# Patient Record
Sex: FEMALE | Race: BLACK OR AFRICAN AMERICAN | NOT HISPANIC OR LATINO | Employment: OTHER | URBAN - METROPOLITAN AREA
[De-identification: names, ages, dates, MRNs, and addresses within clinical notes are randomized per-mention and may not be internally consistent; named-entity substitution may affect disease eponyms.]

---

## 2017-01-04 DIAGNOSIS — G47.00 INSOMNIA: ICD-10-CM

## 2017-01-07 RX ORDER — ZOLPIDEM TARTRATE 10 MG/1
TABLET ORAL
Qty: 30 TABLET | Refills: 0 | Status: SHIPPED | OUTPATIENT
Start: 2017-01-07 | End: 2017-04-21 | Stop reason: SDUPTHER

## 2017-04-03 ENCOUNTER — OFFICE VISIT (OUTPATIENT)
Dept: FAMILY MEDICINE | Facility: CLINIC | Age: 52
End: 2017-04-03
Payer: COMMERCIAL

## 2017-04-03 ENCOUNTER — LAB VISIT (OUTPATIENT)
Dept: LAB | Facility: HOSPITAL | Age: 52
End: 2017-04-03
Attending: FAMILY MEDICINE
Payer: COMMERCIAL

## 2017-04-03 VITALS
TEMPERATURE: 98 F | HEART RATE: 69 BPM | SYSTOLIC BLOOD PRESSURE: 140 MMHG | BODY MASS INDEX: 38.5 KG/M2 | DIASTOLIC BLOOD PRESSURE: 90 MMHG | OXYGEN SATURATION: 97 % | WEIGHT: 209.19 LBS | HEIGHT: 62 IN | RESPIRATION RATE: 16 BRPM

## 2017-04-03 DIAGNOSIS — E11.9 TYPE 2 DIABETES MELLITUS WITHOUT COMPLICATION, WITHOUT LONG-TERM CURRENT USE OF INSULIN: ICD-10-CM

## 2017-04-03 DIAGNOSIS — I10 ESSENTIAL HYPERTENSION: ICD-10-CM

## 2017-04-03 DIAGNOSIS — T78.40XA ALLERGIC REACTION, INITIAL ENCOUNTER: ICD-10-CM

## 2017-04-03 DIAGNOSIS — I10 ESSENTIAL HYPERTENSION, BENIGN: Primary | ICD-10-CM

## 2017-04-03 DIAGNOSIS — I10 ESSENTIAL HYPERTENSION, BENIGN: ICD-10-CM

## 2017-04-03 LAB
ALBUMIN SERPL BCP-MCNC: 3.7 G/DL
ALP SERPL-CCNC: 65 U/L
ALT SERPL W/O P-5'-P-CCNC: 17 U/L
ANION GAP SERPL CALC-SCNC: 8 MMOL/L
AST SERPL-CCNC: 15 U/L
BILIRUB SERPL-MCNC: 0.9 MG/DL
BUN SERPL-MCNC: 11 MG/DL
CALCIUM SERPL-MCNC: 10 MG/DL
CHLORIDE SERPL-SCNC: 109 MMOL/L
CHOLEST/HDLC SERPL: 5.5 {RATIO}
CO2 SERPL-SCNC: 24 MMOL/L
CREAT SERPL-MCNC: 0.9 MG/DL
EST. GFR  (AFRICAN AMERICAN): >60 ML/MIN/1.73 M^2
EST. GFR  (NON AFRICAN AMERICAN): >60 ML/MIN/1.73 M^2
GLUCOSE SERPL-MCNC: 111 MG/DL
HDL/CHOLESTEROL RATIO: 18.3 %
HDLC SERPL-MCNC: 230 MG/DL
HDLC SERPL-MCNC: 42 MG/DL
LDLC SERPL CALC-MCNC: 159.8 MG/DL
NONHDLC SERPL-MCNC: 188 MG/DL
POTASSIUM SERPL-SCNC: 4.8 MMOL/L
PROT SERPL-MCNC: 8.1 G/DL
SODIUM SERPL-SCNC: 141 MMOL/L
TRIGL SERPL-MCNC: 141 MG/DL

## 2017-04-03 PROCEDURE — 3044F HG A1C LEVEL LT 7.0%: CPT | Mod: S$GLB,,, | Performed by: PHYSICIAN ASSISTANT

## 2017-04-03 PROCEDURE — 99213 OFFICE O/P EST LOW 20 MIN: CPT | Mod: S$GLB,,, | Performed by: PHYSICIAN ASSISTANT

## 2017-04-03 PROCEDURE — 83036 HEMOGLOBIN GLYCOSYLATED A1C: CPT

## 2017-04-03 PROCEDURE — 36415 COLL VENOUS BLD VENIPUNCTURE: CPT | Mod: PO

## 2017-04-03 PROCEDURE — 80053 COMPREHEN METABOLIC PANEL: CPT

## 2017-04-03 PROCEDURE — 4010F ACE/ARB THERAPY RXD/TAKEN: CPT | Mod: S$GLB,,, | Performed by: PHYSICIAN ASSISTANT

## 2017-04-03 PROCEDURE — 3080F DIAST BP >= 90 MM HG: CPT | Mod: S$GLB,,, | Performed by: PHYSICIAN ASSISTANT

## 2017-04-03 PROCEDURE — 3077F SYST BP >= 140 MM HG: CPT | Mod: S$GLB,,, | Performed by: PHYSICIAN ASSISTANT

## 2017-04-03 PROCEDURE — 80061 LIPID PANEL: CPT

## 2017-04-03 PROCEDURE — 99999 PR PBB SHADOW E&M-EST. PATIENT-LVL IV: CPT | Mod: PBBFAC,,, | Performed by: PHYSICIAN ASSISTANT

## 2017-04-03 PROCEDURE — 1160F RVW MEDS BY RX/DR IN RCRD: CPT | Mod: S$GLB,,, | Performed by: PHYSICIAN ASSISTANT

## 2017-04-03 RX ORDER — SPIRONOLACTONE 25 MG/1
25 TABLET ORAL DAILY
Qty: 90 TABLET | Refills: 0 | Status: SHIPPED | OUTPATIENT
Start: 2017-04-03 | End: 2017-08-29

## 2017-04-03 RX ORDER — AMLODIPINE AND BENAZEPRIL HYDROCHLORIDE 10; 20 MG/1; MG/1
1 CAPSULE ORAL DAILY
Qty: 90 CAPSULE | Refills: 0 | Status: SHIPPED | OUTPATIENT
Start: 2017-04-03 | End: 2017-12-12 | Stop reason: SDUPTHER

## 2017-04-03 RX ORDER — DESONIDE 0.5 MG/G
CREAM TOPICAL
Qty: 60 G | Refills: 0 | Status: SHIPPED | OUTPATIENT
Start: 2017-04-03 | End: 2019-02-15

## 2017-04-03 RX ORDER — HYDROCODONE BITARTRATE AND ACETAMINOPHEN 7.5; 325 MG/1; MG/1
TABLET ORAL
Refills: 0 | COMMUNITY
Start: 2017-03-22 | End: 2017-08-29

## 2017-04-03 RX ORDER — METOPROLOL SUCCINATE 25 MG/1
25 TABLET, EXTENDED RELEASE ORAL DAILY
Qty: 90 TABLET | Refills: 0 | Status: SHIPPED | OUTPATIENT
Start: 2017-04-03 | End: 2018-06-14

## 2017-04-03 NOTE — MR AVS SNAPSHOT
[facility]  [address]  [place] [address]  Phone: [phone]  Fax: [phone]                  Monalisa Valverdeite   4/3/2017 8:20 AM   Office Visit    Description:  Female : 1965   Provider:  Roberta Erickson PA-C   Department:  [facility]           Reason for Visit     Hypertension           Diagnoses this Visit        Comments    Essential hypertension, benign    -  Primary     Allergic reaction, initial encounter         Type 2 diabetes mellitus without complication, without long-term current use of insulin         Essential hypertension                To Do List           Future Appointments        Provider Department Dept Phone    4/3/2017 9:15 AM LAB, [facility] [phone]      Goals (5 Years of Data)     None      Follow-Up and Disposition     Return in about 4 weeks (around 2017) for BP.       These Medications        Disp Refills Start End    metoprolol succinate (TOPROL-XL) 25 MG 24 hr tablet 90 tablet 0 4/3/2017     Take 1 tablet (25 mg total) by mouth once daily. - Oral    Pharmacy: [facility] [address] [place] [address] [place] -  [facility] #: [phone]       amlodipine-benazepril 10-20mg (LOTREL) 10-20 mg per capsule 90 capsule 0 4/3/2017     Take 1 capsule by mouth once daily. - Oral    Pharmacy: [facility] [address] [place] [address] [place] -  [facility] #: [phone]       desonide (DESOWEN) 0.05 % cream 60 g 0 4/3/2017     Apply BID to affected areas on face x 1-2 wks then prn flares only    Pharmacy: [facility] [address] [place] [address] [place] -  [facility] #: [phone]       spironolactone (ALDACTONE) 25 MG tablet 90 tablet 0 4/3/2017 4/3/2018    Take 1 tablet (25 mg total) by mouth once daily. - Oral    [facility] [address] [place], [place] [address] -  [facility] #:  [phone]         [facility]     [facility] Care [facility] - 24/7 Assistance  Unless otherwise directed by your provider, please contact [facility], our nurse care line that is available for 24/7 assistance.     Registered nurses in the [facility] provide: appointment scheduling, clinical advisement, health education, and other advisory services.  Call: [phone] (toll free)               Medications           Message regarding Medications     Verify the changes and/or additions to your medication regime listed below are the same as discussed with your clinician today.  If any of these changes or additions are incorrect, please notify your healthcare provider.        CHANGE how you are taking these medications     Start Taking Instead of    metoprolol succinate (TOPROL-XL) 25 MG 24 hr tablet metoprolol succinate (TOPROL-XL) 25 MG 24 hr tablet    Dosage:  Take 1 tablet (25 mg total) by mouth once daily. Dosage:  TAKE 1 TABLET BY MOUTH DAILY    Reason for Change:  Reorder       STOP taking these medications     promethazine-dextromethorphan (PROMETHAZINE-DM) 6.25-15 mg/5 mL Syrp 1 teaspoon PO Q 8 hrs PRN cough. DO NOT DRIVE AFTER TAKING MED    triamcinolone acetonide 0.1% (KENALOG) 0.1 % cream Apply topically 2 (two) times daily. To affected areas on trunk and extremities x 1-2 wks then prn flares only           Verify that the below list of medications is an accurate representation of the medications you are currently taking.  If none reported, the list may be blank. If incorrect, please contact your healthcare provider. Carry this list with you in case of emergency.           Current Medications     desonide (DESOWEN) 0.05 % cream Apply BID to affected areas on face x 1-2 wks then prn flares only    doxycycline (MONODOX) 100 MG capsule Take 1 capsule (100 mg total) by mouth 2 (two) times daily.    hydrocodone-acetaminophen 7.5-325mg (NORCO) 7.5-325 mg per tablet TK 1 T PO  Q 8 H  "PRN    ibuprofen (ADVIL,MOTRIN) 800 MG tablet Take 1 tablet (800 mg total) by mouth 3 (three) times daily.    pravastatin (PRAVACHOL) 40 MG tablet Take 1 tablet (40 mg total) by mouth once daily.    zolpidem (AMBIEN) 10 mg Tab TAKE 1 TABLET BY MOUTH EVERY NIGHT AT BEDTIME AS NEEDED FOR SLEEP    amlodipine-benazepril 10-20mg (LOTREL) 10-20 mg per capsule Take 1 capsule by mouth once daily.    gabapentin (NEURONTIN) 300 MG capsule Take 1 capsule (300 mg total) by mouth every evening.    metoprolol succinate (TOPROL-XL) 25 MG 24 hr tablet Take 1 tablet (25 mg total) by mouth once daily.    spironolactone (ALDACTONE) 25 MG tablet Take 1 tablet (25 mg total) by mouth once daily.           Clinical Reference Information           Your Vitals Were     BP Pulse Temp Resp Height Weight    140/90 (BP Location: Left arm, Patient Position: Sitting, BP Method: Manual) 69 97.8 °F (36.6 °C) (Oral) 16 5' 2" (1.575 m) 94.9 kg (209 lb 3.5 oz)    SpO2 BMI             97% 38.27 kg/m2         Blood Pressure          Most Recent Value    BP  (!)  140/90      Allergies as of 4/3/2017     Keflex [Cephalexin]    Vicodin [Hydrocodone-acetaminophen]      Immunizations Administered on Date of Encounter - 4/3/2017     None      Orders Placed During Today's Visit     Future Labs/Procedures Expected by Expires    Comprehensive metabolic panel  4/3/2017 6/2/2018    Hemoglobin A1c  4/3/2017 6/2/2018    Lipid panel  4/3/2017 6/2/2018      Smoking Cessation     If you would like to quit smoking:   You may be eligible for free services if you are a [place] resident and started smoking cigarettes before September 1, 1988.  Call the Smoking Cessation Trust (SCT) toll free at (061) 734-7892 or (632) 841-1803.   Call 0-800-QUIT-NOW if you do not meet the above criteria.   Contact us via email: [email or web address]   View our website for more information: [email or web address]        Language Assistance Services     ATTENTION: Language " assistance services are available, free of charge. Please call [phone].      ATENCIÓN: Si habla nella, tiene a randall disposición servicios gratuitos de asistencia lingüística. Llame al [phone].     CHÚ Ý: N?u b?n nói Ti?ng Vi?t, có các d?ch v? h? tr? ngôn ng? mi?n phí dành cho b?n. G?i s? [phone].         [facility] complies with applicable Federal civil rights laws and does not discriminate on the basis of race, color, national origin, age, disability, or sex.

## 2017-04-03 NOTE — PROGRESS NOTES
Subjective:       Patient ID: Monalisa Carson is a 52 y.o. female.    Chief Complaint: Hypertension (medication refill )    HPI: 51 yo female presents for HTN. Has been out of medication for 3 weeks. Denies chest pain, SOB, leg swelling.    Review of Systems   Constitutional: Negative for fever.   Respiratory: Negative for shortness of breath.    Cardiovascular: Negative for chest pain and leg swelling.       Objective:      Physical Exam   Constitutional: She is oriented to person, place, and time. She appears well-developed and well-nourished. No distress.   Cardiovascular: Normal rate and regular rhythm.    No murmur heard.  Pulses:       Dorsalis pedis pulses are 2+ on the right side, and 2+ on the left side.   Pulmonary/Chest: Effort normal and breath sounds normal. No respiratory distress.   Feet:   Right Foot:   Protective Sensation: 10 sites tested. 10 sites sensed.   Skin Integrity: Negative for skin breakdown, erythema or dry skin.   Left Foot:   Protective Sensation: 10 sites tested. 10 sites sensed.   Skin Integrity: Negative for skin breakdown, erythema or dry skin.   Neurological: She is alert and oriented to person, place, and time.   Skin: Skin is warm and dry. She is not diaphoretic.   Psychiatric: Her behavior is normal.   Vitals reviewed.      Assessment:       1. Essential hypertension, benign    2. Allergic reaction, initial encounter    3. Type 2 diabetes mellitus without complication, without long-term current use of insulin    4. Essential hypertension        Plan:         Monalisa was seen today for hypertension.    Diagnoses and all orders for this visit:    Essential hypertension, benign  -     metoprolol succinate (TOPROL-XL) 25 MG 24 hr tablet; Take 1 tablet (25 mg total) by mouth once daily.  -     amlodipine-benazepril 10-20mg (LOTREL) 10-20 mg per capsule; Take 1 capsule by mouth once daily.  -     Comprehensive metabolic panel; Future  -     Lipid panel; Future  -     Return in 1  month, pt states she will call to schedule     Allergic reaction, initial encounter  -     desonide (DESOWEN) 0.05 % cream; Apply BID to affected areas on face x 1-2 wks then prn flares only    Type 2 diabetes mellitus without complication, without long-term current use of insulin  -     Hemoglobin A1c; Future  -     Foot exam today    Essential hypertension  -     spironolactone (ALDACTONE) 25 MG tablet; Take 1 tablet (25 mg total) by mouth once daily.

## 2017-04-04 LAB
ESTIMATED AVG GLUCOSE: 143 MG/DL
HBA1C MFR BLD HPLC: 6.6 %

## 2017-04-04 PROCEDURE — 99283 EMERGENCY DEPT VISIT LOW MDM: CPT | Mod: 25

## 2017-04-04 PROCEDURE — 96372 THER/PROPH/DIAG INJ SC/IM: CPT

## 2017-04-05 ENCOUNTER — HOSPITAL ENCOUNTER (EMERGENCY)
Facility: HOSPITAL | Age: 52
Discharge: HOME | End: 2017-04-05
Attending: EMERGENCY MEDICINE
Payer: COMMERCIAL

## 2017-04-05 VITALS
TEMPERATURE: 99 F | DIASTOLIC BLOOD PRESSURE: 71 MMHG | HEIGHT: 63 IN | SYSTOLIC BLOOD PRESSURE: 127 MMHG | WEIGHT: 209 LBS | OXYGEN SATURATION: 96 % | BODY MASS INDEX: 37.03 KG/M2 | HEART RATE: 64 BPM | RESPIRATION RATE: 16 BRPM

## 2017-04-05 DIAGNOSIS — M25.511 ACUTE PAIN OF RIGHT SHOULDER: ICD-10-CM

## 2017-04-05 DIAGNOSIS — G89.18 ACUTE POST-OPERATIVE PAIN: Primary | ICD-10-CM

## 2017-04-05 PROCEDURE — 63600175 PHARM REV CODE 636 W HCPCS: Performed by: EMERGENCY MEDICINE

## 2017-04-05 RX ORDER — HYDROMORPHONE HYDROCHLORIDE 2 MG/ML
1 INJECTION, SOLUTION INTRAMUSCULAR; INTRAVENOUS; SUBCUTANEOUS
Status: COMPLETED | OUTPATIENT
Start: 2017-04-05 | End: 2017-04-05

## 2017-04-05 RX ADMIN — HYDROMORPHONE HYDROCHLORIDE 1 MG: 2 INJECTION INTRAMUSCULAR; INTRAVENOUS; SUBCUTANEOUS at 02:04

## 2017-04-05 NOTE — ED PROVIDER NOTES
Encounter Date: 4/4/2017    SCRIBE #1 NOTE: I, Sai Cunha, am scribing for, and in the presence of,  Malcolm Gallegos MD. I have scribed the following portions of the note - Other sections scribed: HPI/ROS.       History     Chief Complaint   Patient presents with    Shoulder Pain     States had right shoulder surgery today by  this am pain medication prescribed not controlling the pain.     Review of patient's allergies indicates:   Allergen Reactions    Keflex [cephalexin]     Vicodin [hydrocodone-acetaminophen]      itch     HPI Comments: CC: Shoulder Pain    HPI: This 52 y.o. Female with anxiety, heart murmur, and HTN presents to the ED c/o severe (pain rating 10/10), right shoulder pain. The pt had right shoulder surgery earlier today by Dr. Garcia in order to shave down a bone spur. The pt was prescribed hydrocodone but it is not sufficiently controlling the pain. The pt is allergic to Percocet, she stated that she gets itchy when she takes it. The pt denies fever, nausea, or vomiting.      The history is provided by the patient. No  was used.     Past Medical History:   Diagnosis Date    Allergy     Anxiety     Heart murmur     Hypertension      Past Surgical History:   Procedure Laterality Date    CHOLECYSTECTOMY      2001 april    HYSTERECTOMY      partial  1996    WISDOM TOOTH EXTRACTION       Family History   Problem Relation Age of Onset    Diabetes Mother     Hyperlipidemia Mother     Hypertension Mother     Diabetes Father     Hypertension Father     Stroke Father     Depression Sister     Hypertension Sister     Stroke Sister     Cancer Maternal Aunt      breast x 2    Cancer Maternal Uncle      prostate x 2    Cancer Paternal Aunt      breast    Cancer Maternal Grandfather      lung    Early death Paternal Grandmother     Early death Paternal Grandfather     Melanoma Neg Hx     Eczema Neg Hx     Lupus Neg Hx     Psoriasis Neg Hx       Social History   Substance Use Topics    Smoking status: Current Some Day Smoker     Packs/day: 0.00     Years: 0.50    Smokeless tobacco: Former User     Types: Chew    Alcohol use 0.0 oz/week     0 Standard drinks or equivalent per week      Comment: seldom     Review of Systems   Constitutional: Negative for fever.   HENT: Negative for sore throat.    Respiratory: Negative for shortness of breath.    Cardiovascular: Negative for chest pain.   Gastrointestinal: Negative for nausea.   Genitourinary: Negative for dysuria.   Musculoskeletal: Negative for back pain.        (+) R shoulder pain   Skin: Negative for rash.   Neurological: Negative for weakness.   Hematological: Does not bruise/bleed easily.       Physical Exam   Initial Vitals   BP Pulse Resp Temp SpO2   04/04/17 2353 04/04/17 2353 04/04/17 2353 04/04/17 2353 04/04/17 2353   128/59 75 18 97.5 °F (36.4 °C) 96 %     Physical Exam    Vitals reviewed.  Constitutional: She appears well-developed and well-nourished.   HENT:   Head: Normocephalic and atraumatic.   Nose: Nose normal.   Mouth/Throat: No oropharyngeal exudate.   Eyes: EOM are normal. Pupils are equal, round, and reactive to light.   Neck: Normal range of motion. Neck supple. No JVD present.   Cardiovascular: Regular rhythm and normal heart sounds. Exam reveals no gallop and no friction rub.    No murmur heard.  Pulmonary/Chest: Breath sounds normal. No stridor. No respiratory distress. She has no wheezes. She has no rhonchi. She has no rales. She exhibits no tenderness.   Musculoskeletal: Normal range of motion. She exhibits no edema.        Right shoulder: She exhibits tenderness and pain.        Right elbow: Normal.       Right wrist: Normal.   Post operative bandages to right shoulder. No active bleeding. FROM at elbow and wrist. Skin appears nl. No mottling. RUM intact with full sensation. Brisk cap refill and strong radial pulse.    Neurological: She is alert and oriented to person,  place, and time. She has normal strength. No sensory deficit.   Skin: Skin is warm and dry.   Psychiatric: She has a normal mood and affect. Thought content normal.         ED Course   Procedures  Labs Reviewed - No data to display         Medical decision-making:    The patient received a medical screening exam. If performed, the EKG was independently evaluated by me and is pending final cardiology evaluation.  If performed, all radiographic studies were independently evaluated by me and are pending final radiology evaluation. If labs were ordered, they were reviewed. Vital signs are independently assessed by me.  If performed, the pulse oximetry was independently evaluated by me.  I decided to obtain the patient's past medical record.  If available, I reviewed the patient's past medical record, including most recent labs and radiology reports.    Pt presents with post operative pain. There is no active bleeding and there are no motor/sensory deficits. I consider, but doubt acute infectious or vascular etiology. Pt was treated for pain in the ED and instructed to follow up with Dr. Garcia. She states understanding.    The results and physical exam findings were reviewed with the patient. Pt agrees with assessment, disposition and treatment plan and has no further questions or complaints at this time.    CRISTOPHER Gallegos M.D. 1:53 AM 4/5/2017                 Scribe Attestation:   Scribe #1: I performed the above scribed service and the documentation accurately describes the services I performed. I attest to the accuracy of the note.    Attending Attestation:           Physician Attestation for Scribe:  Physician Attestation Statement for Scribe #1: I, Malcolm Gallegos MD, reviewed documentation, as scribed by Sai Cunha in my presence, and it is both accurate and complete.                 ED Course     Clinical Impression:   The primary encounter diagnosis was Acute post-operative pain. A diagnosis of Acute  pain of right shoulder was also pertinent to this visit.          Malcolm Gallegos MD  04/05/17 0159

## 2017-04-05 NOTE — DISCHARGE INSTRUCTIONS
"Return to the Emergency Department of any acute worsening of your symptoms or for any other concern.     You should return to the ED for fever/chills, shortness of breath, chest pain, weakness or "passing out".     Pt should take all medications as prescribed.    Pt should follow up with PCP as soon as possible.    The risks associated with not taking your medications as prescribed and not following up with your Primary Care doctor or sub specialist includes worsening of your condition, pain, disability, loss of function or livelihood, and death      CRISTOPHER Gallegos M.D. 1:50 AM 4/5/2017      Our goal in the emergency department is to always give you outstanding care and exceptional service. You may receive a survey by mail or e-mail in the next week regarding your experience in our ED. We would greatly appreciate your completing and returning the survey. Your feedback provides us with a way to recognize our staff who give very good care and it helps us learn how to improve when your experience was below our aspiration of excellence.     "

## 2017-04-05 NOTE — ED AVS SNAPSHOT
[facility]  [address] [place] [address]               Monalisa Carson   2017  1:40 AM   ED    Description:  Female : 1965   Department:  [facility]           Your Care was Coordinated By:     Provider Role From To    Malcolm Gallegos MD Attending Provider 17 0138 --      Reason for Visit     Shoulder Pain           Diagnoses this Visit        Comments    Acute post-operative pain    -  Primary     Acute pain of right shoulder           ED Disposition     ED Disposition Condition Comment    Discharge             To Do List           Follow-up Information     Schedule an appointment as soon as possible for a visit with Toro aGrcia MD.    Specialty:  Orthopedic Surgery    Contact information:    [address] [place] [address]  [phone]          Follow up with [facility].    Specialty:  Emergency Medicine    Why:  As needed, If symptoms worsen    Contact information:    [address] [place] [address] [phone]      [facility]     [facility] Nurse Care Line -  Assistance  Unless otherwise directed by your provider, please contact [facility], our nurse care line that is available for  assistance.     Registered nurses in the [facility] provide: appointment scheduling, clinical advisement, health education, and other advisory services.  Call: [phone] (toll free)               Medications           Message regarding Medications     Verify the changes and/or additions to your medication regime listed below are the same as discussed with your clinician today.  If any of these changes or additions are incorrect, please notify your healthcare provider.        These medications were administered today        Dose Freq    hydromorphone (PF) injection 1 mg 1 mg ED 1 Time    Sig: Inject 0.5 mLs (1 mg total) into the muscle ED 1  "Time.    Class: Normal    Route: Intramuscular           Verify that the below list of medications is an accurate representation of the medications you are currently taking.  If none reported, the list may be blank. If incorrect, please contact your healthcare provider. Carry this list with you in case of emergency.           Current Medications     amlodipine-benazepril 10-20mg (LOTREL) 10-20 mg per capsule Take 1 capsule by mouth once daily.    desonide (DESOWEN) 0.05 % cream Apply BID to affected areas on face x 1-2 wks then prn flares only    doxycycline (MONODOX) 100 MG capsule Take 1 capsule (100 mg total) by mouth 2 (two) times daily.    gabapentin (NEURONTIN) 300 MG capsule Take 1 capsule (300 mg total) by mouth every evening.    hydrocodone-acetaminophen 7.5-325mg (NORCO) 7.5-325 mg per tablet TK 1 T PO  Q 8 H PRN    hydromorphone (PF) injection 1 mg Inject 0.5 mLs (1 mg total) into the muscle ED 1 Time.    ibuprofen (ADVIL,MOTRIN) 800 MG tablet Take 1 tablet (800 mg total) by mouth 3 (three) times daily.    metoprolol succinate (TOPROL-XL) 25 MG 24 hr tablet Take 1 tablet (25 mg total) by mouth once daily.    pravastatin (PRAVACHOL) 40 MG tablet Take 1 tablet (40 mg total) by mouth once daily.    spironolactone (ALDACTONE) 25 MG tablet Take 1 tablet (25 mg total) by mouth once daily.    zolpidem (AMBIEN) 10 mg Tab TAKE 1 TABLET BY MOUTH EVERY NIGHT AT BEDTIME AS NEEDED FOR SLEEP           Clinical Reference Information           Your Vitals Were     BP Pulse Temp Resp Height Weight    128/59 (BP Location: Right arm, Patient Position: Sitting) 75 97.5 °F (36.4 °C) (Oral) 18 5' 3" (1.6 m) 94.8 kg (209 lb)    SpO2 BMI             96% 37.02 kg/m2         Allergies as of 4/5/2017        Reactions    Keflex [Cephalexin]     Vicodin [Hydrocodone-acetaminophen]     itch      Immunizations Administered on Date of Encounter - 4/5/2017     None      ED Micro, Lab, POCT     None      ED Imaging Orders     None    " "    Discharge Instructions       Return to the Emergency Department of any acute worsening of your symptoms or for any other concern.     You should return to the ED for fever/chills, shortness of breath, chest pain, weakness or "passing out".     Pt should take all medications as prescribed.    Pt should follow up with PCP as soon as possible.    The risks associated with not taking your medications as prescribed and not following up with your Primary Care doctor or sub specialist includes worsening of your condition, pain, disability, loss of function or livelihood, and death      CRISTOPHER Gallegos M.D. 1:50 AM 4/5/2017      Our goal in the emergency department is to always give you outstanding care and exceptional service. You may receive a survey by mail or e-mail in the next week regarding your experience in our ED. We would greatly appreciate your completing and returning the survey. Your feedback provides us with a way to recognize our staff who give very good care and it helps us learn how to improve when your experience was below our aspiration of excellence.       Discharge References/Attachments     POST OP WOUND CHECK, PAIN (ENGLISH)      Smoking Cessation     If you would like to quit smoking:   You may be eligible for free services if you are a [place] resident and started smoking cigarettes before September 1, 1988.  Call the Smoking Cessation Trust (SCT) toll free at (430) 876-5616 or (623) 591-2536.   Call 0-379-QUIT-NOW if you do not meet the above criteria.   Contact us via email: [email or web address]   View our website for more information: [email or web address]         [facility] complies with applicable Federal civil rights laws and does not discriminate on the basis of race, color, national origin, age, disability, or sex.        Language Assistance Services     ATTENTION: Language assistance services are available, free of charge. Please call [phone]. "      ATENCIÓN: Si habla español, tiene a randall disposición servicios gratuitos de asistencia lingüística. Llame al [phone].     CHÚ Ý: N?u b?n nói Ti?ng Vi?t, có các d?ch v? h? tr? ngôn ng? mi?n phí dành cho b?n. G?i s? [phone].

## 2017-04-21 ENCOUNTER — TELEPHONE (OUTPATIENT)
Dept: FAMILY MEDICINE | Facility: CLINIC | Age: 52
End: 2017-04-21

## 2017-04-21 DIAGNOSIS — G47.00 INSOMNIA: ICD-10-CM

## 2017-04-21 RX ORDER — ZOLPIDEM TARTRATE 10 MG/1
TABLET ORAL
Qty: 30 TABLET | Refills: 0 | Status: SHIPPED | OUTPATIENT
Start: 2017-04-21 | End: 2017-05-27 | Stop reason: SDUPTHER

## 2017-05-27 DIAGNOSIS — G47.00 INSOMNIA: ICD-10-CM

## 2017-05-29 RX ORDER — ZOLPIDEM TARTRATE 10 MG/1
TABLET ORAL
Qty: 30 TABLET | Refills: 0 | Status: SHIPPED | OUTPATIENT
Start: 2017-05-29 | End: 2017-07-04 | Stop reason: SDUPTHER

## 2017-07-04 DIAGNOSIS — G47.00 INSOMNIA: ICD-10-CM

## 2017-07-05 RX ORDER — ZOLPIDEM TARTRATE 10 MG/1
TABLET ORAL
Qty: 30 TABLET | Refills: 0 | Status: SHIPPED | OUTPATIENT
Start: 2017-07-05 | End: 2017-08-13 | Stop reason: SDUPTHER

## 2017-07-13 ENCOUNTER — TELEPHONE (OUTPATIENT)
Dept: FAMILY MEDICINE | Facility: CLINIC | Age: 52
End: 2017-07-13

## 2017-07-13 DIAGNOSIS — L85.3 DRY SKIN: Primary | ICD-10-CM

## 2017-07-13 NOTE — TELEPHONE ENCOUNTER
Patient requesting referral for dermatology due to dry skin a face, and neck for years. Pt have been controlling it with daly butter but requesting for referral. Please advise. Note referral dept will call pt

## 2017-07-13 NOTE — TELEPHONE ENCOUNTER
----- Message from Aleida Miller sent at 7/13/2017  1:22 PM CDT -----  Contact: self  Pt called concerning an appt/referral for dermatology. Please advise. [phone]

## 2017-08-13 DIAGNOSIS — G47.00 INSOMNIA: ICD-10-CM

## 2017-08-14 RX ORDER — ZOLPIDEM TARTRATE 10 MG/1
TABLET ORAL
Qty: 30 TABLET | Refills: 0 | Status: SHIPPED | OUTPATIENT
Start: 2017-08-14 | End: 2017-08-24 | Stop reason: SDUPTHER

## 2017-08-24 DIAGNOSIS — G47.00 INSOMNIA: ICD-10-CM

## 2017-08-24 RX ORDER — ZOLPIDEM TARTRATE 10 MG/1
TABLET ORAL
Qty: 30 TABLET | Refills: 0 | Status: SHIPPED | OUTPATIENT
Start: 2017-08-24 | End: 2017-09-26 | Stop reason: SDUPTHER

## 2017-08-29 ENCOUNTER — HOSPITAL ENCOUNTER (EMERGENCY)
Facility: OTHER | Age: 52
Discharge: HOME | End: 2017-08-29
Attending: EMERGENCY MEDICINE
Payer: COMMERCIAL

## 2017-08-29 VITALS
HEART RATE: 71 BPM | OXYGEN SATURATION: 99 % | DIASTOLIC BLOOD PRESSURE: 100 MMHG | BODY MASS INDEX: 37.21 KG/M2 | TEMPERATURE: 98 F | HEIGHT: 63 IN | SYSTOLIC BLOOD PRESSURE: 185 MMHG | WEIGHT: 210 LBS | RESPIRATION RATE: 22 BRPM

## 2017-08-29 DIAGNOSIS — R06.00 DYSPNEA: ICD-10-CM

## 2017-08-29 DIAGNOSIS — I10 ESSENTIAL HYPERTENSION: ICD-10-CM

## 2017-08-29 DIAGNOSIS — J20.9 ACUTE BRONCHITIS, UNSPECIFIED ORGANISM: Primary | ICD-10-CM

## 2017-08-29 DIAGNOSIS — R05.9 COUGH: ICD-10-CM

## 2017-08-29 LAB
ALBUMIN SERPL-MCNC: 3.6 G/DL (ref 3.3–5.5)
ALP SERPL-CCNC: 66 U/L (ref 42–141)
BILIRUB SERPL-MCNC: 0.5 MG/DL (ref 0.2–1.6)
BUN SERPL-MCNC: 5 MG/DL (ref 7–22)
CALCIUM SERPL-MCNC: 9.4 MG/DL (ref 8–10.3)
CHLORIDE SERPL-SCNC: 99 MMOL/L (ref 98–108)
CREAT SERPL-MCNC: 0.8 MG/DL (ref 0.6–1.2)
GLUCOSE SERPL-MCNC: 87 MG/DL (ref 73–118)
POC ALT (SGPT): 20 U/L (ref 10–47)
POC AST (SGOT): 26 U/L (ref 11–38)
POC CARDIAC TROPONIN I: 0 NG/ML
POC D-DI: 645 NG/ML (ref 0–450)
POC TCO2: 25 MMOL/L (ref 18–33)
POTASSIUM BLD-SCNC: 3.8 MMOL/L (ref 3.6–5.1)
PROTEIN, POC: 7.8 G/DL (ref 6.4–8.1)
SAMPLE: NORMAL
SODIUM BLD-SCNC: 140 MMOL/L (ref 128–145)

## 2017-08-29 PROCEDURE — 94640 AIRWAY INHALATION TREATMENT: CPT

## 2017-08-29 PROCEDURE — 85379 FIBRIN DEGRADATION QUANT: CPT

## 2017-08-29 PROCEDURE — 99285 EMERGENCY DEPT VISIT HI MDM: CPT | Mod: 25

## 2017-08-29 PROCEDURE — 85025 COMPLETE CBC W/AUTO DIFF WBC: CPT

## 2017-08-29 PROCEDURE — 25000003 PHARM REV CODE 250: Performed by: EMERGENCY MEDICINE

## 2017-08-29 PROCEDURE — 94760 N-INVAS EAR/PLS OXIMETRY 1: CPT

## 2017-08-29 PROCEDURE — 63600175 PHARM REV CODE 636 W HCPCS: Performed by: EMERGENCY MEDICINE

## 2017-08-29 PROCEDURE — 84484 ASSAY OF TROPONIN QUANT: CPT

## 2017-08-29 PROCEDURE — 96374 THER/PROPH/DIAG INJ IV PUSH: CPT

## 2017-08-29 PROCEDURE — 25000242 PHARM REV CODE 250 ALT 637 W/ HCPCS: Performed by: EMERGENCY MEDICINE

## 2017-08-29 PROCEDURE — 25500020 PHARM REV CODE 255

## 2017-08-29 PROCEDURE — 80053 COMPREHEN METABOLIC PANEL: CPT

## 2017-08-29 RX ORDER — PREDNISONE 20 MG/1
40 TABLET ORAL DAILY
Qty: 10 TABLET | Refills: 0 | Status: SHIPPED | OUTPATIENT
Start: 2017-08-29 | End: 2017-09-03

## 2017-08-29 RX ORDER — ALBUTEROL SULFATE 90 UG/1
1-2 AEROSOL, METERED RESPIRATORY (INHALATION) EVERY 4 HOURS PRN
Qty: 1 INHALER | Refills: 0 | Status: SHIPPED | OUTPATIENT
Start: 2017-08-29 | End: 2017-12-22 | Stop reason: SDUPTHER

## 2017-08-29 RX ORDER — BENZONATATE 100 MG/1
100 CAPSULE ORAL
Status: COMPLETED | OUTPATIENT
Start: 2017-08-29 | End: 2017-08-29

## 2017-08-29 RX ORDER — ALBUTEROL SULFATE 0.83 MG/ML
2.5 SOLUTION RESPIRATORY (INHALATION) EVERY 4 HOURS PRN
Status: DISCONTINUED | OUTPATIENT
Start: 2017-08-29 | End: 2017-08-29 | Stop reason: HOSPADM

## 2017-08-29 RX ORDER — DEXAMETHASONE SODIUM PHOSPHATE 4 MG/ML
4 INJECTION, SOLUTION INTRA-ARTICULAR; INTRALESIONAL; INTRAMUSCULAR; INTRAVENOUS; SOFT TISSUE
Status: COMPLETED | OUTPATIENT
Start: 2017-08-29 | End: 2017-08-29

## 2017-08-29 RX ORDER — BENZONATATE 100 MG/1
100 CAPSULE ORAL 3 TIMES DAILY PRN
Qty: 15 CAPSULE | Refills: 0 | Status: SHIPPED | OUTPATIENT
Start: 2017-08-29 | End: 2017-09-08

## 2017-08-29 RX ADMIN — BENZONATATE 100 MG: 100 CAPSULE ORAL at 04:08

## 2017-08-29 RX ADMIN — DEXAMETHASONE SODIUM PHOSPHATE 4 MG: 4 INJECTION, SOLUTION INTRAMUSCULAR; INTRAVENOUS at 02:08

## 2017-08-29 RX ADMIN — ALBUTEROL SULFATE 2.5 MG: 2.5 SOLUTION RESPIRATORY (INHALATION) at 02:08

## 2017-08-29 RX ADMIN — IOHEXOL: 350 INJECTION, SOLUTION INTRAVENOUS at 03:08

## 2017-08-29 NOTE — ED NOTES
Upon arrival pt very anxious / currently pt resting in ED room #2 W/ NAD seen by Dr Alanis and orders pending

## 2017-08-29 NOTE — ED NOTES
Pt identifiers checked and correct.    LOC: The patient is awake, alert and aware of environment with an appropriate affect, the patient is oriented x 3 and speaking appropriately.   APPEARANCE: Patient resting comfortably and in no acute distress, patient is clean and well groomed, patient's clothing is properly fastened.   SKIN: The skin is warm and dry, color consistent with ethnicity, patient has normal skin turgor and moist mucus membranes, skin intact, no breakdown or bruising noted.   MUSCULOSKELETAL: Patient moving all extremities spontaneously, no obvious swelling or deformities noted.   RESPIRATORY: Airway is open and patent, respirations are spontaneous, patient has a normal effort and rate, no accessory muscle use noted. + cough non-productive   CARDIAC: Patient has a normal rate and regular rhythm, no periphreal edema noted, capillary refill < 3 seconds.   ABDOMEN: Soft and non tender to palpation, no distention noted, active bowel sounds present in all four quadrants.   NEUROLOGIC: PERRL, 3 mm bilaterally, eyes open spontaneously, behavior appropriate to situation, follows commands, facial expression symmetrical, bilateral hand grasp equal and even, purposeful motor response noted, normal sensation in all extremities when touched with a finger.

## 2017-08-29 NOTE — ED PROVIDER NOTES
Encounter Date: 8/29/2017       History     Chief Complaint   Patient presents with    Shortness of Breath     denies chest pain    Cough     non-productive since this am     Ms Favorite reports onset of cough yesterday, mostly dry.  She reports onset of worsening cough around 10:30 AM today and has been unable to stop coughing since.  Family brought her in due to complaints of shortness of breath and inability to stop coughing.  She denies any chest pain or any other pain.  She reports some vomiting but only after a significant coughing episode.  She reports history of hypertension and she does not take her blood pressure medications regularly. Sxs of coughing became severe today and family brought her to er.       The history is provided by the patient.     Review of patient's allergies indicates:   Allergen Reactions    Keflex [cephalexin] Hives    Vicodin [hydrocodone-acetaminophen] Itching     itch     Past Medical History:   Diagnosis Date    Allergy     Anxiety     Heart murmur     Hypertension      Past Surgical History:   Procedure Laterality Date    CHOLECYSTECTOMY      2001 april    HYSTERECTOMY      partial  1996    SHOULDER SURGERY Right     WISDOM TOOTH EXTRACTION       Family History   Problem Relation Age of Onset    Diabetes Mother     Hyperlipidemia Mother     Hypertension Mother     Diabetes Father     Hypertension Father     Stroke Father     Depression Sister     Hypertension Sister     Stroke Sister     Cancer Maternal Aunt      breast x 2    Cancer Maternal Uncle      prostate x 2    Cancer Paternal Aunt      breast    Cancer Maternal Grandfather      lung    Early death Paternal Grandmother     Early death Paternal Grandfather     Melanoma Neg Hx     Eczema Neg Hx     Lupus Neg Hx     Psoriasis Neg Hx      Social History   Substance Use Topics    Smoking status: Current Some Day Smoker     Packs/day: 0.00     Years: 0.50    Smokeless tobacco: Former User      Types: Chew    Alcohol use 0.0 oz/week      Comment: seldom     Review of Systems   Respiratory: Positive for cough and shortness of breath.    Cardiovascular: Negative for chest pain and leg swelling.   Gastrointestinal: Positive for vomiting.        Post tussive vomiting   All other systems reviewed and are negative.      Physical Exam     Initial Vitals [08/29/17 1330]   BP Pulse Resp Temp SpO2   (!) 185/100 83 (!) 26 98.4 °F (36.9 °C) 99 %      MAP       128.33         Physical Exam    Nursing note and vitals reviewed.  Constitutional: She appears well-developed and well-nourished.   Tearful.  Dry cough noted.  Some mild hyperventilation/tachypnea noted   HENT:   Head: Normocephalic and atraumatic.   Eyes: EOM are normal.   Cardiovascular: Normal rate and regular rhythm.   Murmur heard.  systolic murmur noted   Abdominal: Soft. She exhibits no distension. There is no tenderness.   Musculoskeletal: Normal range of motion. She exhibits no edema.   Neurological: She is alert and oriented to person, place, and time. She has normal strength.   Skin: Skin is warm and dry. Capillary refill takes less than 2 seconds. No rash noted. No erythema.   Psychiatric: She has a normal mood and affect. Her behavior is normal. Thought content normal.         ED Course   Procedures  Labs Reviewed   POCT D DIMER - Abnormal; Notable for the following:        Result Value    POC D- (*)     All other components within normal limits   POCT CMP - Abnormal; Notable for the following:     POC BUN 5 (*)     All other components within normal limits   TROPONIN ISTAT   POCT CBC   POCT CMP   POCT D DIMER   POCT TROPONIN             Medical Decision Making:   Clinical Tests:   Lab Tests: Ordered and Reviewed  The following lab test(s) were unremarkable: CBC, CMP and Troponin       <> Summary of Lab: Elevated ddimer noted and discussed w pt.   Radiological Study: Ordered and Reviewed  ED Management:  Chest x-ray and CT PE protocol  negative.  Patient feels much better.  Coughing has stopped.  She is relaxing calmly and breathing normally with no tachypnea.  She is moving air well.  She feels better and wants to go home.  She is stable for discharge.  I will treat as bronchitis.  There is no indication for further emergent intervention or evaluation this time. Questions answered.  We discussed worrisome signs that should probably need to return to the ER should they occur.  There is no indication for further emergent intervention or evaluation this time.        Imaging Results          CTA Chest Non-Coronary (PE Study) (Final result)  Result time 08/29/17 15:26:53    Final result by Lorenzo Matos MD (08/29/17 15:26:53)                 Impression:        No evidence of pulmonary embolism.         Electronically signed by: Dr. Lorenzo Matos MD  Date:     08/29/17  Time:    15:26              Narrative:    CT OF THE CHEST WITH IV CONTRAST, PE PROTOCOL:     TECHNIQUE: Initial non-contrast scanograms/topograms were done. Using multi-detector helical CT technique, and thin collimation, images of the chest were performed during the arterial phase during the injection of 88 cc Omni 350 IV. 2D post-processing coronal reconstructions of the pulmonary arteries were performed.     COMPARISON: Chest radiograph dates are 29/17    Technical quality of the exam: Satisfactory.    FINDINGS:    - Pulmonary arteries: There are no filling defects in the pulmonary arteries to indicate a pulmonary embolism.  - Lungs: No nodules or infiltrates.  - Pleura: No thickening or fluid.  - Thyroid: Visualized thyroid is normal.  - Axilla: No adenopathy.  - Mediastinum: No adenopathy.  - Charisse: No adenopathy.  - Heart/Aorta: Negative.  - Osseous: No acute abnormality.   - Visualized upper abdomen: Negative.                             X-Ray Chest AP Portable (Final result)  Result time 08/29/17 14:25:40    Final result by Lorenzo Matos MD (08/29/17 14:25:40)                  Impression:        No acute cardiothoracic disease evident.      Electronically signed by: Dr. Lorenzo Matos MD  Date:     08/29/17  Time:    14:25              Narrative:    PORTABLE AP CHEST:      Comparison: 10/13/2008    Findings:      The lungs are clear. The cardiac silhouette is normal in size. The pulmonary vasculature is unremarkable. There is no pleural effusion or pneumothorax. The hilar and mediastinal contours are unremarkable. There are no acute bony abnormalities.                                  Admission on 08/29/2017, Discharged on 08/29/2017   Component Date Value Ref Range Status    POC Cardiac Troponin I 08/29/2017 0.00  <0.09 ng/mL Final    Sample 08/29/2017 ALAN   Final    POC D-DI 08/29/2017 645* 0.0 - 450.0 ng/mL Final    Albumin, POC 08/29/2017 3.6  3.3 - 5.5 g/dL Final    Alkaline Phosphatase, POC 08/29/2017 66  42 - 141 U/L Final    ALT (SGPT), POC 08/29/2017 20  10 - 47 U/L Final    AST (SGOT), POC 08/29/2017 26  11 - 38 U/L Final    POC BUN 08/29/2017 5* 7 - 22 mg/dL Final    Calcium, POC 08/29/2017 9.4  8.0 - 10.3 mg/dL Final    POC Chloride 08/29/2017 99  98 - 108 mmol/L Final    POC Creatinine 08/29/2017 0.8  0.6 - 1.2 mg/dL Final    POC Glucose 08/29/2017 87  73 - 118 mg/dL Final    POC Potassium 08/29/2017 3.8  3.6 - 5.1 mmol/L Final    POC Sodium 08/29/2017 140  128 - 145 mmol/L Final    Bilirubin 08/29/2017 0.5  0.2 - 1.6 mg/dL Final    POC TCO2 08/29/2017 25  18 - 33 mmol/L Final    Protein 08/29/2017 7.8  6.4 - 8.1 g/dL Final                ED Course     Clinical Impression:   The primary encounter diagnosis was Acute bronchitis, unspecified organism. Diagnoses of Cough, Dyspnea, and Essential hypertension were also pertinent to this visit.                           Sylvester Ragsdale MD  09/01/17 0053

## 2017-09-26 DIAGNOSIS — G47.00 INSOMNIA: ICD-10-CM

## 2017-09-27 RX ORDER — ZOLPIDEM TARTRATE 10 MG/1
TABLET ORAL
Qty: 30 TABLET | Refills: 2 | Status: SHIPPED | OUTPATIENT
Start: 2017-09-27 | End: 2017-12-22 | Stop reason: SDUPTHER

## 2017-12-12 DIAGNOSIS — I10 ESSENTIAL HYPERTENSION, BENIGN: ICD-10-CM

## 2017-12-12 RX ORDER — AMLODIPINE AND BENAZEPRIL HYDROCHLORIDE 10; 20 MG/1; MG/1
1 CAPSULE ORAL DAILY
Qty: 90 CAPSULE | Refills: 0 | Status: SHIPPED | OUTPATIENT
Start: 2017-12-12 | End: 2018-03-09 | Stop reason: SDUPTHER

## 2017-12-22 ENCOUNTER — OFFICE VISIT (OUTPATIENT)
Dept: FAMILY MEDICINE | Facility: CLINIC | Age: 52
End: 2017-12-22
Payer: COMMERCIAL

## 2017-12-22 ENCOUNTER — LAB VISIT (OUTPATIENT)
Dept: LAB | Facility: HOSPITAL | Age: 52
End: 2017-12-22
Attending: FAMILY MEDICINE
Payer: COMMERCIAL

## 2017-12-22 VITALS
DIASTOLIC BLOOD PRESSURE: 78 MMHG | OXYGEN SATURATION: 97 % | TEMPERATURE: 99 F | HEART RATE: 79 BPM | BODY MASS INDEX: 38.01 KG/M2 | RESPIRATION RATE: 14 BRPM | HEIGHT: 63 IN | SYSTOLIC BLOOD PRESSURE: 118 MMHG | WEIGHT: 214.5 LBS

## 2017-12-22 DIAGNOSIS — G47.00 INSOMNIA, UNSPECIFIED TYPE: ICD-10-CM

## 2017-12-22 DIAGNOSIS — Z13.5 SCREENING FOR EYE CONDITION: ICD-10-CM

## 2017-12-22 DIAGNOSIS — R06.2 WHEEZING: ICD-10-CM

## 2017-12-22 DIAGNOSIS — Z00.00 WELL ADULT EXAM: Primary | ICD-10-CM

## 2017-12-22 DIAGNOSIS — Z12.31 SCREENING MAMMOGRAM, ENCOUNTER FOR: ICD-10-CM

## 2017-12-22 DIAGNOSIS — I10 HYPERTENSION, UNSPECIFIED TYPE: ICD-10-CM

## 2017-12-22 DIAGNOSIS — Z00.00 WELL ADULT EXAM: ICD-10-CM

## 2017-12-22 LAB
ALBUMIN SERPL BCP-MCNC: 3.4 G/DL
ALP SERPL-CCNC: 59 U/L
ALT SERPL W/O P-5'-P-CCNC: 25 U/L
ANION GAP SERPL CALC-SCNC: 6 MMOL/L
AST SERPL-CCNC: 20 U/L
BILIRUB SERPL-MCNC: 0.7 MG/DL
BUN SERPL-MCNC: 9 MG/DL
CALCIUM SERPL-MCNC: 9.4 MG/DL
CHLORIDE SERPL-SCNC: 107 MMOL/L
CHOLEST SERPL-MCNC: 199 MG/DL
CHOLEST/HDLC SERPL: 5.7 {RATIO}
CO2 SERPL-SCNC: 25 MMOL/L
CREAT SERPL-MCNC: 0.8 MG/DL
EST. GFR  (AFRICAN AMERICAN): >60 ML/MIN/1.73 M^2
EST. GFR  (NON AFRICAN AMERICAN): >60 ML/MIN/1.73 M^2
ESTIMATED AVG GLUCOSE: 140 MG/DL
GLUCOSE SERPL-MCNC: 113 MG/DL
HBA1C MFR BLD HPLC: 6.5 %
HDLC SERPL-MCNC: 35 MG/DL
HDLC SERPL: 17.6 %
LDLC SERPL CALC-MCNC: 127.4 MG/DL
NONHDLC SERPL-MCNC: 164 MG/DL
POTASSIUM SERPL-SCNC: 4.5 MMOL/L
PROT SERPL-MCNC: 7.8 G/DL
SODIUM SERPL-SCNC: 138 MMOL/L
TRIGL SERPL-MCNC: 183 MG/DL

## 2017-12-22 PROCEDURE — 83036 HEMOGLOBIN GLYCOSYLATED A1C: CPT

## 2017-12-22 PROCEDURE — 90686 IIV4 VACC NO PRSV 0.5 ML IM: CPT | Mod: S$GLB,,, | Performed by: FAMILY MEDICINE

## 2017-12-22 PROCEDURE — 36415 COLL VENOUS BLD VENIPUNCTURE: CPT | Mod: PO

## 2017-12-22 PROCEDURE — 80053 COMPREHEN METABOLIC PANEL: CPT

## 2017-12-22 PROCEDURE — 80061 LIPID PANEL: CPT

## 2017-12-22 PROCEDURE — 90471 IMMUNIZATION ADMIN: CPT | Mod: S$GLB,,, | Performed by: FAMILY MEDICINE

## 2017-12-22 PROCEDURE — 99999 PR PBB SHADOW E&M-EST. PATIENT-LVL IV: CPT | Mod: PBBFAC,,, | Performed by: FAMILY MEDICINE

## 2017-12-22 PROCEDURE — 99396 PREV VISIT EST AGE 40-64: CPT | Mod: 25,S$GLB,, | Performed by: FAMILY MEDICINE

## 2017-12-22 RX ORDER — ZOLPIDEM TARTRATE 10 MG/1
10 TABLET ORAL NIGHTLY
Qty: 30 TABLET | Refills: 5 | Status: SHIPPED | OUTPATIENT
Start: 2017-12-22 | End: 2018-03-24 | Stop reason: SDUPTHER

## 2017-12-22 RX ORDER — ALBUTEROL SULFATE 90 UG/1
1-2 AEROSOL, METERED RESPIRATORY (INHALATION) EVERY 4 HOURS PRN
Qty: 1 INHALER | Refills: 2 | Status: SHIPPED | OUTPATIENT
Start: 2017-12-22 | End: 2020-02-24 | Stop reason: SDUPTHER

## 2017-12-22 NOTE — PROGRESS NOTES
Subjective:       Patient ID: Monalisa Carson is a 52 y.o. female.    Chief Complaint: Annual Exam    HPI:  Here for annual exam.  Reports cough and fever over past week.  Patient with chronic stable HTN, diet controlled diabetes and HLD.  Review of Systems   Constitutional: Negative for appetite change, chills, diaphoresis, fatigue and fever.   HENT: Negative for hearing loss, sinus pressure and trouble swallowing.    Eyes: Negative for visual disturbance.   Respiratory: Positive for cough and wheezing. Negative for chest tightness and shortness of breath.    Cardiovascular: Negative for chest pain, palpitations and leg swelling.   Gastrointestinal: Negative for abdominal pain, blood in stool, constipation, diarrhea, nausea and vomiting.   Genitourinary: Negative for difficulty urinating, dysuria, hematuria, menstrual problem, pelvic pain and vaginal discharge.   Musculoskeletal: Negative for back pain, joint swelling and neck pain.   Skin: Negative for rash.   Neurological: Negative for dizziness, numbness and headaches.   Hematological: Negative for adenopathy. Does not bruise/bleed easily.   Psychiatric/Behavioral: Negative for dysphoric mood and sleep disturbance. The patient is not nervous/anxious.        Objective:      Physical Exam   Constitutional: She is oriented to person, place, and time. She appears well-developed and well-nourished.   Eyes: No scleral icterus.   Neck: Normal range of motion. Neck supple. No thyromegaly present.   Cardiovascular: Normal rate and regular rhythm.  Exam reveals no gallop and no friction rub.    Murmur heard.  Pulmonary/Chest: Effort normal and breath sounds normal. She has no wheezes. She has no rales.   Abdominal: Soft. Bowel sounds are normal. She exhibits no distension and no mass. There is no hepatosplenomegaly. There is no tenderness.   Musculoskeletal: She exhibits no edema.   Lymphadenopathy:     She has no cervical adenopathy.     She has no axillary  adenopathy.        Right: No supraclavicular adenopathy present.        Left: No supraclavicular adenopathy present.   Neurological: She is alert and oriented to person, place, and time.   Skin: Skin is warm and dry. No rash noted.   Psychiatric: She has a normal mood and affect. Her behavior is normal.         Assessment:       1. Well adult exam    2. Insomnia, unspecified type    3. Wheezing    4. Screening mammogram, encounter for    5. Hypertension, unspecified type    6. Screening for eye condition        Plan:       Well adult exam  -     Comprehensive metabolic panel; Future; Expected date: 12/22/2017  -     Lipid panel; Future; Expected date: 12/22/2017  -     Hemoglobin A1c; Future; Expected date: 12/22/2017    Insomnia, unspecified type  -     zolpidem (AMBIEN) 10 mg Tab; Take 1 tablet (10 mg total) by mouth every evening.  Dispense: 30 tablet; Refill: 5    Wheezing  -     albuterol 90 mcg/actuation inhaler; Inhale 1-2 puffs into the lungs every 4 (four) hours as needed for Wheezing. Rescue  Dispense: 1 Inhaler; Refill: 2    Screening mammogram, encounter for  -     Mammo Digital Screening Bilateral With CAD; Future; Expected date: 12/22/2017    Hypertension, unspecified type    Screening for eye condition  -     Ambulatory referral to Optometry    Other orders  -     Influenza - Quadrivalent (3 years & older) (PF)        Return in about 6 months (around 6/22/2018).

## 2017-12-26 ENCOUNTER — TELEPHONE (OUTPATIENT)
Dept: OPTOMETRY | Facility: CLINIC | Age: 52
End: 2017-12-26

## 2017-12-27 ENCOUNTER — HOSPITAL ENCOUNTER (OUTPATIENT)
Dept: RADIOLOGY | Facility: HOSPITAL | Age: 52
Discharge: HOME | End: 2017-12-27
Attending: FAMILY MEDICINE
Payer: COMMERCIAL

## 2017-12-27 DIAGNOSIS — Z12.31 SCREENING MAMMOGRAM, ENCOUNTER FOR: ICD-10-CM

## 2017-12-27 PROCEDURE — 77067 SCR MAMMO BI INCL CAD: CPT | Mod: 26,,, | Performed by: RADIOLOGY

## 2017-12-27 PROCEDURE — 77067 SCR MAMMO BI INCL CAD: CPT | Mod: TC,PO

## 2017-12-27 PROCEDURE — 77063 BREAST TOMOSYNTHESIS BI: CPT | Mod: 26,,, | Performed by: RADIOLOGY

## 2018-03-09 DIAGNOSIS — I10 ESSENTIAL HYPERTENSION, BENIGN: ICD-10-CM

## 2018-03-09 RX ORDER — AMLODIPINE AND BENAZEPRIL HYDROCHLORIDE 10; 20 MG/1; MG/1
1 CAPSULE ORAL DAILY
Qty: 90 CAPSULE | Refills: 0 | Status: SHIPPED | OUTPATIENT
Start: 2018-03-09 | End: 2018-05-18

## 2018-03-09 NOTE — TELEPHONE ENCOUNTER
Informed pt of refill below. Pt verbally understood. Pt is requesting refill on fluid pill. She says it is not HCTZ because she was taken off of that medication. She says it is a different one. Please advise        Amlodipine refill. Last refill.12/12/2017 LOV 12/22/2017

## 2018-03-19 NOTE — TELEPHONE ENCOUNTER
Informed pt that only diuretic on file is HCTZ. Pt still saying that is not the medication she is talking about. Pharmacy also doesn't have a different diuretic on file. Patient is scheduled for appointment 4/4/2018 .

## 2018-03-19 NOTE — TELEPHONE ENCOUNTER
Please inform patient the only diuretic I see in med history is HCTZ.  Please verify with pharmacy any diuretics on patient file.

## 2018-03-24 DIAGNOSIS — G47.00 INSOMNIA, UNSPECIFIED TYPE: ICD-10-CM

## 2018-03-27 RX ORDER — ZOLPIDEM TARTRATE 10 MG/1
TABLET ORAL
Qty: 30 TABLET | Refills: 5 | Status: SHIPPED | OUTPATIENT
Start: 2018-03-27 | End: 2018-10-03 | Stop reason: SDUPTHER

## 2018-04-04 ENCOUNTER — OFFICE VISIT (OUTPATIENT)
Dept: FAMILY MEDICINE | Facility: CLINIC | Age: 53
End: 2018-04-04
Payer: COMMERCIAL

## 2018-04-04 VITALS
WEIGHT: 214 LBS | SYSTOLIC BLOOD PRESSURE: 126 MMHG | DIASTOLIC BLOOD PRESSURE: 70 MMHG | HEIGHT: 63 IN | OXYGEN SATURATION: 97 % | TEMPERATURE: 98 F | HEART RATE: 68 BPM | BODY MASS INDEX: 37.92 KG/M2 | RESPIRATION RATE: 18 BRPM

## 2018-04-04 DIAGNOSIS — N18.2 CONTROLLED TYPE 2 DIABETES MELLITUS WITH STAGE 2 CHRONIC KIDNEY DISEASE, WITHOUT LONG-TERM CURRENT USE OF INSULIN: Primary | ICD-10-CM

## 2018-04-04 DIAGNOSIS — M54.16 LUMBAR BACK PAIN WITH RADICULOPATHY AFFECTING RIGHT LOWER EXTREMITY: ICD-10-CM

## 2018-04-04 DIAGNOSIS — N95.1 MENOPAUSAL SYMPTOMS: ICD-10-CM

## 2018-04-04 DIAGNOSIS — F41.9 ANXIETY: ICD-10-CM

## 2018-04-04 DIAGNOSIS — E11.22 CONTROLLED TYPE 2 DIABETES MELLITUS WITH STAGE 2 CHRONIC KIDNEY DISEASE, WITHOUT LONG-TERM CURRENT USE OF INSULIN: Primary | ICD-10-CM

## 2018-04-04 DIAGNOSIS — E78.5 HYPERLIPIDEMIA, UNSPECIFIED HYPERLIPIDEMIA TYPE: ICD-10-CM

## 2018-04-04 PROCEDURE — 99999 PR PBB SHADOW E&M-EST. PATIENT-LVL III: CPT | Mod: PBBFAC,,, | Performed by: FAMILY MEDICINE

## 2018-04-04 PROCEDURE — 3074F SYST BP LT 130 MM HG: CPT | Mod: CPTII,S$GLB,, | Performed by: FAMILY MEDICINE

## 2018-04-04 PROCEDURE — 3044F HG A1C LEVEL LT 7.0%: CPT | Mod: CPTII,S$GLB,, | Performed by: FAMILY MEDICINE

## 2018-04-04 PROCEDURE — 99214 OFFICE O/P EST MOD 30 MIN: CPT | Mod: S$GLB,,, | Performed by: FAMILY MEDICINE

## 2018-04-04 PROCEDURE — 3078F DIAST BP <80 MM HG: CPT | Mod: CPTII,S$GLB,, | Performed by: FAMILY MEDICINE

## 2018-04-04 RX ORDER — NAPROXEN SODIUM 220 MG/1
81 TABLET, FILM COATED ORAL DAILY
Start: 2018-04-04 | End: 2022-05-17

## 2018-04-04 RX ORDER — METFORMIN HYDROCHLORIDE 500 MG/1
500 TABLET ORAL
Qty: 90 TABLET | Refills: 3 | Status: SHIPPED | OUTPATIENT
Start: 2018-04-04 | End: 2018-08-31

## 2018-04-04 RX ORDER — PRAVASTATIN SODIUM 20 MG/1
20 TABLET ORAL NIGHTLY
Qty: 90 TABLET | Refills: 3 | Status: SHIPPED | OUTPATIENT
Start: 2018-04-04 | End: 2019-02-15

## 2018-04-04 RX ORDER — VENLAFAXINE HYDROCHLORIDE 75 MG/1
75 CAPSULE, EXTENDED RELEASE ORAL DAILY
Qty: 30 CAPSULE | Refills: 11 | Status: SHIPPED | OUTPATIENT
Start: 2018-04-04 | End: 2018-06-14

## 2018-04-04 NOTE — PROGRESS NOTES
Subjective:       Patient ID: Monalisa Carson     Chief Complaint: Diabetes; Hypertension; Hyperlipidemia; and Hot Flashes      Isabel Carson is a 53 y.o. female.here for routine check up.  Patient reports severe hot flashes.  Has chronic back pain with RLE weakness x 1 year.  Reports increased anxiety taking care of her mother.  HTN stable.  No symptoms of hyperglycemia.      Review of patient's allergies indicates:   Allergen Reactions    Keflex [cephalexin] Hives    Vicodin [hydrocodone-acetaminophen] Itching     itch       Current Outpatient Prescriptions:     albuterol 90 mcg/actuation inhaler, Inhale 1-2 puffs into the lungs every 4 (four) hours as needed for Wheezing. Rescue, Disp: 1 Inhaler, Rfl: 2    amlodipine-benazepril 10-20mg (LOTREL) 10-20 mg per capsule, Take 1 capsule by mouth once daily., Disp: 90 capsule, Rfl: 0    desonide (DESOWEN) 0.05 % cream, Apply BID to affected areas on face x 1-2 wks then prn flares only, Disp: 60 g, Rfl: 0    metoprolol succinate (TOPROL-XL) 25 MG 24 hr tablet, Take 1 tablet (25 mg total) by mouth once daily., Disp: 90 tablet, Rfl: 0    zolpidem (AMBIEN) 10 mg Tab, TAKE 1 TABLET BY MOUTH EVERY NIGHT AT BEDTIME, Disp: 30 tablet, Rfl: 5    aspirin 81 MG Chew, Take 1 tablet (81 mg total) by mouth once daily., Disp: , Rfl:     gabapentin (NEURONTIN) 300 MG capsule, Take 1 capsule (300 mg total) by mouth every evening., Disp: 30 capsule, Rfl: 5    metFORMIN (GLUCOPHAGE) 500 MG tablet, Take 1 tablet (500 mg total) by mouth with lunch., Disp: 90 tablet, Rfl: 3    pravastatin (PRAVACHOL) 20 MG tablet, Take 1 tablet (20 mg total) by mouth every evening., Disp: 90 tablet, Rfl: 3    venlafaxine (EFFEXOR-XR) 75 MG 24 hr capsule, Take 1 capsule (75 mg total) by mouth once daily., Disp: 30 capsule, Rfl: 11    Past Medical History:   Diagnosis Date    Allergy     Anxiety     Heart murmur     Hypertension      Review of Systems   Constitutional: Positive for  appetite change (increased). Negative for unexpected weight change.   Eyes: Negative for visual disturbance.   Respiratory: Negative for shortness of breath.    Cardiovascular: Negative for chest pain, palpitations and leg swelling.   Endocrine: Negative for polydipsia, polyphagia and polyuria.        Hot flashes   Musculoskeletal: Positive for back pain.   Neurological: Positive for weakness (right leg x 1 year). Negative for dizziness, numbness and headaches.   Psychiatric/Behavioral: Positive for sleep disturbance. The patient is nervous/anxious.        Objective:      Physical Exam   Constitutional: She is oriented to person, place, and time. She appears well-developed and well-nourished.   HENT:   Head: Normocephalic.   Neck: Normal range of motion. Neck supple. No thyromegaly present.   Cardiovascular: Normal rate and regular rhythm.    Murmur heard.  Pulses:       Dorsalis pedis pulses are 2+ on the right side, and 2+ on the left side.   Pulmonary/Chest: Effort normal and breath sounds normal. No respiratory distress. She has no wheezes. She has no rales.   Abdominal: Soft. Bowel sounds are normal. She exhibits no distension and no mass. There is no tenderness.   Musculoskeletal: She exhibits no edema.        Lumbar back: She exhibits no tenderness and no deformity.        Right foot: There is no deformity.        Left foot: There is no deformity.   Feet:   Right Foot:   Skin Integrity: Negative for ulcer.   Left Foot:   Skin Integrity: Negative for ulcer.   Lymphadenopathy:     She has no cervical adenopathy.   Neurological: She is alert and oriented to person, place, and time. She has normal strength. She displays no atrophy.   Skin: Skin is warm and dry. No rash noted.   Psychiatric: She has a normal mood and affect.       Assessment:       1. Controlled type 2 diabetes mellitus with stage 2 chronic kidney disease, without long-term current use of insulin    2. Menopausal symptoms    3. Anxiety    4. Low  back pain, non-specific    5. Lumbar back pain with radiculopathy affecting right lower extremity    6. Hyperlipidemia, unspecified hyperlipidemia type        Plan:       Monalisa was seen today for diabetes, hypertension, hyperlipidemia and hot flashes.    Diagnoses and all orders for this visit:    Controlled type 2 diabetes mellitus with stage 2 chronic kidney disease, without long-term current use of insulin  Clinically stable.  Encourage weight loss  -     metFORMIN (GLUCOPHAGE) 500 MG tablet; Take 1 tablet (500 mg total) by mouth with lunch.  -     aspirin 81 MG Chew; Take 1 tablet (81 mg total) by mouth once daily.    Menopausal symptoms  -     venlafaxine (EFFEXOR-XR) 75 MG 24 hr capsule; Take 1 capsule (75 mg total) by mouth once daily.    Anxiety  -     venlafaxine (EFFEXOR-XR) 75 MG 24 hr capsule; Take 1 capsule (75 mg total) by mouth once daily.    Lumbar back pain with radiculopathy affecting right lower extremity  -     MRI Lumbar Spine Without Contrast; Future    Hyperlipidemia, unspecified hyperlipidemia type  -     pravastatin (PRAVACHOL) 20 MG tablet; Take 1 tablet (20 mg total) by mouth every evening.

## 2018-04-06 ENCOUNTER — HOSPITAL ENCOUNTER (OUTPATIENT)
Dept: RADIOLOGY | Facility: HOSPITAL | Age: 53
Discharge: HOME | End: 2018-04-06
Attending: FAMILY MEDICINE
Payer: COMMERCIAL

## 2018-04-06 DIAGNOSIS — M54.16 LUMBAR BACK PAIN WITH RADICULOPATHY AFFECTING RIGHT LOWER EXTREMITY: ICD-10-CM

## 2018-04-06 PROCEDURE — 72148 MRI LUMBAR SPINE W/O DYE: CPT | Mod: 26,,, | Performed by: RADIOLOGY

## 2018-04-06 PROCEDURE — 72148 MRI LUMBAR SPINE W/O DYE: CPT | Mod: TC

## 2018-04-17 ENCOUNTER — TELEPHONE (OUTPATIENT)
Dept: FAMILY MEDICINE | Facility: CLINIC | Age: 53
End: 2018-04-17

## 2018-04-17 ENCOUNTER — PATIENT MESSAGE (OUTPATIENT)
Dept: FAMILY MEDICINE | Facility: CLINIC | Age: 53
End: 2018-04-17

## 2018-04-17 DIAGNOSIS — M54.10 RADICULAR SYNDROME OF RIGHT LEG: ICD-10-CM

## 2018-04-17 RX ORDER — GABAPENTIN 300 MG/1
300 CAPSULE ORAL 3 TIMES DAILY
Qty: 270 CAPSULE | Refills: 0 | Status: SHIPPED | OUTPATIENT
Start: 2018-04-17 | End: 2018-08-31

## 2018-04-17 NOTE — TELEPHONE ENCOUNTER
Read pt comment under MRI results from Dr See; pt verbalized understanding; states she is unable to do physical therapy until school lets out at the end of may; she would like medication to help with the pain until she is able to go to physical therapy; please advise

## 2018-04-17 NOTE — TELEPHONE ENCOUNTER
----- Message from Radha Zambrano sent at 4/17/2018  2:16 PM CDT -----  Contact: self  Pt returned call. Please call [phone]

## 2018-04-17 NOTE — TELEPHONE ENCOUNTER
Pt states she has taken aleve and naproxen with no relief; she states the gabapentin did help some but only for short periods of time, ok with trying to increase to taking it more than once daily

## 2018-04-17 NOTE — TELEPHONE ENCOUNTER
----- Message from William Mccall sent at 4/17/2018 12:05 PM CDT -----  Contact: [phone]/PT  Calling to get results from test

## 2018-04-17 NOTE — TELEPHONE ENCOUNTER
No answer; LM informing pt note was sent to her [facility] regarding results and recommendations; call office if unable to access her [facility] message

## 2018-04-27 ENCOUNTER — OFFICE VISIT (OUTPATIENT)
Dept: FAMILY MEDICINE | Facility: CLINIC | Age: 53
End: 2018-04-27
Payer: COMMERCIAL

## 2018-04-27 VITALS
BODY MASS INDEX: 38.16 KG/M2 | TEMPERATURE: 98 F | OXYGEN SATURATION: 97 % | DIASTOLIC BLOOD PRESSURE: 82 MMHG | HEART RATE: 67 BPM | SYSTOLIC BLOOD PRESSURE: 124 MMHG | RESPIRATION RATE: 18 BRPM | HEIGHT: 63 IN | WEIGHT: 215.38 LBS

## 2018-04-27 DIAGNOSIS — B96.89 ACUTE BACTERIAL SINUSITIS: Primary | ICD-10-CM

## 2018-04-27 DIAGNOSIS — J01.90 ACUTE BACTERIAL SINUSITIS: Primary | ICD-10-CM

## 2018-04-27 PROCEDURE — 3079F DIAST BP 80-89 MM HG: CPT | Mod: CPTII,S$GLB,, | Performed by: PHYSICIAN ASSISTANT

## 2018-04-27 PROCEDURE — 99999 PR PBB SHADOW E&M-EST. PATIENT-LVL IV: CPT | Mod: PBBFAC,,, | Performed by: PHYSICIAN ASSISTANT

## 2018-04-27 PROCEDURE — 3074F SYST BP LT 130 MM HG: CPT | Mod: CPTII,S$GLB,, | Performed by: PHYSICIAN ASSISTANT

## 2018-04-27 PROCEDURE — 99214 OFFICE O/P EST MOD 30 MIN: CPT | Mod: S$GLB,,, | Performed by: PHYSICIAN ASSISTANT

## 2018-04-27 RX ORDER — AMOXICILLIN AND CLAVULANATE POTASSIUM 875; 125 MG/1; MG/1
1 TABLET, FILM COATED ORAL 2 TIMES DAILY
Qty: 10 TABLET | Refills: 0 | Status: SHIPPED | OUTPATIENT
Start: 2018-04-27 | End: 2018-05-07

## 2018-04-27 RX ORDER — PROMETHAZINE HYDROCHLORIDE AND DEXTROMETHORPHAN HYDROBROMIDE 6.25; 15 MG/5ML; MG/5ML
5 SYRUP ORAL NIGHTLY PRN
Qty: 180 ML | Refills: 0 | Status: SHIPPED | OUTPATIENT
Start: 2018-04-27 | End: 2018-05-07

## 2018-04-27 NOTE — PROGRESS NOTES
Subjective:       Patient ID: Monalisa Carson is a 53 y.o. female.    Chief Complaint: Cough (for 5 days) and Chest Congestion (for 5 days)    Cough   This is a new problem. The current episode started 1 to 4 weeks ago. The problem has been unchanged. The problem occurs constantly. The cough is non-productive. Associated symptoms include chills, ear pain, nasal congestion, rhinorrhea, shortness of breath and wheezing. Pertinent negatives include no chest pain, fever, hemoptysis, sore throat or sweats. Treatments tried: tylenol cold, alkaselzter plus, cough drops. The treatment provided mild relief. Her past medical history is significant for bronchitis. There is no history of pneumonia.     Social History     Social History    Marital status:      Spouse name: N/A    Number of children: N/A    Years of education: N/A     Occupational History     [employer]     Social History Main Topics    Smoking status: Former Smoker     Packs/day: 0.00     Years: 0.50    Smokeless tobacco: Never Used    Alcohol use 0.0 oz/week      Comment: seldom    Drug use: No    Sexual activity: Yes     Partners: Male     Birth control/ protection: None     Other Topics Concern    Are You Pregnant Or Think You May Be? No    Breast-Feeding No     Social History Narrative    No narrative on file       Review of Systems   Constitutional: Positive for chills. Negative for fever.   HENT: Positive for ear pain and rhinorrhea. Negative for sore throat.    Respiratory: Positive for cough, shortness of breath and wheezing. Negative for hemoptysis.    Cardiovascular: Negative for chest pain.       Objective:      Physical Exam   Constitutional: She appears well-developed and well-nourished.   HENT:   Head: Normocephalic and atraumatic.   Right Ear: Tympanic membrane and ear canal normal.   Left Ear: Tympanic membrane and ear canal normal.   Nose: Right sinus exhibits maxillary sinus tenderness. Right sinus  exhibits no frontal sinus tenderness. Left sinus exhibits maxillary sinus tenderness. Left sinus exhibits no frontal sinus tenderness.   Cardiovascular: Normal rate and regular rhythm.    Pulmonary/Chest: Effort normal and breath sounds normal.   Skin: Skin is warm and dry. She is not diaphoretic.   Psychiatric: She has a normal mood and affect. Her behavior is normal.   Vitals reviewed.      Assessment:       1. Acute bacterial sinusitis        Plan:         Monalisa was seen today for cough and chest congestion.    Diagnoses and all orders for this visit:    Acute bacterial sinusitis  -     amoxicillin-clavulanate 875-125mg (AUGMENTIN) 875-125 mg per tablet; Take 1 tablet by mouth 2 (two) times daily.  -     promethazine-dextromethorphan (PROMETHAZINE-DM) 6.25-15 mg/5 mL Syrp; Take 5 mLs by mouth nightly as needed.  -     Increase fluids

## 2018-04-27 NOTE — LETTER
April 27, 2018    Monlaisa Carson  [address] [place] [address]             [facility]  [address] [place] [address]  Phone: [phone]  Fax: [phone] Monalisa Carson was seen in my clinic on 4/27/18. Please excuse her absence on 4/26/27-4/27/18.  She may return to work on 4/30/18.    If you have any questions or concerns, please don't hesitate to call.    Sincerely,     Roberta Erickson PA-C

## 2018-05-04 ENCOUNTER — OFFICE VISIT (OUTPATIENT)
Dept: FAMILY MEDICINE | Facility: CLINIC | Age: 53
End: 2018-05-04
Payer: COMMERCIAL

## 2018-05-04 VITALS
SYSTOLIC BLOOD PRESSURE: 128 MMHG | OXYGEN SATURATION: 97 % | HEIGHT: 63 IN | HEART RATE: 86 BPM | WEIGHT: 219.56 LBS | RESPIRATION RATE: 18 BRPM | DIASTOLIC BLOOD PRESSURE: 76 MMHG | BODY MASS INDEX: 38.9 KG/M2 | TEMPERATURE: 98 F

## 2018-05-04 DIAGNOSIS — R10.2 SUPRAPUBIC PRESSURE: Primary | ICD-10-CM

## 2018-05-04 DIAGNOSIS — E11.22 CONTROLLED TYPE 2 DIABETES MELLITUS WITH STAGE 2 CHRONIC KIDNEY DISEASE, WITHOUT LONG-TERM CURRENT USE OF INSULIN: ICD-10-CM

## 2018-05-04 DIAGNOSIS — N18.2 CONTROLLED TYPE 2 DIABETES MELLITUS WITH STAGE 2 CHRONIC KIDNEY DISEASE, WITHOUT LONG-TERM CURRENT USE OF INSULIN: ICD-10-CM

## 2018-05-04 LAB
BILIRUB SERPL-MCNC: NEGATIVE MG/DL
BLOOD URINE, POC: NEGATIVE
COLOR, POC UA: YELLOW
GLUCOSE UR QL STRIP: 100
KETONES UR QL STRIP: NEGATIVE
LEUKOCYTE ESTERASE URINE, POC: NEGATIVE
NITRITE, POC UA: NEGATIVE
PH, POC UA: 6
PROTEIN, POC: NEGATIVE
SPECIFIC GRAVITY, POC UA: 1.01
UROBILINOGEN, POC UA: NORMAL

## 2018-05-04 PROCEDURE — 81001 URINALYSIS AUTO W/SCOPE: CPT | Mod: S$GLB,,, | Performed by: FAMILY MEDICINE

## 2018-05-04 PROCEDURE — 3074F SYST BP LT 130 MM HG: CPT | Mod: CPTII,S$GLB,, | Performed by: FAMILY MEDICINE

## 2018-05-04 PROCEDURE — 99214 OFFICE O/P EST MOD 30 MIN: CPT | Mod: 25,S$GLB,, | Performed by: FAMILY MEDICINE

## 2018-05-04 PROCEDURE — 87086 URINE CULTURE/COLONY COUNT: CPT

## 2018-05-04 PROCEDURE — 3078F DIAST BP <80 MM HG: CPT | Mod: CPTII,S$GLB,, | Performed by: FAMILY MEDICINE

## 2018-05-04 PROCEDURE — 99999 PR PBB SHADOW E&M-EST. PATIENT-LVL IV: CPT | Mod: PBBFAC,,, | Performed by: FAMILY MEDICINE

## 2018-05-04 PROCEDURE — 3044F HG A1C LEVEL LT 7.0%: CPT | Mod: CPTII,S$GLB,, | Performed by: FAMILY MEDICINE

## 2018-05-04 PROCEDURE — 3008F BODY MASS INDEX DOCD: CPT | Mod: CPTII,S$GLB,, | Performed by: FAMILY MEDICINE

## 2018-05-04 NOTE — PROGRESS NOTES
Subjective:       Patient ID: Monalisa THOMPSON Favorite     Chief Complaint: Diabetes (follow up) and Urinary Tract Infection (pressure when urinating)      Isabel THOMPSON Favorite is a 53 y.o. female. Reports pressure with urination since yesterday.  No dysuria or frequency.  Symptoms improving today.  Diabetes stable.  Recent hba1c 6.5%.      Review of patient's allergies indicates:   Allergen Reactions    Keflex [cephalexin] Hives    Vicodin [hydrocodone-acetaminophen] Itching     itch       Current Outpatient Prescriptions:     albuterol 90 mcg/actuation inhaler, Inhale 1-2 puffs into the lungs every 4 (four) hours as needed for Wheezing. Rescue, Disp: 1 Inhaler, Rfl: 2    amlodipine-benazepril 10-20mg (LOTREL) 10-20 mg per capsule, Take 1 capsule by mouth once daily., Disp: 90 capsule, Rfl: 0    aspirin 81 MG Chew, Take 1 tablet (81 mg total) by mouth once daily., Disp: , Rfl:     desonide (DESOWEN) 0.05 % cream, Apply BID to affected areas on face x 1-2 wks then prn flares only, Disp: 60 g, Rfl: 0    gabapentin (NEURONTIN) 300 MG capsule, Take 1 capsule (300 mg total) by mouth 3 (three) times daily., Disp: 270 capsule, Rfl: 0    metFORMIN (GLUCOPHAGE) 500 MG tablet, Take 1 tablet (500 mg total) by mouth with lunch., Disp: 90 tablet, Rfl: 3    pravastatin (PRAVACHOL) 20 MG tablet, Take 1 tablet (20 mg total) by mouth every evening., Disp: 90 tablet, Rfl: 3    promethazine-dextromethorphan (PROMETHAZINE-DM) 6.25-15 mg/5 mL Syrp, Take 5 mLs by mouth nightly as needed., Disp: 180 mL, Rfl: 0    venlafaxine (EFFEXOR-XR) 75 MG 24 hr capsule, Take 1 capsule (75 mg total) by mouth once daily., Disp: 30 capsule, Rfl: 11    zolpidem (AMBIEN) 10 mg Tab, TAKE 1 TABLET BY MOUTH EVERY NIGHT AT BEDTIME, Disp: 30 tablet, Rfl: 5    amoxicillin-clavulanate 875-125mg (AUGMENTIN) 875-125 mg per tablet, Take 1 tablet by mouth 2 (two) times daily., Disp: 10 tablet, Rfl: 0    metoprolol succinate (TOPROL-XL) 25 MG 24 hr  tablet, Take 1 tablet (25 mg total) by mouth once daily., Disp: 90 tablet, Rfl: 0    Past Medical History:   Diagnosis Date    Allergy     Anxiety     Heart murmur     Hypertension      Review of Systems   Constitutional: Negative for fever.   Genitourinary: Negative for dysuria, flank pain, frequency, hematuria and vaginal bleeding.       Objective:      Physical Exam   Constitutional: She appears well-developed and well-nourished.   Pulmonary/Chest: Effort normal.   Abdominal: Soft. She exhibits no distension and no mass. There is tenderness in the suprapubic area.       UA dipstick:  Neg leuk, blood or nitrates.  100= glucose  Assessment:       1. Suprapubic pressure        Plan:       Monalisa was seen today for diabetes and urinary tract infection.    Diagnoses and all orders for this visit:    Suprapubic pressure  Symptoms much improved today.  Need to r/o UTI.  Obtain Urine culture.      Controlled type 2 diabetes mellitus with stage 2 chronic kidney disease, without long-term current use of insulin  Glucosuria present.  Discuss diet.

## 2018-05-05 LAB — BACTERIA UR CULT: NORMAL

## 2018-05-18 ENCOUNTER — OFFICE VISIT (OUTPATIENT)
Dept: FAMILY MEDICINE | Facility: CLINIC | Age: 53
End: 2018-05-18
Payer: COMMERCIAL

## 2018-05-18 VITALS
BODY MASS INDEX: 38.56 KG/M2 | HEIGHT: 63 IN | TEMPERATURE: 99 F | WEIGHT: 217.63 LBS | RESPIRATION RATE: 16 BRPM | SYSTOLIC BLOOD PRESSURE: 126 MMHG | DIASTOLIC BLOOD PRESSURE: 76 MMHG | HEART RATE: 71 BPM

## 2018-05-18 DIAGNOSIS — M79.89 SWELLING OF BOTH LOWER EXTREMITIES: Primary | ICD-10-CM

## 2018-05-18 PROCEDURE — 3074F SYST BP LT 130 MM HG: CPT | Mod: CPTII,S$GLB,, | Performed by: PHYSICIAN ASSISTANT

## 2018-05-18 PROCEDURE — 3008F BODY MASS INDEX DOCD: CPT | Mod: CPTII,S$GLB,, | Performed by: PHYSICIAN ASSISTANT

## 2018-05-18 PROCEDURE — 99213 OFFICE O/P EST LOW 20 MIN: CPT | Mod: S$GLB,,, | Performed by: PHYSICIAN ASSISTANT

## 2018-05-18 PROCEDURE — 99999 PR PBB SHADOW E&M-EST. PATIENT-LVL IV: CPT | Mod: PBBFAC,,, | Performed by: PHYSICIAN ASSISTANT

## 2018-05-18 PROCEDURE — 3078F DIAST BP <80 MM HG: CPT | Mod: CPTII,S$GLB,, | Performed by: PHYSICIAN ASSISTANT

## 2018-05-18 RX ORDER — BENAZEPRIL HYDROCHLORIDE 20 MG/1
20 TABLET ORAL DAILY
Qty: 30 TABLET | Refills: 0 | Status: SHIPPED | OUTPATIENT
Start: 2018-05-18 | End: 2018-06-18 | Stop reason: SDUPTHER

## 2018-05-18 NOTE — PROGRESS NOTES
Subjective:       Patient ID: Monalisa THOMPSON Favorite is a 53 y.o. female.    Chief Complaint: Foot Swelling (for 1 week)    HPI: 52 yo female presents for bilateral feet swelling over past week. Getting a little better. Better in morning worse as day goes on. No PATTERSON. Sleeps on 3 pillows due to back.     Review of Systems   Respiratory: Negative for shortness of breath.    Cardiovascular: Positive for leg swelling.       Objective:      Physical Exam   Constitutional: She is oriented to person, place, and time. She appears well-developed and well-nourished. No distress.   Musculoskeletal:   Non pitting edema of the bilateral feet, 2+ DP pulses   Neurological: She is alert and oriented to person, place, and time.   Skin: Skin is warm and dry. She is not diaphoretic.   Psychiatric: She has a normal mood and affect. Her behavior is normal.   Vitals reviewed.      Assessment:       1. Swelling of both lower extremities        Plan:         Monalisa was seen today for foot swelling.    Diagnoses and all orders for this visit:    Swelling of both lower extremities  -     benazepril (LOTENSIN) 20 MG tablet; Take 1 tablet (20 mg total) by mouth once daily.  -     Trial off amlodipine. Monitor BP at home. Return in 1 week for nurse check

## 2018-05-29 ENCOUNTER — NURSE TRIAGE (OUTPATIENT)
Dept: ADMINISTRATIVE | Facility: CLINIC | Age: 53
End: 2018-05-29

## 2018-05-29 ENCOUNTER — HOSPITAL ENCOUNTER (EMERGENCY)
Facility: HOSPITAL | Age: 53
Discharge: HOME | End: 2018-05-29
Attending: EMERGENCY MEDICINE
Payer: COMMERCIAL

## 2018-05-29 VITALS
BODY MASS INDEX: 36.54 KG/M2 | DIASTOLIC BLOOD PRESSURE: 67 MMHG | WEIGHT: 214 LBS | HEIGHT: 64 IN | HEART RATE: 69 BPM | OXYGEN SATURATION: 96 % | TEMPERATURE: 98 F | SYSTOLIC BLOOD PRESSURE: 154 MMHG | RESPIRATION RATE: 17 BRPM

## 2018-05-29 DIAGNOSIS — O16.1 ELEVATED BLOOD PRESSURE AFFECTING PREGNANCY IN FIRST TRIMESTER, ANTEPARTUM: ICD-10-CM

## 2018-05-29 DIAGNOSIS — N39.0 URINARY TRACT INFECTION WITHOUT HEMATURIA, SITE UNSPECIFIED: Primary | ICD-10-CM

## 2018-05-29 DIAGNOSIS — R51.9 ACUTE NONINTRACTABLE HEADACHE, UNSPECIFIED HEADACHE TYPE: ICD-10-CM

## 2018-05-29 LAB
ALBUMIN SERPL-MCNC: 3.7 G/DL (ref 3.3–5.5)
ALP SERPL-CCNC: 74 U/L (ref 42–141)
BILIRUB SERPL-MCNC: 0.6 MG/DL (ref 0.2–1.6)
BILIRUBIN, POC UA: NEGATIVE
BLOOD, POC UA: NEGATIVE
BUN SERPL-MCNC: 8 MG/DL (ref 7–22)
CALCIUM SERPL-MCNC: 9.4 MG/DL (ref 8–10.3)
CHLORIDE SERPL-SCNC: 104 MMOL/L (ref 98–108)
CLARITY, POC UA: CLEAR
COLOR, POC UA: YELLOW
CREAT SERPL-MCNC: 0.9 MG/DL (ref 0.6–1.2)
GLUCOSE SERPL-MCNC: 110 MG/DL (ref 73–118)
GLUCOSE, POC UA: NEGATIVE
KETONES, POC UA: NEGATIVE
LEUKOCYTE EST, POC UA: ABNORMAL
NITRITE, POC UA: NEGATIVE
PH UR STRIP: 5.5 [PH]
POC ALT (SGPT): 26 U/L (ref 10–47)
POC AST (SGOT): 26 U/L (ref 11–38)
POC TCO2: 27 MMOL/L (ref 18–33)
POCT GLUCOSE: 139 MG/DL (ref 70–110)
POTASSIUM BLD-SCNC: 4 MMOL/L (ref 3.6–5.1)
PROTEIN, POC UA: NEGATIVE
PROTEIN, POC: 8.4 G/DL (ref 6.4–8.1)
SODIUM BLD-SCNC: 140 MMOL/L (ref 128–145)
SPECIFIC GRAVITY, POC UA: 1.01
UROBILINOGEN, POC UA: 0.2 E.U./DL

## 2018-05-29 PROCEDURE — 96374 THER/PROPH/DIAG INJ IV PUSH: CPT

## 2018-05-29 PROCEDURE — 85025 COMPLETE CBC W/AUTO DIFF WBC: CPT

## 2018-05-29 PROCEDURE — 63600175 PHARM REV CODE 636 W HCPCS: Performed by: NURSE PRACTITIONER

## 2018-05-29 PROCEDURE — 87086 URINE CULTURE/COLONY COUNT: CPT

## 2018-05-29 PROCEDURE — 99284 EMERGENCY DEPT VISIT MOD MDM: CPT | Mod: 25

## 2018-05-29 PROCEDURE — 81003 URINALYSIS AUTO W/O SCOPE: CPT

## 2018-05-29 PROCEDURE — 93010 ELECTROCARDIOGRAM REPORT: CPT | Mod: ,,, | Performed by: INTERNAL MEDICINE

## 2018-05-29 PROCEDURE — 80053 COMPREHEN METABOLIC PANEL: CPT

## 2018-05-29 PROCEDURE — 96375 TX/PRO/DX INJ NEW DRUG ADDON: CPT

## 2018-05-29 PROCEDURE — 93005 ELECTROCARDIOGRAM TRACING: CPT

## 2018-05-29 RX ORDER — NITROFURANTOIN 25; 75 MG/1; MG/1
100 CAPSULE ORAL 2 TIMES DAILY
Qty: 14 CAPSULE | Refills: 0 | Status: SHIPPED | OUTPATIENT
Start: 2018-05-29 | End: 2018-06-05

## 2018-05-29 RX ORDER — ONDANSETRON 2 MG/ML
4 INJECTION INTRAMUSCULAR; INTRAVENOUS
Status: COMPLETED | OUTPATIENT
Start: 2018-05-29 | End: 2018-05-29

## 2018-05-29 RX ORDER — BUTALBITAL, ACETAMINOPHEN AND CAFFEINE 50; 325; 40 MG/1; MG/1; MG/1
1 TABLET ORAL EVERY 6 HOURS PRN
Qty: 12 TABLET | Refills: 0 | Status: SHIPPED | OUTPATIENT
Start: 2018-05-29 | End: 2018-06-28

## 2018-05-29 RX ORDER — KETOROLAC TROMETHAMINE 30 MG/ML
15 INJECTION, SOLUTION INTRAMUSCULAR; INTRAVENOUS
Status: COMPLETED | OUTPATIENT
Start: 2018-05-29 | End: 2018-05-29

## 2018-05-29 RX ADMIN — KETOROLAC TROMETHAMINE 15 MG: 30 INJECTION, SOLUTION INTRAMUSCULAR at 05:05

## 2018-05-29 RX ADMIN — ONDANSETRON 4 MG: 2 INJECTION INTRAMUSCULAR; INTRAVENOUS at 04:05

## 2018-05-29 NOTE — TELEPHONE ENCOUNTER
Pt woke up with slight headache today, took her bp 168/98.  She just took it again and it is 168/102, she still has a slight HA and states she feels weak.  Reason for Disposition   BP > 160 / 100 and any cardiac or neurologic symptoms (e.g., chest pain, difficulty breathing, unsteady gait, blurred vision)    Protocols used: ST HIGH BLOOD PRESSURE-A-[place]

## 2018-05-29 NOTE — ED PROVIDER NOTES
Encounter Date: 5/29/2018       History     Chief Complaint   Patient presents with    Headache     pt reports headache since this morning. pt reports taking blood pressure this morning and it read 168/102.      A 53 years old female with headache since this morning and her blood pressure was 168/102.  Patient states she takes her medication daily as ordered.  Patient reports eating all seafood on yesterday.  Patient states the headache gradually started.  Patient states this is not worse headache of her life.  Patient reports feeling a little lightheaded with some photophobia.  Patient denies a history of headaches.  Patient has a history of allergies, Anxiety, DM and Heart murmur.      The history is provided by the patient.   Headache    This is a new problem. The current episode started yesterday. The problem has been gradually worsening. The pain is located in the right unilateral region. The pain does not radiate. The pain quality is not similar to prior headaches. The quality of the pain is described as pressure. The pain is at a severity of 8/10. Associated symptoms include nausea and photophobia. Pertinent negatives include no abdominal pain, back pain, fever, neck pain, vomiting or weakness. The symptoms are aggravated by bright light. She has tried nothing for the symptoms.     Review of patient's allergies indicates:   Allergen Reactions    Keflex [cephalexin] Hives    Vicodin [hydrocodone-acetaminophen] Itching     itch     Past Medical History:   Diagnosis Date    Allergy     Anxiety     Diabetes mellitus     Heart murmur     Hypertension      Past Surgical History:   Procedure Laterality Date    CHOLECYSTECTOMY      2001 april    HYSTERECTOMY      partial  1996    OOPHORECTOMY      SHOULDER SURGERY Right     WISDOM TOOTH EXTRACTION       Family History   Problem Relation Age of Onset    Diabetes Mother     Hyperlipidemia Mother     Hypertension Mother     Diabetes Father      Hypertension Father     Stroke Father     Depression Sister     Hypertension Sister     Stroke Sister     Cancer Maternal Aunt         breast x 2    Breast cancer Maternal Aunt     Cancer Maternal Uncle         prostate x 2    Cancer Paternal Aunt         breast    Breast cancer Paternal Aunt     Cancer Maternal Grandfather         lung    Early death Paternal Grandmother     Early death Paternal Grandfather     Melanoma Neg Hx     Eczema Neg Hx     Lupus Neg Hx     Psoriasis Neg Hx      Social History   Substance Use Topics    Smoking status: Former Smoker     Packs/day: 0.00     Years: 0.50    Smokeless tobacco: Never Used    Alcohol use 0.0 oz/week      Comment: seldom     Review of Systems   Constitutional: Negative.  Negative for chills and fever.   HENT: Negative.  Negative for congestion.    Eyes: Positive for photophobia.   Respiratory: Negative.  Negative for chest tightness and shortness of breath.    Cardiovascular: Negative.  Negative for chest pain.   Gastrointestinal: Positive for nausea. Negative for abdominal pain and vomiting.   Endocrine: Negative.    Genitourinary: Negative.  Negative for dysuria and hematuria.   Musculoskeletal: Negative.  Negative for back pain and neck pain.   Skin: Negative.  Negative for rash.   Allergic/Immunologic: Negative for immunocompromised state.   Neurological: Positive for light-headedness and headaches. Negative for weakness.   Hematological: Does not bruise/bleed easily.   Psychiatric/Behavioral: Negative.    All other systems reviewed and are negative.      Physical Exam     Initial Vitals [05/29/18 1341]   BP Pulse Resp Temp SpO2   (!) 173/93 62 17 98.4 °F (36.9 °C) 99 %      MAP       119.67         Physical Exam    Nursing note and vitals reviewed.  Constitutional: Vital signs are normal. She appears well-developed. She is cooperative. She does not appear ill.   HENT:   Head: Normocephalic and atraumatic.   Right Ear: External ear normal.    Left Ear: External ear normal.   Nose: Nose normal.   Mouth/Throat: Oropharynx is clear and moist.   Eyes: Conjunctivae, EOM and lids are normal. Pupils are equal, round, and reactive to light.   Neck: Normal range of motion. Neck supple.   Cardiovascular: Normal rate, regular rhythm, S1 normal, S2 normal and normal heart sounds.   Pulmonary/Chest: Effort normal and breath sounds normal.   Abdominal: Soft. Normal appearance and bowel sounds are normal. There is no tenderness.   Musculoskeletal: Normal range of motion.   From of all extremities   Neurological: She is alert and oriented to person, place, and time. She has normal strength. No cranial nerve deficit or sensory deficit.   CN II- XII intact    Skin: Skin is warm, dry and intact.   Psychiatric: She has a normal mood and affect. Her speech is normal. Thought content normal. Cognition and memory are normal.         ED Course   Procedures  Labs Reviewed   POCT URINALYSIS W/O SCOPE - Abnormal; Notable for the following:        Result Value    Glucose, UA Negative (*)     Bilirubin, UA Negative (*)     Ketones, UA Negative (*)     Blood, UA Negative (*)     Protein, UA Negative (*)     Nitrite, UA Negative (*)     Leukocytes, UA Trace (*)     All other components within normal limits   POCT GLUCOSE - Abnormal; Notable for the following:     POCT Glucose 139 (*)     All other components within normal limits   POCT CMP - Abnormal; Notable for the following:     Protein 8.4 (*)     All other components within normal limits   CULTURE, URINE   POCT CBC   POCT URINALYSIS W/O SCOPE   POCT CMP     EKG Readings: (Independently Interpreted)   Initial Reading: No STEMI. Rhythm: Sinus Bradycardia. Heart Rate: Fifty-five. Axis: Normal. Other Impression: Sinus bradycardia, QTC normal at 392, otherwise normal EKG          Medical Decision Making:   Initial Assessment:   I have reviewed the notes, assessments, and/or procedures performed by NP/PA, I concur with her/his  documentation of Monalisa Carson.  Attending:   Physician Attestation Statement: I have reviewed this case with my non-physician provider.   Physician Attestation Statement: I have provided a face to face evaluation of this patient at the request of my non-physician provider.  I agree with the HPI, review of systems, and physical exam, as documented.  The patient's condition warranted physician involvement.  Other Attend Additions:   Physical Exam: Awake alert oriented ×4 speaking clearly in full sentences.    Regular rate and rhythm no murmur gallop or rub.   Clear to auscultation bilateral without wheeze crackles or Rales   Abdomen soft, nontender, nondistended. Bowel sounds ×4.   Pulses palpable ×4 no clubbing cyanosis or edema.   Skin no rash, no wound.   Medical Decision Making:    I reviewed radiology and Lab Data.  The treatment regimen was reviewed by me.  Patient reports feeling better after treatment in the emergency room.  I agree with outpatient management as documented.  Discussed labs, and imaging results. Answered questions and discussed discharge plan.   Patient is to return to the Emergency department if symptoms worsen or do not resolve.    A 53-year-old female who presents to the ED with complaints of generalized headache which started this morning.  Patient reports feeling lightheaded with some photophobia.  Patient also reports some nausea.  Patient denies vomiting. Patient has a history of HTN, DM,. Anxiety and a Heart murmur.  Patient reports he would involve seafood on yesterday.  Differential Diagnosis:   Hypertensive crisis  Migraine headache  Tension headache  Clinical Tests:   Lab Tests: Ordered and Reviewed       <> Summary of Lab: CBC- WBC 6.5, hemoglobin 13.0, hematocrit 38.1, platelet 285.  CMP- sodium 140, potassium 4.0, bicarb 27, glucose 110, BUN 8, creatinine 0.7  Urine trace leukocytes.  Urine culture pending  Radiological Study: Ordered and Reviewed  ED Management:  Physical  exam.  Orthostatics.  Labs performed.  CT of head with no acute findings.  Pt  medicated with Toradol 15 mg Iv.  Discharge home with Fioricet p.r.n and Macrobid.  Follow-up with PCP in 2-3 days.  Return ED for worsening of symptoms      Imaging Results          CT Head Without Contrast (Final result)  Result time 05/29/18 16:27:41    Final result by Dimitri Joiner MD (05/29/18 16:27:41)                 Impression:      No acute abnormality.      Electronically signed by: Dimitri Joiner MD  Date:    05/29/2018  Time:    16:27             Narrative:    EXAMINATION:  CT HEAD WITHOUT CONTRAST    CLINICAL HISTORY:  headache;    TECHNIQUE:  Low dose axial CT images obtained throughout the head without intravenous contrast. Sagittal and coronal reconstructions were performed.    COMPARISON:  None.    FINDINGS:  Intracranial compartment:    The brain parenchyma appears normal. No parenchymal mass, hemorrhage, edema or major vascular distribution infarct.    Ventricles and sulci are normal in size for age without evidence of hydrocephalus.    No extra-axial blood or fluid collections.    Skull/extracranial contents (limited evaluation): No fracture. Mastoid air cells and paranasal sinuses are essentially clear.                                                  Clinical Impression:   The primary encounter diagnosis was Urinary tract infection without hematuria, site unspecified. Diagnoses of Elevated blood pressure affecting pregnancy in first trimester, antepartum and Acute nonintractable headache, unspecified headache type were also pertinent to this visit.                           TONI Ortiz  05/30/18 2242       TONI Ortiz  05/31/18 0819       Jeana Valadez DO  06/01/18 1367

## 2018-05-29 NOTE — ED NOTES
Pt reports a headache that started earlier today and when she took her blood pressure it was high.

## 2018-05-31 LAB — BACTERIA UR CULT: NORMAL

## 2018-06-14 ENCOUNTER — OFFICE VISIT (OUTPATIENT)
Dept: FAMILY MEDICINE | Facility: CLINIC | Age: 53
End: 2018-06-14
Payer: COMMERCIAL

## 2018-06-14 VITALS
DIASTOLIC BLOOD PRESSURE: 90 MMHG | BODY MASS INDEX: 37.6 KG/M2 | HEIGHT: 64 IN | SYSTOLIC BLOOD PRESSURE: 168 MMHG | OXYGEN SATURATION: 96 % | RESPIRATION RATE: 17 BRPM | TEMPERATURE: 99 F | WEIGHT: 220.25 LBS | HEART RATE: 70 BPM

## 2018-06-14 DIAGNOSIS — F41.9 ANXIETY: ICD-10-CM

## 2018-06-14 DIAGNOSIS — I10 ESSENTIAL HYPERTENSION, BENIGN: Primary | ICD-10-CM

## 2018-06-14 PROCEDURE — 3080F DIAST BP >= 90 MM HG: CPT | Mod: CPTII,S$GLB,, | Performed by: PHYSICIAN ASSISTANT

## 2018-06-14 PROCEDURE — 99999 PR PBB SHADOW E&M-EST. PATIENT-LVL IV: CPT | Mod: PBBFAC,,, | Performed by: PHYSICIAN ASSISTANT

## 2018-06-14 PROCEDURE — 3008F BODY MASS INDEX DOCD: CPT | Mod: CPTII,S$GLB,, | Performed by: PHYSICIAN ASSISTANT

## 2018-06-14 PROCEDURE — 99214 OFFICE O/P EST MOD 30 MIN: CPT | Mod: S$GLB,,, | Performed by: PHYSICIAN ASSISTANT

## 2018-06-14 PROCEDURE — 3077F SYST BP >= 140 MM HG: CPT | Mod: CPTII,S$GLB,, | Performed by: PHYSICIAN ASSISTANT

## 2018-06-14 RX ORDER — AMLODIPINE BESYLATE 5 MG/1
5 TABLET ORAL DAILY
Qty: 30 TABLET | Refills: 0 | Status: SHIPPED | OUTPATIENT
Start: 2018-06-14 | End: 2018-07-05 | Stop reason: SDUPTHER

## 2018-06-14 RX ORDER — CITALOPRAM 20 MG/1
20 TABLET, FILM COATED ORAL DAILY
Qty: 30 TABLET | Refills: 0 | Status: SHIPPED | OUTPATIENT
Start: 2018-06-14 | End: 2019-02-15

## 2018-06-14 NOTE — PROGRESS NOTES
Subjective:       Patient ID: Monalisa THOMPSON Favorite is a 53 y.o. female.    Chief Complaint: Hypertension    HPI: 54 yo female presents for HTN f/u. On 5/18 norvasc 10 was stopped due to leg swelling. Swelling improved. Pressure worsened. She went to ED on 5/29 after pressure was elevated. She has no current chest pain, SOB.   Anxiety: having headache with effexor. Wants to try different medication. Was on celexa in the past which she tolerated well.  Social History     Social History    Marital status:      Spouse name: N/A    Number of children: N/A    Years of education: N/A     Occupational History     [employer]     Social History Main Topics    Smoking status: Former Smoker     Packs/day: 0.00     Years: 0.50    Smokeless tobacco: Never Used    Alcohol use 0.0 oz/week      Comment: seldom    Drug use: No    Sexual activity: Yes     Partners: Male     Birth control/ protection: None     Other Topics Concern    Are You Pregnant Or Think You May Be? No    Breast-Feeding No     Social History Narrative    No narrative on file       Review of Systems   Respiratory: Negative for shortness of breath.    Cardiovascular: Negative for chest pain and leg swelling.       Objective:      Physical Exam   Constitutional: She appears well-developed and well-nourished.   HENT:   Head: Normocephalic and atraumatic.   Cardiovascular: Normal rate, regular rhythm and normal heart sounds.    Pulmonary/Chest: Effort normal and breath sounds normal.   Skin: Skin is warm and dry.   Psychiatric: She has a normal mood and affect. Her behavior is normal.   Vitals reviewed.      Assessment:       1. Essential hypertension, benign    2. Anxiety        Plan:         Monalisa was seen today for hypertension.    Diagnoses and all orders for this visit:    Essential hypertension, benign  -     Start amLODIPine (NORVASC) 5 MG tablet; Take 1 tablet (5 mg total) by mouth once daily.  -     Return in 2 weeks,  monitor pressure at home, monitor for swelling    Anxiety  -     citalopram (CELEXA) 20 MG tablet; Take 1 tablet (20 mg total) by mouth once daily.

## 2018-06-18 DIAGNOSIS — M79.89 SWELLING OF BOTH LOWER EXTREMITIES: ICD-10-CM

## 2018-06-18 RX ORDER — BENAZEPRIL HYDROCHLORIDE 20 MG/1
20 TABLET ORAL DAILY
Qty: 30 TABLET | Refills: 0 | Status: SHIPPED | OUTPATIENT
Start: 2018-06-18 | End: 2019-02-20 | Stop reason: SDUPTHER

## 2018-06-21 ENCOUNTER — CLINICAL SUPPORT (OUTPATIENT)
Dept: FAMILY MEDICINE | Facility: CLINIC | Age: 53
End: 2018-06-21
Payer: COMMERCIAL

## 2018-06-21 VITALS — DIASTOLIC BLOOD PRESSURE: 88 MMHG | SYSTOLIC BLOOD PRESSURE: 144 MMHG

## 2018-06-21 DIAGNOSIS — I10 ESSENTIAL HYPERTENSION, BENIGN: Primary | ICD-10-CM

## 2018-06-21 NOTE — PROGRESS NOTES
Monalisa THOMPSON Yamileth 53 y.o. female is here today for Blood Pressure check.   History of HTN yes.    Review of patient's allergies indicates:   Allergen Reactions    Keflex [cephalexin] Hives    Vicodin [hydrocodone-acetaminophen] Itching     itch     Creatinine   Date Value Ref Range Status   12/22/2017 0.8 0.5 - 1.4 mg/dL Final     Sodium   Date Value Ref Range Status   12/22/2017 138 136 - 145 mmol/L Final     Potassium   Date Value Ref Range Status   12/22/2017 4.5 3.5 - 5.1 mmol/L Final   ]  Patient verifies taking blood pressure medications on a regular basis at the same time of the day.     Current Outpatient Prescriptions:     albuterol 90 mcg/actuation inhaler, Inhale 1-2 puffs into the lungs every 4 (four) hours as needed for Wheezing. Rescue, Disp: 1 Inhaler, Rfl: 2    amLODIPine (NORVASC) 5 MG tablet, Take 1 tablet (5 mg total) by mouth once daily., Disp: 30 tablet, Rfl: 0    aspirin 81 MG Chew, Take 1 tablet (81 mg total) by mouth once daily., Disp: , Rfl:     benazepril (LOTENSIN) 20 MG tablet, Take 1 tablet (20 mg total) by mouth once daily., Disp: 30 tablet, Rfl: 0    butalbital-acetaminophen-caffeine -40 mg (FIORICET, ESGIC) -40 mg per tablet, Take 1 tablet by mouth every 6 (six) hours as needed for Pain., Disp: 12 tablet, Rfl: 0    citalopram (CELEXA) 20 MG tablet, Take 1 tablet (20 mg total) by mouth once daily., Disp: 30 tablet, Rfl: 0    desonide (DESOWEN) 0.05 % cream, Apply BID to affected areas on face x 1-2 wks then prn flares only, Disp: 60 g, Rfl: 0    gabapentin (NEURONTIN) 300 MG capsule, Take 1 capsule (300 mg total) by mouth 3 (three) times daily., Disp: 270 capsule, Rfl: 0    metFORMIN (GLUCOPHAGE) 500 MG tablet, Take 1 tablet (500 mg total) by mouth with lunch., Disp: 90 tablet, Rfl: 3    pravastatin (PRAVACHOL) 20 MG tablet, Take 1 tablet (20 mg total) by mouth every evening., Disp: 90 tablet, Rfl: 3    zolpidem (AMBIEN) 10 mg Tab, TAKE 1 TABLET BY MOUTH  EVERY NIGHT AT BEDTIME, Disp: 30 tablet, Rfl: 5  Does patient have record of home blood pressure readings no. Readings have been averaging not done.   Last dose of blood pressure medication was taken at this morning.  Patient is asymptomatic.   Complains of no complaints.    Vitals:    06/21/18 0754   BP: (!) 144/88   BP Location: Right arm   Patient Position: Sitting   BP Method: Large (Manual)         Dr. See informed of nurse visit.

## 2018-07-05 DIAGNOSIS — I10 ESSENTIAL HYPERTENSION, BENIGN: ICD-10-CM

## 2018-07-05 RX ORDER — AMLODIPINE BESYLATE 5 MG/1
5 TABLET ORAL DAILY
Qty: 30 TABLET | Refills: 0 | Status: SHIPPED | OUTPATIENT
Start: 2018-07-05 | End: 2018-08-30 | Stop reason: SDUPTHER

## 2018-07-09 DIAGNOSIS — E11.9 TYPE 2 DIABETES MELLITUS WITHOUT COMPLICATION: ICD-10-CM

## 2018-08-06 ENCOUNTER — OFFICE VISIT (OUTPATIENT)
Dept: OPTOMETRY | Facility: CLINIC | Age: 53
End: 2018-08-06
Payer: COMMERCIAL

## 2018-08-06 DIAGNOSIS — H52.7 REFRACTIVE ERROR: ICD-10-CM

## 2018-08-06 DIAGNOSIS — Z46.0 ENCOUNTER FOR FITTING OR ADJUSTMENT OF SPECTACLES OR CONTACT LENSES: Primary | ICD-10-CM

## 2018-08-06 DIAGNOSIS — H18.823 CONTACT LENS OVERWEAR OF BOTH EYES: ICD-10-CM

## 2018-08-06 DIAGNOSIS — Z01.00 DIABETIC EYE EXAM: Primary | ICD-10-CM

## 2018-08-06 DIAGNOSIS — E11.9 DIABETIC EYE EXAM: Primary | ICD-10-CM

## 2018-08-06 LAB
LEFT EYE DM RETINOPATHY: NEGATIVE
RIGHT EYE DM RETINOPATHY: NEGATIVE

## 2018-08-06 PROCEDURE — 92310 CONTACT LENS FITTING OU: CPT | Mod: ,,, | Performed by: OPTOMETRIST

## 2018-08-06 PROCEDURE — 99499 UNLISTED E&M SERVICE: CPT | Mod: S$GLB,,, | Performed by: OPTOMETRIST

## 2018-08-06 PROCEDURE — 99999 PR PBB SHADOW E&M-EST. PATIENT-LVL II: CPT | Mod: PBBFAC,,, | Performed by: OPTOMETRIST

## 2018-08-06 PROCEDURE — 92004 COMPRE OPH EXAM NEW PT 1/>: CPT | Mod: S$GLB,,, | Performed by: OPTOMETRIST

## 2018-08-06 PROCEDURE — 92015 DETERMINE REFRACTIVE STATE: CPT | Mod: S$GLB,,, | Performed by: OPTOMETRIST

## 2018-08-06 RX ORDER — HYDROCORTISONE 25 MG/G
CREAM TOPICAL
Refills: 3 | COMMUNITY
Start: 2018-08-03 | End: 2018-08-31 | Stop reason: SDUPTHER

## 2018-08-06 NOTE — PROGRESS NOTES
Subjective:       Patient ID: Monalisa THOMPSON Favorite is a 53 y.o. female      Chief Complaint   Patient presents with    Diabetic Eye Exam     History of Present Illness  Dls: 1 yr [employer]    52 y/o female presents today for diabetic eye exam.   Pt states no changes in vision. Pt wears scls and single vision glasses.   Pt wants a new rx for cls only.     LBS: 149 x 2 days ago    No tearing  No itching   No burning  + pain ou off/on   + ha's  No floaters  No flashes    Eye meds:  None    Hemoglobin A1C       Date                     Value               Ref Range           Status                12/22/2017               6.5 (H)             4.0 - 5.6 %         Final                  04/03/2017               6.6 (H)             4.5 - 6.2 %         Final                  12/10/2015               6.4 (H)             4.5 - 6.2 %         Final            ----------    Pt wears Air Optix. Replaces lenses 6 months. Sleeps in lenses nightly. SVL distance only. Pt wears readers as needed.        Assessment/Plan:     1. Diabetic eye exam  Ranjit vision.    No diabetic retinopathy. Discussed with pt the effects of diabetes on vision, importance of good blood sugar control, compliance with meds, and follow up care with PCP. Return in 1 year for dilated eye exam, sooner PRN.    2. Refractive error  Educated patient on refractive error and discussed lens options. Dispensed updated spectacle Rx. Educated about adaptation period to new specs.    Eyeglass Final Rx     Eyeglass Final Rx       Sphere Cylinder Axis Add    Right -4.00 +1.50 180 +2.00    Left -3.75 +1.25 165 +2.00    Expiration Date:  8/7/2019                3. Contact lens overwear of both eyes  SVL distance only with readers PRN. Pt wanted colored lenses. Advised pt colored lenses do not come with astigmatism correction and pt verbalized understanding.     Contact lens Rx released to pt. Daily wear only advised, replacement monthly with education to risks of extended wear,  including blindness and increased risk of eye infection. Educated pt to not sleep in CLs. Discussed lens care, compliance and solutions. RTC yearly contact lens follow up.    Contact Lens Final Rx     Final Contact Lens Rx       Brand Base Curve Diameter Sphere Cylinder Axis    Right Air Optix Aqua for Astigmatism 8.7 14.5 -2.50 -1.25 090    Left Air Optix Aqua for Astigmatism 8.7 14.5 -2.50 -1.25 080    Expiration Date:  8/7/2019    Replacement:  Monthly    Solutions:  OptiFree PureMoist    Wearing Schedule:  Daily wear          Final Contact Lens Rx #2       Brand Base Curve Diameter Sphere Cylinder Axis    Right Air Optix Colours 8.6 14.2 -3.00 Sphere     Left Air Optix Colours 8.6 14.2 -3.00 Sphere     Expiration Date:  8/7/2019    Replacement:  Monthly    Solutions:  OptiFree Express    Wearing Schedule:  Daily wear                     Follow-up in about 1 year (around 8/6/2019) for Diabetic Eye Exam, Contact Lens Follow Up.

## 2018-08-29 ENCOUNTER — OFFICE VISIT (OUTPATIENT)
Dept: URGENT CARE | Facility: CLINIC | Age: 53
End: 2018-08-29
Payer: COMMERCIAL

## 2018-08-29 VITALS
RESPIRATION RATE: 16 BRPM | HEART RATE: 63 BPM | OXYGEN SATURATION: 98 % | HEIGHT: 64 IN | DIASTOLIC BLOOD PRESSURE: 81 MMHG | TEMPERATURE: 98 F | BODY MASS INDEX: 37.56 KG/M2 | WEIGHT: 220 LBS | SYSTOLIC BLOOD PRESSURE: 183 MMHG

## 2018-08-29 DIAGNOSIS — L08.9 INSECT BITE OF LEFT LOWER LEG WITH INFECTION, INITIAL ENCOUNTER: ICD-10-CM
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
## 2018-08-29 DIAGNOSIS — L03.116 CELLULITIS OF LEFT LOWER EXTREMITY: Primary | ICD-10-CM

## 2018-08-29 DIAGNOSIS — S80.862A INSECT BITE OF LEFT LOWER LEG WITH INFECTION, INITIAL ENCOUNTER: ICD-10-CM

## 2018-08-29 PROCEDURE — 3079F DIAST BP 80-89 MM HG: CPT | Mod: CPTII,S$GLB,, | Performed by: FAMILY MEDICINE

## 2018-08-29 PROCEDURE — 99214 OFFICE O/P EST MOD 30 MIN: CPT | Mod: S$GLB,,, | Performed by: FAMILY MEDICINE

## 2018-08-29 PROCEDURE — 3077F SYST BP >= 140 MM HG: CPT | Mod: CPTII,S$GLB,, | Performed by: FAMILY MEDICINE

## 2018-08-29 RX ORDER — HYDROXYZINE PAMOATE 25 MG/1
25 CAPSULE ORAL NIGHTLY
Qty: 10 CAPSULE | Refills: 0 | Status: SHIPPED | OUTPATIENT
Start: 2018-08-29 | End: 2018-09-08

## 2018-08-29 RX ORDER — MUPIROCIN 20 MG/G
OINTMENT TOPICAL
Qty: 22 G | Refills: 1 | Status: SHIPPED | OUTPATIENT
Start: 2018-08-29 | End: 2019-02-15

## 2018-08-29 NOTE — PATIENT INSTRUCTIONS
Infected Insect Bite or Sting    When an insect stings you, it injects venom. When an insect bites you, it does not. Stings and bites may cause a local reaction. Or they may cause a reaction that affects your whole body. Bites and stings may become infected. Signs of infection include redness, warmth, pain, drainage of pus, and swelling. Infections will need treatment with antibiotics and should get better over the next 10 days.  Home care  The following will help you care for your bite or sting at home:  · If a stinger is still in your skin, it will need to be removed. Don't use tweezers. Gently scrape the stinger from the side with a firm object such as the side of a credit card. This will loosen it and remove it from your skin.  · If itching is a problem, applying ice packs to the sting area will help.  · Wash the area with soap and water at least 3 times a day. Apply a topical antibiotic cream or ointment.  · You can use an over-the counter antihistamine unless your doctor has given you a prescription antihistamine. You may use antihistamines to reduce itching if large areas of the skin are involved. Use lower doses during the daytime and higher doses at bedtime since the drug may make you sleepy. Don't use an antihistamine if you have glaucoma or if you are a man with trouble urinating due to an enlarged prostate. Some antihistamines cause less drowsiness and are a good choice for daytime use.  · If oral antibiotics have been prescribed, be sure to take them as directed until they are all finished.  · You may use over-the-counter pain medicine to control pain, unless another pain medicine was prescribed. Talk with your doctor before using acetaminophen or ibuprofen if you have chronic liver or kidney disease. Also talk with your doctor if you have ever had a stomach ulcer or gastrointestinal bleeding.  Follow-up care  Follow up with your healthcare provider, or as advised if you don't get better over the next  2 days or if your symptoms get worse.  Call 911  Call 911 if any of these occur:  · Swelling of the face, eyelids, mouth, throat, or tongue  · Difficulty swallowing or breathing  When to seek medical advice  Call your healthcare provider right away if any of these occur:  · Spreading areas of redness or swelling  · Fever of 100.4°F (38°C) or higher, or as directed by your healthcare provider  · Increased local pain  · Headache, fever, chills, muscle or joint aching, or vomiting,  · New rash  Date Last Reviewed: 10/1/2016  © 2575-3514 [employer]. [address] [place] [address], [place] [address]. All rights reserved. This information is not intended as a substitute for professional medical care. Always follow your healthcare professional's instructions.

## 2018-08-29 NOTE — PROGRESS NOTES
"Subjective:       Patient ID: Monalisa Carson is a 53 y.o. female.    Vitals:  height is 5' 4" (1.626 m) and weight is 99.8 kg (220 lb). Her temperature is 98.2 °F (36.8 °C). Her blood pressure is 183/81 (abnormal) and her pulse is 63. Her respiration is 16 and oxygen saturation is 98%.     Chief Complaint: Rash    Pt reports suspects insect sting      Rash   This is a new problem. The current episode started in the past 7 days. The problem is unchanged. The affected locations include the left lower leg and right foot. The rash is characterized by blistering and redness. Pertinent negatives include no fever, joint pain, shortness of breath or sore throat.     Review of Systems   Constitution: Negative for chills and fever.   HENT: Negative for sore throat.    Respiratory: Negative for shortness of breath.    Skin: Positive for rash. Negative for itching.   Musculoskeletal: Negative for joint pain.       Objective:      Physical Exam   Constitutional: She is oriented to person, place, and time. She appears well-developed.   HENT:   Head: Normocephalic.   Nose: Nose normal.   Mouth/Throat: Oropharynx is clear and moist.   Eyes: Conjunctivae and EOM are normal. Pupils are equal, round, and reactive to light.   Cardiovascular: Normal rate and regular rhythm.   Pulmonary/Chest: Breath sounds normal.   Abdominal: Bowel sounds are normal.   Neurological: She is alert and oriented to person, place, and time.   Skin: Rash noted. Rash is pustular. There is erythema.        Erythema and edema on left lower anterior leg with blistering.       Assessment:       1. Cellulitis of left lower extremity    2. Insect bite of left lower leg with infection, initial encounter        Plan:         Cellulitis of left lower extremity  -     mupirocin (BACTROBAN) 2 % ointment; Apply to affected area 3 times daily  Dispense: 22 g; Refill: 1    Insect bite of left lower leg with infection, initial encounter  -     hydrOXYzine pamoate " (VISTARIL) 25 MG Cap; Take 1 capsule (25 mg total) by mouth every evening. for 10 days  Dispense: 10 capsule; Refill: 0          Patient Instructions     Infected Insect Bite or Sting    When an insect stings you, it injects venom. When an insect bites you, it does not. Stings and bites may cause a local reaction. Or they may cause a reaction that affects your whole body. Bites and stings may become infected. Signs of infection include redness, warmth, pain, drainage of pus, and swelling. Infections will need treatment with antibiotics and should get better over the next 10 days.  Home care  The following will help you care for your bite or sting at home:  · If a stinger is still in your skin, it will need to be removed. Don't use tweezers. Gently scrape the stinger from the side with a firm object such as the side of a credit card. This will loosen it and remove it from your skin.  · If itching is a problem, applying ice packs to the sting area will help.  · Wash the area with soap and water at least 3 times a day. Apply a topical antibiotic cream or ointment.  · You can use an over-the counter antihistamine unless your doctor has given you a prescription antihistamine. You may use antihistamines to reduce itching if large areas of the skin are involved. Use lower doses during the daytime and higher doses at bedtime since the drug may make you sleepy. Don't use an antihistamine if you have glaucoma or if you are a man with trouble urinating due to an enlarged prostate. Some antihistamines cause less drowsiness and are a good choice for daytime use.  · If oral antibiotics have been prescribed, be sure to take them as directed until they are all finished.  · You may use over-the-counter pain medicine to control pain, unless another pain medicine was prescribed. Talk with your doctor before using acetaminophen or ibuprofen if you have chronic liver or kidney disease. Also talk with your doctor if you have ever had a  stomach ulcer or gastrointestinal bleeding.  Follow-up care  Follow up with your healthcare provider, or as advised if you don't get better over the next 2 days or if your symptoms get worse.  Call 911  Call 911 if any of these occur:  · Swelling of the face, eyelids, mouth, throat, or tongue  · Difficulty swallowing or breathing  When to seek medical advice  Call your healthcare provider right away if any of these occur:  · Spreading areas of redness or swelling  · Fever of 100.4°F (38°C) or higher, or as directed by your healthcare provider  · Increased local pain  · Headache, fever, chills, muscle or joint aching, or vomiting,  · New rash  Date Last Reviewed: 10/1/2016  © 6770-3608 [employer]. [address] [place] [address], [place] [address]. All rights reserved. This information is not intended as a substitute for professional medical care. Always follow your healthcare professional's instructions.

## 2018-08-30 DIAGNOSIS — I10 ESSENTIAL HYPERTENSION, BENIGN: ICD-10-CM

## 2018-08-30 RX ORDER — AMLODIPINE BESYLATE 5 MG/1
5 TABLET ORAL DAILY
Qty: 30 TABLET | Refills: 0 | Status: SHIPPED | OUTPATIENT
Start: 2018-08-30 | End: 2018-08-31 | Stop reason: SDUPTHER

## 2018-08-31 ENCOUNTER — LAB VISIT (OUTPATIENT)
Dept: LAB | Facility: HOSPITAL | Age: 53
End: 2018-08-31
Attending: FAMILY MEDICINE
Payer: COMMERCIAL

## 2018-08-31 ENCOUNTER — OFFICE VISIT (OUTPATIENT)
Dept: FAMILY MEDICINE | Facility: CLINIC | Age: 53
End: 2018-08-31
Payer: COMMERCIAL

## 2018-08-31 VITALS
BODY MASS INDEX: 37.71 KG/M2 | OXYGEN SATURATION: 98 % | RESPIRATION RATE: 20 BRPM | HEIGHT: 64 IN | WEIGHT: 220.88 LBS | DIASTOLIC BLOOD PRESSURE: 90 MMHG | HEART RATE: 75 BPM | SYSTOLIC BLOOD PRESSURE: 164 MMHG | TEMPERATURE: 98 F

## 2018-08-31 DIAGNOSIS — N18.2 CONTROLLED TYPE 2 DIABETES MELLITUS WITH STAGE 2 CHRONIC KIDNEY DISEASE, WITHOUT LONG-TERM CURRENT USE OF INSULIN: Primary | ICD-10-CM

## 2018-08-31 DIAGNOSIS — E11.9 TYPE 2 DIABETES MELLITUS WITHOUT COMPLICATION: ICD-10-CM

## 2018-08-31 DIAGNOSIS — I10 UNCONTROLLED HYPERTENSION: ICD-10-CM

## 2018-08-31 DIAGNOSIS — M54.10 RADICULAR SYNDROME OF RIGHT LEG: ICD-10-CM

## 2018-08-31 DIAGNOSIS — E11.22 CONTROLLED TYPE 2 DIABETES MELLITUS WITH STAGE 2 CHRONIC KIDNEY DISEASE, WITHOUT LONG-TERM CURRENT USE OF INSULIN: Primary | ICD-10-CM

## 2018-08-31 DIAGNOSIS — L03.116 CELLULITIS OF LEFT LOWER EXTREMITY: ICD-10-CM

## 2018-08-31 LAB
ESTIMATED AVG GLUCOSE: 143 MG/DL
HBA1C MFR BLD HPLC: 6.6 %

## 2018-08-31 PROCEDURE — 3044F HG A1C LEVEL LT 7.0%: CPT | Mod: CPTII,S$GLB,, | Performed by: FAMILY MEDICINE

## 2018-08-31 PROCEDURE — 3080F DIAST BP >= 90 MM HG: CPT | Mod: CPTII,S$GLB,, | Performed by: FAMILY MEDICINE

## 2018-08-31 PROCEDURE — 36415 COLL VENOUS BLD VENIPUNCTURE: CPT | Mod: PO

## 2018-08-31 PROCEDURE — 99999 PR PBB SHADOW E&M-EST. PATIENT-LVL III: CPT | Mod: PBBFAC,,, | Performed by: FAMILY MEDICINE

## 2018-08-31 PROCEDURE — 3008F BODY MASS INDEX DOCD: CPT | Mod: CPTII,S$GLB,, | Performed by: FAMILY MEDICINE

## 2018-08-31 PROCEDURE — 99214 OFFICE O/P EST MOD 30 MIN: CPT | Mod: S$GLB,,, | Performed by: FAMILY MEDICINE

## 2018-08-31 PROCEDURE — 83036 HEMOGLOBIN GLYCOSYLATED A1C: CPT

## 2018-08-31 PROCEDURE — 3077F SYST BP >= 140 MM HG: CPT | Mod: CPTII,S$GLB,, | Performed by: FAMILY MEDICINE

## 2018-08-31 RX ORDER — AMLODIPINE BESYLATE 10 MG/1
10 TABLET ORAL DAILY
Qty: 90 TABLET | Refills: 0 | Status: SHIPPED | OUTPATIENT
Start: 2018-08-31 | End: 2018-12-04 | Stop reason: SDUPTHER

## 2018-08-31 RX ORDER — HYDROCORTISONE 25 MG/G
CREAM TOPICAL
Qty: 56 G | Refills: 3 | Status: SHIPPED | OUTPATIENT
Start: 2018-08-31 | End: 2019-02-15

## 2018-08-31 RX ORDER — METFORMIN HYDROCHLORIDE 500 MG/1
500 TABLET ORAL 2 TIMES DAILY WITH MEALS
Qty: 180 TABLET | Refills: 0 | Status: SHIPPED | OUTPATIENT
Start: 2018-08-31 | End: 2019-03-28 | Stop reason: SDUPTHER

## 2018-08-31 RX ORDER — GABAPENTIN 300 MG/1
300 CAPSULE ORAL 3 TIMES DAILY
Qty: 270 CAPSULE | Refills: 3 | Status: SHIPPED | OUTPATIENT
Start: 2018-08-31 | End: 2019-02-15

## 2018-08-31 RX ORDER — CLINDAMYCIN HYDROCHLORIDE 300 MG/1
300 CAPSULE ORAL EVERY 8 HOURS
Qty: 21 CAPSULE | Refills: 0 | Status: SHIPPED | OUTPATIENT
Start: 2018-08-31 | End: 2019-02-15

## 2018-08-31 NOTE — PROGRESS NOTES
Subjective:       Patient ID: Monalisa Carson     Chief Complaint: Foot Pain (2 weeks, both ) and Joint Swelling (2 weeks , both )      Isabel Carson is a 53 y.o. female. Reports ? Insect bite to LLE and right great toe 4 days ago.  Noticed redness and blisters the following day.  Seen in urgent care, treated with topical antibiotic ointment.  No improvement.  Patient states BP has been elevated, ranging 160/80-90.  BS in low 200's.      Review of patient's allergies indicates:   Allergen Reactions    Keflex [cephalexin] Hives    Vicodin [hydrocodone-acetaminophen] Itching     itch       Current Outpatient Medications:     albuterol 90 mcg/actuation inhaler, Inhale 1-2 puffs into the lungs every 4 (four) hours as needed for Wheezing. Rescue, Disp: 1 Inhaler, Rfl: 2    amLODIPine (NORVASC) 10 MG tablet, Take 1 tablet (10 mg total) by mouth once daily., Disp: 90 tablet, Rfl: 0    aspirin 81 MG Chew, Take 1 tablet (81 mg total) by mouth once daily., Disp: , Rfl:     benazepril (LOTENSIN) 20 MG tablet, Take 1 tablet (20 mg total) by mouth once daily., Disp: 30 tablet, Rfl: 0    citalopram (CELEXA) 20 MG tablet, Take 1 tablet (20 mg total) by mouth once daily., Disp: 30 tablet, Rfl: 0    desonide (DESOWEN) 0.05 % cream, Apply BID to affected areas on face x 1-2 wks then prn flares only, Disp: 60 g, Rfl: 0    gabapentin (NEURONTIN) 300 MG capsule, Take 1 capsule (300 mg total) by mouth 3 (three) times daily., Disp: 270 capsule, Rfl: 3    hydrocortisone 2.5 % cream, WOLFGANG EXT AA BID, Disp: 56 g, Rfl: 3    hydrOXYzine pamoate (VISTARIL) 25 MG Cap, Take 1 capsule (25 mg total) by mouth every evening. for 10 days, Disp: 10 capsule, Rfl: 0    metFORMIN (GLUCOPHAGE) 500 MG tablet, Take 1 tablet (500 mg total) by mouth 2 (two) times daily with meals., Disp: 180 tablet, Rfl: 0    mupirocin (BACTROBAN) 2 % ointment, Apply to affected area 3 times daily, Disp: 22 g, Rfl: 1    pravastatin (PRAVACHOL) 20 MG  tablet, Take 1 tablet (20 mg total) by mouth every evening., Disp: 90 tablet, Rfl: 3    zolpidem (AMBIEN) 10 mg Tab, TAKE 1 TABLET BY MOUTH EVERY NIGHT AT BEDTIME, Disp: 30 tablet, Rfl: 5    clindamycin (CLEOCIN) 300 MG capsule, Take 1 capsule (300 mg total) by mouth every 8 (eight) hours., Disp: 21 capsule, Rfl: 0    Past Medical History:   Diagnosis Date    Allergy     Anxiety     Diabetes mellitus     Heart murmur     Hypertension      Review of Systems   Cardiovascular: Negative for chest pain.   Endocrine: Positive for polydipsia and polyuria.       Objective:      Physical Exam   Constitutional: She appears well-developed and well-nourished.   Cardiovascular: Normal rate and regular rhythm.   Pulmonary/Chest: Effort normal.   Musculoskeletal: She exhibits no edema.   Neurological: She is alert.   Skin: There is erythema (erythematous patch on LLE with blisters).       Assessment:       1. Controlled type 2 diabetes mellitus with stage 2 chronic kidney disease, without long-term current use of insulin    2. Cellulitis of left lower extremity    3. Uncontrolled hypertension    4. Radicular syndrome of right leg        Plan:       Monalisa was seen today for foot pain and joint swelling.    Diagnoses and all orders for this visit:    Controlled type 2 diabetes mellitus with stage 2 chronic kidney disease, without long-term current use of insulin  -     metFORMIN (GLUCOPHAGE) 500 MG tablet; Take 1 tablet (500 mg total) by mouth 2 (two) times daily with meals.    Cellulitis of left lower extremity  -     clindamycin (CLEOCIN) 300 MG capsule; Take 1 capsule (300 mg total) by mouth every 8 (eight) hours.    Uncontrolled hypertension  -     amLODIPine (NORVASC) 10 MG tablet; Take 1 tablet (10 mg total) by mouth once daily.    Radicular syndrome of right leg  -     gabapentin (NEURONTIN) 300 MG capsule; Take 1 capsule (300 mg total) by mouth 3 (three) times daily.    Other orders  -     hydrocortisone 2.5 %  cream; WOLFGANG EXT AA BID

## 2018-08-31 NOTE — PROGRESS NOTES
Subjective:       Patient ID: Monalisa Carson     Chief Complaint: Foot Pain (2 weeks, both ) and Joint Swelling (2 weeks , both )      Isabel Carson is a 53 y.o. female.    Review of patient's allergies indicates:   Allergen Reactions    Keflex [cephalexin] Hives    Vicodin [hydrocodone-acetaminophen] Itching     itch       Current Outpatient Medications:     albuterol 90 mcg/actuation inhaler, Inhale 1-2 puffs into the lungs every 4 (four) hours as needed for Wheezing. Rescue, Disp: 1 Inhaler, Rfl: 2    amLODIPine (NORVASC) 5 MG tablet, Take 1 tablet (5 mg total) by mouth once daily., Disp: 30 tablet, Rfl: 0    aspirin 81 MG Chew, Take 1 tablet (81 mg total) by mouth once daily., Disp: , Rfl:     benazepril (LOTENSIN) 20 MG tablet, Take 1 tablet (20 mg total) by mouth once daily., Disp: 30 tablet, Rfl: 0    citalopram (CELEXA) 20 MG tablet, Take 1 tablet (20 mg total) by mouth once daily., Disp: 30 tablet, Rfl: 0    desonide (DESOWEN) 0.05 % cream, Apply BID to affected areas on face x 1-2 wks then prn flares only, Disp: 60 g, Rfl: 0    gabapentin (NEURONTIN) 300 MG capsule, Take 1 capsule (300 mg total) by mouth 3 (three) times daily., Disp: 270 capsule, Rfl: 0    hydrocortisone 2.5 % cream, WOLFGANG EXT AA BID, Disp: , Rfl: 3    hydrOXYzine pamoate (VISTARIL) 25 MG Cap, Take 1 capsule (25 mg total) by mouth every evening. for 10 days, Disp: 10 capsule, Rfl: 0    metFORMIN (GLUCOPHAGE) 500 MG tablet, Take 1 tablet (500 mg total) by mouth with lunch., Disp: 90 tablet, Rfl: 3    mupirocin (BACTROBAN) 2 % ointment, Apply to affected area 3 times daily, Disp: 22 g, Rfl: 1    pravastatin (PRAVACHOL) 20 MG tablet, Take 1 tablet (20 mg total) by mouth every evening., Disp: 90 tablet, Rfl: 3    zolpidem (AMBIEN) 10 mg Tab, TAKE 1 TABLET BY MOUTH EVERY NIGHT AT BEDTIME, Disp: 30 tablet, Rfl: 5    Past Medical History:   Diagnosis Date    Allergy     Anxiety     Diabetes mellitus     Heart murmur      Hypertension      Review of Systems    Objective:      Physical Exam    Assessment:       1. Type 2 diabetes mellitus without complication, without long-term current use of insulin        Plan:       Monalisa was seen today for foot pain and joint swelling.    Diagnoses and all orders for this visit:    Type 2 diabetes mellitus without complication, without long-term current use of insulin  -     Hemoglobin A1c; Future

## 2018-10-03 ENCOUNTER — OFFICE VISIT (OUTPATIENT)
Dept: URGENT CARE | Facility: CLINIC | Age: 53
End: 2018-10-03
Payer: COMMERCIAL

## 2018-10-03 VITALS
TEMPERATURE: 99 F | SYSTOLIC BLOOD PRESSURE: 138 MMHG | DIASTOLIC BLOOD PRESSURE: 84 MMHG | OXYGEN SATURATION: 98 % | HEIGHT: 64 IN | WEIGHT: 200 LBS | HEART RATE: 72 BPM | BODY MASS INDEX: 34.15 KG/M2

## 2018-10-03 DIAGNOSIS — R05.9 COUGH IN ADULT: ICD-10-CM

## 2018-10-03 DIAGNOSIS — G47.00 INSOMNIA, UNSPECIFIED TYPE: ICD-10-CM

## 2018-10-03 DIAGNOSIS — B34.9 VIRAL ILLNESS: Primary | ICD-10-CM

## 2018-10-03 LAB
CTP QC/QA: YES
FLUAV AG NPH QL: NEGATIVE
FLUBV AG NPH QL: NEGATIVE

## 2018-10-03 PROCEDURE — 3079F DIAST BP 80-89 MM HG: CPT | Mod: CPTII,S$GLB,, | Performed by: NURSE PRACTITIONER

## 2018-10-03 PROCEDURE — 3075F SYST BP GE 130 - 139MM HG: CPT | Mod: CPTII,S$GLB,, | Performed by: NURSE PRACTITIONER

## 2018-10-03 PROCEDURE — 3008F BODY MASS INDEX DOCD: CPT | Mod: CPTII,S$GLB,, | Performed by: NURSE PRACTITIONER

## 2018-10-03 PROCEDURE — 87804 INFLUENZA ASSAY W/OPTIC: CPT | Mod: QW,S$GLB,, | Performed by: NURSE PRACTITIONER

## 2018-10-03 PROCEDURE — 99214 OFFICE O/P EST MOD 30 MIN: CPT | Mod: S$GLB,,, | Performed by: NURSE PRACTITIONER

## 2018-10-03 RX ORDER — CODEINE PHOSPHATE AND GUAIFENESIN 10; 100 MG/5ML; MG/5ML
5 SOLUTION ORAL NIGHTLY PRN
Qty: 120 ML | Refills: 0 | Status: SHIPPED | OUTPATIENT
Start: 2018-10-03 | End: 2018-10-13

## 2018-10-03 NOTE — PATIENT INSTRUCTIONS
"  You must understand that you've received an Urgent Care treatment only and that you may be released before all your medical problems are known or treated. You, the patient, will arrange for follow up care as instructed.  If your condition worsens we recommend that you receive another evaluation at the emergency room immediately or contact your primary medical clinics after hours call service to discuss your concerns.  Please return here or go to the Emergency Department for any concerns or worsening of condition.      Viral Syndrome (Adult)  A viral illness may cause a number of symptoms. The symptoms depend on the part of the body that the virus affects. If it settles in your nose, throat, and lungs, it may cause cough, sore throat, congestion, and sometimes headache. If it settles in your stomach and intestinal tract, it may cause vomiting and diarrhea. Sometimes it causes vague symptoms like "aching all over," feeling tired, loss of appetite, or fever.  A viral illness usually lasts 1 to 2 weeks, but sometimes it lasts longer. In some cases, a more serious infection can look like a viral syndrome in the first few days of the illness. You may need another exam and additional tests to know the difference. Watch for the warning signs listed below.  Home care  Follow these guidelines for taking care of yourself at home:  · If symptoms are severe, rest at home for the first 2 to 3 days.  · Stay away from cigarette smoke - both your smoke and the smoke from others.  · You may use over-the-counter acetaminophen or ibuprofen for fever, muscle aching, and headache, unless another medicine was prescribed for this. If you have chronic liver or kidney disease or ever had a stomach ulcer or GI bleeding, talk with your doctor before using these medicines. No one who is younger than 18 and ill with a fever should take aspirin. It may cause severe disease or death.  · Your appetite may be poor, so a light diet is fine. Avoid " dehydration by drinking 8 to 12 8-ounce glasses of fluids each day. This may include water; orange juice; lemonade; apple, grape, and cranberry juice; clear fruit drinks; electrolyte replacement and sports drinks; and decaffeinated teas and coffee. If you have been diagnosed with a kidney disease, ask your doctor how much and what types of fluids you should drink to prevent dehydration. If you have kidney disease, drinking too much fluid can cause it build up in the your body and be dangerous to your health.  · Over-the-counter remedies won't shorten the length of the illness but may be helpful for cough, sore throat; and nasal and sinus congestion. Don't use decongestants if you have high blood pressure.  Follow-up care  Follow up with your healthcare provider if you do not improve over the next week.  Call 911  Get emergency medical care if any of the following occur:  · Convulsion  · Feeling weak, dizzy, or like you are going to faint  · Chest pain, shortness of breath, wheezing, or difficulty breathing  When to seek medical advice  Call your healthcare provider right away if any of these occur:  · Cough with lots of colored sputum (mucus) or blood in your sputum  · Chest pain, shortness of breath, wheezing, or difficulty breathing  · Severe headache; face, neck, or ear pain  · Severe, constant pain in the lower right side of your belly (abdominal)  · Continued vomiting (cant keep liquids down)  · Frequent diarrhea (more than 5 times a day); blood (red or black color) or mucus in diarrhea  · Feeling weak, dizzy, or like you are going to faint  · Extreme thirst  · Fever of 100.4°F (38°C) or higher, or as directed by your healthcare provider  Date Last Reviewed: 9/25/2015  © 3007-7017 [employer]. [address] [place] [address], [place] [address]. All rights reserved. This information is not intended as a substitute for professional medical care. Always follow your healthcare professional's  instructions.

## 2018-10-03 NOTE — PROGRESS NOTES
"Subjective:       Patient ID: Monalisa Carson is a 53 y.o. female.    Vitals:  height is 5' 4" (1.626 m) and weight is 90.7 kg (200 lb). Her temperature is 99 °F (37.2 °C). Her blood pressure is 138/84 and her pulse is 72. Her oxygen saturation is 98%.     Chief Complaint: Sinus Problem    Pt reports for 3 days having cough , congestion and fever       Sinus Problem   This is a new problem. Episode onset: 3 days. The maximum temperature recorded prior to her arrival was 100.4 - 100.9 F. Associated symptoms include congestion, coughing, headaches, sinus pressure, sneezing and a sore throat. Pertinent negatives include no chills or shortness of breath. Past treatments include nothing.     Review of Systems   Constitution: Positive for fever. Negative for chills.   HENT: Positive for congestion, sinus pressure, sneezing and sore throat.    Eyes: Negative for blurred vision.   Cardiovascular: Negative for chest pain.   Respiratory: Positive for cough and sputum production. Negative for shortness of breath.    Skin: Negative for rash.   Musculoskeletal: Negative for back pain and joint pain.   Gastrointestinal: Negative for abdominal pain, diarrhea, nausea and vomiting.   Neurological: Positive for headaches.   Psychiatric/Behavioral: The patient is not nervous/anxious.        Objective:      Physical Exam   Constitutional: She is oriented to person, place, and time. Vital signs are normal. She appears well-developed and well-nourished. She is cooperative.  Non-toxic appearance. She appears ill. No distress.   HENT:   Head: Normocephalic and atraumatic.   Right Ear: Hearing, tympanic membrane, external ear and ear canal normal.   Left Ear: Hearing, tympanic membrane, external ear and ear canal normal.   Nose: Nose normal. No mucosal edema, rhinorrhea or nasal deformity. No epistaxis. Right sinus exhibits no maxillary sinus tenderness and no frontal sinus tenderness. Left sinus exhibits no maxillary sinus tenderness " and no frontal sinus tenderness.   Mouth/Throat: Uvula is midline, oropharynx is clear and moist and mucous membranes are normal. No trismus in the jaw. Normal dentition. No uvula swelling. No posterior oropharyngeal erythema.   Eyes: Conjunctivae and lids are normal. No scleral icterus.   Sclera clear bilat   Neck: Trachea normal, full passive range of motion without pain and phonation normal. Neck supple.   Cardiovascular: Normal rate, regular rhythm, normal heart sounds, intact distal pulses and normal pulses.   Pulmonary/Chest: Effort normal and breath sounds normal. No respiratory distress.   Abdominal: Soft. Normal appearance and bowel sounds are normal. She exhibits no distension. There is no tenderness.   Musculoskeletal: Normal range of motion. She exhibits no edema or deformity.   Lymphadenopathy:        Head (right side): Submandibular adenopathy present.        Head (left side): Submandibular adenopathy present.   Neurological: She is alert and oriented to person, place, and time. She exhibits normal muscle tone. Coordination normal.   Skin: Skin is warm, dry and intact. She is not diaphoretic. No pallor.   Psychiatric: She has a normal mood and affect. Her speech is normal and behavior is normal. Judgment and thought content normal. Cognition and memory are normal.   Nursing note and vitals reviewed.      Results for orders placed or performed in visit on 10/03/18   POCT Influenza A/B   Result Value Ref Range    Rapid Influenza A Ag Negative Negative    Rapid Influenza B Ag Negative Negative     Acceptable Yes      Assessment:       1. Viral illness    2. Cough in adult        Plan:         Viral illness  -     POCT Influenza A/B    Cough in adult  -     guaifenesin-codeine 100-10 mg/5 ml (TUSSI-ORGANIDIN NR)  mg/5 mL syrup; Take 5 mLs by mouth nightly as needed.  Dispense: 120 mL; Refill: 0      Patient Instructions     You must understand that you've received an Urgent Care  "treatment only and that you may be released before all your medical problems are known or treated. You, the patient, will arrange for follow up care as instructed.  If your condition worsens we recommend that you receive another evaluation at the emergency room immediately or contact your primary medical clinics after hours call service to discuss your concerns.  Please return here or go to the Emergency Department for any concerns or worsening of condition.      Viral Syndrome (Adult)  A viral illness may cause a number of symptoms. The symptoms depend on the part of the body that the virus affects. If it settles in your nose, throat, and lungs, it may cause cough, sore throat, congestion, and sometimes headache. If it settles in your stomach and intestinal tract, it may cause vomiting and diarrhea. Sometimes it causes vague symptoms like "aching all over," feeling tired, loss of appetite, or fever.  A viral illness usually lasts 1 to 2 weeks, but sometimes it lasts longer. In some cases, a more serious infection can look like a viral syndrome in the first few days of the illness. You may need another exam and additional tests to know the difference. Watch for the warning signs listed below.  Home care  Follow these guidelines for taking care of yourself at home:  · If symptoms are severe, rest at home for the first 2 to 3 days.  · Stay away from cigarette smoke - both your smoke and the smoke from others.  · You may use over-the-counter acetaminophen or ibuprofen for fever, muscle aching, and headache, unless another medicine was prescribed for this. If you have chronic liver or kidney disease or ever had a stomach ulcer or GI bleeding, talk with your doctor before using these medicines. No one who is younger than 18 and ill with a fever should take aspirin. It may cause severe disease or death.  · Your appetite may be poor, so a light diet is fine. Avoid dehydration by drinking 8 to 12 8-ounce glasses of fluids " each day. This may include water; orange juice; lemonade; apple, grape, and cranberry juice; clear fruit drinks; electrolyte replacement and sports drinks; and decaffeinated teas and coffee. If you have been diagnosed with a kidney disease, ask your doctor how much and what types of fluids you should drink to prevent dehydration. If you have kidney disease, drinking too much fluid can cause it build up in the your body and be dangerous to your health.  · Over-the-counter remedies won't shorten the length of the illness but may be helpful for cough, sore throat; and nasal and sinus congestion. Don't use decongestants if you have high blood pressure.  Follow-up care  Follow up with your healthcare provider if you do not improve over the next week.  Call 911  Get emergency medical care if any of the following occur:  · Convulsion  · Feeling weak, dizzy, or like you are going to faint  · Chest pain, shortness of breath, wheezing, or difficulty breathing  When to seek medical advice  Call your healthcare provider right away if any of these occur:  · Cough with lots of colored sputum (mucus) or blood in your sputum  · Chest pain, shortness of breath, wheezing, or difficulty breathing  · Severe headache; face, neck, or ear pain  · Severe, constant pain in the lower right side of your belly (abdominal)  · Continued vomiting (cant keep liquids down)  · Frequent diarrhea (more than 5 times a day); blood (red or black color) or mucus in diarrhea  · Feeling weak, dizzy, or like you are going to faint  · Extreme thirst  · Fever of 100.4°F (38°C) or higher, or as directed by your healthcare provider  Date Last Reviewed: 9/25/2015  © 1400-2647 [employer]. [address] [place] [address]. All rights reserved. This information is not intended as a substitute for professional medical care. Always follow your healthcare professional's instructions.

## 2018-10-04 RX ORDER — ZOLPIDEM TARTRATE 10 MG/1
TABLET ORAL
Qty: 30 TABLET | Refills: 5 | Status: SHIPPED | OUTPATIENT
Start: 2018-10-04 | End: 2019-04-22 | Stop reason: SDUPTHER

## 2018-12-04 DIAGNOSIS — I10 UNCONTROLLED HYPERTENSION: ICD-10-CM

## 2018-12-04 RX ORDER — AMLODIPINE BESYLATE 10 MG/1
10 TABLET ORAL DAILY
Qty: 90 TABLET | Refills: 0 | Status: SHIPPED | OUTPATIENT
Start: 2018-12-04 | End: 2019-03-26 | Stop reason: SDUPTHER

## 2018-12-04 NOTE — TELEPHONE ENCOUNTER
----- Message from Kasandra Fine sent at 12/4/2018  9:21 AM CST -----  Contact: Self   MEDICATION RECALL... Patient stated she just submitted a refill request for her medication and no she see there is a recall she would like to know if the doctor is going to change her medication. Please call at [phone]    amLODIPine (NORVASC) 10 MG tablet      Patient also need a refill for her Ambien    zolpidem (AMBIEN) 10 mg Tab    [id number] - [place], [place] - [address] [facility]

## 2018-12-04 NOTE — TELEPHONE ENCOUNTER
Informed pt plain amlodipine is not affected and she should continue taking medication, also informed her ambien should still have refills, she states was told by pharmacist they are waiting on approval from provider; I placed call to pharmacy and they will process refill

## 2019-01-03 DIAGNOSIS — E11.9 TYPE 2 DIABETES MELLITUS WITHOUT COMPLICATION: ICD-10-CM

## 2019-02-15 ENCOUNTER — HOSPITAL ENCOUNTER (INPATIENT)
Facility: HOSPITAL | Age: 54
LOS: 2 days | Discharge: HOME | DRG: 092 | End: 2019-02-17
Attending: EMERGENCY MEDICINE | Admitting: EMERGENCY MEDICINE
Payer: COMMERCIAL

## 2019-02-15 DIAGNOSIS — R47.1 DYSARTHRIA: ICD-10-CM

## 2019-02-15 DIAGNOSIS — I63.9 CEREBROVASCULAR ACCIDENT (CVA), UNSPECIFIED MECHANISM: Primary | ICD-10-CM

## 2019-02-15 DIAGNOSIS — I74.9 TIA DUE TO EMBOLISM: ICD-10-CM

## 2019-02-15 DIAGNOSIS — G45.9 TIA DUE TO EMBOLISM: ICD-10-CM

## 2019-02-15 LAB
ALBUMIN SERPL-MCNC: 3.5 G/DL (ref 3.3–5.5)
ALP SERPL-CCNC: 82 U/L (ref 42–141)
BILIRUB SERPL-MCNC: 0.5 MG/DL (ref 0.2–1.6)
BUN SERPL-MCNC: 11 MG/DL (ref 7–22)
CALCIUM SERPL-MCNC: 9.5 MG/DL (ref 8–10.3)
CHLORIDE SERPL-SCNC: 104 MMOL/L (ref 98–108)
CREAT SERPL-MCNC: 0.7 MG/DL (ref 0.6–1.2)
GLUCOSE SERPL-MCNC: 123 MG/DL (ref 73–118)
POC ALT (SGPT): 29 U/L (ref 10–47)
POC AST (SGOT): 29 U/L (ref 11–38)
POC B-TYPE NATRIURETIC PEPTIDE: 9.9 PG/ML (ref 0–100)
POC CARDIAC TROPONIN I: 0 NG/ML
POC PTINR: 0.9 (ref 0.9–1.2)
POC PTWBT: 11.4 SEC (ref 9.7–14.3)
POC TCO2: 25 MMOL/L (ref 18–33)
POCT GLUCOSE: 133 MG/DL (ref 70–110)
POTASSIUM BLD-SCNC: 3.7 MMOL/L (ref 3.6–5.1)
PROTEIN, POC: 7.3 G/DL (ref 6.4–8.1)
SAMPLE: NORMAL
SAMPLE: NORMAL
SODIUM BLD-SCNC: 144 MMOL/L (ref 128–145)

## 2019-02-15 PROCEDURE — 85025 COMPLETE CBC W/AUTO DIFF WBC: CPT | Mod: ER

## 2019-02-15 PROCEDURE — 83880 ASSAY OF NATRIURETIC PEPTIDE: CPT | Mod: ER

## 2019-02-15 PROCEDURE — 99245 PR OFFICE CONSULTATION,LEVEL V: ICD-10-PCS | Mod: GT,,, | Performed by: PSYCHIATRY & NEUROLOGY

## 2019-02-15 PROCEDURE — 25500020 PHARM REV CODE 255: Mod: ER | Performed by: EMERGENCY MEDICINE

## 2019-02-15 PROCEDURE — 99245 OFF/OP CONSLTJ NEW/EST HI 55: CPT | Mod: GT,,, | Performed by: PSYCHIATRY & NEUROLOGY

## 2019-02-15 PROCEDURE — 84484 ASSAY OF TROPONIN QUANT: CPT | Mod: ER

## 2019-02-15 PROCEDURE — 63600175 PHARM REV CODE 636 W HCPCS: Mod: ER | Performed by: NURSE PRACTITIONER

## 2019-02-15 PROCEDURE — 20600001 HC STEP DOWN PRIVATE ROOM

## 2019-02-15 PROCEDURE — 96365 THER/PROPH/DIAG IV INF INIT: CPT | Mod: ER

## 2019-02-15 PROCEDURE — 80053 COMPREHEN METABOLIC PANEL: CPT | Mod: ER

## 2019-02-15 PROCEDURE — 63600175 PHARM REV CODE 636 W HCPCS: Mod: ER | Performed by: EMERGENCY MEDICINE

## 2019-02-15 PROCEDURE — 96375 TX/PRO/DX INJ NEW DRUG ADDON: CPT | Mod: ER

## 2019-02-15 PROCEDURE — 85610 PROTHROMBIN TIME: CPT | Mod: ER

## 2019-02-15 PROCEDURE — 99285 EMERGENCY DEPT VISIT HI MDM: CPT | Mod: 25,ER

## 2019-02-15 RX ORDER — KETOROLAC TROMETHAMINE 30 MG/ML
15 INJECTION, SOLUTION INTRAMUSCULAR; INTRAVENOUS
Status: COMPLETED | OUTPATIENT
Start: 2019-02-15 | End: 2019-02-15

## 2019-02-15 RX ORDER — SODIUM CHLORIDE 0.9 % (FLUSH) 0.9 %
5 SYRINGE (ML) INJECTION
Status: CANCELLED | OUTPATIENT
Start: 2019-02-15

## 2019-02-15 RX ORDER — LEVETIRACETAM 5 MG/ML
500 INJECTION INTRAVASCULAR
Status: COMPLETED | OUTPATIENT
Start: 2019-02-15 | End: 2019-02-15

## 2019-02-15 RX ADMIN — IOHEXOL 75 ML: 350 INJECTION, SOLUTION INTRAVENOUS at 08:02

## 2019-02-15 RX ADMIN — LEVETIRACETAM 500 MG: 5 INJECTION INTRAVENOUS at 06:02

## 2019-02-15 RX ADMIN — KETOROLAC TROMETHAMINE 15 MG: 30 INJECTION, SOLUTION INTRAMUSCULAR at 09:02

## 2019-02-15 NOTE — ED NOTES
Blood pressure elevated,  153/92  160/101 at home.    Takes bp medication and she took her medication today.   Also feeling twitching to the lt. Side of her face an she feels like she is slurring her speech.

## 2019-02-15 NOTE — ED NOTES
No facial drooping noted.    Pt. Does have some slurring of speech started around noon.  No sore throat, no difficulty with swallowing.

## 2019-02-16 PROBLEM — G51.4 FACIAL TWITCHING: Status: ACTIVE | Noted: 2019-02-16

## 2019-02-16 PROBLEM — R47.1 DYSARTHRIA: Status: ACTIVE | Noted: 2019-02-16

## 2019-02-16 PROBLEM — R56.9 SEIZURE-LIKE ACTIVITY: Status: ACTIVE | Noted: 2019-02-16

## 2019-02-16 LAB
ALBUMIN SERPL BCP-MCNC: 3.4 G/DL
ALP SERPL-CCNC: 77 U/L
ALT SERPL W/O P-5'-P-CCNC: 24 U/L
ANION GAP SERPL CALC-SCNC: 8 MMOL/L
ANION GAP SERPL CALC-SCNC: 9 MMOL/L
AST SERPL-CCNC: 17 U/L
BASOPHILS # BLD AUTO: 0.02 K/UL
BASOPHILS # BLD AUTO: 0.03 K/UL
BASOPHILS NFR BLD: 0.3 %
BASOPHILS NFR BLD: 0.5 %
BILIRUB SERPL-MCNC: 0.3 MG/DL
BUN SERPL-MCNC: 11 MG/DL
BUN SERPL-MCNC: 11 MG/DL
CALCIUM SERPL-MCNC: 9.1 MG/DL
CALCIUM SERPL-MCNC: 9.2 MG/DL
CHLORIDE SERPL-SCNC: 106 MMOL/L
CHLORIDE SERPL-SCNC: 107 MMOL/L
CHOLEST SERPL-MCNC: 202 MG/DL
CHOLEST/HDLC SERPL: 5.1 {RATIO}
CO2 SERPL-SCNC: 21 MMOL/L
CO2 SERPL-SCNC: 23 MMOL/L
CREAT SERPL-MCNC: 0.8 MG/DL
CREAT SERPL-MCNC: 0.8 MG/DL
CRP SERPL-MCNC: 2.2 MG/L
DIFFERENTIAL METHOD: NORMAL
DIFFERENTIAL METHOD: NORMAL
EOSINOPHIL # BLD AUTO: 0.1 K/UL
EOSINOPHIL # BLD AUTO: 0.1 K/UL
EOSINOPHIL NFR BLD: 1.2 %
EOSINOPHIL NFR BLD: 1.5 %
ERYTHROCYTE [DISTWIDTH] IN BLOOD BY AUTOMATED COUNT: 13.3 %
ERYTHROCYTE [DISTWIDTH] IN BLOOD BY AUTOMATED COUNT: 13.3 %
ERYTHROCYTE [SEDIMENTATION RATE] IN BLOOD BY WESTERGREN METHOD: 43 MM/HR
EST. GFR  (AFRICAN AMERICAN): >60 ML/MIN/1.73 M^2
EST. GFR  (AFRICAN AMERICAN): >60 ML/MIN/1.73 M^2
EST. GFR  (NON AFRICAN AMERICAN): >60 ML/MIN/1.73 M^2
EST. GFR  (NON AFRICAN AMERICAN): >60 ML/MIN/1.73 M^2
ESTIMATED AVG GLUCOSE: 146 MG/DL
ESTIMATED AVG GLUCOSE: 146 MG/DL
GLUCOSE SERPL-MCNC: 143 MG/DL
GLUCOSE SERPL-MCNC: 146 MG/DL
HBA1C MFR BLD HPLC: 6.7 %
HBA1C MFR BLD HPLC: 6.7 %
HCT VFR BLD AUTO: 40 %
HCT VFR BLD AUTO: 40.1 %
HDLC SERPL-MCNC: 40 MG/DL
HDLC SERPL: 19.8 %
HGB BLD-MCNC: 12.8 G/DL
HGB BLD-MCNC: 13.1 G/DL
IMM GRANULOCYTES # BLD AUTO: 0.01 K/UL
IMM GRANULOCYTES # BLD AUTO: 0.01 K/UL
IMM GRANULOCYTES NFR BLD AUTO: 0.2 %
IMM GRANULOCYTES NFR BLD AUTO: 0.2 %
LDLC SERPL CALC-MCNC: 123.2 MG/DL
LYMPHOCYTES # BLD AUTO: 2.5 K/UL
LYMPHOCYTES # BLD AUTO: 2.8 K/UL
LYMPHOCYTES NFR BLD: 46.5 %
LYMPHOCYTES NFR BLD: 46.5 %
MAGNESIUM SERPL-MCNC: 2 MG/DL
MAGNESIUM SERPL-MCNC: 2 MG/DL
MCH RBC QN AUTO: 29.3 PG
MCH RBC QN AUTO: 29.6 PG
MCHC RBC AUTO-ENTMCNC: 32 G/DL
MCHC RBC AUTO-ENTMCNC: 32.7 G/DL
MCV RBC AUTO: 91 FL
MCV RBC AUTO: 92 FL
MONOCYTES # BLD AUTO: 0.5 K/UL
MONOCYTES # BLD AUTO: 0.6 K/UL
MONOCYTES NFR BLD: 10.1 %
MONOCYTES NFR BLD: 8.3 %
NEUTROPHILS # BLD AUTO: 2.3 K/UL
NEUTROPHILS # BLD AUTO: 2.6 K/UL
NEUTROPHILS NFR BLD: 41.2 %
NEUTROPHILS NFR BLD: 43.5 %
NONHDLC SERPL-MCNC: 162 MG/DL
NRBC BLD-RTO: 0 /100 WBC
NRBC BLD-RTO: 0 /100 WBC
PHOSPHATE SERPL-MCNC: 3 MG/DL
PHOSPHATE SERPL-MCNC: 3.8 MG/DL
PLATELET # BLD AUTO: 324 K/UL
PLATELET # BLD AUTO: 328 K/UL
PMV BLD AUTO: 9.6 FL
PMV BLD AUTO: 9.6 FL
POCT GLUCOSE: 139 MG/DL (ref 70–110)
POCT GLUCOSE: 163 MG/DL (ref 70–110)
POTASSIUM SERPL-SCNC: 3.9 MMOL/L
POTASSIUM SERPL-SCNC: 4.1 MMOL/L
PROT SERPL-MCNC: 7.1 G/DL
RBC # BLD AUTO: 4.37 M/UL
RBC # BLD AUTO: 4.42 M/UL
SODIUM SERPL-SCNC: 137 MMOL/L
SODIUM SERPL-SCNC: 137 MMOL/L
TRIGL SERPL-MCNC: 194 MG/DL
TSH SERPL DL<=0.005 MIU/L-ACNC: 1.38 UIU/ML
WBC # BLD AUTO: 5.46 K/UL
WBC # BLD AUTO: 6.06 K/UL

## 2019-02-16 PROCEDURE — 20600001 HC STEP DOWN PRIVATE ROOM

## 2019-02-16 PROCEDURE — 83735 ASSAY OF MAGNESIUM: CPT | Mod: 91

## 2019-02-16 PROCEDURE — 84100 ASSAY OF PHOSPHORUS: CPT | Mod: 91

## 2019-02-16 PROCEDURE — 25000003 PHARM REV CODE 250: Performed by: PHYSICIAN ASSISTANT

## 2019-02-16 PROCEDURE — 95819 EEG AWAKE AND ASLEEP: CPT

## 2019-02-16 PROCEDURE — 99233 PR SUBSEQUENT HOSPITAL CARE,LEVL III: ICD-10-PCS | Mod: ,,, | Performed by: PSYCHIATRY & NEUROLOGY

## 2019-02-16 PROCEDURE — 83036 HEMOGLOBIN GLYCOSYLATED A1C: CPT | Mod: 91

## 2019-02-16 PROCEDURE — 99223 PR INITIAL HOSPITAL CARE,LEVL III: ICD-10-PCS | Mod: ,,, | Performed by: PHYSICIAN ASSISTANT

## 2019-02-16 PROCEDURE — 85652 RBC SED RATE AUTOMATED: CPT

## 2019-02-16 PROCEDURE — 25500020 PHARM REV CODE 255: Performed by: PHYSICIAN ASSISTANT

## 2019-02-16 PROCEDURE — 99233 SBSQ HOSP IP/OBS HIGH 50: CPT | Mod: ,,, | Performed by: PSYCHIATRY & NEUROLOGY

## 2019-02-16 PROCEDURE — 84443 ASSAY THYROID STIM HORMONE: CPT

## 2019-02-16 PROCEDURE — 99223 1ST HOSP IP/OBS HIGH 75: CPT | Mod: ,,, | Performed by: PHYSICIAN ASSISTANT

## 2019-02-16 PROCEDURE — 86140 C-REACTIVE PROTEIN: CPT

## 2019-02-16 PROCEDURE — 36415 COLL VENOUS BLD VENIPUNCTURE: CPT

## 2019-02-16 PROCEDURE — 95819 PR EEG,W/AWAKE & ASLEEP RECORD: ICD-10-PCS | Mod: 26,,, | Performed by: PSYCHIATRY & NEUROLOGY

## 2019-02-16 PROCEDURE — 80053 COMPREHEN METABOLIC PANEL: CPT

## 2019-02-16 PROCEDURE — 80048 BASIC METABOLIC PNL TOTAL CA: CPT

## 2019-02-16 PROCEDURE — 80061 LIPID PANEL: CPT

## 2019-02-16 PROCEDURE — A9585 GADOBUTROL INJECTION: HCPCS | Performed by: PHYSICIAN ASSISTANT

## 2019-02-16 PROCEDURE — 63600175 PHARM REV CODE 636 W HCPCS: Performed by: PHYSICIAN ASSISTANT

## 2019-02-16 PROCEDURE — 85025 COMPLETE CBC W/AUTO DIFF WBC: CPT

## 2019-02-16 PROCEDURE — 95819 EEG AWAKE AND ASLEEP: CPT | Mod: 26,,, | Performed by: PSYCHIATRY & NEUROLOGY

## 2019-02-16 RX ORDER — GADOBUTROL 604.72 MG/ML
10 INJECTION INTRAVENOUS
Status: COMPLETED | OUTPATIENT
Start: 2019-02-16 | End: 2019-02-16

## 2019-02-16 RX ORDER — POLYETHYLENE GLYCOL 3350 17 G/17G
17 POWDER, FOR SOLUTION ORAL DAILY
Status: DISCONTINUED | OUTPATIENT
Start: 2019-02-16 | End: 2019-02-17 | Stop reason: HOSPADM

## 2019-02-16 RX ORDER — LEVETIRACETAM 10 MG/ML
1000 INJECTION INTRAVASCULAR ONCE
Status: COMPLETED | OUTPATIENT
Start: 2019-02-16 | End: 2019-02-16

## 2019-02-16 RX ORDER — IBUPROFEN 200 MG
16 TABLET ORAL
Status: DISCONTINUED | OUTPATIENT
Start: 2019-02-16 | End: 2019-02-17 | Stop reason: HOSPADM

## 2019-02-16 RX ORDER — BISACODYL 10 MG
10 SUPPOSITORY, RECTAL RECTAL DAILY PRN
Status: DISCONTINUED | OUTPATIENT
Start: 2019-02-16 | End: 2019-02-17 | Stop reason: HOSPADM

## 2019-02-16 RX ORDER — SODIUM CHLORIDE 0.9 % (FLUSH) 0.9 %
5 SYRINGE (ML) INJECTION
Status: DISCONTINUED | OUTPATIENT
Start: 2019-02-16 | End: 2019-02-17 | Stop reason: HOSPADM

## 2019-02-16 RX ORDER — LEVETIRACETAM 750 MG/1
1500 TABLET ORAL 2 TIMES DAILY
Status: DISCONTINUED | OUTPATIENT
Start: 2019-02-16 | End: 2019-02-17 | Stop reason: HOSPADM

## 2019-02-16 RX ORDER — IPRATROPIUM BROMIDE AND ALBUTEROL SULFATE 2.5; .5 MG/3ML; MG/3ML
3 SOLUTION RESPIRATORY (INHALATION) EVERY 4 HOURS PRN
Status: DISCONTINUED | OUTPATIENT
Start: 2019-02-16 | End: 2019-02-17 | Stop reason: HOSPADM

## 2019-02-16 RX ORDER — BENAZEPRIL HYDROCHLORIDE 20 MG/1
20 TABLET ORAL DAILY
Status: DISCONTINUED | OUTPATIENT
Start: 2019-02-16 | End: 2019-02-17 | Stop reason: HOSPADM

## 2019-02-16 RX ORDER — AMLODIPINE BESYLATE 10 MG/1
10 TABLET ORAL DAILY
Status: DISCONTINUED | OUTPATIENT
Start: 2019-02-16 | End: 2019-02-17 | Stop reason: HOSPADM

## 2019-02-16 RX ORDER — INSULIN ASPART 100 [IU]/ML
0-5 INJECTION, SOLUTION INTRAVENOUS; SUBCUTANEOUS
Status: DISCONTINUED | OUTPATIENT
Start: 2019-02-16 | End: 2019-02-17 | Stop reason: HOSPADM

## 2019-02-16 RX ORDER — NAPROXEN SODIUM 220 MG/1
81 TABLET, FILM COATED ORAL DAILY
Status: DISCONTINUED | OUTPATIENT
Start: 2019-02-16 | End: 2019-02-17 | Stop reason: HOSPADM

## 2019-02-16 RX ORDER — ONDANSETRON 8 MG/1
8 TABLET, ORALLY DISINTEGRATING ORAL EVERY 8 HOURS PRN
Status: DISCONTINUED | OUTPATIENT
Start: 2019-02-16 | End: 2019-02-17 | Stop reason: HOSPADM

## 2019-02-16 RX ORDER — IBUPROFEN 200 MG
24 TABLET ORAL
Status: DISCONTINUED | OUTPATIENT
Start: 2019-02-16 | End: 2019-02-17 | Stop reason: HOSPADM

## 2019-02-16 RX ORDER — RAMELTEON 8 MG/1
8 TABLET ORAL NIGHTLY PRN
Status: DISCONTINUED | OUTPATIENT
Start: 2019-02-16 | End: 2019-02-17 | Stop reason: HOSPADM

## 2019-02-16 RX ORDER — ACETAMINOPHEN 325 MG/1
650 TABLET ORAL EVERY 4 HOURS PRN
Status: DISCONTINUED | OUTPATIENT
Start: 2019-02-16 | End: 2019-02-17 | Stop reason: HOSPADM

## 2019-02-16 RX ORDER — KETOROLAC TROMETHAMINE 30 MG/ML
15 INJECTION, SOLUTION INTRAMUSCULAR; INTRAVENOUS EVERY 6 HOURS PRN
Status: DISCONTINUED | OUTPATIENT
Start: 2019-02-16 | End: 2019-02-17 | Stop reason: HOSPADM

## 2019-02-16 RX ORDER — GLUCAGON 1 MG
1 KIT INJECTION
Status: DISCONTINUED | OUTPATIENT
Start: 2019-02-16 | End: 2019-02-17 | Stop reason: HOSPADM

## 2019-02-16 RX ADMIN — ASPIRIN 81 MG CHEWABLE TABLET 81 MG: 81 TABLET CHEWABLE at 09:02

## 2019-02-16 RX ADMIN — LEVETIRACETAM 1500 MG: 750 TABLET ORAL at 09:02

## 2019-02-16 RX ADMIN — BENAZEPRIL HYDROCHLORIDE 20 MG: 20 TABLET, FILM COATED ORAL at 09:02

## 2019-02-16 RX ADMIN — ACETAMINOPHEN 650 MG: 325 TABLET ORAL at 01:02

## 2019-02-16 RX ADMIN — LEVETIRACETAM 1000 MG: 10 INJECTION INTRAVENOUS at 01:02

## 2019-02-16 RX ADMIN — KETOROLAC TROMETHAMINE 15 MG: 30 INJECTION, SOLUTION INTRAMUSCULAR at 04:02

## 2019-02-16 RX ADMIN — GADOBUTROL 10 ML: 604.72 INJECTION INTRAVENOUS at 03:02

## 2019-02-16 RX ADMIN — RAMELTEON 8 MG: 8 TABLET, FILM COATED ORAL at 07:02

## 2019-02-16 RX ADMIN — AMLODIPINE BESYLATE 10 MG: 10 TABLET ORAL at 09:02

## 2019-02-16 RX ADMIN — LEVETIRACETAM 1500 MG: 750 TABLET ORAL at 07:02

## 2019-02-16 RX ADMIN — POLYETHYLENE GLYCOL 3350 17 G: 17 POWDER, FOR SOLUTION ORAL at 09:02

## 2019-02-16 NOTE — PROCEDURES
ROUTINE ELECTROENCEPHALOGRAM REPORT      Monalisa Carson  [id number]  1965    DATE OF SERVICE: 2/16/19    REQUESTING PROVIDER: Luis Felipe Javier PA-C    REASON FOR CONSULT: 52yo F with possible seizures    METHODOLOGY   Electroencephalographic (EEG) recording is with electrodes placed according to the International 10-20 placement system.  Thirty two (32) channels of digital signal (sampling rate of 512/sec) including T1 and T2 was simultaneously recorded from the scalp and may include  EKG, EMG, and/or eye monitors.  Recording band pass was 0.1 to 512 hz.  Digital video recording of the patient is simultaneously recorded with the EEG.  The patient is instructed report clinical symptoms which may occur during the recording session.  EEG and video recording is stored and archived in digital format. Activation procedures which include photic stimulation, hyperventilation and instructing patients to perform simple task are done in selected patients.    The EEG is displayed on a monitor screen and can be reviewed using different montages.  Computer assisted analysis is employed to detect spike and electrographic seizure activity.   The entire record is submitted for computer analysis.  The entire recording is visually reviewed and the times identified by computer analysis as being spikes or seizures are reviewed again.  Compresses spectral analysis (CSA) is also performed on the activity recorded from each individual channel.  This is displayed as a power display of frequencies from 0 to 30 Hz over time.   The CSA is reviewed looking for asymmetries in power between homologous areas of the scalp and then compared with the original EEG recording.     Mobile Event Guide software was also utilized in the review of this study.  This software suite analyzes the EEG recording in multiple domains.  Coherence and rhythmicity is computed to identify EEG sections which may contain organized seizures.  Each channel undergoes analysis to  detect presence of spike and sharp waves which have special and morphological characteristic of epileptic activity.  The routine EEG recording is converted from spacial into frequency domain.  This is then displayed comparing homologous areas to identify areas of significant asymmetry.  Algorithm to identify non-cortically generated artifact is used to separate eye movement, EMG and other artifact from the EEG    EEG FINDINGS  Background activity:   The background rhythm was characterized by possible alpha (9-11 Hz) activity with a (possible) 11 Hz posterior dominant alpha rhythm - only very brief period of wakefulness.  Symmetry and continuity: The background was continuous and symmetric    Sleep:   Normal sleep transients including sleep spindles, K complexes, vertex waves and POSTS were seen.    Activation procedures:   Hyperventilation and photic stimulation were not performed     Abnormal activity:   No epileptiform discharges, periodic discharges, lateralized rhythmic delta activity or electrographic seizures were seen.    IMPRESSION:   This is a normal routine EEG done in sleep, with only very brief period of wakefulness.    A normal EEG does not rule out seizures/epilepsy.  CLINICAL CORRELATION IS RECOMMENDED.    Janine Brooke MD, RAQUEL(), MYNOR KLEIN.  Neurology-Epilepsy.  [facility].

## 2019-02-16 NOTE — PLAN OF CARE
Problem: Adult Inpatient Plan of Care  Goal: Optimal Comfort and Wellbeing    Intervention: Monitor Pain and Promote Comfort  Pt transferred to unit tonight -- has remained free from falls, injury, and seizure-like activity.   No complaints of discomfort or pain made throughout night.    at bedside, attentive to patient. Pt PASSED dysphagia screening and NPO diet advanced to diabetic/cardiac diet with thin liquids. Safety precautions maintained.  Bed in lowest position, side rails up x 2, bed alarm on and audible, VSS, tele monitoring in place, emergency equipment at bedside, non skid socks applied, call light in reach of hand and patient instructed to call for assistance (especially before ambulation).      Will continue to monitor.

## 2019-02-16 NOTE — HPI
Ms. Carson is a 54 yo female w/ PMH significant for HTN, DM2, HLD, who presented as a transfer from [facility] for concern of seizures.    She reports two episodes (a few days apart) of L facial twitching, speech arrest/dysarthria, and no LOC.  Seen by telestroke w/ Dr. Millard who stated symptoms most likely consistent with complex partial seizure.  After events resolved, had residual L facial droop and sensation changes.  No prior episodes of similar symptoms.  Duration on the scale of hours.    H notable for sister who developed seizures in her 40s (?after a stroke).      Works as a .  Social alcohol use.  Denies recreational drugs, cigarette use.

## 2019-02-16 NOTE — CONSULTS
[facility]  Vascular Neurology  Comprehensive Stroke Center  Tele-Consultation Note      Inpatient consult to Telemedicine-Stroke  Consult performed by: Stanley Millard MD  Consult ordered by: Jose Daniel Zaldivar MD          Consulting Provider: Spoke Physician:: Dr. Jose Daniel Zaldivar  Current Providers  No providers found    Patient Location: [facility] ED Emergency Department  Spoke hospital nurse at bedside with patient assisting consultant.     Patient information was obtained from patient.       Assessment/Plan:     STROKE DOCUMENTATION     Acute Stroke Times:   Acute Stroke Times   Last Known Normal Date: 02/15/19  Last Known Normal Time: 1200  Stroke Team Arrival Date: 02/15/19  Stroke Team Arrival Time: 1802  CT Interpretation Time: 1807    NIH Scale:  1a. Level of Consciousness: 0-->Alert, keenly responsive  1b. LOC Questions: 0-->Answers both questions correctly  1c. LOC Commands: 0-->Performs both tasks correctly  2. Best Gaze: 0-->Normal  3. Visual: 0-->No visual loss  4. Facial Palsy: 0-->Normal symmetrical movements  5a. Motor Arm, Left: 0-->No drift, limb holds 90 (or 45) degrees for full 10 secs  5b. Motor Arm, Right: 0-->No drift, limb holds 90 (or 45) degrees for full 10 secs  6a. Motor Leg, Left: 0-->No drift, leg holds 30 degree position for full 5 secs  6b. Motor Leg, Right: 0-->No drift, leg holds 30 degree position for full 5 secs  7. Limb Ataxia: 0-->Absent  8. Sensory: 0-->Normal, no sensory loss  9. Best Language: 0-->No aphasia, normal  10. Dysarthria: 0-->Normal  11. Extinction and Inattention (formerly Neglect): 0-->No abnormality  Total (NIH Stroke Scale): 0     Modified [place]    [place] Coma Scale:    ABCD2 Score:    [id number] Score:   HAS -BLED Score:   ICH Score:   Hunt & Miller Classification:       Diagnoses:   TIA due to embolism    Present preent with several hours of intermittent left facial twitching with slurred speech and possible  "speech arrest  No resolved. CT head is normal    Most likey complex partial seizure.   TIA I unlikely, but should be evaluated as well    Keppra 1500mg BID  EEG  MRI brain w and w/o contrast  CTA head and neck      Antithrombotics for secondary stroke prevention: Antiplatelets: Aspirin: 325 mg daily    Statins for secondary stroke prevention and hyperlipidemia, if present:   Statins: Atorvastatin- 80 mg daily    Aggressive risk factor modification: HTN     Rehab efforts: Occupational Therapy, PT/OT/SLP to evaluate and treat, PM&R consult     Diagnostics ordered/pending: CTA Head to assess vasculature , CTA Neck/Arch to assess vasculature, HgbA1C to assess blood glucose levels, Lipid Profile to assess cholesterol levels, MRI head without contrast to assess brain parenchyma, TSH to assess thyroid function, Other: EEG    VTE prophylaxis: Enoxaparin 40 mg SQ every 24 hours    BP parameters: TIA: SBP <220 until imaging confirmation of no infarct              Blood pressure (!) 153/92, pulse 81, temperature 98.3 °F (36.8 °C), temperature source Oral, resp. rate 18, height 5' 3" (1.6 m), weight 97.5 kg (215 lb), SpO2 100 %.  Alteplase Eligible?: No  Alteplase Recommendation: Alteplase not recommended due to Outside of treatment window , Suspected stroke mimic , Symptoms resolved  and Seizure  Possible Interventional Revascularization Candidate? No; No significant neurological deficit    Disposition Recommendation: transfer to system [facility] by  ground  standard      Subjective:     History of Present Illness:  Patient presents Left facial twitching associated with slurred speech      Woke up with symptoms?: no  Last known normal: Last Known Normal Date: 02/15/19 Last Known Normal Time: 1200    Recent bleeding noted: no  Does the patient take any Blood Thinners? no  Medications: No relevant medications      Past Medical History: hypertension    Past Surgical History: no major surgeries within the last 2 weeks    Family " "History: hypertension, diabetes, hyperlipidemia, MI/CAD, stroke, Heart Problems and Cancer    Social History: no smoking, no drinking, no drugs    Allergies: Keflex [Cephalexin]  Vicodin [Hydrocodone-Acetaminophen] No relevant allergies    Review of Systems   Neurological: Positive for seizures, facial asymmetry and speech difficulty.   All other systems reviewed and are negative.    Objective:   Vitals: Blood pressure (!) 153/92, pulse 81, temperature 98.3 °F (36.8 °C), temperature source Oral, resp. rate 18, height 5' 3" (1.6 m), weight 97.5 kg (215 lb), SpO2 100 %.    CT READ: Yes  No hemmorhage. No mass effect. No early infarct signs.     Physical Exam   Constitutional: She appears well-developed.   HENT:   Head: Normocephalic.   Eyes: Pupils are equal, round, and reactive to light.   Neck: Normal range of motion.   Pulmonary/Chest: Effort normal.   Abdominal: Soft.   Neurological: She is alert.   Skin: Skin is warm.             Recommended the emergency room physician to have a brief discussion with the patient and/or family if available regarding the risks and benefits of treatment, and to briefly document the occurrence of that discussion in his clinical encounter note.     The attending portion of this evaluation, treatment, and documentation was performed per Stanley Millard MD via audiovisual.    Billing code:  (moderate to severe stroke, large areas of edema, some mimics)    · This patient has a critical neurological condition/illness, with high morbidity and mortality.  · There is a high probability for acute neurological change leading to clinical and possibly life-threatening deterioration requiring highest level of physician preparedness for urgent intervention.  · Care was coordinated with other physicians involved in the patient's care.  · Radiologic studies and laboratory data were reviewed and interpreted, and plan of care was re-assessed based on the results.  · Diagnosis, treatment " options and prognosis may have been discussed with the patient and/or family members or caregiver.  · Further advanced medical management and further evaluation is warranted for his care.      In your opinion, this was a: Tier 1    Consult End Time: 6:28 PM     Stanley Millard MD  Comprehensive Stroke Center  Vascular Neurology   [facility]

## 2019-02-16 NOTE — H&P
[facility] Hospital Medicine  History & Physical    Patient Name: Monalisa Carson  MRN: [id number]  Admission Date: 2/15/2019  Attending Physician: No att. providers found   Primary Care Provider: Alisha See MD    [facility] Medicine Team: Networked reference to record PCT  Luis Felipe Javier PA-C     Patient information was obtained from patient, spouse/SO, past medical records and ER records.     Subjective:     Principal Problem:Dysarthria    Chief Complaint:   Chief Complaint   Patient presents with    Hypertension     state BP has been dell all day (160/101 at home), states has been taking Rx as directed.  also states left facial cheek has been twitching        HPI: Monalisa Carson is a 53F with T2DM, HTN, HLD who presents as transfer from [facility]  for evaluation of left sided cheek only twitching, numbness, and dysarthria. Twitching started a few days ago, worsened today and associated with high BP readings. Dysarthria started around 1200 on 02/15, both gradually worsened throughout the day eventually prompting presentation to ED. She denies symptoms like this in the past. Associated with higher than usual BP readings and HA, despite compliance with BP regimen. She states all her symptoms have resolved since administration of keppra. She denies new medications, drug use, family or personal history of seizure. She denies cough, NVD, eye pain, vision changes, hemiparesis, falls, fever, neck stiffness, cough, SOB, CP, confusion, or trouble swallowing.     The patient was evaluated by tele-stroke while in the ED, with NIH of 0 and negative CTA H/N. Transfer was recommended for neurologic evaluation and EEG as there was suspicion for partial seizure/Sal's paralysis. She was given 1500 mg IV keppra.     Intake labs: CBC, CMP, Mg, Phos all WNL. CRP WNL, ESR mildly elevated at 43, symptoms improved.     Past Medical History:   Diagnosis Date    Allergy     Anxiety      Diabetes mellitus     Heart murmur     Hypertension        Past Surgical History:   Procedure Laterality Date    CHOLECYSTECTOMY      2001 april    HYSTERECTOMY      partial  1996    OOPHORECTOMY      SHOULDER SURGERY Right     WISDOM TOOTH EXTRACTION         Review of patient's allergies indicates:   Allergen Reactions    Keflex [cephalexin] Hives    Vicodin [hydrocodone-acetaminophen] Itching     itch       No current facility-administered medications on file prior to encounter.      Current Outpatient Medications on File Prior to Encounter   Medication Sig    albuterol 90 mcg/actuation inhaler Inhale 1-2 puffs into the lungs every 4 (four) hours as needed for Wheezing. Rescue    amLODIPine (NORVASC) 10 MG tablet Take 1 tablet (10 mg total) by mouth once daily.    benazepril (LOTENSIN) 20 MG tablet Take 1 tablet (20 mg total) by mouth once daily.    metFORMIN (GLUCOPHAGE) 500 MG tablet Take 1 tablet (500 mg total) by mouth 2 (two) times daily with meals.    zolpidem (AMBIEN) 10 mg Tab TAKE 1 TABLET BY MOUTH EVERY NIGHT AT BEDTIME    aspirin 81 MG Chew Take 1 tablet (81 mg total) by mouth once daily.     Family History     Problem Relation (Age of Onset)    Breast cancer Maternal Aunt, Paternal Aunt    Cancer Maternal Aunt, Maternal Uncle, Paternal Aunt, Maternal Grandfather    Cataracts Mother    Depression Sister    Diabetes Mother, Father    Early death Paternal Grandmother, Paternal Grandfather    Hyperlipidemia Mother    Hypertension Mother, Father, Sister    No Known Problems Brother, Paternal Uncle, Maternal Grandmother    Stroke Father, Sister        Tobacco Use    Smoking status: Light Tobacco Smoker     Packs/day: 0.00     Years: 0.50     Pack years: 0.00    Smokeless tobacco: Never Used   Substance and Sexual Activity    Alcohol use: Yes     Alcohol/week: 0.0 oz     Comment: seldom    Drug use: No    Sexual activity: Yes     Partners: Male     Birth control/protection: None     Review  of Systems   Constitutional: Negative for chills, fatigue and fever.   HENT: Negative for drooling, hearing loss, sinus pressure, sinus pain and trouble swallowing.    Eyes: Negative for pain, discharge, redness and visual disturbance.   Respiratory: Negative for cough, shortness of breath and wheezing.    Cardiovascular: Negative for chest pain, palpitations and leg swelling.   Gastrointestinal: Negative for abdominal distention, abdominal pain, diarrhea, nausea and vomiting.   Genitourinary: Negative for difficulty urinating and dysuria.   Musculoskeletal: Negative for arthralgias and back pain.   Skin: Negative for rash and wound.   Neurological: Positive for tremors, speech difficulty, numbness and headaches. Negative for dizziness, syncope, weakness and light-headedness.   Psychiatric/Behavioral: Negative for agitation and confusion.     Objective:     Vital Signs (Most Recent):  Temp: 98.5 °F (36.9 °C) (02/16/19 0000)  Pulse: 63 (02/16/19 0000)  Resp: 20 (02/16/19 0000)  BP: 127/61 (02/16/19 0000)  SpO2: 96 % (02/16/19 0000) Vital Signs (24h Range):  Temp:  [98.3 °F (36.8 °C)-99 °F (37.2 °C)] 98.5 °F (36.9 °C)  Pulse:  [61-81] 63  Resp:  [16-25] 20  SpO2:  [96 %-100 %] 96 %  BP: (123-160)/(55-92) 127/61     Weight: 97.5 kg (215 lb)  Body mass index is 38.09 kg/m².    Physical Exam   Constitutional: She is oriented to person, place, and time. She appears well-developed and well-nourished. No distress.   HENT:   Head: Normocephalic and atraumatic.   Mouth/Throat: Uvula is midline and mucous membranes are normal.   Mild left eye injection   Eyes: Conjunctivae and EOM are normal. Pupils are equal, round, and reactive to light.   Neck: Normal range of motion and phonation normal. Neck supple. No spinous process tenderness and no muscular tenderness present. No neck rigidity. No tracheal deviation, no edema and normal range of motion present. No Brudzinski's sign and no Kernig's sign noted. No thyromegaly present.    Cardiovascular: Normal rate and regular rhythm.   No murmur heard.  Pulmonary/Chest: Effort normal and breath sounds normal. No stridor. No respiratory distress. She has no wheezes.   Abdominal: Soft. Bowel sounds are normal. She exhibits no distension. There is no tenderness.   Musculoskeletal: Normal range of motion. She exhibits no edema.   Neurological: She is alert and oriented to person, place, and time. No cranial nerve deficit. GCS eye subscore is 4. GCS verbal subscore is 5. GCS motor subscore is 6.   5/5 strength throughout, speechclear, no drooling, no facial droop, AOx4, no facial tremor appreciated, no numbness to light palpation of left cheek, no temporal tenderness or tortuous arteries or temporal bruits. No crepitus or pain at TMJ   Skin: Skin is warm and dry. She is not diaphoretic.   Psychiatric: She has a normal mood and affect. Her behavior is normal.   Nursing note and vitals reviewed.        CRANIAL NERVES     CN III, IV, VI   Pupils are equal, round, and reactive to light.  Extraocular motions are normal.        Significant Labs:   BMP:   Recent Labs   Lab 02/16/19 0040   *      K 3.9      CO2 21*   BUN 11   CREATININE 0.8   CALCIUM 9.1   MG 2.0     CBC:   Recent Labs   Lab 02/16/19 0040   WBC 6.06   HGB 13.1   HCT 40.1        CMP:   Recent Labs   Lab 02/16/19 0040      K 3.9      CO2 21*   *   BUN 11   CREATININE 0.8   CALCIUM 9.1   PROT 7.1   ALBUMIN 3.4*   BILITOT 0.3   ALKPHOS 77   AST 17   ALT 24   ANIONGAP 9   EGFRNONAA >60.0     Cardiac Markers: No results for input(s): CKMB, MYOGLOBIN, BNP, TROPISTAT in the last 48 hours.  Troponin: No results for input(s): TROPONINI in the last 48 hours.  TSH:   Recent Labs   Lab 02/16/19 0040   TSH 1.377       Significant Imaging: I have reviewed all pertinent imaging results/findings within the past 24 hours.    Assessment/Plan:     * Dysarthria    Facial Twitching  Facial Numbness  - all symptoms  resolved after administration of keppra  - evaluated by tele-stroke with negative CTA, NIH 0, ? Symptoms 2/2 partial seizure/Sal's paralysis   - start keppra 1500 mg BID per vascular reccs   - MRI pending  - neurology consulted   - EEG per their reccs  - neuro checks  - check intake labs including CBC, CMP, Mg, Phos, TSH, ESR/CRP, UA, UDS     Type 2 diabetes mellitus with hyperglycemia, without long-term current use of insulin    - hold home metformin  - A1c 6.3  - low dose SSI     Essential hypertension, benign    - continue home norvasc 10 mg, benazepril 20 mg daily  - BP controlled       VTE Risk Mitigation (From admission, onward)        Ordered     IP VTE HIGH RISK PATIENT  Once      02/16/19 0034     Place JENNIFER hose  Until discontinued      02/16/19 0034     Place sequential compression device  Until discontinued      02/16/19 0034             Luis Felipe Javier PA-C  Department of Hospital Medicine   [facility]

## 2019-02-16 NOTE — ED NOTES
Pt requests pain medicine.  Spoke with Toussaint, NP - no pain medicine at this time awaiting CTA results.  Also spoke with Toussaint, NP regarding holding swallow study due to NPO status.  Update pt on status - verbalizes understanding.  Warm blanket provided.  Pt refused lights dimmed when offered.  Pt's sister at bedside.

## 2019-02-16 NOTE — ED NOTES
JEEVAN Stauffer at [facility] called to advise pt accepted by Dr. Amilcar Vaughn at [facility], Room 7027.  Call report to [phone].

## 2019-02-16 NOTE — CONSULTS
[facility]  Neurology  Consult Note    Patient Name: Monalisa Carson  MRN: [id number]  Admission Date: 2/15/2019  Hospital Length of Stay: 1 days  Code Status: No Order   Attending Provider: Eric Washington MD   Consulting Provider: Humberto Fine MD  Primary Care Physician: Alisha See MD  Principal Problem:Dysarthria    Inpatient consult to Neurology  Consult performed by: Humberto Fine MD  Consult ordered by: Luis Felipe Javier PA-C         Subjective:     Chief Complaint:  Concern for seizures     HPI:   Ms. Carson is a 54 yo female w/ PMH significant for HTN, DM2, HLD, who presented as a transfer from [facility] for concern of seizures.    She reports two episodes (a few days apart) of L facial twitching, speech arrest/dysarthria, and no LOC.  Seen by telestroke w/ Dr. Millard who stated symptoms most likely consistent with complex partial seizure.  After events resolved, had residual L facial droop and sensation changes.  No prior episodes of similar symptoms.  Duration on the scale of hours.    H notable for sister who developed seizures in her 40s (?after a stroke).      Works as a .  Social alcohol use.  Denies recreational drugs, cigarette use.     Past Medical History:   Diagnosis Date    Allergy     Anxiety     Diabetes mellitus     Heart murmur     Hypertension        Past Surgical History:   Procedure Laterality Date    CHOLECYSTECTOMY      2001 april    HYSTERECTOMY      partial  1996    OOPHORECTOMY      SHOULDER SURGERY Right     WISDOM TOOTH EXTRACTION         Review of patient's allergies indicates:   Allergen Reactions    Keflex [cephalexin] Hives    Vicodin [hydrocodone-acetaminophen] Itching     itch       No current facility-administered medications on file prior to encounter.      Current Outpatient Medications on File Prior to Encounter   Medication Sig    albuterol 90 mcg/actuation inhaler Inhale 1-2 puffs into the lungs every 4  (four) hours as needed for Wheezing. Rescue    amLODIPine (NORVASC) 10 MG tablet Take 1 tablet (10 mg total) by mouth once daily.    benazepril (LOTENSIN) 20 MG tablet Take 1 tablet (20 mg total) by mouth once daily.    metFORMIN (GLUCOPHAGE) 500 MG tablet Take 1 tablet (500 mg total) by mouth 2 (two) times daily with meals.    zolpidem (AMBIEN) 10 mg Tab TAKE 1 TABLET BY MOUTH EVERY NIGHT AT BEDTIME    aspirin 81 MG Chew Take 1 tablet (81 mg total) by mouth once daily.     Family History     Problem Relation (Age of Onset)    Breast cancer Maternal Aunt, Paternal Aunt    Cancer Maternal Aunt, Maternal Uncle, Paternal Aunt, Maternal Grandfather    Cataracts Mother    Depression Sister    Diabetes Mother, Father    Early death Paternal Grandmother, Paternal Grandfather    Hyperlipidemia Mother    Hypertension Mother, Father, Sister    No Known Problems Brother, Paternal Uncle, Maternal Grandmother    Stroke Father, Sister        Tobacco Use    Smoking status: Light Tobacco Smoker     Packs/day: 0.00     Years: 0.50     Pack years: 0.00    Smokeless tobacco: Never Used   Substance and Sexual Activity    Alcohol use: Yes     Alcohol/week: 0.0 oz     Comment: seldom    Drug use: No    Sexual activity: Yes     Partners: Male     Birth control/protection: None     Review of Systems   Constitutional: Negative for fever.   HENT: Negative for facial swelling.    Eyes: Negative for redness.   Respiratory: Negative for shortness of breath.    Cardiovascular: Negative for chest pain.   Gastrointestinal: Negative for abdominal pain.   Skin: Negative for rash.   Allergic/Immunologic: Negative for immunocompromised state.   Neurological:        +sz-like activity     Psychiatric/Behavioral: Negative for confusion.     Objective:     Vital Signs (Most Recent):  Temp: 98.1 °F (36.7 °C) (02/16/19 0518)  Pulse: (!) 59 (02/16/19 1142)  Resp: 18 (02/16/19 0932)  BP: 132/63 (02/16/19 0932)  SpO2: (!) 93 % (02/16/19 0932) Vital  Signs (24h Range):  Temp:  [97.7 °F (36.5 °C)-99 °F (37.2 °C)] 98.1 °F (36.7 °C)  Pulse:  [59-85] 59  Resp:  [16-25] 18  SpO2:  [93 %-100 %] 93 %  BP: (114-160)/(55-92) 132/63     Weight: 97.5 kg (215 lb)  Body mass index is 38.09 kg/m².    Physical Exam   Constitutional: She is oriented to person, place, and time. She appears well-developed. No distress.   HENT:   Head: Normocephalic.   Eyes: Conjunctivae and EOM are normal. Pupils are equal, round, and reactive to light.   Cardiovascular: Normal rate, regular rhythm and normal heart sounds. Exam reveals no friction rub.   Pulmonary/Chest: Effort normal and breath sounds normal. No respiratory distress. She has no wheezes.   Abdominal: Soft. Bowel sounds are normal. She exhibits no distension. There is no tenderness.   Musculoskeletal: She exhibits no edema.   Neurological: She is alert and oriented to person, place, and time. She has a normal Finger-Nose-Finger Test.   Reflex Scores:       Tricep reflexes are 1+ on the right side and 1+ on the left side.       Bicep reflexes are 2+ on the right side and 2+ on the left side.       Brachioradialis reflexes are 2+ on the right side and 2+ on the left side.       Patellar reflexes are 2+ on the right side and 2+ on the left side.       Achilles reflexes are 1+ on the right side and 1+ on the left side.  Skin: Skin is warm and dry.   Psychiatric: She has a normal mood and affect.       NEUROLOGICAL EXAMINATION:     MENTAL STATUS   Oriented to person, place, and time.   Level of consciousness: alert    CRANIAL NERVES     CN II   Visual fields full to confrontation.     CN III, IV, VI   Pupils are equal, round, and reactive to light.  Extraocular motions are normal.     CN V   Right facial sensation deficit: none  Left facial sensation deficit: cheeks    CN VII   Facial expression full, symmetric.     CN VIII   CN VIII normal.     CN IX, X   Palate: symmetric    CN XI   CN XI normal.     CN XII   CN XII normal.        No  tongue lacerations visualized     MOTOR EXAM   Muscle bulk: normal  Overall muscle tone: normal    Strength   Right deltoid: 5/5  Left deltoid: 5/5  Right biceps: 5/5  Left biceps: 5/5  Right triceps: 5/5  Left triceps: 5/5  Right interossei: 5/5  Left interossei: 5/5  Right quadriceps: 5/5  Left quadriceps: 5/5  Right hamstrin/5  Left hamstrin/5  Right glutei: 5/5  Left glutei: 5/5  Right anterior tibial: 5/5  Left anterior tibial: 5/5  Right posterior tibial: 5/5  Left posterior tibial: 5/5  Right peroneal: 5/5  Left peroneal: 5/5  Right gastroc: 5/5  Left gastroc: 5/5    REFLEXES     Reflexes   Right brachioradialis: 2+  Left brachioradialis: 2+  Right biceps: 2+  Left biceps: 2+  Right triceps: 1+  Left triceps: 1+  Right patellar: 2+  Left patellar: 2+  Right achilles: 1+  Left achilles: 1+  Right plantar: equivocal  Left plantar: equivocal  Right Farrell: absent  Left Farrell: absent  Right ankle clonus: absent  Left ankle clonus: absent    SENSORY EXAM        To light touch decreased in L V2 distribution     GAIT AND COORDINATION      Coordination   Finger to nose coordination: normal       Gait deferred 2/2 seizure precautions       Significant Labs:   Recent Results (from the past 24 hour(s))   POCT glucose    Collection Time: 02/15/19  5:39 PM   Result Value Ref Range    POCT Glucose 133 (H) 70 - 110 mg/dL   POCT CMP    Collection Time: 02/15/19  7:57 PM   Result Value Ref Range    Albumin, POC 3.5 3.3 - 5.5 g/dL    Alkaline Phosphatase, POC 82 42 - 141 U/L    ALT (SGPT), POC 29 10 - 47 U/L    AST (SGOT), POC 29 11 - 38 U/L    POC BUN 11 7 - 22 mg/dL    Calcium, POC 9.5 8.0 - 10.3 mg/dL    POC Chloride 104 98 - 108 mmol/L    POC Creatinine 0.7 0.6 - 1.2 mg/dL    POC Glucose 123 (H) 73 - 118 mg/dL    POC Potassium 3.7 3.6 - 5.1 mmol/L    POC Sodium 144 128 - 145 mmol/L    Bilirubin 0.5 0.2 - 1.6 mg/dL    POC TCO2 25 18 - 33 mmol/L    Protein 7.3 6.4 - 8.1 g/dL   ISTAT PROCEDURE    Collection Time:  02/15/19  8:00 PM   Result Value Ref Range    POC PTWBT 11.4 9.7 - 14.3 sec    POC PTINR 0.9 0.9 - 1.2    Sample UNK    Troponin ISTAT    Collection Time: 02/15/19  8:02 PM   Result Value Ref Range    POC Cardiac Troponin I 0.00 <0.09 ng/mL    Sample UNK    POCT B-type natriuretic peptide (BNP)    Collection Time: 02/15/19  8:16 PM   Result Value Ref Range    POC B-Type Natriuretic Peptide 9.9 0.0 - 100.0 pg/mL   CBC auto differential    Collection Time: 02/16/19 12:40 AM   Result Value Ref Range    WBC 6.06 3.90 - 12.70 K/uL    RBC 4.42 4.00 - 5.40 M/uL    Hemoglobin 13.1 12.0 - 16.0 g/dL    Hematocrit 40.1 37.0 - 48.5 %    MCV 91 82 - 98 fL    MCH 29.6 27.0 - 31.0 pg    MCHC 32.7 32.0 - 36.0 g/dL    RDW 13.3 11.5 - 14.5 %    Platelets 328 150 - 350 K/uL    MPV 9.6 9.2 - 12.9 fL    Immature Granulocytes 0.2 0.0 - 0.5 %    Gran # (ANC) 2.6 1.8 - 7.7 K/uL    Immature Grans (Abs) 0.01 0.00 - 0.04 K/uL    Lymph # 2.8 1.0 - 4.8 K/uL    Mono # 0.5 0.3 - 1.0 K/uL    Eos # 0.1 0.0 - 0.5 K/uL    Baso # 0.02 0.00 - 0.20 K/uL    nRBC 0 0 /100 WBC    Gran% 43.5 38.0 - 73.0 %    Lymph% 46.5 18.0 - 48.0 %    Mono% 8.3 4.0 - 15.0 %    Eosinophil% 1.2 0.0 - 8.0 %    Basophil% 0.3 0.0 - 1.9 %    Differential Method Automated    Comprehensive metabolic panel    Collection Time: 02/16/19 12:40 AM   Result Value Ref Range    Sodium 137 136 - 145 mmol/L    Potassium 3.9 3.5 - 5.1 mmol/L    Chloride 107 95 - 110 mmol/L    CO2 21 (L) 23 - 29 mmol/L    Glucose 143 (H) 70 - 110 mg/dL    BUN, Bld 11 6 - 20 mg/dL    Creatinine 0.8 0.5 - 1.4 mg/dL    Calcium 9.1 8.7 - 10.5 mg/dL    Total Protein 7.1 6.0 - 8.4 g/dL    Albumin 3.4 (L) 3.5 - 5.2 g/dL    Total Bilirubin 0.3 0.1 - 1.0 mg/dL    Alkaline Phosphatase 77 55 - 135 U/L    AST 17 10 - 40 U/L    ALT 24 10 - 44 U/L    Anion Gap 9 8 - 16 mmol/L    eGFR if African American >60.0 >60 mL/min/1.73 m^2    eGFR if non African American >60.0 >60 mL/min/1.73 m^2   Magnesium    Collection Time:  02/16/19 12:40 AM   Result Value Ref Range    Magnesium 2.0 1.6 - 2.6 mg/dL   Phosphorus    Collection Time: 02/16/19 12:40 AM   Result Value Ref Range    Phosphorus 3.8 2.7 - 4.5 mg/dL   Lipid panel    Collection Time: 02/16/19 12:40 AM   Result Value Ref Range    Cholesterol 202 (H) 120 - 199 mg/dL    Triglycerides 194 (H) 30 - 150 mg/dL    HDL 40 40 - 75 mg/dL    LDL Cholesterol 123.2 63.0 - 159.0 mg/dL    HDL/Chol Ratio 19.8 (L) 20.0 - 50.0 %    Total Cholesterol/HDL Ratio 5.1 (H) 2.0 - 5.0    Non-HDL Cholesterol 162 mg/dL   Hemoglobin A1c    Collection Time: 02/16/19 12:40 AM   Result Value Ref Range    Hemoglobin A1C 6.7 (H) 4.0 - 5.6 %    Estimated Avg Glucose 146 (H) 68 - 131 mg/dL   TSH    Collection Time: 02/16/19 12:40 AM   Result Value Ref Range    TSH 1.377 0.400 - 4.000 uIU/mL   Sedimentation rate    Collection Time: 02/16/19 12:40 AM   Result Value Ref Range    Sed Rate 43 (H) 0 - 36 mm/Hr   C-reactive protein    Collection Time: 02/16/19 12:40 AM   Result Value Ref Range    CRP 2.2 0.0 - 8.2 mg/L   Basic Metabolic Panel (BMP)    Collection Time: 02/16/19  6:28 AM   Result Value Ref Range    Sodium 137 136 - 145 mmol/L    Potassium 4.1 3.5 - 5.1 mmol/L    Chloride 106 95 - 110 mmol/L    CO2 23 23 - 29 mmol/L    Glucose 146 (H) 70 - 110 mg/dL    BUN, Bld 11 6 - 20 mg/dL    Creatinine 0.8 0.5 - 1.4 mg/dL    Calcium 9.2 8.7 - 10.5 mg/dL    Anion Gap 8 8 - 16 mmol/L    eGFR if African American >60.0 >60 mL/min/1.73 m^2    eGFR if non African American >60.0 >60 mL/min/1.73 m^2   Magnesium    Collection Time: 02/16/19  6:28 AM   Result Value Ref Range    Magnesium 2.0 1.6 - 2.6 mg/dL   Phosphorus    Collection Time: 02/16/19  6:28 AM   Result Value Ref Range    Phosphorus 3.0 2.7 - 4.5 mg/dL   Hemoglobin A1c    Collection Time: 02/16/19  6:28 AM   Result Value Ref Range    Hemoglobin A1C 6.7 (H) 4.0 - 5.6 %    Estimated Avg Glucose 146 (H) 68 - 131 mg/dL   CBC with Automated Differential    Collection Time:  19  6:28 AM   Result Value Ref Range    WBC 5.46 3.90 - 12.70 K/uL    RBC 4.37 4.00 - 5.40 M/uL    Hemoglobin 12.8 12.0 - 16.0 g/dL    Hematocrit 40.0 37.0 - 48.5 %    MCV 92 82 - 98 fL    MCH 29.3 27.0 - 31.0 pg    MCHC 32.0 32.0 - 36.0 g/dL    RDW 13.3 11.5 - 14.5 %    Platelets 324 150 - 350 K/uL    MPV 9.6 9.2 - 12.9 fL    Immature Granulocytes 0.2 0.0 - 0.5 %    Gran # (ANC) 2.3 1.8 - 7.7 K/uL    Immature Grans (Abs) 0.01 0.00 - 0.04 K/uL    Lymph # 2.5 1.0 - 4.8 K/uL    Mono # 0.6 0.3 - 1.0 K/uL    Eos # 0.1 0.0 - 0.5 K/uL    Baso # 0.03 0.00 - 0.20 K/uL    nRBC 0 0 /100 WBC    Gran% 41.2 38.0 - 73.0 %    Lymph% 46.5 18.0 - 48.0 %    Mono% 10.1 4.0 - 15.0 %    Eosinophil% 1.5 0.0 - 8.0 %    Basophil% 0.5 0.0 - 1.9 %    Differential Method Automated    POCT glucose    Collection Time: 19 11:25 AM   Result Value Ref Range    POCT Glucose 163 (H) 70 - 110 mg/dL         Significant Imagin19 MRI brain w/ and w/o: Per independent resident review and interpretation,  No acute infarct.  Scattered chronic microvascular ischemic changes throughout b/l cerebral hemispheres.        2/15/19 CTA Head and Neck: Per independent resident review and interpretation,  No large-vessel occlusion, hemodynamically-significant stenosis, nor aneurysm visualized.            Assessment and Plan:     Facial twitching    Ms. Carson is a 52 yo female w/ PMH significant for HTN, DM2, HLD, who presented as a transfer from [facility] for concern of seizures.    Potentially complex partial seizures given L face twitching, report of speech arrest, and possible Sal's paralysis w/ residual L facial droop reported after 2 separate events that occurred more than 48 hours apart.      I discussed with pt and family regarding driving laws in [place] after seizures.  Pt must refrain from driving for 6 months after having a seizure, and must be cleared by a neurologist to legally resume driving.  Physician team not required to  report pt to DMV.  Informed pt that I would document this conversation in the medical record.  Additionally, advised pt to avoid bathing alone, working in high places, and to not supervise children swimming alone or engage in other activities where losing consciousness or motor control would risk harm to self or others.    Recommendations  -Continue keppra 1.5 g BID  -Routine EEG  -Seizure precautions  -No driving x6 months.  May need to contact CM/SW to discuss options for taking time off of work ()  -Referral to neurology for outpatient follow-up (can f/u in my resident clinic, will send message to MA).             VTE Risk Mitigation (From admission, onward)        Ordered     IP VTE HIGH RISK PATIENT  Once      02/16/19 0034     Place JENNIFER hose  Until discontinued      02/16/19 0034     Place sequential compression device  Until discontinued      02/16/19 0034          Thank you for your consult. I will follow-up with patient. Please contact us if you have any additional questions.    Humberto Fine MD  Neurology  [facility]

## 2019-02-16 NOTE — ASSESSMENT & PLAN NOTE
Present preent with several hours of intermittent left facial twitching with slurred speech and possible speech arrest  No resolved. CT head is normal    Most likey complex partial seizure.   TIA I unlikely, but should be evaluated as well    Keppra 1500mg BID  EEG  MRI brain w and w/o contrast  CTA head and neck      Antithrombotics for secondary stroke prevention: Antiplatelets: Aspirin: 325 mg daily    Statins for secondary stroke prevention and hyperlipidemia, if present:   Statins: Atorvastatin- 80 mg daily    Aggressive risk factor modification: HTN     Rehab efforts: Occupational Therapy, PT/OT/SLP to evaluate and treat, PM&R consult     Diagnostics ordered/pending: CTA Head to assess vasculature , CTA Neck/Arch to assess vasculature, HgbA1C to assess blood glucose levels, Lipid Profile to assess cholesterol levels, MRI head without contrast to assess brain parenchyma, TSH to assess thyroid function, Other: EEG    VTE prophylaxis: Enoxaparin 40 mg SQ every 24 hours    BP parameters: TIA: SBP <220 until imaging confirmation of no infarct

## 2019-02-16 NOTE — ED NOTES
Pt ambulates to bathroom with even and steady gait, without difficulty, accompanied by KATINA [employer].

## 2019-02-16 NOTE — ED NOTES
Blood obtained from IV access at left forearm after 10ml waste.  Oxygen 2L initiated per nasal cannula.

## 2019-02-16 NOTE — SUBJECTIVE & OBJECTIVE
Past Medical History:   Diagnosis Date    Allergy     Anxiety     Diabetes mellitus     Heart murmur     Hypertension        Past Surgical History:   Procedure Laterality Date    CHOLECYSTECTOMY      2001 april    HYSTERECTOMY      partial  1996    OOPHORECTOMY      SHOULDER SURGERY Right     WISDOM TOOTH EXTRACTION         Review of patient's allergies indicates:   Allergen Reactions    Keflex [cephalexin] Hives    Vicodin [hydrocodone-acetaminophen] Itching     itch       No current facility-administered medications on file prior to encounter.      Current Outpatient Medications on File Prior to Encounter   Medication Sig    albuterol 90 mcg/actuation inhaler Inhale 1-2 puffs into the lungs every 4 (four) hours as needed for Wheezing. Rescue    amLODIPine (NORVASC) 10 MG tablet Take 1 tablet (10 mg total) by mouth once daily.    benazepril (LOTENSIN) 20 MG tablet Take 1 tablet (20 mg total) by mouth once daily.    metFORMIN (GLUCOPHAGE) 500 MG tablet Take 1 tablet (500 mg total) by mouth 2 (two) times daily with meals.    zolpidem (AMBIEN) 10 mg Tab TAKE 1 TABLET BY MOUTH EVERY NIGHT AT BEDTIME    aspirin 81 MG Chew Take 1 tablet (81 mg total) by mouth once daily.     Family History     Problem Relation (Age of Onset)    Breast cancer Maternal Aunt, Paternal Aunt    Cancer Maternal Aunt, Maternal Uncle, Paternal Aunt, Maternal Grandfather    Cataracts Mother    Depression Sister    Diabetes Mother, Father    Early death Paternal Grandmother, Paternal Grandfather    Hyperlipidemia Mother    Hypertension Mother, Father, Sister    No Known Problems Brother, Paternal Uncle, Maternal Grandmother    Stroke Father, Sister        Tobacco Use    Smoking status: Light Tobacco Smoker     Packs/day: 0.00     Years: 0.50     Pack years: 0.00    Smokeless tobacco: Never Used   Substance and Sexual Activity    Alcohol use: Yes     Alcohol/week: 0.0 oz     Comment: seldom    Drug use: No    Sexual activity:  Yes     Partners: Male     Birth control/protection: None     Review of Systems   Constitutional: Negative for fever.   HENT: Negative for facial swelling.    Eyes: Negative for redness.   Respiratory: Negative for shortness of breath.    Cardiovascular: Negative for chest pain.   Gastrointestinal: Negative for abdominal pain.   Skin: Negative for rash.   Allergic/Immunologic: Negative for immunocompromised state.   Neurological:        +sz-like activity     Psychiatric/Behavioral: Negative for confusion.     Objective:     Vital Signs (Most Recent):  Temp: 98.1 °F (36.7 °C) (02/16/19 0518)  Pulse: (!) 59 (02/16/19 1142)  Resp: 18 (02/16/19 0932)  BP: 132/63 (02/16/19 0932)  SpO2: (!) 93 % (02/16/19 0932) Vital Signs (24h Range):  Temp:  [97.7 °F (36.5 °C)-99 °F (37.2 °C)] 98.1 °F (36.7 °C)  Pulse:  [59-85] 59  Resp:  [16-25] 18  SpO2:  [93 %-100 %] 93 %  BP: (114-160)/(55-92) 132/63     Weight: 97.5 kg (215 lb)  Body mass index is 38.09 kg/m².    Physical Exam   Constitutional: She is oriented to person, place, and time. She appears well-developed. No distress.   HENT:   Head: Normocephalic.   Eyes: Conjunctivae and EOM are normal. Pupils are equal, round, and reactive to light.   Cardiovascular: Normal rate, regular rhythm and normal heart sounds. Exam reveals no friction rub.   Pulmonary/Chest: Effort normal and breath sounds normal. No respiratory distress. She has no wheezes.   Abdominal: Soft. Bowel sounds are normal. She exhibits no distension. There is no tenderness.   Musculoskeletal: She exhibits no edema.   Neurological: She is alert and oriented to person, place, and time. She has a normal Finger-Nose-Finger Test.   Reflex Scores:       Tricep reflexes are 1+ on the right side and 1+ on the left side.       Bicep reflexes are 2+ on the right side and 2+ on the left side.       Brachioradialis reflexes are 2+ on the right side and 2+ on the left side.       Patellar reflexes are 2+ on the right side and  2+ on the left side.       Achilles reflexes are 1+ on the right side and 1+ on the left side.  Skin: Skin is warm and dry.   Psychiatric: She has a normal mood and affect.       NEUROLOGICAL EXAMINATION:     MENTAL STATUS   Oriented to person, place, and time.   Level of consciousness: alert    CRANIAL NERVES     CN II   Visual fields full to confrontation.     CN III, IV, VI   Pupils are equal, round, and reactive to light.  Extraocular motions are normal.     CN V   Right facial sensation deficit: none  Left facial sensation deficit: cheeks    CN VII   Facial expression full, symmetric.     CN VIII   CN VIII normal.     CN IX, X   Palate: symmetric    CN XI   CN XI normal.     CN XII   CN XII normal.        No tongue lacerations visualized     MOTOR EXAM   Muscle bulk: normal  Overall muscle tone: normal    Strength   Right deltoid: 5/5  Left deltoid: 5/5  Right biceps: 5/5  Left biceps: 5/5  Right triceps: 5/5  Left triceps: 5/5  Right interossei: 5/5  Left interossei: 5/5  Right quadriceps: 5/5  Left quadriceps: 5/5  Right hamstrin/5  Left hamstrin/5  Right glutei: 5/5  Left glutei: 5/5  Right anterior tibial: 5/5  Left anterior tibial: 5/5  Right posterior tibial: 5/5  Left posterior tibial: 5/5  Right peroneal: 5/5  Left peroneal: 5/5  Right gastroc: 5/5  Left gastroc: 5/5    REFLEXES     Reflexes   Right brachioradialis: 2+  Left brachioradialis: 2+  Right biceps: 2+  Left biceps: 2+  Right triceps: 1+  Left triceps: 1+  Right patellar: 2+  Left patellar: 2+  Right achilles: 1+  Left achilles: 1+  Right plantar: equivocal  Left plantar: equivocal  Right Farrell: absent  Left Farrell: absent  Right ankle clonus: absent  Left ankle clonus: absent    SENSORY EXAM        To light touch decreased in L V2 distribution     GAIT AND COORDINATION      Coordination   Finger to nose coordination: normal       Gait deferred 2/2 seizure precautions       Significant Labs:   Recent Results (from the past 24  hour(s))   POCT glucose    Collection Time: 02/15/19  5:39 PM   Result Value Ref Range    POCT Glucose 133 (H) 70 - 110 mg/dL   POCT CMP    Collection Time: 02/15/19  7:57 PM   Result Value Ref Range    Albumin, POC 3.5 3.3 - 5.5 g/dL    Alkaline Phosphatase, POC 82 42 - 141 U/L    ALT (SGPT), POC 29 10 - 47 U/L    AST (SGOT), POC 29 11 - 38 U/L    POC BUN 11 7 - 22 mg/dL    Calcium, POC 9.5 8.0 - 10.3 mg/dL    POC Chloride 104 98 - 108 mmol/L    POC Creatinine 0.7 0.6 - 1.2 mg/dL    POC Glucose 123 (H) 73 - 118 mg/dL    POC Potassium 3.7 3.6 - 5.1 mmol/L    POC Sodium 144 128 - 145 mmol/L    Bilirubin 0.5 0.2 - 1.6 mg/dL    POC TCO2 25 18 - 33 mmol/L    Protein 7.3 6.4 - 8.1 g/dL   ISTAT PROCEDURE    Collection Time: 02/15/19  8:00 PM   Result Value Ref Range    POC PTWBT 11.4 9.7 - 14.3 sec    POC PTINR 0.9 0.9 - 1.2    Sample UNK    Troponin ISTAT    Collection Time: 02/15/19  8:02 PM   Result Value Ref Range    POC Cardiac Troponin I 0.00 <0.09 ng/mL    Sample UNK    POCT B-type natriuretic peptide (BNP)    Collection Time: 02/15/19  8:16 PM   Result Value Ref Range    POC B-Type Natriuretic Peptide 9.9 0.0 - 100.0 pg/mL   CBC auto differential    Collection Time: 02/16/19 12:40 AM   Result Value Ref Range    WBC 6.06 3.90 - 12.70 K/uL    RBC 4.42 4.00 - 5.40 M/uL    Hemoglobin 13.1 12.0 - 16.0 g/dL    Hematocrit 40.1 37.0 - 48.5 %    MCV 91 82 - 98 fL    MCH 29.6 27.0 - 31.0 pg    MCHC 32.7 32.0 - 36.0 g/dL    RDW 13.3 11.5 - 14.5 %    Platelets 328 150 - 350 K/uL    MPV 9.6 9.2 - 12.9 fL    Immature Granulocytes 0.2 0.0 - 0.5 %    Gran # (ANC) 2.6 1.8 - 7.7 K/uL    Immature Grans (Abs) 0.01 0.00 - 0.04 K/uL    Lymph # 2.8 1.0 - 4.8 K/uL    Mono # 0.5 0.3 - 1.0 K/uL    Eos # 0.1 0.0 - 0.5 K/uL    Baso # 0.02 0.00 - 0.20 K/uL    nRBC 0 0 /100 WBC    Gran% 43.5 38.0 - 73.0 %    Lymph% 46.5 18.0 - 48.0 %    Mono% 8.3 4.0 - 15.0 %    Eosinophil% 1.2 0.0 - 8.0 %    Basophil% 0.3 0.0 - 1.9 %    Differential Method  Automated    Comprehensive metabolic panel    Collection Time: 02/16/19 12:40 AM   Result Value Ref Range    Sodium 137 136 - 145 mmol/L    Potassium 3.9 3.5 - 5.1 mmol/L    Chloride 107 95 - 110 mmol/L    CO2 21 (L) 23 - 29 mmol/L    Glucose 143 (H) 70 - 110 mg/dL    BUN, Bld 11 6 - 20 mg/dL    Creatinine 0.8 0.5 - 1.4 mg/dL    Calcium 9.1 8.7 - 10.5 mg/dL    Total Protein 7.1 6.0 - 8.4 g/dL    Albumin 3.4 (L) 3.5 - 5.2 g/dL    Total Bilirubin 0.3 0.1 - 1.0 mg/dL    Alkaline Phosphatase 77 55 - 135 U/L    AST 17 10 - 40 U/L    ALT 24 10 - 44 U/L    Anion Gap 9 8 - 16 mmol/L    eGFR if African American >60.0 >60 mL/min/1.73 m^2    eGFR if non African American >60.0 >60 mL/min/1.73 m^2   Magnesium    Collection Time: 02/16/19 12:40 AM   Result Value Ref Range    Magnesium 2.0 1.6 - 2.6 mg/dL   Phosphorus    Collection Time: 02/16/19 12:40 AM   Result Value Ref Range    Phosphorus 3.8 2.7 - 4.5 mg/dL   Lipid panel    Collection Time: 02/16/19 12:40 AM   Result Value Ref Range    Cholesterol 202 (H) 120 - 199 mg/dL    Triglycerides 194 (H) 30 - 150 mg/dL    HDL 40 40 - 75 mg/dL    LDL Cholesterol 123.2 63.0 - 159.0 mg/dL    HDL/Chol Ratio 19.8 (L) 20.0 - 50.0 %    Total Cholesterol/HDL Ratio 5.1 (H) 2.0 - 5.0    Non-HDL Cholesterol 162 mg/dL   Hemoglobin A1c    Collection Time: 02/16/19 12:40 AM   Result Value Ref Range    Hemoglobin A1C 6.7 (H) 4.0 - 5.6 %    Estimated Avg Glucose 146 (H) 68 - 131 mg/dL   TSH    Collection Time: 02/16/19 12:40 AM   Result Value Ref Range    TSH 1.377 0.400 - 4.000 uIU/mL   Sedimentation rate    Collection Time: 02/16/19 12:40 AM   Result Value Ref Range    Sed Rate 43 (H) 0 - 36 mm/Hr   C-reactive protein    Collection Time: 02/16/19 12:40 AM   Result Value Ref Range    CRP 2.2 0.0 - 8.2 mg/L   Basic Metabolic Panel (BMP)    Collection Time: 02/16/19  6:28 AM   Result Value Ref Range    Sodium 137 136 - 145 mmol/L    Potassium 4.1 3.5 - 5.1 mmol/L    Chloride 106 95 - 110 mmol/L     CO2 23 23 - 29 mmol/L    Glucose 146 (H) 70 - 110 mg/dL    BUN, Bld 11 6 - 20 mg/dL    Creatinine 0.8 0.5 - 1.4 mg/dL    Calcium 9.2 8.7 - 10.5 mg/dL    Anion Gap 8 8 - 16 mmol/L    eGFR if African American >60.0 >60 mL/min/1.73 m^2    eGFR if non African American >60.0 >60 mL/min/1.73 m^2   Magnesium    Collection Time: 19  6:28 AM   Result Value Ref Range    Magnesium 2.0 1.6 - 2.6 mg/dL   Phosphorus    Collection Time: 19  6:28 AM   Result Value Ref Range    Phosphorus 3.0 2.7 - 4.5 mg/dL   Hemoglobin A1c    Collection Time: 19  6:28 AM   Result Value Ref Range    Hemoglobin A1C 6.7 (H) 4.0 - 5.6 %    Estimated Avg Glucose 146 (H) 68 - 131 mg/dL   CBC with Automated Differential    Collection Time: 19  6:28 AM   Result Value Ref Range    WBC 5.46 3.90 - 12.70 K/uL    RBC 4.37 4.00 - 5.40 M/uL    Hemoglobin 12.8 12.0 - 16.0 g/dL    Hematocrit 40.0 37.0 - 48.5 %    MCV 92 82 - 98 fL    MCH 29.3 27.0 - 31.0 pg    MCHC 32.0 32.0 - 36.0 g/dL    RDW 13.3 11.5 - 14.5 %    Platelets 324 150 - 350 K/uL    MPV 9.6 9.2 - 12.9 fL    Immature Granulocytes 0.2 0.0 - 0.5 %    Gran # (ANC) 2.3 1.8 - 7.7 K/uL    Immature Grans (Abs) 0.01 0.00 - 0.04 K/uL    Lymph # 2.5 1.0 - 4.8 K/uL    Mono # 0.6 0.3 - 1.0 K/uL    Eos # 0.1 0.0 - 0.5 K/uL    Baso # 0.03 0.00 - 0.20 K/uL    nRBC 0 0 /100 WBC    Gran% 41.2 38.0 - 73.0 %    Lymph% 46.5 18.0 - 48.0 %    Mono% 10.1 4.0 - 15.0 %    Eosinophil% 1.5 0.0 - 8.0 %    Basophil% 0.5 0.0 - 1.9 %    Differential Method Automated    POCT glucose    Collection Time: 19 11:25 AM   Result Value Ref Range    POCT Glucose 163 (H) 70 - 110 mg/dL         Significant Imagin19 MRI brain w/ and w/o: Per independent resident review and interpretation,  No acute infarct.  Scattered chronic microvascular ischemic changes throughout b/l cerebral hemispheres.        2/15/19 CTA Head and Neck: Per independent resident review and interpretation,  No large-vessel  occlusion, hemodynamically-significant stenosis, nor aneurysm visualized.

## 2019-02-16 NOTE — ED NOTES
Sima contacted to let her know CTA results are back and negative. sima with [facility] to contact neuro at main campus for transfer for EEG

## 2019-02-16 NOTE — ASSESSMENT & PLAN NOTE
Facial Twitching  Facial Numbness  - all symptoms resolved after administration of keppra  - evaluated by tele-stroke with negative CTA, NIH 0, ? Symptoms 2/2 partial seizure/Sal's paralysis   - start keppra 1500 mg BID per vascular reccs   - MRI pending  - neurology consulted   - EEG per their reccs  - neuro checks  - check intake labs including CBC, CMP, Mg, Phos, TSH, ESR/CRP, UA, UDS

## 2019-02-16 NOTE — ASSESSMENT & PLAN NOTE
Ms. Carson is a 54 yo female w/ PMH significant for HTN, DM2, HLD, who presented as a transfer from [facility] for concern of seizures.    Potentially complex partial seizures given L face twitching, report of speech arrest, and possible Sal's paralysis w/ residual L facial droop reported after 2 separate events that occurred more than 48 hours apart.      I discussed with pt and family regarding driving laws in [place] after seizures.  Pt must refrain from driving for 6 months after having a seizure, and must be cleared by a neurologist to legally resume driving.  Physician team not required to report pt to DMV.  Informed pt that I would document this conversation in the medical record.  Additionally, advised pt to avoid bathing alone, working in high places, and to not supervise children swimming alone or engage in other activities where losing consciousness or motor control would risk harm to self or others.    Recommendations  -Continue keppra 1.5 g BID  -Routine EEG  -Seizure precautions  -No driving x6 months.  May need to contact CM/SW to discuss options for taking time off of work ()  -Referral to neurology for outpatient follow-up (can f/u in my resident clinic, will send message to MA).

## 2019-02-16 NOTE — SUBJECTIVE & OBJECTIVE
"  Woke up with symptoms?: no  Last known normal: Last Known Normal Date: 02/15/19 Last Known Normal Time: 1200    Recent bleeding noted: no  Does the patient take any Blood Thinners? no  Medications: No relevant medications      Past Medical History: hypertension    Past Surgical History: no major surgeries within the last 2 weeks    Family History: hypertension, diabetes, hyperlipidemia, MI/CAD, stroke, Heart Problems and Cancer    Social History: no smoking, no drinking, no drugs    Allergies: Keflex [Cephalexin]  Vicodin [Hydrocodone-Acetaminophen] No relevant allergies    Review of Systems   Neurological: Positive for seizures, facial asymmetry and speech difficulty.   All other systems reviewed and are negative.    Objective:   Vitals: Blood pressure (!) 153/92, pulse 81, temperature 98.3 °F (36.8 °C), temperature source Oral, resp. rate 18, height 5' 3" (1.6 m), weight 97.5 kg (215 lb), SpO2 100 %.    CT READ: Yes  No hemmorhage. No mass effect. No early infarct signs.     Physical Exam   Constitutional: She appears well-developed.   HENT:   Head: Normocephalic.   Eyes: Pupils are equal, round, and reactive to light.   Neck: Normal range of motion.   Pulmonary/Chest: Effort normal.   Abdominal: Soft.   Neurological: She is alert.   Skin: Skin is warm.         "

## 2019-02-16 NOTE — ED NOTES
Transfer explained to pt and family member at bedside.  Pt verbalizes understanding for transfer with no additional questions.  Consent to transfer signed by pt.

## 2019-02-16 NOTE — HPI
Monalisa Carson is a 53F with T2DM, HTN, HLD who presents as transfer from [facility] ED for evaluation of left sided cheek only twitching, numbness, and dysarthria. Twitching started a few days ago, worsened today and associated with high BP readings. Dysarthria started around 1200 on 02/15, both gradually worsened throughout the day eventually prompting presentation to ED. She denies symptoms like this in the past. Associated with higher than usual BP readings and HA, despite compliance with BP regimen. She states all her symptoms have resolved since administration of keppra. She denies new medications, drug use, family or personal history of seizure. She denies cough, NVD, eye pain, vision changes, hemiparesis, falls, fever, neck stiffness, cough, SOB, CP, confusion, or trouble swallowing.     The patient was evaluated by tele-stroke while in the ED, with NIH of 0 and negative CTA H/N. Transfer was recommended for neurologic evaluation and EEG as there was suspicion for partial seizure/Sal's paralysis. She was given 1500 mg IV keppra.     Intake labs: CBC, CMP, Mg, Phos all WNL. CRP WNL, ESR mildly elevated at 43, symptoms improved.

## 2019-02-17 VITALS
WEIGHT: 215 LBS | DIASTOLIC BLOOD PRESSURE: 73 MMHG | HEART RATE: 61 BPM | RESPIRATION RATE: 18 BRPM | OXYGEN SATURATION: 93 % | HEIGHT: 63 IN | SYSTOLIC BLOOD PRESSURE: 127 MMHG | TEMPERATURE: 98 F | BODY MASS INDEX: 38.09 KG/M2

## 2019-02-17 LAB
ANION GAP SERPL CALC-SCNC: 6 MMOL/L
BASOPHILS # BLD AUTO: 0.02 K/UL
BASOPHILS NFR BLD: 0.4 %
BUN SERPL-MCNC: 16 MG/DL
CALCIUM SERPL-MCNC: 9.3 MG/DL
CHLORIDE SERPL-SCNC: 108 MMOL/L
CO2 SERPL-SCNC: 22 MMOL/L
CREAT SERPL-MCNC: 0.8 MG/DL
DIFFERENTIAL METHOD: NORMAL
EOSINOPHIL # BLD AUTO: 0.1 K/UL
EOSINOPHIL NFR BLD: 1.4 %
ERYTHROCYTE [DISTWIDTH] IN BLOOD BY AUTOMATED COUNT: 13.5 %
EST. GFR  (AFRICAN AMERICAN): >60 ML/MIN/1.73 M^2
EST. GFR  (NON AFRICAN AMERICAN): >60 ML/MIN/1.73 M^2
GLUCOSE SERPL-MCNC: 141 MG/DL
HCT VFR BLD AUTO: 39.8 %
HGB BLD-MCNC: 13.1 G/DL
IMM GRANULOCYTES # BLD AUTO: 0.01 K/UL
IMM GRANULOCYTES NFR BLD AUTO: 0.2 %
LYMPHOCYTES # BLD AUTO: 2.3 K/UL
LYMPHOCYTES NFR BLD: 46.1 %
MAGNESIUM SERPL-MCNC: 2 MG/DL
MCH RBC QN AUTO: 30.6 PG
MCHC RBC AUTO-ENTMCNC: 32.9 G/DL
MCV RBC AUTO: 93 FL
MONOCYTES # BLD AUTO: 0.4 K/UL
MONOCYTES NFR BLD: 8.4 %
NEUTROPHILS # BLD AUTO: 2.2 K/UL
NEUTROPHILS NFR BLD: 43.5 %
NRBC BLD-RTO: 0 /100 WBC
PHOSPHATE SERPL-MCNC: 3.8 MG/DL
PLATELET # BLD AUTO: 325 K/UL
PMV BLD AUTO: 10.1 FL
POTASSIUM SERPL-SCNC: 4.5 MMOL/L
RBC # BLD AUTO: 4.28 M/UL
SODIUM SERPL-SCNC: 136 MMOL/L
WBC # BLD AUTO: 4.99 K/UL

## 2019-02-17 PROCEDURE — 99238 HOSP IP/OBS DSCHRG MGMT 30/<: CPT | Mod: ,,, | Performed by: INTERNAL MEDICINE

## 2019-02-17 PROCEDURE — 85025 COMPLETE CBC W/AUTO DIFF WBC: CPT

## 2019-02-17 PROCEDURE — 25000003 PHARM REV CODE 250: Performed by: PHYSICIAN ASSISTANT

## 2019-02-17 PROCEDURE — 84100 ASSAY OF PHOSPHORUS: CPT

## 2019-02-17 PROCEDURE — 80048 BASIC METABOLIC PNL TOTAL CA: CPT

## 2019-02-17 PROCEDURE — 99238 PR HOSPITAL DISCHARGE DAY,<30 MIN: ICD-10-PCS | Mod: ,,, | Performed by: INTERNAL MEDICINE

## 2019-02-17 PROCEDURE — 36415 COLL VENOUS BLD VENIPUNCTURE: CPT

## 2019-02-17 PROCEDURE — 83735 ASSAY OF MAGNESIUM: CPT

## 2019-02-17 RX ORDER — LEVETIRACETAM 750 MG/1
1500 TABLET ORAL 2 TIMES DAILY
Qty: 120 TABLET | Refills: 11 | Status: SHIPPED | OUTPATIENT
Start: 2019-02-17 | End: 2019-03-27

## 2019-02-17 RX ADMIN — POLYETHYLENE GLYCOL 3350 17 G: 17 POWDER, FOR SOLUTION ORAL at 09:02

## 2019-02-17 RX ADMIN — LEVETIRACETAM 1500 MG: 750 TABLET ORAL at 09:02

## 2019-02-17 RX ADMIN — BENAZEPRIL HYDROCHLORIDE 20 MG: 20 TABLET, FILM COATED ORAL at 09:02

## 2019-02-17 RX ADMIN — ASPIRIN 81 MG CHEWABLE TABLET 81 MG: 81 TABLET CHEWABLE at 09:02

## 2019-02-17 RX ADMIN — AMLODIPINE BESYLATE 10 MG: 10 TABLET ORAL at 09:02

## 2019-02-17 NOTE — MEDICAL/APP STUDENT
Hospital Medicine  Progress note    Team: [facility] MED B [facility]  Admit Date: 2/15/2019  YANELY 2/17/2019  Length of Stay:  LOS: 2 days   Code status: Not of File    Principal Problem:  Dysarthria    Overview:  Monalisa Carson is a 53F with T2DM, HTN, HLD who presents as transfer from [facility]  for evaluation of left sided cheek only twitching, numbness, and dysarthria. Twitching started a few days ago, worsened today and associated with high BP readings.    Interval hx: EEG negative for epileptic activity. However given repeat events neurology recommends continuing seizure prophylaxis with keppra 1.5g BID.     ROS     Constitutional: Negative for chills, fatigue and fever.   HENT: Negative for drooling, hearing loss, sinus pressure, sinus pain and trouble swallowing.    Eyes: Negative for pain, discharge, redness and visual disturbance.   Respiratory: Negative for cough, shortness of breath and wheezing.    Cardiovascular: Negative for chest pain, palpitations and leg swelling.   Gastrointestinal: Negative for abdominal distention, abdominal pain, diarrhea, nausea and vomiting.   Genitourinary: Negative for difficulty urinating and dysuria.   Musculoskeletal: Negative for arthralgias and back pain.   Skin: Negative for rash and wound.   Neurological: Positive for tremors, speech difficulty, numbness and headaches. Negative for dizziness, syncope, weakness and light-headedness.   Psychiatric/Behavioral: Negative for agitation and confusion.     PEx  Temp:  [98.2 °F (36.8 °C)-98.3 °F (36.8 °C)]   Pulse:  [55-71]   Resp:  [17-18]   BP: (111-127)/(54-73)   SpO2:  [93 %-97 %]   No intake or output data in the 24 hours ending 02/17/19 1117    Constitutional: She is oriented to person, place, and time. She appears well-developed and well-nourished. No distress.   HENT:   Head: Normocephalic and atraumatic.   Mouth/Throat: Uvula is midline and mucous membranes are normal.   Mild left eye injection    Eyes: Conjunctivae and EOM are normal. Pupils are equal, round, and reactive to light.   Neck: Normal range of motion and phonation normal. Neck supple. No spinous process tenderness and no muscular tenderness present. No neck rigidity. No tracheal deviation, no edema and normal range of motion present. No Brudzinski's sign and no Kernig's sign noted. No thyromegaly present.   Cardiovascular: Normal rate and regular rhythm.   No murmur heard.  Pulmonary/Chest: Effort normal and breath sounds normal. No stridor. No respiratory distress. She has no wheezes.   Abdominal: Soft. Bowel sounds are normal. She exhibits no distension. There is no tenderness.   Musculoskeletal: Normal range of motion. She exhibits no edema.   Neurological: She is alert and oriented to person, place, and time. No cranial nerve deficit. GCS eye subscore is 4. GCS verbal subscore is 5. GCS motor subscore is 6.   5/5 strength throughout, speechclear, no drooling, no facial droop, AOx4, no facial tremor appreciated, no numbness to light palpation of left cheek, no temporal tenderness or tortuous arteries or temporal bruits. No crepitus or pain at TMJ   Skin: Skin is warm and dry. She is not diaphoretic.   Psychiatric: She has a normal mood and affect. Her behavior is normal.      Recent Labs   Lab 02/16/19 0040 02/16/19 0628 02/17/19  0514   WBC 6.06 5.46 4.99   HGB 13.1 12.8 13.1   HCT 40.1 40.0 39.8    324 325     Recent Labs   Lab 02/16/19  0040 02/16/19  0628 02/17/19  0514    137 136   K 3.9 4.1 4.5    106 108   CO2 21* 23 22*   BUN 11 11 16   CREATININE 0.8 0.8 0.8   * 146* 141*   CALCIUM 9.1 9.2 9.3   MG 2.0 2.0 2.0   PHOS 3.8 3.0 3.8     Recent Labs   Lab 02/16/19  0040   ALKPHOS 77   ALT 24   AST 17   ALBUMIN 3.4*   PROT 7.1   BILITOT 0.3        No results for input(s): CPK, CPKMB, MB, TROPONINI in the last 72 hours.  Recent Labs   Lab 02/15/19  1739 02/16/19  1125 02/16/19  1811   POCTGLUCOSE 133* 163* 139*      Hemoglobin A1C   Date Value Ref Range Status   02/16/2019 6.7 (H) 4.0 - 5.6 % Final     Comment:     ADA Screening Guidelines:  5.7-6.4%  Consistent with prediabetes  >or=6.5%  Consistent with diabetes  High levels of fetal hemoglobin interfere with the HbA1C  assay. Heterozygous hemoglobin variants (HbS, HgC, etc)do  not significantly interfere with this assay.   However, presence of multiple variants may affect accuracy.     02/16/2019 6.7 (H) 4.0 - 5.6 % Final     Comment:     ADA Screening Guidelines:  5.7-6.4%  Consistent with prediabetes  >or=6.5%  Consistent with diabetes  High levels of fetal hemoglobin interfere with the HbA1C  assay. Heterozygous hemoglobin variants (HbS, HgC, etc)do  not significantly interfere with this assay.   However, presence of multiple variants may affect accuracy.     08/31/2018 6.6 (H) 4.0 - 5.6 % Final     Comment:     ADA Screening Guidelines:  5.7-6.4%  Consistent with prediabetes  >or=6.5%  Consistent with diabetes  High levels of fetal hemoglobin interfere with the HbA1C  assay. Heterozygous hemoglobin variants (HbS, HgC, etc)do  not significantly interfere with this assay.   However, presence of multiple variants may affect accuracy.         Scheduled Meds:   amLODIPine  10 mg Oral Daily    aspirin  81 mg Oral Daily    benazepril  20 mg Oral Daily    levETIRAcetam  1,500 mg Oral BID    polyethylene glycol  17 g Oral Daily     Continuous Infusions:  As Needed:  acetaminophen, albuterol-ipratropium, bisacodyl, dextrose 50%, dextrose 50%, glucagon (human recombinant), glucose, glucose, insulin aspart U-100, ketorolac, ondansetron, promethazine (PHENERGAN) IVPB, ramelteon, sodium chloride 0.9%    Active Hospital Problems    Diagnosis  POA    *Dysarthria [R47.1]  Yes    Facial twitching [G51.4]  Yes    Type 2 diabetes mellitus with hyperglycemia, without long-term current use of insulin [E11.65]  Yes    Essential hypertension, benign [I10]  Yes      Resolved  Hospital Problems   No resolved problems to display.         Assessment and Plan      High Risk Conditions  {HIGH RISK CONDITIONS:11581}     Diet:    GI PPx:   DVT PPx:    Lines:  Drains:  Airways:    Wounds:    Goals of Care:   Discharge plan:

## 2019-02-17 NOTE — MEDICAL/APP STUDENT
Hospital Medicine  Progress note    [place]: [facility] [place]  Admit Date: 2/15/2019  YANELY 2/17/2019  Length of Stay:  LOS: 2 days   Code status: Not of File    Principal Problem:  Dysarthria    Overview:  Monalisa Carson is a 53F with T2DM, HTN, HLD who presents as transfer from [facility]  for evaluation of left sided cheek only twitching, numbness, and dysarthria. Twitching started a few days ago, worsened today and associated with high BP readings.    Interval hx: EEG negative for epileptic activity. However given repeat events neurology recommends continuing seizure prophylaxis with keppra 1.5g BID.     ROS     Constitutional: Negative for chills, fatigue and fever.   HENT: Negative for drooling, hearing loss, sinus pressure, sinus pain and trouble swallowing.    Eyes: Negative for pain, discharge, redness and visual disturbance.   Respiratory: Negative for cough, shortness of breath and wheezing.    Cardiovascular: Negative for chest pain, palpitations and leg swelling.   Gastrointestinal: Negative for abdominal distention, abdominal pain, diarrhea, nausea and vomiting.   Genitourinary: Negative for difficulty urinating and dysuria.   Musculoskeletal: Negative for arthralgias and back pain.   Skin: Negative for rash and wound.   Neurological: Positive for tremors, speech difficulty, numbness and headaches. Negative for dizziness, syncope, weakness and light-headedness.   Psychiatric/Behavioral: Negative for agitation and confusion.     PEx  Temp:  [98.2 °F (36.8 °C)-98.3 °F (36.8 °C)]   Pulse:  [55-71]   Resp:  [17-18]   BP: (111-127)/(54-73)   SpO2:  [93 %-97 %]   No intake or output data in the 24 hours ending 02/17/19 1122    Constitutional: She is oriented to person, place, and time. She appears well-developed and well-nourished. No distress.   HENT:   Head: Normocephalic and atraumatic.   Mouth/Throat: Uvula is midline and mucous membranes are normal.   Mild left eye injection    Eyes: Conjunctivae and EOM are normal. Pupils are equal, round, and reactive to light.   Neck: Normal range of motion and phonation normal. Neck supple. No spinous process tenderness and no muscular tenderness present. No neck rigidity. No tracheal deviation, no edema and normal range of motion present. No Brudzinski's sign and no Kernig's sign noted. No thyromegaly present.   Cardiovascular: Normal rate and regular rhythm.   No murmur heard.  Pulmonary/Chest: Effort normal and breath sounds normal. No stridor. No respiratory distress. She has no wheezes.   Abdominal: Soft. Bowel sounds are normal. She exhibits no distension. There is no tenderness.   Musculoskeletal: Normal range of motion. She exhibits no edema.   Neurological: She is alert and oriented to person, place, and time. No cranial nerve deficit. GCS eye subscore is 4. GCS verbal subscore is 5. GCS motor subscore is 6.   5/5 strength throughout, speechclear, no drooling, no facial droop, AOx4, no facial tremor appreciated, no numbness to light palpation of left cheek, no temporal tenderness or tortuous arteries or temporal bruits. No crepitus or pain at TMJ   Skin: Skin is warm and dry. She is not diaphoretic.   Psychiatric: She has a normal mood and affect. Her behavior is normal.      Recent Labs   Lab 02/16/19 0040 02/16/19 0628 02/17/19  0514   WBC 6.06 5.46 4.99   HGB 13.1 12.8 13.1   HCT 40.1 40.0 39.8    324 325     Recent Labs   Lab 02/16/19  0040 02/16/19  0628 02/17/19  0514    137 136   K 3.9 4.1 4.5    106 108   CO2 21* 23 22*   BUN 11 11 16   CREATININE 0.8 0.8 0.8   * 146* 141*   CALCIUM 9.1 9.2 9.3   MG 2.0 2.0 2.0   PHOS 3.8 3.0 3.8     Recent Labs   Lab 02/16/19  0040   ALKPHOS 77   ALT 24   AST 17   ALBUMIN 3.4*   PROT 7.1   BILITOT 0.3        No results for input(s): CPK, CPKMB, MB, TROPONINI in the last 72 hours.  Recent Labs   Lab 02/15/19  1739 02/16/19  1125 02/16/19  1811   POCTGLUCOSE 133* 163* 139*      Hemoglobin A1C   Date Value Ref Range Status   02/16/2019 6.7 (H) 4.0 - 5.6 % Final     Comment:     ADA Screening Guidelines:  5.7-6.4%  Consistent with prediabetes  >or=6.5%  Consistent with diabetes  High levels of fetal hemoglobin interfere with the HbA1C  assay. Heterozygous hemoglobin variants (HbS, HgC, etc)do  not significantly interfere with this assay.   However, presence of multiple variants may affect accuracy.     02/16/2019 6.7 (H) 4.0 - 5.6 % Final     Comment:     ADA Screening Guidelines:  5.7-6.4%  Consistent with prediabetes  >or=6.5%  Consistent with diabetes  High levels of fetal hemoglobin interfere with the HbA1C  assay. Heterozygous hemoglobin variants (HbS, HgC, etc)do  not significantly interfere with this assay.   However, presence of multiple variants may affect accuracy.     08/31/2018 6.6 (H) 4.0 - 5.6 % Final     Comment:     ADA Screening Guidelines:  5.7-6.4%  Consistent with prediabetes  >or=6.5%  Consistent with diabetes  High levels of fetal hemoglobin interfere with the HbA1C  assay. Heterozygous hemoglobin variants (HbS, HgC, etc)do  not significantly interfere with this assay.   However, presence of multiple variants may affect accuracy.         Scheduled Meds:   amLODIPine  10 mg Oral Daily    aspirin  81 mg Oral Daily    benazepril  20 mg Oral Daily    levETIRAcetam  1,500 mg Oral BID    polyethylene glycol  17 g Oral Daily     Continuous Infusions:  As Needed:  acetaminophen, albuterol-ipratropium, bisacodyl, dextrose 50%, dextrose 50%, glucagon (human recombinant), glucose, glucose, insulin aspart U-100, ketorolac, ondansetron, promethazine (PHENERGAN) IVPB, ramelteon, sodium chloride 0.9%    Active Hospital Problems    Diagnosis  POA    *Dysarthria [R47.1]  Yes    Facial twitching [G51.4]  Yes    Type 2 diabetes mellitus with hyperglycemia, without long-term current use of insulin [E11.65]  Yes    Essential hypertension, benign [I10]  Yes      Resolved  Hospital Problems   No resolved problems to display.         Assessment and Plan    Dysarthria     Facial Twitching  Facial Numbness  - all symptoms resolved after administration of keppra  - evaluated by tele-stroke with negative CTA, NIH 0, ? Symptoms 2/2 partial seizure/Sal's paralysis              - start keppra 1500 mg BID per vascular reccs              - MRI pending  - neurology consulted              - EEG per their reccs  - neuro checks  - check intake labs including CBC, CMP, Mg, Phos, TSH, ESR/CRP, UA, UDS      Type 2 diabetes mellitus with hyperglycemia, without long-term current use of insulin     - hold home metformin  - A1c 6.3  - low dose SSI      Essential hypertension, benign     - continue home norvasc 10 mg, benazepril 20 mg daily  - BP controlled       Discharge plan:    I personally scribed for Eric Washington MD on 02/17/2019 at 11:22 AM. Electronically signed by urban Bell III on 02/17/2019 at 11:22 AM

## 2019-02-17 NOTE — PLAN OF CARE
02/17/2019      Monalisa Carson  [address] [place] [address]          Hospital Medicine Dept.  [facility] [address] [facility] [place] [address]  [phone] after hours  [phone] fax Monalisa Carson has been hospitalized at the [facility] since 2/15/2019.  Please excuse the patient from duties.  Patient may return on 2/20/19. She is unable to drive due to medical conditions for the next six months or when by cleared to drive by her neurologist    Please contact me if you have any questions.                  __________________________  Eric Washington MD  02/17/2019

## 2019-02-17 NOTE — DISCHARGE SUMMARY
Hospital Medicine  Progress note    I personally performed the history, physical exam and medical decision making: and confirmed the accuracy of the information in the transcribed note.  Eric Washington M.D.  Pager: [phone]  Cell Phone: [phone]    Team: [facility] MED B Eric Washington MD  Admit Date: 2/15/2019  YANELY 2/17/2019  Length of Stay:  LOS: 2 days   Code status: Not of File    Principal Problem:  Dysarthria    Overview:  Monalisa Carson is a 53F with T2DM, HTN, HLD who presents as transfer from [facility]  for evaluation of left sided cheek only twitching, numbness, and dysarthria. Twitching started a few days ago, worsened today and associated with high BP readings.    Interval hx: EEG negative for epileptic activity. However given repeat events neurology recommends continuing seizure prophylaxis with keppra 1.5g BID.     ROS     Constitutional: Negative for chills, fatigue and fever.   HENT: Negative for drooling, hearing loss, sinus pressure, sinus pain and trouble swallowing.    Eyes: Negative for pain, discharge, redness and visual disturbance.   Respiratory: Negative for cough, shortness of breath and wheezing.    Cardiovascular: Negative for chest pain, palpitations and leg swelling.   Gastrointestinal: Negative for abdominal distention, abdominal pain, diarrhea, nausea and vomiting.   Genitourinary: Negative for difficulty urinating and dysuria.   Musculoskeletal: Negative for arthralgias and back pain.   Skin: Negative for rash and wound.   Neurological: Positive for tremors, speech difficulty, numbness and headaches. Negative for dizziness, syncope, weakness and light-headedness.   Psychiatric/Behavioral: Negative for agitation and confusion.     PEx  Temp:  [98.2 °F (36.8 °C)-98.3 °F (36.8 °C)]   Pulse:  [55-71]   Resp:  [17-18]   BP: (111-127)/(54-73)   SpO2:  [93 %-97 %]   No intake or output data in the 24 hours ending 02/17/19 0759    Constitutional: She is oriented to person,  place, and time. She appears well-developed and well-nourished. No distress.   HENT:   Head: Normocephalic and atraumatic.   Mouth/Throat: Uvula is midline and mucous membranes are normal.   Mild left eye injection   Eyes: Conjunctivae and EOM are normal. Pupils are equal, round, and reactive to light.   Neck: Normal range of motion and phonation normal. Neck supple. No spinous process tenderness and no muscular tenderness present. No neck rigidity. No tracheal deviation, no edema and normal range of motion present. No Brudzinski's sign and no Kernig's sign noted. No thyromegaly present.   Cardiovascular: Normal rate and regular rhythm.   No murmur heard.  Pulmonary/Chest: Effort normal and breath sounds normal. No stridor. No respiratory distress. She has no wheezes.   Abdominal: Soft. Bowel sounds are normal. She exhibits no distension. There is no tenderness.   Musculoskeletal: Normal range of motion. She exhibits no edema.   Neurological: She is alert and oriented to person, place, and time. No cranial nerve deficit. GCS eye subscore is 4. GCS verbal subscore is 5. GCS motor subscore is 6.   5/5 strength throughout, speechclear, no drooling, no facial droop, AOx4, no facial tremor appreciated, no numbness to light palpation of left cheek, no temporal tenderness or tortuous arteries or temporal bruits. No crepitus or pain at TMJ   Skin: Skin is warm and dry. She is not diaphoretic.   Psychiatric: She has a normal mood and affect. Her behavior is normal.      Recent Labs   Lab 02/16/19 0040 02/16/19 0628 02/17/19 0514   WBC 6.06 5.46 4.99   HGB 13.1 12.8 13.1   HCT 40.1 40.0 39.8    324 325     Recent Labs   Lab 02/16/19  0040 02/16/19 0628 02/17/19 0514    137 136   K 3.9 4.1 4.5    106 108   CO2 21* 23 22*   BUN 11 11 16   CREATININE 0.8 0.8 0.8   * 146* 141*   CALCIUM 9.1 9.2 9.3   MG 2.0 2.0 2.0   PHOS 3.8 3.0 3.8     Recent Labs   Lab 02/16/19  0040   ALKPHOS 77   ALT 24   AST  17   ALBUMIN 3.4*   PROT 7.1   BILITOT 0.3        No results for input(s): CPK, CPKMB, MB, TROPONINI in the last 72 hours.  Recent Labs   Lab 02/15/19  1739 02/16/19  1125 02/16/19  1811   POCTGLUCOSE 133* 163* 139*     Hemoglobin A1C   Date Value Ref Range Status   02/16/2019 6.7 (H) 4.0 - 5.6 % Final     Comment:     ADA Screening Guidelines:  5.7-6.4%  Consistent with prediabetes  >or=6.5%  Consistent with diabetes  High levels of fetal hemoglobin interfere with the HbA1C  assay. Heterozygous hemoglobin variants (HbS, HgC, etc)do  not significantly interfere with this assay.   However, presence of multiple variants may affect accuracy.     02/16/2019 6.7 (H) 4.0 - 5.6 % Final     Comment:     ADA Screening Guidelines:  5.7-6.4%  Consistent with prediabetes  >or=6.5%  Consistent with diabetes  High levels of fetal hemoglobin interfere with the HbA1C  assay. Heterozygous hemoglobin variants (HbS, HgC, etc)do  not significantly interfere with this assay.   However, presence of multiple variants may affect accuracy.     08/31/2018 6.6 (H) 4.0 - 5.6 % Final     Comment:     ADA Screening Guidelines:  5.7-6.4%  Consistent with prediabetes  >or=6.5%  Consistent with diabetes  High levels of fetal hemoglobin interfere with the HbA1C  assay. Heterozygous hemoglobin variants (HbS, HgC, etc)do  not significantly interfere with this assay.   However, presence of multiple variants may affect accuracy.         Scheduled Meds:    Continuous Infusions:  As Needed:      Active Hospital Problems    Diagnosis  POA    *Dysarthria [R47.1]  Yes    Facial twitching [G51.4]  Yes    Type 2 diabetes mellitus with hyperglycemia, without long-term current use of insulin [E11.65]  Yes    Essential hypertension, benign [I10]  Yes      Resolved Hospital Problems   No resolved problems to display.         Assessment and Plan    Dysarthria     Facial Twitching  Facial Numbness  - all symptoms resolved after administration of keppra  -  evaluated by tele-stroke with negative CTA, NIH 0, ? Symptoms 2/2 partial seizure/Sal's paralysis              - start keppra 1500 mg BID per vascular reccs              - MRI pending  - neurology consulted              - EEG per their reccs  - neuro checks  - check intake labs including CBC, CMP, Mg, Phos, TSH, ESR/CRP, UA, UDS      Type 2 diabetes mellitus with hyperglycemia, without long-term current use of insulin     - hold home metformin  - A1c 6.3  - low dose SSI      Essential hypertension, benign     - continue home norvasc 10 mg, benazepril 20 mg daily  - BP controlled       Discharge plan:    I personally scribed for No att. providers found on 02/17/2019 at 11:22 AM. Electronically signed by urban Bell III on 02/17/2019 at 11:22 AM

## 2019-02-17 NOTE — CARE UPDATE
EEG negative for epileptic activity overnight.  However, given repeat events and their history, would continue to recommend seizure prophylaxis with keppra 1.5 g BID.  OK to discharge from neuro perspective.  Sent message to my MA to schedule in clinic and gave clinic number to patient.    Discussed driving precautions with patient this morning.    Recommendations  -Keppra 1.5 g BID  -No driving x6 months after last event  -F/u in neurology clinic in 4-6 weeks    Please call for any questions/concerns.    Humberto Fine MD

## 2019-02-17 NOTE — PLAN OF CARE
EEG negative for epileptic activity overnight.  However, given repeat events and their history, would continue to recommend seizure prophylaxis with keppra 1.5 g BID.  OK to discharge from neuro perspective.  Sent message to my MA to schedule in clinic and gave clinic number to patient.     Discussed driving precautions with patient again this morning.     Recommendations  -Keppra 1.5 g BID  -No driving x6 months after last event  -OK to discharge from neuro perspective  -F/u in neurology clinic in 4-6 weeks     Please call for any questions/concerns.     Humberto Fine MD

## 2019-02-19 ENCOUNTER — NURSE TRIAGE (OUTPATIENT)
Dept: ADMINISTRATIVE | Facility: CLINIC | Age: 54
End: 2019-02-19

## 2019-02-19 NOTE — TELEPHONE ENCOUNTER
Reason for Disposition   Severe headache, sudden onset (i.e., reaching maximum intensity within 30 seconds)   Unexplained headache that is present > 24 hours    Protocols used: ST HEADACHE-A-OH

## 2019-02-20 ENCOUNTER — OFFICE VISIT (OUTPATIENT)
Dept: FAMILY MEDICINE | Facility: CLINIC | Age: 54
End: 2019-02-20
Payer: COMMERCIAL

## 2019-02-20 VITALS
WEIGHT: 218.06 LBS | BODY MASS INDEX: 38.64 KG/M2 | OXYGEN SATURATION: 98 % | HEIGHT: 63 IN | RESPIRATION RATE: 16 BRPM | SYSTOLIC BLOOD PRESSURE: 150 MMHG | HEART RATE: 64 BPM | TEMPERATURE: 99 F | DIASTOLIC BLOOD PRESSURE: 78 MMHG

## 2019-02-20 DIAGNOSIS — N18.2 CONTROLLED TYPE 2 DIABETES MELLITUS WITH STAGE 2 CHRONIC KIDNEY DISEASE, WITHOUT LONG-TERM CURRENT USE OF INSULIN: ICD-10-CM

## 2019-02-20 DIAGNOSIS — I10 UNCONTROLLED HYPERTENSION: ICD-10-CM

## 2019-02-20 DIAGNOSIS — Z23 NEED FOR INFLUENZA VACCINATION: ICD-10-CM

## 2019-02-20 DIAGNOSIS — G51.4 FACIAL TWITCHING: ICD-10-CM

## 2019-02-20 DIAGNOSIS — Z23 NEED FOR DIPHTHERIA, TETANUS, PERTUSSIS, AND HIB VACCINATION: ICD-10-CM

## 2019-02-20 DIAGNOSIS — F41.9 ANXIETY: ICD-10-CM

## 2019-02-20 DIAGNOSIS — E78.5 HYPERLIPIDEMIA, UNSPECIFIED HYPERLIPIDEMIA TYPE: Primary | ICD-10-CM

## 2019-02-20 DIAGNOSIS — R47.1 DYSARTHRIA: ICD-10-CM

## 2019-02-20 DIAGNOSIS — E11.22 CONTROLLED TYPE 2 DIABETES MELLITUS WITH STAGE 2 CHRONIC KIDNEY DISEASE, WITHOUT LONG-TERM CURRENT USE OF INSULIN: ICD-10-CM

## 2019-02-20 PROBLEM — I63.9 CEREBROVASCULAR ACCIDENT (CVA): Status: RESOLVED | Noted: 2019-02-15 | Resolved: 2019-02-20

## 2019-02-20 PROCEDURE — 90472 TD VACCINE GREATER THAN OR EQUAL TO 7YO PRESERVATIVE FREE IM: ICD-10-PCS | Mod: S$GLB,,, | Performed by: FAMILY MEDICINE

## 2019-02-20 PROCEDURE — 90471 IMMUNIZATION ADMIN: CPT | Mod: S$GLB,,, | Performed by: FAMILY MEDICINE

## 2019-02-20 PROCEDURE — 90714 TD VACC NO PRESV 7 YRS+ IM: CPT | Mod: S$GLB,,, | Performed by: FAMILY MEDICINE

## 2019-02-20 PROCEDURE — 90686 FLU VACCINE (QUAD) GREATER THAN OR EQUAL TO 3YO PRESERVATIVE FREE IM: ICD-10-PCS | Mod: S$GLB,,, | Performed by: FAMILY MEDICINE

## 2019-02-20 PROCEDURE — 99496 TRANSJ CARE MGMT HIGH F2F 7D: CPT | Mod: S$GLB,,, | Performed by: FAMILY MEDICINE

## 2019-02-20 PROCEDURE — 99999 PR PBB SHADOW E&M-EST. PATIENT-LVL III: CPT | Mod: PBBFAC,,, | Performed by: FAMILY MEDICINE

## 2019-02-20 PROCEDURE — 90471 FLU VACCINE (QUAD) GREATER THAN OR EQUAL TO 3YO PRESERVATIVE FREE IM: ICD-10-PCS | Mod: S$GLB,,, | Performed by: FAMILY MEDICINE

## 2019-02-20 PROCEDURE — 90714 TD VACCINE GREATER THAN OR EQUAL TO 7YO PRESERVATIVE FREE IM: ICD-10-PCS | Mod: S$GLB,,, | Performed by: FAMILY MEDICINE

## 2019-02-20 PROCEDURE — 90472 IMMUNIZATION ADMIN EACH ADD: CPT | Mod: S$GLB,,, | Performed by: FAMILY MEDICINE

## 2019-02-20 PROCEDURE — 99496 TRANSITIONAL CARE MANAGE SERVICE 7 DAY DISCHARGE: ICD-10-PCS | Mod: S$GLB,,, | Performed by: FAMILY MEDICINE

## 2019-02-20 PROCEDURE — 99999 PR PBB SHADOW E&M-EST. PATIENT-LVL III: ICD-10-PCS | Mod: PBBFAC,,, | Performed by: FAMILY MEDICINE

## 2019-02-20 PROCEDURE — 90686 IIV4 VACC NO PRSV 0.5 ML IM: CPT | Mod: S$GLB,,, | Performed by: FAMILY MEDICINE

## 2019-02-20 RX ORDER — BENAZEPRIL HYDROCHLORIDE 20 MG/1
20 TABLET ORAL DAILY
Qty: 30 TABLET | Refills: 0 | Status: SHIPPED | OUTPATIENT
Start: 2019-02-20 | End: 2019-03-28 | Stop reason: SDUPTHER

## 2019-02-20 RX ORDER — ATORVASTATIN CALCIUM 40 MG/1
40 TABLET, FILM COATED ORAL DAILY
Qty: 90 TABLET | Refills: 3 | Status: SHIPPED | OUTPATIENT
Start: 2019-02-20 | End: 2020-02-24 | Stop reason: ALTCHOICE

## 2019-02-20 NOTE — PROGRESS NOTES
Transitional Care Note  Subjective:       Patient ID: Monalisa Valverdeite is a 53 y.o. female.  Chief Complaint: Hospital Follow Up    Family and/or Caretaker present at visit?  Yes.  Diagnostic tests reviewed/disposition: No diagnosic tests pending after this hospitalization.  Disease/illness education: yes  Home health/community services discussion/referrals: Patient does not have home health established from hospital visit.  They do not need home health.  If needed, we will set up home health for the patient.   Establishment or re-establishment of referral orders for community resources: No other necessary community resources.   Discussion with other health care providers: No discussion with other health care providers necessary.   HPI:  Here for follow up recent hospitalization for suspected new onset of complex partial seizures.  Presented with twitching of the left facial  Muscles and dysarthria.  EEG normal.  Started on Keppra.  Reports feeling weak before onset of symptoms. Had another episode of dysarthria x 10 minutes on yesterday.  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  Review of Systems   Respiratory: Negative.    Cardiovascular: Negative.    Gastrointestinal: Positive for nausea. Negative for abdominal pain.   Neurological: Positive for light-headedness, numbness (left cheek) and headaches. Negative for dizziness and weakness.       Objective:      Physical Exam   Constitutional: She is oriented to person, place, and time. She appears well-developed and well-nourished.   HENT:   Head: Normocephalic and atraumatic.   Eyes: EOM are normal. Pupils are equal, round, and reactive to light.   Neck: Normal range of motion. Neck supple. No thyromegaly present.   Cardiovascular: Normal rate, regular rhythm and normal heart sounds.   No murmur heard.  Pulmonary/Chest: Effort normal and breath sounds normal. No respiratory distress. She has no wheezes. She has no rales.   Abdominal: Soft. Bowel  sounds are normal. She exhibits no distension and no mass. There is no tenderness.   Musculoskeletal: She exhibits no edema.   Lymphadenopathy:     She has no cervical adenopathy.   Neurological: She is alert and oriented to person, place, and time.   Skin: Skin is warm and dry. No rash noted.   Psychiatric: She has a normal mood and affect.       Assessment:       1. Hyperlipidemia, unspecified hyperlipidemia type    2. Need for diphtheria, tetanus, pertussis, and Hib vaccination    3. Need for influenza vaccination    4. Facial twitching    5. Dysarthria    6. Uncontrolled hypertension    7. Controlled type 2 diabetes mellitus with stage 2 chronic kidney disease, without long-term current use of insulin    8. Anxiety        Plan:       Monalisa was seen today for hospital follow up.    Diagnoses and all orders for this visit:    Hyperlipidemia, unspecified hyperlipidemia type  -     atorvastatin (LIPITOR) 40 MG tablet; Take 1 tablet (40 mg total) by mouth once daily.    Need for diphtheria, tetanus, pertussis, and Hib vaccination  -     Td Vaccine (Adult) - Preservative Free    Need for influenza vaccination  -     Influenza - Quadrivalent (3 years & older) (PF)    Facial twitching  Continue Keppra for now.  Unsure if symptoms related to stress.    -     Ambulatory referral to Neurology    Dysarthria  -     Ambulatory referral to Neurology    Stressed to patient she is not to operate any motor vehicle for 6 months and need to be released to resume driving by neurologist    Uncontrolled hypertension  Resume Lotensin    Controlled type 2 diabetes mellitus with stage 2 chronic kidney disease, without long-term current use of insulin  Diabetes stable    Anxiety  Resume Celexa

## 2019-03-11 ENCOUNTER — HOSPITAL ENCOUNTER (EMERGENCY)
Facility: HOSPITAL | Age: 54
Discharge: HOME | End: 2019-03-11
Attending: EMERGENCY MEDICINE
Payer: COMMERCIAL

## 2019-03-11 VITALS
SYSTOLIC BLOOD PRESSURE: 118 MMHG | TEMPERATURE: 99 F | OXYGEN SATURATION: 100 % | DIASTOLIC BLOOD PRESSURE: 58 MMHG | WEIGHT: 215 LBS | BODY MASS INDEX: 38.09 KG/M2 | RESPIRATION RATE: 28 BRPM | HEIGHT: 63 IN | HEART RATE: 65 BPM

## 2019-03-11 DIAGNOSIS — M54.2 NECK PAIN ON LEFT SIDE: ICD-10-CM

## 2019-03-11 DIAGNOSIS — G44.209 TENSION-TYPE HEADACHE, NOT INTRACTABLE, UNSPECIFIED CHRONICITY PATTERN: Primary | ICD-10-CM

## 2019-03-11 LAB
ALBUMIN SERPL BCP-MCNC: 3.9 G/DL
ALP SERPL-CCNC: 93 U/L
ALT SERPL W/O P-5'-P-CCNC: 62 U/L
ANION GAP SERPL CALC-SCNC: 7 MMOL/L
AST SERPL-CCNC: 31 U/L
BASOPHILS # BLD AUTO: 0.02 K/UL
BASOPHILS NFR BLD: 0.3 %
BILIRUB SERPL-MCNC: 0.3 MG/DL
BUN SERPL-MCNC: 9 MG/DL
CALCIUM SERPL-MCNC: 10.1 MG/DL
CHLORIDE SERPL-SCNC: 105 MMOL/L
CO2 SERPL-SCNC: 26 MMOL/L
CREAT SERPL-MCNC: 0.8 MG/DL
DIFFERENTIAL METHOD: ABNORMAL
EOSINOPHIL # BLD AUTO: 0.1 K/UL
EOSINOPHIL NFR BLD: 1.3 %
ERYTHROCYTE [DISTWIDTH] IN BLOOD BY AUTOMATED COUNT: 13.2 %
EST. GFR  (AFRICAN AMERICAN): >60 ML/MIN/1.73 M^2
EST. GFR  (NON AFRICAN AMERICAN): >60 ML/MIN/1.73 M^2
GLUCOSE SERPL-MCNC: 99 MG/DL
HCT VFR BLD AUTO: 41.9 %
HGB BLD-MCNC: 14.2 G/DL
LYMPHOCYTES # BLD AUTO: 3.8 K/UL
LYMPHOCYTES NFR BLD: 53.9 %
MCH RBC QN AUTO: 30.5 PG
MCHC RBC AUTO-ENTMCNC: 33.9 G/DL
MCV RBC AUTO: 90 FL
MONOCYTES # BLD AUTO: 0.5 K/UL
MONOCYTES NFR BLD: 7.6 %
NEUTROPHILS # BLD AUTO: 2.6 K/UL
NEUTROPHILS NFR BLD: 36.9 %
PLATELET # BLD AUTO: 362 K/UL
PMV BLD AUTO: 9.8 FL
POTASSIUM SERPL-SCNC: 3.8 MMOL/L
PROT SERPL-MCNC: 8.3 G/DL
RBC # BLD AUTO: 4.65 M/UL
SODIUM SERPL-SCNC: 138 MMOL/L
TROPONIN I SERPL DL<=0.01 NG/ML-MCNC: <0.006 NG/ML
WBC # BLD AUTO: 7 K/UL

## 2019-03-11 PROCEDURE — 80053 COMPREHEN METABOLIC PANEL: CPT

## 2019-03-11 PROCEDURE — 93010 ELECTROCARDIOGRAM REPORT: CPT | Mod: ,,, | Performed by: INTERNAL MEDICINE

## 2019-03-11 PROCEDURE — 99285 EMERGENCY DEPT VISIT HI MDM: CPT | Mod: 25

## 2019-03-11 PROCEDURE — 85025 COMPLETE CBC W/AUTO DIFF WBC: CPT

## 2019-03-11 PROCEDURE — 96375 TX/PRO/DX INJ NEW DRUG ADDON: CPT

## 2019-03-11 PROCEDURE — 25000003 PHARM REV CODE 250: Performed by: EMERGENCY MEDICINE

## 2019-03-11 PROCEDURE — 84484 ASSAY OF TROPONIN QUANT: CPT

## 2019-03-11 PROCEDURE — 93005 ELECTROCARDIOGRAM TRACING: CPT

## 2019-03-11 PROCEDURE — 93010 EKG 12-LEAD: ICD-10-PCS | Mod: ,,, | Performed by: INTERNAL MEDICINE

## 2019-03-11 PROCEDURE — 96374 THER/PROPH/DIAG INJ IV PUSH: CPT

## 2019-03-11 PROCEDURE — 63600175 PHARM REV CODE 636 W HCPCS: Performed by: EMERGENCY MEDICINE

## 2019-03-11 RX ORDER — DIAZEPAM 5 MG/1
5 TABLET ORAL
Status: DISCONTINUED | OUTPATIENT
Start: 2019-03-11 | End: 2019-03-11

## 2019-03-11 RX ORDER — PROCHLORPERAZINE EDISYLATE 5 MG/ML
10 INJECTION INTRAMUSCULAR; INTRAVENOUS
Status: COMPLETED | OUTPATIENT
Start: 2019-03-11 | End: 2019-03-11

## 2019-03-11 RX ORDER — BUTALBITAL, ACETAMINOPHEN AND CAFFEINE 50; 325; 40 MG/1; MG/1; MG/1
1 TABLET ORAL EVERY 6 HOURS PRN
Qty: 20 TABLET | Refills: 0 | Status: SHIPPED | OUTPATIENT
Start: 2019-03-11 | End: 2019-04-10

## 2019-03-11 RX ORDER — DIPHENHYDRAMINE HYDROCHLORIDE 50 MG/ML
25 INJECTION INTRAMUSCULAR; INTRAVENOUS
Status: COMPLETED | OUTPATIENT
Start: 2019-03-11 | End: 2019-03-11

## 2019-03-11 RX ADMIN — PROCHLORPERAZINE EDISYLATE 10 MG: 5 INJECTION INTRAMUSCULAR; INTRAVENOUS at 01:03

## 2019-03-11 RX ADMIN — DIPHENHYDRAMINE HYDROCHLORIDE 25 MG: 50 INJECTION INTRAMUSCULAR; INTRAVENOUS at 01:03

## 2019-03-11 RX ADMIN — SODIUM CHLORIDE 1000 ML: 0.9 INJECTION, SOLUTION INTRAVENOUS at 01:03

## 2019-03-11 NOTE — ED PROVIDER NOTES
"Encounter Date: 3/11/2019    SCRIBE #1 NOTE: I, Melida Joyce, am scribing for, and in the presence of,  Faye Manrique MD. I have scribed the following portions of the note - Other sections scribed: HPI, ROS .       History     Chief Complaint   Patient presents with    Facial Pain     " I have pain on the side of my face (left),it started this morning".     Back Pain     " I have pain all down my back"     CC: Headache/Abdominal Pain    54 year old female  has a past medical history of Allergy, Anxiety, Diabetes mellitus, Heart murmur, and Hypertension presents to the ED for evaluation of a 1 day history of a worsening left sided headache and nausea.  The headache started yesterday and then worsened this morning.  Pt notes the pain radiates from the left side of her neck to the left side of her head. Pt reports some tingling to her left cheek that has been ongoing since she was admitted for possible TIA versus complex seizure about 1 month ago.. She otherwise denies numbness or weakness. Pt reports a history of headaches and takes Tylenol for these symptoms, she reports the headache started after her admission about a month ago.  She states headaches occur daily.. She last took Tylenol today at 8 am. Pt denies vomiting, fever, chills, and chest pain. Pt reported back pain to triage but does not note any back pain at the time of exam- she clarifies that the pain is in the left lateral neck area.    Pt also notes left upper quadrant pain that began while she was in the exam room but then resolved by the time we are finished with the history and physical exam, this was described as a cramping, charley horse sensation..     Pt's friend reports pt was in the ER 1 month ago for evaluation of a left sided facial droop and difficulty speaking. She reports pt was diagnosed w/ seizures. She was prescribed seizure medication after this visit. She is also in speech therapy currently and has follow-up scheduled with Dr." Babatunde (neurology) on April 2nd..       The history is provided by the patient and a friend. No  was used.     Review of patient's allergies indicates:   Allergen Reactions    Keflex [cephalexin] Hives    Vicodin [hydrocodone-acetaminophen] Itching     itch     Past Medical History:   Diagnosis Date    Allergy     Anxiety     Diabetes mellitus     Heart murmur     Hypertension      Past Surgical History:   Procedure Laterality Date    CHOLECYSTECTOMY      2001 april    HYSTERECTOMY      partial  1996    OOPHORECTOMY      SHOULDER SURGERY Right     WISDOM TOOTH EXTRACTION       Family History   Problem Relation Age of Onset    Diabetes Mother     Hyperlipidemia Mother     Hypertension Mother     Cataracts Mother     Diabetes Father     Hypertension Father     Stroke Father     Depression Sister     Hypertension Sister     Stroke Sister     Cancer Maternal Aunt         breast x 2    Breast cancer Maternal Aunt     Cancer Maternal Uncle         prostate x 2    Cancer Paternal Aunt         breast    Breast cancer Paternal Aunt     Cancer Maternal Grandfather         lung    Early death Paternal Grandmother     Early death Paternal Grandfather     No Known Problems Brother     No Known Problems Paternal Uncle     No Known Problems Maternal Grandmother     Melanoma Neg Hx     Eczema Neg Hx     Lupus Neg Hx     Psoriasis Neg Hx     Amblyopia Neg Hx     Blindness Neg Hx     Glaucoma Neg Hx     Macular degeneration Neg Hx     Retinal detachment Neg Hx     Strabismus Neg Hx     Thyroid disease Neg Hx      Social History     Tobacco Use    Smoking status: Former Smoker     Packs/day: 0.00     Years: 0.50     Pack years: 0.00    Smokeless tobacco: Never Used   Substance Use Topics    Alcohol use: Yes     Alcohol/week: 0.0 oz     Comment: seldom    Drug use: No     Review of Systems   Constitutional: Negative for appetite change and fever.   HENT: Negative for  rhinorrhea and sore throat.    Eyes: Negative for visual disturbance.   Respiratory: Negative for cough and shortness of breath.    Cardiovascular: Negative for chest pain.   Gastrointestinal: Positive for abdominal pain (left upper quadrant) and nausea. Negative for vomiting.   Genitourinary: Negative for dysuria.   Musculoskeletal: Negative for gait problem.   Skin: Negative for rash.   Neurological: Positive for headaches (left sided radiating from left neck to parietal aspect of head). Negative for syncope.       Physical Exam     Initial Vitals [03/11/19 1259]   BP Pulse Resp Temp SpO2   137/75 67 18 98.7 °F (37.1 °C) 97 %      MAP       --         Physical Exam    Constitutional: She appears well-developed and well-nourished. She is not diaphoretic. No distress.   HENT:   Head: Normocephalic and atraumatic.   Nose: No mucosal edema.   Mouth/Throat: Oropharynx is clear and moist.   Eyes: EOM are normal. Pupils are equal, round, and reactive to light.   +colored contact lenses   Neck: Neck supple.       Cardiovascular: Normal rate and regular rhythm.   Murmur (systolic) heard.  Pulses:       Radial pulses are 2+ on the right side, and 2+ on the left side.   Pulmonary/Chest: Breath sounds normal. No respiratory distress.   Abdominal: Soft. Bowel sounds are normal. She exhibits no distension. There is no tenderness.   Neurological: She is alert and oriented to person, place, and time. She has normal strength. No cranial nerve deficit or sensory deficit. GCS score is 15. GCS eye subscore is 4. GCS verbal subscore is 5. GCS motor subscore is 6.   Patient has an isolated area of tingling sensation to the left cheek over the maxillary process, otherwise her sensation is intact in her face.  I do not appreciate any cranial nerve deficits.   Skin: Skin is warm and dry.   Psychiatric:   Anxious and tearful         ED Course   Procedures  Labs Reviewed   CBC W/ AUTO DIFFERENTIAL - Abnormal; Notable for the following  components:       Result Value    Platelets 362 (*)     Gran% 36.9 (*)     Lymph% 53.9 (*)     All other components within normal limits   COMPREHENSIVE METABOLIC PANEL - Abnormal; Notable for the following components:    ALT 62 (*)     Anion Gap 7 (*)     All other components within normal limits   TROPONIN I        ECG Results          EKG 12-lead (In process)  Result time 03/11/19 14:15:54    In process by Interface, Lab In [facility] (03/11/19 14:15:54)                 Narrative:    Test Reason : M54.2,    Vent. Rate : 062 BPM     Atrial Rate : 062 BPM     P-R Int : 178 ms          QRS Dur : 088 ms      QT Int : 400 ms       P-R-T Axes : 027 040 062 degrees     QTc Int : 406 ms    Normal sinus rhythm  Normal ECG  When compared with ECG of 29-MAY-2018 14:02,  No significant change was found    Referred By: [email or web address]   SELF           Confirmed By:                   In process by Interface, Lab In [facility] (03/11/19 14:13:36)                 Narrative:    Test Reason : M54.2,    Vent. Rate : 062 BPM     Atrial Rate : 062 BPM     P-R Int : 178 ms          QRS Dur : 088 ms      QT Int : 400 ms       P-R-T Axes : 027 040 062 degrees     QTc Int : 406 ms    Normal sinus rhythm  Normal ECG  When compared with ECG of 29-MAY-2018 14:02,  No significant change was found    Referred By: [email or web address]   SELF           Confirmed By:                             Imaging Results          CT Head Without Contrast (Final result)  Result time 03/11/19 14:03:53    Final result by Ke Rios MD (03/11/19 14:03:53)                 Impression:      1. No acute intracranial abnormalities, noting sequela of suspected chronic microvascular ischemic change.      Electronically signed by: Ke Rios MD  Date:    03/11/2019  Time:    14:03             Narrative:    EXAMINATION:  CT HEAD WITHOUT CONTRAST    CLINICAL HISTORY:  Headache, acute, norm neuro exam;    TECHNIQUE:  Low dose axial images were obtained through the head.  Coronal  and sagittal reformations were also performed. Contrast was not administered.    COMPARISON:  MRI 02/16/2019    FINDINGS:  There is generalized cerebral volume loss.  There is hypoattenuation in a periventricular fashion, likely sequela of chronic microvascular ischemic change.  There is no evidence of acute major vascular territory infarct, hemorrhage, or mass.  There is no hydrocephalus.  There are no abnormal extra-axial fluid collections.  The paranasal sinuses and mastoid air cells are clear, and there is no evidence of calvarial fracture.  The visualized soft tissues are unremarkable.                               X-Ray Chest AP Portable (Final result)  Result time 03/11/19 13:45:24    Final result by Ke Rios MD (03/11/19 13:45:24)                 Impression:      1. No acute cardiopulmonary process.      Electronically signed by: Ke Rios MD  Date:    03/11/2019  Time:    13:45             Narrative:    EXAMINATION:  XR CHEST AP PORTABLE    CLINICAL HISTORY:  left neck pain;    TECHNIQUE:  Single frontal view of the chest was performed.    COMPARISON:  02/15/2019    FINDINGS:  The cardiomediastinal silhouette is not enlarged.  There is no pleural effusion.  The trachea is midline.  The lungs are symmetrically expanded bilaterally without evidence of acute parenchymal process. No large focal consolidation seen.  There is no pneumothorax.  The osseous structures are remarkable for degenerative changes..                                 Medical Decision Making:   Initial Assessment:   54-year-old female presents with left-sided headache, left-sided neck pain. Her exam is notable for some tingling sensation over the left cheek but otherwise a nonfocal neurologic exam.  She has tenderness over the left trapezius muscle.  I suspect she has a tension-type headache. I do not suspect subarachnoid hemorrhage versus meningitis.  I do not suspect cervical fracture versus dislocation.  I reviewed her  chart from her previous visit, it appears that she was diagnosed with complex partial seizures with Sal's paralysis.  She is on Keppra and reports compliance.  Given her recent seizure versus TIA activity, I will get a CT of her head.  I will treat her with Compazine and Benadryl as well as Valium for presumed tension-type headache. As she is having some left-sided neck pain, I will also get cardiac troponin as well as an EKG, however I think ACS is less likely.            Scribe Attestation:   Scribe #1: I performed the above scribed service and the documentation accurately describes the services I performed. I attest to the accuracy of the note.    Attending Attestation:           Physician Attestation for Scribe:  Physician Attestation Statement for Scribe #1: I, Faye Manrique MD, reviewed documentation, as scribed by Melida Joyce in my presence, and it is both accurate and complete.                 ED Course as of Mar 11 1817   Mon Mar 11, 2019   1428 Patient reassessed, she is feeling improved.  She is resting comfortably in bed.  Valium held as patient has had improvement with Compazine and Benadryl.  Her CT shows chronic microvascular changes, CBC and CMP are within acceptable limits.  Troponin pending.  [LH]   1527 Patient reports feeling improved, no longer has a headache. This time, will discharge with a prescription for Fioricet and follow up with Neurology.  She is advised to return to the emergency department should she develop any new or worsening symptoms. Patient states she understands and agrees with plan.  [LH]      ED Course User Index  [LH] Faye Manrique MD     Clinical Impression:       ICD-10-CM ICD-9-CM   1. Tension-type headache, not intractable, unspecified chronicity pattern G44.209 339.10   2. Neck pain on left side M54.2 723.1                                Faye Manrique MD  03/11/19 1817

## 2019-03-12 ENCOUNTER — TELEPHONE (OUTPATIENT)
Dept: NEUROLOGY | Facility: CLINIC | Age: 54
End: 2019-03-12

## 2019-03-12 NOTE — TELEPHONE ENCOUNTER
----- Message from Yeimy Fletcher sent at 3/11/2019 10:42 AM CDT -----  Contact: pt  Pt would like to be called back regarding a appt that she needs to be asap - bad headaches on left hand side     Pt can be reached at [phone]

## 2019-03-12 NOTE — TELEPHONE ENCOUNTER
I'm not sure if you seen this patient in the E.R. I put her on the wait list but the patient is complaining of bad headaches. No sooner appointments.

## 2019-03-26 DIAGNOSIS — I10 UNCONTROLLED HYPERTENSION: ICD-10-CM

## 2019-03-26 RX ORDER — AMLODIPINE BESYLATE 10 MG/1
10 TABLET ORAL DAILY
Qty: 90 TABLET | Refills: 0 | Status: SHIPPED | OUTPATIENT
Start: 2019-03-26 | End: 2019-08-15 | Stop reason: SDUPTHER

## 2019-03-27 ENCOUNTER — OFFICE VISIT (OUTPATIENT)
Dept: NEUROLOGY | Facility: CLINIC | Age: 54
End: 2019-03-27
Payer: COMMERCIAL

## 2019-03-27 VITALS
SYSTOLIC BLOOD PRESSURE: 160 MMHG | BODY MASS INDEX: 38.67 KG/M2 | DIASTOLIC BLOOD PRESSURE: 89 MMHG | HEART RATE: 90 BPM | HEIGHT: 63 IN | WEIGHT: 218.25 LBS

## 2019-03-27 DIAGNOSIS — G44.209 TENSION HEADACHE: Primary | ICD-10-CM

## 2019-03-27 DIAGNOSIS — G40.209 PARTIAL SYMPTOMATIC EPILEPSY WITH COMPLEX PARTIAL SEIZURES, NOT INTRACTABLE, WITHOUT STATUS EPILEPTICUS: ICD-10-CM

## 2019-03-27 DIAGNOSIS — G51.4 FACIAL TWITCHING: ICD-10-CM

## 2019-03-27 DIAGNOSIS — I10 ESSENTIAL HYPERTENSION, BENIGN: ICD-10-CM

## 2019-03-27 PROCEDURE — 99215 OFFICE O/P EST HI 40 MIN: CPT | Mod: S$GLB,,, | Performed by: STUDENT IN AN ORGANIZED HEALTH CARE EDUCATION/TRAINING PROGRAM

## 2019-03-27 PROCEDURE — 3077F PR MOST RECENT SYSTOLIC BLOOD PRESSURE >= 140 MM HG: ICD-10-PCS | Mod: CPTII,S$GLB,, | Performed by: STUDENT IN AN ORGANIZED HEALTH CARE EDUCATION/TRAINING PROGRAM

## 2019-03-27 PROCEDURE — 3008F BODY MASS INDEX DOCD: CPT | Mod: CPTII,S$GLB,, | Performed by: STUDENT IN AN ORGANIZED HEALTH CARE EDUCATION/TRAINING PROGRAM

## 2019-03-27 PROCEDURE — 3079F DIAST BP 80-89 MM HG: CPT | Mod: CPTII,S$GLB,, | Performed by: STUDENT IN AN ORGANIZED HEALTH CARE EDUCATION/TRAINING PROGRAM

## 2019-03-27 PROCEDURE — 3077F SYST BP >= 140 MM HG: CPT | Mod: CPTII,S$GLB,, | Performed by: STUDENT IN AN ORGANIZED HEALTH CARE EDUCATION/TRAINING PROGRAM

## 2019-03-27 PROCEDURE — 99999 PR PBB SHADOW E&M-EST. PATIENT-LVL IV: CPT | Mod: PBBFAC,,, | Performed by: STUDENT IN AN ORGANIZED HEALTH CARE EDUCATION/TRAINING PROGRAM

## 2019-03-27 PROCEDURE — 3008F PR BODY MASS INDEX (BMI) DOCUMENTED: ICD-10-PCS | Mod: CPTII,S$GLB,, | Performed by: STUDENT IN AN ORGANIZED HEALTH CARE EDUCATION/TRAINING PROGRAM

## 2019-03-27 PROCEDURE — 99999 PR PBB SHADOW E&M-EST. PATIENT-LVL IV: ICD-10-PCS | Mod: PBBFAC,,, | Performed by: STUDENT IN AN ORGANIZED HEALTH CARE EDUCATION/TRAINING PROGRAM

## 2019-03-27 PROCEDURE — 3079F PR MOST RECENT DIASTOLIC BLOOD PRESSURE 80-89 MM HG: ICD-10-PCS | Mod: CPTII,S$GLB,, | Performed by: STUDENT IN AN ORGANIZED HEALTH CARE EDUCATION/TRAINING PROGRAM

## 2019-03-27 PROCEDURE — 99215 PR OFFICE/OUTPT VISIT, EST, LEVL V, 40-54 MIN: ICD-10-PCS | Mod: S$GLB,,, | Performed by: STUDENT IN AN ORGANIZED HEALTH CARE EDUCATION/TRAINING PROGRAM

## 2019-03-27 RX ORDER — LEVETIRACETAM 1000 MG/1
1000 TABLET ORAL 2 TIMES DAILY
Qty: 60 TABLET | Refills: 5 | Status: SHIPPED | OUTPATIENT
Start: 2019-03-27 | End: 2019-10-11 | Stop reason: SDUPTHER

## 2019-03-27 RX ORDER — NORTRIPTYLINE HYDROCHLORIDE 10 MG/1
10 CAPSULE ORAL NIGHTLY
Qty: 30 CAPSULE | Refills: 5 | Status: SHIPPED | OUTPATIENT
Start: 2019-03-27 | End: 2019-05-08

## 2019-03-27 RX ORDER — LIDOCAINE 40 MG/G
CREAM TOPICAL
Refills: 0 | COMMUNITY
Start: 2019-03-27 | End: 2020-08-05 | Stop reason: SDUPTHER

## 2019-03-27 RX ORDER — METHYLPREDNISOLONE 4 MG/1
TABLET ORAL
Qty: 1 PACKAGE | Refills: 0 | Status: SHIPPED | OUTPATIENT
Start: 2019-03-27 | End: 2019-04-17

## 2019-03-27 RX ORDER — ONDANSETRON 8 MG/1
8 TABLET, ORALLY DISINTEGRATING ORAL EVERY 12 HOURS PRN
Qty: 20 TABLET | Refills: 1 | Status: SHIPPED | OUTPATIENT
Start: 2019-03-27 | End: 2020-05-27 | Stop reason: SDUPTHER

## 2019-03-27 NOTE — PATIENT INSTRUCTIONS
-START nortriptyline nightly (don't take with ambien)  -Start medrol dose pack (one package)  -DECREASE keppra to 1000 mg twice a day (new prescription sent)  -STOP fioricet  -Ibuprofen up to 3x/week  -Bengay, heat packs, lidocaine gel as needed to your neck  -Referral to physical therapy  -Will schedule a 1 hour EEG in 2-4 weeks  -No driving for 6 months after last seizure  -F/u with primary doctor to discuss blood pressure management  -Return to clinic in 6 weeks    Humberto Fine MD  [facility]   Phone Number: [phone]  Fax Number: [phone]

## 2019-03-27 NOTE — PROGRESS NOTES
Neurology Clinic  Hospital follow up visit    Patient Name: Monalisa Carson  MRN: [id number]    CC: Seizures, headache    HPI: Monalisa Carson is a 54 y.o. female w/ PMH significant for Anxiety, HTN, epilepsy presenting as hospital follow-up for evaluation of seizures and headache.    I last saw pt for seizures in the hospital in 2/2019 for evaluation of seizures.  At that point, she'd had two events concern for complex partial seizures (L facial twitching, speech arrest, ?LOC) with Sal's paralysis/residual L facial droop after 2 separate events that occurred more than 48 hours apart.  Her EEG was unremarkable and she was discharged on keppra 1500 BID.  Since discharge, she reports no seizures, but has had nausea/vomiting with the keppra and sometimes is not able to keep the dose of keppra down.  However, she notes she has had episodes of L facial twitching, up to a few times per day and duration of 1-2 minutes without any clear triggers.    With regards to her headache:  Went to ED on 3/11 for headache.  Headaches began while in hospital, but not too severe.  Significantly worsened mid-February.    L neck pain predominantly, that radiates up over her head.  Pressure-like pain.  Photo and phono sensitive.  Feels weak all over; no focal neurological deficits.  Given fioricet in ED, every day or QOD with some help.  10/10 pain at worst, fioricet brings it down to a 5/6.  Nothing in particular makes headaches better/worse.  Possibly worsened by exertion.  Every day.  Before fioricet, was taking tylenol BID.    Never had any headaches like this before.  No FMH of similar headaches.        Review of Systems:  General: No fevers, chills  Eyes: No changes in vision  ENT: No changes in hearing  Respiratory: No SOB  CV: No chest pain, palpitations  GI: No diarrhea, blood in stool  Urinary: No dysuria, hematuria  Skin: No rashes  Neurological: No weakness, confusion  Psychiatric: No auditory nor visual  hallucinations      Past Medical History  Past Medical History:   Diagnosis Date    Allergy     Anxiety     Diabetes mellitus     Heart murmur     Hypertension        Medications    Current Outpatient Medications:     amLODIPine (NORVASC) 10 MG tablet, Take 1 tablet (10 mg total) by mouth once daily., Disp: 90 tablet, Rfl: 0    aspirin 81 MG Chew, Take 1 tablet (81 mg total) by mouth once daily., Disp: , Rfl:     atorvastatin (LIPITOR) 40 MG tablet, Take 1 tablet (40 mg total) by mouth once daily., Disp: 90 tablet, Rfl: 3    butalbital-acetaminophen-caffeine -40 mg (FIORICET, ESGIC) -40 mg per tablet, Take 1 tablet by mouth every 6 (six) hours as needed for Headaches., Disp: 20 tablet, Rfl: 0    zolpidem (AMBIEN) 10 mg Tab, TAKE 1 TABLET BY MOUTH EVERY NIGHT AT BEDTIME, Disp: 30 tablet, Rfl: 5    albuterol 90 mcg/actuation inhaler, Inhale 1-2 puffs into the lungs every 4 (four) hours as needed for Wheezing. Rescue, Disp: 1 Inhaler, Rfl: 2    benazepril (LOTENSIN) 20 MG tablet, TAKE 1 TABLET(20 MG) BY MOUTH EVERY DAY, Disp: 90 tablet, Rfl: 1    levETIRAcetam (KEPPRA) 1000 MG tablet, Take 1 tablet (1,000 mg total) by mouth 2 (two) times daily., Disp: 60 tablet, Rfl: 5    lidocaine (LMX) 4 % cream, Apply topically as needed., Disp: , Rfl: 0    metFORMIN (GLUCOPHAGE) 500 MG tablet, TAKE 1 TABLET BY MOUTH TWICE DAILY WITH MEALS, Disp: 180 tablet, Rfl: 1    methylPREDNISolone (MEDROL DOSEPACK) 4 mg tablet, use as directed, Disp: 1 Package, Rfl: 0    nortriptyline (PAMELOR) 10 MG capsule, Take 1 capsule (10 mg total) by mouth every evening., Disp: 30 capsule, Rfl: 5    ondansetron (ZOFRAN-ODT) 8 MG TbDL, Take 1 tablet (8 mg total) by mouth every 12 (twelve) hours as needed., Disp: 20 tablet, Rfl: 1  Any other notable medications as documented in HPI    Allergies  Review of patient's allergies indicates:   Allergen Reactions    Keflex [cephalexin] Hives    Vicodin [hydrocodone-acetaminophen]  Itching     itch       Social History  Social History     Socioeconomic History    Marital status:      Spouse name: Not on file    Number of children: Not on file    Years of education: Not on file    Highest education level: Not on file   Occupational History    Occupation:      Employer: [employer]   Social Needs    Financial resource strain: Not on file    Food insecurity:     Worry: Not on file     Inability: Not on file    Transportation needs:     Medical: Not on file     Non-medical: Not on file   Tobacco Use    Smoking status: Former Smoker     Packs/day: 0.00     Years: 0.50     Pack years: 0.00    Smokeless tobacco: Never Used   Substance and Sexual Activity    Alcohol use: Yes     Alcohol/week: 0.0 oz     Comment: seldom    Drug use: No    Sexual activity: Yes     Partners: Male     Birth control/protection: None   Lifestyle    Physical activity:     Days per week: Not on file     Minutes per session: Not on file    Stress: Not on file   Relationships    Social connections:     Talks on phone: Not on file     Gets together: Not on file     Attends Christianity service: Not on file     Active member of club or organization: Not on file     Attends meetings of clubs or organizations: Not on file     Relationship status: Not on file    Intimate partner violence:     Fear of current or ex partner: Not on file     Emotionally abused: Not on file     Physically abused: Not on file     Forced sexual activity: Not on file   Other Topics Concern    Are you pregnant or think you may be? No    Breast-feeding No   Social History Narrative    Not on file     Any other notable Social History as documented in HPI.    Family History  Family History   Problem Relation Age of Onset    Diabetes Mother     Hyperlipidemia Mother     Hypertension Mother     Cataracts Mother     Diabetes Father     Hypertension Father     Stroke Father     Depression Sister     Hypertension  "Sister     Stroke Sister     Cancer Maternal Aunt         breast x 2    Breast cancer Maternal Aunt     Cancer Maternal Uncle         prostate x 2    Cancer Paternal Aunt         breast    Breast cancer Paternal Aunt     Cancer Maternal Grandfather         lung    Early death Paternal Grandmother     Early death Paternal Grandfather     No Known Problems Brother     No Known Problems Paternal Uncle     No Known Problems Maternal Grandmother     Melanoma Neg Hx     Eczema Neg Hx     Lupus Neg Hx     Psoriasis Neg Hx     Amblyopia Neg Hx     Blindness Neg Hx     Glaucoma Neg Hx     Macular degeneration Neg Hx     Retinal detachment Neg Hx     Strabismus Neg Hx     Thyroid disease Neg Hx      Any other notable FMH as documented in HPI.    Physical Exam  BP (!) 160/89   Pulse 90   Ht 5' 3" (1.6 m)   Wt 99 kg (218 lb 4.1 oz)   BMI 38.66 kg/m²     General: Well-developed, well-groomed. No apparent distress  HENT: Normocephalic, atraumatic.  No carotid bruits auscultated.    Cardiovascular: Regular rate and rhythm with no murmurs, rubs or gallops.    Chest: Lungs clear to auscultation bilaterally.  No wheezes, stridor, ronchi appreciated.  Abdomen: Normoactive bowel sounds present.  Soft, nontender to palpation.  Musculoskeletal: No peripheral edema    Neurologic Exam: The patient is awake, alert and oriented. Language is fluent.  Fund of knowledge is appropriate.     Cranial nerves:   Pupils are round and reactive to light and accommodation.   Visual fields are full to confrontation.    Ocular motility is full in all cardinal positions of gaze.   Facial sensation is normal to light touch.   Facial activation w/ mild L lower facial droop  Hearing is symmetric bilaterally.   Palate elevates symmetrically.   Shoulder elevation is symmetric and full strength bilaterally.   Tongue is midline and neck rotation strength is normal bilaterally.      Motor examination of all extremities demonstrates " "normal bulk and tone in all four limbs. There are no atrophy or fasciculations. Strength is 5/5 in the upper and lower extremities bilaterally.    Sensory examination is normal light touch in BUE and BLE.  Romberg is negative.    Deep tendon reflexes are 1+ and symmetric in the upper and lower extremities bilaterally with the exception of 2+ patellar reflexes.  No clonus b/l.    Coordination: Finger to nose intact b/l    Lab and Test Results    No recent pertinent labs    Images:  3/11/19 CTH: Per report,  "1. No acute intracranial abnormalities, noting sequela of suspected chronic microvascular ischemic change."    2/16/19 Routine EEG: Per report,  "Abnormal activity:   No epileptiform discharges, periodic discharges, lateralized   rhythmic delta activity or electrographic seizures were seen.    IMPRESSION:   This is a normal routine EEG done in sleep, with only very brief   period of wakefulness."    2/16/19 MRI Brain w/ and w/o: Per report,  "1. No acute intracranial abnormality identified on today's examination.  Specifically, no evidence of acute infarction or enhancing lesion.  2. Multiple foci of T2/FLAIR signal hyperintensity in the supratentorial and periventricular white matter demonstrate no corresponding restricted diffusion or postcontrast enhancement.  These lesions are nonspecific in appearance and may be seen with accelerated small vessel ischemic disease, vasculopathies, migraine headaches, and as the residua from inflammatory and traumatic insults to the brain."    2/15/19 CTA Head and Neck: Per report,  "Small caliber right anterior cerebral artery which may be narrow on a congenital basis.  Otherwise CTA head and neck without significant focal stenosis or occlusion."    Assessment and Plan    Problem List Items Addressed This Visit        Neuro    Facial twitching    Current Assessment & Plan     Unclear if this represents seizure activity of L hemifacial spasm.  May be contributing to L lower " facial droop.  See epilepsy section for discussion/plan.         Tension headache - Primary    Current Assessment & Plan     New-onset headache with migrainous features.  MRI in February, CTH in March unrevealing of etiology.  Concerned for risks of medication overuse headache with tylenol/fioricet use.    Plan  -START nortriptyline nightly (don't take with ambien)  -Start medrol dose pack x1 with medication washout period  -STOP fioricet  -Ibuprofen PRN up to 3x/week  -Bengay, heat packs, lidocaine gel as needed to your neck  -Referral to physical therapy  -Return to clinic in 6 weeks         Relevant Medications    methylPREDNISolone (MEDROL DOSEPACK) 4 mg tablet    nortriptyline (PAMELOR) 10 MG capsule    ondansetron (ZOFRAN-ODT) 8 MG TbDL    lidocaine (LMX) 4 % cream    Other Relevant Orders    Ambulatory Referral to Physical/Occupational Therapy    Nonintractable epilepsy without status epilepticus    Overview     Assessment  Monalisa Carson is a 54 y.o. female w/ PMH significant for Anxiety, HTN, epilepsy presenting as hospital follow-up for evaluation of seizures and headache.    Overall, I am suspicious that the events she reported in February may represent seizure activity.  She reports no generalized seizure activity/LOC, but continues to have L facial twitching.    Has not tolerated keppra well (2/2 nausea/vomiting) and has thrown up some doses.    -I discussed with pt and  regarding driving laws in [place] after seizures.  Pt must refrain from driving for 6 months after having a seizure, and must be cleared by a neurologist to legally resume driving.  Physician team not required to report pt to DM.  Informed pt that I would document this conversation in the medical record.  Additionally, advised pt to avoid bathing alone, working in high places, and to not supervise children swimming alone or engage in other activities where losing consciousness or motor control would risk harm to self or  others.      Recommendations & Plan:  -DECREASE keppra to 1 g BID, zofran PRN to improve tolerance of keppra.  Extended discussion regarding risks of seizures worsening/need to call clinic if so.  Gave ED warnings.  May need to change AED if not tolerating better.  -Routine EEG as outpt  -RTC 6 weeks         Relevant Medications    levETIRAcetam (KEPPRA) 1000 MG tablet    ondansetron (ZOFRAN-ODT) 8 MG TbDL    Other Relevant Orders    EEG,extended monitoring,41-60 minutes       Cardiac/Vascular    Essential hypertension, benign    Current Assessment & Plan     Elevated BP in clinic (160/89), but appears stressed by her symptoms.     -F/u with primary doctor to discuss blood pressure management                   Humberto Fine MD  Neurology Resident   [facility]  [address] [place] [address]

## 2019-03-28 DIAGNOSIS — E11.22 CONTROLLED TYPE 2 DIABETES MELLITUS WITH STAGE 2 CHRONIC KIDNEY DISEASE, WITHOUT LONG-TERM CURRENT USE OF INSULIN: ICD-10-CM

## 2019-03-28 DIAGNOSIS — N18.2 CONTROLLED TYPE 2 DIABETES MELLITUS WITH STAGE 2 CHRONIC KIDNEY DISEASE, WITHOUT LONG-TERM CURRENT USE OF INSULIN: ICD-10-CM

## 2019-03-29 RX ORDER — BENAZEPRIL HYDROCHLORIDE 20 MG/1
TABLET ORAL
Qty: 90 TABLET | Refills: 1 | Status: SHIPPED | OUTPATIENT
Start: 2019-03-29 | End: 2019-11-09 | Stop reason: SDUPTHER

## 2019-03-29 RX ORDER — METFORMIN HYDROCHLORIDE 500 MG/1
TABLET ORAL
Qty: 180 TABLET | Refills: 1 | Status: SHIPPED | OUTPATIENT
Start: 2019-03-29 | End: 2019-08-26

## 2019-03-31 PROBLEM — G40.909 NONINTRACTABLE EPILEPSY WITHOUT STATUS EPILEPTICUS: Status: ACTIVE | Noted: 2019-03-31

## 2019-03-31 PROBLEM — G44.209 TENSION HEADACHE: Status: ACTIVE | Noted: 2019-03-31

## 2019-04-01 NOTE — ASSESSMENT & PLAN NOTE
New-onset headache with migrainous features.  MRI in February, CTH in March unrevealing of etiology.  Concerned for risks of medication overuse headache with tylenol/fioricet use.    Plan  -START nortriptyline nightly (don't take with ambien)  -Start medrol dose pack x1 with medication washout period  -STOP fioricet  -Ibuprofen PRN up to 3x/week  -Bengay, heat packs, lidocaine gel as needed to your neck  -Referral to physical therapy  -Return to clinic in 6 weeks

## 2019-04-01 NOTE — ASSESSMENT & PLAN NOTE
Unclear if this represents seizure activity of L hemifacial spasm.  May be contributing to L lower facial droop.  See epilepsy section for discussion/plan.

## 2019-04-01 NOTE — ASSESSMENT & PLAN NOTE
Elevated BP in clinic (160/89), but appears stressed by her symptoms.     -F/u with primary doctor to discuss blood pressure management

## 2019-04-08 ENCOUNTER — TELEPHONE (OUTPATIENT)
Dept: FAMILY MEDICINE | Facility: CLINIC | Age: 54
End: 2019-04-08

## 2019-04-08 ENCOUNTER — HOSPITAL ENCOUNTER (EMERGENCY)
Facility: HOSPITAL | Age: 54
Discharge: HOME | End: 2019-04-08
Attending: EMERGENCY MEDICINE
Payer: COMMERCIAL

## 2019-04-08 VITALS
RESPIRATION RATE: 18 BRPM | DIASTOLIC BLOOD PRESSURE: 88 MMHG | SYSTOLIC BLOOD PRESSURE: 144 MMHG | OXYGEN SATURATION: 98 % | WEIGHT: 218 LBS | HEART RATE: 68 BPM | BODY MASS INDEX: 38.62 KG/M2 | TEMPERATURE: 98 F | HEIGHT: 63 IN

## 2019-04-08 DIAGNOSIS — N39.0 ACUTE URINARY TRACT INFECTION: Primary | ICD-10-CM

## 2019-04-08 DIAGNOSIS — R51.9 NONINTRACTABLE EPISODIC HEADACHE, UNSPECIFIED HEADACHE TYPE: ICD-10-CM

## 2019-04-08 DIAGNOSIS — I10 HYPERTENSION: ICD-10-CM

## 2019-04-08 LAB
ALBUMIN SERPL-MCNC: 3.6 G/DL
ALP SERPL-CCNC: 77 U/L
BILIRUB SERPL-MCNC: 0.6 MG/DL
BILIRUBIN, POC UA: NEGATIVE
BLOOD, POC UA: NEGATIVE
BUN SERPL-MCNC: 9 MG/DL
CALCIUM SERPL-MCNC: 10.1 MG/DL
CHLORIDE SERPL-SCNC: 106 MMOL/L
CLARITY, POC UA: CLEAR
COLOR, POC UA: YELLOW
CREAT SERPL-MCNC: 0.6 MG/DL
GLUCOSE SERPL-MCNC: 150 MG/DL (ref 70–110)
GLUCOSE, POC UA: NEGATIVE
KETONES, POC UA: NEGATIVE
LEUKOCYTE EST, POC UA: ABNORMAL
NITRITE, POC UA: NEGATIVE
PH UR STRIP: 5 [PH]
POC ALT (SGPT): 40 U/L
POC AST (SGOT): 28 U/L
POC CARDIAC TROPONIN I: 0 NG/ML
POC PTINR: 1 (ref 0.9–1.2)
POC PTWBT: 11.4 SEC (ref 9.7–14.3)
POC TCO2: 25 MMOL/L
POCT GLUCOSE: 147 MG/DL (ref 70–110)
POTASSIUM BLD-SCNC: 4.1 MMOL/L
PROTEIN, POC UA: NEGATIVE
PROTEIN, POC: 7.9 G/DL
SAMPLE: NORMAL
SAMPLE: NORMAL
SODIUM BLD-SCNC: 145 MMOL/L
SPECIFIC GRAVITY, POC UA: 1.02
UROBILINOGEN, POC UA: 0.2 E.U./DL

## 2019-04-08 PROCEDURE — 63600175 PHARM REV CODE 636 W HCPCS: Mod: ER | Performed by: EMERGENCY MEDICINE

## 2019-04-08 PROCEDURE — 81003 URINALYSIS AUTO W/O SCOPE: CPT | Mod: ER

## 2019-04-08 PROCEDURE — 80053 COMPREHEN METABOLIC PANEL: CPT | Mod: ER

## 2019-04-08 PROCEDURE — 93005 ELECTROCARDIOGRAM TRACING: CPT | Mod: ER

## 2019-04-08 PROCEDURE — 99285 EMERGENCY DEPT VISIT HI MDM: CPT | Mod: 25,ER

## 2019-04-08 PROCEDURE — 96374 THER/PROPH/DIAG INJ IV PUSH: CPT | Mod: ER

## 2019-04-08 PROCEDURE — 84484 ASSAY OF TROPONIN QUANT: CPT | Mod: ER

## 2019-04-08 PROCEDURE — 93010 ELECTROCARDIOGRAM REPORT: CPT | Mod: ,,, | Performed by: INTERNAL MEDICINE

## 2019-04-08 PROCEDURE — 85610 PROTHROMBIN TIME: CPT | Mod: ER

## 2019-04-08 PROCEDURE — 93010 EKG 12-LEAD: ICD-10-PCS | Mod: ,,, | Performed by: INTERNAL MEDICINE

## 2019-04-08 PROCEDURE — 87086 URINE CULTURE/COLONY COUNT: CPT

## 2019-04-08 PROCEDURE — 85025 COMPLETE CBC W/AUTO DIFF WBC: CPT | Mod: ER

## 2019-04-08 RX ORDER — NITROFURANTOIN 25; 75 MG/1; MG/1
100 CAPSULE ORAL 2 TIMES DAILY
Qty: 10 CAPSULE | Refills: 0 | Status: SHIPPED | OUTPATIENT
Start: 2019-04-08 | End: 2019-04-13

## 2019-04-08 RX ORDER — ACETAMINOPHEN 500 MG
1000 TABLET ORAL EVERY 6 HOURS PRN
Qty: 30 TABLET | Refills: 0 | Status: SHIPPED | OUTPATIENT
Start: 2019-04-08 | End: 2021-05-31

## 2019-04-08 RX ORDER — KETOROLAC TROMETHAMINE 30 MG/ML
15 INJECTION, SOLUTION INTRAMUSCULAR; INTRAVENOUS
Status: COMPLETED | OUTPATIENT
Start: 2019-04-08 | End: 2019-04-08

## 2019-04-08 RX ORDER — FLUTICASONE PROPIONATE 50 MCG
1 SPRAY, SUSPENSION (ML) NASAL 2 TIMES DAILY
Qty: 1 BOTTLE | Refills: 0 | OUTPATIENT
Start: 2019-04-08 | End: 2020-03-22

## 2019-04-08 RX ORDER — LORATADINE 10 MG/1
10 TABLET ORAL DAILY
Qty: 60 TABLET | Refills: 0 | Status: SHIPPED | OUTPATIENT
Start: 2019-04-08 | End: 2021-05-20

## 2019-04-08 RX ORDER — IBUPROFEN 600 MG/1
600 TABLET ORAL EVERY 6 HOURS PRN
Qty: 20 TABLET | Refills: 0 | Status: ON HOLD | OUTPATIENT
Start: 2019-04-08 | End: 2020-04-04 | Stop reason: HOSPADM

## 2019-04-08 RX ORDER — HYDROXYZINE HYDROCHLORIDE 25 MG/1
25 TABLET, FILM COATED ORAL 3 TIMES DAILY PRN
Qty: 12 TABLET | Refills: 0 | Status: SHIPPED | OUTPATIENT
Start: 2019-04-08 | End: 2021-05-31

## 2019-04-08 RX ADMIN — KETOROLAC TROMETHAMINE 15 MG: 30 INJECTION INTRAMUSCULAR; INTRAVENOUS at 10:04

## 2019-04-08 NOTE — TELEPHONE ENCOUNTER
Pt called clinic this morning in regards of elevated blood pressure. States that at 7am bp was 161/98 before medication, and after medication pressure was 149/101. Pt states that she is experiencing sweats and that she had a seizure two months ago. Pt was advised to go ED or urgent care.

## 2019-04-08 NOTE — ED PROVIDER NOTES
"Encounter Date: 4/8/2019    SCRIBE #1 NOTE: I, Marques Roberto, am scribing for, and in the presence of,  Dr. Valadez. I have scribed the following portions of the note - Other sections scribed: HPI, ROS, PE.       History     Chief Complaint   Patient presents with    Hypertension     states BP has been high for several days, states has been taking her Rx    "sweats"     states has had "sweats" intermittently for 2 weeks     CC:   Elevated BP  HPI:  This is a 54 y.o. female who presents to the ED with a chief complaint of elevated BP that started earlier today.  Pt states that she had a recorded BP of 161/98 and 141/101.  Pt states that she has been having intermittent hot and cold flashes for the past four days and has had two episodes since checking into the ER.  She states that she felt her face twitching earlier today.  Pt endorses an intermittent generalized HA for the past month.  She denies this being the worst PETTY of her life.  Pt states this is the same HA she has been having on and off for the past month and notes she has had this HA before her seizure one month ago.  Her HA has not worsened or changed. There is no current HA during exam.  She denies CP, SOB, abdominal pain, nausea, emesis, dysuria, urinary frequency, leg pain/swelling, numbness, tingling, weakness, or slurred speech. She has not been eating more salt or taking OTC cold medicine.  Her PSHx includes a Hysterectomy.  She denies tobacco, alcohol, or illicit drug use.  She has been complaint with her BP medication.      The history is provided by the patient.     Review of patient's allergies indicates:   Allergen Reactions    Keflex [cephalexin] Hives    Vicodin [hydrocodone-acetaminophen] Itching     itch     Past Medical History:   Diagnosis Date    Allergy     Anxiety     Diabetes mellitus     Heart murmur     Hypertension      Past Surgical History:   Procedure Laterality Date    CHOLECYSTECTOMY      2001 april    HYSTERECTOMY   "    partial  1996    OOPHORECTOMY      SHOULDER SURGERY Right     WISDOM TOOTH EXTRACTION       Family History   Problem Relation Age of Onset    Diabetes Mother     Hyperlipidemia Mother     Hypertension Mother     Cataracts Mother     Diabetes Father     Hypertension Father     Stroke Father     Depression Sister     Hypertension Sister     Stroke Sister     Cancer Maternal Aunt         breast x 2    Breast cancer Maternal Aunt     Cancer Maternal Uncle         prostate x 2    Cancer Paternal Aunt         breast    Breast cancer Paternal Aunt     Cancer Maternal Grandfather         lung    Early death Paternal Grandmother     Early death Paternal Grandfather     No Known Problems Brother     No Known Problems Paternal Uncle     No Known Problems Maternal Grandmother     Melanoma Neg Hx     Eczema Neg Hx     Lupus Neg Hx     Psoriasis Neg Hx     Amblyopia Neg Hx     Blindness Neg Hx     Glaucoma Neg Hx     Macular degeneration Neg Hx     Retinal detachment Neg Hx     Strabismus Neg Hx     Thyroid disease Neg Hx      Social History     Tobacco Use    Smoking status: Former Smoker     Packs/day: 0.00     Years: 0.50     Pack years: 0.00    Smokeless tobacco: Never Used   Substance Use Topics    Alcohol use: Yes     Alcohol/week: 0.0 oz     Comment: seldom    Drug use: No     Review of Systems   Constitutional: Positive for chills, diaphoresis and fever (Subjective).        Pt has had intermittent hot flashes for the past four days.    HENT: Negative for sneezing.    Eyes: Positive for redness. Negative for discharge and itching.   Respiratory: Positive for cough. Negative for shortness of breath.    Cardiovascular: Negative for chest pain and leg swelling.   Gastrointestinal: Negative for abdominal pain, nausea and vomiting.   Genitourinary: Negative for dysuria and frequency.   Neurological: Positive for headaches (Resolved). Negative for speech difficulty, weakness and  numbness.   All other systems reviewed and are negative.      Physical Exam     Initial Vitals [04/08/19 0916]   BP Pulse Resp Temp SpO2   (!) 141/83 79 19 97.9 °F (36.6 °C) 98 %      MAP       --         The patient granted permission for examination.     Physical Exam    Nursing note and vitals reviewed.  Constitutional: She appears well-developed and well-nourished.   HENT:   Head: Normocephalic and atraumatic.   Right Ear: Tympanic membrane and external ear normal.   Left Ear: Tympanic membrane and external ear normal.   Nose: Mucosal edema and rhinorrhea present.   Mouth/Throat: Uvula is midline, oropharynx is clear and moist and mucous membranes are normal. No oropharyngeal exudate, posterior oropharyngeal edema, posterior oropharyngeal erythema or tonsillar abscesses.   PND present.    Eyes: EOM are normal. Pupils are equal, round, and reactive to light. Right conjunctiva is injected. Left conjunctiva is injected.   Neck: Normal range of motion and phonation normal. Neck supple.   Cardiovascular: Normal rate, regular rhythm, normal heart sounds and intact distal pulses. Exam reveals no gallop and no friction rub.    No murmur heard.  Pulmonary/Chest: Effort normal and breath sounds normal. No stridor. No respiratory distress. She has no wheezes. She has no rhonchi. She has no rales. She exhibits no tenderness.   Abdominal: Soft. Bowel sounds are normal. She exhibits no distension. There is no tenderness. There is no rigidity, no rebound and no guarding.   Musculoskeletal: Normal range of motion. She exhibits no edema or tenderness.        Thoracic back: She exhibits normal range of motion, no tenderness, no bony tenderness, no swelling, no edema, no deformity, no laceration, no pain, no spasm and normal pulse.        Lumbar back: She exhibits normal range of motion, no tenderness, no bony tenderness, no swelling, no edema, no deformity, no laceration, no pain, no spasm and normal pulse.        Right lower  leg: She exhibits no tenderness, no bony tenderness, no swelling, no edema, no deformity and no laceration.        Left lower leg: She exhibits no tenderness, no bony tenderness, no swelling, no edema, no deformity and no laceration.   Neurological: She is alert and oriented to person, place, and time. She has normal strength. No cranial nerve deficit or sensory deficit. GCS score is 15. GCS eye subscore is 4. GCS verbal subscore is 5. GCS motor subscore is 6.   Skin: Skin is warm and dry. Capillary refill takes less than 2 seconds. No rash noted.   Psychiatric: She has a normal mood and affect. Her behavior is normal.         ED Course   Procedures  Labs Reviewed   POCT URINALYSIS W/O SCOPE - Abnormal; Notable for the following components:       Result Value    Glucose, UA Negative (*)     Bilirubin, UA Negative (*)     Ketones, UA Negative (*)     Blood, UA Negative (*)     Protein, UA Negative (*)     Nitrite, UA Negative (*)     Leukocytes, UA 1+ (*)     All other components within normal limits   POCT GLUCOSE - Abnormal; Notable for the following components:    POCT Glucose 147 (*)     All other components within normal limits   CULTURE, URINE   TROPONIN ISTAT   POCT URINALYSIS W/O SCOPE   POCT CBC   POCT GLUCOSE, HAND-HELD DEVICE   POCT CMP   POCT PROTIME-INR   POCT TROPONIN   ISTAT PROCEDURE   POCT CMP     EKG Readings: (Independently Interpreted)   Initial Reading: No STEMI. Previous EKG Date: When compared to prior EKG, 3/11/19, rate has increased by 2 BPM today  . Rhythm: Normal Sinus Rhythm. Heart Rate: 64. Axis: Normal. Other Impression: Normal EKG, QTC is 408     ECG Results          EKG 12-lead (In process)  Result time 04/08/19 10:24:05    In process by Interface, Lab In [facility] (04/08/19 10:24:05)                 Narrative:    Test Reason : I10,    Vent. Rate : 064 BPM     Atrial Rate : 064 BPM     P-R Int : 162 ms          QRS Dur : 094 ms      QT Int : 396 ms       P-R-T Axes : 059 015 062  degrees     QTc Int : 408 ms    Normal sinus rhythm  Normal ECG  When compared with ECG of 11-MAR-2019 13:30,  No significant change was found    Referred By: [email or web address]   SELF           Confirmed By:                             Imaging Results          X-Ray Chest PA And Lateral (Final result)  Result time 04/08/19 10:09:45    Final result by Inez Teague MD (04/08/19 10:09:45)                 Impression:      No acute abnormality.      Electronically signed by: Inez Teague MD  Date:    04/08/2019  Time:    10:09             Narrative:    EXAMINATION:  XR CHEST PA AND LATERAL    CLINICAL HISTORY:  Hypertension;    TECHNIQUE:  PA and lateral views of the chest were performed.    COMPARISON:  None    FINDINGS:  The lungs are clear, with normal appearance of pulmonary vasculature and no pleural effusion or pneumothorax.    The cardiac silhouette is normal in size. The hilar and mediastinal contours are unremarkable.    Bones are intact.                                 Medical Decision Making:   Clinical Tests:   Lab Tests: Ordered  ED Management:  I will order POCT UA W/O SCOPE, Glucose, CXR, Urine Culture, CBC, CMP, INR, Troponin, vital signs, cardiac monitoring, pulse oximetry, diet NPO, saline lock IV, and EKG.     Patient will be treated with ketorolac and dx 'd with nitrofurantoin, ibuprofen, acetaminophen, hydroxyzine, fluticasone, and loratadine.      Treatment in the ED Physical Exam.   Patient reports total resolution of symptoms after medication.    Discussed labs, and imaging results.    Fill and take prescriptions as directed.  Return to the ED if symptoms worsen or do not resolve.   Answered questions and discussed discharge plan.    Patient feels much better and is ready for discharge.  Follow up with PCP in 1 days.           Scribe Attestation:   Scribe #1: I performed the above scribed service and the documentation accurately describes the services I performed. I attest to the accuracy of the  note.     I, Dr. Jeana Valadez, personally performed the services described in this documentation. This document was produced by a scribe under my direction and in my presence. All medical record entries made by the scribe were at my direction and in my presence.  I have reviewed the chart and agree that the record reflects my personal performance and is accurate and complete. Jeana Valadez DO.     04/15/2019 12:34 PM             Clinical Impression:     1. Acute urinary tract infection    2. Hypertension    3. Nonintractable episodic headache, unspecified headache type                                  Jeana Valadez DO  04/15/19 1234

## 2019-04-10 ENCOUNTER — HOSPITAL ENCOUNTER (OUTPATIENT)
Dept: NEUROLOGY | Facility: CLINIC | Age: 54
Discharge: HOME | End: 2019-04-10
Payer: COMMERCIAL

## 2019-04-10 DIAGNOSIS — G40.209 PARTIAL SYMPTOMATIC EPILEPSY WITH COMPLEX PARTIAL SEIZURES, NOT INTRACTABLE, WITHOUT STATUS EPILEPTICUS: ICD-10-CM

## 2019-04-10 LAB — BACTERIA UR CULT: NORMAL

## 2019-04-10 PROCEDURE — 95816 PR EEG,W/AWAKE & DROWSY RECORD: ICD-10-PCS | Mod: S$GLB,,, | Performed by: PSYCHIATRY & NEUROLOGY

## 2019-04-10 PROCEDURE — 95816 EEG AWAKE AND DROWSY: CPT | Mod: S$GLB,,, | Performed by: PSYCHIATRY & NEUROLOGY

## 2019-04-10 NOTE — PROCEDURES
ELECTROENCEPHALOGRAM REPORT    DATE OF SERVICE:  04/10/2019.    EEG NUMBER:  [id number].    REQUESTING PHYSICIAN:  Dr. Humberto Fine.     METHODOLOGY:  Electroencephalographic (EEG) recording is recorded with   electrodes placed according to the International 10-20 placement system.  Thirty   two (32) channels of digital signal (sampling rate of 512/sec), including T1   and T2, were simultaneously recorded from the scalp and may include EKG, EMG,   and/or eye monitors.  Recording band pass was 0.1 to 512 Hz.  Digital video   recording of the patient is simultaneously recorded with the EEG.  The patient   is instructed to report clinical symptoms which may occur during the recording   session.  EEG and video recording are stored and archived in digital format.    Activation procedures, which include photic stimulation, hyperventilation and   instructing patients to perform simple tasks, are done in selected patients  The EEG is displayed on a monitor screen and can be reviewed using different   montages.  Computer assisted-analysis is employed to detect spike and   electrographic seizure activity.   The entire record is submitted for computer   analysis.  The entire recording is visually reviewed, and the times identified   by computer analysis as being spikes or seizures are reviewed again.    Compressed spectral analysis (CSA) is also performed on the activity recorded   from each individual channel.  This is displayed as a power display of   frequencies from 0 to 30 Hz over time.   The CSA is reviewed looking for   asymmetries in power between homologous areas of the scalp, then compared with   the original EEG recording.    Adisn software was also utilized in the review of this study.  This software   suite analyzes the EEG recording in multiple domains.  Coherence and rhythmicity   are computed to identify EEG sections which may contain organized seizures.    Each channel undergoes analysis to detect the presence  of spike and sharp waves   which have special and morphological characteristics of epileptic activity.  The   routine EEG recording is converted from special into frequency domain.  This is   then displayed comparing homologous areas to identify areas of significant   asymmetry.  Algorithm to identify non-cortically generated artifact is used to   separate artifact from the EEG.    EEG FINDINGS:  The patient was awake throughout the recording.  Background   showed a very low amplitude, but fairly rhythmic posterior dominant rhythm of 10   to 11 Hz seen in the occipital leads bilaterally.  Mixture of low and midrange   beta was noted diffusely.  The dominant rhythm would intermittently attenuate,   but the patient did not pass into the sleeping state.  No lateralized or focal   changes were noted and no spike or sharp wave activity was seen.  Intermittent   photic stimulation produced a good driving response, but did not provoke any   pathological discharges.  Three minutes of hyperventilation was carried out,   which did not significantly change the electrocortical activity.    IMPRESSION:  Normal EEG.    CLINICAL CORRELATION:  The patient is a 54-year-old female who had two events,   which consisted of left facial twitching and speech arrest with a Sal paralysis   with left facial droop post-event.  This occurred on two occasions.  She was   started on levetiracetam 1500 mg twice a day.  This recording was obtained to   evaluate for possible seizures, but this tracing is normal throughout the   recording.      RR/HN  dd: 04/10/2019 15:04:44 (CDT)  td: 04/10/2019 15:19:35 (CDT)  Doc ID   #[id number]  Job ID #[id number]    CC:

## 2019-04-17 ENCOUNTER — CLINICAL SUPPORT (OUTPATIENT)
Dept: REHABILITATION | Facility: HOSPITAL | Age: 54
End: 2019-04-17
Attending: STUDENT IN AN ORGANIZED HEALTH CARE EDUCATION/TRAINING PROGRAM
Payer: COMMERCIAL

## 2019-04-17 DIAGNOSIS — M54.2 PAINFUL CERVICAL RANGE OF MOTION: ICD-10-CM

## 2019-04-17 DIAGNOSIS — G44.229 CHRONIC TENSION-TYPE HEADACHE, NOT INTRACTABLE: ICD-10-CM

## 2019-04-17 DIAGNOSIS — M54.2 CERVICAL PAIN: ICD-10-CM

## 2019-04-17 PROCEDURE — 97140 MANUAL THERAPY 1/> REGIONS: CPT | Mod: PN

## 2019-04-17 PROCEDURE — 97161 PT EVAL LOW COMPLEX 20 MIN: CPT | Mod: PN

## 2019-04-17 PROCEDURE — 97110 THERAPEUTIC EXERCISES: CPT | Mod: PN

## 2019-04-17 NOTE — PLAN OF CARE
[facility]  Physical Therapy Initial Evaluation    Name: Monalisa Carson  Clinic Number: [id number]    Therapy Diagnosis:   Encounter Diagnoses   Name Primary?    Chronic tension-type headache, not intractable     Painful cervical range of motion     Cervical pain      Physician: Humberto Fine MD    Physician Orders: PT Eval and Treat   Medical Diagnosis from Referral: Tension headache  Evaluation Date: 4/17/2019  Authorization Period Expiration: 12/31/2019  Plan of Care Expiration: 7/14/2019  Visit # / Visits authorized: 2/ 50    Time In: 3:00 pm  Time Out: 4:00 pm  Total Billable Time: 60 minutes    Precautions: Standard & hx of seizures    Subjective   Date of onset: over 6 months ago  History of current condition - Monalisa reports: that she has neck pain over 6 months ago. Pt states neck pain began with anxiety  and stress. Pt states she takes pain medication to help decrease neck pain. Pt states she feels neck pain in the posterior of her neck and travel to anterior forehead. Pt describe neck pain as dull pain. Pt states she does not get dizziness often. Pt reports headaches aggravates when she is in more stress. 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 Pt states she had seizures 2/15/2019. Pt states that she has headache  when she looks at the light but it is not often.        Medical History:   Past Medical History:   Diagnosis Date    Allergy     Anxiety     Diabetes mellitus     Heart murmur     Hypertension        Surgical History:   Monalisa Carson  has a past surgical history that includes Cholecystectomy; Hysterectomy; [place] tooth extraction; Shoulder surgery (Right); and Oophorectomy.    Medications:   Monalisa has a current medication list which includes the following prescription(s): acetaminophen, albuterol, amlodipine, aspirin, atorvastatin, benazepril, fluticasone, hydroxyzine hcl, ibuprofen, levetiracetam, lidocaine,  "loratadine, metformin, methylprednisolone, nortriptyline, ondansetron, and zolpidem.    Allergies:   Review of patient's allergies indicates:   Allergen Reactions    Keflex [cephalexin] Hives    Vicodin [hydrocodone-acetaminophen] Itching     itch        Imaging, MRI studies:   Images:  3/11/19 CTH: Per report,  "1. No acute intracranial abnormalities, noting sequela of suspected chronic microvascular ischemic change."     2/16/19 Routine EEG: Per report,  "Abnormal activity:   No epileptiform discharges, periodic discharges, lateralized   rhythmic delta activity or electrographic seizures were seen.    IMPRESSION:   This is a normal routine EEG done in sleep, with only very brief   period of wakefulness."     2/16/19 MRI Brain w/ and w/o: Per report,  "1. No acute intracranial abnormality identified on today's examination.  Specifically, no evidence of acute infarction or enhancing lesion.  2. Multiple foci of T2/FLAIR signal hyperintensity in the supratentorial and periventricular white matter demonstrate no corresponding restricted diffusion or postcontrast enhancement.  These lesions are nonspecific in appearance and may be seen with accelerated small vessel ischemic disease, vasculopathies, migraine headaches, and as the residua from inflammatory and traumatic insults to the brain."     2/15/19 CTA Head and Neck: Per report,  "Small caliber right anterior cerebral artery which may be narrow on a congenital basis.  Otherwise CTA head and neck without significant focal stenosis or occlusion      Prior Therapy: Yes.   Social History: lives with their family and lives with their spouse  Occupation: Own bus company   Prior Level of Function: Independent   Current Level of Function:Limited to work     Pain:  Current 6/10, worst 10/10, best 3/10   Location: bilateral neck   Description: Dull  Aggravating Factors: Stress and anxiety and loudness  Easing Factors: relaxation and pain medication    Pts goals: Get the " headache gone    Objective       Observation:     Posture Alignment: slouched posture;forward head    Sensation: Light touch: Intact    DTR:intact LE    GAIT DEVIATIONS: Monalisa displays not major deviation    Cervical Range of Motion:    Degrees Pain   Flexion WFL No     Extension 19 Yes     Right Side Bending 23 Yes     Left Side Bending 25 Yes      Right Rotation WFL Yes   Left Rotation WFL Yes       Shoulder Range of Motion:   Shoulder Left Right   Flexion WFL WFL   Abduction WFL WFL   ER WFL WFL   IR WFL WFL     Upper Extremity Strength  (R) UE  (L) UE    Shoulder elevation: 4+/5 Shoulder elevation: 4+/5   Shoulder flexion: 4/5 Shoulder flexion: 4+/5   Shoulder Abduction: 4+/5 Shoulder abduction: 4+/5   Shoulder ER 4+/5 Shoulder ER 5/5   Shoulder IR 4+/5 Shoulder IR 4+/5    4+/5 : 4+/5       Special Tests:  Distraction Negative   Compression Negative   Spurlings Negative    Sharp-Bartolo NP   VA test NP   Lateral Flexion Alar Ligament NP   DNF test NP       Joint Mobility: PA glides of cervical decreased     Thoracic mobility: decrease mobility    Palpation: Severe tension of suboccipital muscle and upper traps muscles.       PT Evaluation Completed? Yes  Discussed Plan of Care with patient: Yes    CMS Impairment/Limitation/Restriction for FOTO Neck Survey  Status Limitation G-Code CMS Severity Modifier  Intake 71% 29% Current Status CJ - At least 20 percent but less than 40 percent  Predicted 75% 25% Goal Status+ CJ - At least 20 percent but less than 40 percent  D/C Status CJ **only report if this is a one time visit  +Based on FOTO predicted change score    TREATMENT   Treatment Time In: 3:40 pm  Treatment Time Out: 3:58  pm  Total Treatment time separate from Evaluation: 18  minutes    Monalisa received therapeutic exercises to develop strength, endurance, ROM, flexibility and posture for 8 minutes including:    Upper trap stretch  Levators stretch   Pectoralis muscle stretch     Monalisa received  the following manual therapy techniques: Soft tissue Mobilization were applied to the: cervical  for 10 minutes, including:  Suboccipital and upper traps    Monalisa received  hot  pack for 10 minutes to cervical .      Home Exercises and Patient Education Provided    Education provided:   Cont HEP     Written Home Exercises Provided: Patient instructed to cont prior HEP.  Exercises were reviewed and Monalisa was able to demonstrate them prior to the end of the session.  Monalisa demonstrated good  understanding of the education provided.     See EMR under Patient Instructions for exercises provided 4/17/2019.    Assessment   Monalisa is a 54 y.o. female referred to outpatient Physical Therapy with a medical diagnosis of Tension headache. Pt presents with chronic tension headache. Pt has had increase of headache pain for over 6 months. Stress is causing factors to increase headache. 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  Pt demonstrated decrease cervical range of motion. During palpation, pt demonstrated severe tightness of suboccipital muscles and upper traps. During PA glides pt demonstrated thoracic and cervical hypomobility causing tension in the cervical region. Pt demonstrated decrease of muscles tension after STM of cervical and heat  Pack. Pt will benefit from skilled PT services to decrease functional limitations.     Pt prognosis is Good.   Pt will benefit from skilled outpatient Physical Therapy to address the deficits stated above and in the chart below, provide pt/family education, and to maximize pt's level of independence.     Plan of care discussed with patient: Yes  Pt's spiritual, cultural and educational needs considered and patient is agreeable to the plan of care and goals as stated below:     Anticipated Barriers for therapy: None    Medical Necessity is demonstrated by the following  History  Co-morbidities and personal factors that may impact the plan of care Co-morbidities:   Seizures  and hx of right shoulder surgery    Personal Factors:   coping style  lifestyle     low   Examination  Body Structures and Functions, activity limitations and participation restrictions that may impact the plan of care Body Regions:   neck    Body Systems:    ROM  strength    Participation Restrictions:   Community activities     Activity limitations:   Learning and applying knowledge  no deficits    General Tasks and Commands  no deficits    Communication  no deficits    Mobility  lifting and carrying objects    Self care  no deficits    Domestic Life  doing house work (cleaning house, washing dishes, laundry)    Interactions/Relationships  no deficits    Life Areas  no deficits    Community and Social Life  community life  recreation and leisure         Complexity: low   Clinical Presentation stable and uncomplicated low   Decision Making/ Complexity Score: low       GOALS: Short Term Goals: 4 weeks  1.Report decreased in pain at worse less than  <   / =  5  /10  to increase tolerance for functional activities   2. Pt to improve range of motion by 25% to allow for improved functional mobility to allow for improvement in IADLs.   3. Pt to report less frequency of headache to improve quality of life.   4. Pt to tolerate HEP to improve ROM and independence with ADL's    Long Term Goals: 8 weeks  1.Report decreased in pain at worse less than  <   / =  2 /10  to increase tolerance for functional activities   2.Pt to improve range of motion by 75% to allow for improved functional mobility to allow for improvement in IADLs.   3.  Pt will report At least 20 percent but less than 40 percent on FOTO neck survey score for neck pain disability to demonstrate decrease in disability and improvement in neck pain.  4. Pt to be Independent with HEP to improve ROM and independence with ADL's  5. Pt will report no headache for at least 1 or 2 weeks to improve quality of life.      Plan   Plan of care Certification: 4/17/2019 to  7/14/2019.    Outpatient Physical Therapy 2 times weekly for 12 weeks to include the following interventions: Cervical/Lumbar Traction, Manual Therapy, Moist Heat/ Ice, Patient Education and Therapeutic Exercise.       Fabio Cohen, PT  04/17/2019

## 2019-04-22 DIAGNOSIS — G47.00 INSOMNIA, UNSPECIFIED TYPE: ICD-10-CM

## 2019-04-23 ENCOUNTER — TELEPHONE (OUTPATIENT)
Dept: FAMILY MEDICINE | Facility: CLINIC | Age: 54
End: 2019-04-23

## 2019-04-23 ENCOUNTER — CLINICAL SUPPORT (OUTPATIENT)
Dept: REHABILITATION | Facility: HOSPITAL | Age: 54
End: 2019-04-23
Attending: STUDENT IN AN ORGANIZED HEALTH CARE EDUCATION/TRAINING PROGRAM
Payer: COMMERCIAL

## 2019-04-23 DIAGNOSIS — G44.229 CHRONIC TENSION-TYPE HEADACHE, NOT INTRACTABLE: ICD-10-CM

## 2019-04-23 DIAGNOSIS — M54.2 PAINFUL CERVICAL RANGE OF MOTION: ICD-10-CM

## 2019-04-23 DIAGNOSIS — M54.2 CERVICAL PAIN: ICD-10-CM

## 2019-04-23 PROCEDURE — 97110 THERAPEUTIC EXERCISES: CPT | Mod: PN

## 2019-04-23 PROCEDURE — 97140 MANUAL THERAPY 1/> REGIONS: CPT | Mod: PN

## 2019-04-23 RX ORDER — ZOLPIDEM TARTRATE 10 MG/1
TABLET ORAL
Qty: 30 TABLET | Refills: 2 | Status: SHIPPED | OUTPATIENT
Start: 2019-04-23 | End: 2019-09-23 | Stop reason: SDUPTHER

## 2019-04-23 NOTE — TELEPHONE ENCOUNTER
----- Message from Babatunde Rainey sent at 4/23/2019  2:03 PM CDT -----  Contact: Self: [phone]  Type: RX Refill Request    Who Called: Monalisa    Refill or New Rx:Refill    RX Name and Strength:zolpidem (AMBIEN) 10 mg Tab    How is the patient currently taking it? ( 1XDay):    Is this a 30 day or 90 day RX:30 Day    Preferred Pharmacy with phone number:..  [facility] [address] [place] [address] [place] [id number] [employer] [place] [id number] [facility] [address]  Phone: [phone] Fax: [phone]        Local or Mail Order:Local    Ordering Provider:Dr. See  Would the patient rather a call back or a response via My [facility]? Callback    Best Call Back Number:[phone]    Additional Information: N/A

## 2019-04-23 NOTE — PROGRESS NOTES
Physical Therapy Daily Treatment Note     Name: Monalisa THOMPSON [facility]  Clinic Number: [id number]    Therapy Diagnosis:   Encounter Diagnoses   Name Primary?    Chronic tension-type headache, not intractable     Painful cervical range of motion     Cervical pain      Physician: Humberto Fine MD    Visit Date: 4/23/2019    Physician Orders: PT Eval and Treat   Medical Diagnosis from Referral: Tension headache  Evaluation Date: 4/17/2019  Authorization Period Expiration: 12/31/2019  Plan of Care Expiration: 7/14/2019  Visit # / Visits authorized: 2/ 50  PN Due: 5/17/2019     Time In: 8:00 am  Time Out: 9:05 am  Total Billable Time: 55 minutes      Precautions: Standard & hx of seizures      Subjective     Pt reports: that she felt decrease in neck pain after last PT session and she also fel muscle soreness.   She was compliant with home exercise program.  Response to previous treatment: decrease neck pain  Functional change: No changes    Pain: 2/10  Location: bilateral cervical      Objective     Monalisa received therapeutic exercises to develop strength, endurance, ROM, flexibility and posture for 45 minutes including:    UBE 6 min  Upper trap stretch 5x30 sec  Levators stretch 5x30 sec   Supine cervical rotation with o-foat 1x20 ea  Supine cervical flexion and extension 1x20 ea   Supine pect stretch on half foam 1x2'   Supine on half foam shoulder horizontal abd 1x20  Standing Pectoralis muscle stretch 5x30 sec         Monalisa received the following manual therapy techniques: Soft tissue Mobilization were applied to the: cervical  for 15 minutes, including:  STM and IASTM right cervical muscles.     Monalisa received hot pack for 10 minutes to cervical.        Home Exercises and Patient Education Provided     Education provided:   Cont HEP      Written Home Exercises Provided: Patient instructed to cont prior HEP.  Exercises were reviewed and Monalisa was able to demonstrate them prior to the end of the  session.  Monalisa demonstrated good  understanding of the education provided.      See EMR under Patient Instructions for exercises provided 4/17/2019.      Assessment     Pt tolerated PT session well today. Pt demonstrated increase in right upper traps and suboccipital muscles tightness and restrictions. Pt was able to perform therapeutic exercises with heavy v/c's to perform exercises with proper form. Pt tolerated manual therapy with decrease of right upper traps and suboccipital muscle restriction. Pt was given information regarding dry needling to help decrease tissue restriction. Plan to cont stretches techniques and perform dry needling.     Monalisa is progressing well towards her goals.   Pt prognosis is Good.     Pt will continue to benefit from skilled outpatient physical therapy to address the deficits listed in the problem list box on initial evaluation, provide pt/family education and to maximize pt's level of independence in the home and community environment.     Pt's spiritual, cultural and educational needs considered and pt agreeable to plan of care and goals.    Anticipated barriers to physical therapy: none    GOALS: Short Term Goals: 4 weeks  1.Report decreased in pain at worse less than  <   / =  5  /10  to increase tolerance for functional activities. Appropriate, ongoing  2. Pt to improve range of motion by 25% to allow for improved functional mobility to allow for improvement in IADLs. Appropriate, ongoing  3. Pt to report less frequency of headache to improve quality of life. Appropriate, ongoing  4. Pt to tolerate HEP to improve ROM and independence with ADL's.Appropriate, ongoing     Long Term Goals: 8 weeks  1.Report decreased in pain at worse less than  <   / =  2 /10  to increase tolerance for functional activities.Appropriate, ongoing  2.Pt to improve range of motion by 75% to allow for improved functional mobility to allow for improvement in IADLs.Appropriate, ongoing   3.  Pt  will report At least 20 percent but less than 40 percent on FOTO neck survey score for neck pain disability to demonstrate decrease in disability and improvement in neck pain.Appropriate, ongoing  4. Pt to be Independent with HEP to improve ROM and independence with ADL's.Appropriate, ongoing  5. Pt will report no headache for at least 1 or 2 weeks to improve quality of life.Appropriate, ongoing      Plan     Plan of care Certification: 4/17/2019 to 7/14/2019.      Cont skilled PT session towards PT and patient's goals.    Fabio Cohen, PT   04/23/2019

## 2019-04-26 ENCOUNTER — CLINICAL SUPPORT (OUTPATIENT)
Dept: FAMILY MEDICINE | Facility: CLINIC | Age: 54
End: 2019-04-26
Payer: COMMERCIAL

## 2019-04-26 ENCOUNTER — CLINICAL SUPPORT (OUTPATIENT)
Dept: REHABILITATION | Facility: HOSPITAL | Age: 54
End: 2019-04-26
Attending: STUDENT IN AN ORGANIZED HEALTH CARE EDUCATION/TRAINING PROGRAM
Payer: COMMERCIAL

## 2019-04-26 VITALS — DIASTOLIC BLOOD PRESSURE: 84 MMHG | SYSTOLIC BLOOD PRESSURE: 138 MMHG

## 2019-04-26 DIAGNOSIS — I10 UNCONTROLLED HYPERTENSION: Primary | ICD-10-CM

## 2019-04-26 DIAGNOSIS — G44.229 CHRONIC TENSION-TYPE HEADACHE, NOT INTRACTABLE: ICD-10-CM

## 2019-04-26 DIAGNOSIS — M54.2 CERVICAL PAIN: ICD-10-CM

## 2019-04-26 DIAGNOSIS — M54.2 PAINFUL CERVICAL RANGE OF MOTION: ICD-10-CM

## 2019-04-26 PROCEDURE — 97110 THERAPEUTIC EXERCISES: CPT | Mod: PN

## 2019-04-26 PROCEDURE — 97140 MANUAL THERAPY 1/> REGIONS: CPT | Mod: PN

## 2019-05-01 ENCOUNTER — DOCUMENTATION ONLY (OUTPATIENT)
Dept: REHABILITATION | Facility: HOSPITAL | Age: 54
End: 2019-05-01

## 2019-05-01 NOTE — PROGRESS NOTES
Missed Visit/Cancellation     Date: 5/1/19    Canceled Number: 0  No Show Number: 1             Pt initially had visit scheduled for today for 1000.  Front office phone pt after NS visit. Pt reports she is not feeling well.  Pt with next scheduled visit 5/3/19 at 1000.  Pt confirmed next visit.

## 2019-05-03 ENCOUNTER — CLINICAL SUPPORT (OUTPATIENT)
Dept: REHABILITATION | Facility: HOSPITAL | Age: 54
End: 2019-05-03
Attending: STUDENT IN AN ORGANIZED HEALTH CARE EDUCATION/TRAINING PROGRAM
Payer: COMMERCIAL

## 2019-05-03 DIAGNOSIS — M54.2 PAINFUL CERVICAL RANGE OF MOTION: ICD-10-CM

## 2019-05-03 DIAGNOSIS — G44.229 CHRONIC TENSION-TYPE HEADACHE, NOT INTRACTABLE: ICD-10-CM

## 2019-05-03 DIAGNOSIS — M54.2 CERVICAL PAIN: ICD-10-CM

## 2019-05-03 PROCEDURE — 97110 THERAPEUTIC EXERCISES: CPT | Mod: PN

## 2019-05-03 NOTE — PROGRESS NOTES
Physical Therapy Daily Treatment Note     [facility]  Clinic Number: [id number]    Therapy Diagnosis:   Encounter Diagnoses   Name Primary?    Chronic tension-type headache, not intractable     Painful cervical range of motion     Cervical pain      Physician: Humberto Fine MD    Visit Date: 5/3/2019    Physician Orders: PT Eval and Treat   Medical Diagnosis from Referral: Tension headache  Evaluation Date: 4/17/2019  Authorization Period Expiration: 12/31/2019  Plan of Care Expiration: 7/14/2019  Visit # / Visits authorized: 4/ 50    PN Due: 5/17/2019     Time In: 959  Time Out: 1105  Total Billable Time: 25 minutes      Precautions: Standard & hx of seizures      Subjective     Pt reports: My neck has been feeling a little better. Still tight.   She was compliant with home exercise program.  Response to previous treatment: neck muscle felt loose and muscle soreness   Functional change: No changes    Pain: 0/10  Location: bilateral cervical      Objective     Monalisa received therapeutic exercises to develop strength, endurance, ROM, flexibility and posture for 25 minutes including:    UBE 6 min 3/3  Upper trap stretch 5x30 sec  Levators stretch 5x30 sec   Supine cervical rotation with o-foat 1x20 ea  Supine cervical flexion and extension 1x20 ea  Performed on 1/2 foam:   Supine pect stretch x2'    shoulder horizontal abd x20   Alt arms x20   Shoulder rolls x20   Serratus punches x20  Standing Pectoralis muscle stretch 5x30 sec       Monalisa received the following manual therapy techniques: Soft tissue Mobilization were applied to the: cervical  for 20 minutes, including:  STM and IASTM right cervical muscles.     Vacuum/cupping STM with manual therapy techniques was performed to right cervical muscles  to decrease muscle tightness, increase circulation and promote healing process. The pt's skin was monitored for redness adjusting pressure as needed. The pt was instructed in possible side  effects of bruising and/or soreness.     Pt cleared of contraindications and verbal and written consent acquired. Pt given option of copy of consent form. Pt educated on benefits and potential side effects of DN. DN for 15 minutes with pt in supine position with involved side right cervical. Cervicogenic Headache (posterior) & cervico thoracic  junction protocol with periosteal pecking and  winding.   Patient provided written and verbal consent to receive functional dry needling at today's visit (see consent form scanned into chart). FDN performed to right cervical, right upper traps, and sub occipitals . FDN performed to reduce pain and muscle tension, promote blood flow, and improve ROM and function x 15 minutes (no charge). Pt tolerated tx well without adverse effects. She was educated on what to expect following the procedure and she verbalized understanding.      Monalisa received hot pack for 10 minutes to cervical.    Home Exercises and Patient Education Provided     Education provided:   Cont HEP      Written Home Exercises Provided: Patient instructed to cont prior HEP.  Exercises were reviewed and Monalisa was able to demonstrate them prior to the end of the session.  Monalisa demonstrated good  understanding of the education provided.      See EMR under Patient Instructions for exercises provided 4/17/2019.      Assessment     Pt tolerated PT session well today. She reports good response from last visit with dry needling. We included dry needling today as well to improve soft tissue mobility of cervical musculature. Localized twitch response noted in B upper traps.  Plan to cont performing dry needling to improve results and cont stretches techniques.       Monalisa is progressing well towards her goals.   Pt prognosis is Good.     Pt will continue to benefit from skilled outpatient physical therapy to address the deficits listed in the problem list box on initial evaluation, provide pt/family education  and to maximize pt's level of independence in the home and community environment.     Pt's spiritual, cultural and educational needs considered and pt agreeable to plan of care and goals.    Anticipated barriers to physical therapy: none    GOALS: Short Term Goals: 4 weeks  1.Report decreased in pain at worse less than  <   / =  5  /10  to increase tolerance for functional activities. Appropriate, ongoing  2. Pt to improve range of motion by 25% to allow for improved functional mobility to allow for improvement in IADLs. Appropriate, ongoing  3. Pt to report less frequency of headache to improve quality of life. Appropriate, ongoing  4. Pt to tolerate HEP to improve ROM and independence with ADL's.Appropriate, ongoing     Long Term Goals: 8 weeks  1.Report decreased in pain at worse less than  <   / =  2 /10  to increase tolerance for functional activities.Appropriate, ongoing  2.Pt to improve range of motion by 75% to allow for improved functional mobility to allow for improvement in IADLs.Appropriate, ongoing   3.  Pt will report At least 20 percent but less than 40 percent on FOTO neck survey score for neck pain disability to demonstrate decrease in disability and improvement in neck pain.Appropriate, ongoing  4. Pt to be Independent with HEP to improve ROM and independence with ADL's.Appropriate, ongoing  5. Pt will report no headache for at least 1 or 2 weeks to improve quality of life.Appropriate, ongoing      Plan      Plan to cont perform dry needling next 3 visits to improve results and cont stretches techniques.       Plan of care Certification: 4/17/2019 to 7/14/2019.      Cont skilled PT session towards PT and patient's goals. Assess pt's response to dry needling at next visit.     Jason Valente, PT   05/03/2019

## 2019-05-07 ENCOUNTER — CLINICAL SUPPORT (OUTPATIENT)
Dept: REHABILITATION | Facility: HOSPITAL | Age: 54
End: 2019-05-07
Attending: STUDENT IN AN ORGANIZED HEALTH CARE EDUCATION/TRAINING PROGRAM
Payer: COMMERCIAL

## 2019-05-07 DIAGNOSIS — M54.2 PAINFUL CERVICAL RANGE OF MOTION: ICD-10-CM

## 2019-05-07 DIAGNOSIS — M54.2 CERVICAL PAIN: ICD-10-CM

## 2019-05-07 DIAGNOSIS — G44.229 CHRONIC TENSION-TYPE HEADACHE, NOT INTRACTABLE: ICD-10-CM

## 2019-05-07 PROCEDURE — 97140 MANUAL THERAPY 1/> REGIONS: CPT | Mod: PN

## 2019-05-07 PROCEDURE — 97110 THERAPEUTIC EXERCISES: CPT | Mod: PN

## 2019-05-07 NOTE — PROGRESS NOTES
Physical Therapy Daily Treatment Note     Name: Monalisa THOMPSON [facility]  Clinic Number: [id number]    Therapy Diagnosis:   Encounter Diagnoses   Name Primary?    Chronic tension-type headache, not intractable     Painful cervical range of motion     Cervical pain      Physician: Humberto Fine MD    Visit Date: 5/7/2019    Physician Orders: PT Eval and Treat   Medical Diagnosis from Referral: Tension headache  Evaluation Date: 4/17/2019  Authorization Period Expiration: 12/31/2019  Plan of Care Expiration: 7/14/2019  Visit # / Visits authorized: 5/ 50  PN Due: 5/17/2019     Time In: 9:10 am  Time Out: 10:00 am  Total Billable Time: 25 minutes      Precautions: Standard & hx of seizures      Subjective     Pt reports: that he neck was feeling sore after dry needling. Pt states she felt good after one day with less neck pain and stiffness, but pt states that she walked a lot yesterday causing increase in neck pain. Pt states she has neurologist appts tomorrow.   She was compliant with home exercise program.  Response to previous treatment: neck muscle felt loose and muscle soreness   Functional change: No changes    Pain: 3/10  Location: bilateral cervical      Objective     Monalisa received therapeutic exercises to develop strength, endurance, ROM, flexibility and posture for 40 minutes including:    UBE 6 min 3/3  Upper trap stretch 5x30 sec  Levators stretch 5x30 sec   Supine cervical rotation with o-foat 1x20 ea  Supine cervical flexion and extension 1x20 ea  Performed on 1/2 foam:   Supine pect stretch x2'    shoulder horizontal abd x20   Alt arms x20 YTB    Shoulder rolls x20   Serratus punches x20  Standing Pectoralis muscle stretch 5x30 sec   Shoulder T's, Y's, and W's at Wall 2x10       Monalisa received the following manual therapy techniques: Soft tissue Mobilization were applied to the: cervical  for 10 minutes, including:  STM and IASTM right cervical muscles.     Vacuum/cupping STM with manual  therapy techniques was performed to right cervical muscles  to decrease muscle tightness, increase circulation and promote healing process. The pt's skin was monitored for redness adjusting pressure as needed. The pt was instructed in possible side effects of bruising and/or soreness. NP     Pt cleared of contraindications and verbal and written consent acquired. Pt given option of copy of consent form. Pt educated on benefits and potential side effects of DN. DN for 15 minutes with pt in supine position with involved side right cervical. Cervicogenic Headache (posterior) & cervico thoracic  junction protocol with periosteal pecking and  winding.   Patient provided written and verbal consent to receive functional dry needling at today's visit (see consent form scanned into chart). FDN performed to right cervical, right upper traps, and sub occipitals . FDN performed to reduce pain and muscle tension, promote blood flow, and improve ROM and function x 15 minutes (no charge). Pt tolerated tx well without adverse effects. She was educated on what to expect following the procedure and she verbalized understanding. NP      Monalisa received hot pack for 10 minutes to cervical.    Home Exercises and Patient Education Provided     Education provided:   Cont HEP      Written Home Exercises Provided: Patient instructed to cont prior HEP.  Exercises were reviewed and Monalisa was able to demonstrate them prior to the end of the session.  Monalisa demonstrated good  understanding of the education provided.      See EMR under Patient Instructions for exercises provided 4/17/2019.      Assessment     Pt tolerated PT session well today Pt did not presented with provocation of left cervical pain. Pt demonstrated good tolerance to periscapular exercises. Pt presented with decrease left upper traps flexibility and increase suboccipital muscle  Tightness. Pt responded well STM of left cervical. Plan to perform dry needling as needed  next visit. Plan to cont performing dry needling to improve results and cont stretches techniques.       Monalisa is progressing well towards her goals.   Pt prognosis is Good.     Pt will continue to benefit from skilled outpatient physical therapy to address the deficits listed in the problem list box on initial evaluation, provide pt/family education and to maximize pt's level of independence in the home and community environment.     Pt's spiritual, cultural and educational needs considered and pt agreeable to plan of care and goals.    Anticipated barriers to physical therapy: none    GOALS: Short Term Goals: 4 weeks  1.Report decreased in pain at worse less than  <   / =  5  /10  to increase tolerance for functional activities. Appropriate, ongoing  2. Pt to improve range of motion by 25% to allow for improved functional mobility to allow for improvement in IADLs. Appropriate, ongoing  3. Pt to report less frequency of headache to improve quality of life. Appropriate, ongoing  4. Pt to tolerate HEP to improve ROM and independence with ADL's.Appropriate, ongoing     Long Term Goals: 8 weeks  1.Report decreased in pain at worse less than  <   / =  2 /10  to increase tolerance for functional activities.Appropriate, ongoing  2.Pt to improve range of motion by 75% to allow for improved functional mobility to allow for improvement in IADLs.Appropriate, ongoing   3.  Pt will report At least 20 percent but less than 40 percent on FOTO neck survey score for neck pain disability to demonstrate decrease in disability and improvement in neck pain.Appropriate, ongoing  4. Pt to be Independent with HEP to improve ROM and independence with ADL's.Appropriate, ongoing  5. Pt will report no headache for at least 1 or 2 weeks to improve quality of life.Appropriate, ongoing      Plan      Plan to cont perform dry needling next 3 visits to improve results and cont stretches techniques.       Plan of care Certification: 4/17/2019  to 7/14/2019.      Cont skilled PT session towards PT and patient's goals. Assess pt's response to dry needling at next visit.     Fabio Cohen, PT   05/07/2019

## 2019-05-08 ENCOUNTER — OFFICE VISIT (OUTPATIENT)
Dept: NEUROLOGY | Facility: CLINIC | Age: 54
End: 2019-05-08
Payer: COMMERCIAL

## 2019-05-08 VITALS
HEART RATE: 95 BPM | HEIGHT: 63 IN | BODY MASS INDEX: 39.21 KG/M2 | DIASTOLIC BLOOD PRESSURE: 90 MMHG | WEIGHT: 221.31 LBS | SYSTOLIC BLOOD PRESSURE: 139 MMHG

## 2019-05-08 DIAGNOSIS — M54.2 PAINFUL CERVICAL RANGE OF MOTION: ICD-10-CM

## 2019-05-08 DIAGNOSIS — G44.209 TENSION HEADACHE: ICD-10-CM

## 2019-05-08 DIAGNOSIS — G40.209 PARTIAL SYMPTOMATIC EPILEPSY WITH COMPLEX PARTIAL SEIZURES, NOT INTRACTABLE, WITHOUT STATUS EPILEPTICUS: Primary | ICD-10-CM

## 2019-05-08 DIAGNOSIS — G43.009 MIGRAINE WITHOUT AURA AND WITHOUT STATUS MIGRAINOSUS, NOT INTRACTABLE: ICD-10-CM

## 2019-05-08 DIAGNOSIS — G44.229 CHRONIC TENSION-TYPE HEADACHE, NOT INTRACTABLE: ICD-10-CM

## 2019-05-08 DIAGNOSIS — G51.4 FACIAL TWITCHING: ICD-10-CM

## 2019-05-08 PROCEDURE — 99214 OFFICE O/P EST MOD 30 MIN: CPT | Mod: S$GLB,,, | Performed by: STUDENT IN AN ORGANIZED HEALTH CARE EDUCATION/TRAINING PROGRAM

## 2019-05-08 PROCEDURE — 99999 PR PBB SHADOW E&M-EST. PATIENT-LVL IV: CPT | Mod: PBBFAC,,, | Performed by: STUDENT IN AN ORGANIZED HEALTH CARE EDUCATION/TRAINING PROGRAM

## 2019-05-08 PROCEDURE — 3008F BODY MASS INDEX DOCD: CPT | Mod: CPTII,S$GLB,, | Performed by: STUDENT IN AN ORGANIZED HEALTH CARE EDUCATION/TRAINING PROGRAM

## 2019-05-08 PROCEDURE — 99999 PR PBB SHADOW E&M-EST. PATIENT-LVL IV: ICD-10-PCS | Mod: PBBFAC,,, | Performed by: STUDENT IN AN ORGANIZED HEALTH CARE EDUCATION/TRAINING PROGRAM

## 2019-05-08 PROCEDURE — 3075F SYST BP GE 130 - 139MM HG: CPT | Mod: CPTII,S$GLB,, | Performed by: STUDENT IN AN ORGANIZED HEALTH CARE EDUCATION/TRAINING PROGRAM

## 2019-05-08 PROCEDURE — 3080F DIAST BP >= 90 MM HG: CPT | Mod: CPTII,S$GLB,, | Performed by: STUDENT IN AN ORGANIZED HEALTH CARE EDUCATION/TRAINING PROGRAM

## 2019-05-08 PROCEDURE — 99214 PR OFFICE/OUTPT VISIT, EST, LEVL IV, 30-39 MIN: ICD-10-PCS | Mod: S$GLB,,, | Performed by: STUDENT IN AN ORGANIZED HEALTH CARE EDUCATION/TRAINING PROGRAM

## 2019-05-08 PROCEDURE — 3080F PR MOST RECENT DIASTOLIC BLOOD PRESSURE >= 90 MM HG: ICD-10-PCS | Mod: CPTII,S$GLB,, | Performed by: STUDENT IN AN ORGANIZED HEALTH CARE EDUCATION/TRAINING PROGRAM

## 2019-05-08 PROCEDURE — 3008F PR BODY MASS INDEX (BMI) DOCUMENTED: ICD-10-PCS | Mod: CPTII,S$GLB,, | Performed by: STUDENT IN AN ORGANIZED HEALTH CARE EDUCATION/TRAINING PROGRAM

## 2019-05-08 PROCEDURE — 3075F PR MOST RECENT SYSTOLIC BLOOD PRESS GE 130-139MM HG: ICD-10-PCS | Mod: CPTII,S$GLB,, | Performed by: STUDENT IN AN ORGANIZED HEALTH CARE EDUCATION/TRAINING PROGRAM

## 2019-05-08 RX ORDER — NORTRIPTYLINE HYDROCHLORIDE 25 MG/1
25 CAPSULE ORAL NIGHTLY
Qty: 90 CAPSULE | Refills: 2 | Status: SHIPPED | OUTPATIENT
Start: 2019-05-08 | End: 2019-10-11

## 2019-05-08 NOTE — PROGRESS NOTES
Neurology Clinic  Hospital follow up visit    Patient Name: Monalisa Carson  MRN: [id number]    CC: Seizures, headache    HPI: Monalisa Carson is a 54 y.o. female w/ PMH significant for Anxiety, HTN, epilepsy presenting as follow-up for evaluation of seizures and headache.    5/8/19 Interval History  Headaches decreased in frequency to 3x/week, resolve in ~30 minutes with 1 g tylenol PRN.  Headache character unchanged in frequency except for decreased severity.  Is also attending PT for help with her contributing neck pain and has received significant relief with this.  Tolerating nortriptyline well.     Speech improved, notes stuttering when she is stressed, but not on other occasions.      Reports a few episodes of face twitching unaccompanied by other symptoms in the intervening period.  Tolerating keppra well.    3/27/19 Initial History  I last saw pt for seizures in the hospital in 2/2019 for evaluation of seizures.  At that point, she'd had two events concern for complex partial seizures (L facial twitching, speech arrest, ?LOC) with Sal's paralysis/residual L facial droop after 2 separate events that occurred more than 48 hours apart.  Her EEG was unremarkable and she was discharged on keppra 1500 BID.  Since discharge, she reports no seizures, but has had nausea/vomiting with the keppra and sometimes is not able to keep the dose of keppra down.  However, she notes she has had episodes of L facial twitching, up to a few times per day and duration of 1-2 minutes without any clear triggers.    With regards to her headache:  Went to ED on 3/11 for headache.  Headaches began while in hospital, but not too severe.  Significantly worsened mid-February.    L neck pain predominantly, that radiates up over her head.  Pressure-like pain.  Photo and phono sensitive.  Feels weak all over; no focal neurological deficits.  Given fioricet in ED, every day or QOD with some help.  10/10 pain at worst, fioricet brings it  down to a 5/6.  Nothing in particular makes headaches better/worse.  Possibly worsened by exertion.  Every day.  Before fioricet, was taking tylenol BID.    Never had any headaches like this before.  No FMH of similar headaches.        Review of Systems:  General: No fevers, chills  Eyes: No changes in vision  ENT: No changes in hearing  Respiratory: No SOB  CV: No chest pain, palpitations  GI: No diarrhea, blood in stool  Urinary: No dysuria, hematuria  Skin: No rashes  Neurological: No weakness, confusion  Psychiatric: No auditory nor visual hallucinations      Past Medical History  Past Medical History:   Diagnosis Date    Allergy     Anxiety     Diabetes mellitus     Heart murmur     Hypertension        Medications    Current Outpatient Medications:     acetaminophen (TYLENOL) 500 MG tablet, Take 2 tablets (1,000 mg total) by mouth every 6 (six) hours as needed for Pain., Disp: 30 tablet, Rfl: 0    amLODIPine (NORVASC) 10 MG tablet, Take 1 tablet (10 mg total) by mouth once daily., Disp: 90 tablet, Rfl: 0    aspirin 81 MG Chew, Take 1 tablet (81 mg total) by mouth once daily., Disp: , Rfl:     atorvastatin (LIPITOR) 40 MG tablet, Take 1 tablet (40 mg total) by mouth once daily., Disp: 90 tablet, Rfl: 3    benazepril (LOTENSIN) 20 MG tablet, TAKE 1 TABLET(20 MG) BY MOUTH EVERY DAY, Disp: 90 tablet, Rfl: 1    fluticasone (FLONASE) 50 mcg/actuation nasal spray, 1 spray (50 mcg total) by Each Nare route 2 (two) times daily., Disp: 1 Bottle, Rfl: 0    hydrOXYzine HCl (ATARAX) 25 MG tablet, Take 1 tablet (25 mg total) by mouth 3 (three) times daily as needed for Anxiety., Disp: 12 tablet, Rfl: 0    ibuprofen (ADVIL,MOTRIN) 600 MG tablet, Take 1 tablet (600 mg total) by mouth every 6 (six) hours as needed for Pain (Take with food as needed for mild-to-moderate pain)., Disp: 20 tablet, Rfl: 0    levETIRAcetam (KEPPRA) 1000 MG tablet, Take 1 tablet (1,000 mg total) by mouth 2 (two) times daily., Disp: 60  tablet, Rfl: 5    lidocaine (LMX) 4 % cream, Apply topically as needed., Disp: , Rfl: 0    loratadine (CLARITIN) 10 mg tablet, Take 1 tablet (10 mg total) by mouth once daily., Disp: 60 tablet, Rfl: 0    metFORMIN (GLUCOPHAGE) 500 MG tablet, TAKE 1 TABLET BY MOUTH TWICE DAILY WITH MEALS, Disp: 180 tablet, Rfl: 1    nortriptyline (PAMELOR) 25 MG capsule, Take 1 capsule (25 mg total) by mouth every evening., Disp: 90 capsule, Rfl: 2    ondansetron (ZOFRAN-ODT) 8 MG TbDL, Take 1 tablet (8 mg total) by mouth every 12 (twelve) hours as needed., Disp: 20 tablet, Rfl: 1    zolpidem (AMBIEN) 10 mg Tab, TAKE 1 TABLET BY MOUTH EVERY NIGHT AT BEDTIME, Disp: 30 tablet, Rfl: 2    albuterol 90 mcg/actuation inhaler, Inhale 1-2 puffs into the lungs every 4 (four) hours as needed for Wheezing. Rescue, Disp: 1 Inhaler, Rfl: 2  Any other notable medications as documented in HPI    Allergies  Review of patient's allergies indicates:   Allergen Reactions    Keflex [cephalexin] Hives    Vicodin [hydrocodone-acetaminophen] Itching     itch       Social History  Social History     Socioeconomic History    Marital status:      Spouse name: Not on file    Number of children: Not on file    Years of education: Not on file    Highest education level: Not on file   Occupational History    Occupation:      Employer: [employer]   Social Needs    Financial resource strain: Not on file    Food insecurity:     Worry: Not on file     Inability: Not on file    Transportation needs:     Medical: Not on file     Non-medical: Not on file   Tobacco Use    Smoking status: Former Smoker     Packs/day: 0.00     Years: 0.50     Pack years: 0.00    Smokeless tobacco: Never Used   Substance and Sexual Activity    Alcohol use: Yes     Alcohol/week: 0.0 oz     Comment: seldom    Drug use: No    Sexual activity: Yes     Partners: Male     Birth control/protection: None   Lifestyle    Physical activity:     Days per  "week: Not on file     Minutes per session: Not on file    Stress: Not on file   Relationships    Social connections:     Talks on phone: Not on file     Gets together: Not on file     Attends Mu-ism service: Not on file     Active member of club or organization: Not on file     Attends meetings of clubs or organizations: Not on file     Relationship status: Not on file   Other Topics Concern    Are you pregnant or think you may be? No    Breast-feeding No   Social History Narrative    Not on file     Any other notable Social History as documented in HPI.    Family History  Family History   Problem Relation Age of Onset    Diabetes Mother     Hyperlipidemia Mother     Hypertension Mother     Cataracts Mother     Diabetes Father     Hypertension Father     Stroke Father     Depression Sister     Hypertension Sister     Stroke Sister     Cancer Maternal Aunt         breast x 2    Breast cancer Maternal Aunt     Cancer Maternal Uncle         prostate x 2    Cancer Paternal Aunt         breast    Breast cancer Paternal Aunt     Cancer Maternal Grandfather         lung    Early death Paternal Grandmother     Early death Paternal Grandfather     No Known Problems Brother     No Known Problems Paternal Uncle     No Known Problems Maternal Grandmother     Melanoma Neg Hx     Eczema Neg Hx     Lupus Neg Hx     Psoriasis Neg Hx     Amblyopia Neg Hx     Blindness Neg Hx     Glaucoma Neg Hx     Macular degeneration Neg Hx     Retinal detachment Neg Hx     Strabismus Neg Hx     Thyroid disease Neg Hx      Any other notable FMH as documented in HPI.    Physical Exam  BP (!) 139/90   Pulse 95   Ht 5' 3" (1.6 m)   Wt 100.4 kg (221 lb 5.5 oz)   BMI 39.21 kg/m²     General: Well-developed, well-groomed. No apparent distress  HENT: Normocephalic, atraumatic.  No carotid bruits auscultated.    Cardiovascular: Regular rate and rhythm with no murmurs, rubs or gallops.    Chest: Lungs clear to " "auscultation bilaterally.  No wheezes, stridor, ronchi appreciated.  Abdomen: Normoactive bowel sounds present.  Soft, nontender to palpation.  Musculoskeletal: No peripheral edema    Neurologic Exam: The patient is awake, alert and oriented. Language is fluent.  Fund of knowledge is appropriate.     Cranial nerves:   Pupils are round and reactive to light and accommodation.   Visual fields are full to confrontation.    Ocular motility is full in all cardinal positions of gaze.   Facial sensation is normal to light touch.   Facial activation w/ mild L lower facial droop  Hearing is symmetric bilaterally.   Palate elevates symmetrically.   Shoulder elevation is symmetric and full strength bilaterally.   Tongue is midline and neck rotation strength is normal bilaterally.      Motor examination of all extremities demonstrates normal bulk and tone in all four limbs. There are no atrophy or fasciculations. Strength is 5/5 in the upper and lower extremities bilaterally.    Sensory examination is normal light touch in BUE and BLE.  Romberg is negative.    Deep tendon reflexes are 1+ and symmetric in the upper and lower extremities bilaterally with the exception of 2+ patellar reflexes.  No clonus b/l.    Coordination: Finger to nose intact b/l    Lab and Test Results    No recent pertinent labs    Images:  4/10/19 EEG: Per report,  "IMPRESSION:  Normal EEG.     CLINICAL CORRELATION:  The patient is a 54-year-old female who had two events,   which consisted of left facial twitching and speech arrest with a Sal paralysis   with left facial droop post-event.  This occurred on two occasions.  She was   started on levetiracetam 1500 mg twice a day.  This recording was obtained to   evaluate for possible seizures, but this tracing is normal throughout the   recording."    3/11/19 CTH: Per report,  "1. No acute intracranial abnormalities, noting sequela of suspected chronic microvascular ischemic change."    2/16/19 Routine EEG: " "Per report,  "Abnormal activity:   No epileptiform discharges, periodic discharges, lateralized   rhythmic delta activity or electrographic seizures were seen.    IMPRESSION:   This is a normal routine EEG done in sleep, with only very brief   period of wakefulness."    2/16/19 MRI Brain w/ and w/o: Per report,  "1. No acute intracranial abnormality identified on today's examination.  Specifically, no evidence of acute infarction or enhancing lesion.  2. Multiple foci of T2/FLAIR signal hyperintensity in the supratentorial and periventricular white matter demonstrate no corresponding restricted diffusion or postcontrast enhancement.  These lesions are nonspecific in appearance and may be seen with accelerated small vessel ischemic disease, vasculopathies, migraine headaches, and as the residua from inflammatory and traumatic insults to the brain."    2/15/19 CTA Head and Neck: Per report,  "Small caliber right anterior cerebral artery which may be narrow on a congenital basis.  Otherwise CTA head and neck without significant focal stenosis or occlusion."    Assessment and Plan    Problem List Items Addressed This Visit        Neuro    Facial twitching    Relevant Orders    Levetiracetam level (Completed)    Tension headache    Relevant Medications    nortriptyline (PAMELOR) 25 MG capsule    Nonintractable epilepsy without status epilepticus - Primary    Current Assessment & Plan     Assessment  Monalisa Carson is a 54 y.o. female w/ PMH significant for Anxiety, HTN, epilepsy presenting as follow-up for evaluation of seizures and headache.    Overall, I am suspicious that the events she reported in February may represent seizure activity.  She reports no generalized seizure activity/LOC, but continues to have L facial twitching.    Has done well on keppra with only a few episodes of facial twitching, unaccompanied by loss of consciousness.  Unclear if these repeat episodes are seizures or not.  Would like to " evaluate in the EMU further, but at this point these episodes are too infrequent to have a high likelihood of being captured during a single EMU stay.  Discussed EMU stay if these events become more frequent.    -Discussed with pt regarding driving laws in [place] after seizures.  Pt must refrain from driving for 6 months after having a seizure before can legally resume driving.  Physician team not required to report pt to DMV.  Informed pt that I would document this conversation in the medical record.  Additionally, advised pt to avoid bathing alone, working in high places, and to not supervise children swimming alone or engage in other activities where losing consciousness or motor control would risk harm to self or others.    Recommendations & Plan:  -Keppra to 1 g BID, zofran PRN to improve tolerance of keppra.  Extended discussion regarding risks of seizures worsening/need to call clinic if so.  Gave ED warnings.    -check keppra level  -RTC 3 months           Relevant Orders    Levetiracetam level (Completed)    Tension headache, chronic    Current Assessment & Plan     Continue PT, increase nortriptyline          Migraine without aura and without status migrainosus, not intractable    Current Assessment & Plan     Migraine headaches improved on regimen of nortriptyline, PT, tylenol PRN.    -Increase nortriptyline to 25 mg QHS  -Discussed headache lifestyle changes, reviewed side effects            Orthopedic    Painful cervical range of motion    Current Assessment & Plan     Continue PT                 Humberto Fine MD  Neurology Resident   [facility] [address] [place] [address]

## 2019-05-08 NOTE — PATIENT INSTRUCTIONS
-INCREASE nortriptyline (pamelor) to 25 mg nightly (sent a new prescription) for headache prevention  -Tylenol 1 g PRN for severe headaches (max of 3 g in any 24 hour period)  -Continue keppra   -Keep track of facial twitching  -Blood work today  -No driving for 6 months after last seizure  -Call if seizures return  -Return to clinic in 3 months    Humberto Fine MD  [facility]   Phone Number: [phone]  Fax Number: [phone]

## 2019-05-12 PROBLEM — G43.009 MIGRAINE WITHOUT AURA AND WITHOUT STATUS MIGRAINOSUS, NOT INTRACTABLE: Status: ACTIVE | Noted: 2019-05-12

## 2019-05-13 NOTE — ASSESSMENT & PLAN NOTE
Assessment  Monalisa Carson is a 54 y.o. female w/ PMH significant for Anxiety, HTN, epilepsy presenting as follow-up for evaluation of seizures and headache.    Overall, I am suspicious that the events she reported in February may represent seizure activity.  She reports no generalized seizure activity/LOC, but continues to have L facial twitching.    Has done well on keppra with only a few episodes of facial twitching, unaccompanied by loss of consciousness.  Unclear if these repeat episodes are seizures or not.  Would like to evaluate in the EMU further, but at this point these episodes are too infrequent to have a high likelihood of being captured during a single EMU stay.  Discussed EMU stay if these events become more frequent.    -Discussed with pt regarding driving laws in [place] after seizures.  Pt must refrain from driving for 6 months after having a seizure before can legally resume driving.  Physician team not required to report pt to DMV.  Informed pt that I would document this conversation in the medical record.  Additionally, advised pt to avoid bathing alone, working in high places, and to not supervise children swimming alone or engage in other activities where losing consciousness or motor control would risk harm to self or others.    Recommendations & Plan:  -Keppra to 1 g BID, zofran PRN to improve tolerance of keppra.  Extended discussion regarding risks of seizures worsening/need to call clinic if so.  Gave ED warnings.    -check keppra level  -RTC 3 months

## 2019-05-13 NOTE — ASSESSMENT & PLAN NOTE
Migraine headaches improved on regimen of nortriptyline, PT, tylenol PRN.    -Increase nortriptyline to 25 mg QHS  -Discussed headache lifestyle changes, reviewed side effects

## 2019-05-14 ENCOUNTER — CLINICAL SUPPORT (OUTPATIENT)
Dept: REHABILITATION | Facility: HOSPITAL | Age: 54
End: 2019-05-14
Attending: STUDENT IN AN ORGANIZED HEALTH CARE EDUCATION/TRAINING PROGRAM
Payer: COMMERCIAL

## 2019-05-14 DIAGNOSIS — M54.2 CERVICAL PAIN: ICD-10-CM

## 2019-05-14 DIAGNOSIS — M54.2 PAINFUL CERVICAL RANGE OF MOTION: ICD-10-CM

## 2019-05-14 DIAGNOSIS — G44.229 CHRONIC TENSION-TYPE HEADACHE, NOT INTRACTABLE: ICD-10-CM

## 2019-05-14 PROCEDURE — 97140 MANUAL THERAPY 1/> REGIONS: CPT | Mod: PN

## 2019-05-14 PROCEDURE — 97110 THERAPEUTIC EXERCISES: CPT | Mod: PN

## 2019-05-14 NOTE — PROGRESS NOTES
Physical Therapy Daily Treatment Note     Name: Monalisa THOMPSON [facility]  Clinic Number: [id number]    Therapy Diagnosis:   Encounter Diagnoses   Name Primary?    Chronic tension-type headache, not intractable     Painful cervical range of motion     Cervical pain      Physician: Humberto Fine MD    Visit Date: 5/14/2019    Physician Orders: PT Eval and Treat   Medical Diagnosis from Referral: Tension headache  Evaluation Date: 4/17/2019  Authorization Period Expiration: 12/31/2019  Plan of Care Expiration: 7/14/2019  Visit # / Visits authorized: 6/ 50  PN Due: 5/17/2019     Time In: 9:00 am  Time Out: 10:00 am  Total Billable Time: 30 minutes      Precautions: Standard & hx of seizures      Subjective     Pt reports: that therapy is helping her. Pt reports she cont with cervical pain greater pain in th left side than right side. Pt reports she went to physician.   She was compliant with home exercise program.  Response to previous treatment: neck muscle felt loose and muscle soreness   Functional change: No changes    Pain: 3/10  Location: bilateral cervical      Objective     Monalisa received therapeutic exercises to develop strength, endurance, ROM, flexibility and posture for 40 minutes including:    UBE 6 min 3/3  Upper trap stretch 5x30 sec  Levators stretch 5x30 sec   Supine cervical rotation with o-foat 1x20 ea  Supine cervical flexion and extension 1x20 ea  Performed on 1/2 foam:   Supine pect stretch x2'    shoulder horizontal abd x20   Alt arms x20 YTB    Shoulder rolls x20   Serratus punches x20  Standing Pectoralis muscle stretch 5x30 sec   Shoulder T's, Y's, and W's at Wall 2x10       Monalisa received the following manual therapy techniques: Soft tissue Mobilization were applied to the: cervical  for 10 minutes, including:  STM and IASTM right cervical muscles.  NP    Vacuum/cupping STM with manual therapy techniques was performed to right cervical muscles  to decrease muscle tightness, increase  circulation and promote healing process. The pt's skin was monitored for redness adjusting pressure as needed. The pt was instructed in possible side effects of bruising and/or soreness.     Pt cleared of contraindications and verbal and written consent acquired. Pt given option of copy of consent form. Pt educated on benefits and potential side effects of DN. DN for 5 minutes with pt in supine position with involved side right cervical. Cervicogenic Headache (posterior) & cervico thoracic  junction protocol with periosteal pecking and  winding.   Patient provided written and verbal consent to receive functional dry needling at today's visit (see consent form scanned into chart). FDN performed to right cervical, right upper traps, and sub occipitals . FDN performed to reduce pain and muscle tension, promote blood flow, and improve ROM and function x 15 minutes (no charge). Pt tolerated tx well without adverse effects. She was educated on what to expect following the procedure and she verbalized understanding.       Monalisa received hot pack for 10 minutes to cervical.    Home Exercises and Patient Education Provided     Education provided:   Cont HEP      Written Home Exercises Provided: Patient instructed to cont prior HEP.  Exercises were reviewed and Monalisa was able to demonstrate them prior to the end of the session.  Monalisa demonstrated good  understanding of the education provided.      See EMR under Patient Instructions for exercises provided 4/17/2019.      Assessment     Pt tolerated PT session well today Pt cont demonstrated upper traps tightness and restriction. Pt demonstrated provocation of left cervical pain but pain subsided in the end of PT session. Pt tolerated well cupping and dry needling to decrease cervical restriction and pain. Pt did not presented with any adverse effects. Pt cont with good tolerance to stretches techniques. Pt denies schedule more appts. She wants to place therapy on  hold after next visit.  Plan to cont performing dry needling to improve results and cont stretches techniques.       Monalisa is progressing well towards her goals.   Pt prognosis is Good.     Pt will continue to benefit from skilled outpatient physical therapy to address the deficits listed in the problem list box on initial evaluation, provide pt/family education and to maximize pt's level of independence in the home and community environment.     Pt's spiritual, cultural and educational needs considered and pt agreeable to plan of care and goals.    Anticipated barriers to physical therapy: none    GOALS: Short Term Goals: 4 weeks  1.Report decreased in pain at worse less than  <   / =  5  /10  to increase tolerance for functional activities. Appropriate, ongoing  2. Pt to improve range of motion by 25% to allow for improved functional mobility to allow for improvement in IADLs. Appropriate, ongoing  3. Pt to report less frequency of headache to improve quality of life. Appropriate, ongoing  4. Pt to tolerate HEP to improve ROM and independence with ADL's.Appropriate, ongoing     Long Term Goals: 8 weeks  1.Report decreased in pain at worse less than  <   / =  2 /10  to increase tolerance for functional activities.Appropriate, ongoing  2.Pt to improve range of motion by 75% to allow for improved functional mobility to allow for improvement in IADLs.Appropriate, ongoing   3.  Pt will report At least 20 percent but less than 40 percent on FOTO neck survey score for neck pain disability to demonstrate decrease in disability and improvement in neck pain.Appropriate, ongoing  4. Pt to be Independent with HEP to improve ROM and independence with ADL's.Appropriate, ongoing  5. Pt will report no headache for at least 1 or 2 weeks to improve quality of life.Appropriate, ongoing      Plan      Plan to cont perform dry needling next 3 visits to improve results and cont stretches techniques.       Plan of care  Certification: 4/17/2019 to 7/14/2019.      Cont skilled PT session towards PT and patient's goals. Assess pt's response to dry needling at next visit.     Fabio Cohen, PT   05/14/2019

## 2019-07-26 NOTE — PATIENT INSTRUCTIONS
Copyright © 2311-1668 [employer]         unable to visualize did not follow directions for task unable to visualize unable to visualize 2/2 reduced oral aperture unable to visualize

## 2019-07-30 ENCOUNTER — TELEPHONE (OUTPATIENT)
Dept: NEUROLOGY | Facility: CLINIC | Age: 54
End: 2019-07-30

## 2019-07-30 DIAGNOSIS — G51.4 FACIAL TWITCHING: ICD-10-CM

## 2019-07-30 DIAGNOSIS — G40.209 PARTIAL SYMPTOMATIC EPILEPSY WITH COMPLEX PARTIAL SEIZURES, NOT INTRACTABLE, WITHOUT STATUS EPILEPTICUS: Primary | ICD-10-CM

## 2019-07-30 NOTE — TELEPHONE ENCOUNTER
----- Message from Talita Gonzalez sent at 7/30/2019  4:04 PM CDT -----  Contact: Monalisa (daughter) @ [phone]  Pt was advised to call if she started having issues.  She is now having twitch in her face again and slurred speech.  Pls call.

## 2019-08-02 NOTE — TELEPHONE ENCOUNTER
08/02/2019  4:25 PM    Reports increased frequency of facial twitching.  Occurring up to 3x/hr.  No other associated symptoms.  No loss of consciousness.  She reports this peaked in frequency a few days ago, but has since been improving.  Denies any missed doses, changes in other meds, nor recent infections.    Will plan to recheck EEG, keppra level at f/u.  Gave ED warnings.    Humberto Fine MD

## 2019-08-05 ENCOUNTER — TELEPHONE (OUTPATIENT)
Dept: NEUROLOGY | Facility: CLINIC | Age: 54
End: 2019-08-05

## 2019-08-05 NOTE — TELEPHONE ENCOUNTER
----- Message from Humberto Fine MD sent at 8/2/2019  4:38 PM CDT -----  Zack Ray,    Please call/schedule for EEG on 8/29 (she should be coming in that day for a f/u appointment).  Told her she can get her keppra level drawn that day as well.    Thanks,  Humberto

## 2019-08-05 NOTE — TELEPHONE ENCOUNTER
Left a message for patient to let her know an EEG appointment is scheduled for her on 8/29 at 9:00 after her appointment with Dr. iFne.

## 2019-08-15 DIAGNOSIS — E11.65 TYPE 2 DIABETES MELLITUS WITH HYPERGLYCEMIA, WITHOUT LONG-TERM CURRENT USE OF INSULIN: Primary | ICD-10-CM

## 2019-08-15 DIAGNOSIS — I10 UNCONTROLLED HYPERTENSION: ICD-10-CM

## 2019-08-16 RX ORDER — AMLODIPINE BESYLATE 10 MG/1
TABLET ORAL
Qty: 90 TABLET | Refills: 0 | Status: SHIPPED | OUTPATIENT
Start: 2019-08-16 | End: 2019-11-08 | Stop reason: SDUPTHER

## 2019-08-19 ENCOUNTER — TELEPHONE (OUTPATIENT)
Dept: NEUROLOGY | Facility: CLINIC | Age: 54
End: 2019-08-19

## 2019-08-21 ENCOUNTER — LAB VISIT (OUTPATIENT)
Dept: LAB | Facility: HOSPITAL | Age: 54
End: 2019-08-21
Attending: FAMILY MEDICINE
Payer: COMMERCIAL

## 2019-08-21 DIAGNOSIS — E11.65 TYPE 2 DIABETES MELLITUS WITH HYPERGLYCEMIA, WITHOUT LONG-TERM CURRENT USE OF INSULIN: ICD-10-CM

## 2019-08-21 LAB
ESTIMATED AVG GLUCOSE: 186 MG/DL (ref 68–131)
HBA1C MFR BLD HPLC: 8.1 % (ref 4–5.6)

## 2019-08-21 PROCEDURE — 83036 HEMOGLOBIN GLYCOSYLATED A1C: CPT

## 2019-08-21 PROCEDURE — 36415 COLL VENOUS BLD VENIPUNCTURE: CPT | Mod: PO

## 2019-08-23 DIAGNOSIS — E11.9 TYPE 2 DIABETES MELLITUS WITHOUT COMPLICATION, UNSPECIFIED WHETHER LONG TERM INSULIN USE: ICD-10-CM

## 2019-08-26 ENCOUNTER — OFFICE VISIT (OUTPATIENT)
Dept: FAMILY MEDICINE | Facility: CLINIC | Age: 54
End: 2019-08-26
Payer: COMMERCIAL

## 2019-08-26 VITALS
BODY MASS INDEX: 40 KG/M2 | DIASTOLIC BLOOD PRESSURE: 96 MMHG | RESPIRATION RATE: 17 BRPM | HEART RATE: 92 BPM | HEIGHT: 63 IN | SYSTOLIC BLOOD PRESSURE: 166 MMHG | OXYGEN SATURATION: 98 % | TEMPERATURE: 99 F | WEIGHT: 225.75 LBS

## 2019-08-26 DIAGNOSIS — F41.8 DEPRESSION WITH ANXIETY: ICD-10-CM

## 2019-08-26 DIAGNOSIS — I10 UNCONTROLLED HYPERTENSION: ICD-10-CM

## 2019-08-26 LAB — HM FOOT EXAM: NORMAL

## 2019-08-26 PROCEDURE — 99999 PR PBB SHADOW E&M-EST. PATIENT-LVL III: ICD-10-PCS | Mod: PBBFAC,,, | Performed by: FAMILY MEDICINE

## 2019-08-26 PROCEDURE — 3080F PR MOST RECENT DIASTOLIC BLOOD PRESSURE >= 90 MM HG: ICD-10-PCS | Mod: CPTII,S$GLB,, | Performed by: FAMILY MEDICINE

## 2019-08-26 PROCEDURE — 99214 PR OFFICE/OUTPT VISIT, EST, LEVL IV, 30-39 MIN: ICD-10-PCS | Mod: S$GLB,,, | Performed by: FAMILY MEDICINE

## 2019-08-26 PROCEDURE — 3008F BODY MASS INDEX DOCD: CPT | Mod: CPTII,S$GLB,, | Performed by: FAMILY MEDICINE

## 2019-08-26 PROCEDURE — 3045F PR MOST RECENT HEMOGLOBIN A1C LEVEL 7.0-9.0%: CPT | Mod: CPTII,S$GLB,, | Performed by: FAMILY MEDICINE

## 2019-08-26 PROCEDURE — 99999 PR PBB SHADOW E&M-EST. PATIENT-LVL III: CPT | Mod: PBBFAC,,, | Performed by: FAMILY MEDICINE

## 2019-08-26 PROCEDURE — 99214 OFFICE O/P EST MOD 30 MIN: CPT | Mod: S$GLB,,, | Performed by: FAMILY MEDICINE

## 2019-08-26 PROCEDURE — 3045F PR MOST RECENT HEMOGLOBIN A1C LEVEL 7.0-9.0%: ICD-10-PCS | Mod: CPTII,S$GLB,, | Performed by: FAMILY MEDICINE

## 2019-08-26 PROCEDURE — 3077F SYST BP >= 140 MM HG: CPT | Mod: CPTII,S$GLB,, | Performed by: FAMILY MEDICINE

## 2019-08-26 PROCEDURE — 3008F PR BODY MASS INDEX (BMI) DOCUMENTED: ICD-10-PCS | Mod: CPTII,S$GLB,, | Performed by: FAMILY MEDICINE

## 2019-08-26 PROCEDURE — 3077F PR MOST RECENT SYSTOLIC BLOOD PRESSURE >= 140 MM HG: ICD-10-PCS | Mod: CPTII,S$GLB,, | Performed by: FAMILY MEDICINE

## 2019-08-26 PROCEDURE — 3080F DIAST BP >= 90 MM HG: CPT | Mod: CPTII,S$GLB,, | Performed by: FAMILY MEDICINE

## 2019-08-26 RX ORDER — ESCITALOPRAM OXALATE 10 MG/1
10 TABLET ORAL DAILY
Qty: 90 TABLET | Refills: 3 | Status: SHIPPED | OUTPATIENT
Start: 2019-08-26 | End: 2019-09-23

## 2019-08-26 RX ORDER — HYDROCHLOROTHIAZIDE 25 MG/1
25 TABLET ORAL DAILY
Qty: 90 TABLET | Refills: 0 | Status: SHIPPED | OUTPATIENT
Start: 2019-08-26 | End: 2019-11-08 | Stop reason: SDUPTHER

## 2019-08-26 RX ORDER — METFORMIN HYDROCHLORIDE 500 MG/1
1000 TABLET, EXTENDED RELEASE ORAL 2 TIMES DAILY WITH MEALS
Qty: 360 TABLET | Refills: 0 | Status: SHIPPED | OUTPATIENT
Start: 2019-08-26 | End: 2019-11-08 | Stop reason: SDUPTHER

## 2019-08-26 RX ORDER — HYDROCORTISONE 25 MG/G
CREAM TOPICAL
Refills: 3 | COMMUNITY
Start: 2019-06-11 | End: 2019-12-06 | Stop reason: SDUPTHER

## 2019-08-26 NOTE — PROGRESS NOTES
Subjective:       Patient ID: Monalisa Carson     Chief Complaint: Diabetes and Diabetic Foot Exam      HPIMonalisa Carson is a 54 y.o. female here for follow up uncontrolled HTN and diabetes.  Hba1c 8.1%.  Reports weight gain 25 lbs.  Reports increased stress.  BP remains elevated.  No cardiac complaints.    Review of patient's allergies indicates:   Allergen Reactions    Keflex [cephalexin] Hives    Vicodin [hydrocodone-acetaminophen] Itching     itch       Current Outpatient Medications:     amLODIPine (NORVASC) 10 MG tablet, TAKE 1 TABLET(10 MG) BY MOUTH EVERY DAY, Disp: 90 tablet, Rfl: 0    aspirin 81 MG Chew, Take 1 tablet (81 mg total) by mouth once daily., Disp: , Rfl:     atorvastatin (LIPITOR) 40 MG tablet, Take 1 tablet (40 mg total) by mouth once daily., Disp: 90 tablet, Rfl: 3    benazepril (LOTENSIN) 20 MG tablet, TAKE 1 TABLET(20 MG) BY MOUTH EVERY DAY, Disp: 90 tablet, Rfl: 1    hydrocortisone 2.5 % cream, WOLFGANG EXT AA BID, Disp: , Rfl: 3    levETIRAcetam (KEPPRA) 1000 MG tablet, Take 1 tablet (1,000 mg total) by mouth 2 (two) times daily., Disp: 60 tablet, Rfl: 5    lidocaine (LMX) 4 % cream, Apply topically as needed., Disp: , Rfl: 0    nortriptyline (PAMELOR) 25 MG capsule, Take 1 capsule (25 mg total) by mouth every evening., Disp: 90 capsule, Rfl: 2    zolpidem (AMBIEN) 10 mg Tab, TAKE 1 TABLET BY MOUTH EVERY NIGHT AT BEDTIME, Disp: 30 tablet, Rfl: 2    acetaminophen (TYLENOL) 500 MG tablet, Take 2 tablets (1,000 mg total) by mouth every 6 (six) hours as needed for Pain., Disp: 30 tablet, Rfl: 0    albuterol 90 mcg/actuation inhaler, Inhale 1-2 puffs into the lungs every 4 (four) hours as needed for Wheezing. Rescue, Disp: 1 Inhaler, Rfl: 2    escitalopram oxalate (LEXAPRO) 10 MG tablet, Take 1 tablet (10 mg total) by mouth once daily., Disp: 90 tablet, Rfl: 3    fluticasone (FLONASE) 50 mcg/actuation nasal spray, 1 spray (50 mcg total) by Each Nare route 2 (two) times  daily., Disp: 1 Bottle, Rfl: 0    hydroCHLOROthiazide (HYDRODIURIL) 25 MG tablet, Take 1 tablet (25 mg total) by mouth once daily., Disp: 90 tablet, Rfl: 0    hydrOXYzine HCl (ATARAX) 25 MG tablet, Take 1 tablet (25 mg total) by mouth 3 (three) times daily as needed for Anxiety., Disp: 12 tablet, Rfl: 0    ibuprofen (ADVIL,MOTRIN) 600 MG tablet, Take 1 tablet (600 mg total) by mouth every 6 (six) hours as needed for Pain (Take with food as needed for mild-to-moderate pain)., Disp: 20 tablet, Rfl: 0    loratadine (CLARITIN) 10 mg tablet, Take 1 tablet (10 mg total) by mouth once daily., Disp: 60 tablet, Rfl: 0    metFORMIN (GLUCOPHAGE-XR) 500 MG 24 hr tablet, Take 2 tablets (1,000 mg total) by mouth 2 (two) times daily with meals., Disp: 360 tablet, Rfl: 0    ondansetron (ZOFRAN-ODT) 8 MG TbDL, Take 1 tablet (8 mg total) by mouth every 12 (twelve) hours as needed., Disp: 20 tablet, Rfl: 1    Past Medical History:   Diagnosis Date    Allergy     Anxiety     Diabetes mellitus     Heart murmur     Hypertension      Review of Systems   Eyes: Negative for visual disturbance.   Endocrine: Negative for polydipsia, polyphagia and polyuria.   Musculoskeletal: Positive for arthralgias (fingers right hand).   Neurological: Negative for numbness.        Facial twitching   Psychiatric/Behavioral: Positive for dysphoric mood.       Objective:      Physical Exam   Cardiovascular:   Pulses:       Dorsalis pedis pulses are 2+ on the right side, and 2+ on the left side.   Feet:   Right Foot:   Protective Sensation: 4 sites tested. 4 sites sensed.   Skin Integrity: Positive for callus. Negative for ulcer.   Left Foot:   Protective Sensation: 4 sites tested. 3 sites sensed.   Skin Integrity: Positive for callus. Negative for ulcer.       Results for orders placed or performed in visit on 08/21/19   Hemoglobin A1c   Result Value Ref Range    Hemoglobin A1C 8.1 (H) 4.0 - 5.6 %    Estimated Avg Glucose 186 (H) 68 - 131 mg/dL      Assessment:       1. Uncontrolled type 2 diabetes mellitus with stage 2 chronic kidney disease, without long-term current use of insulin    2. Depression with anxiety    3. Uncontrolled hypertension        Plan:       Monalisa was seen today for diabetes and diabetic foot exam.    Diagnoses and all orders for this visit:    Uncontrolled type 2 diabetes mellitus with stage 2 chronic kidney disease, without long-term current use of insulin  -     Increase metFORMIN (GLUCOPHAGE-XR) 500 MG 24 hr tablet; Take 2 tablets (1,000 mg total) by mouth 2 (two) times daily with meals.    Depression with anxiety  -     escitalopram oxalate (LEXAPRO) 10 MG tablet; Take 1 tablet (10 mg total) by mouth once daily.    Uncontrolled hypertension  -     Add hydroCHLOROthiazide (HYDRODIURIL) 25 MG tablet; Take 1 tablet (25 mg total) by mouth once daily.

## 2019-08-29 ENCOUNTER — HOSPITAL ENCOUNTER (OUTPATIENT)
Dept: NEUROLOGY | Facility: CLINIC | Age: 54
Discharge: HOME | End: 2019-08-29
Payer: COMMERCIAL

## 2019-08-29 ENCOUNTER — OFFICE VISIT (OUTPATIENT)
Dept: NEUROLOGY | Facility: CLINIC | Age: 54
End: 2019-08-29
Payer: COMMERCIAL

## 2019-08-29 VITALS
HEIGHT: 63 IN | WEIGHT: 222.69 LBS | HEART RATE: 103 BPM | BODY MASS INDEX: 39.46 KG/M2 | DIASTOLIC BLOOD PRESSURE: 87 MMHG | SYSTOLIC BLOOD PRESSURE: 132 MMHG

## 2019-08-29 DIAGNOSIS — G43.009 MIGRAINE WITHOUT AURA AND WITHOUT STATUS MIGRAINOSUS, NOT INTRACTABLE: ICD-10-CM

## 2019-08-29 DIAGNOSIS — G40.209 PARTIAL SYMPTOMATIC EPILEPSY WITH COMPLEX PARTIAL SEIZURES, NOT INTRACTABLE, WITHOUT STATUS EPILEPTICUS: Primary | ICD-10-CM

## 2019-08-29 DIAGNOSIS — G51.4 FACIAL TWITCHING: ICD-10-CM

## 2019-08-29 DIAGNOSIS — G40.209 PARTIAL SYMPTOMATIC EPILEPSY WITH COMPLEX PARTIAL SEIZURES, NOT INTRACTABLE, WITHOUT STATUS EPILEPTICUS: ICD-10-CM

## 2019-08-29 PROCEDURE — 3008F PR BODY MASS INDEX (BMI) DOCUMENTED: ICD-10-PCS | Mod: CPTII,S$GLB,, | Performed by: STUDENT IN AN ORGANIZED HEALTH CARE EDUCATION/TRAINING PROGRAM

## 2019-08-29 PROCEDURE — 3079F DIAST BP 80-89 MM HG: CPT | Mod: CPTII,S$GLB,, | Performed by: STUDENT IN AN ORGANIZED HEALTH CARE EDUCATION/TRAINING PROGRAM

## 2019-08-29 PROCEDURE — 99999 PR PBB SHADOW E&M-EST. PATIENT-LVL IV: CPT | Mod: PBBFAC,,, | Performed by: STUDENT IN AN ORGANIZED HEALTH CARE EDUCATION/TRAINING PROGRAM

## 2019-08-29 PROCEDURE — 99999 PR PBB SHADOW E&M-EST. PATIENT-LVL IV: ICD-10-PCS | Mod: PBBFAC,,, | Performed by: STUDENT IN AN ORGANIZED HEALTH CARE EDUCATION/TRAINING PROGRAM

## 2019-08-29 PROCEDURE — 99214 OFFICE O/P EST MOD 30 MIN: CPT | Mod: S$GLB,,, | Performed by: STUDENT IN AN ORGANIZED HEALTH CARE EDUCATION/TRAINING PROGRAM

## 2019-08-29 PROCEDURE — 99214 PR OFFICE/OUTPT VISIT, EST, LEVL IV, 30-39 MIN: ICD-10-PCS | Mod: S$GLB,,, | Performed by: STUDENT IN AN ORGANIZED HEALTH CARE EDUCATION/TRAINING PROGRAM

## 2019-08-29 PROCEDURE — 3075F SYST BP GE 130 - 139MM HG: CPT | Mod: CPTII,S$GLB,, | Performed by: STUDENT IN AN ORGANIZED HEALTH CARE EDUCATION/TRAINING PROGRAM

## 2019-08-29 PROCEDURE — 3075F PR MOST RECENT SYSTOLIC BLOOD PRESS GE 130-139MM HG: ICD-10-PCS | Mod: CPTII,S$GLB,, | Performed by: STUDENT IN AN ORGANIZED HEALTH CARE EDUCATION/TRAINING PROGRAM

## 2019-08-29 PROCEDURE — 3079F PR MOST RECENT DIASTOLIC BLOOD PRESSURE 80-89 MM HG: ICD-10-PCS | Mod: CPTII,S$GLB,, | Performed by: STUDENT IN AN ORGANIZED HEALTH CARE EDUCATION/TRAINING PROGRAM

## 2019-08-29 PROCEDURE — 3008F BODY MASS INDEX DOCD: CPT | Mod: CPTII,S$GLB,, | Performed by: STUDENT IN AN ORGANIZED HEALTH CARE EDUCATION/TRAINING PROGRAM

## 2019-08-29 NOTE — PATIENT INSTRUCTIONS
-Continue kera   -Ambulatory EEG  -Return to clinic in 6 weeks    Humberto Fine MD  [facility]   Phone Number: [phone]  Fax Number: [phone]

## 2019-08-29 NOTE — ASSESSMENT & PLAN NOTE
Assessment  Monalisa Carson is a 54 y.o. female w/ PMH significant for Anxiety, HTN, epilepsy presenting as follow-up for evaluation of seizures.    Overall, I am suspicious that the events she reported in February may represent seizure activity.  She reports no generalized seizure activity/LOC, but continues to have L facial twitching while on keppra.  These episodes have not been accompanied by any alteration of awareness/loss of consciousness (except at onset in February 2019).  It is unclear to me if these represent focal seizures without alteration of consciousness that are under partial control with keppra, or if they represent hemifacial spasm.  Her prior EEGs have been negative, and MRI was unrevealing of an acute change (although did note scattered chronic microvascular ischemic changes).      At this point, differential would include epilepsy, hemifacial spasm, or conversion disorder.  To more accurately diagnose these symptoms, we will obtain an ambulatory EEG (72 hours).      We reviewed pertinent [place] driving laws in the setting of seizures, and recommended no further driving until follow-up after ambulatory EEG is obtained.    Recommendations & Plan:  -Keppra to 1 g BID  -Ambulatory EEG  -RTC 6 weeks

## 2019-08-29 NOTE — PROGRESS NOTES
Neurology Clinic  Follow up visit    Patient Name: Monalisa Carson  MRN: [id number]    CC: Seizures    HPI: Monalisa Carson is a 54 y.o. female w/ PMH significant for Anxiety, HTN, epilepsy presenting as follow-up for evaluation of seizures.    8/29/19 Interval History  Started on lexapro by PCP.    Continues to have L facial twitching, duration on the order of seconds.   Occurs 5-6x/day.  May decrease in severity if she touches her face, but doesn't stop.  Begins with twitching more pronounced in lower-mid cheek before moving superiorly on cheek.  Not associated with any alterations of awareness/consciousness, urinary/fecal incontinence, nor movements in any other limbs.  May be more prevalent with stress.     Currently on keppra 1 g BID, reports no missed doses.    Headaches better, taking tylenol 3x/week.    5/8/19 Interval History  Headaches decreased in frequency to 3x/week, resolve in ~30 minutes with 1 g tylenol PRN.  Headache character unchanged in frequency except for decreased severity.  Is also attending PT for help with her contributing neck pain and has received significant relief with this.  Tolerating nortriptyline well.     Speech improved, notes stuttering when she is stressed, but not on other occasions.      Reports a few episodes of face twitching unaccompanied by other symptoms in the intervening period.  Tolerating keppra well.    3/27/19 Initial History  I last saw pt for seizures in the hospital in 2/2019 for evaluation of seizures.  At that point, she'd had two events concern for complex partial seizures (L facial twitching, speech arrest, ?LOC) with Sal's paralysis/residual L facial droop after 2 separate events that occurred more than 48 hours apart.  Her EEG was unremarkable and she was discharged on keppra 1500 BID.  Since discharge, she reports no seizures, but has had nausea/vomiting with the keppra and sometimes is not able to keep the dose of keppra down.  However, she notes  she has had episodes of L facial twitching, up to a few times per day and duration of 1-2 minutes without any clear triggers.    With regards to her headache:  Went to ED on 3/11 for headache.  Headaches began while in hospital, but not too severe.  Significantly worsened mid-February.    L neck pain predominantly, that radiates up over her head.  Pressure-like pain.  Photo and phono sensitive.  Feels weak all over; no focal neurological deficits.  Given fioricet in ED, every day or QOD with some help.  10/10 pain at worst, fioricet brings it down to a 5/6.  Nothing in particular makes headaches better/worse.  Possibly worsened by exertion.  Every day.  Before fioricet, was taking tylenol BID.    Never had any headaches like this before.  No FMH of similar headaches.      Review of Systems:  General: No fevers, chills  Eyes: No changes in vision  ENT: No changes in hearing  Respiratory: No SOB  CV: No chest pain, palpitations  GI: No diarrhea, blood in stool  Urinary: No dysuria, hematuria  Skin: No rashes  Neurological: No weakness, confusion.  +facial twitching.  Psychiatric: No auditory nor visual hallucinations.  +anxiety      Past Medical History  Past Medical History:   Diagnosis Date    Allergy     Anxiety     Diabetes mellitus     Heart murmur     Hypertension        Medications    Current Outpatient Medications:     acetaminophen (TYLENOL) 500 MG tablet, Take 2 tablets (1,000 mg total) by mouth every 6 (six) hours as needed for Pain., Disp: 30 tablet, Rfl: 0    amLODIPine (NORVASC) 10 MG tablet, TAKE 1 TABLET(10 MG) BY MOUTH EVERY DAY, Disp: 90 tablet, Rfl: 0    aspirin 81 MG Chew, Take 1 tablet (81 mg total) by mouth once daily., Disp: , Rfl:     atorvastatin (LIPITOR) 40 MG tablet, Take 1 tablet (40 mg total) by mouth once daily., Disp: 90 tablet, Rfl: 3    benazepril (LOTENSIN) 20 MG tablet, TAKE 1 TABLET(20 MG) BY MOUTH EVERY DAY, Disp: 90 tablet, Rfl: 1    escitalopram oxalate (LEXAPRO) 10  MG tablet, Take 1 tablet (10 mg total) by mouth once daily., Disp: 90 tablet, Rfl: 3    fluticasone (FLONASE) 50 mcg/actuation nasal spray, 1 spray (50 mcg total) by Each Nare route 2 (two) times daily., Disp: 1 Bottle, Rfl: 0    hydroCHLOROthiazide (HYDRODIURIL) 25 MG tablet, Take 1 tablet (25 mg total) by mouth once daily., Disp: 90 tablet, Rfl: 0    hydrocortisone 2.5 % cream, [place]OL[place]ANG EXT AA BID, Disp: , Rfl: 3    hydrOXYzine HCl (ATARAX) 25 MG tablet, Take 1 tablet (25 mg total) by mouth 3 (three) times daily as needed for Anxiety., Disp: 12 tablet, Rfl: 0    ibuprofen (ADVIL,MOTRIN) 600 MG tablet, Take 1 tablet (600 mg total) by mouth every 6 (six) hours as needed for Pain (Take with food as needed for mild-to-moderate pain)., Disp: 20 tablet, Rfl: 0    levETIRAcetam (KEPPRA) 1000 MG tablet, Take 1 tablet (1,000 mg total) by mouth 2 (two) times daily., Disp: 60 tablet, Rfl: 5    lidocaine (LMX) 4 % cream, Apply topically as needed., Disp: , Rfl: 0    loratadine (CLARITIN) 10 mg tablet, Take 1 tablet (10 mg total) by mouth once daily., Disp: 60 tablet, Rfl: 0    metFORMIN (GLUCOPHAGE-XR) 500 MG 24 hr tablet, Take 2 tablets (1,000 mg total) by mouth 2 (two) times daily with meals., Disp: 360 tablet, Rfl: 0    nortriptyline (PAMELOR) 25 MG capsule, Take 1 capsule (25 mg total) by mouth every evening., Disp: 90 capsule, Rfl: 2    ondansetron (ZOFRAN-ODT) 8 MG TbDL, Take 1 tablet (8 mg total) by mouth every 12 (twelve) hours as needed., Disp: 20 tablet, Rfl: 1    zolpidem (AMBIEN) 10 mg Tab, TAKE 1 TABLET BY MOUTH EVERY NIGHT AT BEDTIME, Disp: 30 tablet, Rfl: 2    albuterol 90 mcg/actuation inhaler, Inhale 1-2 puffs into the lungs every 4 (four) hours as needed for Wheezing. Rescue, Disp: 1 Inhaler, Rfl: 2  Any other notable medications as documented in HPI    Allergies  Review of patient's allergies indicates:   Allergen Reactions    Keflex [cephalexin] Hives    Vicodin [hydrocodone-acetaminophen]  Itching     itch       Social History  Social History     Socioeconomic History    Marital status:      Spouse name: Not on file    Number of children: Not on file    Years of education: Not on file    Highest education level: Not on file   Occupational History    Occupation:      Employer: [employer]   Social Needs    Financial resource strain: Not on file    Food insecurity:     Worry: Not on file     Inability: Not on file    Transportation needs:     Medical: Not on file     Non-medical: Not on file   Tobacco Use    Smoking status: Former Smoker     Packs/day: 0.00     Years: 0.50     Pack years: 0.00    Smokeless tobacco: Never Used   Substance and Sexual Activity    Alcohol use: Yes     Alcohol/week: 0.0 oz     Comment: seldom    Drug use: No    Sexual activity: Yes     Partners: Male     Birth control/protection: None   Lifestyle    Physical activity:     Days per week: Not on file     Minutes per session: Not on file    Stress: Not on file   Relationships    Social connections:     Talks on phone: Not on file     Gets together: Not on file     Attends [employer] service: Not on file     Active member of club or organization: Not on file     Attends meetings of clubs or organizations: Not on file     Relationship status: Not on file   Other Topics Concern    Are you pregnant or think you may be? No    Breast-feeding No   Social History Narrative    Not on file     Any other notable Social History as documented in HPI.    Family History  Family History   Problem Relation Age of Onset    Diabetes Mother     Hyperlipidemia Mother     Hypertension Mother     Cataracts Mother     Diabetes Father     Hypertension Father     Stroke Father     Depression Sister     Hypertension Sister     Stroke Sister     Cancer Maternal Aunt         breast x 2    Breast cancer Maternal Aunt     Cancer Maternal Uncle         prostate x 2    Cancer Paternal Aunt         breast  "   Breast cancer Paternal Aunt     Cancer Maternal Grandfather         lung    Early death Paternal Grandmother     Early death Paternal Grandfather     No Known Problems Brother     No Known Problems Paternal Uncle     No Known Problems Maternal Grandmother     Melanoma Neg Hx     Eczema Neg Hx     Lupus Neg Hx     Psoriasis Neg Hx     Amblyopia Neg Hx     Blindness Neg Hx     Glaucoma Neg Hx     Macular degeneration Neg Hx     Retinal detachment Neg Hx     Strabismus Neg Hx     Thyroid disease Neg Hx      Any other notable FMH as documented in HPI.    Physical Exam  /87   Pulse 103   Ht 5' 3" (1.6 m)   Wt 101 kg (222 lb 10.6 oz)   BMI 39.44 kg/m²     General: Well-developed, well-groomed. No apparent distress  HENT: Normocephalic, atraumatic.  No carotid bruits auscultated.    Cardiovascular: Regular rate and rhythm with no murmurs, rubs or gallops.    Chest: Lungs clear to auscultation bilaterally.  No wheezes, stridor, ronchi appreciated.  Abdomen: Normoactive bowel sounds present.  Soft, nontender to palpation.  Musculoskeletal: No peripheral edema    Neurologic Exam: The patient is awake, alert and oriented. Language is fluent.  Fund of knowledge is appropriate.     Cranial nerves:   Pupils are round and reactive to light and accommodation.   Visual fields are full to confrontation.    Ocular motility is full in all cardinal positions of gaze.   Facial sensation is normal to light touch.   Facial activation symmetric  Hearing is symmetric bilaterally.   Palate elevates symmetrically.   Shoulder elevation is symmetric and full strength bilaterally.   Tongue is midline and neck rotation strength is normal bilaterally.      Motor examination of all extremities demonstrates normal bulk and tone in all four limbs. There are no atrophy or fasciculations. Strength is 5/5 in the upper and lower extremities bilaterally.    Sensory examination is normal light touch in BUE and BLE.      Deep " "tendon reflexes are 2+ and symmetric in the upper and lower extremities bilaterally.  No clonus b/l.    Coordination: Finger to nose intact b/l    Lab and Test Results    No recent pertinent labs    Images:  4/10/19 EEG: Per report,  "IMPRESSION:  Normal EEG.     CLINICAL CORRELATION:  The patient is a 54-year-old female who had two events,   which consisted of left facial twitching and speech arrest with a Sal paralysis   with left facial droop post-event.  This occurred on two occasions.  She was   started on levetiracetam 1500 mg twice a day.  This recording was obtained to   evaluate for possible seizures, but this tracing is normal throughout the   recording."    3/11/19 CTH: Per report,  "1. No acute intracranial abnormalities, noting sequela of suspected chronic microvascular ischemic change."    2/16/19 Routine EEG: Per report,  "Abnormal activity:   No epileptiform discharges, periodic discharges, lateralized   rhythmic delta activity or electrographic seizures were seen.    IMPRESSION:   This is a normal routine EEG done in sleep, with only very brief   period of wakefulness."    2/16/19 MRI Brain w/ and w/o: Per report,  "1. No acute intracranial abnormality identified on today's examination.  Specifically, no evidence of acute infarction or enhancing lesion.  2. Multiple foci of T2/FLAIR signal hyperintensity in the supratentorial and periventricular white matter demonstrate no corresponding restricted diffusion or postcontrast enhancement.  These lesions are nonspecific in appearance and may be seen with accelerated small vessel ischemic disease, vasculopathies, migraine headaches, and as the residua from inflammatory and traumatic insults to the brain."    2/15/19 CTA Head and Neck: Per report,  "Small caliber right anterior cerebral artery which may be narrow on a congenital basis.  Otherwise CTA head and neck without significant focal stenosis or occlusion."    Assessment and Plan    Problem List " Items Addressed This Visit        Neuro    Facial twitching    Relevant Orders    Ambulatory EEG monitoring    Nonintractable epilepsy without status epilepticus - Primary    Current Assessment & Plan     Assessment  Monalisa Carson is a 54 y.o. female w/ PMH significant for Anxiety, HTN, epilepsy presenting as follow-up for evaluation of seizures.    Overall, I am suspicious that the events she reported in February may represent seizure activity.  She reports no generalized seizure activity/LOC, but continues to have L facial twitching while on keppra.  These episodes have not been accompanied by any alteration of awareness/loss of consciousness (except at onset in February 2019).  It is unclear to me if these represent focal seizures without alteration of consciousness that are under partial control with keppra, or if they represent hemifacial spasm.  Her prior EEGs have been negative, and MRI was unrevealing of an acute change (although did note scattered chronic microvascular ischemic changes).      At this point, differential would include epilepsy, hemifacial spasm, or conversion disorder.  To more accurately diagnose these symptoms, we will obtain an ambulatory EEG (72 hours).      We reviewed pertinent [place] driving laws in the setting of seizures, and recommended no further driving until follow-up after ambulatory EEG is obtained.    Recommendations & Plan:  -Keppra to 1 g BID  -Ambulatory EEG  -RTC 6 weeks          Relevant Orders    Ambulatory EEG monitoring    Migraine without aura and without status migrainosus, not intractable    Current Assessment & Plan     -continue nortriptyline QHS                    Humberto Fine MD  Neurology Resident   [facility]  [address] [place] [address]

## 2019-09-02 NOTE — ED PROVIDER NOTES
Encounter Date: 2/15/2019    SCRIBE #1 NOTE: I, Marques Roberto, am scribing for, and in the presence of,  Toussaint Battley, FNP. I have scribed the following portions of the note - Other sections scribed: HPI, ROS, PE.       History     Chief Complaint   Patient presents with    Hypertension     state BP has been dell all day (160/101 at home), states has been taking Rx as directed.  also states left facial cheek has been twitching     Last normal around noon per patient.  She 1st noticed that she was having twitching on the left side of her face associated with difficulty speaking and slurred speech. Patient reports the symptoms have worsened since then.  Patient has never had symptoms like this before.  This is a 53 y.o. female who presents to the ED with a complaint of left facial twitching that began at around noon today. Pt endorses slurred speech. Pt states that her symptoms have been gradually worsening. She denies facial asymmetry. Pt states that her BP has been elevated all day with a value of 160/101. She has been compliant with her HTN medication. She denies sore throat, trouble swallowing, or HA.       The history is provided by the patient.   Hypertension    This is a new problem. The current episode started just prior to arrival. The problem has been gradually worsening. Pertinent negatives include no chest pain and no headaches. There are no associated agents to hypertension.     Review of patient's allergies indicates:   Allergen Reactions    Keflex [cephalexin] Hives    Vicodin [hydrocodone-acetaminophen] Itching     itch     Past Medical History:   Diagnosis Date    Allergy     Anxiety     Diabetes mellitus     Heart murmur     Hypertension      Past Surgical History:   Procedure Laterality Date    CHOLECYSTECTOMY      2001 april    HYSTERECTOMY      partial  1996    OOPHORECTOMY      SHOULDER SURGERY Right     WISDOM TOOTH EXTRACTION       Family History   Problem Relation Age of  Onset    Diabetes Mother     Hyperlipidemia Mother     Hypertension Mother     Cataracts Mother     Diabetes Father     Hypertension Father     Stroke Father     Depression Sister     Hypertension Sister     Stroke Sister     Cancer Maternal Aunt         breast x 2    Breast cancer Maternal Aunt     Cancer Maternal Uncle         prostate x 2    Cancer Paternal Aunt         breast    Breast cancer Paternal Aunt     Cancer Maternal Grandfather         lung    Early death Paternal Grandmother     Early death Paternal Grandfather     No Known Problems Brother     No Known Problems Paternal Uncle     No Known Problems Maternal Grandmother     Melanoma Neg Hx     Eczema Neg Hx     Lupus Neg Hx     Psoriasis Neg Hx     Amblyopia Neg Hx     Blindness Neg Hx     Glaucoma Neg Hx     Macular degeneration Neg Hx     Retinal detachment Neg Hx     Strabismus Neg Hx     Thyroid disease Neg Hx      Social History     Tobacco Use    Smoking status: Former Smoker     Packs/day: 0.00     Years: 0.50     Pack years: 0.00    Smokeless tobacco: Never Used   Substance Use Topics    Alcohol use: Yes     Alcohol/week: 0.0 oz     Comment: seldom    Drug use: No     Review of Systems   Constitutional: Negative for fever.   HENT: Negative for sore throat and trouble swallowing.         Positive for left facial cheek twitching.    Cardiovascular: Negative for chest pain.   Neurological: Positive for speech difficulty (Slurred speech). Negative for facial asymmetry and headaches.   All other systems reviewed and are negative.      Physical Exam     Initial Vitals [02/15/19 1735]   BP Pulse Resp Temp SpO2   (!) 160/87 76 20 98.3 °F (36.8 °C) 97 %      MAP       --         Physical Exam    Nursing note and vitals reviewed.  Constitutional: She appears well-developed and well-nourished.   HENT:   Head: Normocephalic and atraumatic.   Eyes: Conjunctivae are normal. Pupils are equal, round, and reactive to light.  Right eye exhibits nystagmus. Left eye exhibits nystagmus.   Neck: Normal range of motion. Neck supple.   Cardiovascular: Normal rate, regular rhythm, normal heart sounds and intact distal pulses. Exam reveals no gallop and no friction rub.    No murmur heard.  Pulmonary/Chest: Effort normal and breath sounds normal. No respiratory distress. She has no wheezes. She has no rhonchi. She has no rales. She exhibits no tenderness.   Abdominal: Soft. Bowel sounds are normal. There is no tenderness.   Musculoskeletal: Normal range of motion.   Neurological: She is alert and oriented to person, place, and time. She has normal strength. A sensory deficit (To the left side of her face) is present. No cranial nerve deficit. GCS score is 15. GCS eye subscore is 4. GCS verbal subscore is 5. GCS motor subscore is 6.   No facial asymmetry. Pt's speech is slurred. Negative pronator drift. Dysarthria and expressive aphasia present.    Skin: Skin is warm and dry.   Psychiatric: She has a normal mood and affect.      There is bilateral nystagmus with lateral gaze.  Extraocular motions are intact. Sensation is slightly decreased on the side of the face.  Visual fields are intact. There is no pronator drift.    There are no obvious extremity motor deficits, but patient has both dysarthria as well as the expressive aphasia.  She also has a sensory deficit.  Concerned about the possibility of large vessel occlusion.  The tele stroke team has been consulted.  ED Course   Procedures  Labs Reviewed   POCT GLUCOSE - Abnormal; Notable for the following components:       Result Value    POCT Glucose 133 (*)     All other components within normal limits   POCT CMP - Abnormal; Notable for the following components:    POC Glucose 123 (*)     All other components within normal limits   TROPONIN ISTAT   POCT CBC   POCT CMP   POCT PROTIME-INR   POCT TROPONIN   POCT B-TYPE NATRIURETIC PEPTIDE (BNP)   ISTAT PROCEDURE   POCT B-TYPE NATRIURETIC PEPTIDE  (BNP)          Imaging Results          CTA Head and Neck (xpd) (Final result)  Result time 02/15/19 21:10:24    Final result by Wood Ventura MD (02/15/19 21:10:24)                 Impression:      Small caliber right anterior cerebral artery which may be narrow on a congenital basis.  Otherwise CTA head and neck without significant focal stenosis or occlusion.      Electronically signed by: Wood Ventura MD  Date:    02/15/2019  Time:    21:10             Narrative:    EXAMINATION:  CTA HEAD AND NECK (XPD)    CLINICAL HISTORY:  Focal neuro deficit, new, fixed or worsening, >6 hours;    TECHNIQUE:  Non contrast low dose axial images were obtained through the head.  CT angiogram was performed from the level of the sia to the top of the head following the IV administration of 75mL of Omnipaque 350.   Sagittal and coronal reconstructions and maximum intensity projection reconstructions were performed. Arterial stenosis percentages are based on NASCET measurement criteria.    COMPARISON:  CT head from the same date.    FINDINGS:  CTA Neck: The origins of the right brachiocephalic, left common carotid and left subclavian arteries from the arch are within normal limits.  The origins of the vertebral arteries are within normal limits.  The vertebral arteries are unremarkable throughout their course without evidence for focal stenosis or occlusion.   The bilateral common carotid arteries and internal carotid arteries are patent without evidence for focal stenosis or occlusion.    Anterior circulation: The bilateral distal cervical, petrous, cavernous, and supraclinoid ICAs are patent without significant stenosis or aneurysm.  The middle cerebral arteries are patent without significant stenosis, occlusion, or aneurysm.  There is hyperplastic right A1 segment with small caliber right anterior cerebral artery which may be narrow on a congenital basis.  Left anterior artery is patent without significant stenosis,  occlusion, or aneurysm.    Posterior circulation: Dominant left vertebral artery.  Distal vertebral arteries, basilar artery and posterior cerebral arteries are patent without significant focal stenosis, occlusion, or aneurysm.    Airways: Unremarkable.    Glands/Nodes: The parotid, submandibular, and thyroid glands are unremarkable.    Spine: No acute osseous abnormality identified.    Lungs: Visualized lung apices are clear.                               X-Ray Chest 1 View (Final result)  Result time 02/15/19 20:37:50   Procedure changed from X-Ray Chest AP Portable     Final result by Rick Stringer MD (02/15/19 20:37:50)                 Impression:      No acute findings in the chest.      Electronically signed by: Rick Stringer MD  Date:    02/15/2019  Time:    20:37             Narrative:    EXAMINATION:  XR CHEST 1 VIEW    CLINICAL HISTORY:  Stroke;    TECHNIQUE:  Single frontal view of the chest was performed.    COMPARISON:  None    FINDINGS:  No consolidation, pleural effusion or pneumothorax.    Cardiomediastinal silhouette is unremarkable.                               CT Head Without Contrast (Final result)  Result time 02/15/19 18:13:22    Final result by Edgar Londono MD (02/15/19 18:13:22)                 Impression:      No acute intracranial abnormality identified.      Electronically signed by: Edgar Londono MD  Date:    02/15/2019  Time:    18:13             Narrative:    EXAMINATION:  CT HEAD WITHOUT CONTRAST    CLINICAL HISTORY:  Facial nerve disorders;facial twitching and hypertension (DBP>100);    TECHNIQUE:  Low dose axial CT images obtained throughout the head without intravenous contrast. Sagittal and coronal reconstructions were performed.    COMPARISON:  Head CT 05/29/2018    FINDINGS:  Intracranial compartment:    Ventricles and sulci are normal in size for age without evidence of hydrocephalus. No extra-axial blood or fluid collections.  Partial empty sella configuration similar  No to prior, nonspecific.    The brain parenchyma appears normal. No parenchymal mass, hemorrhage, edema or major vascular distribution infarct.    Skull/extracranial contents (limited evaluation): No fracture. Mastoid air cells and paranasal sinuses are essentially clear.  Visualized portions the orbits are within normal limits.                                 Medical Decision Making:   Clinical Tests:   Lab Tests: Ordered  Radiological Study: Ordered            Scribe Attestation:   Scribe #1: I performed the above scribed service and the documentation accurately describes the services I performed. I attest to the accuracy of the note.    I attest that I personally performed the services documented by the scribe and acknowledged and confirm the content of the note. Jose Daniel Zaldivar              ED Course as of Feb 23 0102   Fri Feb 15, 2019   1837 Discussed with tele stroke physician Dr. forrester who recommends that the patient be started on Keppra and transfer for admission for CTA head neck and EEG.  []   1850 Awaiting call back from transfer center concerning availability of EEG at [facility]  []      ED Course User Index  [MH] Jose Daniel Zaldivar MD     Clinical Impression:     1. Cerebrovascular accident (CVA), unspecified mechanism    2. TIA due to embolism    3. Dysarthria                                  Jose Daniel Zaldivar MD  02/15/19 1816       Jose Daniel Zaldivar MD  02/23/19 0102

## 2019-09-09 ENCOUNTER — TELEPHONE (OUTPATIENT)
Dept: ADMINISTRATIVE | Facility: HOSPITAL | Age: 54
End: 2019-09-09

## 2019-09-09 ENCOUNTER — PATIENT MESSAGE (OUTPATIENT)
Dept: ADMINISTRATIVE | Facility: HOSPITAL | Age: 54
End: 2019-09-09

## 2019-09-09 ENCOUNTER — PATIENT OUTREACH (OUTPATIENT)
Dept: ADMINISTRATIVE | Facility: HOSPITAL | Age: 54
End: 2019-09-09

## 2019-09-09 ENCOUNTER — CLINICAL SUPPORT (OUTPATIENT)
Dept: FAMILY MEDICINE | Facility: CLINIC | Age: 54
End: 2019-09-09
Payer: COMMERCIAL

## 2019-09-09 VITALS — SYSTOLIC BLOOD PRESSURE: 146 MMHG | DIASTOLIC BLOOD PRESSURE: 88 MMHG

## 2019-09-09 DIAGNOSIS — I10 UNCONTROLLED HYPERTENSION: Primary | ICD-10-CM

## 2019-09-09 PROCEDURE — 99999 PR PBB SHADOW E&M-EST. PATIENT-LVL I: CPT | Mod: PBBFAC,,,

## 2019-09-09 PROCEDURE — 99999 PR PBB SHADOW E&M-EST. PATIENT-LVL I: ICD-10-PCS | Mod: PBBFAC,,,

## 2019-09-09 NOTE — PROGRESS NOTES
Monalisa THOMPSON Yamileth 54 y.o. female is here today for Blood Pressure check.   History of HTN yes.    Review of patient's allergies indicates:   Allergen Reactions    Keflex [cephalexin] Hives    Vicodin [hydrocodone-acetaminophen] Itching     itch     Creatinine   Date Value Ref Range Status   03/11/2019 0.8 0.5 - 1.4 mg/dL Final     Sodium   Date Value Ref Range Status   03/11/2019 138 136 - 145 mmol/L Final     Potassium   Date Value Ref Range Status   03/11/2019 3.8 3.5 - 5.1 mmol/L Final   ]  Patient verifies taking blood pressure medications on a regular basis at the same time of the day.     Current Outpatient Medications:     acetaminophen (TYLENOL) 500 MG tablet, Take 2 tablets (1,000 mg total) by mouth every 6 (six) hours as needed for Pain., Disp: 30 tablet, Rfl: 0    albuterol 90 mcg/actuation inhaler, Inhale 1-2 puffs into the lungs every 4 (four) hours as needed for Wheezing. Rescue, Disp: 1 Inhaler, Rfl: 2    amLODIPine (NORVASC) 10 MG tablet, TAKE 1 TABLET(10 MG) BY MOUTH EVERY DAY, Disp: 90 tablet, Rfl: 0    aspirin 81 MG Chew, Take 1 tablet (81 mg total) by mouth once daily., Disp: , Rfl:     atorvastatin (LIPITOR) 40 MG tablet, Take 1 tablet (40 mg total) by mouth once daily., Disp: 90 tablet, Rfl: 3    benazepril (LOTENSIN) 20 MG tablet, TAKE 1 TABLET(20 MG) BY MOUTH EVERY DAY, Disp: 90 tablet, Rfl: 1    escitalopram oxalate (LEXAPRO) 10 MG tablet, Take 1 tablet (10 mg total) by mouth once daily., Disp: 90 tablet, Rfl: 3    fluticasone (FLONASE) 50 mcg/actuation nasal spray, 1 spray (50 mcg total) by Each Nare route 2 (two) times daily., Disp: 1 Bottle, Rfl: 0    hydroCHLOROthiazide (HYDRODIURIL) 25 MG tablet, Take 1 tablet (25 mg total) by mouth once daily., Disp: 90 tablet, Rfl: 0    hydrocortisone 2.5 % cream, WOLFGANG EXT AA BID, Disp: , Rfl: 3    hydrOXYzine HCl (ATARAX) 25 MG tablet, Take 1 tablet (25 mg total) by mouth 3 (three) times daily as needed for Anxiety., Disp: 12 tablet,  Rfl: 0    ibuprofen (ADVIL,MOTRIN) 600 MG tablet, Take 1 tablet (600 mg total) by mouth every 6 (six) hours as needed for Pain (Take with food as needed for mild-to-moderate pain)., Disp: 20 tablet, Rfl: 0    levETIRAcetam (KEPPRA) 1000 MG tablet, Take 1 tablet (1,000 mg total) by mouth 2 (two) times daily., Disp: 60 tablet, Rfl: 5    lidocaine (LMX) 4 % cream, Apply topically as needed., Disp: , Rfl: 0    loratadine (CLARITIN) 10 mg tablet, Take 1 tablet (10 mg total) by mouth once daily., Disp: 60 tablet, Rfl: 0    metFORMIN (GLUCOPHAGE-XR) 500 MG 24 hr tablet, Take 2 tablets (1,000 mg total) by mouth 2 (two) times daily with meals., Disp: 360 tablet, Rfl: 0    nortriptyline (PAMELOR) 25 MG capsule, Take 1 capsule (25 mg total) by mouth every evening., Disp: 90 capsule, Rfl: 2    ondansetron (ZOFRAN-ODT) 8 MG TbDL, Take 1 tablet (8 mg total) by mouth every 12 (twelve) hours as needed., Disp: 20 tablet, Rfl: 1    zolpidem (AMBIEN) 10 mg Tab, TAKE 1 TABLET BY MOUTH EVERY NIGHT AT BEDTIME, Disp: 30 tablet, Rfl: 2  Does patient have record of home blood pressure readings no. Readings have been averaging not done.   Last dose of blood pressure medication was taken at this morning around 8:30.  Patient is asymptomatic.   Complains of no complaints.    Vitals:    09/09/19 0930 09/09/19 0949   BP: (!) 150/92 (!) 146/88   BP Location: Left arm Left arm   Patient Position: Sitting Sitting   BP Method: Large (Manual) Large (Manual)         Dr. See informed of nurse visit.

## 2019-09-10 ENCOUNTER — HOSPITAL ENCOUNTER (OUTPATIENT)
Dept: NEUROLOGY | Facility: CLINIC | Age: 54
Discharge: HOME | End: 2019-09-10
Payer: COMMERCIAL

## 2019-09-10 DIAGNOSIS — G51.39 HEMIFACIAL SPASM, UNSPECIFIED LATERALITY: Primary | ICD-10-CM

## 2019-09-13 ENCOUNTER — HOSPITAL ENCOUNTER (OUTPATIENT)
Dept: NEUROLOGY | Facility: CLINIC | Age: 54
Discharge: HOME | End: 2019-09-13
Payer: COMMERCIAL

## 2019-09-13 DIAGNOSIS — G40.209 PARTIAL SYMPTOMATIC EPILEPSY WITH COMPLEX PARTIAL SEIZURES, NOT INTRACTABLE, WITHOUT STATUS EPILEPTICUS: ICD-10-CM

## 2019-09-13 DIAGNOSIS — G51.4 FACIAL TWITCHING: ICD-10-CM

## 2019-09-18 ENCOUNTER — PATIENT OUTREACH (OUTPATIENT)
Dept: ADMINISTRATIVE | Facility: OTHER | Age: 54
End: 2019-09-18

## 2019-09-19 ENCOUNTER — TELEPHONE (OUTPATIENT)
Dept: OPTOMETRY | Facility: CLINIC | Age: 54
End: 2019-09-19

## 2019-09-19 NOTE — TELEPHONE ENCOUNTER
[facility] ins benefits as of 19:     Member Name : SHELLI THOMPSON FAVORITE  ID : [id number]  Group : [facility] [employer]   Patient Name : SHELLI THOMPSON FAVORITE  Relationship : MEMBER   : 1965     Authorization Issued       Authorization Number: [id number]    Issue Date: 2019    Expiration Date: 10/10/2019    Services: Eye Examination    Examination Copayment: $25.00

## 2019-09-23 ENCOUNTER — TELEPHONE (OUTPATIENT)
Dept: FAMILY MEDICINE | Facility: CLINIC | Age: 54
End: 2019-09-23

## 2019-09-23 ENCOUNTER — OFFICE VISIT (OUTPATIENT)
Dept: FAMILY MEDICINE | Facility: CLINIC | Age: 54
End: 2019-09-23
Payer: COMMERCIAL

## 2019-09-23 VITALS
TEMPERATURE: 98 F | HEIGHT: 63 IN | RESPIRATION RATE: 16 BRPM | HEART RATE: 91 BPM | BODY MASS INDEX: 40.08 KG/M2 | OXYGEN SATURATION: 95 % | SYSTOLIC BLOOD PRESSURE: 160 MMHG | WEIGHT: 226.19 LBS | DIASTOLIC BLOOD PRESSURE: 100 MMHG

## 2019-09-23 DIAGNOSIS — F41.8 DEPRESSION WITH ANXIETY: ICD-10-CM

## 2019-09-23 DIAGNOSIS — I10 UNCONTROLLED HYPERTENSION: ICD-10-CM

## 2019-09-23 DIAGNOSIS — G47.00 INSOMNIA, UNSPECIFIED TYPE: ICD-10-CM

## 2019-09-23 DIAGNOSIS — Z23 NEED FOR INFLUENZA VACCINATION: Primary | ICD-10-CM

## 2019-09-23 DIAGNOSIS — E66.01 MORBID OBESITY WITH BMI OF 40.0-44.9, ADULT: ICD-10-CM

## 2019-09-23 PROCEDURE — 90471 FLU VACCINE (QUAD) GREATER THAN OR EQUAL TO 3YO PRESERVATIVE FREE IM: ICD-10-PCS | Mod: S$GLB,,, | Performed by: FAMILY MEDICINE

## 2019-09-23 PROCEDURE — 3080F DIAST BP >= 90 MM HG: CPT | Mod: CPTII,S$GLB,, | Performed by: FAMILY MEDICINE

## 2019-09-23 PROCEDURE — 3008F BODY MASS INDEX DOCD: CPT | Mod: CPTII,S$GLB,, | Performed by: FAMILY MEDICINE

## 2019-09-23 PROCEDURE — 3008F PR BODY MASS INDEX (BMI) DOCUMENTED: ICD-10-PCS | Mod: CPTII,S$GLB,, | Performed by: FAMILY MEDICINE

## 2019-09-23 PROCEDURE — 90686 FLU VACCINE (QUAD) GREATER THAN OR EQUAL TO 3YO PRESERVATIVE FREE IM: ICD-10-PCS | Mod: S$GLB,,, | Performed by: FAMILY MEDICINE

## 2019-09-23 PROCEDURE — 90686 IIV4 VACC NO PRSV 0.5 ML IM: CPT | Mod: S$GLB,,, | Performed by: FAMILY MEDICINE

## 2019-09-23 PROCEDURE — 90471 IMMUNIZATION ADMIN: CPT | Mod: S$GLB,,, | Performed by: FAMILY MEDICINE

## 2019-09-23 PROCEDURE — 99214 PR OFFICE/OUTPT VISIT, EST, LEVL IV, 30-39 MIN: ICD-10-PCS | Mod: 25,S$GLB,, | Performed by: FAMILY MEDICINE

## 2019-09-23 PROCEDURE — 99999 PR PBB SHADOW E&M-EST. PATIENT-LVL III: ICD-10-PCS | Mod: PBBFAC,,, | Performed by: FAMILY MEDICINE

## 2019-09-23 PROCEDURE — 3080F PR MOST RECENT DIASTOLIC BLOOD PRESSURE >= 90 MM HG: ICD-10-PCS | Mod: CPTII,S$GLB,, | Performed by: FAMILY MEDICINE

## 2019-09-23 PROCEDURE — 99999 PR PBB SHADOW E&M-EST. PATIENT-LVL III: CPT | Mod: PBBFAC,,, | Performed by: FAMILY MEDICINE

## 2019-09-23 PROCEDURE — 3077F PR MOST RECENT SYSTOLIC BLOOD PRESSURE >= 140 MM HG: ICD-10-PCS | Mod: CPTII,S$GLB,, | Performed by: FAMILY MEDICINE

## 2019-09-23 PROCEDURE — 99214 OFFICE O/P EST MOD 30 MIN: CPT | Mod: 25,S$GLB,, | Performed by: FAMILY MEDICINE

## 2019-09-23 PROCEDURE — 3077F SYST BP >= 140 MM HG: CPT | Mod: CPTII,S$GLB,, | Performed by: FAMILY MEDICINE

## 2019-09-23 RX ORDER — CLONIDINE HYDROCHLORIDE 0.1 MG/1
0.1 TABLET ORAL 2 TIMES DAILY
Qty: 180 TABLET | Refills: 0 | Status: ON HOLD | OUTPATIENT
Start: 2019-09-23 | End: 2020-04-04 | Stop reason: HOSPADM

## 2019-09-23 RX ORDER — ESCITALOPRAM OXALATE 20 MG/1
20 TABLET ORAL DAILY
Qty: 90 TABLET | Refills: 3 | Status: SHIPPED | OUTPATIENT
Start: 2019-09-23 | End: 2020-09-22

## 2019-09-23 RX ORDER — ZOLPIDEM TARTRATE 10 MG/1
TABLET ORAL
Qty: 30 TABLET | Refills: 5 | Status: SHIPPED | OUTPATIENT
Start: 2019-09-23 | End: 2020-04-06 | Stop reason: SDUPTHER

## 2019-09-23 NOTE — TELEPHONE ENCOUNTER
----- Message from Alisha See MD sent at 9/23/2019  1:05 PM CDT -----  Please schedule nurse BP check

## 2019-09-23 NOTE — PROGRESS NOTES
Subjective:       Patient ID: Monalisa Carson     Chief Complaint: Follow-up      Isabel Carson is a 54 y.o. female here for follow up uncontrolled HTN.  No side effects with HCTZ.  BP fluctuates.  Reports elevated BP over the past 2 weeks.  BP as high as 160/102.  BP elevated with stress.  Mother recently in the hospital.    Review of patient's allergies indicates:   Allergen Reactions    Keflex [cephalexin] Hives    Vicodin [hydrocodone-acetaminophen] Itching     itch       Current Outpatient Medications:     acetaminophen (TYLENOL) 500 MG tablet, Take 2 tablets (1,000 mg total) by mouth every 6 (six) hours as needed for Pain., Disp: 30 tablet, Rfl: 0    amLODIPine (NORVASC) 10 MG tablet, TAKE 1 TABLET(10 MG) BY MOUTH EVERY DAY, Disp: 90 tablet, Rfl: 0    aspirin 81 MG Chew, Take 1 tablet (81 mg total) by mouth once daily., Disp: , Rfl:     atorvastatin (LIPITOR) 40 MG tablet, Take 1 tablet (40 mg total) by mouth once daily., Disp: 90 tablet, Rfl: 3    benazepril (LOTENSIN) 20 MG tablet, TAKE 1 TABLET(20 MG) BY MOUTH EVERY DAY, Disp: 90 tablet, Rfl: 1    escitalopram oxalate (LEXAPRO) 20 MG tablet, Take 1 tablet (20 mg total) by mouth once daily., Disp: 90 tablet, Rfl: 3    fluticasone (FLONASE) 50 mcg/actuation nasal spray, 1 spray (50 mcg total) by Each Nare route 2 (two) times daily., Disp: 1 Bottle, Rfl: 0    hydroCHLOROthiazide (HYDRODIURIL) 25 MG tablet, Take 1 tablet (25 mg total) by mouth once daily., Disp: 90 tablet, Rfl: 0    hydrocortisone 2.5 % cream, WOLFGANG EXT AA BID, Disp: , Rfl: 3    hydrOXYzine HCl (ATARAX) 25 MG tablet, Take 1 tablet (25 mg total) by mouth 3 (three) times daily as needed for Anxiety., Disp: 12 tablet, Rfl: 0    ibuprofen (ADVIL,MOTRIN) 600 MG tablet, Take 1 tablet (600 mg total) by mouth every 6 (six) hours as needed for Pain (Take with food as needed for mild-to-moderate pain)., Disp: 20 tablet, Rfl: 0    levETIRAcetam (KEPPRA) 1000 MG tablet, Take 1  tablet (1,000 mg total) by mouth 2 (two) times daily., Disp: 60 tablet, Rfl: 5    lidocaine (LMX) 4 % cream, Apply topically as needed., Disp: , Rfl: 0    loratadine (CLARITIN) 10 mg tablet, Take 1 tablet (10 mg total) by mouth once daily., Disp: 60 tablet, Rfl: 0    metFORMIN (GLUCOPHAGE-XR) 500 MG 24 hr tablet, Take 2 tablets (1,000 mg total) by mouth 2 (two) times daily with meals., Disp: 360 tablet, Rfl: 0    nortriptyline (PAMELOR) 25 MG capsule, Take 1 capsule (25 mg total) by mouth every evening., Disp: 90 capsule, Rfl: 2    ondansetron (ZOFRAN-ODT) 8 MG TbDL, Take 1 tablet (8 mg total) by mouth every 12 (twelve) hours as needed., Disp: 20 tablet, Rfl: 1    zolpidem (AMBIEN) 10 mg Tab, TAKE 1 TABLET BY MOUTH EVERY NIGHT AT BEDTIME, Disp: 30 tablet, Rfl: 5    albuterol 90 mcg/actuation inhaler, Inhale 1-2 puffs into the lungs every 4 (four) hours as needed for Wheezing. Rescue, Disp: 1 Inhaler, Rfl: 2    cloNIDine (CATAPRES) 0.1 MG tablet, Take 1 tablet (0.1 mg total) by mouth 2 (two) times daily., Disp: 180 tablet, Rfl: 0    Past Medical History:   Diagnosis Date    Allergy     Anxiety     Diabetes mellitus     Heart murmur     Hypertension      Review of Systems   Constitutional: Positive for appetite change and unexpected weight change. Negative for activity change.   Cardiovascular: Negative for chest pain.   Neurological: Positive for headaches.   Psychiatric/Behavioral: The patient is nervous/anxious.        Objective:      Physical Exam   Constitutional: She appears well-developed and well-nourished.   Neurological: She is alert.   Psychiatric: Her speech is normal and behavior is normal. Her mood appears not anxious. Cognition and memory are normal. She exhibits a depressed mood.       Assessment:       1. Need for influenza vaccination    2. Insomnia, unspecified type    3. Morbid obesity with BMI of 40.0-44.9, adult    4. Depression with anxiety    5. Uncontrolled hypertension         Plan:       Monalisa was seen today for follow-up.    Diagnoses and all orders for this visit:    Need for influenza vaccination  -     Influenza - Quadrivalent (3 years & older) (PF)    Insomnia, unspecified type  -     zolpidem (AMBIEN) 10 mg Tab; TAKE 1 TABLET BY MOUTH EVERY NIGHT AT BEDTIME    Morbid obesity with BMI of 40.0-44.9, adult  Patient with weight gain. Suspect stress related     Depression with anxiety  -     Increase escitalopram oxalate (LEXAPRO) 20 MG tablet; Take 1 tablet (20 mg total) by mouth once daily.    Uncontrolled hypertension  -     cloNIDine (CATAPRES) 0.1 MG tablet; Take 1 tablet (0.1 mg total) by mouth 2 (two) times daily.

## 2019-10-03 ENCOUNTER — TELEPHONE (OUTPATIENT)
Dept: NEUROLOGY | Facility: CLINIC | Age: 54
End: 2019-10-03

## 2019-10-03 NOTE — TELEPHONE ENCOUNTER
----- Message from Yrn Vinson sent at 10/3/2019 10:20 AM CDT -----  Contact: [employer] @ [phone]   Caller requesting a return call to discuss pt's estimated return to work, pls advise and reference this # [phone]

## 2019-10-08 ENCOUNTER — PATIENT OUTREACH (OUTPATIENT)
Dept: ADMINISTRATIVE | Facility: OTHER | Age: 54
End: 2019-10-08

## 2019-10-11 ENCOUNTER — OFFICE VISIT (OUTPATIENT)
Dept: NEUROLOGY | Facility: CLINIC | Age: 54
End: 2019-10-11
Payer: COMMERCIAL

## 2019-10-11 VITALS
BODY MASS INDEX: 40.04 KG/M2 | DIASTOLIC BLOOD PRESSURE: 87 MMHG | WEIGHT: 226 LBS | SYSTOLIC BLOOD PRESSURE: 126 MMHG | HEART RATE: 73 BPM | HEIGHT: 63 IN

## 2019-10-11 DIAGNOSIS — G43.009 MIGRAINE WITHOUT AURA AND WITHOUT STATUS MIGRAINOSUS, NOT INTRACTABLE: ICD-10-CM

## 2019-10-11 DIAGNOSIS — G44.209 TENSION HEADACHE: ICD-10-CM

## 2019-10-11 DIAGNOSIS — G51.4 FACIAL TWITCHING: ICD-10-CM

## 2019-10-11 DIAGNOSIS — G40.209 PARTIAL SYMPTOMATIC EPILEPSY WITH COMPLEX PARTIAL SEIZURES, NOT INTRACTABLE, WITHOUT STATUS EPILEPTICUS: ICD-10-CM

## 2019-10-11 PROCEDURE — 3074F PR MOST RECENT SYSTOLIC BLOOD PRESSURE < 130 MM HG: ICD-10-PCS | Mod: CPTII,S$GLB,, | Performed by: STUDENT IN AN ORGANIZED HEALTH CARE EDUCATION/TRAINING PROGRAM

## 2019-10-11 PROCEDURE — 3008F BODY MASS INDEX DOCD: CPT | Mod: CPTII,S$GLB,, | Performed by: STUDENT IN AN ORGANIZED HEALTH CARE EDUCATION/TRAINING PROGRAM

## 2019-10-11 PROCEDURE — 99999 PR PBB SHADOW E&M-EST. PATIENT-LVL IV: CPT | Mod: PBBFAC,,, | Performed by: STUDENT IN AN ORGANIZED HEALTH CARE EDUCATION/TRAINING PROGRAM

## 2019-10-11 PROCEDURE — 3079F PR MOST RECENT DIASTOLIC BLOOD PRESSURE 80-89 MM HG: ICD-10-PCS | Mod: CPTII,S$GLB,, | Performed by: STUDENT IN AN ORGANIZED HEALTH CARE EDUCATION/TRAINING PROGRAM

## 2019-10-11 PROCEDURE — 3008F PR BODY MASS INDEX (BMI) DOCUMENTED: ICD-10-PCS | Mod: CPTII,S$GLB,, | Performed by: STUDENT IN AN ORGANIZED HEALTH CARE EDUCATION/TRAINING PROGRAM

## 2019-10-11 PROCEDURE — 99999 PR PBB SHADOW E&M-EST. PATIENT-LVL IV: ICD-10-PCS | Mod: PBBFAC,,, | Performed by: STUDENT IN AN ORGANIZED HEALTH CARE EDUCATION/TRAINING PROGRAM

## 2019-10-11 PROCEDURE — 99214 OFFICE O/P EST MOD 30 MIN: CPT | Mod: S$GLB,,, | Performed by: STUDENT IN AN ORGANIZED HEALTH CARE EDUCATION/TRAINING PROGRAM

## 2019-10-11 PROCEDURE — 3079F DIAST BP 80-89 MM HG: CPT | Mod: CPTII,S$GLB,, | Performed by: STUDENT IN AN ORGANIZED HEALTH CARE EDUCATION/TRAINING PROGRAM

## 2019-10-11 PROCEDURE — 99214 PR OFFICE/OUTPT VISIT, EST, LEVL IV, 30-39 MIN: ICD-10-PCS | Mod: S$GLB,,, | Performed by: STUDENT IN AN ORGANIZED HEALTH CARE EDUCATION/TRAINING PROGRAM

## 2019-10-11 PROCEDURE — 3074F SYST BP LT 130 MM HG: CPT | Mod: CPTII,S$GLB,, | Performed by: STUDENT IN AN ORGANIZED HEALTH CARE EDUCATION/TRAINING PROGRAM

## 2019-10-11 RX ORDER — NORTRIPTYLINE HYDROCHLORIDE 50 MG/1
50 CAPSULE ORAL NIGHTLY
Qty: 30 CAPSULE | Refills: 5 | Status: SHIPPED | OUTPATIENT
Start: 2019-10-11 | End: 2020-09-28 | Stop reason: SDUPTHER

## 2019-10-11 RX ORDER — LEVETIRACETAM 1000 MG/1
1000 TABLET ORAL 2 TIMES DAILY
Qty: 180 TABLET | Refills: 3 | Status: SHIPPED | OUTPATIENT
Start: 2019-10-11 | End: 2020-02-13

## 2019-10-11 NOTE — PATIENT INSTRUCTIONS
-Increase nortriptyline to 50 mg nightly  -continue keppra  -will call with EEG results  -Return to clinic 3 months      Humberto Fine MD  [facility]   Phone Number: [phone]  Fax Number: [phone]

## 2019-10-11 NOTE — PROGRESS NOTES
Neurology Clinic  Follow up visit    Patient Name: Monalisa Carson  MRN: [id number]    CC: Seizures    HPI: Monalisa Carson is a 54 y.o. female w/ PMH significant for Anxiety, HTN, epilepsy presenting as follow-up for evaluation of seizures.    10/11/19 Interval History  Had ambulatory EEG about a month ago, but not read yet.  No clinical events during EEG.  Has had 2 events of L facial twitching, lasting 1-2 minutes at a time since that time.      Headaches 3x/week, but overall improved.  No side effects from nortriptyline.      8/29/19 Interval History  Started on lexapro by PCP.    Continues to have L facial twitching, duration on the order of seconds.   Occurs 5-6x/day.  May decrease in severity if she touches her face, but doesn't stop.  Begins with twitching more pronounced in lower-mid cheek before moving superiorly on cheek.  Not associated with any alterations of awareness/consciousness, urinary/fecal incontinence, nor movements in any other limbs.  May be more prevalent with stress.     Currently on keppra 1 g BID, reports no missed doses.    Headaches better, taking tylenol 3x/week.    5/8/19 Interval History  Headaches decreased in frequency to 3x/week, resolve in ~30 minutes with 1 g tylenol PRN.  Headache character unchanged in frequency except for decreased severity.  Is also attending PT for help with her contributing neck pain and has received significant relief with this.  Tolerating nortriptyline well.     Speech improved, notes stuttering when she is stressed, but not on other occasions.      Reports a few episodes of face twitching unaccompanied by other symptoms in the intervening period.  Tolerating keppra well.    3/27/19 Initial History  I last saw pt for seizures in the hospital in 2/2019 for evaluation of seizures.  At that point, she'd had two events concern for complex partial seizures (L facial twitching, speech arrest, ?LOC) with Sal's paralysis/residual L facial droop after 2  separate events that occurred more than 48 hours apart.  Her EEG was unremarkable and she was discharged on keppra 1500 BID.  Since discharge, she reports no seizures, but has had nausea/vomiting with the keppra and sometimes is not able to keep the dose of keppra down.  However, she notes she has had episodes of L facial twitching, up to a few times per day and duration of 1-2 minutes without any clear triggers.    With regards to her headache:  Went to ED on 3/11 for headache.  Headaches began while in hospital, but not too severe.  Significantly worsened mid-February.    L neck pain predominantly, that radiates up over her head.  Pressure-like pain.  Photo and phono sensitive.  Feels weak all over; no focal neurological deficits.  Given fioricet in ED, every day or QOD with some help.  10/10 pain at worst, fioricet brings it down to a 5/6.  Nothing in particular makes headaches better/worse.  Possibly worsened by exertion.  Every day.  Before fioricet, was taking tylenol BID.    Never had any headaches like this before.  No FMH of similar headaches.      Review of Systems:  General: No fevers, chills  Eyes: No changes in vision  ENT: No changes in hearing  Respiratory: No SOB  CV: No chest pain, palpitations  GI: No diarrhea, blood in stool  Urinary: No dysuria, hematuria  Skin: No rashes  Neurological: No weakness, confusion.  +facial twitching.  Psychiatric: No auditory nor visual hallucinations.  +anxiety      Past Medical History  Past Medical History:   Diagnosis Date    Allergy     Anxiety     Diabetes mellitus     Heart murmur     Hypertension        Medications    Current Outpatient Medications:     acetaminophen (TYLENOL) 500 MG tablet, Take 2 tablets (1,000 mg total) by mouth every 6 (six) hours as needed for Pain., Disp: 30 tablet, Rfl: 0    amLODIPine (NORVASC) 10 MG tablet, TAKE 1 TABLET(10 MG) BY MOUTH EVERY DAY, Disp: 90 tablet, Rfl: 0    aspirin 81 MG Chew, Take 1 tablet (81 mg total) by  mouth once daily., Disp: , Rfl:     atorvastatin (LIPITOR) 40 MG tablet, Take 1 tablet (40 mg total) by mouth once daily., Disp: 90 tablet, Rfl: 3    benazepril (LOTENSIN) 20 MG tablet, TAKE 1 TABLET(20 MG) BY MOUTH EVERY DAY, Disp: 90 tablet, Rfl: 1    cloNIDine (CATAPRES) 0.1 MG tablet, Take 1 tablet (0.1 mg total) by mouth 2 (two) times daily., Disp: 180 tablet, Rfl: 0    escitalopram oxalate (LEXAPRO) 20 MG tablet, Take 1 tablet (20 mg total) by mouth once daily., Disp: 90 tablet, Rfl: 3    fluticasone (FLONASE) 50 mcg/actuation nasal spray, 1 spray (50 mcg total) by Each Nare route 2 (two) times daily., Disp: 1 Bottle, Rfl: 0    hydrocortisone 2.5 % cream, WOLFGANG EXT AA BID, Disp: , Rfl: 3    hydrOXYzine HCl (ATARAX) 25 MG tablet, Take 1 tablet (25 mg total) by mouth 3 (three) times daily as needed for Anxiety., Disp: 12 tablet, Rfl: 0    ibuprofen (ADVIL,MOTRIN) 600 MG tablet, Take 1 tablet (600 mg total) by mouth every 6 (six) hours as needed for Pain (Take with food as needed for mild-to-moderate pain)., Disp: 20 tablet, Rfl: 0    levETIRAcetam (KEPPRA) 1000 MG tablet, Take 1 tablet (1,000 mg total) by mouth 2 (two) times daily., Disp: 180 tablet, Rfl: 3    lidocaine (LMX) 4 % cream, Apply topically as needed., Disp: , Rfl: 0    loratadine (CLARITIN) 10 mg tablet, Take 1 tablet (10 mg total) by mouth once daily., Disp: 60 tablet, Rfl: 0    metFORMIN (GLUCOPHAGE-XR) 500 MG 24 hr tablet, Take 2 tablets (1,000 mg total) by mouth 2 (two) times daily with meals., Disp: 360 tablet, Rfl: 0    nortriptyline (PAMELOR) 50 MG capsule, Take 1 capsule (50 mg total) by mouth every evening., Disp: 30 capsule, Rfl: 5    ondansetron (ZOFRAN-ODT) 8 MG TbDL, Take 1 tablet (8 mg total) by mouth every 12 (twelve) hours as needed., Disp: 20 tablet, Rfl: 1    zolpidem (AMBIEN) 10 mg Tab, TAKE 1 TABLET BY MOUTH EVERY NIGHT AT BEDTIME, Disp: 30 tablet, Rfl: 5    albuterol 90 mcg/actuation inhaler, Inhale 1-2 puffs into  the lungs every 4 (four) hours as needed for Wheezing. Rescue, Disp: 1 Inhaler, Rfl: 2    hydroCHLOROthiazide (HYDRODIURIL) 25 MG tablet, Take 1 tablet (25 mg total) by mouth once daily., Disp: 90 tablet, Rfl: 0  Any other notable medications as documented in HPI    Allergies  Review of patient's allergies indicates:   Allergen Reactions    Keflex [cephalexin] Hives    Vicodin [hydrocodone-acetaminophen] Itching     itch       Social History  Social History     Socioeconomic History    Marital status:      Spouse name: Not on file    Number of children: Not on file    Years of education: Not on file    Highest education level: Not on file   Occupational History    Occupation:      Employer: [employer]   Social Needs    Financial resource strain: Not on file    Food insecurity:     Worry: Not on file     Inability: Not on file    Transportation needs:     Medical: Not on file     Non-medical: Not on file   Tobacco Use    Smoking status: Former Smoker     Packs/day: 0.00     Years: 0.50     Pack years: 0.00    Smokeless tobacco: Never Used   Substance and Sexual Activity    Alcohol use: Yes     Alcohol/week: 0.0 standard drinks     Comment: seldom    Drug use: No    Sexual activity: Yes     Partners: Male     Birth control/protection: None   Lifestyle    Physical activity:     Days per week: Not on file     Minutes per session: Not on file    Stress: Not on file   Relationships    Social connections:     Talks on phone: Not on file     Gets together: Not on file     Attends Taoism service: Not on file     Active member of club or organization: Not on file     Attends meetings of clubs or organizations: Not on file     Relationship status: Not on file   Other Topics Concern    Are you pregnant or think you may be? No    Breast-feeding No   Social History Narrative    Not on file     Any other notable Social History as documented in HPI.    Family History  Family History  "  Problem Relation Age of Onset    Diabetes Mother     Hyperlipidemia Mother     Hypertension Mother     Cataracts Mother     Diabetes Father     Hypertension Father     Stroke Father     Depression Sister     Hypertension Sister     Stroke Sister     Cancer Maternal Aunt         breast x 2    Breast cancer Maternal Aunt     Cancer Maternal Uncle         prostate x 2    Cancer Paternal Aunt         breast    Breast cancer Paternal Aunt     Cancer Maternal Grandfather         lung    Early death Paternal Grandmother     Early death Paternal Grandfather     No Known Problems Brother     No Known Problems Paternal Uncle     No Known Problems Maternal Grandmother     Melanoma Neg Hx     Eczema Neg Hx     Lupus Neg Hx     Psoriasis Neg Hx     Amblyopia Neg Hx     Blindness Neg Hx     Glaucoma Neg Hx     Macular degeneration Neg Hx     Retinal detachment Neg Hx     Strabismus Neg Hx     Thyroid disease Neg Hx      Any other notable FMH as documented in HPI.    Physical Exam  /87   Pulse 73   Ht 5' 3" (1.6 m)   Wt 102.5 kg (226 lb)   BMI 40.03 kg/m²     General: Well-developed, well-groomed. No apparent distress  HENT: Normocephalic, atraumatic.  No carotid bruits auscultated.    Cardiovascular: Regular rate and rhythm with no murmurs, rubs or gallops.    Chest: Lungs clear to auscultation bilaterally.  No wheezes, stridor, ronchi appreciated.  Abdomen: Normoactive bowel sounds present.  Soft, nontender to palpation.  Musculoskeletal: No peripheral edema    Neurologic Exam: The patient is awake, alert and oriented. Language is fluent.  Fund of knowledge is appropriate.     Cranial nerves:   Pupils are round and reactive to light and accommodation.   Visual fields are full to confrontation.    Ocular motility is full in all cardinal positions of gaze.   Facial sensation is normal to light touch.   Facial activation symmetric  Hearing is symmetric bilaterally.   Palate elevates " "symmetrically.   Shoulder elevation is symmetric and full strength bilaterally.   Tongue is midline and neck rotation strength is normal bilaterally.      Motor examination of all extremities demonstrates normal bulk and tone in all four limbs. There are no atrophy or fasciculations. Strength is 5/5 in the upper and lower extremities bilaterally.    Sensory examination is normal light touch in BUE and BLE.      Deep tendon reflexes are 2+ and symmetric in the upper and lower extremities bilaterally.  No clonus b/l.    Coordination: Finger to nose intact b/l    Lab and Test Results    No recent pertinent labs    Images:  4/10/19 EEG: Per report,  "IMPRESSION:  Normal EEG.     CLINICAL CORRELATION:  The patient is a 54-year-old female who had two events,   which consisted of left facial twitching and speech arrest with a Sal paralysis   with left facial droop post-event.  This occurred on two occasions.  She was   started on levetiracetam 1500 mg twice a day.  This recording was obtained to   evaluate for possible seizures, but this tracing is normal throughout the   recording."    3/11/19 CTH: Per report,  "1. No acute intracranial abnormalities, noting sequela of suspected chronic microvascular ischemic change."    2/16/19 Routine EEG: Per report,  "Abnormal activity:   No epileptiform discharges, periodic discharges, lateralized   rhythmic delta activity or electrographic seizures were seen.    IMPRESSION:   This is a normal routine EEG done in sleep, with only very brief   period of wakefulness."    2/16/19 MRI Brain w/ and w/o: Per report,  "1. No acute intracranial abnormality identified on today's examination.  Specifically, no evidence of acute infarction or enhancing lesion.  2. Multiple foci of T2/FLAIR signal hyperintensity in the supratentorial and periventricular white matter demonstrate no corresponding restricted diffusion or postcontrast enhancement.  These lesions are nonspecific in appearance and " "may be seen with accelerated small vessel ischemic disease, vasculopathies, migraine headaches, and as the residua from inflammatory and traumatic insults to the brain."    2/15/19 CTA Head and Neck: Per report,  "Small caliber right anterior cerebral artery which may be narrow on a congenital basis.  Otherwise CTA head and neck without significant focal stenosis or occlusion."    Assessment and Plan    Problem List Items Addressed This Visit        Neuro    Facial twitching    Current Assessment & Plan     Unclear if this represents seizure activity of L hemifacial spasm.  See epilepsy section for discussion/plan.         Tension headache    Relevant Medications    nortriptyline (PAMELOR) 50 MG capsule    Nonintractable epilepsy without status epilepticus    Current Assessment & Plan     Assessment  Monalisa Carson is a 54 y.o. female w/ PMH significant for Anxiety, HTN, epilepsy presenting as follow-up for evaluation of seizures.    Overall, I am suspicious that the TWO events (more than 48 hrs apart) she reported in February may represent seizure activity.  She reports no further generalized seizure activity/LOC, but continues to have L facial twitching while on keppra.  These episodes have not been accompanied by any alteration of awareness/loss of consciousness (except at onset in February 2019).  It is unclear to me if these represent focal seizures without alteration of consciousness that are under partial control with keppra, or if they represent hemifacial spasm.  Her prior EEGs have been negative, and MRI was unrevealing of an acute change (although did note scattered chronic microvascular ischemic changes).      At this point, differential would include epilepsy, hemifacial spasm, or conversion disorder.  To more accurately diagnose these symptoms, ordered a 72 hour ambulatory EEG, but the report has not been posted yet.    We reviewed pertinent [place] driving laws in the setting of seizures, and " recommended no further driving until follow-up after ambulatory EEG report is returned.    Recommendations & Plan:  -Keppra 1 g BID  -F/u ambulatory EEG and will call with results  -RTC 3 months         Relevant Medications    levETIRAcetam (KEPPRA) 1000 MG tablet    Migraine without aura and without status migrainosus, not intractable    Current Assessment & Plan     -Increase nortriptyline to 50 mg QHS                    Humberto Fine MD  Neurology Resident   [facility]  [address] [place] [address]

## 2019-10-13 NOTE — ASSESSMENT & PLAN NOTE
Unclear if this represents seizure activity of L hemifacial spasm.  See epilepsy section for discussion/plan.

## 2019-10-13 NOTE — ASSESSMENT & PLAN NOTE
Assessment  Monalisa Carson is a 54 y.o. female w/ PMH significant for Anxiety, HTN, epilepsy presenting as follow-up for evaluation of seizures.    Overall, I am suspicious that the TWO events (more than 48 hrs apart) she reported in February may represent seizure activity.  She reports no further generalized seizure activity/LOC, but continues to have L facial twitching while on keppra.  These episodes have not been accompanied by any alteration of awareness/loss of consciousness (except at onset in February 2019).  It is unclear to me if these represent focal seizures without alteration of consciousness that are under partial control with keppra, or if they represent hemifacial spasm.  Her prior EEGs have been negative, and MRI was unrevealing of an acute change (although did note scattered chronic microvascular ischemic changes).      At this point, differential would include epilepsy, hemifacial spasm, or conversion disorder.  To more accurately diagnose these symptoms, ordered a 72 hour ambulatory EEG, but the report has not been posted yet.    We reviewed pertinent [place] driving laws in the setting of seizures, and recommended no further driving until follow-up after ambulatory EEG report is returned.    Recommendations & Plan:  -Keppra 1 g BID  -F/u ambulatory EEG and will call with results  -RTC 3 months

## 2019-11-04 ENCOUNTER — TELEPHONE (OUTPATIENT)
Dept: NEUROLOGY | Facility: CLINIC | Age: 54
End: 2019-11-04

## 2019-11-04 NOTE — TELEPHONE ENCOUNTER
11/04/2019  12:11 PM    Discussed request for physicians statement/return to work forms.  Will fax to number provided.      Humberto Fine MD    [facility]   [address] [place] [id number]  Phone Number: [phone]  Fax Number: [phone]

## 2019-11-06 ENCOUNTER — TELEPHONE (OUTPATIENT)
Dept: NEUROLOGY | Facility: CLINIC | Age: 54
End: 2019-11-06

## 2019-11-06 NOTE — TELEPHONE ENCOUNTER
----- Message from Sascha Ca sent at 11/4/2019 10:26 AM CST -----  Contact: patient  Please call pt at [phone]    Patient is on her way to bring disability paperwork to be completed    Thank you

## 2019-11-08 DIAGNOSIS — I10 UNCONTROLLED HYPERTENSION: ICD-10-CM

## 2019-11-08 RX ORDER — METFORMIN HYDROCHLORIDE 500 MG/1
TABLET, EXTENDED RELEASE ORAL
Qty: 360 TABLET | Refills: 0 | Status: SHIPPED | OUTPATIENT
Start: 2019-11-08 | End: 2020-02-06

## 2019-11-08 RX ORDER — HYDROCHLOROTHIAZIDE 25 MG/1
TABLET ORAL
Qty: 90 TABLET | Refills: 0 | Status: SHIPPED | OUTPATIENT
Start: 2019-11-08 | End: 2020-02-06

## 2019-11-08 RX ORDER — AMLODIPINE BESYLATE 10 MG/1
TABLET ORAL
Qty: 90 TABLET | Refills: 0 | Status: SHIPPED | OUTPATIENT
Start: 2019-11-08 | End: 2020-02-06

## 2019-11-11 RX ORDER — BENAZEPRIL HYDROCHLORIDE 20 MG/1
TABLET ORAL
Qty: 90 TABLET | Refills: 0 | Status: SHIPPED | OUTPATIENT
Start: 2019-11-11 | End: 2019-11-22

## 2019-11-12 ENCOUNTER — TELEPHONE (OUTPATIENT)
Dept: FAMILY MEDICINE | Facility: CLINIC | Age: 54
End: 2019-11-12

## 2019-11-12 NOTE — TELEPHONE ENCOUNTER
Spoke with patient appoint schedule at this time for B/P check and also with PCP , patient verbalized understand .

## 2019-11-14 ENCOUNTER — CLINICAL SUPPORT (OUTPATIENT)
Dept: FAMILY MEDICINE | Facility: CLINIC | Age: 54
End: 2019-11-14
Payer: COMMERCIAL

## 2019-11-14 VITALS — SYSTOLIC BLOOD PRESSURE: 150 MMHG | DIASTOLIC BLOOD PRESSURE: 90 MMHG

## 2019-11-14 DIAGNOSIS — I10 UNCONTROLLED HYPERTENSION: Primary | ICD-10-CM

## 2019-11-14 PROCEDURE — 99999 PR PBB SHADOW E&M-EST. PATIENT-LVL II: CPT | Mod: PBBFAC,,,

## 2019-11-14 PROCEDURE — 99499 UNLISTED E&M SERVICE: CPT | Mod: S$GLB,,, | Performed by: FAMILY MEDICINE

## 2019-11-14 PROCEDURE — 99499 NO LOS: ICD-10-PCS | Mod: S$GLB,,, | Performed by: FAMILY MEDICINE

## 2019-11-14 PROCEDURE — 99999 PR PBB SHADOW E&M-EST. PATIENT-LVL II: ICD-10-PCS | Mod: PBBFAC,,,

## 2019-11-14 NOTE — PROGRESS NOTES
Monalisa DONNA Carson 54 y.o. female is here today for Blood Pressure check.   History of HTN yes.    Review of patient's allergies indicates:   Allergen Reactions    Keflex [cephalexin] Hives    Vicodin [hydrocodone-acetaminophen] Itching     itch     Creatinine   Date Value Ref Range Status   03/11/2019 0.8 0.5 - 1.4 mg/dL Final     Sodium   Date Value Ref Range Status   03/11/2019 138 136 - 145 mmol/L Final     Potassium   Date Value Ref Range Status   03/11/2019 3.8 3.5 - 5.1 mmol/L Final   ]  Patient verifies taking blood pressure medications on a regular basis at the same time of the day.     Current Outpatient Medications:     acetaminophen (TYLENOL) 500 MG tablet, Take 2 tablets (1,000 mg total) by mouth every 6 (six) hours as needed for Pain., Disp: 30 tablet, Rfl: 0    albuterol 90 mcg/actuation inhaler, Inhale 1-2 puffs into the lungs every 4 (four) hours as needed for Wheezing. Rescue, Disp: 1 Inhaler, Rfl: 2    amLODIPine (NORVASC) 10 MG tablet, TAKE 1 TABLET(10 MG) BY MOUTH EVERY DAY, Disp: 90 tablet, Rfl: 0    aspirin 81 MG Chew, Take 1 tablet (81 mg total) by mouth once daily., Disp: , Rfl:     atorvastatin (LIPITOR) 40 MG tablet, Take 1 tablet (40 mg total) by mouth once daily., Disp: 90 tablet, Rfl: 3    benazepril (LOTENSIN) 20 MG tablet, TAKE 1 TABLET(20 MG) BY MOUTH EVERY DAY, Disp: 90 tablet, Rfl: 0    cloNIDine (CATAPRES) 0.1 MG tablet, Take 1 tablet (0.1 mg total) by mouth 2 (two) times daily., Disp: 180 tablet, Rfl: 0    escitalopram oxalate (LEXAPRO) 20 MG tablet, Take 1 tablet (20 mg total) by mouth once daily., Disp: 90 tablet, Rfl: 3    fluticasone (FLONASE) 50 mcg/actuation nasal spray, 1 spray (50 mcg total) by Each Nare route 2 (two) times daily., Disp: 1 Bottle, Rfl: 0    hydroCHLOROthiazide (HYDRODIURIL) 25 MG tablet, TAKE 1 TABLET(25 MG) BY MOUTH EVERY DAY, Disp: 90 tablet, Rfl: 0    hydrocortisone 2.5 % cream, WOLFGANG EXT AA BID, Disp: , Rfl: 3    hydrOXYzine HCl (ATARAX)  25 MG tablet, Take 1 tablet (25 mg total) by mouth 3 (three) times daily as needed for Anxiety., Disp: 12 tablet, Rfl: 0    ibuprofen (ADVIL,MOTRIN) 600 MG tablet, Take 1 tablet (600 mg total) by mouth every 6 (six) hours as needed for Pain (Take with food as needed for mild-to-moderate pain)., Disp: 20 tablet, Rfl: 0    levETIRAcetam (KEPPRA) 1000 MG tablet, Take 1 tablet (1,000 mg total) by mouth 2 (two) times daily., Disp: 180 tablet, Rfl: 3    lidocaine (LMX) 4 % cream, Apply topically as needed., Disp: , Rfl: 0    loratadine (CLARITIN) 10 mg tablet, Take 1 tablet (10 mg total) by mouth once daily., Disp: 60 tablet, Rfl: 0    metFORMIN (GLUCOPHAGE-XR) 500 MG 24 hr tablet, TAKE 2 TABLETS(1000 MG) BY MOUTH TWICE DAILY WITH MEALS, Disp: 360 tablet, Rfl: 0    nortriptyline (PAMELOR) 50 MG capsule, Take 1 capsule (50 mg total) by mouth every evening., Disp: 30 capsule, Rfl: 5    ondansetron (ZOFRAN-ODT) 8 MG TbDL, Take 1 tablet (8 mg total) by mouth every 12 (twelve) hours as needed., Disp: 20 tablet, Rfl: 1    zolpidem (AMBIEN) 10 mg Tab, TAKE 1 TABLET BY MOUTH EVERY NIGHT AT BEDTIME, Disp: 30 tablet, Rfl: 5  Does patient have record of home blood pressure readings no.  Last dose of blood pressure medication was taken at 8 am today  Patient is asymptomatic.       Vitals:    11/14/19 0920   BP: (!) 158/90   BP Location: Right arm   Patient Position: Sitting   BP Method: Large (Manual)          informed of nurse visit.

## 2019-11-22 ENCOUNTER — OFFICE VISIT (OUTPATIENT)
Dept: FAMILY MEDICINE | Facility: CLINIC | Age: 54
End: 2019-11-22
Payer: COMMERCIAL

## 2019-11-22 VITALS
OXYGEN SATURATION: 97 % | HEIGHT: 63 IN | WEIGHT: 226.19 LBS | RESPIRATION RATE: 16 BRPM | BODY MASS INDEX: 40.08 KG/M2 | SYSTOLIC BLOOD PRESSURE: 142 MMHG | DIASTOLIC BLOOD PRESSURE: 82 MMHG | TEMPERATURE: 98 F | HEART RATE: 86 BPM

## 2019-11-22 DIAGNOSIS — Z12.31 SCREENING MAMMOGRAM, ENCOUNTER FOR: ICD-10-CM

## 2019-11-22 DIAGNOSIS — Z23 NEED FOR PNEUMOCOCCAL VACCINATION: ICD-10-CM

## 2019-11-22 DIAGNOSIS — I10 UNCONTROLLED HYPERTENSION: Primary | ICD-10-CM

## 2019-11-22 PROCEDURE — 3079F DIAST BP 80-89 MM HG: CPT | Mod: CPTII,S$GLB,, | Performed by: FAMILY MEDICINE

## 2019-11-22 PROCEDURE — 99999 PR PBB SHADOW E&M-EST. PATIENT-LVL IV: CPT | Mod: PBBFAC,,, | Performed by: FAMILY MEDICINE

## 2019-11-22 PROCEDURE — 90471 PNEUMOCOCCAL CONJUGATE VACCINE 13-VALENT LESS THAN 5YO & GREATER THAN: ICD-10-PCS | Mod: S$GLB,,, | Performed by: FAMILY MEDICINE

## 2019-11-22 PROCEDURE — 3008F BODY MASS INDEX DOCD: CPT | Mod: CPTII,S$GLB,, | Performed by: FAMILY MEDICINE

## 2019-11-22 PROCEDURE — 90670 PNEUMOCOCCAL CONJUGATE VACCINE 13-VALENT LESS THAN 5YO & GREATER THAN: ICD-10-PCS | Mod: S$GLB,,, | Performed by: FAMILY MEDICINE

## 2019-11-22 PROCEDURE — 99214 OFFICE O/P EST MOD 30 MIN: CPT | Mod: 25,S$GLB,, | Performed by: FAMILY MEDICINE

## 2019-11-22 PROCEDURE — 99214 PR OFFICE/OUTPT VISIT, EST, LEVL IV, 30-39 MIN: ICD-10-PCS | Mod: 25,S$GLB,, | Performed by: FAMILY MEDICINE

## 2019-11-22 PROCEDURE — 3074F SYST BP LT 130 MM HG: CPT | Mod: CPTII,S$GLB,, | Performed by: FAMILY MEDICINE

## 2019-11-22 PROCEDURE — 99999 PR PBB SHADOW E&M-EST. PATIENT-LVL IV: ICD-10-PCS | Mod: PBBFAC,,, | Performed by: FAMILY MEDICINE

## 2019-11-22 PROCEDURE — 90471 IMMUNIZATION ADMIN: CPT | Mod: S$GLB,,, | Performed by: FAMILY MEDICINE

## 2019-11-22 PROCEDURE — 3008F PR BODY MASS INDEX (BMI) DOCUMENTED: ICD-10-PCS | Mod: CPTII,S$GLB,, | Performed by: FAMILY MEDICINE

## 2019-11-22 PROCEDURE — 90670 PCV13 VACCINE IM: CPT | Mod: S$GLB,,, | Performed by: FAMILY MEDICINE

## 2019-11-22 PROCEDURE — 3074F PR MOST RECENT SYSTOLIC BLOOD PRESSURE < 130 MM HG: ICD-10-PCS | Mod: CPTII,S$GLB,, | Performed by: FAMILY MEDICINE

## 2019-11-22 PROCEDURE — 3079F PR MOST RECENT DIASTOLIC BLOOD PRESSURE 80-89 MM HG: ICD-10-PCS | Mod: CPTII,S$GLB,, | Performed by: FAMILY MEDICINE

## 2019-11-22 RX ORDER — BENAZEPRIL HYDROCHLORIDE 40 MG/1
40 TABLET ORAL DAILY
Qty: 90 TABLET | Refills: 0 | Status: ON HOLD | OUTPATIENT
Start: 2019-11-22 | End: 2020-04-04 | Stop reason: HOSPADM

## 2019-11-22 NOTE — PROGRESS NOTES
Subjective:       Patient ID: Monalisa THOMPSON Favorite     Chief Complaint: Hypertension (follow up)      Isabel Carson is a 54 y.o. female here for follow up uncontrolled HTN and diabetes.  BP has improved.  No symptoms of hyperglycemia.    Review of patient's allergies indicates:   Allergen Reactions    Keflex [cephalexin] Hives    Vicodin [hydrocodone-acetaminophen] Itching     itch       Current Outpatient Medications:     acetaminophen (TYLENOL) 500 MG tablet, Take 2 tablets (1,000 mg total) by mouth every 6 (six) hours as needed for Pain., Disp: 30 tablet, Rfl: 0    amLODIPine (NORVASC) 10 MG tablet, TAKE 1 TABLET(10 MG) BY MOUTH EVERY DAY, Disp: 90 tablet, Rfl: 0    aspirin 81 MG Chew, Take 1 tablet (81 mg total) by mouth once daily., Disp: , Rfl:     atorvastatin (LIPITOR) 40 MG tablet, Take 1 tablet (40 mg total) by mouth once daily., Disp: 90 tablet, Rfl: 3    benazepril (LOTENSIN) 40 MG tablet, Take 1 tablet (40 mg total) by mouth once daily., Disp: 90 tablet, Rfl: 0    cloNIDine (CATAPRES) 0.1 MG tablet, Take 1 tablet (0.1 mg total) by mouth 2 (two) times daily., Disp: 180 tablet, Rfl: 0    escitalopram oxalate (LEXAPRO) 20 MG tablet, Take 1 tablet (20 mg total) by mouth once daily., Disp: 90 tablet, Rfl: 3    fluticasone (FLONASE) 50 mcg/actuation nasal spray, 1 spray (50 mcg total) by Each Nare route 2 (two) times daily., Disp: 1 Bottle, Rfl: 0    hydroCHLOROthiazide (HYDRODIURIL) 25 MG tablet, TAKE 1 TABLET(25 MG) BY MOUTH EVERY DAY, Disp: 90 tablet, Rfl: 0    hydrocortisone 2.5 % cream, WOLFGANG EXT AA BID, Disp: , Rfl: 3    hydrOXYzine HCl (ATARAX) 25 MG tablet, Take 1 tablet (25 mg total) by mouth 3 (three) times daily as needed for Anxiety., Disp: 12 tablet, Rfl: 0    ibuprofen (ADVIL,MOTRIN) 600 MG tablet, Take 1 tablet (600 mg total) by mouth every 6 (six) hours as needed for Pain (Take with food as needed for mild-to-moderate pain)., Disp: 20 tablet, Rfl: 0    levETIRAcetam  (KEPPRA) 1000 MG tablet, Take 1 tablet (1,000 mg total) by mouth 2 (two) times daily., Disp: 180 tablet, Rfl: 3    lidocaine (LMX) 4 % cream, Apply topically as needed., Disp: , Rfl: 0    loratadine (CLARITIN) 10 mg tablet, Take 1 tablet (10 mg total) by mouth once daily., Disp: 60 tablet, Rfl: 0    metFORMIN (GLUCOPHAGE-XR) 500 MG 24 hr tablet, TAKE 2 TABLETS(1000 MG) BY MOUTH TWICE DAILY WITH MEALS, Disp: 360 tablet, Rfl: 0    nortriptyline (PAMELOR) 50 MG capsule, Take 1 capsule (50 mg total) by mouth every evening., Disp: 30 capsule, Rfl: 5    ondansetron (ZOFRAN-ODT) 8 MG TbDL, Take 1 tablet (8 mg total) by mouth every 12 (twelve) hours as needed., Disp: 20 tablet, Rfl: 1    zolpidem (AMBIEN) 10 mg Tab, TAKE 1 TABLET BY MOUTH EVERY NIGHT AT BEDTIME, Disp: 30 tablet, Rfl: 5    albuterol 90 mcg/actuation inhaler, Inhale 1-2 puffs into the lungs every 4 (four) hours as needed for Wheezing. Rescue, Disp: 1 Inhaler, Rfl: 2    Past Medical History:   Diagnosis Date    Allergy     Anxiety     Diabetes mellitus     Heart murmur     Hypertension      Review of Systems   Cardiovascular: Negative for chest pain.   Endocrine: Negative for polydipsia and polyphagia.   Neurological:        Facial twitching       Objective:      Physical Exam   Constitutional: She appears well-developed and well-nourished.   Cardiovascular: Normal rate and regular rhythm.   Pulmonary/Chest: Effort normal and breath sounds normal.   Musculoskeletal: She exhibits no edema.   Neurological: She is alert.       Assessment:       1. Uncontrolled hypertension    2. Uncontrolled type 2 diabetes mellitus with stage 2 chronic kidney disease, without long-term current use of insulin    3. Need for pneumococcal vaccination    4. Screening mammogram, encounter for        Plan:       Monalisa was seen today for hypertension.    Diagnoses and all orders for this visit:    Uncontrolled hypertension  -     Increase benazepril (LOTENSIN) 40 MG  tablet; Take 1 tablet (40 mg total) by mouth once daily.  -     Hypertension Digital Medicine (HDMP) Enrollment Order    Uncontrolled type 2 diabetes mellitus with stage 2 chronic kidney disease, without long-term current use of insulin  -     Hemoglobin A1c; Future  Encourage compliance with statin    Need for pneumococcal vaccination  -     Pneumococcal Conjugate Vaccine (13 Valent) (IM)    Screening mammogram, encounter for  -     Mammo Digital Screening Bilateral With CAD; Future

## 2019-11-22 NOTE — PROGRESS NOTES
Patient received pneumonia vaccination to left deltoid. Tolerated well. 0 adverse reaction noted at this time.

## 2019-11-25 ENCOUNTER — LAB VISIT (OUTPATIENT)
Dept: LAB | Facility: HOSPITAL | Age: 54
End: 2019-11-25
Attending: FAMILY MEDICINE
Payer: COMMERCIAL

## 2019-11-25 ENCOUNTER — CLINICAL SUPPORT (OUTPATIENT)
Dept: OPHTHALMOLOGY | Facility: CLINIC | Age: 54
End: 2019-11-25
Payer: COMMERCIAL

## 2019-11-25 DIAGNOSIS — E11.9 TYPE 2 DIABETES MELLITUS WITHOUT COMPLICATION, UNSPECIFIED WHETHER LONG TERM INSULIN USE: ICD-10-CM

## 2019-11-25 LAB
ESTIMATED AVG GLUCOSE: 186 MG/DL (ref 68–131)
HBA1C MFR BLD HPLC: 8.1 % (ref 4–5.6)

## 2019-11-25 PROCEDURE — 36415 COLL VENOUS BLD VENIPUNCTURE: CPT | Mod: PO

## 2019-11-25 PROCEDURE — 83036 HEMOGLOBIN GLYCOSYLATED A1C: CPT

## 2019-11-25 PROCEDURE — 92250 FUNDUS PHOTOGRAPHY W/I&R: CPT | Mod: 26,S$GLB,, | Performed by: OPHTHALMOLOGY

## 2019-11-25 PROCEDURE — 92250 DIABETIC EYE SCREENING PHOTO: ICD-10-PCS | Mod: 26,S$GLB,, | Performed by: OPHTHALMOLOGY

## 2019-11-25 PROCEDURE — 2022F DILAT RTA XM EVC RTNOPTHY: CPT | Mod: S$GLB,,, | Performed by: OPHTHALMOLOGY

## 2019-11-25 PROCEDURE — 2022F DIABETIC EYE SCREENING PHOTO: ICD-10-PCS | Mod: S$GLB,,, | Performed by: OPHTHALMOLOGY

## 2019-11-25 NOTE — PROGRESS NOTES
HPI     53 Y/o here for screening for diabetic retinopathy with non-dilated   photos per Dr. See     Last edited by Mike Garibay MA on 11/25/2019 10:21 AM. (History)            Assessment /Plan     For exam results, see Encounter Report.    There are no diagnoses linked to this encounter.    Please see Dr. Lugo progress note for interpretation

## 2019-12-04 ENCOUNTER — TELEPHONE (OUTPATIENT)
Dept: FAMILY MEDICINE | Facility: CLINIC | Age: 54
End: 2019-12-04

## 2019-12-04 NOTE — TELEPHONE ENCOUNTER
----- Message from Alisha See MD sent at 12/3/2019  5:33 PM CST -----  Diabetes not at goal.  I would like to add another medication to get closer to 7%.  Please let me know if you are in agreement.

## 2019-12-04 NOTE — TELEPHONE ENCOUNTER
Notified of information below. Patient verbalized understanding.     Pt is ok with adding medication.

## 2019-12-04 NOTE — TELEPHONE ENCOUNTER
Notified of information below. Patient verbalized understanding.      pt also states she is having bad spasm in her stomach and back. She is wondering if something is going on with her potassium level. Please advise

## 2019-12-05 NOTE — TELEPHONE ENCOUNTER
Patient advised, per Dr. See, that an office visit is needed. Patient verbalized understanding, scheduled appointment for 12/13/2019.

## 2019-12-06 ENCOUNTER — CLINICAL SUPPORT (OUTPATIENT)
Dept: FAMILY MEDICINE | Facility: CLINIC | Age: 54
End: 2019-12-06
Payer: COMMERCIAL

## 2019-12-06 VITALS — SYSTOLIC BLOOD PRESSURE: 136 MMHG | DIASTOLIC BLOOD PRESSURE: 88 MMHG

## 2019-12-06 DIAGNOSIS — I10 ESSENTIAL HYPERTENSION: Primary | ICD-10-CM

## 2019-12-06 PROCEDURE — 99499 UNLISTED E&M SERVICE: CPT | Mod: S$GLB,,, | Performed by: FAMILY MEDICINE

## 2019-12-06 PROCEDURE — 99499 NO LOS: ICD-10-PCS | Mod: S$GLB,,, | Performed by: FAMILY MEDICINE

## 2019-12-06 RX ORDER — HYDROCORTISONE 25 MG/G
CREAM TOPICAL
Qty: 28 G | Refills: 3 | Status: SHIPPED | OUTPATIENT
Start: 2019-12-06 | End: 2020-04-06 | Stop reason: SDUPTHER

## 2019-12-06 NOTE — PROGRESS NOTES
Monalisa DONNA Carson 54 y.o. female is here today for Blood Pressure check.   History of HTN: Yes.    Review of patient's allergies indicates:   Allergen Reactions    Keflex [cephalexin] Hives    Vicodin [hydrocodone-acetaminophen] Itching     itch     Creatinine   Date Value Ref Range Status   03/11/2019 0.8 0.5 - 1.4 mg/dL Final     Sodium   Date Value Ref Range Status   03/11/2019 138 136 - 145 mmol/L Final     Potassium   Date Value Ref Range Status   03/11/2019 3.8 3.5 - 5.1 mmol/L Final   ]  Patient verifies taking blood pressure medications on a regular basis at the same time of the day.     Current Outpatient Medications:     acetaminophen (TYLENOL) 500 MG tablet, Take 2 tablets (1,000 mg total) by mouth every 6 (six) hours as needed for Pain., Disp: 30 tablet, Rfl: 0    albuterol 90 mcg/actuation inhaler, Inhale 1-2 puffs into the lungs every 4 (four) hours as needed for Wheezing. Rescue, Disp: 1 Inhaler, Rfl: 2    amLODIPine (NORVASC) 10 MG tablet, TAKE 1 TABLET(10 MG) BY MOUTH EVERY DAY, Disp: 90 tablet, Rfl: 0    aspirin 81 MG Chew, Take 1 tablet (81 mg total) by mouth once daily., Disp: , Rfl:     atorvastatin (LIPITOR) 40 MG tablet, Take 1 tablet (40 mg total) by mouth once daily., Disp: 90 tablet, Rfl: 3    benazepril (LOTENSIN) 40 MG tablet, Take 1 tablet (40 mg total) by mouth once daily., Disp: 90 tablet, Rfl: 0    cloNIDine (CATAPRES) 0.1 MG tablet, Take 1 tablet (0.1 mg total) by mouth 2 (two) times daily., Disp: 180 tablet, Rfl: 0    escitalopram oxalate (LEXAPRO) 20 MG tablet, Take 1 tablet (20 mg total) by mouth once daily., Disp: 90 tablet, Rfl: 3    fluticasone (FLONASE) 50 mcg/actuation nasal spray, 1 spray (50 mcg total) by Each Nare route 2 (two) times daily., Disp: 1 Bottle, Rfl: 0    hydroCHLOROthiazide (HYDRODIURIL) 25 MG tablet, TAKE 1 TABLET(25 MG) BY MOUTH EVERY DAY, Disp: 90 tablet, Rfl: 0    hydrocortisone 2.5 % cream, WOLFGANG EXT AA BID, Disp: , Rfl: 3    hydrOXYzine HCl  (ATARAX) 25 MG tablet, Take 1 tablet (25 mg total) by mouth 3 (three) times daily as needed for Anxiety., Disp: 12 tablet, Rfl: 0    ibuprofen (ADVIL,MOTRIN) 600 MG tablet, Take 1 tablet (600 mg total) by mouth every 6 (six) hours as needed for Pain (Take with food as needed for mild-to-moderate pain)., Disp: 20 tablet, Rfl: 0    levETIRAcetam (KEPPRA) 1000 MG tablet, Take 1 tablet (1,000 mg total) by mouth 2 (two) times daily., Disp: 180 tablet, Rfl: 3    lidocaine (LMX) 4 % cream, Apply topically as needed., Disp: , Rfl: 0    loratadine (CLARITIN) 10 mg tablet, Take 1 tablet (10 mg total) by mouth once daily., Disp: 60 tablet, Rfl: 0    metFORMIN (GLUCOPHAGE-XR) 500 MG 24 hr tablet, TAKE 2 TABLETS(1000 MG) BY MOUTH TWICE DAILY WITH MEALS, Disp: 360 tablet, Rfl: 0    nortriptyline (PAMELOR) 50 MG capsule, Take 1 capsule (50 mg total) by mouth every evening., Disp: 30 capsule, Rfl: 5    ondansetron (ZOFRAN-ODT) 8 MG TbDL, Take 1 tablet (8 mg total) by mouth every 12 (twelve) hours as needed., Disp: 20 tablet, Rfl: 1    SITagliptin (JANUVIA) 100 MG Tab, Take 1 tablet (100 mg total) by mouth once daily., Disp: 90 tablet, Rfl: 0    zolpidem (AMBIEN) 10 mg Tab, TAKE 1 TABLET BY MOUTH EVERY NIGHT AT BEDTIME, Disp: 30 tablet, Rfl: 5  Does patient have record of home blood pressure readings: No.    Last dose of blood pressure medication was taken at 8:00 AM.  Patient is asymptomactic.       Vitals:    12/06/19 0936   BP: 136/88   BP Location: Left arm   Patient Position: Sitting   BP Method: Large (Manual)         Dr. Alisha See informed of nurse visit.

## 2019-12-13 ENCOUNTER — TELEPHONE (OUTPATIENT)
Dept: NEUROLOGY | Facility: CLINIC | Age: 54
End: 2019-12-13

## 2019-12-16 ENCOUNTER — LAB VISIT (OUTPATIENT)
Dept: LAB | Facility: HOSPITAL | Age: 54
End: 2019-12-16
Attending: FAMILY MEDICINE
Payer: COMMERCIAL

## 2019-12-16 ENCOUNTER — OFFICE VISIT (OUTPATIENT)
Dept: FAMILY MEDICINE | Facility: CLINIC | Age: 54
End: 2019-12-16
Payer: COMMERCIAL

## 2019-12-16 VITALS
HEART RATE: 94 BPM | WEIGHT: 225.75 LBS | SYSTOLIC BLOOD PRESSURE: 150 MMHG | BODY MASS INDEX: 40 KG/M2 | DIASTOLIC BLOOD PRESSURE: 90 MMHG | TEMPERATURE: 98 F | OXYGEN SATURATION: 99 % | HEIGHT: 63 IN | RESPIRATION RATE: 16 BRPM

## 2019-12-16 DIAGNOSIS — M62.838 MUSCLE SPASM: ICD-10-CM

## 2019-12-16 DIAGNOSIS — M62.838 MUSCLE SPASM: Primary | ICD-10-CM

## 2019-12-16 LAB
CK SERPL-CCNC: 202 U/L (ref 20–180)
POTASSIUM SERPL-SCNC: 4.1 MMOL/L (ref 3.5–5.1)

## 2019-12-16 PROCEDURE — 84132 ASSAY OF SERUM POTASSIUM: CPT

## 2019-12-16 PROCEDURE — 99214 PR OFFICE/OUTPT VISIT, EST, LEVL IV, 30-39 MIN: ICD-10-PCS | Mod: S$GLB,,, | Performed by: FAMILY MEDICINE

## 2019-12-16 PROCEDURE — 99999 PR PBB SHADOW E&M-EST. PATIENT-LVL III: CPT | Mod: PBBFAC,,, | Performed by: FAMILY MEDICINE

## 2019-12-16 PROCEDURE — 99214 OFFICE O/P EST MOD 30 MIN: CPT | Mod: S$GLB,,, | Performed by: FAMILY MEDICINE

## 2019-12-16 PROCEDURE — 99999 PR PBB SHADOW E&M-EST. PATIENT-LVL III: ICD-10-PCS | Mod: PBBFAC,,, | Performed by: FAMILY MEDICINE

## 2019-12-16 PROCEDURE — 36415 COLL VENOUS BLD VENIPUNCTURE: CPT | Mod: PO

## 2019-12-16 PROCEDURE — 82550 ASSAY OF CK (CPK): CPT

## 2019-12-16 NOTE — PROGRESS NOTES
Subjective:       Patient ID: Monalisa Carson     Chief Complaint: Abdominal Pain and Back Pain      Isabel Carson is a 54 y.o. female.presents with severe muscle cramps in abdomen and lower back x 2 days last week.  No bowel changes.  No mucous in stool.  No association with meals.    Review of patient's allergies indicates:   Allergen Reactions    Keflex [cephalexin] Hives    Vicodin [hydrocodone-acetaminophen] Itching     itch       Current Outpatient Medications:     acetaminophen (TYLENOL) 500 MG tablet, Take 2 tablets (1,000 mg total) by mouth every 6 (six) hours as needed for Pain., Disp: 30 tablet, Rfl: 0    amLODIPine (NORVASC) 10 MG tablet, TAKE 1 TABLET(10 MG) BY MOUTH EVERY DAY, Disp: 90 tablet, Rfl: 0    aspirin 81 MG Chew, Take 1 tablet (81 mg total) by mouth once daily., Disp: , Rfl:     atorvastatin (LIPITOR) 40 MG tablet, Take 1 tablet (40 mg total) by mouth once daily., Disp: 90 tablet, Rfl: 3    benazepril (LOTENSIN) 40 MG tablet, Take 1 tablet (40 mg total) by mouth once daily., Disp: 90 tablet, Rfl: 0    cloNIDine (CATAPRES) 0.1 MG tablet, Take 1 tablet (0.1 mg total) by mouth 2 (two) times daily., Disp: 180 tablet, Rfl: 0    escitalopram oxalate (LEXAPRO) 20 MG tablet, Take 1 tablet (20 mg total) by mouth once daily., Disp: 90 tablet, Rfl: 3    fluticasone (FLONASE) 50 mcg/actuation nasal spray, 1 spray (50 mcg total) by Each Nare route 2 (two) times daily., Disp: 1 Bottle, Rfl: 0    hydroCHLOROthiazide (HYDRODIURIL) 25 MG tablet, TAKE 1 TABLET(25 MG) BY MOUTH EVERY DAY, Disp: 90 tablet, Rfl: 0    hydrocortisone 2.5 % cream, WOLFGANG EXT AA BID, Disp: 28 g, Rfl: 3    hydrOXYzine HCl (ATARAX) 25 MG tablet, Take 1 tablet (25 mg total) by mouth 3 (three) times daily as needed for Anxiety., Disp: 12 tablet, Rfl: 0    ibuprofen (ADVIL,MOTRIN) 600 MG tablet, Take 1 tablet (600 mg total) by mouth every 6 (six) hours as needed for Pain (Take with food as needed for  mild-to-moderate pain)., Disp: 20 tablet, Rfl: 0    levETIRAcetam (KEPPRA) 1000 MG tablet, Take 1 tablet (1,000 mg total) by mouth 2 (two) times daily., Disp: 180 tablet, Rfl: 3    lidocaine (LMX) 4 % cream, Apply topically as needed., Disp: , Rfl: 0    loratadine (CLARITIN) 10 mg tablet, Take 1 tablet (10 mg total) by mouth once daily., Disp: 60 tablet, Rfl: 0    metFORMIN (GLUCOPHAGE-XR) 500 MG 24 hr tablet, TAKE 2 TABLETS(1000 MG) BY MOUTH TWICE DAILY WITH MEALS, Disp: 360 tablet, Rfl: 0    nortriptyline (PAMELOR) 50 MG capsule, Take 1 capsule (50 mg total) by mouth every evening., Disp: 30 capsule, Rfl: 5    ondansetron (ZOFRAN-ODT) 8 MG TbDL, Take 1 tablet (8 mg total) by mouth every 12 (twelve) hours as needed., Disp: 20 tablet, Rfl: 1    SITagliptin (JANUVIA) 100 MG Tab, Take 1 tablet (100 mg total) by mouth once daily., Disp: 90 tablet, Rfl: 0    zolpidem (AMBIEN) 10 mg Tab, TAKE 1 TABLET BY MOUTH EVERY NIGHT AT BEDTIME, Disp: 30 tablet, Rfl: 5    albuterol 90 mcg/actuation inhaler, Inhale 1-2 puffs into the lungs every 4 (four) hours as needed for Wheezing. Rescue, Disp: 1 Inhaler, Rfl: 2    Past Medical History:   Diagnosis Date    Allergy     Anxiety     Diabetes mellitus     Heart murmur     Hypertension      Review of Systems   Constitutional: Negative for fever.   Gastrointestinal: Negative for blood in stool.       Objective:      Physical Exam   Constitutional: She appears well-developed and well-nourished.   Cardiovascular: Normal rate.   Murmur heard.  Pulmonary/Chest: Effort normal and breath sounds normal.   Abdominal: She exhibits no mass. There is no tenderness.   Musculoskeletal: She exhibits no edema.       Assessment:       1. Muscle spasm        Plan:       Monalisa was seen today for abdominal pain and back pain.    Diagnoses and all orders for this visit:    Muscle spasm  -     CK; Future  -     Potassium; Future  Hold Lipitor

## 2019-12-23 ENCOUNTER — TELEPHONE (OUTPATIENT)
Dept: OPHTHALMOLOGY | Facility: CLINIC | Age: 54
End: 2019-12-23

## 2019-12-23 ENCOUNTER — CLINICAL SUPPORT (OUTPATIENT)
Dept: FAMILY MEDICINE | Facility: CLINIC | Age: 54
End: 2019-12-23
Payer: COMMERCIAL

## 2019-12-23 VITALS — SYSTOLIC BLOOD PRESSURE: 122 MMHG | DIASTOLIC BLOOD PRESSURE: 82 MMHG

## 2019-12-23 DIAGNOSIS — I10 ESSENTIAL HYPERTENSION, BENIGN: Primary | ICD-10-CM

## 2019-12-23 PROCEDURE — 99499 UNLISTED E&M SERVICE: CPT | Mod: S$GLB,,, | Performed by: FAMILY MEDICINE

## 2019-12-23 PROCEDURE — 99499 NO LOS: ICD-10-PCS | Mod: S$GLB,,, | Performed by: FAMILY MEDICINE

## 2019-12-23 NOTE — TELEPHONE ENCOUNTER
Called patient regarding diabetic eye screening results lvm; No Background Diabetic Retinopathy.  Follow up in 12 months.

## 2019-12-23 NOTE — PROGRESS NOTES
Monalisa THOMPSON Yamileth 54 y.o. female is here today for Blood Pressure check.   History of HTN yes.    Review of patient's allergies indicates:   Allergen Reactions    Keflex [cephalexin] Hives    Vicodin [hydrocodone-acetaminophen] Itching     itch     Creatinine   Date Value Ref Range Status   03/11/2019 0.8 0.5 - 1.4 mg/dL Final     Sodium   Date Value Ref Range Status   03/11/2019 138 136 - 145 mmol/L Final     Potassium   Date Value Ref Range Status   12/16/2019 4.1 3.5 - 5.1 mmol/L Final   ]      Patient denies taking blood pressure medications on a regular basis at the same time of the day.  Sometimes she is not able to take it at the same time everyday, because she takes care of her mother and the medication - clonidine - along with anxiety medication makes her tired.        Current Outpatient Medications:     acetaminophen (TYLENOL) 500 MG tablet, Take 2 tablets (1,000 mg total) by mouth every 6 (six) hours as needed for Pain., Disp: 30 tablet, Rfl: 0    albuterol 90 mcg/actuation inhaler, Inhale 1-2 puffs into the lungs every 4 (four) hours as needed for Wheezing. Rescue, Disp: 1 Inhaler, Rfl: 2    amLODIPine (NORVASC) 10 MG tablet, TAKE 1 TABLET(10 MG) BY MOUTH EVERY DAY, Disp: 90 tablet, Rfl: 0    aspirin 81 MG Chew, Take 1 tablet (81 mg total) by mouth once daily., Disp: , Rfl:     atorvastatin (LIPITOR) 40 MG tablet, Take 1 tablet (40 mg total) by mouth once daily., Disp: 90 tablet, Rfl: 3    benazepril (LOTENSIN) 40 MG tablet, Take 1 tablet (40 mg total) by mouth once daily., Disp: 90 tablet, Rfl: 0    cloNIDine (CATAPRES) 0.1 MG tablet, Take 1 tablet (0.1 mg total) by mouth 2 (two) times daily., Disp: 180 tablet, Rfl: 0    escitalopram oxalate (LEXAPRO) 20 MG tablet, Take 1 tablet (20 mg total) by mouth once daily., Disp: 90 tablet, Rfl: 3    fluticasone (FLONASE) 50 mcg/actuation nasal spray, 1 spray (50 mcg total) by Each Nare route 2 (two) times daily., Disp: 1 Bottle, Rfl: 0     hydroCHLOROthiazide (HYDRODIURIL) 25 MG tablet, TAKE 1 TABLET(25 MG) BY MOUTH EVERY DAY, Disp: 90 tablet, Rfl: 0    hydrocortisone 2.5 % cream, WOLFGANG EXT AA BID, Disp: 28 g, Rfl: 3    hydrOXYzine HCl (ATARAX) 25 MG tablet, Take 1 tablet (25 mg total) by mouth 3 (three) times daily as needed for Anxiety., Disp: 12 tablet, Rfl: 0    ibuprofen (ADVIL,MOTRIN) 600 MG tablet, Take 1 tablet (600 mg total) by mouth every 6 (six) hours as needed for Pain (Take with food as needed for mild-to-moderate pain)., Disp: 20 tablet, Rfl: 0    levETIRAcetam (KEPPRA) 1000 MG tablet, Take 1 tablet (1,000 mg total) by mouth 2 (two) times daily., Disp: 180 tablet, Rfl: 3    lidocaine (LMX) 4 % cream, Apply topically as needed., Disp: , Rfl: 0    loratadine (CLARITIN) 10 mg tablet, Take 1 tablet (10 mg total) by mouth once daily., Disp: 60 tablet, Rfl: 0    metFORMIN (GLUCOPHAGE-XR) 500 MG 24 hr tablet, TAKE 2 TABLETS(1000 MG) BY MOUTH TWICE DAILY WITH MEALS, Disp: 360 tablet, Rfl: 0    nortriptyline (PAMELOR) 50 MG capsule, Take 1 capsule (50 mg total) by mouth every evening., Disp: 30 capsule, Rfl: 5    ondansetron (ZOFRAN-ODT) 8 MG TbDL, Take 1 tablet (8 mg total) by mouth every 12 (twelve) hours as needed., Disp: 20 tablet, Rfl: 1    SITagliptin (JANUVIA) 100 MG Tab, Take 1 tablet (100 mg total) by mouth once daily., Disp: 90 tablet, Rfl: 0    zolpidem (AMBIEN) 10 mg Tab, TAKE 1 TABLET BY MOUTH EVERY NIGHT AT BEDTIME, Disp: 30 tablet, Rfl: 5  Does patient have record of home blood pressure readings no. Readings have been averaging  - no readings.   Last dose of blood pressure medication was taken at 8:30 am  12/23/2019.  Patient is asymptomatic.   Complains of  - muscle spasms  - stopped medication -Lipitor - and is still having the same issue.    122/82 left arm, sitting, manual       Dr. See informed of nurse visit.

## 2019-12-23 NOTE — TELEPHONE ENCOUNTER
Called patient gave her diabetic eye screening results ; gave patient her results    ----- Message from Mari Carlos sent at 12/23/2019  2:46 PM CST -----  Contact: pt @ [phone]      ----- Message -----  From: Gaviota Fischer  Sent: 12/23/2019   2:31 PM CST  To: Jacinto Avery Staff    [facility], please call

## 2019-12-27 ENCOUNTER — HOSPITAL ENCOUNTER (OUTPATIENT)
Dept: RADIOLOGY | Facility: HOSPITAL | Age: 54
Discharge: HOME | End: 2019-12-27
Attending: FAMILY MEDICINE
Payer: COMMERCIAL

## 2019-12-27 DIAGNOSIS — Z12.31 SCREENING MAMMOGRAM, ENCOUNTER FOR: ICD-10-CM

## 2019-12-27 PROCEDURE — 77067 SCR MAMMO BI INCL CAD: CPT | Mod: TC,PO

## 2019-12-27 PROCEDURE — 77063 BREAST TOMOSYNTHESIS BI: CPT | Mod: 26,,, | Performed by: RADIOLOGY

## 2019-12-27 PROCEDURE — 77067 MAMMO DIGITAL SCREENING BILAT WITH TOMOSYNTHESIS_CAD: ICD-10-PCS | Mod: 26,,, | Performed by: RADIOLOGY

## 2019-12-27 PROCEDURE — 77063 MAMMO DIGITAL SCREENING BILAT WITH TOMOSYNTHESIS_CAD: ICD-10-PCS | Mod: 26,,, | Performed by: RADIOLOGY

## 2019-12-27 PROCEDURE — 77067 SCR MAMMO BI INCL CAD: CPT | Mod: 26,,, | Performed by: RADIOLOGY

## 2020-02-06 DIAGNOSIS — I10 UNCONTROLLED HYPERTENSION: ICD-10-CM

## 2020-02-06 RX ORDER — METFORMIN HYDROCHLORIDE 500 MG/1
TABLET, EXTENDED RELEASE ORAL
Qty: 360 TABLET | Refills: 0 | Status: SHIPPED | OUTPATIENT
Start: 2020-02-06 | End: 2020-03-29 | Stop reason: ALTCHOICE

## 2020-02-06 RX ORDER — HYDROCHLOROTHIAZIDE 25 MG/1
TABLET ORAL
Qty: 90 TABLET | Refills: 0 | Status: ON HOLD | OUTPATIENT
Start: 2020-02-06 | End: 2020-04-04 | Stop reason: HOSPADM

## 2020-02-06 RX ORDER — BENAZEPRIL HYDROCHLORIDE 20 MG/1
TABLET ORAL
Qty: 90 TABLET | Refills: 3 | OUTPATIENT
Start: 2020-02-06

## 2020-02-06 RX ORDER — AMLODIPINE BESYLATE 10 MG/1
TABLET ORAL
Qty: 90 TABLET | Refills: 3 | Status: ON HOLD | OUTPATIENT
Start: 2020-02-06 | End: 2020-04-04 | Stop reason: HOSPADM

## 2020-02-10 ENCOUNTER — PATIENT OUTREACH (OUTPATIENT)
Dept: ADMINISTRATIVE | Facility: HOSPITAL | Age: 55
End: 2020-02-10

## 2020-02-13 ENCOUNTER — OFFICE VISIT (OUTPATIENT)
Dept: NEUROLOGY | Facility: CLINIC | Age: 55
End: 2020-02-13
Payer: COMMERCIAL

## 2020-02-13 VITALS
HEIGHT: 63 IN | HEART RATE: 72 BPM | BODY MASS INDEX: 39.87 KG/M2 | SYSTOLIC BLOOD PRESSURE: 159 MMHG | DIASTOLIC BLOOD PRESSURE: 90 MMHG | WEIGHT: 225 LBS

## 2020-02-13 DIAGNOSIS — G51.39 HEMIFACIAL SPASM: Primary | ICD-10-CM

## 2020-02-13 DIAGNOSIS — G51.4 FACIAL TWITCHING: ICD-10-CM

## 2020-02-13 DIAGNOSIS — G40.209 PARTIAL SYMPTOMATIC EPILEPSY WITH COMPLEX PARTIAL SEIZURES, NOT INTRACTABLE, WITHOUT STATUS EPILEPTICUS: ICD-10-CM

## 2020-02-13 PROCEDURE — 3080F PR MOST RECENT DIASTOLIC BLOOD PRESSURE >= 90 MM HG: ICD-10-PCS | Mod: CPTII,S$GLB,, | Performed by: STUDENT IN AN ORGANIZED HEALTH CARE EDUCATION/TRAINING PROGRAM

## 2020-02-13 PROCEDURE — 99213 OFFICE O/P EST LOW 20 MIN: CPT | Mod: S$GLB,,, | Performed by: STUDENT IN AN ORGANIZED HEALTH CARE EDUCATION/TRAINING PROGRAM

## 2020-02-13 PROCEDURE — 3080F DIAST BP >= 90 MM HG: CPT | Mod: CPTII,S$GLB,, | Performed by: STUDENT IN AN ORGANIZED HEALTH CARE EDUCATION/TRAINING PROGRAM

## 2020-02-13 PROCEDURE — 3077F PR MOST RECENT SYSTOLIC BLOOD PRESSURE >= 140 MM HG: ICD-10-PCS | Mod: CPTII,S$GLB,, | Performed by: STUDENT IN AN ORGANIZED HEALTH CARE EDUCATION/TRAINING PROGRAM

## 2020-02-13 PROCEDURE — 99999 PR PBB SHADOW E&M-EST. PATIENT-LVL IV: CPT | Mod: PBBFAC,,, | Performed by: STUDENT IN AN ORGANIZED HEALTH CARE EDUCATION/TRAINING PROGRAM

## 2020-02-13 PROCEDURE — 3008F BODY MASS INDEX DOCD: CPT | Mod: CPTII,S$GLB,, | Performed by: STUDENT IN AN ORGANIZED HEALTH CARE EDUCATION/TRAINING PROGRAM

## 2020-02-13 PROCEDURE — 99213 PR OFFICE/OUTPT VISIT, EST, LEVL III, 20-29 MIN: ICD-10-PCS | Mod: S$GLB,,, | Performed by: STUDENT IN AN ORGANIZED HEALTH CARE EDUCATION/TRAINING PROGRAM

## 2020-02-13 PROCEDURE — 3077F SYST BP >= 140 MM HG: CPT | Mod: CPTII,S$GLB,, | Performed by: STUDENT IN AN ORGANIZED HEALTH CARE EDUCATION/TRAINING PROGRAM

## 2020-02-13 PROCEDURE — 3008F PR BODY MASS INDEX (BMI) DOCUMENTED: ICD-10-PCS | Mod: CPTII,S$GLB,, | Performed by: STUDENT IN AN ORGANIZED HEALTH CARE EDUCATION/TRAINING PROGRAM

## 2020-02-13 PROCEDURE — 99999 PR PBB SHADOW E&M-EST. PATIENT-LVL IV: ICD-10-PCS | Mod: PBBFAC,,, | Performed by: STUDENT IN AN ORGANIZED HEALTH CARE EDUCATION/TRAINING PROGRAM

## 2020-02-13 RX ORDER — LEVETIRACETAM 1000 MG/1
1000 TABLET ORAL 2 TIMES DAILY
Qty: 180 TABLET | Refills: 3 | Status: SHIPPED | OUTPATIENT
Start: 2020-02-13 | End: 2020-09-28 | Stop reason: SDUPTHER

## 2020-02-13 RX ORDER — TIZANIDINE 2 MG/1
2 TABLET ORAL EVERY 8 HOURS PRN
Qty: 30 TABLET | Refills: 1 | Status: SHIPPED | OUTPATIENT
Start: 2020-02-13 | End: 2021-05-31

## 2020-02-13 NOTE — PROGRESS NOTES
Neurology Clinic  Follow up visit    Patient Name: Monalisa Carson  MRN: [id number]    CC: Seizures    HPI: Monalisa Carson is a 54 y.o. female w/ PMH significant for Anxiety, HTN, epilepsy presenting as follow-up for evaluation of seizures vs hemifacial spasm.    2/13/20 Interval History  No episodes of loss of consciousness since last visit.  Reports that she continues to have L-sided facial twitching, 1-2x/week, 1-2 minutes at a time.  Notices increased frequency when she is delayed or misses a dose of keppra.  She has been unable to capture a video of the events.    3/27/19 Initial History  I last saw pt for seizures in the hospital in 2/2019 for evaluation of seizures.  At that point, she'd had two events concern for complex partial seizures (L facial twitching, speech arrest, ?LOC) with Sal's paralysis/residual L facial droop after 2 separate events that occurred more than 48 hours apart.  Her EEG was unremarkable and she was discharged on keppra 1500 BID.  Since discharge, she reports no seizures, but has had nausea/vomiting with the keppra and sometimes is not able to keep the dose of keppra down.  However, she notes she has had episodes of L facial twitching, up to a few times per day and duration of 1-2 minutes without any clear triggers.    With regards to her headache:  Went to ED on 3/11 for headache.  Headaches began while in hospital, but not too severe.  Significantly worsened mid-February.    L neck pain predominantly, that radiates up over her head.  Pressure-like pain.  Photo and phono sensitive.  Feels weak all over; no focal neurological deficits.  Given fioricet in ED, every day or QOD with some help.  10/10 pain at worst, fioricet brings it down to a 5/6.  Nothing in particular makes headaches better/worse.  Possibly worsened by exertion.  Every day.  Before fioricet, was taking tylenol BID.    Never had any headaches like this before.  No FMH of similar headaches.      Review of  Systems:  General: No fevers, chills  Eyes: No changes in vision  ENT: No changes in hearing  Respiratory: No SOB  CV: No chest pain, palpitations  GI: No diarrhea, blood in stool  Urinary: No dysuria, hematuria  Skin: No rashes  Neurological: No weakness, confusion.  +facial twitching.  Psychiatric: No auditory nor visual hallucinations.      Past Medical History  Past Medical History:   Diagnosis Date    Allergy     Anxiety     Diabetes mellitus     Heart murmur     Hypertension        Medications    Current Outpatient Medications:     acetaminophen (TYLENOL) 500 MG tablet, Take 2 tablets (1,000 mg total) by mouth every 6 (six) hours as needed for Pain., Disp: 30 tablet, Rfl: 0    amLODIPine (NORVASC) 10 MG tablet, TAKE 1 TABLET(10 MG) BY MOUTH EVERY DAY, Disp: 90 tablet, Rfl: 3    aspirin 81 MG Chew, Take 1 tablet (81 mg total) by mouth once daily., Disp: , Rfl:     atorvastatin (LIPITOR) 40 MG tablet, Take 1 tablet (40 mg total) by mouth once daily., Disp: 90 tablet, Rfl: 3    benazepril (LOTENSIN) 40 MG tablet, Take 1 tablet (40 mg total) by mouth once daily., Disp: 90 tablet, Rfl: 0    cloNIDine (CATAPRES) 0.1 MG tablet, Take 1 tablet (0.1 mg total) by mouth 2 (two) times daily., Disp: 180 tablet, Rfl: 0    escitalopram oxalate (LEXAPRO) 20 MG tablet, Take 1 tablet (20 mg total) by mouth once daily., Disp: 90 tablet, Rfl: 3    fluticasone (FLONASE) 50 mcg/actuation nasal spray, 1 spray (50 mcg total) by Each Nare route 2 (two) times daily., Disp: 1 Bottle, Rfl: 0    hydroCHLOROthiazide (HYDRODIURIL) 25 MG tablet, TAKE 1 TABLET(25 MG) BY MOUTH EVERY DAY, Disp: 90 tablet, Rfl: 0    hydrocortisone 2.5 % cream, WOLFGANG EXT AA BID, Disp: 28 g, Rfl: 3    hydrOXYzine HCl (ATARAX) 25 MG tablet, Take 1 tablet (25 mg total) by mouth 3 (three) times daily as needed for Anxiety., Disp: 12 tablet, Rfl: 0    ibuprofen (ADVIL,MOTRIN) 600 MG tablet, Take 1 tablet (600 mg total) by mouth every 6 (six) hours as  needed for Pain (Take with food as needed for mild-to-moderate pain)., Disp: 20 tablet, Rfl: 0    levETIRAcetam (KEPPRA) 1000 MG tablet, Take 1 tablet (1,000 mg total) by mouth 2 (two) times daily., Disp: 180 tablet, Rfl: 3    lidocaine (LMX) 4 % cream, Apply topically as needed., Disp: , Rfl: 0    loratadine (CLARITIN) 10 mg tablet, Take 1 tablet (10 mg total) by mouth once daily., Disp: 60 tablet, Rfl: 0    metFORMIN (GLUCOPHAGE-XR) 500 MG XR 24hr tablet, TAKE 2 TABLETS(1000 MG) BY MOUTH TWICE DAILY WITH MEALS, Disp: 360 tablet, Rfl: 0    nortriptyline (PAMELOR) 50 MG capsule, Take 1 capsule (50 mg total) by mouth every evening., Disp: 30 capsule, Rfl: 5    ondansetron (ZOFRAN-ODT) 8 MG TbDL, Take 1 tablet (8 mg total) by mouth every 12 (twelve) hours as needed., Disp: 20 tablet, Rfl: 1    SITagliptin (JANUVIA) 100 MG Tab, Take 1 tablet (100 mg total) by mouth once daily., Disp: 90 tablet, Rfl: 0    zolpidem (AMBIEN) 10 mg Tab, TAKE 1 TABLET BY MOUTH EVERY NIGHT AT BEDTIME, Disp: 30 tablet, Rfl: 5    albuterol 90 mcg/actuation inhaler, Inhale 1-2 puffs into the lungs every 4 (four) hours as needed for Wheezing. Rescue, Disp: 1 Inhaler, Rfl: 2    tiZANidine (ZANAFLEX) 2 MG tablet, Take 1 tablet (2 mg total) by mouth every 8 (eight) hours as needed., Disp: 30 tablet, Rfl: 1  Any other notable medications as documented in HPI    Allergies  Review of patient's allergies indicates:   Allergen Reactions    Keflex [cephalexin] Hives    Vicodin [hydrocodone-acetaminophen] Itching     itch       Social History  Social History     Socioeconomic History    Marital status:      Spouse name: Not on file    Number of children: Not on file    Years of education: Not on file    Highest education level: Not on file   Occupational History    Occupation:      Employer: [employer]   Social Needs    Financial resource strain: Not on file    Food insecurity:     Worry: Not on file      Inability: Not on file    Transportation needs:     Medical: Not on file     Non-medical: Not on file   Tobacco Use    Smoking status: Former Smoker     Packs/day: 0.00     Years: 0.50     Pack years: 0.00    Smokeless tobacco: Never Used   Substance and Sexual Activity    Alcohol use: Yes     Alcohol/week: 0.0 standard drinks     Comment: seldom    Drug use: No    Sexual activity: Yes     Partners: Male     Birth control/protection: None   Lifestyle    Physical activity:     Days per week: Not on file     Minutes per session: Not on file    Stress: Not on file   Relationships    Social connections:     Talks on phone: Not on file     Gets together: Not on file     Attends Yarsani service: Not on file     Active member of club or organization: Not on file     Attends meetings of clubs or organizations: Not on file     Relationship status: Not on file   Other Topics Concern    Are you pregnant or think you may be? No    Breast-feeding No   Social History Narrative    Not on file     Any other notable Social History as documented in HPI.    Family History  Family History   Problem Relation Age of Onset    Diabetes Mother     Hyperlipidemia Mother     Hypertension Mother     Cataracts Mother     Diabetes Father     Hypertension Father     Stroke Father     Depression Sister     Hypertension Sister     Stroke Sister     Cancer Maternal Aunt         breast x 2    Breast cancer Maternal Aunt     Cancer Maternal Uncle         prostate x 2    Cancer Paternal Aunt         breast    Breast cancer Paternal Aunt     Cancer Maternal Grandfather         lung    Early death Paternal Grandmother     Early death Paternal Grandfather     No Known Problems Brother     No Known Problems Paternal Uncle     No Known Problems Maternal Grandmother     Melanoma Neg Hx     Eczema Neg Hx     Lupus Neg Hx     Psoriasis Neg Hx     Amblyopia Neg Hx     Blindness Neg Hx     Glaucoma Neg Hx     Macular  "degeneration Neg Hx     Retinal detachment Neg Hx     Strabismus Neg Hx     Thyroid disease Neg Hx      Any other notable FMH as documented in HPI.    Physical Exam  BP (!) 159/90   Pulse 72   Ht 5' 3" (1.6 m)   Wt 102.1 kg (225 lb)   BMI 39.86 kg/m²     General: Well-developed, well-groomed. No apparent distress  HENT: Normocephalic, atraumatic.  No carotid bruits auscultated.    Cardiovascular: Regular rate and rhythm with no murmurs, rubs or gallops.    Chest: Lungs clear to auscultation bilaterally.  No wheezes, stridor, ronchi appreciated.  Abdomen: Normoactive bowel sounds present.  Soft, nontender to palpation.  Musculoskeletal: No peripheral edema    Neurologic Exam: The patient is awake, alert and oriented. Language is fluent.  Fund of knowledge is appropriate.     Cranial nerves:   Pupils are round and reactive to light and accommodation.   Visual fields are full to confrontation.    Ocular motility is full in all cardinal positions of gaze.   Facial sensation is normal to light touch.   Facial activation symmetric  Hearing is symmetric bilaterally.   Palate elevates symmetrically.   Shoulder elevation is symmetric and full strength bilaterally.   Tongue is midline and neck rotation strength is normal bilaterally.   * Upon closing eyes, small inferior orbicularis oculi +/- zygomaticus contractures, irregular frequency.  Reproducible on repeat exam.     Motor examination of all extremities demonstrates normal bulk and tone in all four limbs. There are no atrophy or fasciculations. Strength is 5/5 in the upper and lower extremities bilaterally.    Sensory examination is normal light touch in BUE and BLE.      Deep tendon reflexes are 2+ and symmetric in the upper and lower extremities bilaterally.  No clonus b/l.    Coordination: Finger to nose intact b/l    Lab and Test Results    No recent pertinent labs    Images:  9/13/2019 Ambulatory EEG: Per prelim report from Dr. Warren,  No epileptic " "discharges nor seizures captured.  Formal report to follow.    4/10/19 EEG: Per report,  "IMPRESSION:  Normal EEG.     CLINICAL CORRELATION:  The patient is a 54-year-old female who had two events,   which consisted of left facial twitching and speech arrest with a Sal paralysis   with left facial droop post-event.  This occurred on two occasions.  She was   started on levetiracetam 1500 mg twice a day.  This recording was obtained to   evaluate for possible seizures, but this tracing is normal throughout the   recording."    3/11/19 CTH: Per report,  "1. No acute intracranial abnormalities, noting sequela of suspected chronic microvascular ischemic change."    2/16/19 Routine EEG: Per report,  "Abnormal activity:   No epileptiform discharges, periodic discharges, lateralized   rhythmic delta activity or electrographic seizures were seen.    IMPRESSION:   This is a normal routine EEG done in sleep, with only very brief   period of wakefulness."    2/16/19 MRI Brain w/ and w/o: Per report,  "1. No acute intracranial abnormality identified on today's examination.  Specifically, no evidence of acute infarction or enhancing lesion.  2. Multiple foci of T2/FLAIR signal hyperintensity in the supratentorial and periventricular white matter demonstrate no corresponding restricted diffusion or postcontrast enhancement.  These lesions are nonspecific in appearance and may be seen with accelerated small vessel ischemic disease, vasculopathies, migraine headaches, and as the residua from inflammatory and traumatic insults to the brain."    2/15/19 CTA Head and Neck: Per report,  "Small caliber right anterior cerebral artery which may be narrow on a congenital basis.  Otherwise CTA head and neck without significant focal stenosis or occlusion."    Assessment and Plan    Problem List Items Addressed This Visit        Neuro    Hemifacial spasm - Primary    Current Assessment & Plan     Assessment  Monalisa Carson is a 54 y.o. " female w/ PMH significant for Anxiety, HTN, possible epilepsy presenting as follow-up for evaluation of seizures vs L hemifacial spasm.    She has had 2 events (more than 48 hours apart) in 2/2019 associated with L facial twitching and possible generalized shaking/LOC.  These have been treated as seizures with keppra.  She has had no further LOC/generalized events since that time, but has continued to have L facial twitching.  Her EEGs have been unrevealing (including 3 day ambulatory EEG, but no clinical events per pt).  MRI with only chronic microvascular changes.  The facial twitching worsens when a dose of keppra is missed or delayed.  On 2/14/20, this is the first visit where I have been able to reproduce her facial twitching by having her close her eyes.      Based on this, I suspect her continued facial twitching represents hemifacial spasm and less likely focal seizure activity.  I cannot rule out that the events in 2/2019 represented epileptic activity, and will therefore continue keppra.  As she has had no further generalized activity, we will lift her driving/work restrictions with the explicit instructions that should these events recur, the restrictions will be reimposed.    Recommendations & Plan:  -Trial of tizanidine PRN, discussed botox for hemifacial spasm  -Keppra 1 g BID  -OK to return to work and driving with no restrictions.  -RTC 6-12 weeks         Relevant Medications    tiZANidine (ZANAFLEX) 2 MG tablet    Nonintractable epilepsy without status epilepticus    Current Assessment & Plan     See hemifacial spasm section for discussion regarding possible focal seizure (less likely) vs hemifacial spasm (more likely).  Cannot rule out that events in 2/2019 were epileptic    -continue keppra 1 g BID         Relevant Medications    levETIRAcetam (KEPPRA) 1000 MG tablet            Humberto Fine MD  Neurology Resident   [facility]  [address] [place] [address]

## 2020-02-13 NOTE — LETTER
February 13, 2020    Monalisa Carson  [address] [place] [address]             [facility] - Neurology  [address]  [place] [address]  Phone: [phone]  Fax: [phone] To whom it may concern,    Mrs. Carson has been under my care in neurology clinic.  She has not had a loss of consciousness event concerning for a seizure since 2/2019 and is OK to return to work with no restrictions.      Please contact me with any questions or concerns.            Humberto Fine MD    [facility]   [address] [place] [address]  Phone Number: [phone]  Fax Number: [phone]

## 2020-02-14 ENCOUNTER — LAB VISIT (OUTPATIENT)
Dept: LAB | Facility: HOSPITAL | Age: 55
End: 2020-02-14
Attending: FAMILY MEDICINE
Payer: COMMERCIAL

## 2020-02-14 DIAGNOSIS — E11.9 TYPE 2 DIABETES MELLITUS WITHOUT COMPLICATION: ICD-10-CM

## 2020-02-14 PROBLEM — G51.39 HEMIFACIAL SPASM: Status: ACTIVE | Noted: 2019-02-16

## 2020-02-14 LAB
CHOLEST SERPL-MCNC: 224 MG/DL (ref 120–199)
CHOLEST/HDLC SERPL: 4.7 {RATIO} (ref 2–5)
ESTIMATED AVG GLUCOSE: 183 MG/DL (ref 68–131)
HBA1C MFR BLD HPLC: 8 % (ref 4–5.6)
HDLC SERPL-MCNC: 48 MG/DL (ref 40–75)
HDLC SERPL: 21.4 % (ref 20–50)
LDLC SERPL CALC-MCNC: 119.4 MG/DL (ref 63–159)
NONHDLC SERPL-MCNC: 176 MG/DL
TRIGL SERPL-MCNC: 283 MG/DL (ref 30–150)

## 2020-02-14 PROCEDURE — 80061 LIPID PANEL: CPT

## 2020-02-14 PROCEDURE — 36415 COLL VENOUS BLD VENIPUNCTURE: CPT | Mod: PO

## 2020-02-14 PROCEDURE — 83036 HEMOGLOBIN GLYCOSYLATED A1C: CPT

## 2020-02-14 NOTE — ASSESSMENT & PLAN NOTE
Assessment  Monalisa Carson is a 54 y.o. female w/ PMH significant for Anxiety, HTN, possible epilepsy presenting as follow-up for evaluation of seizures vs L hemifacial spasm.    She has had 2 events (more than 48 hours apart) in 2/2019 associated with L facial twitching and possible generalized shaking/LOC.  These have been treated as seizures with keppra.  She has had no further LOC/generalized events since that time, but has continued to have L facial twitching.  Her EEGs have been unrevealing (including 3 day ambulatory EEG, but no clinical events per pt).  MRI with only chronic microvascular changes.  The facial twitching worsens when a dose of keppra is missed or delayed.  On 2/14/20, this is the first visit where I have been able to reproduce her facial twitching by having her close her eyes.      Based on this, I suspect her continued facial twitching represents hemifacial spasm and less likely focal seizure activity.  I cannot rule out that the events in 2/2019 represented epileptic activity, and will therefore continue keppra.  As she has had no further generalized activity, we will lift her driving/work restrictions with the explicit instructions that should these events recur, the restrictions will be reimposed.    Recommendations & Plan:  -Trial of tizanidine PRN, discussed botox for hemifacial spasm  -Keppra 1 g BID  -OK to return to work and driving with no restrictions.  -RTC 6-12 weeks

## 2020-02-14 NOTE — ASSESSMENT & PLAN NOTE
See hemifacial spasm section for discussion regarding possible focal seizure (less likely) vs hemifacial spasm (more likely).  Cannot rule out that events in 2/2019 were epileptic    -continue keppra 1 g BID

## 2020-02-19 NOTE — PROGRESS NOTES
I have reviewed the history and physical, assessments, and plan, I concur with her/his documentation of Monalisa Carson. Patient was seen and examined with the resident.    Jennifer Beck MD  General Neurology Staff  [facility]

## 2020-02-24 ENCOUNTER — OFFICE VISIT (OUTPATIENT)
Dept: FAMILY MEDICINE | Facility: CLINIC | Age: 55
End: 2020-02-24
Payer: COMMERCIAL

## 2020-02-24 VITALS
TEMPERATURE: 98 F | WEIGHT: 226.88 LBS | RESPIRATION RATE: 16 BRPM | BODY MASS INDEX: 40.2 KG/M2 | DIASTOLIC BLOOD PRESSURE: 84 MMHG | HEART RATE: 84 BPM | OXYGEN SATURATION: 99 % | HEIGHT: 63 IN | SYSTOLIC BLOOD PRESSURE: 120 MMHG

## 2020-02-24 DIAGNOSIS — E78.2 MIXED HYPERLIPIDEMIA: ICD-10-CM

## 2020-02-24 DIAGNOSIS — R06.2 WHEEZING: ICD-10-CM

## 2020-02-24 PROBLEM — M54.2 CERVICAL PAIN: Status: RESOLVED | Noted: 2019-04-17 | Resolved: 2020-02-24

## 2020-02-24 PROBLEM — G44.209 TENSION HEADACHE: Status: RESOLVED | Noted: 2019-03-31 | Resolved: 2020-02-24

## 2020-02-24 PROBLEM — M54.2 PAINFUL CERVICAL RANGE OF MOTION: Status: RESOLVED | Noted: 2019-04-17 | Resolved: 2020-02-24

## 2020-02-24 PROCEDURE — 3079F DIAST BP 80-89 MM HG: CPT | Mod: CPTII,S$GLB,, | Performed by: FAMILY MEDICINE

## 2020-02-24 PROCEDURE — 99214 OFFICE O/P EST MOD 30 MIN: CPT | Mod: S$GLB,,, | Performed by: FAMILY MEDICINE

## 2020-02-24 PROCEDURE — 3008F PR BODY MASS INDEX (BMI) DOCUMENTED: ICD-10-PCS | Mod: CPTII,S$GLB,, | Performed by: FAMILY MEDICINE

## 2020-02-24 PROCEDURE — 99999 PR PBB SHADOW E&M-EST. PATIENT-LVL IV: ICD-10-PCS | Mod: PBBFAC,,, | Performed by: FAMILY MEDICINE

## 2020-02-24 PROCEDURE — 2024F PR 7 FIELD PHOTOS WITH INTERP/ REVIEW: ICD-10-PCS | Mod: S$GLB,,, | Performed by: FAMILY MEDICINE

## 2020-02-24 PROCEDURE — 2024F 7 FLD RTA PHOTO EVC RTNOPTHY: CPT | Mod: S$GLB,,, | Performed by: FAMILY MEDICINE

## 2020-02-24 PROCEDURE — 99999 PR PBB SHADOW E&M-EST. PATIENT-LVL IV: CPT | Mod: PBBFAC,,, | Performed by: FAMILY MEDICINE

## 2020-02-24 PROCEDURE — 3008F BODY MASS INDEX DOCD: CPT | Mod: CPTII,S$GLB,, | Performed by: FAMILY MEDICINE

## 2020-02-24 PROCEDURE — 99214 PR OFFICE/OUTPT VISIT, EST, LEVL IV, 30-39 MIN: ICD-10-PCS | Mod: S$GLB,,, | Performed by: FAMILY MEDICINE

## 2020-02-24 PROCEDURE — 3052F HG A1C>EQUAL 8.0%<EQUAL 9.0%: CPT | Mod: CPTII,S$GLB,, | Performed by: FAMILY MEDICINE

## 2020-02-24 PROCEDURE — 3074F PR MOST RECENT SYSTOLIC BLOOD PRESSURE < 130 MM HG: ICD-10-PCS | Mod: CPTII,S$GLB,, | Performed by: FAMILY MEDICINE

## 2020-02-24 PROCEDURE — 3074F SYST BP LT 130 MM HG: CPT | Mod: CPTII,S$GLB,, | Performed by: FAMILY MEDICINE

## 2020-02-24 PROCEDURE — 3052F PR MOST RECENT HEMOGLOBIN A1C LEVEL 8.0 - < 9.0%: ICD-10-PCS | Mod: CPTII,S$GLB,, | Performed by: FAMILY MEDICINE

## 2020-02-24 PROCEDURE — 3079F PR MOST RECENT DIASTOLIC BLOOD PRESSURE 80-89 MM HG: ICD-10-PCS | Mod: CPTII,S$GLB,, | Performed by: FAMILY MEDICINE

## 2020-02-24 RX ORDER — ROSUVASTATIN CALCIUM 20 MG/1
20 TABLET, COATED ORAL DAILY
Qty: 90 TABLET | Refills: 1 | Status: SHIPPED | OUTPATIENT
Start: 2020-02-24 | End: 2022-09-29

## 2020-02-24 RX ORDER — ALBUTEROL SULFATE 90 UG/1
1-2 AEROSOL, METERED RESPIRATORY (INHALATION) EVERY 4 HOURS PRN
Qty: 18 G | Refills: 11 | Status: SHIPPED | OUTPATIENT
Start: 2020-02-24 | End: 2020-04-06 | Stop reason: SDUPTHER

## 2020-02-24 RX ORDER — PEN NEEDLE, DIABETIC 30 GX3/16"
NEEDLE, DISPOSABLE MISCELLANEOUS
Qty: 100 EACH | Refills: 0 | Status: SHIPPED | OUTPATIENT
Start: 2020-02-24

## 2020-02-24 NOTE — PROGRESS NOTES
Subjective:       Patient ID: Monalisa Carson     Chief Complaint: Follow-up      Isabel Carson is a 54 y.o. female.follow up uncontrolled diabetes.  Diabetes unchanged.  Hba1c 8.0%.   yesterday.   Lipids worse.      Review of patient's allergies indicates:   Allergen Reactions    Keflex [cephalexin] Hives    Vicodin [hydrocodone-acetaminophen] Itching     itch       Current Outpatient Medications:     acetaminophen (TYLENOL) 500 MG tablet, Take 2 tablets (1,000 mg total) by mouth every 6 (six) hours as needed for Pain., Disp: 30 tablet, Rfl: 0    albuterol (PROVENTIL/VENTOLIN HFA) 90 mcg/actuation inhaler, Inhale 1-2 puffs into the lungs every 4 (four) hours as needed for Wheezing. Rescue, Disp: 18 g, Rfl: 11    amLODIPine (NORVASC) 10 MG tablet, TAKE 1 TABLET(10 MG) BY MOUTH EVERY DAY, Disp: 90 tablet, Rfl: 3    aspirin 81 MG Chew, Take 1 tablet (81 mg total) by mouth once daily., Disp: , Rfl:     benazepril (LOTENSIN) 40 MG tablet, Take 1 tablet (40 mg total) by mouth once daily., Disp: 90 tablet, Rfl: 0    cloNIDine (CATAPRES) 0.1 MG tablet, Take 1 tablet (0.1 mg total) by mouth 2 (two) times daily., Disp: 180 tablet, Rfl: 0    escitalopram oxalate (LEXAPRO) 20 MG tablet, Take 1 tablet (20 mg total) by mouth once daily., Disp: 90 tablet, Rfl: 3    fluticasone (FLONASE) 50 mcg/actuation nasal spray, 1 spray (50 mcg total) by Each Nare route 2 (two) times daily., Disp: 1 Bottle, Rfl: 0    hydroCHLOROthiazide (HYDRODIURIL) 25 MG tablet, TAKE 1 TABLET(25 MG) BY MOUTH EVERY DAY, Disp: 90 tablet, Rfl: 0    hydrocortisone 2.5 % cream, WOLFGANG EXT AA BID, Disp: 28 g, Rfl: 3    hydrOXYzine HCl (ATARAX) 25 MG tablet, Take 1 tablet (25 mg total) by mouth 3 (three) times daily as needed for Anxiety., Disp: 12 tablet, Rfl: 0    ibuprofen (ADVIL,MOTRIN) 600 MG tablet, Take 1 tablet (600 mg total) by mouth every 6 (six) hours as needed for Pain (Take with food as needed for mild-to-moderate pain).,  "Disp: 20 tablet, Rfl: 0    levETIRAcetam (KEPPRA) 1000 MG tablet, Take 1 tablet (1,000 mg total) by mouth 2 (two) times daily., Disp: 180 tablet, Rfl: 3    lidocaine (LMX) 4 % cream, Apply topically as needed., Disp: , Rfl: 0    loratadine (CLARITIN) 10 mg tablet, Take 1 tablet (10 mg total) by mouth once daily., Disp: 60 tablet, Rfl: 0    metFORMIN (GLUCOPHAGE-XR) 500 MG XR 24hr tablet, TAKE 2 TABLETS(1000 MG) BY MOUTH TWICE DAILY WITH MEALS, Disp: 360 tablet, Rfl: 0    nortriptyline (PAMELOR) 50 MG capsule, Take 1 capsule (50 mg total) by mouth every evening., Disp: 30 capsule, Rfl: 5    ondansetron (ZOFRAN-ODT) 8 MG TbDL, Take 1 tablet (8 mg total) by mouth every 12 (twelve) hours as needed., Disp: 20 tablet, Rfl: 1    tiZANidine (ZANAFLEX) 2 MG tablet, Take 1 tablet (2 mg total) by mouth every 8 (eight) hours as needed., Disp: 30 tablet, Rfl: 1    zolpidem (AMBIEN) 10 mg Tab, TAKE 1 TABLET BY MOUTH EVERY NIGHT AT BEDTIME, Disp: 30 tablet, Rfl: 5    exenatide microspheres (BYDUREON) 2 mg ER injection suspension, Inject 2 mg into the skin every 7 days., Disp: 12 vial, Rfl: 0    pen needle, diabetic 31 gauge x 5/16" Ndle, For use with bydureon, Disp: 100 each, Rfl: 0    rosuvastatin (CRESTOR) 20 MG tablet, Take 1 tablet (20 mg total) by mouth once daily., Disp: 90 tablet, Rfl: 1    Past Medical History:   Diagnosis Date    Allergy     Anxiety     Diabetes mellitus     Heart murmur     Hypertension      Review of Systems   Constitutional: Positive for fatigue.   Endocrine: Positive for polydipsia and polyuria. Negative for polyphagia.   Neurological: Positive for numbness.        Facial twitching       Objective:      Physical Exam   Constitutional: She appears well-developed and well-nourished.   Cardiovascular: Normal rate and regular rhythm.   Pulmonary/Chest: Effort normal.   Musculoskeletal: She exhibits no edema.   Neurological: She is alert.       Results for orders placed or performed in " "visit on 02/14/20   Lipid panel   Result Value Ref Range    Cholesterol 224 (H) 120 - 199 mg/dL    Triglycerides 283 (H) 30 - 150 mg/dL    HDL 48 40 - 75 mg/dL    LDL Cholesterol 119.4 63.0 - 159.0 mg/dL    Hdl/Cholesterol Ratio 21.4 20.0 - 50.0 %    Total Cholesterol/HDL Ratio 4.7 2.0 - 5.0    Non-HDL Cholesterol 176 mg/dL   Hemoglobin A1c   Result Value Ref Range    Hemoglobin A1C 8.0 (H) 4.0 - 5.6 %    Estimated Avg Glucose 183 (H) 68 - 131 mg/dL     Assessment:       1. Uncontrolled type 2 diabetes mellitus with stage 2 chronic kidney disease, without long-term current use of insulin    2. Wheezing    3. Mixed hyperlipidemia        Plan:       Monalisa was seen today for follow-up.    Diagnoses and all orders for this visit:    Uncontrolled type 2 diabetes mellitus with stage 2 chronic kidney disease, without long-term current use of insulin  -     exenatide microspheres (BYDUREON) 2 mg ER injection suspension; Inject 2 mg into the skin every 7 days.  -     pen needle, diabetic 31 gauge x 5/16" Ndle; For use with bydureon  -     Hemoglobin A1c; Future    Wheezing  -     albuterol (PROVENTIL/VENTOLIN HFA) 90 mcg/actuation inhaler; Inhale 1-2 puffs into the lungs every 4 (four) hours as needed for Wheezing. Rescue    Mixed hyperlipidemia  -     rosuvastatin (CRESTOR) 20 MG tablet; Take 1 tablet (20 mg total) by mouth once daily.          "

## 2020-03-20 ENCOUNTER — TELEPHONE (OUTPATIENT)
Dept: FAMILY MEDICINE | Facility: CLINIC | Age: 55
End: 2020-03-20

## 2020-03-20 NOTE — TELEPHONE ENCOUNTER
Inform patient according to last neurology note she can return to work without driving restrictions.  She may need to follow up with neurology if she has concerns.

## 2020-03-20 NOTE — TELEPHONE ENCOUNTER
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

## 2020-03-22 ENCOUNTER — HOSPITAL ENCOUNTER (EMERGENCY)
Facility: HOSPITAL | Age: 55
Discharge: HOME | End: 2020-03-22
Attending: INTERNAL MEDICINE
Payer: COMMERCIAL

## 2020-03-22 VITALS
HEIGHT: 63 IN | TEMPERATURE: 100 F | DIASTOLIC BLOOD PRESSURE: 80 MMHG | WEIGHT: 224 LBS | SYSTOLIC BLOOD PRESSURE: 149 MMHG | BODY MASS INDEX: 39.69 KG/M2 | OXYGEN SATURATION: 96 % | HEART RATE: 94 BPM | RESPIRATION RATE: 18 BRPM

## 2020-03-22 DIAGNOSIS — B34.9 ACUTE VIRAL SYNDROME: Primary | ICD-10-CM

## 2020-03-22 PROCEDURE — 25000003 PHARM REV CODE 250: Mod: ER | Performed by: INTERNAL MEDICINE

## 2020-03-22 PROCEDURE — 99284 EMERGENCY DEPT VISIT MOD MDM: CPT | Mod: ER

## 2020-03-22 RX ORDER — FLUTICASONE PROPIONATE 50 MCG
2 SPRAY, SUSPENSION (ML) NASAL DAILY
Qty: 15 G | Refills: 0 | Status: SHIPPED | OUTPATIENT
Start: 2020-03-22 | End: 2022-09-29 | Stop reason: SDUPTHER

## 2020-03-22 RX ORDER — AZELASTINE 1 MG/ML
2 SPRAY, METERED NASAL 2 TIMES DAILY
Qty: 30 ML | Refills: 0 | Status: SHIPPED | OUTPATIENT
Start: 2020-03-22 | End: 2020-05-27

## 2020-03-22 RX ORDER — ACETAMINOPHEN 325 MG/1
650 TABLET ORAL
Status: COMPLETED | OUTPATIENT
Start: 2020-03-22 | End: 2020-03-22

## 2020-03-22 RX ADMIN — ACETAMINOPHEN 650 MG: 325 TABLET ORAL at 01:03

## 2020-03-22 NOTE — ED PROVIDER NOTES
Encounter Date: 3/22/2020    SCRIBE #1 NOTE: I, Amrita Alarcon, am scribing for, and in the presence of,  Dr. Wilson Alanis. I have scribed the following portions of the note - Other sections scribed: HPI, ROS, PE.       History     Chief Complaint   Patient presents with    Cough     unproductive cough x 3 days w fever    Rash     to arms x 3 days     Monalisa Carson is a 55 y.o. female who presents to the ED complaining of acute dry cough x 3 days ago. Endorses fever. Denies chills, nasal congestion, sore throat, myalgias, SOB and CP. Patient states she was tested for COVID-19 via the [facility] x 2 hours PTA.     The history is provided by the patient. No  was used.     Review of patient's allergies indicates:   Allergen Reactions    Keflex [cephalexin] Hives    Vicodin [hydrocodone-acetaminophen] Itching     itch     Past Medical History:   Diagnosis Date    Allergy     Anxiety     Diabetes mellitus     Heart murmur     Hypertension      Past Surgical History:   Procedure Laterality Date    CHOLECYSTECTOMY      2001 april    HYSTERECTOMY      partial  1996    OOPHORECTOMY      SHOULDER SURGERY Right     WISDOM TOOTH EXTRACTION       Family History   Problem Relation Age of Onset    Diabetes Mother     Hyperlipidemia Mother     Hypertension Mother     Cataracts Mother     Diabetes Father     Hypertension Father     Stroke Father     Depression Sister     Hypertension Sister     Stroke Sister     Cancer Maternal Aunt         breast x 2    Breast cancer Maternal Aunt     Cancer Maternal Uncle         prostate x 2    Cancer Paternal Aunt         breast    Breast cancer Paternal Aunt     Cancer Maternal Grandfather         lung    Early death Paternal Grandmother     Early death Paternal Grandfather     No Known Problems Brother     No Known Problems Paternal Uncle     No Known Problems Maternal Grandmother     Melanoma Neg Hx     Eczema Neg Hx      Lupus Neg Hx     Psoriasis Neg Hx     Amblyopia Neg Hx     Blindness Neg Hx     Glaucoma Neg Hx     Macular degeneration Neg Hx     Retinal detachment Neg Hx     Strabismus Neg Hx     Thyroid disease Neg Hx      Social History     Tobacco Use    Smoking status: Former Smoker     Packs/day: 0.00     Years: 0.50     Pack years: 0.00    Smokeless tobacco: Never Used   Substance Use Topics    Alcohol use: Yes     Alcohol/week: 0.0 standard drinks     Comment: seldom    Drug use: No     Review of Systems   Constitutional: Positive for fever. Negative for appetite change and chills.   HENT: Negative for congestion and sore throat.    Respiratory: Positive for cough. Negative for shortness of breath.    Cardiovascular: Negative for chest pain.   Gastrointestinal: Negative for abdominal pain, diarrhea, nausea and vomiting.   Genitourinary: Negative for dysuria.   Musculoskeletal: Negative for back pain and myalgias.   Skin: Negative for rash.   Neurological: Negative for weakness and headaches.   Hematological: Negative for adenopathy.   Psychiatric/Behavioral: Negative for behavioral problems.   All other systems reviewed and are negative.      Physical Exam     Initial Vitals [03/22/20 1300]   BP Pulse Resp Temp SpO2   (!) 140/70 99 20 (!) 101.7 °F (38.7 °C) 100 %      MAP       --         Physical Exam    Nursing note and vitals reviewed.  Constitutional: She appears well-developed and well-nourished.   HENT:   Head: Normocephalic and atraumatic.   Right Ear: External ear normal.   Left Ear: External ear normal.   Mouth/Throat: Uvula is midline and mucous membranes are normal. Posterior oropharyngeal erythema present. No oropharyngeal exudate or posterior oropharyngeal edema.   Enlarged nasal turbinates noted. Clear nasal discharge noted. Oropharyngeal erythema present. No oropharyngeal exudate or edema.      Eyes: Conjunctivae are normal.   Neck: Normal range of motion. Neck supple.   Cardiovascular: Normal  rate, regular rhythm and normal heart sounds. Exam reveals no gallop and no friction rub.    No murmur heard.  Pulmonary/Chest: Breath sounds normal. No respiratory distress. She has no wheezes. She has no rhonchi. She has no rales.   Abdominal: Soft. There is no tenderness.   Musculoskeletal: Normal range of motion. She exhibits no edema.   Neurological: She is alert and oriented to person, place, and time. GCS score is 15. GCS eye subscore is 4. GCS verbal subscore is 5. GCS motor subscore is 6.   Skin: Skin is warm and dry.   Psychiatric: She has a normal mood and affect.         ED Course   Procedures  Labs Reviewed - No data to display       Imaging Results    None          Medical Decision Making:   History:   Old Medical Records: I decided to obtain old medical records.  Initial Assessment:   Monalisa Carson is a 55 y.o. female who presents to the ED complaining of acute dry cough x 3 days ago. Endorses fever.     Patient reports secondary complaint of rash to bilateral arms x 3 days ago.   ED Management:  Patient was given instructions for acute viral syndrome and advised follow up with her primary care physician within the next 3 days for re-evaluation/return to the emergency department if condition worsens.              Scribe Attestation:   Scribe #1: I performed the above scribed service and the documentation accurately describes the services I performed. I attest to the accuracy of the note.               This document was produced by a scribe under my direction and in my presence. I agree with the content of the note and have made any necessary edits.     Dr. Alanis    03/23/2020 1:15 PM             Clinical Impression:     1. Acute viral syndrome            Disposition:   Disposition: Discharged  Condition: Stable     ED Disposition Condition    Discharge Stable        ED Prescriptions     Medication Sig Dispense Start Date End Date Auth. Provider    azelastine (ASTELIN) 137 mcg (0.1 %) nasal spray  2 sprays (274 mcg total) by Nasal route 2 (two) times daily. 30 mL 3/22/2020 5/16/2020 Wilson Alanis MD    fluticasone propionate (FLONASE) 50 mcg/actuation nasal spray 2 sprays (100 mcg total) by Each Nostril route once daily. 15 g 3/22/2020  Wilson Alanis MD        Follow-up Information     Follow up With Specialties Details Why Contact Info    Alisha See MD Family Medicine Schedule an appointment as soon as possible for a visit in 1 day For reevaluation and results on coronavirus testing [address] [place] [address]  [phone]                                       Wilson Alanis MD  03/23/20 6559

## 2020-03-29 ENCOUNTER — HOSPITAL ENCOUNTER (INPATIENT)
Facility: HOSPITAL | Age: 55
LOS: 7 days | Discharge: HOME | DRG: 177 | End: 2020-04-05
Attending: EMERGENCY MEDICINE | Admitting: HOSPITALIST
Payer: COMMERCIAL

## 2020-03-29 DIAGNOSIS — U07.1 2019 NOVEL CORONAVIRUS DISEASE (COVID-19): ICD-10-CM

## 2020-03-29 DIAGNOSIS — R09.02 HYPOXIA: ICD-10-CM

## 2020-03-29 DIAGNOSIS — J12.9 VIRAL PNEUMONIA: Primary | ICD-10-CM

## 2020-03-29 DIAGNOSIS — E78.5 HYPERLIPIDEMIA, UNSPECIFIED HYPERLIPIDEMIA TYPE: ICD-10-CM

## 2020-03-29 DIAGNOSIS — R06.02 SOB (SHORTNESS OF BREATH): ICD-10-CM

## 2020-03-29 LAB
ALBUMIN SERPL BCP-MCNC: 3.2 G/DL (ref 3.5–5.2)
ALP SERPL-CCNC: 58 U/L (ref 55–135)
ALT SERPL W/O P-5'-P-CCNC: 53 U/L (ref 10–44)
ANION GAP SERPL CALC-SCNC: 11 MMOL/L (ref 8–16)
AST SERPL-CCNC: 78 U/L (ref 10–40)
BASOPHILS # BLD AUTO: 0.01 K/UL (ref 0–0.2)
BASOPHILS NFR BLD: 0.1 % (ref 0–1.9)
BILIRUB SERPL-MCNC: 0.4 MG/DL (ref 0.1–1)
BUN SERPL-MCNC: 8 MG/DL (ref 6–20)
CALCIUM SERPL-MCNC: 8.5 MG/DL (ref 8.7–10.5)
CHLORIDE SERPL-SCNC: 98 MMOL/L (ref 95–110)
CO2 SERPL-SCNC: 23 MMOL/L (ref 23–29)
CREAT SERPL-MCNC: 0.9 MG/DL (ref 0.5–1.4)
CRP SERPL-MCNC: 62.1 MG/L (ref 0–8.2)
DIFFERENTIAL METHOD: ABNORMAL
EOSINOPHIL # BLD AUTO: 0 K/UL (ref 0–0.5)
EOSINOPHIL NFR BLD: 0 % (ref 0–8)
ERYTHROCYTE [DISTWIDTH] IN BLOOD BY AUTOMATED COUNT: 13.9 % (ref 11.5–14.5)
EST. GFR  (AFRICAN AMERICAN): >60 ML/MIN/1.73 M^2
EST. GFR  (NON AFRICAN AMERICAN): >60 ML/MIN/1.73 M^2
FERRITIN SERPL-MCNC: 2508 NG/ML (ref 20–300)
GLUCOSE SERPL-MCNC: 249 MG/DL (ref 70–110)
HCT VFR BLD AUTO: 43.3 % (ref 37–48.5)
HGB BLD-MCNC: 14.5 G/DL (ref 12–16)
IMM GRANULOCYTES # BLD AUTO: 0.07 K/UL (ref 0–0.04)
IMM GRANULOCYTES NFR BLD AUTO: 0.7 % (ref 0–0.5)
LYMPHOCYTES # BLD AUTO: 1.3 K/UL (ref 1–4.8)
LYMPHOCYTES NFR BLD: 13.9 % (ref 18–48)
MCH RBC QN AUTO: 29.3 PG (ref 27–31)
MCHC RBC AUTO-ENTMCNC: 33.5 G/DL (ref 32–36)
MCV RBC AUTO: 88 FL (ref 82–98)
MONOCYTES # BLD AUTO: 0.3 K/UL (ref 0.3–1)
MONOCYTES NFR BLD: 2.9 % (ref 4–15)
NEUTROPHILS # BLD AUTO: 7.8 K/UL (ref 1.8–7.7)
NEUTROPHILS NFR BLD: 82.4 % (ref 38–73)
NRBC BLD-RTO: 0 /100 WBC
PLATELET # BLD AUTO: 406 K/UL (ref 150–350)
PMV BLD AUTO: 9.7 FL (ref 9.2–12.9)
POCT GLUCOSE: 227 MG/DL (ref 70–110)
POCT GLUCOSE: 233 MG/DL (ref 70–110)
POTASSIUM SERPL-SCNC: 3.8 MMOL/L (ref 3.5–5.1)
PROCALCITONIN SERPL IA-MCNC: 0.11 NG/ML
PROT SERPL-MCNC: 8.3 G/DL (ref 6–8.4)
RBC # BLD AUTO: 4.95 M/UL (ref 4–5.4)
SODIUM SERPL-SCNC: 132 MMOL/L (ref 136–145)
TROPONIN I SERPL DL<=0.01 NG/ML-MCNC: 0.02 NG/ML (ref 0–0.03)
WBC # BLD AUTO: 9.47 K/UL (ref 3.9–12.7)

## 2020-03-29 PROCEDURE — 63600175 PHARM REV CODE 636 W HCPCS: Performed by: HOSPITALIST

## 2020-03-29 PROCEDURE — 80053 COMPREHEN METABOLIC PANEL: CPT

## 2020-03-29 PROCEDURE — 85025 COMPLETE CBC W/AUTO DIFF WBC: CPT

## 2020-03-29 PROCEDURE — 86140 C-REACTIVE PROTEIN: CPT

## 2020-03-29 PROCEDURE — 21400001 HC TELEMETRY ROOM

## 2020-03-29 PROCEDURE — 25000003 PHARM REV CODE 250: Performed by: PHYSICIAN ASSISTANT

## 2020-03-29 PROCEDURE — 93010 EKG 12-LEAD: ICD-10-PCS | Mod: ,,, | Performed by: INTERNAL MEDICINE

## 2020-03-29 PROCEDURE — 99285 EMERGENCY DEPT VISIT HI MDM: CPT | Mod: 25

## 2020-03-29 PROCEDURE — 93010 ELECTROCARDIOGRAM REPORT: CPT | Mod: ,,, | Performed by: INTERNAL MEDICINE

## 2020-03-29 PROCEDURE — 63600175 PHARM REV CODE 636 W HCPCS: Performed by: EMERGENCY MEDICINE

## 2020-03-29 PROCEDURE — U0002 COVID-19 LAB TEST NON-CDC: HCPCS

## 2020-03-29 PROCEDURE — 84484 ASSAY OF TROPONIN QUANT: CPT

## 2020-03-29 PROCEDURE — 84145 PROCALCITONIN (PCT): CPT

## 2020-03-29 PROCEDURE — 25000003 PHARM REV CODE 250: Performed by: EMERGENCY MEDICINE

## 2020-03-29 PROCEDURE — 82728 ASSAY OF FERRITIN: CPT

## 2020-03-29 PROCEDURE — 93005 ELECTROCARDIOGRAM TRACING: CPT

## 2020-03-29 RX ORDER — ACETAMINOPHEN 325 MG/1
650 TABLET ORAL EVERY 6 HOURS PRN
Status: DISCONTINUED | OUTPATIENT
Start: 2020-03-29 | End: 2020-04-05 | Stop reason: HOSPADM

## 2020-03-29 RX ORDER — HYDROXYZINE HYDROCHLORIDE 25 MG/1
25 TABLET, FILM COATED ORAL 3 TIMES DAILY PRN
Status: DISCONTINUED | OUTPATIENT
Start: 2020-03-29 | End: 2020-04-05 | Stop reason: HOSPADM

## 2020-03-29 RX ORDER — ACETAMINOPHEN 500 MG
1000 TABLET ORAL
Status: COMPLETED | OUTPATIENT
Start: 2020-03-29 | End: 2020-03-29

## 2020-03-29 RX ORDER — NAPROXEN SODIUM 220 MG/1
81 TABLET, FILM COATED ORAL DAILY
Status: DISCONTINUED | OUTPATIENT
Start: 2020-03-29 | End: 2020-04-05 | Stop reason: HOSPADM

## 2020-03-29 RX ORDER — SODIUM CHLORIDE 0.9 % (FLUSH) 0.9 %
10 SYRINGE (ML) INJECTION
Status: DISCONTINUED | OUTPATIENT
Start: 2020-03-29 | End: 2020-04-05 | Stop reason: HOSPADM

## 2020-03-29 RX ORDER — ONDANSETRON 2 MG/ML
4 INJECTION INTRAMUSCULAR; INTRAVENOUS EVERY 6 HOURS PRN
Status: DISCONTINUED | OUTPATIENT
Start: 2020-03-29 | End: 2020-04-05 | Stop reason: HOSPADM

## 2020-03-29 RX ORDER — BENAZEPRIL HYDROCHLORIDE 10 MG/1
40 TABLET ORAL DAILY
Status: DISCONTINUED | OUTPATIENT
Start: 2020-03-29 | End: 2020-03-31

## 2020-03-29 RX ORDER — TIZANIDINE 2 MG/1
2 TABLET ORAL EVERY 8 HOURS
Status: DISCONTINUED | OUTPATIENT
Start: 2020-03-29 | End: 2020-03-31

## 2020-03-29 RX ORDER — LEVETIRACETAM 500 MG/1
1000 TABLET ORAL 2 TIMES DAILY
Status: DISCONTINUED | OUTPATIENT
Start: 2020-03-29 | End: 2020-04-05 | Stop reason: HOSPADM

## 2020-03-29 RX ORDER — ALBUTEROL SULFATE 90 UG/1
2 AEROSOL, METERED RESPIRATORY (INHALATION) EVERY 4 HOURS PRN
Status: DISCONTINUED | OUTPATIENT
Start: 2020-03-29 | End: 2020-03-31

## 2020-03-29 RX ORDER — ESCITALOPRAM OXALATE 10 MG/1
20 TABLET ORAL DAILY
Status: DISCONTINUED | OUTPATIENT
Start: 2020-03-29 | End: 2020-04-05 | Stop reason: HOSPADM

## 2020-03-29 RX ORDER — CLONIDINE HYDROCHLORIDE 0.1 MG/1
0.1 TABLET ORAL 2 TIMES DAILY
Status: DISCONTINUED | OUTPATIENT
Start: 2020-03-29 | End: 2020-03-31

## 2020-03-29 RX ORDER — NORTRIPTYLINE HYDROCHLORIDE 25 MG/1
50 CAPSULE ORAL NIGHTLY
Status: DISCONTINUED | OUTPATIENT
Start: 2020-03-29 | End: 2020-04-05 | Stop reason: HOSPADM

## 2020-03-29 RX ORDER — ZOLPIDEM TARTRATE 5 MG/1
5 TABLET ORAL NIGHTLY PRN
Status: DISCONTINUED | OUTPATIENT
Start: 2020-03-29 | End: 2020-04-05 | Stop reason: HOSPADM

## 2020-03-29 RX ORDER — IBUPROFEN 200 MG
16 TABLET ORAL
Status: DISCONTINUED | OUTPATIENT
Start: 2020-03-29 | End: 2020-04-05 | Stop reason: HOSPADM

## 2020-03-29 RX ORDER — ROSUVASTATIN CALCIUM 10 MG/1
20 TABLET, COATED ORAL DAILY
Status: DISCONTINUED | OUTPATIENT
Start: 2020-03-29 | End: 2020-04-05 | Stop reason: HOSPADM

## 2020-03-29 RX ORDER — ENOXAPARIN SODIUM 100 MG/ML
40 INJECTION SUBCUTANEOUS EVERY 24 HOURS
Status: DISCONTINUED | OUTPATIENT
Start: 2020-03-29 | End: 2020-04-05 | Stop reason: HOSPADM

## 2020-03-29 RX ORDER — PROCHLORPERAZINE EDISYLATE 5 MG/ML
5 INJECTION INTRAMUSCULAR; INTRAVENOUS EVERY 6 HOURS PRN
Status: DISCONTINUED | OUTPATIENT
Start: 2020-03-29 | End: 2020-04-05 | Stop reason: HOSPADM

## 2020-03-29 RX ORDER — AMLODIPINE BESYLATE 5 MG/1
10 TABLET ORAL DAILY
Status: DISCONTINUED | OUTPATIENT
Start: 2020-03-29 | End: 2020-03-31

## 2020-03-29 RX ORDER — GLUCAGON 1 MG
1 KIT INJECTION
Status: DISCONTINUED | OUTPATIENT
Start: 2020-03-29 | End: 2020-04-05 | Stop reason: HOSPADM

## 2020-03-29 RX ORDER — IBUPROFEN 200 MG
24 TABLET ORAL
Status: DISCONTINUED | OUTPATIENT
Start: 2020-03-29 | End: 2020-04-05 | Stop reason: HOSPADM

## 2020-03-29 RX ORDER — INSULIN ASPART 100 [IU]/ML
1-10 INJECTION, SOLUTION INTRAVENOUS; SUBCUTANEOUS
Status: DISCONTINUED | OUTPATIENT
Start: 2020-03-29 | End: 2020-04-05 | Stop reason: HOSPADM

## 2020-03-29 RX ORDER — ACETAMINOPHEN 325 MG/1
650 TABLET ORAL
Status: COMPLETED | OUTPATIENT
Start: 2020-03-29 | End: 2020-03-29

## 2020-03-29 RX ORDER — POLYETHYLENE GLYCOL 3350 17 G/17G
17 POWDER, FOR SOLUTION ORAL DAILY
Status: DISCONTINUED | OUTPATIENT
Start: 2020-03-29 | End: 2020-03-31

## 2020-03-29 RX ADMIN — BENAZEPRIL HYDROCHLORIDE 40 MG: 10 TABLET ORAL at 12:03

## 2020-03-29 RX ADMIN — CLONIDINE HYDROCHLORIDE 0.1 MG: 0.1 TABLET ORAL at 12:03

## 2020-03-29 RX ADMIN — INSULIN ASPART 2 UNITS: 100 INJECTION, SOLUTION INTRAVENOUS; SUBCUTANEOUS at 09:03

## 2020-03-29 RX ADMIN — ENOXAPARIN SODIUM 40 MG: 100 INJECTION SUBCUTANEOUS at 05:03

## 2020-03-29 RX ADMIN — ROSUVASTATIN CALCIUM 20 MG: 10 TABLET, COATED ORAL at 12:03

## 2020-03-29 RX ADMIN — ACETAMINOPHEN 650 MG: 325 TABLET ORAL at 01:03

## 2020-03-29 RX ADMIN — AMLODIPINE BESYLATE 10 MG: 5 TABLET ORAL at 12:03

## 2020-03-29 RX ADMIN — CLONIDINE HYDROCHLORIDE 0.1 MG: 0.1 TABLET ORAL at 09:03

## 2020-03-29 RX ADMIN — TIZANIDINE 2 MG: 2 TABLET ORAL at 04:03

## 2020-03-29 RX ADMIN — NORTRIPTYLINE HYDROCHLORIDE 50 MG: 25 CAPSULE ORAL at 09:03

## 2020-03-29 RX ADMIN — POLYETHYLENE GLYCOL 3350 17 G: 17 POWDER, FOR SOLUTION ORAL at 12:03

## 2020-03-29 RX ADMIN — ACETAMINOPHEN 1000 MG: 500 TABLET ORAL at 08:03

## 2020-03-29 RX ADMIN — LEVETIRACETAM 1000 MG: 500 TABLET, FILM COATED ORAL at 09:03

## 2020-03-29 RX ADMIN — ASPIRIN 81 MG 81 MG: 81 TABLET ORAL at 12:03

## 2020-03-29 RX ADMIN — ESCITALOPRAM OXALATE 20 MG: 10 TABLET ORAL at 12:03

## 2020-03-29 RX ADMIN — LEVETIRACETAM 1000 MG: 500 TABLET, FILM COATED ORAL at 12:03

## 2020-03-29 RX ADMIN — TIZANIDINE 2 MG: 2 TABLET ORAL at 09:03

## 2020-03-29 NOTE — ED NOTES
Currently on 4L NC, sats 96%.  Resting with lights off.  Call light within reach.  Isolation precautions in force.

## 2020-03-29 NOTE — PLAN OF CARE
Problem: Fall Injury Risk  Goal: Absence of Fall and Fall-Related Injury  Outcome: Ongoing, Progressing  Intervention: Identify and Manage Contributors to Fall Injury Risk  Flowsheets (Taken 3/29/2020 1711)  Self-Care Promotion: BADL personal objects within reach; safe use of adaptive equipment encouraged  Medication Review/Management: medications reviewed; high risk medications identified  Intervention: Promote Injury-Free Environment  Flowsheets (Taken 3/29/2020 1711)  Safety Promotion/Fall Prevention: room near unit station; lighting adjusted; Fall Risk reviewed with patient/family  Environmental Safety Modification: assistive device/personal items within reach; clutter free environment maintained; room near unit station     Problem: Fall Injury Risk  Goal: Absence of Fall and Fall-Related Injury  Intervention: Identify and Manage Contributors to Fall Injury Risk  Flowsheets (Taken 3/29/2020 1711)  Self-Care Promotion: BADL personal objects within reach; safe use of adaptive equipment encouraged  Medication Review/Management: medications reviewed; high risk medications identified     Problem: Fall Injury Risk  Goal: Absence of Fall and Fall-Related Injury  Intervention: Promote Injury-Free Environment  Flowsheets (Taken 3/29/2020 1711)  Safety Promotion/Fall Prevention: room near unit station; lighting adjusted; Fall Risk reviewed with patient/family  Environmental Safety Modification: assistive device/personal items within reach; clutter free environment maintained; room near unit station

## 2020-03-29 NOTE — ED NOTES
Encouraged to breathe thru nose out mouth, to help slow breathing down,.  Tylenol on board at present for temp,

## 2020-03-29 NOTE — ED NOTES
Tylenol given for fever 102.6 pt c/o lower back pain other than that denies.  Rates 9 of 10 pain for lower back pain, started yesterday.

## 2020-03-29 NOTE — ED PROVIDER NOTES
Encounter Date: 3/29/2020    SCRIBE #1 NOTE: I, Nubia Dixon, am scribing for, and in the presence of,  Guillermo Bermeo MD. I have scribed the following portions of the note - Other sections scribed: HPI, ROS.       History     Chief Complaint   Patient presents with    Shortness of Breath     reports feeling SOB for x2  days; tested positive for COVID19     This is a 55 y.o. female with a PMHx of Anxiety, DM, Heart Murmur, or HTN who presents to the Emergency Department with a cc of constant SOB x2 days. The pt reports that she tested positive for COVID-19. Pt reports associated cough, CP, and diarrhea. Pt denies any HA, eye pain, arm pain, leg pain, dysuria, or rash. There were no worsening or alleviating factors reported.      The history is provided by the patient. No  was used.     Review of patient's allergies indicates:   Allergen Reactions    Keflex [cephalexin] Hives    Vicodin [hydrocodone-acetaminophen] Itching     itch     Past Medical History:   Diagnosis Date    Allergy     Anxiety     Diabetes mellitus     Heart murmur     Hypertension     Seizures      Past Surgical History:   Procedure Laterality Date    CHOLECYSTECTOMY      2001 april    HYSTERECTOMY      partial  1996    OOPHORECTOMY      SHOULDER SURGERY Right     WISDOM TOOTH EXTRACTION       Family History   Problem Relation Age of Onset    Diabetes Mother     Hyperlipidemia Mother     Hypertension Mother     Cataracts Mother     Diabetes Father     Hypertension Father     Stroke Father     Depression Sister     Hypertension Sister     Stroke Sister     Cancer Maternal Aunt         breast x 2    Breast cancer Maternal Aunt     Cancer Maternal Uncle         prostate x 2    Cancer Paternal Aunt         breast    Breast cancer Paternal Aunt     Cancer Maternal Grandfather         lung    Early death Paternal Grandmother     Early death Paternal Grandfather     No Known Problems Brother     No  Known Problems Paternal Uncle     No Known Problems Maternal Grandmother     Melanoma Neg Hx     Eczema Neg Hx     Lupus Neg Hx     Psoriasis Neg Hx     Amblyopia Neg Hx     Blindness Neg Hx     Glaucoma Neg Hx     Macular degeneration Neg Hx     Retinal detachment Neg Hx     Strabismus Neg Hx     Thyroid disease Neg Hx      Social History     Tobacco Use    Smoking status: Former Smoker     Packs/day: 0.00     Years: 0.50     Pack years: 0.00    Smokeless tobacco: Never Used   Substance Use Topics    Alcohol use: Yes     Alcohol/week: 0.0 standard drinks     Comment: seldom    Drug use: No     Review of Systems   Constitutional: Negative for chills, diaphoresis and fever.   HENT: Negative for ear pain and sore throat.    Eyes: Negative for photophobia, pain, discharge, redness and itching.   Respiratory: Positive for cough and shortness of breath.    Cardiovascular: Negative for chest pain.   Gastrointestinal: Positive for diarrhea. Negative for nausea and vomiting.   Genitourinary: Negative for dysuria.   Musculoskeletal: Negative for arthralgias and myalgias.   Skin: Negative for rash.   Neurological: Negative for headaches.       Physical Exam     Initial Vitals [03/29/20 0645]   BP Pulse Resp Temp SpO2   137/75 104 (!) 22 (!) 102.1 °F (38.9 °C) (!) 91 %      MAP       --         Physical Exam  The patient was examined specifically for the following:   General:No significant distress, Good color, Warm and dry. Head and neck:Scalp atraumatic, Neck supple. Neurological:Appropriate conversation, Gross motor deficits. Eyes:Conjugate gaze, Clear corneas. ENT: No epistaxis. Cardiac: Regular rate and rhythm, Grossly normal heart tones. Pulmonary: Wheezing, Rales. Gastrointestinal: Abdominal tenderness, Abdominal distention. Musculoskeletal: Extremity deformity, Apparent pain with range of motion of the joints. Skin: Rash.   The findings on examination were normal except for the following:  The patient's  temperature is a 102.1°.  The heart rate is 104.  Oxygen saturations are 91% on room air.  They are 94% on 5 L nasal cannula.   ED Course   Procedures  Labs Reviewed   CBC W/ AUTO DIFFERENTIAL - Abnormal; Notable for the following components:       Result Value    Platelets 406 (*)     Immature Granulocytes 0.7 (*)     Gran # (ANC) 7.8 (*)     Immature Grans (Abs) 0.07 (*)     Gran% 82.4 (*)     Lymph% 13.9 (*)     Mono% 2.9 (*)     All other components within normal limits   COMPREHENSIVE METABOLIC PANEL - Abnormal; Notable for the following components:    Sodium 132 (*)     Glucose 249 (*)     Calcium 8.5 (*)     Albumin 3.2 (*)     AST 78 (*)     ALT 53 (*)     All other components within normal limits   C-REACTIVE PROTEIN - Abnormal; Notable for the following components:    CRP 62.1 (*)     All other components within normal limits   PROCALCITONIN   TROPONIN I   FERRITIN   SARS-COV-2 (COVID-19) QUALITATIVE PCR     EKG Readings: (Independently Interpreted)   This patient is in a normal sinus rhythm with a heart rate of 98.  There is poor R-wave progression across the precordium.  There is no definite evidence of acute myocardial infarction or malignant arrhythmia.     ECG Results          EKG 12-lead (In process)  Result time 03/29/20 09:25:59    In process by Interface, Lab In [facility] (03/29/20 09:25:59)                 Narrative:    Test Reason : R06.02,    Vent. Rate : 098 BPM     Atrial Rate : 098 BPM     P-R Int : 158 ms          QRS Dur : 090 ms      QT Int : 354 ms       P-R-T Axes : 056 014 064 degrees     QTc Int : 451 ms    Normal sinus rhythm  Normal ECG  When compared with ECG of 08-APR-2019 09:43,  Significant changes have occurred    Referred By: System System           Confirmed By:                   In process by Interface, Lab In [facility] (03/29/20 09:18:54)                 Narrative:    Test Reason : R06.02,    Vent. Rate : 098 BPM     Atrial Rate : 098 BPM     P-R Int : 158 ms          QRS Dur :  090 ms      QT Int : 354 ms       P-R-T Axes : 056 014 064 degrees     QTc Int : 451 ms    Normal sinus rhythm  Normal ECG  When compared with ECG of 08-APR-2019 09:43,  Significant changes have occurred    Referred By: System System           Confirmed By:                             Imaging Results          X-Ray Chest 1 View (Final result)  Result time 03/29/20 08:20:44    Final result by Darrius Urban MD (03/29/20 08:20:44)                 Impression:      Subtle patchy consolidation within the mid left lung.  Findings are worrisome for pneumonia and viral pneumonia should be strongly considered.    Linear opacities within the right lung base which may be related to linear atelectasis.      Electronically signed by: Darrius Urban MD  Date:    03/29/2020  Time:    08:20             Narrative:    EXAMINATION:  XR CHEST 1 VIEW    CLINICAL HISTORY:  Shortness of breath    TECHNIQUE:  Single frontal view of the chest was performed.    COMPARISON:  04/08/2019.    FINDINGS:  The heart and mediastinal structures are unremarkable.  There appears to be some subtle patchy consolidation within the mid left lung.  In this patient with shortness of breath and positive test for Covid-19, viral pneumonia should be strongly considered.  Linear opacities are also noted within the right lung base which may be due to linear atelectasis.  There is no evidence for pneumothorax or large pleural effusions.  Bony structures are grossly intact.                              Medical decision making:  Given the above, this patient presents to the emergency room with shortness of breath cough and fever.  The patient tested positive for COVID-19.  Chest x-ray reveals what is likely a viral pneumonia.  The patient had oxygen saturations of 91% on arrival.  Her oxygen saturations are 96% on 3 L nasal cannula at the time of admission.  I will admit her to the hospital for observation and oxygen support.                Scribe Attestation:    Scribe #1: I performed the above scribed service and the documentation accurately describes the services I performed. I attest to the accuracy of the note.                          Clinical Impression:       ICD-10-CM ICD-9-CM   1. Viral pneumonia J12.9 480.9   2. SOB (shortness of breath) R06.02 786.05   3. Hypoxia R09.02 799.02             I personally performed the services described in this documentation.  All medical record  entries made by the scribe are at my direction and in my presence.  Signed, Dr. Bermeo   ED Disposition Condition    Admit                           Guillermo Bermeo MD  03/29/20 0942

## 2020-03-29 NOTE — PLAN OF CARE
03/29/20 1628   Discharge Assessment   Assessment Type Discharge Planning Assessment   Confirmed/corrected address and phone number on facesheet? Yes   Assessment information obtained from? Medical Record;Caregiver  (Skylero-spouse)   Communicated expected length of stay with patient/caregiver no   Prior to hospitilization cognitive status: Alert/Oriented   Prior to hospitalization functional status: Independent;Needs Assistance  (Some assist needed: seizures, SOB)   Current cognitive status: Alert/Oriented   Current Functional Status: Independent;Needs Assistance  (Some assist needed; seizures, SOB)   Facility Arrived From: Home   Lives With spouse   Able to Return to Prior Arrangements yes   Is patient able to care for self after discharge? Yes   Who are your caregiver(s) and their phone number(s)? Reynrachealo-spouse: [phone]   Patient's perception of discharge disposition home or selfcare   Readmission Within the Last 30 Days no previous admission in last 30 days   Patient currently being followed by outpatient case management? No   Patient currently receives any other outside agency services? No   Equipment Currently Used at Home none   Do you have any problems affording any of your prescribed medications? No   Is the patient taking medications as prescribed? yes   Does the patient have transportation home? Yes   Transportation Anticipated family or friend will provide   Does the patient receive services at the [facility]? No   Discharge Plan A Home with family   Discharge Plan B Other  (TBD)   DME Needed Upon Discharge  other (see comments)  (TBD)   Patient/Family in Agreement with Plan yes   SW Role explained to patient; two patient identifiers recognized; SW contact information placed on Communication board. Discussed patient managing health care at home; determined who would be helping patient at home with recovery: Reynoldo-spouse helps at home.     PCP: Alisha See MD Prefers morning  appointments    Extended Emergency Contact Information  Primary Emergency Contact: Favorite,Lola   [place]  Mobile Phone: [phone]  Relation: Spouse     [id number] - [place] - [address] [facility]  [address] [address]  Phone: [phone] Fax: [phone]    Payor: BLUE CROSS BLUE SHIELD / Plan: [employer] [employer] [place] LOCAL PLUS / Product Type: Commercial /

## 2020-03-30 LAB
POCT GLUCOSE: 165 MG/DL (ref 70–110)
POCT GLUCOSE: 211 MG/DL (ref 70–110)
POCT GLUCOSE: 211 MG/DL (ref 70–110)

## 2020-03-30 PROCEDURE — 21400001 HC TELEMETRY ROOM

## 2020-03-30 PROCEDURE — 63600175 PHARM REV CODE 636 W HCPCS: Performed by: EMERGENCY MEDICINE

## 2020-03-30 PROCEDURE — 25000242 PHARM REV CODE 250 ALT 637 W/ HCPCS: Performed by: EMERGENCY MEDICINE

## 2020-03-30 PROCEDURE — 25000003 PHARM REV CODE 250: Performed by: HOSPITALIST

## 2020-03-30 PROCEDURE — 25000003 PHARM REV CODE 250: Performed by: EMERGENCY MEDICINE

## 2020-03-30 RX ADMIN — TIZANIDINE 2 MG: 2 TABLET ORAL at 06:03

## 2020-03-30 RX ADMIN — ASPIRIN 81 MG 81 MG: 81 TABLET ORAL at 09:03

## 2020-03-30 RX ADMIN — INSULIN ASPART 4 UNITS: 100 INJECTION, SOLUTION INTRAVENOUS; SUBCUTANEOUS at 09:03

## 2020-03-30 RX ADMIN — LEVETIRACETAM 1000 MG: 500 TABLET, FILM COATED ORAL at 09:03

## 2020-03-30 RX ADMIN — ESCITALOPRAM OXALATE 20 MG: 10 TABLET ORAL at 09:03

## 2020-03-30 RX ADMIN — GUAIFENESIN AND DEXTROMETHORPHAN HYDROBROMIDE 1 TABLET: 600; 30 TABLET, EXTENDED RELEASE ORAL at 09:03

## 2020-03-30 RX ADMIN — ALBUTEROL SULFATE 2 PUFF: 90 AEROSOL, METERED RESPIRATORY (INHALATION) at 10:03

## 2020-03-30 RX ADMIN — GUAIFENESIN AND DEXTROMETHORPHAN HYDROBROMIDE 1 TABLET: 600; 30 TABLET, EXTENDED RELEASE ORAL at 11:03

## 2020-03-30 RX ADMIN — TIZANIDINE 2 MG: 2 TABLET ORAL at 09:03

## 2020-03-30 RX ADMIN — NORTRIPTYLINE HYDROCHLORIDE 50 MG: 25 CAPSULE ORAL at 09:03

## 2020-03-30 RX ADMIN — ACETAMINOPHEN 650 MG: 325 TABLET ORAL at 09:03

## 2020-03-30 RX ADMIN — ROSUVASTATIN CALCIUM 20 MG: 10 TABLET, COATED ORAL at 09:03

## 2020-03-30 RX ADMIN — ENOXAPARIN SODIUM 40 MG: 100 INJECTION SUBCUTANEOUS at 06:03

## 2020-03-30 RX ADMIN — INSULIN ASPART 4 UNITS: 100 INJECTION, SOLUTION INTRAVENOUS; SUBCUTANEOUS at 12:03

## 2020-03-30 RX ADMIN — TIZANIDINE 2 MG: 2 TABLET ORAL at 02:03

## 2020-03-30 RX ADMIN — INSULIN ASPART 2 UNITS: 100 INJECTION, SOLUTION INTRAVENOUS; SUBCUTANEOUS at 11:03

## 2020-03-30 NOTE — NURSING
Pt wearing 5L O2 nc. Orders entered for continuous pulse ox per standing ordered. Varsha in resp.notified who states we have no more currently but are waiting on some rentals to be delivered.

## 2020-03-30 NOTE — ASSESSMENT & PLAN NOTE
Tested positive for COVID-19, hypoxic on room air, bilat infiltrates on chest xray.  Stable on room air.  Continue isolation and supportive care.

## 2020-03-30 NOTE — PROGRESS NOTES
Patient reported 2 episodes of breathing discomfort, pillow support provided & HOB elevated- Patient reported relief, no new complaints. Day nurse notified

## 2020-03-30 NOTE — PROGRESS NOTES
Received report from JEEVAN Lemos. Patient AAOx4, resting quietly in bed, 5L O2 NC & telemetry monitoring in place, no distress noted. Safety maintained, call light in reach.

## 2020-03-30 NOTE — H&P
[facility] Medicine  History & Physical    Patient Name: Monalisa Carson  MRN: [id number]  Admission Date: 3/29/2020  Attending Physician: Franchesca Butt MD   Primary Care Provider: Alisha See MD         Patient information was obtained from patient, past medical records and ER records.     Subjective:     Principal Problem:2019 novel coronavirus disease (COVID-19)    Chief Complaint:   Chief Complaint   Patient presents with    Shortness of Breath     reports feeling SOB for x2  days; tested positive for COVID19        HPI: Mrs. Monalisa Carson is a 55 y.o female with T2DM, hypertension, heart murmur, and anxiety who presents to the hospital with complaints of fever (102F), cough for 1 week and SOB for the last 2 days. Patient reports she went to the [facility] 7 days ago because of fever and cough after got tested for COVID-19 via [facility]. She was discharged home with albuterol inhaler. Patient decided to go to hospital today due to increasing SOB for the last 2 days. Patient reports her  at home also having cough but not fever. No recent travel. Also reports associated symptoms are nausea/vomit and diarrhea. Denies other symptoms. Reports taking Tylenol for fever at home. Patient reports she is positive for COVID-19 which done on 3/22/2020    In ED, temp was 102, spO2 91% in RA then 92-94% on 4L oxygen, tachypnea 22-27; NSR on EKG; Chest xray reveals subtle patchy consolidation within mid left lung and linear opacities in right lung; Labs with unremarkable CBC, CMP with mild hyponatremia (Na 132); mild elevated AST/ALT 78/53; elevated CRP 62.1; Troponin normal at 0.023; normal Procalcitonin 0.11    Patient is admitted and COVID-19 protocol is initiated        Past Medical History:   Diagnosis Date    Allergy     Anxiety     Diabetes mellitus     Heart murmur     Hypertension     Seizures        Past Surgical History:   Procedure Laterality  Date    CHOLECYSTECTOMY      2001 april    HYSTERECTOMY      partial  1996    OOPHORECTOMY      SHOULDER SURGERY Right     WISDOM TOOTH EXTRACTION         Review of patient's allergies indicates:   Allergen Reactions    Keflex [cephalexin] Hives    Vicodin [hydrocodone-acetaminophen] Itching     itch       No current facility-administered medications on file prior to encounter.      Current Outpatient Medications on File Prior to Encounter   Medication Sig    acetaminophen (TYLENOL) 500 MG tablet Take 2 tablets (1,000 mg total) by mouth every 6 (six) hours as needed for Pain.    albuterol (PROVENTIL/VENTOLIN HFA) 90 mcg/actuation inhaler Inhale 1-2 puffs into the lungs every 4 (four) hours as needed for Wheezing. Rescue    amLODIPine (NORVASC) 10 MG tablet TAKE 1 TABLET(10 MG) BY MOUTH EVERY DAY    aspirin 81 MG Chew Take 1 tablet (81 mg total) by mouth once daily.    azelastine (ASTELIN) 137 mcg (0.1 %) nasal spray 2 sprays (274 mcg total) by Nasal route 2 (two) times daily.    benazepril (LOTENSIN) 40 MG tablet Take 1 tablet (40 mg total) by mouth once daily.    cloNIDine (CATAPRES) 0.1 MG tablet Take 1 tablet (0.1 mg total) by mouth 2 (two) times daily.    escitalopram oxalate (LEXAPRO) 20 MG tablet Take 1 tablet (20 mg total) by mouth once daily.    exenatide microspheres (BYDUREON) 2 mg ER injection suspension Inject 2 mg into the skin every 7 days.    fluticasone propionate (FLONASE) 50 mcg/actuation nasal spray 2 sprays (100 mcg total) by Each Nostril route once daily.    hydroCHLOROthiazide (HYDRODIURIL) 25 MG tablet TAKE 1 TABLET(25 MG) BY MOUTH EVERY DAY    hydrOXYzine HCl (ATARAX) 25 MG tablet Take 1 tablet (25 mg total) by mouth 3 (three) times daily as needed for Anxiety.    levETIRAcetam (KEPPRA) 1000 MG tablet Take 1 tablet (1,000 mg total) by mouth 2 (two) times daily.    lidocaine (LMX) 4 % cream Apply topically as needed.    loratadine (CLARITIN) 10 mg tablet Take 1 tablet (10  "mg total) by mouth once daily.    nortriptyline (PAMELOR) 50 MG capsule Take 1 capsule (50 mg total) by mouth every evening.    ondansetron (ZOFRAN-ODT) 8 MG TbDL Take 1 tablet (8 mg total) by mouth every 12 (twelve) hours as needed.    pen needle, diabetic 31 gauge x 5/16" Ndle For use with bydureon    rosuvastatin (CRESTOR) 20 MG tablet Take 1 tablet (20 mg total) by mouth once daily.    tiZANidine (ZANAFLEX) 2 MG tablet Take 1 tablet (2 mg total) by mouth every 8 (eight) hours as needed.    zolpidem (AMBIEN) 10 mg Tab TAKE 1 TABLET BY MOUTH EVERY NIGHT AT BEDTIME    [DISCONTINUED] metFORMIN (GLUCOPHAGE-XR) 500 MG XR 24hr tablet TAKE 2 TABLETS(1000 MG) BY MOUTH TWICE DAILY WITH MEALS    hydrocortisone 2.5 % cream WOLFGANG EXT AA BID    ibuprofen (ADVIL,MOTRIN) 600 MG tablet Take 1 tablet (600 mg total) by mouth every 6 (six) hours as needed for Pain (Take with food as needed for mild-to-moderate pain).     Family History     Problem Relation (Age of Onset)    Breast cancer Maternal Aunt, Paternal Aunt    Cancer Maternal Aunt, Maternal Uncle, Paternal Aunt, Maternal Grandfather    Cataracts Mother    Depression Sister    Diabetes Mother, Father    Early death Paternal Grandmother, Paternal Grandfather    Hyperlipidemia Mother    Hypertension Mother, Father, Sister    No Known Problems Brother, Paternal Uncle, Maternal Grandmother    Stroke Father, Sister        Tobacco Use    Smoking status: Former Smoker     Packs/day: 0.00     Years: 0.50     Pack years: 0.00    Smokeless tobacco: Never Used   Substance and Sexual Activity    Alcohol use: Yes     Alcohol/week: 0.0 standard drinks     Comment: seldom    Drug use: No    Sexual activity: Yes     Partners: Male     Birth control/protection: None     Review of Systems   Constitutional: Positive for fever. Negative for activity change, appetite change and chills.   HENT: Negative for congestion, sore throat and trouble swallowing.    Respiratory: Positive for " cough (with greenish phlegm x1week), shortness of breath and wheezing.    Cardiovascular: Negative for chest pain, palpitations and leg swelling.   Gastrointestinal: Positive for diarrhea, nausea and vomiting. Negative for abdominal distention, abdominal pain and constipation.   Genitourinary: Negative for dysuria and hematuria.   Musculoskeletal: Negative for arthralgias, joint swelling and myalgias.   Neurological: Negative for dizziness, tremors, seizures, weakness and headaches.     Objective:     Vital Signs (Most Recent):  Temp: 98.4 °F (36.9 °C) (03/29/20 1615)  Pulse: 89 (03/29/20 1615)  Resp: 20 (03/29/20 1615)  BP: 109/62 (03/29/20 1615)  SpO2: (!) 93 % (03/29/20 1615) Vital Signs (24h Range):  Temp:  [98.4 °F (36.9 °C)-102.6 °F (39.2 °C)] 98.4 °F (36.9 °C)  Pulse:  [] 89  Resp:  [20-48] 20  SpO2:  [91 %-94 %] 93 %  BP: (109-153)/(62-83) 109/62     Weight: 100.3 kg (221 lb 1.9 oz)  Body mass index is 39.17 kg/m².    Physical Exam   Constitutional: She is oriented to person, place, and time. She appears well-developed and well-nourished. No distress.   HENT:   Head: Normocephalic and atraumatic.   Eyes: Pupils are equal, round, and reactive to light. EOM are normal.   Neck: Normal range of motion. No JVD present.   Cardiovascular: Normal rate, regular rhythm and normal heart sounds.   No murmur heard.  Pulmonary/Chest: Effort normal. She has wheezes. She has rales. She exhibits no tenderness.   Abdominal: Soft. Bowel sounds are normal. She exhibits no distension. There is no tenderness. There is no guarding.   Musculoskeletal: Normal range of motion. She exhibits no edema.   Neurological: She is alert and oriented to person, place, and time.   Skin: Skin is warm and dry. No rash noted. She is not diaphoretic.   Psychiatric: She has a normal mood and affect.         CRANIAL NERVES     CN III, IV, VI   Pupils are equal, round, and reactive to light.  Extraocular motions are normal.        Significant  Labs:   CBC:   Recent Labs   Lab 03/29/20  0738   WBC 9.47   HGB 14.5   HCT 43.3   *     CMP:   Recent Labs   Lab 03/29/20  0738   *   K 3.8   CL 98   CO2 23   *   BUN 8   CREATININE 0.9   CALCIUM 8.5*   PROT 8.3   ALBUMIN 3.2*   BILITOT 0.4   ALKPHOS 58   AST 78*   ALT 53*   ANIONGAP 11   EGFRNONAA >60     Troponin:   Recent Labs   Lab 03/29/20  0738   TROPONINI 0.023       Significant Imaging: I have reviewed and interpreted all pertinent imaging results/findings within the past 24 hours.    Assessment/Plan:     * 2019 novel coronavirus disease (COVID-19)  Tested positive for COVID-19, hypoxic on room air, bilat infiltrates on chest xray.  Stable on room air.  Continue isolation and supportive care.    Viral pneumonia  Secondary to novel coronavirus, management as above    HLD (hyperlipidemia)  Continue statin    Diabetes mellitus  HgbA1C 8.0 (2/14/2020). On Bydureon SQ weekly at home.  -SSI with meals  -POCT glucose AC/HS      Essential hypertension, benign  Well controlled, continue home medications and monitor blood pressure, adjust as needed.     Anxiety  Continue as needed hydroxyzine       VTE Risk Mitigation (From admission, onward)         Ordered     enoxaparin injection 40 mg  Daily      03/29/20 1148     IP VTE HIGH RISK PATIENT  Once      03/29/20 1148              Rohit Talbot Jr., APRN, AGACN-BC  Hospitalist - Department of Hospital Medicine  [facility] [address] [place] [id number]  Office #: [phone]; Pager #: [phone]

## 2020-03-30 NOTE — HPI
Mrs. Monalisa Carson is a 55 y.o female with T2DM, hypertension, heart murmur, and anxiety who presents to the hospital with complaints of fever (102F), cough for 1 week and SOB for the last 2 days. Patient reports she went to the [facility] 7 days ago because of fever and cough after got tested for COVID-19 via [facility]. She was discharged home with albuterol inhaler. Patient decided to go to hospital today due to increasing SOB for the last 2 days. Patient reports her  at home also having cough but not fever. No recent travel. Also reports associated symptoms are nausea/vomit and diarrhea. Denies other symptoms. Reports taking Tylenol for fever at home. Patient reports she is positive for COVID-19 which done on 3/22/2020    In ED, temp was 102, spO2 91% in RA then 92-94% on 4L oxygen, tachypnea 22-27; NSR on EKG; Chest xray reveals subtle patchy consolidation within mid left lung and linear opacities in right lung; Labs with unremarkable CBC, CMP with mild hyponatremia (Na 132); mild elevated AST/ALT 78/53; elevated CRP 62.1; Troponin normal at 0.023; normal Procalcitonin 0.11    Patient is admitted and COVID-19 protocol is initiated

## 2020-03-30 NOTE — SUBJECTIVE & OBJECTIVE
Past Medical History:   Diagnosis Date    Allergy     Anxiety     Diabetes mellitus     Heart murmur     Hypertension     Seizures        Past Surgical History:   Procedure Laterality Date    CHOLECYSTECTOMY      2001 april    HYSTERECTOMY      partial  1996    OOPHORECTOMY      SHOULDER SURGERY Right     WISDOM TOOTH EXTRACTION         Review of patient's allergies indicates:   Allergen Reactions    Keflex [cephalexin] Hives    Vicodin [hydrocodone-acetaminophen] Itching     itch       No current facility-administered medications on file prior to encounter.      Current Outpatient Medications on File Prior to Encounter   Medication Sig    acetaminophen (TYLENOL) 500 MG tablet Take 2 tablets (1,000 mg total) by mouth every 6 (six) hours as needed for Pain.    albuterol (PROVENTIL/VENTOLIN HFA) 90 mcg/actuation inhaler Inhale 1-2 puffs into the lungs every 4 (four) hours as needed for Wheezing. Rescue    amLODIPine (NORVASC) 10 MG tablet TAKE 1 TABLET(10 MG) BY MOUTH EVERY DAY    aspirin 81 MG Chew Take 1 tablet (81 mg total) by mouth once daily.    azelastine (ASTELIN) 137 mcg (0.1 %) nasal spray 2 sprays (274 mcg total) by Nasal route 2 (two) times daily.    benazepril (LOTENSIN) 40 MG tablet Take 1 tablet (40 mg total) by mouth once daily.    cloNIDine (CATAPRES) 0.1 MG tablet Take 1 tablet (0.1 mg total) by mouth 2 (two) times daily.    escitalopram oxalate (LEXAPRO) 20 MG tablet Take 1 tablet (20 mg total) by mouth once daily.    exenatide microspheres (BYDUREON) 2 mg ER injection suspension Inject 2 mg into the skin every 7 days.    fluticasone propionate (FLONASE) 50 mcg/actuation nasal spray 2 sprays (100 mcg total) by Each Nostril route once daily.    hydroCHLOROthiazide (HYDRODIURIL) 25 MG tablet TAKE 1 TABLET(25 MG) BY MOUTH EVERY DAY    hydrOXYzine HCl (ATARAX) 25 MG tablet Take 1 tablet (25 mg total) by mouth 3 (three) times daily as needed for Anxiety.    levETIRAcetam (KEPPRA)  "1000 MG tablet Take 1 tablet (1,000 mg total) by mouth 2 (two) times daily.    lidocaine (LMX) 4 % cream Apply topically as needed.    loratadine (CLARITIN) 10 mg tablet Take 1 tablet (10 mg total) by mouth once daily.    nortriptyline (PAMELOR) 50 MG capsule Take 1 capsule (50 mg total) by mouth every evening.    ondansetron (ZOFRAN-ODT) 8 MG TbDL Take 1 tablet (8 mg total) by mouth every 12 (twelve) hours as needed.    pen needle, diabetic 31 gauge x 5/16" Ndle For use with bydureon    rosuvastatin (CRESTOR) 20 MG tablet Take 1 tablet (20 mg total) by mouth once daily.    tiZANidine (ZANAFLEX) 2 MG tablet Take 1 tablet (2 mg total) by mouth every 8 (eight) hours as needed.    zolpidem (AMBIEN) 10 mg Tab TAKE 1 TABLET BY MOUTH EVERY NIGHT AT BEDTIME    [DISCONTINUED] metFORMIN (GLUCOPHAGE-XR) 500 MG XR 24hr tablet TAKE 2 TABLETS(1000 MG) BY MOUTH TWICE DAILY WITH MEALS    hydrocortisone 2.5 % cream WOLFGANG EXT AA BID    ibuprofen (ADVIL,MOTRIN) 600 MG tablet Take 1 tablet (600 mg total) by mouth every 6 (six) hours as needed for Pain (Take with food as needed for mild-to-moderate pain).     Family History     Problem Relation (Age of Onset)    Breast cancer Maternal Aunt, Paternal Aunt    Cancer Maternal Aunt, Maternal Uncle, Paternal Aunt, Maternal Grandfather    Cataracts Mother    Depression Sister    Diabetes Mother, Father    Early death Paternal Grandmother, Paternal Grandfather    Hyperlipidemia Mother    Hypertension Mother, Father, Sister    No Known Problems Brother, Paternal Uncle, Maternal Grandmother    Stroke Father, Sister        Tobacco Use    Smoking status: Former Smoker     Packs/day: 0.00     Years: 0.50     Pack years: 0.00    Smokeless tobacco: Never Used   Substance and Sexual Activity    Alcohol use: Yes     Alcohol/week: 0.0 standard drinks     Comment: seldom    Drug use: No    Sexual activity: Yes     Partners: Male     Birth control/protection: None     Review of Systems "   Constitutional: Positive for fever. Negative for activity change, appetite change and chills.   HENT: Negative for congestion, sore throat and trouble swallowing.    Respiratory: Positive for cough (with greenish phlegm x1week), shortness of breath and wheezing.    Cardiovascular: Negative for chest pain, palpitations and leg swelling.   Gastrointestinal: Positive for diarrhea, nausea and vomiting. Negative for abdominal distention, abdominal pain and constipation.   Genitourinary: Negative for dysuria and hematuria.   Musculoskeletal: Negative for arthralgias, joint swelling and myalgias.   Neurological: Negative for dizziness, tremors, seizures, weakness and headaches.     Objective:     Vital Signs (Most Recent):  Temp: 98.4 °F (36.9 °C) (03/29/20 1615)  Pulse: 89 (03/29/20 1615)  Resp: 20 (03/29/20 1615)  BP: 109/62 (03/29/20 1615)  SpO2: (!) 93 % (03/29/20 1615) Vital Signs (24h Range):  Temp:  [98.4 °F (36.9 °C)-102.6 °F (39.2 °C)] 98.4 °F (36.9 °C)  Pulse:  [] 89  Resp:  [20-48] 20  SpO2:  [91 %-94 %] 93 %  BP: (109-153)/(62-83) 109/62     Weight: 100.3 kg (221 lb 1.9 oz)  Body mass index is 39.17 kg/m².    Physical Exam   Constitutional: She is oriented to person, place, and time. She appears well-developed and well-nourished. No distress.   HENT:   Head: Normocephalic and atraumatic.   Eyes: Pupils are equal, round, and reactive to light. EOM are normal.   Neck: Normal range of motion. No JVD present.   Cardiovascular: Normal rate, regular rhythm and normal heart sounds.   No murmur heard.  Pulmonary/Chest: Effort normal. She has wheezes. She has rales. She exhibits no tenderness.   Abdominal: Soft. Bowel sounds are normal. She exhibits no distension. There is no tenderness. There is no guarding.   Musculoskeletal: Normal range of motion. She exhibits no edema.   Neurological: She is alert and oriented to person, place, and time.   Skin: Skin is warm and dry. No rash noted. She is not diaphoretic.    Psychiatric: She has a normal mood and affect.         CRANIAL NERVES     CN III, IV, VI   Pupils are equal, round, and reactive to light.  Extraocular motions are normal.        Significant Labs:   CBC:   Recent Labs   Lab 03/29/20  0738   WBC 9.47   HGB 14.5   HCT 43.3   *     CMP:   Recent Labs   Lab 03/29/20  0738   *   K 3.8   CL 98   CO2 23   *   BUN 8   CREATININE 0.9   CALCIUM 8.5*   PROT 8.3   ALBUMIN 3.2*   BILITOT 0.4   ALKPHOS 58   AST 78*   ALT 53*   ANIONGAP 11   EGFRNONAA >60     Troponin:   Recent Labs   Lab 03/29/20  0738   TROPONINI 0.023       Significant Imaging: I have reviewed and interpreted all pertinent imaging results/findings within the past 24 hours.

## 2020-03-31 LAB
POCT GLUCOSE: 178 MG/DL (ref 70–110)
POCT GLUCOSE: 195 MG/DL (ref 70–110)
POCT GLUCOSE: 198 MG/DL (ref 70–110)
POCT GLUCOSE: 199 MG/DL (ref 70–110)
POCT GLUCOSE: 202 MG/DL (ref 70–110)

## 2020-03-31 PROCEDURE — 25000003 PHARM REV CODE 250: Performed by: EMERGENCY MEDICINE

## 2020-03-31 PROCEDURE — 25000003 PHARM REV CODE 250: Performed by: NURSE PRACTITIONER

## 2020-03-31 PROCEDURE — 25000003 PHARM REV CODE 250: Performed by: HOSPITALIST

## 2020-03-31 PROCEDURE — 63600175 PHARM REV CODE 636 W HCPCS: Performed by: EMERGENCY MEDICINE

## 2020-03-31 PROCEDURE — 21400001 HC TELEMETRY ROOM

## 2020-03-31 RX ORDER — HYDROXYCHLOROQUINE SULFATE 200 MG/1
400 TABLET, FILM COATED ORAL 2 TIMES DAILY
Status: COMPLETED | OUTPATIENT
Start: 2020-03-31 | End: 2020-04-01

## 2020-03-31 RX ORDER — TIZANIDINE 2 MG/1
2 TABLET ORAL NIGHTLY
Status: DISCONTINUED | OUTPATIENT
Start: 2020-03-31 | End: 2020-04-05 | Stop reason: HOSPADM

## 2020-03-31 RX ORDER — HYDRALAZINE HYDROCHLORIDE 20 MG/ML
10 INJECTION INTRAMUSCULAR; INTRAVENOUS EVERY 8 HOURS PRN
Status: DISCONTINUED | OUTPATIENT
Start: 2020-03-31 | End: 2020-04-05 | Stop reason: HOSPADM

## 2020-03-31 RX ORDER — HYDROXYCHLOROQUINE SULFATE 200 MG/1
400 TABLET, FILM COATED ORAL DAILY
Status: COMPLETED | OUTPATIENT
Start: 2020-04-02 | End: 2020-04-05

## 2020-03-31 RX ORDER — ALBUTEROL SULFATE 90 UG/1
2 AEROSOL, METERED RESPIRATORY (INHALATION) EVERY 4 HOURS
Status: DISCONTINUED | OUTPATIENT
Start: 2020-03-31 | End: 2020-04-05 | Stop reason: HOSPADM

## 2020-03-31 RX ADMIN — TIZANIDINE 2 MG: 2 TABLET ORAL at 10:03

## 2020-03-31 RX ADMIN — NORTRIPTYLINE HYDROCHLORIDE 50 MG: 25 CAPSULE ORAL at 10:03

## 2020-03-31 RX ADMIN — TIZANIDINE 2 MG: 2 TABLET ORAL at 06:03

## 2020-03-31 RX ADMIN — LEVETIRACETAM 1000 MG: 500 TABLET, FILM COATED ORAL at 09:03

## 2020-03-31 RX ADMIN — ROSUVASTATIN CALCIUM 20 MG: 10 TABLET, COATED ORAL at 09:03

## 2020-03-31 RX ADMIN — HYDROXYCHLOROQUINE SULFATE 400 MG: 200 TABLET, FILM COATED ORAL at 10:03

## 2020-03-31 RX ADMIN — ACETAMINOPHEN 650 MG: 325 TABLET ORAL at 10:03

## 2020-03-31 RX ADMIN — INSULIN ASPART 2 UNITS: 100 INJECTION, SOLUTION INTRAVENOUS; SUBCUTANEOUS at 05:03

## 2020-03-31 RX ADMIN — ACETAMINOPHEN 650 MG: 325 TABLET ORAL at 09:03

## 2020-03-31 RX ADMIN — ESCITALOPRAM OXALATE 20 MG: 10 TABLET ORAL at 09:03

## 2020-03-31 RX ADMIN — ENOXAPARIN SODIUM 40 MG: 100 INJECTION SUBCUTANEOUS at 05:03

## 2020-03-31 RX ADMIN — INSULIN ASPART 2 UNITS: 100 INJECTION, SOLUTION INTRAVENOUS; SUBCUTANEOUS at 01:03

## 2020-03-31 RX ADMIN — ALBUTEROL SULFATE 2 PUFF: 90 AEROSOL, METERED RESPIRATORY (INHALATION) at 10:03

## 2020-03-31 RX ADMIN — LEVETIRACETAM 1000 MG: 500 TABLET, FILM COATED ORAL at 10:03

## 2020-03-31 RX ADMIN — INSULIN ASPART 2 UNITS: 100 INJECTION, SOLUTION INTRAVENOUS; SUBCUTANEOUS at 09:03

## 2020-03-31 RX ADMIN — GUAIFENESIN AND DEXTROMETHORPHAN HYDROBROMIDE 1 TABLET: 600; 30 TABLET, EXTENDED RELEASE ORAL at 10:03

## 2020-03-31 RX ADMIN — ASPIRIN 81 MG 81 MG: 81 TABLET ORAL at 09:03

## 2020-03-31 RX ADMIN — ZOLPIDEM TARTRATE 5 MG: 5 TABLET, COATED ORAL at 01:03

## 2020-03-31 RX ADMIN — GUAIFENESIN AND DEXTROMETHORPHAN HYDROBROMIDE 1 TABLET: 600; 30 TABLET, EXTENDED RELEASE ORAL at 09:03

## 2020-03-31 RX ADMIN — INSULIN ASPART 1 UNITS: 100 INJECTION, SOLUTION INTRAVENOUS; SUBCUTANEOUS at 10:03

## 2020-03-31 RX ADMIN — ALBUTEROL SULFATE 2 PUFF: 90 AEROSOL, METERED RESPIRATORY (INHALATION) at 05:03

## 2020-03-31 RX ADMIN — ZOLPIDEM TARTRATE 5 MG: 5 TABLET, COATED ORAL at 10:03

## 2020-03-31 NOTE — SUBJECTIVE & OBJECTIVE
Interval History: Reports non productive cough and shortness of breath same. PATTERSON from bed/chair to BSC See hospital course.     Review of Systems   Constitutional: Negative for activity change, appetite change, chills and fever.   HENT: Negative for congestion, sore throat and trouble swallowing.    Respiratory: Positive for cough (with greenish phlegm x1week), shortness of breath and wheezing.    Cardiovascular: Negative for chest pain, palpitations and leg swelling.   Gastrointestinal: Positive for diarrhea. Negative for abdominal distention, abdominal pain, constipation, nausea and vomiting.   Genitourinary: Negative for dysuria and hematuria.   Musculoskeletal: Negative for arthralgias, joint swelling and myalgias.   Neurological: Negative for dizziness, tremors, seizures, weakness and headaches.     Objective:     Vital Signs (Most Recent):  Temp: 97.4 °F (36.3 °C) (03/31/20 1112)  Pulse: 77 (03/31/20 1112)  Resp: 17 (03/31/20 1112)  BP: 112/65 (03/31/20 1112)  SpO2: 96 % (03/31/20 1112) Vital Signs (24h Range):  Temp:  [96.7 °F (35.9 °C)-98.7 °F (37.1 °C)] 97.4 °F (36.3 °C)  Pulse:  [67-81] 77  Resp:  [17-22] 17  SpO2:  [91 %-98 %] 96 %  BP: ()/(56-65) 112/65     Weight: 100.3 kg (221 lb 1.9 oz)  Body mass index is 39.17 kg/m².  No intake or output data in the 24 hours ending 03/31/20 1415   Physical Exam   Constitutional: She is oriented to person, place, and time. She appears well-developed and well-nourished. No distress.   HENT:   Head: Normocephalic and atraumatic.   Eyes: Pupils are equal, round, and reactive to light. EOM are normal.   Neck: Normal range of motion. No JVD present.   Cardiovascular: Normal rate, regular rhythm and normal heart sounds.   No murmur heard.  Pulmonary/Chest: Effort normal. She has wheezes. She has rales. She exhibits no tenderness.   Mid to lower crackles   Abdominal: Soft. Bowel sounds are normal. She exhibits no distension. There is no tenderness. There is no  guarding.   Musculoskeletal: Normal range of motion. She exhibits no edema.   Neurological: She is alert and oriented to person, place, and time.   Skin: Skin is warm and dry. No rash noted. She is not diaphoretic.       Significant Labs: All pertinent labs within the past 24 hours have been reviewed.    Significant Imaging: I have reviewed and interpreted all pertinent imaging results/findings within the past 24 hours.

## 2020-03-31 NOTE — PLAN OF CARE
Problem: Fall Injury Risk  Goal: Absence of Fall and Fall-Related Injury  Outcome: Ongoing, Progressing     Problem: Adult Inpatient Plan of Care  Goal: Plan of Care Review  Outcome: Ongoing, Progressing  Goal: Patient-Specific Goal (Individualization)  Outcome: Ongoing, Progressing  Goal: Absence of Hospital-Acquired Illness or Injury  Outcome: Ongoing, Progressing  Goal: Optimal Comfort and Wellbeing  Outcome: Ongoing, Progressing  Goal: Readiness for Transition of Care  Outcome: Ongoing, Progressing  Goal: Rounds/Family Conference  Outcome: Ongoing, Progressing     Problem: Diabetes Comorbidity  Goal: Blood Glucose Level Within Desired Range  Outcome: Ongoing, Progressing  Intervention: Maintain Glycemic Control  Flowsheets (Taken 3/30/2020 1935)  Glycemic Management: blood glucose monitoring; supplemental insulin given     Problem: Infection  Goal: Infection Symptom Resolution  Outcome: Ongoing, Progressing  Intervention: Prevent or Manage Infection  Flowsheets (Taken 3/30/2020 1935)  Fever Reduction/Comfort Measures: cool cloth applied; lightweight bedding; lightweight clothing; medication administered  Infection Management: aseptic technique maintained  Isolation Precautions: airborne precautions maintained; contact precautions maintained; droplet precautions maintained; protective environment maintained     Problem: Social Isolation  Goal: Increased Social Interaction  Outcome: Ongoing, Progressing  Intervention: Promote Feelings of Connectedness  Flowsheets (Taken 3/30/2020 1935)  Supportive Measures: active listening utilized; decision-making supported; goal setting facilitated; relaxation techniques promoted; self-responsibility promoted; self-care encouraged; verbalization of feelings encouraged   Pt no falls this shift, safety maintained. Pt able to voice needs. Afebrile, VSS though low for patient. Pt with sore throat but no other pain. Poor appetite, low urine output (cayden), encouraged to sip  liquids.  Rule-out CoVid results pending

## 2020-03-31 NOTE — ASSESSMENT & PLAN NOTE
Tested positive for COVID-19 (per her report) outside testing at [facility] on [place] 3/29.  tested positive and recovering at home. On admit, hypoxic on room air, bilat infiltrates on chest xray.   Obtain COVID-19 pending   Influenza not done   Elevated CRP, procalcitonin  Add CPK, d dimer to am labs  Maintain airborne isolation / droplet / contact precautions  Supplemental oxygen to maintain SpO2 >92%, O2 96% on 5 L NC-wean according   Avoid BiPAP to minimize aerosolization; move to intubation and mechanical ventilation  Start albuterol and plaquenil  Incentive spirometry     Discussed with patient if respiratory worsen, will need for intbuation/ventilation. Patient agreed for intubation/ventilation if need. Will update  POC.

## 2020-03-31 NOTE — SUBJECTIVE & OBJECTIVE
Interval History: pt reports fatigue, cough andSOB     Review of Systems   Constitutional: Positive for fever. Negative for activity change, appetite change and chills.   HENT: Negative for congestion, sore throat and trouble swallowing.    Respiratory: Positive for cough (with greenish phlegm x1week), shortness of breath and wheezing.    Cardiovascular: Negative for chest pain, palpitations and leg swelling.   Gastrointestinal: Positive for diarrhea, nausea and vomiting. Negative for abdominal distention, abdominal pain and constipation.   Genitourinary: Negative for dysuria and hematuria.   Musculoskeletal: Negative for arthralgias, joint swelling and myalgias.   Neurological: Negative for dizziness, tremors, seizures, weakness and headaches.     Objective:     Vital Signs (Most Recent):  Temp: 98 °F (36.7 °C) (03/30/20 2111)  Pulse: 71 (03/30/20 2111)  Resp: 19 (03/30/20 2111)  BP: 113/62 (03/30/20 2111)  SpO2: 96 % (03/30/20 2111) Vital Signs (24h Range):  Temp:  [98 °F (36.7 °C)-98.9 °F (37.2 °C)] 98 °F (36.7 °C)  Pulse:  [67-85] 71  Resp:  [18-22] 19  SpO2:  [90 %-99 %] 96 %  BP: ()/(57-72) 113/62     Weight: 100.3 kg (221 lb 1.9 oz)  Body mass index is 39.17 kg/m².    Intake/Output Summary (Last 24 hours) at 3/30/2020 2313  Last data filed at 3/30/2020 1030  Gross per 24 hour   Intake 100 ml   Output 200 ml   Net -100 ml      Physical Exam   Constitutional: She is oriented to person, place, and time. She appears well-developed and well-nourished. No distress.   HENT:   Head: Normocephalic and atraumatic.   Eyes: Pupils are equal, round, and reactive to light. EOM are normal.   Neck: Normal range of motion. No JVD present.   Cardiovascular: Normal rate, regular rhythm and normal heart sounds.   No murmur heard.  Pulmonary/Chest: Effort normal. She has wheezes. She has rales. She exhibits no tenderness.   Abdominal: Soft. Bowel sounds are normal. She exhibits no distension. There is no tenderness. There  is no guarding.   Musculoskeletal: Normal range of motion. She exhibits no edema.   Neurological: She is alert and oriented to person, place, and time.   Skin: Skin is warm and dry. No rash noted. She is not diaphoretic.   Psychiatric: She has a normal mood and affect.       Significant Labs:   Blood Culture: No results for input(s): LABBLOO in the last 48 hours.  BMP:   Recent Labs   Lab 03/29/20  0738   *   *   K 3.8   CL 98   CO2 23   BUN 8   CREATININE 0.9   CALCIUM 8.5*     CBC:   Recent Labs   Lab 03/29/20  0738   WBC 9.47   HGB 14.5   HCT 43.3   *       Significant Imaging: I have reviewed and interpreted all pertinent imaging results/findings within the past 24 hours.

## 2020-03-31 NOTE — PROGRESS NOTES
[facility]  Progress Note    Patient Name: Monalisa Carson  MRN: [id number]  Patient Class: IP- Inpatient   Admission Date: 3/29/2020  Length of Stay: 1 days  Attending Physician: Franchesca Butt MD  Primary Care Provider: Alisha See MD        Subjective:     Principal Problem:2019 novel coronavirus disease (COVID-19)        HPI:  Mrs. Monalisa Carson is a 55 y.o female with T2DM, hypertension, heart murmur, and anxiety who presents to the hospital with complaints of fever (102F), cough for 1 week and SOB for the last 2 days. Patient reports she went to the [facility] ED at [facility] 7 days ago because of fever and cough after got tested for COVID-19 via [facility]. She was discharged home with albuterol inhaler. Patient decided to go to hospital today due to increasing SOB for the last 2 days. Patient reports her  at home also having cough but not fever. No recent travel. Also reports associated symptoms are nausea/vomit and diarrhea. Denies other symptoms. Reports taking Tylenol for fever at home. Patient reports she is positive for COVID-19 which done on 3/22/2020    In ED, temp was 102, spO2 91% in RA then 92-94% on 4L oxygen, tachypnea 22-27; NSR on EKG; Chest xray reveals subtle patchy consolidation within mid left lung and linear opacities in right lung; Labs with unremarkable CBC, CMP with mild hyponatremia (Na 132); mild elevated AST/ALT 78/53; elevated CRP 62.1; Troponin normal at 0.023; normal Procalcitonin 0.11    Patient is admitted and COVID-19 protocol is initiated        Overview/Hospital Course:  Pt admitted with flu symptoms. Spouse + covid.    Pt had testing done at [facility].  State dept will not release records.  Retested on admission    Azithromycin + Rocephin + plaquenil   5 liter NC O2      Interval History: pt reports fatigue, cough andSOB     Review of Systems   Constitutional: Positive for fever. Negative for activity change,  appetite change and chills.   HENT: Negative for congestion, sore throat and trouble swallowing.    Respiratory: Positive for cough (with greenish phlegm x1week), shortness of breath and wheezing.    Cardiovascular: Negative for chest pain, palpitations and leg swelling.   Gastrointestinal: Positive for diarrhea, nausea and vomiting. Negative for abdominal distention, abdominal pain and constipation.   Genitourinary: Negative for dysuria and hematuria.   Musculoskeletal: Negative for arthralgias, joint swelling and myalgias.   Neurological: Negative for dizziness, tremors, seizures, weakness and headaches.     Objective:     Vital Signs (Most Recent):  Temp: 98 °F (36.7 °C) (03/30/20 2111)  Pulse: 71 (03/30/20 2111)  Resp: 19 (03/30/20 2111)  BP: 113/62 (03/30/20 2111)  SpO2: 96 % (03/30/20 2111) Vital Signs (24h Range):  Temp:  [98 °F (36.7 °C)-98.9 °F (37.2 °C)] 98 °F (36.7 °C)  Pulse:  [67-85] 71  Resp:  [18-22] 19  SpO2:  [90 %-99 %] 96 %  BP: ()/(57-72) 113/62     Weight: 100.3 kg (221 lb 1.9 oz)  Body mass index is 39.17 kg/m².    Intake/Output Summary (Last 24 hours) at 3/30/2020 2313  Last data filed at 3/30/2020 1030  Gross per 24 hour   Intake 100 ml   Output 200 ml   Net -100 ml      Physical Exam   Constitutional: She is oriented to person, place, and time. She appears well-developed and well-nourished. No distress.   HENT:   Head: Normocephalic and atraumatic.   Eyes: Pupils are equal, round, and reactive to light. EOM are normal.   Neck: Normal range of motion. No JVD present.   Cardiovascular: Normal rate, regular rhythm and normal heart sounds.   No murmur heard.  Pulmonary/Chest: Effort normal. She has wheezes. She has rales. She exhibits no tenderness.   Abdominal: Soft. Bowel sounds are normal. She exhibits no distension. There is no tenderness. There is no guarding.   Musculoskeletal: Normal range of motion. She exhibits no edema.   Neurological: She is alert and oriented to person, place,  and time.   Skin: Skin is warm and dry. No rash noted. She is not diaphoretic.   Psychiatric: She has a normal mood and affect.       Significant Labs:   Blood Culture: No results for input(s): LABBLOO in the last 48 hours.  BMP:   Recent Labs   Lab 03/29/20  0738   *   *   K 3.8   CL 98   CO2 23   BUN 8   CREATININE 0.9   CALCIUM 8.5*     CBC:   Recent Labs   Lab 03/29/20  0738   WBC 9.47   HGB 14.5   HCT 43.3   *       Significant Imaging: I have reviewed and interpreted all pertinent imaging results/findings within the past 24 hours.      Assessment/Plan:      * 2019 novel coronavirus disease (COVID-19)  Tested positive for COVID-19 (per her report) outside testing , hypoxic on room air, bilat infiltrates on chest xray.  Stable on room air.  Continue isolation and supportive care.    Viral pneumonia  Secondary to novel coronavirus, management as above    HLD (hyperlipidemia)  Continue statin    Diabetes mellitus  HgbA1C 8.0 (2/14/2020). On Bydureon SQ weekly at home.  -SSI with meals  -POCT glucose AC/HS      Essential hypertension, benign  Well controlled, continue home medications and monitor blood pressure, adjust as needed.     Anxiety  Continue as needed hydroxyzine       VTE Risk Mitigation (From admission, onward)         Ordered     enoxaparin injection 40 mg  Daily      03/29/20 1148     IP VTE HIGH RISK PATIENT  Once      03/29/20 1148                      Franchesca Butt MD  Department of Hospital Medicine   [facility]

## 2020-03-31 NOTE — PROGRESS NOTES
[facility]  Progress Note    Patient Name: Monalisa Carson  MRN: [id number]  Patient Class: IP- Inpatient   Admission Date: 3/29/2020  Length of Stay: 2 days  Attending Physician: Ronaldo Rose MD  Primary Care Provider: Alisha See MD        Subjective:     Principal Problem:2019 novel coronavirus disease (COVID-19)        HPI:  Mrs. Monalisa Carson is a 55 y.o female with T2DM, hypertension, heart murmur, and anxiety who presents to the hospital with complaints of fever (102F), cough for 1 week and SOB for the last 2 days. Patient reports she went to the [facility] 7 days ago because of fever and cough after got tested for COVID-19 via [facility]. She was discharged home with albuterol inhaler. Patient decided to go to hospital today due to increasing SOB for the last 2 days. Patient reports her  at home also having cough but not fever. No recent travel. Also reports associated symptoms are nausea/vomit and diarrhea. Denies other symptoms. Reports taking Tylenol for fever at home. Patient reports she is positive for COVID-19 which done on 3/22/2020    In ED, temp was 102, spO2 91% in RA then 92-94% on 4L oxygen, tachypnea 22-27; NSR on EKG; Chest xray reveals subtle patchy consolidation within mid left lung and linear opacities in right lung; Labs with unremarkable CBC, CMP with mild hyponatremia (Na 132); mild elevated AST/ALT 78/53; elevated CRP 62.1; Troponin normal at 0.023; normal Procalcitonin 0.11    Patient is admitted and COVID-19 protocol is initiated        Overview/Hospital Course:  Patient admitted with flu symptoms. Spouse + covid.  Patient had testing done at [facility].  State dept will not release records.  Retested on admission.  On 3/31, reports breathing same on 5 liter NC O2. Start plaquenil and albuterol      Interval History: Reports non productive cough and shortness of breath same. PATTERSON from bed/chair to [facility] See  hospital course.     Review of Systems   Constitutional: Negative for activity change, appetite change, chills and fever.   HENT: Negative for congestion, sore throat and trouble swallowing.    Respiratory: Positive for cough (with greenish phlegm x1week), shortness of breath and wheezing.    Cardiovascular: Negative for chest pain, palpitations and leg swelling.   Gastrointestinal: Positive for diarrhea. Negative for abdominal distention, abdominal pain, constipation, nausea and vomiting.   Genitourinary: Negative for dysuria and hematuria.   Musculoskeletal: Negative for arthralgias, joint swelling and myalgias.   Neurological: Negative for dizziness, tremors, seizures, weakness and headaches.     Objective:     Vital Signs (Most Recent):  Temp: 97.4 °F (36.3 °C) (03/31/20 1112)  Pulse: 77 (03/31/20 1112)  Resp: 17 (03/31/20 1112)  BP: 112/65 (03/31/20 1112)  SpO2: 96 % (03/31/20 1112) Vital Signs (24h Range):  Temp:  [96.7 °F (35.9 °C)-98.7 °F (37.1 °C)] 97.4 °F (36.3 °C)  Pulse:  [67-81] 77  Resp:  [17-22] 17  SpO2:  [91 %-98 %] 96 %  BP: ()/(56-65) 112/65     Weight: 100.3 kg (221 lb 1.9 oz)  Body mass index is 39.17 kg/m².  No intake or output data in the 24 hours ending 03/31/20 1415   Physical Exam   Constitutional: She is oriented to person, place, and time. She appears well-developed and well-nourished. No distress.   HENT:   Head: Normocephalic and atraumatic.   Eyes: Pupils are equal, round, and reactive to light. EOM are normal.   Neck: Normal range of motion. No JVD present.   Cardiovascular: Normal rate, regular rhythm and normal heart sounds.   No murmur heard.  Pulmonary/Chest: Effort normal. She has wheezes. She has rales. She exhibits no tenderness.   Mid to lower crackles   Abdominal: Soft. Bowel sounds are normal. She exhibits no distension. There is no tenderness. There is no guarding.   Musculoskeletal: Normal range of motion. She exhibits no edema.   Neurological: She is alert and  oriented to person, place, and time.   Skin: Skin is warm and dry. No rash noted. She is not diaphoretic.       Significant Labs: All pertinent labs within the past 24 hours have been reviewed.    Significant Imaging: I have reviewed and interpreted all pertinent imaging results/findings within the past 24 hours.      Assessment/Plan:      * 2019 novel coronavirus disease (COVID-19)  Tested positive for COVID-19 (per her report) outside testing at [facility] on [place] 3/29.  tested positive and recovering at home. On admit, hypoxic on room air, bilat infiltrates on chest xray.   Obtain COVID-19 pending   Influenza not done   Elevated CRP, procalcitonin  Add CPK, d dimer to am labs  Maintain airborne isolation / droplet / contact precautions  Supplemental oxygen to maintain SpO2 >92%, O2 96% on 5 L NC-wean according   Avoid BiPAP to minimize aerosolization; move to intubation and mechanical ventilation  Start albuterol and plaquenil  Incentive spirometry     Discussed with patient if respiratory worsen, will need for intbuation/ventilation. Patient agreed for intubation/ventilation if need. Will update  POC.              Viral pneumonia  Secondary to novel coronavirus, management as above    HLD (hyperlipidemia)  Lab Results   Component Value Date    CHOL 224 (H) 02/14/2020    CHOL 202 (H) 02/16/2019    CHOL 199 12/22/2017     Lab Results   Component Value Date    HDL 48 02/14/2020    HDL 40 02/16/2019    HDL 35 (L) 12/22/2017     Lab Results   Component Value Date    LDLCALC 119.4 02/14/2020    LDLCALC 123.2 02/16/2019    LDLCALC 127.4 12/22/2017     Lab Results   Component Value Date    TRIG 283 (H) 02/14/2020    TRIG 194 (H) 02/16/2019    TRIG 183 (H) 12/22/2017   Continue statin      Diabetes mellitus  HgbA1C 8.0 (2/14/2020). On Bydureon SQ weekly at home.  -SSI with meals  -POCT glucose AC/HS      Essential hypertension, benign  Hold amlodipine, benazipril, clonidine as low normal side.  Hydralazine prn     Anxiety  Continue as needed hydroxyzine       VTE Risk Mitigation (From admission, onward)         Ordered     enoxaparin injection 40 mg  Daily      03/29/20 1148     IP VTE HIGH RISK PATIENT  Once      03/29/20 1148                      Nesha Rojo NP  Department of Hospital Medicine   [facility]

## 2020-03-31 NOTE — HOSPITAL COURSE
Patient admitted with flu symptoms. Spouse + covid.  Patient had testing done at [facility].  State dept will not release records.  Retested on admission which came back positive. Initially required Oxygen  5 liter NC then able to wean down slowly to room air.  Patient shortness of breath also slowly improved during hospital stay.  Patient completed Plaquenil course 5 days.  PT/OT evaluation completed.  No indication for home health.  O2 saturation 95 -97% on room air on discharge.  Patient ready and stable for discharge home. Instruct  if respiratory symptoms worsen to return to hospital. Instruct self and family/close contact self quarantine x 2 weeks and at least 72 hrs after symptoms resolved.

## 2020-03-31 NOTE — ASSESSMENT & PLAN NOTE
Tested positive for COVID-19 (per her report) outside testing , hypoxic on room air, bilat infiltrates on chest xray.  Stable on room air.  Continue isolation and supportive care.

## 2020-03-31 NOTE — ASSESSMENT & PLAN NOTE
Lab Results   Component Value Date    CHOL 224 (H) 02/14/2020    CHOL 202 (H) 02/16/2019    CHOL 199 12/22/2017     Lab Results   Component Value Date    HDL 48 02/14/2020    HDL 40 02/16/2019    HDL 35 (L) 12/22/2017     Lab Results   Component Value Date    LDLCALC 119.4 02/14/2020    LDLCALC 123.2 02/16/2019    LDLCALC 127.4 12/22/2017     Lab Results   Component Value Date    TRIG 283 (H) 02/14/2020    TRIG 194 (H) 02/16/2019    TRIG 183 (H) 12/22/2017   Continue statin

## 2020-04-01 LAB
ALBUMIN SERPL BCP-MCNC: 3.1 G/DL (ref 3.5–5.2)
ALP SERPL-CCNC: 69 U/L (ref 55–135)
ALT SERPL W/O P-5'-P-CCNC: 260 U/L (ref 10–44)
ANION GAP SERPL CALC-SCNC: 16 MMOL/L (ref 8–16)
AST SERPL-CCNC: 186 U/L (ref 10–40)
BASOPHILS # BLD AUTO: 0.01 K/UL (ref 0–0.2)
BASOPHILS NFR BLD: 0.1 % (ref 0–1.9)
BILIRUB SERPL-MCNC: 0.5 MG/DL (ref 0.1–1)
BUN SERPL-MCNC: 15 MG/DL (ref 6–20)
CALCIUM SERPL-MCNC: 9.2 MG/DL (ref 8.7–10.5)
CHLORIDE SERPL-SCNC: 99 MMOL/L (ref 95–110)
CK SERPL-CCNC: 542 U/L (ref 20–180)
CO2 SERPL-SCNC: 22 MMOL/L (ref 23–29)
CREAT SERPL-MCNC: 0.8 MG/DL (ref 0.5–1.4)
D DIMER PPP IA.FEU-MCNC: 0.97 MG/L FEU
DIFFERENTIAL METHOD: ABNORMAL
EOSINOPHIL # BLD AUTO: 0 K/UL (ref 0–0.5)
EOSINOPHIL NFR BLD: 0 % (ref 0–8)
ERYTHROCYTE [DISTWIDTH] IN BLOOD BY AUTOMATED COUNT: 14 % (ref 11.5–14.5)
EST. GFR  (AFRICAN AMERICAN): >60 ML/MIN/1.73 M^2
EST. GFR  (NON AFRICAN AMERICAN): >60 ML/MIN/1.73 M^2
FERRITIN SERPL-MCNC: 4062 NG/ML (ref 20–300)
GLUCOSE SERPL-MCNC: 142 MG/DL (ref 70–110)
HCT VFR BLD AUTO: 42.6 % (ref 37–48.5)
HGB BLD-MCNC: 13.8 G/DL (ref 12–16)
IMM GRANULOCYTES # BLD AUTO: 0.08 K/UL (ref 0–0.04)
IMM GRANULOCYTES NFR BLD AUTO: 0.9 % (ref 0–0.5)
LYMPHOCYTES # BLD AUTO: 2.5 K/UL (ref 1–4.8)
LYMPHOCYTES NFR BLD: 28 % (ref 18–48)
MCH RBC QN AUTO: 29.4 PG (ref 27–31)
MCHC RBC AUTO-ENTMCNC: 32.4 G/DL (ref 32–36)
MCV RBC AUTO: 91 FL (ref 82–98)
MONOCYTES # BLD AUTO: 0.5 K/UL (ref 0.3–1)
MONOCYTES NFR BLD: 5.1 % (ref 4–15)
NEUTROPHILS # BLD AUTO: 5.8 K/UL (ref 1.8–7.7)
NEUTROPHILS NFR BLD: 65.9 % (ref 38–73)
NRBC BLD-RTO: 0 /100 WBC
PLATELET # BLD AUTO: 579 K/UL (ref 150–350)
PMV BLD AUTO: 9.5 FL (ref 9.2–12.9)
POCT GLUCOSE: 171 MG/DL (ref 70–110)
POCT GLUCOSE: 190 MG/DL (ref 70–110)
POCT GLUCOSE: 198 MG/DL (ref 70–110)
POCT GLUCOSE: 208 MG/DL (ref 70–110)
POTASSIUM SERPL-SCNC: 4 MMOL/L (ref 3.5–5.1)
PROT SERPL-MCNC: 8.2 G/DL (ref 6–8.4)
RBC # BLD AUTO: 4.7 M/UL (ref 4–5.4)
SARS-COV-2 RNA RESP QL NAA+PROBE: DETECTED
SODIUM SERPL-SCNC: 137 MMOL/L (ref 136–145)
WBC # BLD AUTO: 8.81 K/UL (ref 3.9–12.7)

## 2020-04-01 PROCEDURE — 63600175 PHARM REV CODE 636 W HCPCS: Performed by: EMERGENCY MEDICINE

## 2020-04-01 PROCEDURE — 85379 FIBRIN DEGRADATION QUANT: CPT

## 2020-04-01 PROCEDURE — 82550 ASSAY OF CK (CPK): CPT

## 2020-04-01 PROCEDURE — 80053 COMPREHEN METABOLIC PANEL: CPT

## 2020-04-01 PROCEDURE — 85025 COMPLETE CBC W/AUTO DIFF WBC: CPT

## 2020-04-01 PROCEDURE — 25000003 PHARM REV CODE 250: Performed by: EMERGENCY MEDICINE

## 2020-04-01 PROCEDURE — 21400001 HC TELEMETRY ROOM

## 2020-04-01 PROCEDURE — 36415 COLL VENOUS BLD VENIPUNCTURE: CPT

## 2020-04-01 PROCEDURE — 25000003 PHARM REV CODE 250: Performed by: HOSPITALIST

## 2020-04-01 PROCEDURE — 25000003 PHARM REV CODE 250: Performed by: NURSE PRACTITIONER

## 2020-04-01 PROCEDURE — 82728 ASSAY OF FERRITIN: CPT

## 2020-04-01 RX ADMIN — GUAIFENESIN AND DEXTROMETHORPHAN HYDROBROMIDE 1 TABLET: 600; 30 TABLET, EXTENDED RELEASE ORAL at 10:04

## 2020-04-01 RX ADMIN — ALBUTEROL SULFATE 2 PUFF: 90 AEROSOL, METERED RESPIRATORY (INHALATION) at 01:04

## 2020-04-01 RX ADMIN — ROSUVASTATIN CALCIUM 20 MG: 10 TABLET, COATED ORAL at 09:04

## 2020-04-01 RX ADMIN — INSULIN ASPART 2 UNITS: 100 INJECTION, SOLUTION INTRAVENOUS; SUBCUTANEOUS at 01:04

## 2020-04-01 RX ADMIN — INSULIN ASPART 2 UNITS: 100 INJECTION, SOLUTION INTRAVENOUS; SUBCUTANEOUS at 09:04

## 2020-04-01 RX ADMIN — ASPIRIN 81 MG 81 MG: 81 TABLET ORAL at 09:04

## 2020-04-01 RX ADMIN — ZOLPIDEM TARTRATE 5 MG: 5 TABLET, COATED ORAL at 10:04

## 2020-04-01 RX ADMIN — ESCITALOPRAM OXALATE 20 MG: 10 TABLET ORAL at 09:04

## 2020-04-01 RX ADMIN — LEVETIRACETAM 1000 MG: 500 TABLET, FILM COATED ORAL at 09:04

## 2020-04-01 RX ADMIN — TIZANIDINE 2 MG: 2 TABLET ORAL at 10:04

## 2020-04-01 RX ADMIN — NORTRIPTYLINE HYDROCHLORIDE 50 MG: 25 CAPSULE ORAL at 10:04

## 2020-04-01 RX ADMIN — HYDROXYCHLOROQUINE SULFATE 400 MG: 200 TABLET, FILM COATED ORAL at 09:04

## 2020-04-01 RX ADMIN — INSULIN ASPART 2 UNITS: 100 INJECTION, SOLUTION INTRAVENOUS; SUBCUTANEOUS at 10:04

## 2020-04-01 RX ADMIN — ALBUTEROL SULFATE 2 PUFF: 90 AEROSOL, METERED RESPIRATORY (INHALATION) at 06:04

## 2020-04-01 RX ADMIN — GUAIFENESIN AND DEXTROMETHORPHAN HYDROBROMIDE 1 TABLET: 600; 30 TABLET, EXTENDED RELEASE ORAL at 09:04

## 2020-04-01 RX ADMIN — LEVETIRACETAM 1000 MG: 500 TABLET, FILM COATED ORAL at 10:04

## 2020-04-01 RX ADMIN — ENOXAPARIN SODIUM 40 MG: 100 INJECTION SUBCUTANEOUS at 06:04

## 2020-04-01 RX ADMIN — ACETAMINOPHEN 650 MG: 325 TABLET ORAL at 09:04

## 2020-04-01 RX ADMIN — ALBUTEROL SULFATE 2 PUFF: 90 AEROSOL, METERED RESPIRATORY (INHALATION) at 05:04

## 2020-04-01 RX ADMIN — ALBUTEROL SULFATE 2 PUFF: 90 AEROSOL, METERED RESPIRATORY (INHALATION) at 10:04

## 2020-04-01 RX ADMIN — ALBUTEROL SULFATE 2 PUFF: 90 AEROSOL, METERED RESPIRATORY (INHALATION) at 02:04

## 2020-04-01 RX ADMIN — ALBUTEROL SULFATE 2 PUFF: 90 AEROSOL, METERED RESPIRATORY (INHALATION) at 09:04

## 2020-04-01 NOTE — SUBJECTIVE & OBJECTIVE
Interval History: Reports feeling a bit better today. Tired. Afebrile and on 4 L Os sat 94%.     Review of Systems   Constitutional: Negative for activity change, appetite change, chills and fever.   HENT: Negative for congestion, sore throat and trouble swallowing.    Respiratory: Positive for cough (with greenish phlegm x1week), shortness of breath and wheezing.    Cardiovascular: Negative for chest pain, palpitations and leg swelling.   Gastrointestinal: Negative for abdominal distention, abdominal pain, constipation, diarrhea, nausea and vomiting.   Genitourinary: Negative for dysuria and hematuria.   Musculoskeletal: Negative for arthralgias, joint swelling and myalgias.   Neurological: Negative for dizziness, tremors, seizures, weakness and headaches.     Objective:     Vital Signs (Most Recent):  Temp: 97.8 °F (36.6 °C) (04/01/20 1109)  Pulse: 75 (04/01/20 1338)  Resp: (!) 21 (04/01/20 1338)  BP: 136/77 (04/01/20 1109)  SpO2: (!) 94 % (04/01/20 1338) Vital Signs (24h Range):  Temp:  [97.6 °F (36.4 °C)-98.6 °F (37 °C)] 97.8 °F (36.6 °C)  Pulse:  [72-92] 75  Resp:  [17-21] 21  SpO2:  [91 %-97 %] 94 %  BP: (116-136)/(63-77) 136/77     Weight: 100.3 kg (221 lb 1.9 oz)  Body mass index is 39.17 kg/m².    Intake/Output Summary (Last 24 hours) at 4/1/2020 1351  Last data filed at 4/1/2020 0554  Gross per 24 hour   Intake 370 ml   Output 150 ml   Net 220 ml      Physical Exam   Constitutional: She is oriented to person, place, and time. She appears well-developed and well-nourished. No distress.   HENT:   Head: Normocephalic and atraumatic.   Eyes: Pupils are equal, round, and reactive to light. EOM are normal.   Neck: Normal range of motion. No JVD present.   Cardiovascular: Normal rate, regular rhythm and normal heart sounds.   No murmur heard.  Pulmonary/Chest: Effort normal. She has wheezes. She has rales. She exhibits no tenderness.   Mid to lower crackles   Abdominal: Soft. Bowel sounds are normal. She exhibits  no distension. There is no tenderness. There is no guarding.   Musculoskeletal: Normal range of motion. She exhibits no edema.   Neurological: She is alert and oriented to person, place, and time.   Skin: Skin is warm and dry. No rash noted. She is not diaphoretic.       Significant Labs: All pertinent labs within the past 24 hours have been reviewed.    Significant Imaging: I have reviewed and interpreted all pertinent imaging results/findings within the past 24 hours.

## 2020-04-01 NOTE — PLAN OF CARE
Problem: Fall Injury Risk  Goal: Absence of Fall and Fall-Related Injury  Outcome: Ongoing, Progressing  Intervention: Promote Injury-Free Environment  Flowsheets (Taken 3/31/2020 1948)  Safety Promotion/Fall Prevention: assistive device/personal item within reach; bed alarm set; commode/urinal/bedpan at bedside; Fall Risk reviewed with patient/family; Fall Risk signage in place; lighting adjusted; medications reviewed; nonskid shoes/socks when out of bed; instructed to call staff for mobility  Environmental Safety Modification: assistive device/personal items within reach; clutter free environment maintained; room near unit station; lighting adjusted; room organization consistent     Problem: Adult Inpatient Plan of Care  Goal: Plan of Care Review  Outcome: Ongoing, Progressing  Goal: Patient-Specific Goal (Individualization)  Outcome: Ongoing, Progressing  Goal: Absence of Hospital-Acquired Illness or Injury  Outcome: Ongoing, Progressing  Goal: Optimal Comfort and Wellbeing  Outcome: Ongoing, Progressing  Goal: Readiness for Transition of Care  Outcome: Ongoing, Progressing  Goal: Rounds/Family Conference  Outcome: Ongoing, Progressing     Problem: Diabetes Comorbidity  Goal: Blood Glucose Level Within Desired Range  Outcome: Ongoing, Progressing  Intervention: Maintain Glycemic Control  Flowsheets (Taken 3/31/2020 1948)  Glycemic Management: blood glucose monitoring; supplemental insulin given     Problem: Infection  Goal: Infection Symptom Resolution  Outcome: Ongoing, Progressing  Intervention: Prevent or Manage Infection  Flowsheets (Taken 3/31/2020 1948)  Fever Reduction/Comfort Measures: fluid intake increased; lightweight bedding; lightweight clothing  Infection Management: aseptic technique maintained  Isolation Precautions: airborne precautions maintained; contact precautions maintained; droplet precautions maintained; protective environment maintained     Problem: Social Isolation  Goal: Increased  Social Interaction  Outcome: Ongoing, Progressing  Intervention: Promote Feelings of Connectedness  Flowsheets (Taken 3/31/2020 1948)  Supportive Measures: active listening utilized; decision-making supported; goal setting facilitated; positive reinforcement provided; relaxation techniques promoted; self-responsibility promoted; self-reflection promoted; verbalization of feelings encouraged     Problem: Skin Injury Risk Increased  Goal: Skin Health and Integrity  Outcome: Ongoing, Progressing   CoVid results pending, pt aware. No falls this shift, fall precautions in place. VSS, afebrile. 5L O2 NC humidified, goal to start weaning down, keep sats above 92%. Plaquenil, albuterol scheduled added.

## 2020-04-01 NOTE — PLAN OF CARE
Problem: Fall Injury Risk  Goal: Absence of Fall and Fall-Related Injury  Outcome: Ongoing, Not Progressing  Intervention: Identify and Manage Contributors to Fall Injury Risk  Flowsheets (Taken 4/1/2020 0551)  Self-Care Promotion: independence encouraged; BADL personal objects within reach  Medication Review/Management: medications reviewed; dosing adjusted  Intervention: Promote Injury-Free Environment  Flowsheets (Taken 4/1/2020 0502)  Safety Promotion/Fall Prevention: assistive device/personal item within reach; bed alarm set; instructed to call staff for mobility; side rails raised x 2; lighting adjusted; commode/urinal/bedpan at bedside  Environmental Safety Modification: assistive device/personal items within reach; clutter free environment maintained

## 2020-04-01 NOTE — ASSESSMENT & PLAN NOTE
Tested positive for COVID-19 (per her report) outside testing at [facility] on [place] 3/29.  tested positive and recovering at home. On admit, hypoxic on room air, bilat infiltrates on chest xray.   COVID-19 Positive   Influenza not done   Elevated CRP, procalcitonin, CPK, d dimer, ferritin    Maintain airborne isolation / droplet / contact precautions  Supplemental oxygen to maintain SpO2 >92%, currently wean down 5 to 4 L NC Os sat 94%  Avoid BiPAP to minimize aerosolization; move to intubation and mechanical ventilation  Continue albuterol and plaquenil  Incentive spirometry     Discussed with patient if respiratory worsen, will need for intbuation/ventilation. Patient agreed for intubation/ventilation if need.

## 2020-04-01 NOTE — PROGRESS NOTES
[facility]  Progress Note    Patient Name: Monalisa Carson  MRN: [id number]  Patient Class: IP- Inpatient   Admission Date: 3/29/2020  Length of Stay: 3 days  Attending Physician: Ronaldo Rose MD  Primary Care Provider: Alisha See MD        Subjective:     Principal Problem:2019 novel coronavirus disease (COVID-19)        HPI:  Mrs. Monalisa Carson is a 55 y.o female with T2DM, hypertension, heart murmur, and anxiety who presents to the hospital with complaints of fever (102F), cough for 1 week and SOB for the last 2 days. Patient reports she went to the [facility] 7 days ago because of fever and cough after got tested for COVID-19 via [facility]. She was discharged home with albuterol inhaler. Patient decided to go to hospital today due to increasing SOB for the last 2 days. Patient reports her  at home also having cough but not fever. No recent travel. Also reports associated symptoms are nausea/vomit and diarrhea. Denies other symptoms. Reports taking Tylenol for fever at home. Patient reports she is positive for COVID-19 which done on 3/22/2020    In ED, temp was 102, spO2 91% in RA then 92-94% on 4L oxygen, tachypnea 22-27; NSR on EKG; Chest xray reveals subtle patchy consolidation within mid left lung and linear opacities in right lung; Labs with unremarkable CBC, CMP with mild hyponatremia (Na 132); mild elevated AST/ALT 78/53; elevated CRP 62.1; Troponin normal at 0.023; normal Procalcitonin 0.11    Patient is admitted and COVID-19 protocol is initiated        Overview/Hospital Course:  Patient admitted with flu symptoms. Spouse + covid.  Patient had testing done at [facility].  State dept will not release records.  Retested on admission.  On 3/31, reports breathing same on 5 liter NC O2. Started plaquenil and albuterol. 4/1, COVID19 positive. Improvement in shortness of breath, currently 94% on 4 L NC. Continue plaquenil, albuterol  inhaler.       Interval History: Reports feeling a bit better today. Tired. Afebrile and on 4 L Os sat 94%.     Review of Systems   Constitutional: Negative for activity change, appetite change, chills and fever.   HENT: Negative for congestion, sore throat and trouble swallowing.    Respiratory: Positive for cough (with greenish phlegm x1week), shortness of breath and wheezing.    Cardiovascular: Negative for chest pain, palpitations and leg swelling.   Gastrointestinal: Negative for abdominal distention, abdominal pain, constipation, diarrhea, nausea and vomiting.   Genitourinary: Negative for dysuria and hematuria.   Musculoskeletal: Negative for arthralgias, joint swelling and myalgias.   Neurological: Negative for dizziness, tremors, seizures, weakness and headaches.     Objective:     Vital Signs (Most Recent):  Temp: 97.8 °F (36.6 °C) (04/01/20 1109)  Pulse: 75 (04/01/20 1338)  Resp: (!) 21 (04/01/20 1338)  BP: 136/77 (04/01/20 1109)  SpO2: (!) 94 % (04/01/20 1338) Vital Signs (24h Range):  Temp:  [97.6 °F (36.4 °C)-98.6 °F (37 °C)] 97.8 °F (36.6 °C)  Pulse:  [72-92] 75  Resp:  [17-21] 21  SpO2:  [91 %-97 %] 94 %  BP: (116-136)/(63-77) 136/77     Weight: 100.3 kg (221 lb 1.9 oz)  Body mass index is 39.17 kg/m².    Intake/Output Summary (Last 24 hours) at 4/1/2020 1351  Last data filed at 4/1/2020 0554  Gross per 24 hour   Intake 370 ml   Output 150 ml   Net 220 ml      Physical Exam   Constitutional: She is oriented to person, place, and time. She appears well-developed and well-nourished. No distress.   HENT:   Head: Normocephalic and atraumatic.   Eyes: Pupils are equal, round, and reactive to light. EOM are normal.   Neck: Normal range of motion. No JVD present.   Cardiovascular: Normal rate, regular rhythm and normal heart sounds.   No murmur heard.  Pulmonary/Chest: Effort normal. She has wheezes. She has rales. She exhibits no tenderness.   Mid to lower crackles   Abdominal: Soft. Bowel sounds are  normal. She exhibits no distension. There is no tenderness. There is no guarding.   Musculoskeletal: Normal range of motion. She exhibits no edema.   Neurological: She is alert and oriented to person, place, and time.   Skin: Skin is warm and dry. No rash noted. She is not diaphoretic.       Significant Labs: All pertinent labs within the past 24 hours have been reviewed.    Significant Imaging: I have reviewed and interpreted all pertinent imaging results/findings within the past 24 hours.      Assessment/Plan:      * 2019 novel coronavirus disease (COVID-19)  Tested positive for COVID-19 (per her report) outside testing at [facility] on Dhav[place] 3/29.  tested positive and recovering at home. On admit, hypoxic on room air, bilat infiltrates on chest xray.   COVID-19 Positive   Influenza not done   Elevated CRP, procalcitonin, CPK, d dimer, ferritin    Maintain airborne isolation / droplet / contact precautions  Supplemental oxygen to maintain SpO2 >92%, currently wean down 5 to 4 L NC Os sat 94%  Avoid BiPAP to minimize aerosolization; move to intubation and mechanical ventilation  Continue albuterol and plaquenil  Incentive spirometry     Discussed with patient if respiratory worsen, will need for intbuation/ventilation. Patient agreed for intubation/ventilation if need.             Viral pneumonia  Secondary to novel coronavirus, management as above    HLD (hyperlipidemia)  Lab Results   Component Value Date    LDLCALC 119.4 02/14/2020   Continue statin        Diabetes mellitus  HgbA1C 8.0 (2/14/2020). On Bydureon SQ weekly at home.  -SSI with meals        Essential hypertension, benign  Hold amlodipine, benazipril, clonidine as low normal side. Hydralazine prn     Anxiety  Continue as needed hydroxyzine       VTE Risk Mitigation (From admission, onward)         Ordered     enoxaparin injection 40 mg  Daily      03/29/20 1148     IP VTE HIGH RISK PATIENT  Once      03/29/20 1148                      Nesha  Irvin Rojo NP  Department of Hospital Medicine   [facility]

## 2020-04-01 NOTE — NURSING
Received Critical LAB result : COVID positive. Communicated by Mirtha, from LAB. Dr. Liao informed with no further order.

## 2020-04-01 NOTE — PLAN OF CARE
04/01/20 1550   Discharge Reassessment   Assessment Type Discharge Planning Reassessment   Do you have any problems affording any of your prescribed medications? No   Discharge Plan A Home with family   DME Needed Upon Discharge    (TBD)   Can the patient/caregiver answer the patient profile reliably? Yes, cognitively intact   How does the patient rate their overall health at the present time? Poor   Describe the patient's ability to walk at the present time. Minor restrictions or changes   How often would a person be available to care for the patient? Whenever needed   Number of comorbid conditions (as recorded on the chart) Two   During the past month, has the patient often been bothered by feeling down, depressed or hopeless? No   During the past month, has the patient often been bothered by little interest or pleasure in doing things? No   Post-Acute Status   Post-Acute Authorization Other   Other Status   (TBD)

## 2020-04-01 NOTE — NURSING
1912 (3/31) Received report from JEEVAN Shukla. Patient alert and oriented. Compliants of throat pain, no sign of distress. IV intact - saline locked. Call bell given on her reach,instructed to call for assistance all the time. Bed on lowest position. Bed alarm on. Safety maintained.

## 2020-04-02 LAB
POCT GLUCOSE: 135 MG/DL (ref 70–110)
POCT GLUCOSE: 161 MG/DL (ref 70–110)
POCT GLUCOSE: 193 MG/DL (ref 70–110)

## 2020-04-02 PROCEDURE — 25000003 PHARM REV CODE 250: Performed by: HOSPITALIST

## 2020-04-02 PROCEDURE — 25000003 PHARM REV CODE 250: Performed by: NURSE PRACTITIONER

## 2020-04-02 PROCEDURE — 25000003 PHARM REV CODE 250: Performed by: EMERGENCY MEDICINE

## 2020-04-02 PROCEDURE — 21400001 HC TELEMETRY ROOM

## 2020-04-02 PROCEDURE — 63600175 PHARM REV CODE 636 W HCPCS: Performed by: EMERGENCY MEDICINE

## 2020-04-02 RX ADMIN — NORTRIPTYLINE HYDROCHLORIDE 50 MG: 25 CAPSULE ORAL at 08:04

## 2020-04-02 RX ADMIN — ENOXAPARIN SODIUM 40 MG: 100 INJECTION SUBCUTANEOUS at 05:04

## 2020-04-02 RX ADMIN — LEVETIRACETAM 1000 MG: 500 TABLET, FILM COATED ORAL at 09:04

## 2020-04-02 RX ADMIN — TIZANIDINE 2 MG: 2 TABLET ORAL at 08:04

## 2020-04-02 RX ADMIN — ALBUTEROL SULFATE 2 PUFF: 90 AEROSOL, METERED RESPIRATORY (INHALATION) at 10:04

## 2020-04-02 RX ADMIN — GUAIFENESIN AND DEXTROMETHORPHAN HYDROBROMIDE 1 TABLET: 600; 30 TABLET, EXTENDED RELEASE ORAL at 09:04

## 2020-04-02 RX ADMIN — ROSUVASTATIN CALCIUM 20 MG: 10 TABLET, COATED ORAL at 09:04

## 2020-04-02 RX ADMIN — ESCITALOPRAM OXALATE 20 MG: 10 TABLET ORAL at 09:04

## 2020-04-02 RX ADMIN — ASPIRIN 81 MG 81 MG: 81 TABLET ORAL at 09:04

## 2020-04-02 RX ADMIN — HYDROXYCHLOROQUINE SULFATE 400 MG: 200 TABLET, FILM COATED ORAL at 09:04

## 2020-04-02 RX ADMIN — ACETAMINOPHEN 650 MG: 325 TABLET ORAL at 09:04

## 2020-04-02 RX ADMIN — ALBUTEROL SULFATE 2 PUFF: 90 AEROSOL, METERED RESPIRATORY (INHALATION) at 09:04

## 2020-04-02 RX ADMIN — INSULIN ASPART 2 UNITS: 100 INJECTION, SOLUTION INTRAVENOUS; SUBCUTANEOUS at 05:04

## 2020-04-02 RX ADMIN — ALBUTEROL SULFATE 2 PUFF: 90 AEROSOL, METERED RESPIRATORY (INHALATION) at 01:04

## 2020-04-02 RX ADMIN — LEVETIRACETAM 1000 MG: 500 TABLET, FILM COATED ORAL at 08:04

## 2020-04-02 RX ADMIN — ALBUTEROL SULFATE 2 PUFF: 90 AEROSOL, METERED RESPIRATORY (INHALATION) at 02:04

## 2020-04-02 RX ADMIN — ALBUTEROL SULFATE 2 PUFF: 90 AEROSOL, METERED RESPIRATORY (INHALATION) at 05:04

## 2020-04-02 NOTE — PROGRESS NOTES
[facility]  Progress Note    Patient Name: Monalisa Carson  MRN: [id number]  Patient Class: IP- Inpatient   Admission Date: 3/29/2020  Length of Stay: 4 days  Attending Physician: Ronaldo Rose MD  Primary Care Provider: Alisha See MD        Subjective:     Principal Problem:2019 novel coronavirus disease (COVID-19)        HPI:  Mrs. Monalisa Carson is a 55 y.o female with T2DM, hypertension, heart murmur, and anxiety who presents to the hospital with complaints of fever (102F), cough for 1 week and SOB for the last 2 days. Patient reports she went to [facility] 7 days ago because of fever and cough after got tested for COVID-19 via [facility]. She was discharged home with albuterol inhaler. Patient decided to go to hospital today due to increasing SOB for the last 2 days. Patient reports her  at home also having cough but not fever. No recent travel. Also reports associated symptoms are nausea/vomit and diarrhea. Denies other symptoms. Reports taking Tylenol for fever at home. Patient reports she is positive for COVID-19 which done on 3/22/2020    In ED, temp was 102, spO2 91% in RA then 92-94% on 4L oxygen, tachypnea 22-27; NSR on EKG; Chest xray reveals subtle patchy consolidation within mid left lung and linear opacities in right lung; Labs with unremarkable CBC, CMP with mild hyponatremia (Na 132); mild elevated AST/ALT 78/53; elevated CRP 62.1; Troponin normal at 0.023; normal Procalcitonin 0.11    Patient is admitted and COVID-19 protocol is initiated        Overview/Hospital Course:  Patient admitted with flu symptoms. Spouse + covid.  Patient had testing done at [facility].  State dept will not release records.  Retested on admission.  On 3/31, reports breathing same on 5 liter NC O2. Started plaquenil and albuterol. 4/1, COVID19 positive. Improvement in shortness of breath, currently 94% on 4 L NC. On 4/2, shortness continues  to improve slowly and still requiring 4 L NC 90-96% on 4 LNC. Continue plaquenil (day 3), albuterol inhaler.       No new subjective & objective note has been filed under this hospital service since the last note was generated.      Assessment/Plan:      * 2019 novel coronavirus disease (COVID-19)  Tested positive for COVID-19 (per her report) outside testing at [facility] on [place]haval 3/29.  tested positive and recovering at home. On admit, hypoxic on room air, bilat infiltrates on chest xray.   COVID-19 Positive   Influenza not done   Elevated CRP, procalcitonin, CPK, d dimer, ferritin    Maintain airborne isolation / droplet / contact precautions  Supplemental oxygen to maintain SpO2 >92%,  wean down 5 to 4 L NC Os sat 94%  Avoid BiPAP to minimize aerosolization; move to intubation and mechanical ventilation  Discussed with patient if respiratory worsen, will need for intbuation/ventilation. Patient agreed for intubation/ventilation if need.     4/2/20  On 4/2, shortness continues to improve slowly and still requiring 4 L NC 90-96% on 4 LNC. Not moving much except bed to BSC. Continue plaquenil (day 3), albuterol inhaler.                 Viral pneumonia  Secondary to novel coronavirus, management as above    HLD (hyperlipidemia)  Lab Results   Component Value Date    CHOL 224 (H) 02/14/2020    CHOL 202 (H) 02/16/2019    CHOL 199 12/22/2017     Lab Results   Component Value Date    HDL 48 02/14/2020    HDL 40 02/16/2019    HDL 35 (L) 12/22/2017     Lab Results   Component Value Date    LDLCALC 119.4 02/14/2020    LDLCALC 123.2 02/16/2019    LDLCALC 127.4 12/22/2017     Lab Results   Component Value Date    TRIG 283 (H) 02/14/2020    TRIG 194 (H) 02/16/2019    TRIG 183 (H) 12/22/2017   Continue statin      Diabetes mellitus  HgbA1C 8.0 (2/14/2020). On Bydureon SQ weekly at home.  -SSI with meals        Essential hypertension, benign  Hold amlodipine, benazipril, clonidine as low normal side. Hydralazine prn      Anxiety  Continue as needed hydroxyzine       VTE Risk Mitigation (From admission, onward)         Ordered     enoxaparin injection 40 mg  Daily      03/29/20 1148     IP VTE HIGH RISK PATIENT  Once      03/29/20 1148                      Nesha Rojo NP  Department of Hospital Medicine   [facility]

## 2020-04-02 NOTE — ASSESSMENT & PLAN NOTE
Tested positive for COVID-19 (per her report) outside testing at [facility] on Dhaval 3/29.  tested positive and recovering at home. On admit, hypoxic on room air, bilat infiltrates on chest xray.   COVID-19 Positive   Influenza not done   Elevated CRP, procalcitonin, CPK, d dimer, ferritin    Maintain airborne isolation / droplet / contact precautions  Supplemental oxygen to maintain SpO2 >92%,  wean down 5 to 4 L NC Os sat 94%  Avoid BiPAP to minimize aerosolization; move to intubation and mechanical ventilation  Discussed with patient if respiratory worsen, will need for intbuation/ventilation. Patient agreed for intubation/ventilation if need.     4/2/20  On 4/2, shortness continues to improve slowly and still requiring 4 L NC 90-96% on 4 LNC. Not moving much except bed to BSC. Continue plaquenil (day 3), albuterol inhaler.

## 2020-04-02 NOTE — PLAN OF CARE
Problem: Fall Injury Risk  Goal: Absence of Fall and Fall-Related Injury  Outcome: Ongoing, Progressing     Problem: Adult Inpatient Plan of Care  Goal: Plan of Care Review  Outcome: Ongoing, Progressing  Goal: Patient-Specific Goal (Individualization)  Outcome: Ongoing, Progressing  Goal: Absence of Hospital-Acquired Illness or Injury  Outcome: Ongoing, Progressing  Goal: Optimal Comfort and Wellbeing  Outcome: Ongoing, Progressing  Goal: Readiness for Transition of Care  Outcome: Ongoing, Progressing  Goal: Rounds/Family Conference  Outcome: Ongoing, Progressing     Problem: Diabetes Comorbidity  Goal: Blood Glucose Level Within Desired Range  Outcome: Ongoing, Progressing     Problem: Infection  Goal: Infection Symptom Resolution  Outcome: Ongoing, Progressing     Problem: Social Isolation  Goal: Increased Social Interaction  Outcome: Ongoing, Progressing     Problem: Skin Injury Risk Increased  Goal: Skin Health and Integrity  Outcome: Ongoing, Progressing

## 2020-04-02 NOTE — NURSING
19:16 (4/1) Received bedside report from JEEVAN strong. Patient alert and oriented. No pain. No sign of distress. IV line intact, saline locked. Cardiac monitor in place. On oxygen at 4L.  Call bell given on her reach, instructed to call for assistance all the time. Bed on lowest position. Bed alarm on. Safety maintained.

## 2020-04-03 LAB
POCT GLUCOSE: 147 MG/DL (ref 70–110)
POCT GLUCOSE: 157 MG/DL (ref 70–110)
POCT GLUCOSE: 160 MG/DL (ref 70–110)

## 2020-04-03 PROCEDURE — 21400001 HC TELEMETRY ROOM

## 2020-04-03 PROCEDURE — 25000003 PHARM REV CODE 250: Performed by: HOSPITALIST

## 2020-04-03 PROCEDURE — 25000003 PHARM REV CODE 250: Performed by: EMERGENCY MEDICINE

## 2020-04-03 PROCEDURE — 25000003 PHARM REV CODE 250: Performed by: NURSE PRACTITIONER

## 2020-04-03 PROCEDURE — 63600175 PHARM REV CODE 636 W HCPCS: Performed by: EMERGENCY MEDICINE

## 2020-04-03 RX ADMIN — ALBUTEROL SULFATE 2 PUFF: 90 AEROSOL, METERED RESPIRATORY (INHALATION) at 10:04

## 2020-04-03 RX ADMIN — ALBUTEROL SULFATE 2 PUFF: 90 AEROSOL, METERED RESPIRATORY (INHALATION) at 06:04

## 2020-04-03 RX ADMIN — ROSUVASTATIN CALCIUM 20 MG: 10 TABLET, COATED ORAL at 08:04

## 2020-04-03 RX ADMIN — LEVETIRACETAM 1000 MG: 500 TABLET, FILM COATED ORAL at 08:04

## 2020-04-03 RX ADMIN — NORTRIPTYLINE HYDROCHLORIDE 50 MG: 25 CAPSULE ORAL at 08:04

## 2020-04-03 RX ADMIN — ALBUTEROL SULFATE 2 PUFF: 90 AEROSOL, METERED RESPIRATORY (INHALATION) at 02:04

## 2020-04-03 RX ADMIN — ALBUTEROL SULFATE 2 PUFF: 90 AEROSOL, METERED RESPIRATORY (INHALATION) at 05:04

## 2020-04-03 RX ADMIN — HYDROXYCHLOROQUINE SULFATE 400 MG: 200 TABLET, FILM COATED ORAL at 08:04

## 2020-04-03 RX ADMIN — ENOXAPARIN SODIUM 40 MG: 100 INJECTION SUBCUTANEOUS at 04:04

## 2020-04-03 RX ADMIN — ASPIRIN 81 MG 81 MG: 81 TABLET ORAL at 08:04

## 2020-04-03 RX ADMIN — ESCITALOPRAM OXALATE 20 MG: 10 TABLET ORAL at 08:04

## 2020-04-03 RX ADMIN — GUAIFENESIN AND DEXTROMETHORPHAN HYDROBROMIDE 1 TABLET: 600; 30 TABLET, EXTENDED RELEASE ORAL at 05:04

## 2020-04-03 RX ADMIN — TIZANIDINE 2 MG: 2 TABLET ORAL at 08:04

## 2020-04-03 RX ADMIN — ZOLPIDEM TARTRATE 5 MG: 5 TABLET, COATED ORAL at 08:04

## 2020-04-03 NOTE — NURSING TRANSFER
Nursing Transfer Note      4/3/2020     Transfer From: 340B To: 315B    Transfer via bed    Transfer with cardiac monitoring and 4L of oxygen via NC    Transported by Zuly, RN, Denise, RN, and Emanuel RN.    Medicines sent: albuterol inhaler and Novolog pen    Chart send with patient: Yes    Notified: n/a    Patient reassessed at: 125am    Upon arrival to room, patients vital signs obtained and assessment completed. Patient assisted to bedside commode and provided fresh ice and water. Call placed within reach and patient instructed to inform the nurse if anything is needed.

## 2020-04-03 NOTE — CARE UPDATE
Contact was made with family via Skype. Patient and family appeared to be in good spirits. Patient told family that she call them by phone later on today.

## 2020-04-03 NOTE — PROGRESS NOTES
[facility]  Progress Note    Patient Name: Monalisa Carson  MRN: [id number]  Patient Class: IP- Inpatient   Admission Date: 3/29/2020  Length of Stay: 5 days  Attending Physician: Ronaldo Rose MD  Primary Care Provider: Alisha See MD        Subjective:     Principal Problem:2019 novel coronavirus disease (COVID-19)        HPI:  Mrs. Monalisa Carson is a 55 y.o female with T2DM, hypertension, heart murmur, and anxiety who presents to the hospital with complaints of fever (102F), cough for 1 week and SOB for the last 2 days. Patient reports [facility] 7 days ago because of fever and cough after got tested for COVID-19 via [facility]. She was discharged home with albuterol inhaler. Patient decided to go to hospital today due to increasing SOB for the last 2 days. Patient reports her  at home also having cough but not fever. No recent travel. Also reports associated symptoms are nausea/vomit and diarrhea. Denies other symptoms. Reports taking Tylenol for fever at home. Patient reports she is positive for COVID-19 which done on 3/22/2020    In ED, temp was 102, spO2 91% in RA then 92-94% on 4L oxygen, tachypnea 22-27; NSR on EKG; Chest xray reveals subtle patchy consolidation within mid left lung and linear opacities in right lung; Labs with unremarkable CBC, CMP with mild hyponatremia (Na 132); mild elevated AST/ALT 78/53; elevated CRP 62.1; Troponin normal at 0.023; normal Procalcitonin 0.11    Patient is admitted and COVID-19 protocol is initiated        Overview/Hospital Course:  Patient admitted with flu symptoms. Spouse + covid.  Patient had testing done at [facility].  State dept will not release records.  Retested on admission.  On 3/31, reports breathing same on 5 liter NC O2. Started plaquenil and albuterol. 4/1, COVID19 positive. Improvement in shortness of breath, currently 94% on 4 L NC. On 4/2, shortness continues  to improve slowly and still requiring 4 L NC 90-96% on 4 LNC. 4/3, stable on 4 L NC O2 sat 93-95% Continue plaquenil (day 4), albuterol inhaler.       Interval History: Feels better today and okay with PT evaluation    Review of Systems   Constitutional: Negative for activity change, appetite change, chills and fever.   HENT: Negative for congestion, sore throat and trouble swallowing.    Respiratory: Positive for cough (non productive now), shortness of breath and wheezing.         Improving   Cardiovascular: Negative for chest pain, palpitations and leg swelling.   Gastrointestinal: Negative for abdominal distention, abdominal pain, constipation, diarrhea, nausea and vomiting.   Genitourinary: Negative for dysuria and hematuria.   Musculoskeletal: Negative for arthralgias, joint swelling and myalgias.   Neurological: Positive for weakness. Negative for dizziness, tremors, seizures and headaches.     Objective:     Vital Signs (Most Recent):  Temp: 98 °F (36.7 °C) (04/03/20 1206)  Pulse: 86 (04/03/20 1206)  Resp: 20 (04/03/20 1000)  BP: (!) 166/64 (04/03/20 1206)  SpO2: 95 % (04/03/20 1206) Vital Signs (24h Range):  Temp:  [97.5 °F (36.4 °C)-98.5 °F (36.9 °C)] 98 °F (36.7 °C)  Pulse:  [72-91] 86  Resp:  [18-23] 20  SpO2:  [92 %-95 %] 95 %  BP: (118-166)/(64-80) 166/64     Weight: 100.3 kg (221 lb 1.9 oz)  Body mass index is 39.17 kg/m².    Intake/Output Summary (Last 24 hours) at 4/3/2020 1222  Last data filed at 4/3/2020 0600  Gross per 24 hour   Intake 240 ml   Output 500 ml   Net -260 ml      Physical Exam   Constitutional: She is oriented to person, place, and time. She appears well-developed and well-nourished. No distress.   HENT:   Head: Normocephalic and atraumatic.   Eyes: Pupils are equal, round, and reactive to light. EOM are normal.   Neck: Normal range of motion. No JVD present.   Cardiovascular: Normal rate, regular rhythm and normal heart sounds.   No murmur heard.  Pulmonary/Chest: Effort normal.  She has no wheezes. She has no rales. She exhibits no tenderness.   Mid to lower crackles   Abdominal: Soft. Bowel sounds are normal. She exhibits no distension. There is no tenderness. There is no guarding.   Musculoskeletal: Normal range of motion. She exhibits no edema.   Neurological: She is alert and oriented to person, place, and time.   Skin: Skin is warm and dry. No rash noted. She is not diaphoretic.       Significant Labs: All pertinent labs within the past 24 hours have been reviewed.    Significant Imaging: I have reviewed and interpreted all pertinent imaging results/findings within the past 24 hours.      Assessment/Plan:      * 2019 novel coronavirus disease (COVID-19)  Tested positive for COVID-19 (per her report) outside testing at [facility] on [place]haval 3/29.  tested positive and recovering at home. On admit, hypoxic on room air, bilat infiltrates on chest xray.   COVID-19 Positive   Influenza not done   Elevated CRP, procalcitonin, CPK, d dimer, ferritin    Maintain airborne isolation / droplet / contact precautions  Supplemental oxygen to maintain SpO2 >92%,  wean down 5 to 4 L NC Os sat 94%  Avoid BiPAP to minimize aerosolization; move to intubation and mechanical ventilation  Discussed with patient if respiratory worsen, will need for intbuation/ventilation. Patient agreed for intubation/ventilation if need.     4/3/20   shortness continues to improve slowly and stable on 4 L NC 93-95% on 4 LNC. Continue plaquenil (day 4), albuterol inhaler.   PT consult                Viral pneumonia  Secondary to novel coronavirus, management as above    HLD (hyperlipidemia)  Lab Results   Component Value Date    CHOL 224 (H) 02/14/2020    CHOL 202 (H) 02/16/2019    CHOL 199 12/22/2017     Lab Results   Component Value Date    HDL 48 02/14/2020    HDL 40 02/16/2019    HDL 35 (L) 12/22/2017     Lab Results   Component Value Date    LDLCALC 119.4 02/14/2020    LDLCALC 123.2 02/16/2019    LDLCALC 127.4  12/22/2017     Lab Results   Component Value Date    TRIG 283 (H) 02/14/2020    TRIG 194 (H) 02/16/2019    TRIG 183 (H) 12/22/2017   Continue statin      Diabetes mellitus  HgbA1C 8.0 (2/14/2020). On Bydureon SQ weekly at home.  -SSI with meals        Essential hypertension, benign  Hold amlodipine, benazipril, clonidine as low normal side. Hydralazine prn     Anxiety  Continue as needed hydroxyzine       VTE Risk Mitigation (From admission, onward)         Ordered     enoxaparin injection 40 mg  Daily      03/29/20 1148     IP VTE HIGH RISK PATIENT  Once      03/29/20 1148                      Nesha Rojo NP  Department of Hospital Medicine   [facility]

## 2020-04-03 NOTE — SUBJECTIVE & OBJECTIVE
Interval History: Feels better today and okay with PT evaluation    Review of Systems   Constitutional: Negative for activity change, appetite change, chills and fever.   HENT: Negative for congestion, sore throat and trouble swallowing.    Respiratory: Positive for cough (non productive now), shortness of breath and wheezing.         Improving   Cardiovascular: Negative for chest pain, palpitations and leg swelling.   Gastrointestinal: Negative for abdominal distention, abdominal pain, constipation, diarrhea, nausea and vomiting.   Genitourinary: Negative for dysuria and hematuria.   Musculoskeletal: Negative for arthralgias, joint swelling and myalgias.   Neurological: Positive for weakness. Negative for dizziness, tremors, seizures and headaches.     Objective:     Vital Signs (Most Recent):  Temp: 98 °F (36.7 °C) (04/03/20 1206)  Pulse: 86 (04/03/20 1206)  Resp: 20 (04/03/20 1000)  BP: (!) 166/64 (04/03/20 1206)  SpO2: 95 % (04/03/20 1206) Vital Signs (24h Range):  Temp:  [97.5 °F (36.4 °C)-98.5 °F (36.9 °C)] 98 °F (36.7 °C)  Pulse:  [72-91] 86  Resp:  [18-23] 20  SpO2:  [92 %-95 %] 95 %  BP: (118-166)/(64-80) 166/64     Weight: 100.3 kg (221 lb 1.9 oz)  Body mass index is 39.17 kg/m².    Intake/Output Summary (Last 24 hours) at 4/3/2020 1222  Last data filed at 4/3/2020 0600  Gross per 24 hour   Intake 240 ml   Output 500 ml   Net -260 ml      Physical Exam   Constitutional: She is oriented to person, place, and time. She appears well-developed and well-nourished. No distress.   HENT:   Head: Normocephalic and atraumatic.   Eyes: Pupils are equal, round, and reactive to light. EOM are normal.   Neck: Normal range of motion. No JVD present.   Cardiovascular: Normal rate, regular rhythm and normal heart sounds.   No murmur heard.  Pulmonary/Chest: Effort normal. She has no wheezes. She has no rales. She exhibits no tenderness.   Mid to lower crackles   Abdominal: Soft. Bowel sounds are normal. She exhibits no  distension. There is no tenderness. There is no guarding.   Musculoskeletal: Normal range of motion. She exhibits no edema.   Neurological: She is alert and oriented to person, place, and time.   Skin: Skin is warm and dry. No rash noted. She is not diaphoretic.       Significant Labs: All pertinent labs within the past 24 hours have been reviewed.    Significant Imaging: I have reviewed and interpreted all pertinent imaging results/findings within the past 24 hours.

## 2020-04-03 NOTE — PLAN OF CARE
Problem: Fall Injury Risk  Goal: Absence of Fall and Fall-Related Injury  Outcome: Ongoing, Progressing     Problem: Adult Inpatient Plan of Care  Goal: Plan of Care Review  Outcome: Ongoing, Progressing  Goal: Patient-Specific Goal (Individualization)  Outcome: Ongoing, Progressing  Goal: Absence of Hospital-Acquired Illness or Injury  Outcome: Ongoing, Progressing  Goal: Optimal Comfort and Wellbeing  Outcome: Ongoing, Progressing  Goal: Readiness for Transition of Care  Outcome: Ongoing, Progressing  Goal: Rounds/Family Conference  Outcome: Ongoing, Progressing     Problem: Diabetes Comorbidity  Goal: Blood Glucose Level Within Desired Range  Outcome: Ongoing, Progressing     Problem: Infection  Goal: Infection Symptom Resolution  Outcome: Ongoing, Progressing     Problem: Social Isolation  Goal: Increased Social Interaction  Outcome: Ongoing, Progressing     Problem: Skin Injury Risk Increased  Goal: Skin Health and Integrity  Outcome: Ongoing, Progressing   No falls this shift, safety maintained. Pt able to voice needs. Afebrile, VSS. BG remains under 200, SS novolog given twice. No N/V. BM today. 4L nc with humidification O2 sats 93-96%

## 2020-04-03 NOTE — ASSESSMENT & PLAN NOTE
Tested positive for COVID-19 (per her report) outside testing at [facility] on Dhav[address] 3/29.  tested positive and recovering at home. On admit, hypoxic on room air, bilat infiltrates on chest xray.   COVID-19 Positive   Influenza not done   Elevated CRP, procalcitonin, CPK, d dimer, ferritin    Maintain airborne isolation / droplet / contact precautions  Supplemental oxygen to maintain SpO2 >92%,  wean down 5 to 4 L NC Os sat 94%  Avoid BiPAP to minimize aerosolization; move to intubation and mechanical ventilation  Discussed with patient if respiratory worsen, will need for intbuation/ventilation. Patient agreed for intubation/ventilation if need.     4/3/20   shortness continues to improve slowly and stable on 4 L NC 93-95% on 4 LNC. Continue plaquenil (day 4), albuterol inhaler.   PT consult

## 2020-04-03 NOTE — PLAN OF CARE
Problem: Fall Injury Risk  Goal: Absence of Fall and Fall-Related Injury  Intervention: Identify and Manage Contributors to Fall Injury Risk  Flowsheets (Taken 4/3/2020 0507)  Self-Care Promotion: independence encouraged  Medication Review/Management: medications reviewed  Intervention: Promote Injury-Free Environment  Flowsheets (Taken 4/3/2020 0507)  Safety Promotion/Fall Prevention: bed alarm set; side rails raised x 2; room near unit station  Environmental Safety Modification: clutter free environment maintained; lighting adjusted; room near unit station; room organization consistent     Problem: Adult Inpatient Plan of Care  Goal: Plan of Care Review  Flowsheets (Taken 4/3/2020 0507)  Plan of Care Reviewed With: patient     Problem: Diabetes Comorbidity  Goal: Blood Glucose Level Within Desired Range  Intervention: Maintain Glycemic Control  Flowsheets (Taken 4/3/2020 0507)  Glycemic Management: blood glucose monitoring     Problem: Infection  Goal: Infection Symptom Resolution  Intervention: Prevent or Manage Infection  Flowsheets (Taken 4/3/2020 0507)  Infection Management: cultures obtained and sent to lab  Isolation Precautions: airborne precautions maintained; contact precautions maintained; droplet precautions maintained; protective environment maintained     Problem: Social Isolation  Goal: Increased Social Interaction  Intervention: Promote Feelings of Connectedness  Flowsheets (Taken 4/3/2020 0507)  Supportive Measures: verbalization of feelings encouraged     Problem: Skin Injury Risk Increased  Goal: Skin Health and Integrity  Intervention: Optimize Skin Protection  Flowsheets (Taken 4/3/2020 0507)  Head of Bed (HOB): HOB at 20-30 degrees     Patient remained free of injury throughout the shift. Vital signs remained WNL. Plan to continue COVID treatment and wean O2. Patient updated on plan of care and verbalized understanding. Call light in reach and patient instructed to inform the nurse if  anything is needed.

## 2020-04-04 LAB
POCT GLUCOSE: 127 MG/DL (ref 70–110)
POCT GLUCOSE: 176 MG/DL (ref 70–110)
POCT GLUCOSE: 177 MG/DL (ref 70–110)

## 2020-04-04 PROCEDURE — 97535 SELF CARE MNGMENT TRAINING: CPT

## 2020-04-04 PROCEDURE — 63600175 PHARM REV CODE 636 W HCPCS: Performed by: HOSPITALIST

## 2020-04-04 PROCEDURE — 97161 PT EVAL LOW COMPLEX 20 MIN: CPT

## 2020-04-04 PROCEDURE — 63600175 PHARM REV CODE 636 W HCPCS: Performed by: EMERGENCY MEDICINE

## 2020-04-04 PROCEDURE — 21400001 HC TELEMETRY ROOM

## 2020-04-04 PROCEDURE — 25000003 PHARM REV CODE 250: Performed by: NURSE PRACTITIONER

## 2020-04-04 PROCEDURE — 25000003 PHARM REV CODE 250: Performed by: EMERGENCY MEDICINE

## 2020-04-04 PROCEDURE — 97166 OT EVAL MOD COMPLEX 45 MIN: CPT

## 2020-04-04 RX ORDER — FUROSEMIDE 10 MG/ML
20 INJECTION INTRAMUSCULAR; INTRAVENOUS ONCE
Status: COMPLETED | OUTPATIENT
Start: 2020-04-04 | End: 2020-04-04

## 2020-04-04 RX ADMIN — NORTRIPTYLINE HYDROCHLORIDE 50 MG: 25 CAPSULE ORAL at 08:04

## 2020-04-04 RX ADMIN — HYDROXYCHLOROQUINE SULFATE 400 MG: 200 TABLET, FILM COATED ORAL at 09:04

## 2020-04-04 RX ADMIN — ZOLPIDEM TARTRATE 5 MG: 5 TABLET, COATED ORAL at 08:04

## 2020-04-04 RX ADMIN — ROSUVASTATIN CALCIUM 20 MG: 10 TABLET, COATED ORAL at 09:04

## 2020-04-04 RX ADMIN — TIZANIDINE 2 MG: 2 TABLET ORAL at 08:04

## 2020-04-04 RX ADMIN — LEVETIRACETAM 1000 MG: 500 TABLET, FILM COATED ORAL at 08:04

## 2020-04-04 RX ADMIN — LEVETIRACETAM 1000 MG: 500 TABLET, FILM COATED ORAL at 09:04

## 2020-04-04 RX ADMIN — ALBUTEROL SULFATE 2 PUFF: 90 AEROSOL, METERED RESPIRATORY (INHALATION) at 10:04

## 2020-04-04 RX ADMIN — ALBUTEROL SULFATE 2 PUFF: 90 AEROSOL, METERED RESPIRATORY (INHALATION) at 06:04

## 2020-04-04 RX ADMIN — ALBUTEROL SULFATE 2 PUFF: 90 AEROSOL, METERED RESPIRATORY (INHALATION) at 02:04

## 2020-04-04 RX ADMIN — ASPIRIN 81 MG 81 MG: 81 TABLET ORAL at 09:04

## 2020-04-04 RX ADMIN — FUROSEMIDE 20 MG: 10 INJECTION, SOLUTION INTRAMUSCULAR; INTRAVENOUS at 01:04

## 2020-04-04 RX ADMIN — INSULIN ASPART 2 UNITS: 100 INJECTION, SOLUTION INTRAVENOUS; SUBCUTANEOUS at 09:04

## 2020-04-04 RX ADMIN — ESCITALOPRAM OXALATE 20 MG: 10 TABLET ORAL at 09:04

## 2020-04-04 RX ADMIN — ENOXAPARIN SODIUM 40 MG: 100 INJECTION SUBCUTANEOUS at 08:04

## 2020-04-04 NOTE — PLAN OF CARE
Pt was able to be weaned off of O2.  Will monitor over night.  If pt does well she will D/C tomorrow.

## 2020-04-04 NOTE — PLAN OF CARE
Problem: Occupational Therapy Goal  Goal: Occupational Therapy Goal  Description  Goals to be met by: 04/18/20    Patient will increase functional independence with ADLs by performing:    LE Dressing with Stand-by Assistance.  Grooming while standing at sink with Supervision.  Toileting from toilet with Supervision for hygiene and clothing management.   Supine to sit with Supervision.  Step transfer with Supervision  Toilet transfer to toilet with Supervision.     Outcome: Ongoing, Progressing   Pleasantly motivated; ready to go home. On 1.5L NC 90-95% with SBA for in-room mobility and standing ADL. OT rec. Home with family at discharge.

## 2020-04-04 NOTE — PLAN OF CARE
Problem: Physical Therapy Goal  Goal: Physical Therapy Goal  Description  Goals to be met by:      Patient will increase functional independence with mobility by performin. Supine to sit with Modified [place]  2. Sit to supine with Modified [place]  3. Sit to stand transfer with Modified [place]  4. Bed to chair transfer with Modified [place]   5. Gait  x 50 feet with Modified [place].   6. Stand for 5 minutes with [place]   7. Lower extremity exercise program x20 reps per handout, with independence     Outcome: Ongoing, Progressing    5x/week. Stable sats on 1.5 LO2 walking in room. No HHPT or DME needs. D/w Dr. Rose

## 2020-04-04 NOTE — PROGRESS NOTES
[facility]  Progress Note    Patient Name: Monalisa Carson  MRN: [id number]  Patient Class: IP- Inpatient   Admission Date: 3/29/2020  Length of Stay: 6 days  Attending Physician: Ronaldo Rose MD  Primary Care Provider: Alisha See MD        Subjective:     Principal Problem:2019 novel coronavirus disease (COVID-19)        HPI:  Mrs. Monalisa Carson is a 55 y.o female with T2DM, hypertension, heart murmur, and anxiety who presents to the hospital with complaints of fever (102F), cough for 1 week and SOB for the last 2 days. Patient reports she went to the [facility] ED at [facility] 7 days ago because of fever and cough after got tested for COVID-19 via [facility]. She was discharged home with albuterol inhaler. Patient decided to go to hospital today due to increasing SOB for the last 2 days. Patient reports her  at home also having cough but not fever. No recent travel. Also reports associated symptoms are nausea/vomit and diarrhea. Denies other symptoms. Reports taking Tylenol for fever at home. Patient reports she is positive for COVID-19 which done on 3/22/2020    In ED, temp was 102, spO2 91% in RA then 92-94% on 4L oxygen, tachypnea 22-27; NSR on EKG; Chest xray reveals subtle patchy consolidation within mid left lung and linear opacities in right lung; Labs with unremarkable CBC, CMP with mild hyponatremia (Na 132); mild elevated AST/ALT 78/53; elevated CRP 62.1; Troponin normal at 0.023; normal Procalcitonin 0.11    Patient is admitted and COVID-19 protocol is initiated        Overview/Hospital Course:  Patient admitted with flu symptoms. Spouse + covid.  Patient had testing done at [facility].  State dept will not release records.  Retested on admission.  On 3/31, reports breathing same on 5 liter NC O2. Started plaquenil and albuterol. 4/1, COVID19 positive. Improvement in shortness of breath and O2 sat. Continue plaquenil (day 5), albuterol  "inhaler.VS stable.  PT consult and wean oxygen to keep O2 <90%. Possible home today or tomorrow.      Interval History: Feel "good" today and shortness continues to improve. Not moving around much. Eating breakfast in bed.     Review of Systems   Constitutional: Negative for activity change, appetite change, chills and fever.   HENT: Negative for congestion, sore throat and trouble swallowing.    Respiratory: Positive for cough (non productive now) and shortness of breath. Negative for wheezing.         Improving   Cardiovascular: Negative for chest pain, palpitations and leg swelling.   Gastrointestinal: Negative for abdominal distention, abdominal pain, constipation, diarrhea, nausea and vomiting.   Genitourinary: Negative for dysuria and hematuria.   Musculoskeletal: Negative for arthralgias, joint swelling and myalgias.   Neurological: Positive for weakness. Negative for dizziness, tremors, seizures and headaches.     Objective:     Vital Signs (Most Recent):  Temp: 98 °F (36.7 °C) (04/04/20 0838)  Pulse: 87 (04/04/20 0838)  Resp: 18 (04/04/20 0838)  BP: (!) 116/58 (04/04/20 0838)  SpO2: 96 % (04/04/20 0838) Vital Signs (24h Range):  Temp:  [98 °F (36.7 °C)-98.8 °F (37.1 °C)] 98 °F (36.7 °C)  Pulse:  [76-87] 87  Resp:  [18-34] 18  SpO2:  [94 %-99 %] 96 %  BP: (107-166)/(58-74) 116/58     Weight: 100.3 kg (221 lb 1.9 oz)  Body mass index is 39.17 kg/m².  No intake or output data in the 24 hours ending 04/04/20 0932   Physical Exam   Constitutional: She appears well-developed and well-nourished. No distress.   HENT:   Head: Normocephalic and atraumatic.   Eyes: Pupils are equal, round, and reactive to light. EOM are normal.   Neck: Normal range of motion. No JVD present.   Cardiovascular: Normal rate, regular rhythm and normal heart sounds.   No murmur heard.  Pulmonary/Chest: Effort normal. She has no wheezes. She has no rales. She exhibits no tenderness.   Mid to lower crackles   Abdominal: Soft. Bowel sounds " are normal. She exhibits no distension. There is no tenderness. There is no guarding.   Musculoskeletal: Normal range of motion. She exhibits no edema.   Skin: No rash noted. She is not diaphoretic.       Significant Labs: All pertinent labs within the past 24 hours have been reviewed.    Significant Imaging: I have reviewed and interpreted all pertinent imaging results/findings within the past 24 hours.      Assessment/Plan:      * 2019 novel coronavirus disease (COVID-19)  Tested positive for COVID-19 (per her report) outside testing at [facility] on [address] 3/29.  tested positive and recovering at home. On admit, hypoxic on room air, bilat infiltrates on chest xray.   COVID-19 Positive   Influenza not done   Elevated CRP, procalcitonin, CPK, d dimer, ferritin    Maintain airborne isolation / droplet / contact precautions  Supplemental oxygen to maintain SpO2 >92%,  wean down 5 to 4 L NC Os sat 94%  Avoid BiPAP to minimize aerosolization; move to intubation and mechanical ventilation  Discussed with patient if respiratory worsen, will need for intbuation/ventilation. Patient agreed for intubation/ventilation if need.     4/4/20  shortness continues to improve and stable on 4 L NC 96% this am. Continue plaquenil (day 5), albuterol inhaler.   Wean Oxygen to keep O2>90  PT/OT consult  Possible home today or tomorrow                Viral pneumonia  Secondary to novel coronavirus, management as above    HLD (hyperlipidemia)  Lab Results   Component Value Date    CHOL 224 (H) 02/14/2020    CHOL 202 (H) 02/16/2019    CHOL 199 12/22/2017     Lab Results   Component Value Date    HDL 48 02/14/2020    HDL 40 02/16/2019    HDL 35 (L) 12/22/2017     Lab Results   Component Value Date    LDLCALC 119.4 02/14/2020    LDLCALC 123.2 02/16/2019    LDLCALC 127.4 12/22/2017     Lab Results   Component Value Date    TRIG 283 (H) 02/14/2020    TRIG 194 (H) 02/16/2019    TRIG 183 (H) 12/22/2017   Continue statin      Diabetes  mellitus  HgbA1C 8.0 (2/14/2020). On Bydureon SQ weekly at home.  -SSI with meals        Essential hypertension, benign  Hold amlodipine, benazipril, clonidine as low normal side. Hydralazine prn     Anxiety  Continue as needed hydroxyzine       VTE Risk Mitigation (From admission, onward)         Ordered     enoxaparin injection 40 mg  Daily      03/29/20 1148     IP VTE HIGH RISK PATIENT  Once      03/29/20 1148                      Nesha Rojo NP  Department of Hospital Medicine   [facility]

## 2020-04-04 NOTE — ASSESSMENT & PLAN NOTE
Tested positive for COVID-19 (per her report) outside testing at [facility] on [address] 3/29.  tested positive and recovering at home. On admit, hypoxic on room air, bilat infiltrates on chest xray.   COVID-19 Positive   Influenza not done   Elevated CRP, procalcitonin, CPK, d dimer, ferritin    Maintain airborne isolation / droplet / contact precautions  Supplemental oxygen to maintain SpO2 >92%,  wean down 5 to 4 L NC Os sat 94%  Avoid BiPAP to minimize aerosolization; move to intubation and mechanical ventilation  Discussed with patient if respiratory worsen, will need for intbuation/ventilation. Patient agreed for intubation/ventilation if need.     4/4/20  shortness continues to improve and stable on 4 L NC 96% this am. Continue plaquenil (day 5), albuterol inhaler.   Wean Oxygen to keep O2>90  PT/OT consult  Possible home today or tomorrow

## 2020-04-04 NOTE — PT/OT/SLP EVAL
"Physical Therapy Evaluation    Patient Name:  Monalisa Carson   MRN:  [id number]    Recommendations:     Discharge Recommendations:  home   Discharge Equipment Recommendations: none   Barriers to discharge: None    Assessment:     Monalisa Carson is a 55 y.o. female admitted with a medical diagnosis of 2019 novel coronavirus disease (COVID-19).  She presents with the following impairments/functional limitations:  weakness, impaired balance, impaired functional mobilty, impaired endurance, gait instability, decreased lower extremity function, impaired cardiopulmonary response to activity .    Pt safely mobilizing in room to commode and to chair. Stable O2 sats on 1.5 LO2 while walking x 10 ft. Will f/u Monday to ensure no setbacks. Recommend OOB UIC as much as possible.     Rehab Prognosis: Good; patient would benefit from acute skilled PT services to address these deficits and reach maximum level of function.    Recent Surgery: * No surgery found *      Plan:     During this hospitalization, patient to be seen 5 x/week to address the identified rehab impairments via gait training, therapeutic activities, therapeutic exercises, neuromuscular re-education and progress toward the following goals:    · Plan of Care Expires:  04/18/20    Subjective     Chief Complaint: "been here a week"  Patient/Family Comments/goals: get well and go home  Pain/Comfort:  · Pain Rating 1: 0/10    Patients cultural, spiritual, Taoist conflicts given the current situation: no    Living Environment:  Home with spouse, no LUIS M. Not working.   Prior to admission, patients level of function was Ind with all.  Equipment used at home: none.  DME owned (not currently used): none.  Upon discharge, patient will have assistance from spouse.    Objective:     Communicated with RN prior to session.  Patient found HOB elevated with oxygen, peripheral IV  upon PT entry to room.    General Precautions: Standard, airborne, contact, droplet, fall, " respiratory   Orthopedic Precautions:N/A   Braces: N/A     Pt on .LO via NC throughout session with sats > 90%.     Exams:  · flat affect. cooperative with exam.   · Cognitive Exam:  Patient is oriented to Person, Place, Time and Situation  · Gross Motor Coordination:  WFL  · Postural Exam:  Patient presented with the following abnormalities:    · -       No postural abnormalities identified  · Skin Integrity/Edema:      · -       Face erythemic, and dry in patches  · RLE ROM: WFL  · RLE Strength: grossly 4+/5  · LLE ROM: WFL  · LLE Strength: grossly 4+/5    Functional Mobility:  · Bed Mobility:     · Supine to Sit: modified independence  · Transfers:     · Sit to Stand:  supervision with no AD  · Gait: ~ 5 ft to sink and then to chair with SBA; slow but steady  · Balance: fair + standing unsupported   · Supervision with BSC transfer      Therapeutic Activities and Exercises:   n/a    AM-PAC 6 CLICK MOBILITY  Total Score:24     Patient left up in chair with call button in reach and OT present.    GOALS:   Multidisciplinary Problems     Physical Therapy Goals        Problem: Physical Therapy Goal    Goal Priority Disciplines Outcome Goal Variances Interventions   Physical Therapy Goal     PT, PT/OT Ongoing, Progressing     Description:  Goals to be met by:      Patient will increase functional independence with mobility by performin. Supine to sit with Modified [place]  2. Sit to supine with Modified [place]  3. Sit to stand transfer with Modified [place]  4. Bed to chair transfer with Modified [place]   5. Gait  x 50 feet with Modified [place].   6. Stand for 5 minutes with [place]   7. Lower extremity exercise program x20 reps per handout, with independence                      History:     Past Medical History:   Diagnosis Date    Allergy     Anxiety     Diabetes mellitus     Heart murmur     Hypertension     Seizures        Past Surgical History:   Procedure  Laterality Date    CHOLECYSTECTOMY      2001 april    HYSTERECTOMY      partial  1996    OOPHORECTOMY      SHOULDER SURGERY Right     WISDOM TOOTH EXTRACTION         Time Tracking:     PT Received On: 04/04/20  PT Start Time: 1215     PT Stop Time: 1235  PT Total Time (min): 20 min     Billable Minutes: Evaluation 15      Tia Damon, PT  04/04/2020

## 2020-04-04 NOTE — SUBJECTIVE & OBJECTIVE
"Interval History: Feel "good" today and shortness continues to improve. Not moving around much. Eating breakfast in bed.     Review of Systems   Constitutional: Negative for activity change, appetite change, chills and fever.   HENT: Negative for congestion, sore throat and trouble swallowing.    Respiratory: Positive for cough (non productive now) and shortness of breath. Negative for wheezing.         Improving   Cardiovascular: Negative for chest pain, palpitations and leg swelling.   Gastrointestinal: Negative for abdominal distention, abdominal pain, constipation, diarrhea, nausea and vomiting.   Genitourinary: Negative for dysuria and hematuria.   Musculoskeletal: Negative for arthralgias, joint swelling and myalgias.   Neurological: Positive for weakness. Negative for dizziness, tremors, seizures and headaches.     Objective:     Vital Signs (Most Recent):  Temp: 98 °F (36.7 °C) (04/04/20 0838)  Pulse: 87 (04/04/20 0838)  Resp: 18 (04/04/20 0838)  BP: (!) 116/58 (04/04/20 0838)  SpO2: 96 % (04/04/20 0838) Vital Signs (24h Range):  Temp:  [98 °F (36.7 °C)-98.8 °F (37.1 °C)] 98 °F (36.7 °C)  Pulse:  [76-87] 87  Resp:  [18-34] 18  SpO2:  [94 %-99 %] 96 %  BP: (107-166)/(58-74) 116/58     Weight: 100.3 kg (221 lb 1.9 oz)  Body mass index is 39.17 kg/m².  No intake or output data in the 24 hours ending 04/04/20 0932   Physical Exam   Constitutional: She appears well-developed and well-nourished. No distress.   HENT:   Head: Normocephalic and atraumatic.   Eyes: Pupils are equal, round, and reactive to light. EOM are normal.   Neck: Normal range of motion. No JVD present.   Cardiovascular: Normal rate, regular rhythm and normal heart sounds.   No murmur heard.  Pulmonary/Chest: Effort normal. She has no wheezes. She has no rales. She exhibits no tenderness.   Mid to lower crackles   Abdominal: Soft. Bowel sounds are normal. She exhibits no distension. There is no tenderness. There is no guarding.   Musculoskeletal: " Normal range of motion. She exhibits no edema.   Skin: No rash noted. She is not diaphoretic.       Significant Labs: All pertinent labs within the past 24 hours have been reviewed.    Significant Imaging: I have reviewed and interpreted all pertinent imaging results/findings within the past 24 hours.

## 2020-04-04 NOTE — PT/OT/SLP EVAL
Occupational Therapy   Evaluation    Name: Monalisa Carson  MRN: [id number]  Admitting Diagnosis:  2019 novel coronavirus disease (COVID-19)      Recommendations:     Discharge Recommendations: (home with family)  Discharge Equipment Recommendations:  (oxygen?, pulse oximeter, long-handled sponge)  Barriers to discharge:  None    Assessment:     Monalisa Carson is a 55 y.o. female with a medical diagnosis of 2019 novel coronavirus disease (COVID-19). Performance deficits affecting function: weakness, impaired self care skills, impaired endurance, impaired cardiopulmonary response to activity.      Pleasantly motivated; ready to go home. On 1.5L NC 90-95% with SBA for in-room mobility and standing ADL. OT rec. Home with family at discharge.    Rehab Prognosis: Good; patient would benefit from acute skilled OT services to address these deficits and reach maximum level of function.       Plan:     Patient to be seen (3-5x/week) to address the above listed problems via self-care/home management, therapeutic activities, therapeutic exercises  · Plan of Care Expires: 04/18/20  · Plan of Care Reviewed with: patient    Subjective     Chief Complaint: ready to go home  Patient/Family Comments/goals: needed to use the restroom     Occupational Profile:  Living Environment: Pt lives with her  in a SS house with no concerns at entry.    Previous level of function: independent with all aspects of care   Roles and Routines: does not work; many grandchildren (3 mo-18 yo) nearby   Equipment Used at Home:  none  Assistance upon Discharge: spouse, adult children     Pain/Comfort:  · Pain Rating 1: 0/10    Patients cultural, spiritual, [employer] conflicts given the current situation: no    Objective:     Communicated with: nurseMelissa, prior to session.  Patient found HOB elevated with peripheral IV, oxygen, telemetry upon OT entry to room.    General Precautions: Standard, airborne, droplet, fall, special contact,  respiratory(Covid-19+)   Orthopedic Precautions:N/A   Braces: N/A     Occupational Performance:    Bed Mobility:    · Patient completed Scooting/Bridging with stand by assistance  · Patient completed Supine to Sit with stand by assistance    Functional Mobility/Transfers:  · Patient completed Sit <> Stand Transfer with stand by assistance  with  no assistive device   · Patient completed Bed <> Chair Transfer using Step Transfer technique with stand by assistance with no assistive device  · Patient completed Toilet Transfer Step Transfer technique with stand by assistance with  no AD and bedside commode  · Functional Mobility: Pt ambulated within the room and stood at the sink for standing ADL with SBA and no AD.     Activities of Daily Living:  · Grooming: stand by assistance standing at the sink to wash her hands  · Toileting: stand by assistance while pt stood to complete pericare herself     Cognitive/Visual Perceptual:  Cognitive/Psychosocial Skills:     -       Oriented to: Person, Place, Time and Situation   -       Follows Commands/attention:Follows multistep  commands  -       Communication: clear/fluent  -       Memory: No Deficits noted  -       Safety awareness/insight to disability: intact   -       Mood/Affect/Coping skills/emotional control: Appropriate to situation  Visual/Perceptual:      -Intact  R/L discrimination      Physical Exam:  Balance:    -       seated: MOD I; standing: SBA  Postural examination/scapula alignment:    -       Rounded shoulders  -       Forward head  Skin integrity: Visible skin intact  Edema:  no BUE edema noted  Dominant hand:    -       Right  Upper Extremity Range of Motion:     -       Right Upper Extremity: WFL  -       Left Upper Extremity: WFL  Upper Extremity Strength:    -       Right Upper Extremity: WFL  -       Left Upper Extremity: WFL   Strength:    -       Right Upper Extremity: WFL  -       Left Upper Extremity: WFL  Fine Motor Coordination:    -        Intact  Gross motor coordination:   WFL    AMPAC 6 Click ADL:  AMPAC Total Score: 22    Treatment & Education:  · Pt educated on OT role/POC.   · Importance of sitting upright in the chair throughout the day; pt understands in-room safety   · Safety during functional t/f and mobility   · Edu pt on increasing her functional activity tolerance safely each day; encouraged pt to use a pulse oximeter to monitor her O2 levels and to take a pursed lip breathing rest break seated if her O2 gets to 90%.   · OT demo and pt demo back BUE AROM to complete 2x12 reps daily (forward punches, elbow flexion/extension, shoulder flexion/extension)  · Multiple self-care tasks/functional mobility completed- assistance level noted above   · All questions/concerns answered within OT scope of practice     Education:    Patient left up in chair with all lines intact, call button in reach, nurseMelissa, notified and all needs within reach    GOALS:   Multidisciplinary Problems     Occupational Therapy Goals        Problem: Occupational Therapy Goal    Goal Priority Disciplines Outcome Interventions   Occupational Therapy Goal     OT, PT/OT Ongoing, Progressing    Description:  Goals to be met by: 04/18/20    Patient will increase functional independence with ADLs by performing:    LE Dressing with Stand-by Assistance.  Grooming while standing at sink with Supervision.  Toileting from toilet with Supervision for hygiene and clothing management.   Supine to sit with Supervision.  Step transfer with Supervision  Toilet transfer to toilet with Supervision.                      History:     Past Medical History:   Diagnosis Date    Allergy     Anxiety     Diabetes mellitus     Heart murmur     Hypertension     Seizures        Past Surgical History:   Procedure Laterality Date    CHOLECYSTECTOMY      2001 april    HYSTERECTOMY      partial  1996    OOPHORECTOMY      SHOULDER SURGERY Right     WISDOM TOOTH EXTRACTION         Time  Tracking:     OT Date of Treatment: 04/04/20  OT Start Time: 1215  OT Stop Time: 1238  OT Total Time (min): 23 min    Billable Minutes:Evaluation 15 min  Self Care/Home Management 8 min  Total Time 23 min (co-eval with PT)    Patsy Augustin, BELLA  4/4/2020

## 2020-04-05 VITALS
SYSTOLIC BLOOD PRESSURE: 117 MMHG | HEIGHT: 63 IN | RESPIRATION RATE: 16 BRPM | DIASTOLIC BLOOD PRESSURE: 65 MMHG | BODY MASS INDEX: 39.18 KG/M2 | HEART RATE: 87 BPM | OXYGEN SATURATION: 97 % | WEIGHT: 221.13 LBS | TEMPERATURE: 98 F

## 2020-04-05 LAB
POCT GLUCOSE: 195 MG/DL (ref 70–110)
POCT GLUCOSE: 205 MG/DL (ref 70–110)

## 2020-04-05 PROCEDURE — 25000003 PHARM REV CODE 250: Performed by: EMERGENCY MEDICINE

## 2020-04-05 RX ADMIN — HYDROXYCHLOROQUINE SULFATE 400 MG: 200 TABLET, FILM COATED ORAL at 08:04

## 2020-04-05 RX ADMIN — ROSUVASTATIN CALCIUM 20 MG: 10 TABLET, COATED ORAL at 08:04

## 2020-04-05 RX ADMIN — ESCITALOPRAM OXALATE 20 MG: 10 TABLET ORAL at 08:04

## 2020-04-05 RX ADMIN — ALBUTEROL SULFATE 2 PUFF: 90 AEROSOL, METERED RESPIRATORY (INHALATION) at 06:04

## 2020-04-05 RX ADMIN — ALBUTEROL SULFATE 2 PUFF: 90 AEROSOL, METERED RESPIRATORY (INHALATION) at 03:04

## 2020-04-05 RX ADMIN — LEVETIRACETAM 1000 MG: 500 TABLET, FILM COATED ORAL at 08:04

## 2020-04-05 RX ADMIN — ASPIRIN 81 MG 81 MG: 81 TABLET ORAL at 08:04

## 2020-04-05 NOTE — PLAN OF CARE
"   04/05/20 0820   Final Note   Assessment Type Final Discharge Note   Anticipated Discharge Disposition Home   Hospital Follow Up  Appt(s) scheduled? Yes   Discharge plans and expectations educations in teach back method with documentation complete? Yes   Right Care Referral Info   Post Acute Recommendation No Care   Post-Acute Status   Post-Acute Authorization Other   Other Status No Post-Acute Service Needs   pts nurse Josefina notified that the pt can d/c from CM standpoint.    EDUCATION:  provided with educational information on COVID-19.  Information reviewed and placed in :My Healthcare Packet" to be brought home  to use as resource after discharge.  Information included:  signs and symptoms to look for and call the doctor if experiencing, and symptoms that may indicate a medical emergency: CALL 911.      All questions answered.  Teach back method used.    Patient stated, " will be sure to answer for scheduled telephone appointment and take medications as prescribed ".        "

## 2020-04-05 NOTE — NURSING
Pt discharged home.  Pt teaching and dc instructions given.  Pt verbalized understanding.  Tele removed.  Iv removed left forearm tip intact. Pt tolerated well. Surgical mask placed on patient.  Awaiting transport to car.

## 2020-04-05 NOTE — PROGRESS NOTES
Bedside report given to oncoming nurse, NAD noted. Safety maintained, Call light within reach.     24 hr chart check completed.

## 2020-04-05 NOTE — DISCHARGE SUMMARY
[facility] Medicine  Discharge Summary      Patient Name: Monalisa Carson  MRN: [id number]  Admission Date: 3/29/2020  Hospital Length of Stay: 7 days  Discharge Date and Time:  04/05/2020 11:07 AM  Attending Physician: No att. providers found   Discharging Provider: Nesha Rojo NP  Primary Care Provider: Alisha See MD      HPI:   Mrs. Monalisa Carson is a 55 y.o female with T2DM, hypertension, heart murmur, and anxiety who presents to the hospital with complaints of fever (102F), cough for 1 week and SOB for the last 2 days. Patient reports she went to the [facility] ED at [facility] 7 days ago because of fever and cough after got tested for COVID-19 via [facility]. She was discharged home with albuterol inhaler. Patient decided to go to hospital today due to increasing SOB for the last 2 days. Patient reports her  at home also having cough but not fever. No recent travel. Also reports associated symptoms are nausea/vomit and diarrhea. Denies other symptoms. Reports taking Tylenol for fever at home. Patient reports she is positive for COVID-19 which done on 3/22/2020    In ED, temp was 102, spO2 91% in RA then 92-94% on 4L oxygen, tachypnea 22-27; NSR on EKG; Chest xray reveals subtle patchy consolidation within mid left lung and linear opacities in right lung; Labs with unremarkable CBC, CMP with mild hyponatremia (Na 132); mild elevated AST/ALT 78/53; elevated CRP 62.1; Troponin normal at 0.023; normal Procalcitonin 0.11    Patient is admitted and COVID-19 protocol is initiated        * No surgery found *      Hospital Course:   Patient admitted with flu symptoms. Spouse + covid.  Patient had testing done at [facility].  State dept will not release records.  Retested on admission which came back positive. Initially required Oxygen  5 liter NC then able to wean down slowly to room air.  Patient shortness of breath also slowly improved during hospital stay.   Patient completed Plaquenil course 5 days.  PT/OT evaluation completed.  No indication for home health. Continue to  Hold antihypertensives. O2 saturation 95 -97% on room air on discharge.  Patient ready and stable for discharge home. Instruct  if respiratory symptoms worsen to return to hospital. Instruct self and family/close contact self quarantine x 2 weeks and at least 72 hrs after symptoms resolved.          Consults:     No new Assessment & Plan notes have been filed under this hospital service since the last note was generated.  Service: Hospital Medicine    Final Active Diagnoses:    Diagnosis Date Noted POA    PRINCIPAL PROBLEM:  2019 novel coronavirus disease (COVID-19) [U07.1] 03/29/2020 Yes    Viral pneumonia [J12.9] 03/29/2020 Yes    HLD (hyperlipidemia) [E78.5] 07/19/2013 Yes    Diabetes mellitus [E11.9] 09/27/2012 Yes    Anxiety [F41.9] 09/27/2012 Yes    Essential hypertension, benign [I10] 09/27/2012 Yes      Problems Resolved During this Admission:       Discharged Condition: good    Disposition: Home or Self Care    Follow Up:  Follow-up Information     Alisha See MD On 4/11/2020.    Specialty:  Family Medicine  Why:  Outpatient Services, hospital follow-up appointment by phone @ 10:40AM.  Contact information:  [facility] [address] [place] [address] [phone]                 Patient Instructions:      Notify your health care provider if you experience any of the following:  temperature >100.4     Notify your health care provider if you experience any of the following:  difficulty breathing or increased cough     Notify your health care provider if you experience any of the following:  increased confusion or weakness     Activity as tolerated       Significant Diagnostic Studies: Labs: All labs within the past 24 hours have been reviewed    Pending Diagnostic Studies:     None         Medications:  Reconciled Home Medications:      Medication List      CONTINUE taking these  "medications    acetaminophen 500 MG tablet  Commonly known as:  TYLENOL  Take 2 tablets (1,000 mg total) by mouth every 6 (six) hours as needed for Pain.     albuterol 90 mcg/actuation inhaler  Commonly known as:  PROVENTIL/VENTOLIN HFA  Inhale 1-2 puffs into the lungs every 4 (four) hours as needed for Wheezing. Rescue     aspirin 81 MG Chew  Take 1 tablet (81 mg total) by mouth once daily.     azelastine 137 mcg (0.1 %) nasal spray  Commonly known as:  ASTELIN  2 sprays (274 mcg total) by Nasal route 2 (two) times daily.     escitalopram oxalate 20 MG tablet  Commonly known as:  LEXAPRO  Take 1 tablet (20 mg total) by mouth once daily.     exenatide microspheres 2 mg ER injection suspension  Commonly known as:  BYDUREON  Inject 2 mg into the skin every 7 days.     fluticasone propionate 50 mcg/actuation nasal spray  Commonly known as:  FLONASE  2 sprays (100 mcg total) by Each Nostril route once daily.     hydrocortisone 2.5 % cream  WOLFGANG EXT AA BID     hydroxyzine HCL 25 MG tablet  Commonly known as:  ATARAX  Take 1 tablet (25 mg total) by mouth 3 (three) times daily as needed for Anxiety.     levETIRAcetam 1000 MG tablet  Commonly known as:  KEPPRA  Take 1 tablet (1,000 mg total) by mouth 2 (two) times daily.     lidocaine 4 % cream  Commonly known as:  LMX  Apply topically as needed.     loratadine 10 mg tablet  Commonly known as:  CLARITIN  Take 1 tablet (10 mg total) by mouth once daily.     nortriptyline 50 MG capsule  Commonly known as:  PAMELOR  Take 1 capsule (50 mg total) by mouth every evening.     ondansetron 8 MG Tbdl  Commonly known as:  ZOFRAN-ODT  Take 1 tablet (8 mg total) by mouth every 12 (twelve) hours as needed.     pen needle, diabetic 31 gauge x 5/16" Ndle  For use with bydureon     rosuvastatin 20 MG tablet  Commonly known as:  CRESTOR  Take 1 tablet (20 mg total) by mouth once daily.     tiZANidine 2 MG tablet  Commonly known as:  ZANAFLEX  Take 1 tablet (2 mg total) by mouth every 8 " (eight) hours as needed.     zolpidem 10 mg Tab  Commonly known as:  AMBIEN  TAKE 1 TABLET BY MOUTH EVERY NIGHT AT BEDTIME        STOP taking these medications    amLODIPine 10 MG tablet  Commonly known as:  NORVASC     benazepriL 40 MG tablet  Commonly known as:  LOTENSIN     cloNIDine 0.1 MG tablet  Commonly known as:  CATAPRES     hydroCHLOROthiazide 25 MG tablet  Commonly known as:  HYDRODIURIL     ibuprofen 600 MG tablet  Commonly known as:  ADVIL,MOTRIN            Indwelling Lines/Drains at time of discharge:   Lines/Drains/Airways     None                 Time spent on the discharge of patient: 35 minutes  Patient was seen and examined on the date of discharge and determined to be suitable for discharge.         Nesha Rojo NP  Department of Hospital Medicine  [facility]

## 2020-04-06 ENCOUNTER — TELEPHONE (OUTPATIENT)
Dept: FAMILY MEDICINE | Facility: CLINIC | Age: 55
End: 2020-04-06

## 2020-04-06 DIAGNOSIS — G47.00 INSOMNIA, UNSPECIFIED TYPE: ICD-10-CM

## 2020-04-06 DIAGNOSIS — R06.2 WHEEZING: ICD-10-CM

## 2020-04-06 NOTE — TELEPHONE ENCOUNTER
Patient was scheduled for 4/11/2020 need to be reschedule. Pt reschedule. [facility] visit [facility].

## 2020-04-07 RX ORDER — ALBUTEROL SULFATE 90 UG/1
1-2 AEROSOL, METERED RESPIRATORY (INHALATION) EVERY 4 HOURS PRN
Qty: 18 G | Refills: 11 | Status: SHIPPED | OUTPATIENT
Start: 2020-04-07 | End: 2020-04-09 | Stop reason: SDUPTHER

## 2020-04-07 RX ORDER — ZOLPIDEM TARTRATE 10 MG/1
TABLET ORAL
Qty: 30 TABLET | Refills: 2 | Status: SHIPPED | OUTPATIENT
Start: 2020-04-07 | End: 2020-07-02

## 2020-04-07 RX ORDER — HYDROCORTISONE 25 MG/G
CREAM TOPICAL
Qty: 28 G | Refills: 0 | Status: SHIPPED | OUTPATIENT
Start: 2020-04-07 | End: 2020-08-05 | Stop reason: SDUPTHER

## 2020-04-08 ENCOUNTER — TELEPHONE (OUTPATIENT)
Dept: FAMILY MEDICINE | Facility: CLINIC | Age: 55
End: 2020-04-08

## 2020-04-08 NOTE — TELEPHONE ENCOUNTER
Spoke with patient staid that she had trouble yesterday with virtual visit , patient agree to schedule an Audio visit , patient verbalized understand .

## 2020-04-08 NOTE — TELEPHONE ENCOUNTER
----- Message from Antonella Octavio sent at 4/7/2020 10:58 AM CDT -----  Contact: pt  Type: Patient Call Back    Who calledpt    What is the request in detail:pt is having trouble with varsha for virtual appt. Call pt    Can the clinic reply by [facility]?    Would the patient rather a call back or a response via [facility]? call    Best call back number:[phone]    Additional Information:

## 2020-04-09 ENCOUNTER — VITALS (OUTPATIENT)
Dept: FAMILY MEDICINE | Facility: CLINIC | Age: 55
End: 2020-04-09
Payer: COMMERCIAL

## 2020-04-09 ENCOUNTER — OFFICE VISIT (OUTPATIENT)
Dept: FAMILY MEDICINE | Facility: CLINIC | Age: 55
End: 2020-04-09
Payer: COMMERCIAL

## 2020-04-09 VITALS — OXYGEN SATURATION: 95 % | HEART RATE: 88 BPM | RESPIRATION RATE: 18 BRPM

## 2020-04-09 DIAGNOSIS — U07.1 PNEUMONIA DUE TO COVID-19 VIRUS: ICD-10-CM

## 2020-04-09 DIAGNOSIS — R06.2 WHEEZING: ICD-10-CM

## 2020-04-09 DIAGNOSIS — U07.1 PNEUMONIA DUE TO COVID-19 VIRUS: Primary | ICD-10-CM

## 2020-04-09 DIAGNOSIS — J12.82 PNEUMONIA DUE TO COVID-19 VIRUS: ICD-10-CM

## 2020-04-09 DIAGNOSIS — J12.82 PNEUMONIA DUE TO COVID-19 VIRUS: Primary | ICD-10-CM

## 2020-04-09 PROCEDURE — 99214 OFFICE O/P EST MOD 30 MIN: CPT | Mod: 95,,, | Performed by: FAMILY MEDICINE

## 2020-04-09 PROCEDURE — 99214 PR OFFICE/OUTPT VISIT, EST, LEVL IV, 30-39 MIN: ICD-10-PCS | Mod: 95,,, | Performed by: FAMILY MEDICINE

## 2020-04-09 PROCEDURE — 99999 PR PBB SHADOW E&M-EST. PATIENT-LVL I: ICD-10-PCS | Mod: PBBFAC,,,

## 2020-04-09 PROCEDURE — 99999 PR PBB SHADOW E&M-EST. PATIENT-LVL I: CPT | Mod: PBBFAC,,,

## 2020-04-09 RX ORDER — ALBUTEROL SULFATE 90 UG/1
1-2 AEROSOL, METERED RESPIRATORY (INHALATION) EVERY 4 HOURS PRN
Qty: 18 G | Refills: 11 | Status: SHIPPED | OUTPATIENT
Start: 2020-04-09 | End: 2022-02-17 | Stop reason: SDUPTHER

## 2020-04-09 NOTE — PROGRESS NOTES
[facility] FAMILY MEDICINE  Drive Thru Respiratory Check    Date: 04/09/2020  Referred by: Dr. Alisha See    Narrative of symptoms: sob. Discharged from hosp. States it is about the same.    Vitals:    Pulse: 88    Pulse oximetry (on room air): 95 %.    Respirations: 18    Patient Assessment: did a 2 min walk test. o2 decreased to 93% but pt was sob. Advised caution to patient. Advised pt if sob worsens she may need to go to the ED for an eval.

## 2020-04-09 NOTE — PROGRESS NOTES
Subjective:       Patient ID: Monalisa Carson is a 55 y.o. female.    Chief Complaint: No chief complaint on file.    HPI:  Here for follow up hospital discharge 4 days ago for Covid infection with pneumonia.  Patient was never placed on Bipap or intubated.  No home O2.  She continues to report a cough and shortness with minimal exertion.  SOB unchanged since discharge.  She has been using her mothers oxygen at home.  States BP was discontinued.  /68 at home. Denies wheezing at present  Review of Systems   Constitutional: Positive for appetite change. Negative for fatigue.   Respiratory: Positive for cough, shortness of breath and wheezing.    Cardiovascular: Negative for chest pain.       Objective:    /78  Physical Exam    Assessment:       1. Pneumonia due to COVID-19 virus        Plan:       Diagnoses and all orders for this visit:    Pneumonia due to COVID-19 virus  No change in respiratory status since discharge.  Will have patient come for pulse ox and ambulation test today to further assess her condition.  -     COVID-19 Respiratory Check; Future    The patient location ishome  The chief complaint leading to consultation is: covid hospital follow up  Visit type: Virtual visit with synchronous audio only  Total time spent with patient: 11 min  Each patient to whom he or she provides medical services by telemedicine is:  (1) informed of the relationship between the physician and patient and the respective role of any other health care provider with respect to management of the patient; and (2) notified that he or she may decline to receive medical services by telemedicine and may withdraw from such care at any time.

## 2020-04-16 ENCOUNTER — PATIENT MESSAGE (OUTPATIENT)
Dept: FAMILY MEDICINE | Facility: CLINIC | Age: 55
End: 2020-04-16

## 2020-04-16 RX ORDER — BENZONATATE 100 MG/1
100 CAPSULE ORAL 3 TIMES DAILY PRN
Qty: 30 CAPSULE | Refills: 0 | Status: SHIPPED | OUTPATIENT
Start: 2020-04-16 | End: 2020-04-26

## 2020-04-23 ENCOUNTER — PATIENT MESSAGE (OUTPATIENT)
Dept: FAMILY MEDICINE | Facility: CLINIC | Age: 55
End: 2020-04-23

## 2020-05-06 RX ORDER — METFORMIN HYDROCHLORIDE 500 MG/1
TABLET, EXTENDED RELEASE ORAL
Qty: 360 TABLET | Refills: 0 | OUTPATIENT
Start: 2020-05-06

## 2020-05-07 DIAGNOSIS — E11.9 TYPE 2 DIABETES MELLITUS WITHOUT COMPLICATION: ICD-10-CM

## 2020-05-20 ENCOUNTER — PATIENT OUTREACH (OUTPATIENT)
Dept: ADMINISTRATIVE | Facility: HOSPITAL | Age: 55
End: 2020-05-20

## 2020-05-25 ENCOUNTER — LAB VISIT (OUTPATIENT)
Dept: LAB | Facility: HOSPITAL | Age: 55
End: 2020-05-25
Attending: FAMILY MEDICINE
Payer: COMMERCIAL

## 2020-05-25 LAB
ALBUMIN SERPL BCP-MCNC: 4.1 G/DL (ref 3.5–5.2)
ALP SERPL-CCNC: 74 U/L (ref 55–135)
ALT SERPL W/O P-5'-P-CCNC: 53 U/L (ref 10–44)
ANION GAP SERPL CALC-SCNC: 10 MMOL/L (ref 8–16)
AST SERPL-CCNC: 33 U/L (ref 10–40)
BILIRUB SERPL-MCNC: 0.6 MG/DL (ref 0.1–1)
BUN SERPL-MCNC: 12 MG/DL (ref 6–20)
CALCIUM SERPL-MCNC: 10.3 MG/DL (ref 8.7–10.5)
CHLORIDE SERPL-SCNC: 101 MMOL/L (ref 95–110)
CO2 SERPL-SCNC: 26 MMOL/L (ref 23–29)
CREAT SERPL-MCNC: 0.9 MG/DL (ref 0.5–1.4)
EST. GFR  (AFRICAN AMERICAN): >60 ML/MIN/1.73 M^2
EST. GFR  (NON AFRICAN AMERICAN): >60 ML/MIN/1.73 M^2
GLUCOSE SERPL-MCNC: 120 MG/DL (ref 70–110)
POTASSIUM SERPL-SCNC: 4.4 MMOL/L (ref 3.5–5.1)
PROT SERPL-MCNC: 8.8 G/DL (ref 6–8.4)
SODIUM SERPL-SCNC: 137 MMOL/L (ref 136–145)

## 2020-05-25 PROCEDURE — 83036 HEMOGLOBIN GLYCOSYLATED A1C: CPT

## 2020-05-25 PROCEDURE — 36415 COLL VENOUS BLD VENIPUNCTURE: CPT | Mod: PO

## 2020-05-25 PROCEDURE — 80053 COMPREHEN METABOLIC PANEL: CPT

## 2020-05-26 LAB
ESTIMATED AVG GLUCOSE: 157 MG/DL (ref 68–131)
HBA1C MFR BLD HPLC: 7.1 % (ref 4–5.6)

## 2020-05-27 ENCOUNTER — OFFICE VISIT (OUTPATIENT)
Dept: FAMILY MEDICINE | Facility: CLINIC | Age: 55
End: 2020-05-27
Payer: COMMERCIAL

## 2020-05-27 VITALS
TEMPERATURE: 98 F | RESPIRATION RATE: 20 BRPM | DIASTOLIC BLOOD PRESSURE: 78 MMHG | BODY MASS INDEX: 37.57 KG/M2 | HEIGHT: 63 IN | WEIGHT: 212.06 LBS | HEART RATE: 88 BPM | SYSTOLIC BLOOD PRESSURE: 124 MMHG

## 2020-05-27 DIAGNOSIS — E11.22 CONTROLLED TYPE 2 DIABETES MELLITUS WITH STAGE 2 CHRONIC KIDNEY DISEASE, WITHOUT LONG-TERM CURRENT USE OF INSULIN: ICD-10-CM

## 2020-05-27 DIAGNOSIS — E66.01 SEVERE OBESITY (BMI 35.0-39.9) WITH COMORBIDITY: ICD-10-CM

## 2020-05-27 DIAGNOSIS — G43.009 MIGRAINE WITHOUT AURA AND WITHOUT STATUS MIGRAINOSUS, NOT INTRACTABLE: Primary | ICD-10-CM

## 2020-05-27 DIAGNOSIS — N18.2 CONTROLLED TYPE 2 DIABETES MELLITUS WITH STAGE 2 CHRONIC KIDNEY DISEASE, WITHOUT LONG-TERM CURRENT USE OF INSULIN: ICD-10-CM

## 2020-05-27 PROBLEM — I74.9 TIA DUE TO EMBOLISM: Status: RESOLVED | Noted: 2019-02-15 | Resolved: 2020-05-27

## 2020-05-27 PROBLEM — G45.9 TIA DUE TO EMBOLISM: Status: RESOLVED | Noted: 2019-02-15 | Resolved: 2020-05-27

## 2020-05-27 PROBLEM — B34.9 ACUTE VIRAL SYNDROME: Status: RESOLVED | Noted: 2020-03-22 | Resolved: 2020-05-27

## 2020-05-27 PROBLEM — J12.9 VIRAL PNEUMONIA: Status: RESOLVED | Noted: 2020-03-29 | Resolved: 2020-05-27

## 2020-05-27 PROCEDURE — 3074F SYST BP LT 130 MM HG: CPT | Mod: CPTII,S$GLB,, | Performed by: FAMILY MEDICINE

## 2020-05-27 PROCEDURE — 2024F PR 7 FIELD PHOTOS WITH INTERP/ REVIEW: ICD-10-PCS | Mod: S$GLB,,, | Performed by: FAMILY MEDICINE

## 2020-05-27 PROCEDURE — 99999 PR PBB SHADOW E&M-EST. PATIENT-LVL III: CPT | Mod: PBBFAC,,, | Performed by: FAMILY MEDICINE

## 2020-05-27 PROCEDURE — 3051F HG A1C>EQUAL 7.0%<8.0%: CPT | Mod: CPTII,S$GLB,, | Performed by: FAMILY MEDICINE

## 2020-05-27 PROCEDURE — 99214 OFFICE O/P EST MOD 30 MIN: CPT | Mod: S$GLB,,, | Performed by: FAMILY MEDICINE

## 2020-05-27 PROCEDURE — 3074F PR MOST RECENT SYSTOLIC BLOOD PRESSURE < 130 MM HG: ICD-10-PCS | Mod: CPTII,S$GLB,, | Performed by: FAMILY MEDICINE

## 2020-05-27 PROCEDURE — 99214 PR OFFICE/OUTPT VISIT, EST, LEVL IV, 30-39 MIN: ICD-10-PCS | Mod: S$GLB,,, | Performed by: FAMILY MEDICINE

## 2020-05-27 PROCEDURE — 3078F PR MOST RECENT DIASTOLIC BLOOD PRESSURE < 80 MM HG: ICD-10-PCS | Mod: CPTII,S$GLB,, | Performed by: FAMILY MEDICINE

## 2020-05-27 PROCEDURE — 99999 PR PBB SHADOW E&M-EST. PATIENT-LVL III: ICD-10-PCS | Mod: PBBFAC,,, | Performed by: FAMILY MEDICINE

## 2020-05-27 PROCEDURE — 3008F PR BODY MASS INDEX (BMI) DOCUMENTED: ICD-10-PCS | Mod: CPTII,S$GLB,, | Performed by: FAMILY MEDICINE

## 2020-05-27 PROCEDURE — 3008F BODY MASS INDEX DOCD: CPT | Mod: CPTII,S$GLB,, | Performed by: FAMILY MEDICINE

## 2020-05-27 PROCEDURE — 3078F DIAST BP <80 MM HG: CPT | Mod: CPTII,S$GLB,, | Performed by: FAMILY MEDICINE

## 2020-05-27 PROCEDURE — 3051F PR MOST RECENT HEMOGLOBIN A1C LEVEL 7.0 - < 8.0%: ICD-10-PCS | Mod: CPTII,S$GLB,, | Performed by: FAMILY MEDICINE

## 2020-05-27 PROCEDURE — 2024F 7 FLD RTA PHOTO EVC RTNOPTHY: CPT | Mod: S$GLB,,, | Performed by: FAMILY MEDICINE

## 2020-05-27 RX ORDER — ONDANSETRON 8 MG/1
8 TABLET, ORALLY DISINTEGRATING ORAL EVERY 12 HOURS PRN
Qty: 30 TABLET | Refills: 0 | OUTPATIENT
Start: 2020-05-27 | End: 2021-02-19

## 2020-05-27 RX ORDER — TOPIRAMATE 50 MG/1
TABLET, FILM COATED ORAL
Qty: 163 TABLET | Refills: 0 | Status: SHIPPED | OUTPATIENT
Start: 2020-05-27 | End: 2020-09-28

## 2020-05-27 NOTE — PROGRESS NOTES
Subjective:       Patient ID: Monalisa Carson is a 55 y.o. female.    Chief Complaint: Hyperlipidemia; Hypertension; and Diabetes    HPI:  Patient with a history of migraines and presents with persistent left sided headaches, nausea and vomiting since covid.  Also reports itching of chest. Diabetes improved.  Weight loss 14 lbs.  Has been exercising.  Review of Systems   Constitutional: Negative for fatigue.   Cardiovascular: Negative for chest pain.   Endocrine: Negative for polydipsia, polyphagia and polyuria.   Skin: Negative for pallor.   Neurological: Negative for dizziness, tremors, seizures, speech difficulty, weakness and headaches.   Psychiatric/Behavioral: Negative for confusion. The patient is not nervous/anxious.        Objective:      Physical Exam   Constitutional: She appears well-developed and well-nourished.   HENT:   Head: Normocephalic and atraumatic.   Neck: Normal range of motion. Neck supple.   Cardiovascular: Normal rate and regular rhythm.   Pulmonary/Chest: Effort normal and breath sounds normal.   Abdominal: Soft. Bowel sounds are normal. There is no tenderness.   Musculoskeletal: She exhibits no edema or deformity.   Neurological: She is alert.   Skin: Skin is warm and dry.   Psychiatric: She has a normal mood and affect.       Lab Results   Component Value Date    HGBA1C 7.1 (H) 05/25/2020     Assessment:       1. Migraine without aura and without status migrainosus, not intractable    2. Partial symptomatic epilepsy with complex partial seizures, not intractable, without status epilepticus    3. Tension headache        Plan:       Monalisa was seen today for hyperlipidemia, hypertension and diabetes.    Diagnoses and all orders for this visit:    Migraine without aura and without status migrainosus, not intractable  -     ondansetron (ZOFRAN-ODT) 8 MG TbDL; Take 1 tablet (8 mg total) by mouth every 12 (twelve) hours as needed.  -     topiramate (TOPAMAX) 50 MG tablet; Take 0.5  tablets (25 mg total) by mouth once daily for 7 days, THEN 1 tablet (50 mg total) once daily for 7 days, THEN 1 tablet (50 mg total) 2 (two) times daily.    Severe obesity (BMI 35.0-39.9) with comorbidity  Continue weight loss    Controlled type 2 diabetes mellitus with stage 2 chronic kidney disease, without long-term current use of insulin  Diabetes stable

## 2020-05-27 NOTE — PROGRESS NOTES
Answers for HPI/ROS submitted by the patient on 5/23/2020   Diabetes problem  Diabetes type: type 2  MedicAlert ID: No  Disease duration: 3 years  blurred vision: No  chest pain: No  fatigue: No  foot paresthesias: No  foot ulcerations: No  polydipsia: No  polyphagia: No  polyuria: No  visual change: No  weakness: No  weight loss: No  Symptom course: improving  confusion: No  dizziness: No  headaches: No  hunger: No  mood changes: No  nervous/anxious: No  pallor: No  seizures: No  sleepiness: No  speech difficulty: No  sweats: No  tremors: No  blackouts: No  hospitalization: No  nocturnal hypoglycemia: No  required assistance: No  required glucagon: No  CVA: No  heart disease: No  impotence: No  nephropathy: No  peripheral neuropathy: No  PVD: No  retinopathy: No  autonomic neuropathy: No  CAD risks: dyslipidemia, hypertension, stress  Current treatments: insulin injections, oral agent (monotherapy)  Treatment compliance: all of the time  Given by: patient  Injection sites: abdominal wall  Monitoring compliance: good  Blood glucose trend: decreasing steadily  Weight trend: decreasing steadily  Dietitian visit: No  Eye exam current: No  Sees podiatrist: Yes

## 2020-06-19 ENCOUNTER — TELEPHONE (OUTPATIENT)
Dept: NEUROLOGY | Facility: CLINIC | Age: 55
End: 2020-06-19

## 2020-06-19 NOTE — TELEPHONE ENCOUNTER
"Received completed medical examination report form for  medical certification.  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  She continues to have L hemifacial spasm, but the events in 2/2019 are suspected to have been associated with loss of consciousness and therefore presumed to be epileptic in origin.  I tried to call pt to discuss further, but voicemail full.  No answer at second number.    This is my last day in this clinic, so I will ask MA to schedule pt for f/u in clinic with another provider based on these findings.  At this time, I will defer to the provided medical examiner determination made by Nathanael YORK on 3/2/20.    Humberto Fine MD    "

## 2020-06-22 NOTE — TELEPHONE ENCOUNTER
Inform patient I neurologist, Dr. Fine in agreement with employers MD.  I will complete form for disability.

## 2020-06-25 ENCOUNTER — TELEPHONE (OUTPATIENT)
Dept: FAMILY MEDICINE | Facility: CLINIC | Age: 55
End: 2020-06-25

## 2020-06-25 NOTE — TELEPHONE ENCOUNTER
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 NO     Would the patient rather a call back or a response via [facility]? [facility]    Best call back number: [phone]    Additional Information:

## 2020-07-08 ENCOUNTER — OFFICE VISIT (OUTPATIENT)
Dept: OPTOMETRY | Facility: CLINIC | Age: 55
End: 2020-07-08
Payer: COMMERCIAL

## 2020-07-08 DIAGNOSIS — H52.7 REFRACTIVE ERROR: ICD-10-CM

## 2020-07-08 DIAGNOSIS — Z46.0 ENCOUNTER FOR FITTING OR ADJUSTMENT OF SPECTACLES OR CONTACT LENSES: Primary | ICD-10-CM

## 2020-07-08 DIAGNOSIS — E11.9 TYPE 2 DIABETES MELLITUS WITHOUT RETINOPATHY: Primary | ICD-10-CM

## 2020-07-08 LAB
LEFT EYE DM RETINOPATHY: NEGATIVE
RIGHT EYE DM RETINOPATHY: NEGATIVE

## 2020-07-08 PROCEDURE — 92310 PR CONTACT LENS FITTING (NO CHANGE): ICD-10-PCS | Mod: CSM,,, | Performed by: OPTOMETRIST

## 2020-07-08 PROCEDURE — 92310 CONTACT LENS FITTING OU: CPT | Mod: CSM,,, | Performed by: OPTOMETRIST

## 2020-07-08 PROCEDURE — 99499 NO LOS: ICD-10-PCS | Mod: S$GLB,,, | Performed by: OPTOMETRIST

## 2020-07-08 PROCEDURE — 92014 COMPRE OPH EXAM EST PT 1/>: CPT | Mod: S$GLB,,, | Performed by: OPTOMETRIST

## 2020-07-08 PROCEDURE — 99499 UNLISTED E&M SERVICE: CPT | Mod: S$GLB,,, | Performed by: OPTOMETRIST

## 2020-07-08 PROCEDURE — 92015 PR REFRACTION: ICD-10-PCS | Mod: S$GLB,,, | Performed by: OPTOMETRIST

## 2020-07-08 PROCEDURE — 99999 PR PBB SHADOW E&M-EST. PATIENT-LVL III: ICD-10-PCS | Mod: PBBFAC,,, | Performed by: OPTOMETRIST

## 2020-07-08 PROCEDURE — 92014 PR EYE EXAM, EST PATIENT,COMPREHESV: ICD-10-PCS | Mod: S$GLB,,, | Performed by: OPTOMETRIST

## 2020-07-08 PROCEDURE — 92015 DETERMINE REFRACTIVE STATE: CPT | Mod: S$GLB,,, | Performed by: OPTOMETRIST

## 2020-07-08 PROCEDURE — 99999 PR PBB SHADOW E&M-EST. PATIENT-LVL III: CPT | Mod: PBBFAC,,, | Performed by: OPTOMETRIST

## 2020-07-08 NOTE — PROGRESS NOTES
Subjective:       Patient ID: Monalisa THOMPSON Favorite is a 55 y.o. female      Chief Complaint   Patient presents with    Concerns About Ocular Health    Contact Lens Follow Up    Diabetic Eye Exam     History of Present Illness  Dls: 8/6/18 Dr. Casey     56 y/o female presents today for diabetic eye exam.   Pt states no changes in vision.   Pt wears scls and pal's.      x 1 day     No tearing  No itching  No burning  No pain   No ha's  No floaters  No flashes    Eye meds  Visine ou prn     Hemoglobin A1C       Date                     Value               Ref Range           Status                05/25/2020               7.1 (H)             4.0 - 5.6 %         Final          02/14/2020               8.0 (H)             4.0 - 5.6 %         Final           11/25/2019               8.1 (H)             4.0 - 5.6 %         Final         Air optix. Replacing every 2 weeks per pt. Does not sleep in lenses. DVO   with readers PRN. Last wore contacts before coming into the office today.        Assessment/Plan:     1. Type 2 diabetes mellitus without retinopathy  No diabetic retinopathy. Discussed with pt the effects of diabetes on vision, importance of good blood sugar control, compliance with meds, and follow up care with PCP. Return in 1 year for dilated eye exam, sooner PRN.    2. Refractive error  Educated patient on refractive error and discussed lens options. Dispensed updated spectacle Rx. Educated about adaptation period to new specs.    Eyeglass Final Rx     Eyeglass Final Rx       Sphere Cylinder Axis Add    Right -3.75 +1.50 180 +2.00    Left -3.75 +1.50 165 +2.00    Expiration Date: 7/9/2021                Contact lens Rx released to pt. Daily wear only advised, replacement monthly with education to risks of extended wear.  Discussed lens care, compliance and solutions. RTC yearly contact lens follow up.     Contact Lens Final Rx     Final Contact Lens Rx       Brand Base Curve Diameter Sphere Cylinder Axis     Right Air Optix Aqua for Astigmatism 8.7 14.5 -2.25 -1.25 090    Left Air Optix Aqua for Astigmatism 8.7 14.5 -2.25 -1.25 080    Expiration Date: 7/9/2021    Replacement: Monthly    Solutions: OptiFree PureMoist    Wearing Schedule: Daily wear          Final Contact Lens Rx #2       Brand Base Curve Diameter Sphere Cylinder Axis    Right Air Optix Colours 8.6 14.2 -3.00 Sphere     Left Air Optix Colours 8.6 14.2 -3.00 Sphere     Expiration Date: 7/9/2021    Replacement: Monthly    Solutions: OptiFree PureMoist    Wearing Schedule: Daily wear                  Follow up in about 1 year (around 7/8/2021) for Diabetic Eye Exam, Contact Lens.

## 2020-07-28 ENCOUNTER — TELEPHONE (OUTPATIENT)
Dept: NEUROLOGY | Facility: CLINIC | Age: 55
End: 2020-07-28

## 2020-07-28 NOTE — TELEPHONE ENCOUNTER
Left a message for the patient to let her know Dr. Fine is no longer here. She was scheduled with another provider to establish care. Patient was asked to call back to reschedule if this is not a good day and time for her.

## 2020-07-28 NOTE — TELEPHONE ENCOUNTER
----- Message from Kenia Mallory sent at 7/28/2020  8:34 AM CDT -----  Contact: pt  Pt called regarding disability paperwork needing to be completed and asked for a call back to inform pt of how need to need to go about to get this paperwork completed.

## 2020-08-06 RX ORDER — HYDROCORTISONE 25 MG/G
CREAM TOPICAL
Qty: 28 G | Refills: 0 | Status: SHIPPED | OUTPATIENT
Start: 2020-08-06 | End: 2021-01-27 | Stop reason: SDUPTHER

## 2020-09-11 DIAGNOSIS — E11.9 TYPE 2 DIABETES MELLITUS WITHOUT COMPLICATION: ICD-10-CM

## 2020-09-25 ENCOUNTER — IMMUNIZATION (OUTPATIENT)
Dept: FAMILY MEDICINE | Facility: CLINIC | Age: 55
End: 2020-09-25
Payer: COMMERCIAL

## 2020-09-25 DIAGNOSIS — Z23 NEED FOR INFLUENZA VACCINATION: Primary | ICD-10-CM

## 2020-09-25 PROCEDURE — 90471 FLU VACCINE (QUAD) GREATER THAN OR EQUAL TO 3YO PRESERVATIVE FREE IM: ICD-10-PCS | Mod: S$GLB,,, | Performed by: FAMILY MEDICINE

## 2020-09-25 PROCEDURE — 99499 UNLISTED E&M SERVICE: CPT | Mod: S$GLB,,, | Performed by: FAMILY MEDICINE

## 2020-09-25 PROCEDURE — 90686 FLU VACCINE (QUAD) GREATER THAN OR EQUAL TO 3YO PRESERVATIVE FREE IM: ICD-10-PCS | Mod: S$GLB,,, | Performed by: FAMILY MEDICINE

## 2020-09-25 PROCEDURE — 90686 IIV4 VACC NO PRSV 0.5 ML IM: CPT | Mod: S$GLB,,, | Performed by: FAMILY MEDICINE

## 2020-09-25 PROCEDURE — 99499 NO LOS: ICD-10-PCS | Mod: S$GLB,,, | Performed by: FAMILY MEDICINE

## 2020-09-25 PROCEDURE — 90471 IMMUNIZATION ADMIN: CPT | Mod: S$GLB,,, | Performed by: FAMILY MEDICINE

## 2020-09-27 ENCOUNTER — PATIENT OUTREACH (OUTPATIENT)
Dept: ADMINISTRATIVE | Facility: OTHER | Age: 55
End: 2020-09-27

## 2020-09-27 NOTE — PROGRESS NOTES
Neurology Clinic  Follow up visit    Patient Name: Monalisa Carson  MRN: [id number]    CC: Seizures    HPI: Monalisa Carson is a 55 y.o. female w/ PMH significant for Anxiety, HTN, epilepsy presenting as follow-up for evaluation of seizures vs hemifacial spasm.    9/28//20 Interval History  2 weeks ago during dentist apptm noticed some twitching in her face but stopped.  Today has a headache. For the last 2 weeks have been having daily headaches. She takes tylenol extra strength 6 pills daily.  Had 3 headaches per week before this.   Medrol pack.  We will increase Topamax to 75 mg q.h.s..  Interested in trying nerve block for occipital neuralgia.  Referral to physical therapy was sent but she does not have free time right now but is interested to do it in the future.    Per Humberto Fine MD note:  3/27/19 Initial History  I last saw pt for seizures in the hospital in 2/2019 for evaluation of seizures.  At that point, she had two events concern for complex partial seizures (L facial twitching, speech arrest, ?LOC) with Sal's paralysis/residual L facial droop after 2 separate events that occurred more than 48 hours apart.  Her EEG was unremarkable and she was discharged on keppra 1500 BID.  Since discharge, she reports no seizures, but has had nausea/vomiting with the keppra and sometimes is not able to keep the dose of keppra down.  However, she notes she has had episodes of L facial twitching, up to a few times per day and duration of 1-2 minutes without any clear triggers.    With regards to her headache:  Went to ED on 3/11 for headache.  Headaches began while in hospital, but not too severe.  Significantly worsened mid-February.    L neck pain predominantly, that radiates up over her head.  Pressure-like pain.  Photo and phono sensitive.  Feels weak all over; no focal neurological deficits.  Given fioricet in ED, every day or QOD with some help.  10/10 pain at worst, fioricet brings it down to a 5/6.   Nothing in particular makes headaches better/worse.  Possibly worsened by exertion.  Every day.  Before fioricet, was taking tylenol BID.    Never had any headaches like this before.  No FMH of similar headaches.      Review of Systems:  General: No fevers, chills  Eyes: No changes in vision  ENT: No changes in hearing  Respiratory: No SOB  CV: No chest pain, palpitations  GI: No diarrhea, blood in stool  Urinary: No dysuria, hematuria  Skin: No rashes  Neurological: No weakness, confusion.  +facial twitching.  Psychiatric: No auditory nor visual hallucinations.      Past Medical History  Past Medical History:   Diagnosis Date    Allergy     Anxiety     Diabetes mellitus     Heart murmur     Hypertension     Seizures        Medications    Current Outpatient Medications:     acetaminophen (TYLENOL) 500 MG tablet, Take 2 tablets (1,000 mg total) by mouth every 6 (six) hours as needed for Pain., Disp: 30 tablet, Rfl: 0    albuterol (PROVENTIL/VENTOLIN HFA) 90 mcg/actuation inhaler, Inhale 1-2 puffs into the lungs every 4 (four) hours as needed for Wheezing. Rescue, Disp: 18 g, Rfl: 11    aspirin 81 MG Chew, Take 1 tablet (81 mg total) by mouth once daily., Disp: , Rfl:     benazepriL (LOTENSIN) 40 MG tablet, TAKE 1 TABLET(40 MG) BY MOUTH EVERY DAY, Disp: 90 tablet, Rfl: 0    BYDUREON 2 mg/0.65 mL PnIj, INJECT 2 MG UNDER THE SKIN EVERY 7 DAYS, Disp: 12 each, Rfl: 0    escitalopram oxalate (LEXAPRO) 20 MG tablet, Take 1 tablet (20 mg total) by mouth once daily., Disp: 90 tablet, Rfl: 3    fluticasone propionate (FLONASE) 50 mcg/actuation nasal spray, 2 sprays (100 mcg total) by Each Nostril route once daily., Disp: 15 g, Rfl: 0    hydrocortisone 2.5 % cream, WOLFGANG EXT AA BID, Disp: 28 g, Rfl: 0    hydrOXYzine HCl (ATARAX) 25 MG tablet, Take 1 tablet (25 mg total) by mouth 3 (three) times daily as needed for Anxiety., Disp: 12 tablet, Rfl: 0    levETIRAcetam (KEPPRA) 1000 MG tablet, Take 1 tablet (1,000 mg  "total) by mouth 2 (two) times daily., Disp: 180 tablet, Rfl: 3    lidocaine (LMX) 4 % cream, Apply topically as needed., Disp: , Rfl: 0    loratadine (CLARITIN) 10 mg tablet, Take 1 tablet (10 mg total) by mouth once daily., Disp: 60 tablet, Rfl: 0    nortriptyline (PAMELOR) 50 MG capsule, Take 1 capsule (50 mg total) by mouth every evening., Disp: 30 capsule, Rfl: 5    ondansetron (ZOFRAN-ODT) 8 MG TbDL, Take 1 tablet (8 mg total) by mouth every 12 (twelve) hours as needed., Disp: 30 tablet, Rfl: 0    pen needle, diabetic 31 gauge x 5/16" Ndle, For use with bydureon, Disp: 100 each, Rfl: 0    rosuvastatin (CRESTOR) 20 MG tablet, Take 1 tablet (20 mg total) by mouth once daily., Disp: 90 tablet, Rfl: 1    tiZANidine (ZANAFLEX) 2 MG tablet, Take 1 tablet (2 mg total) by mouth every 8 (eight) hours as needed., Disp: 30 tablet, Rfl: 1    topiramate (TOPAMAX) 50 MG tablet, Take 0.5 tablets (25 mg total) by mouth once daily for 7 days, THEN 1 tablet (50 mg total) once daily for 7 days, THEN 1 tablet (50 mg total) 2 (two) times daily., Disp: 163 tablet, Rfl: 0    zolpidem (AMBIEN) 10 mg Tab, TAKE 1 TABLET BY MOUTH EVERY NIGHT AT BEDTIME, Disp: 30 tablet, Rfl: 5  Any other notable medications as documented in HPI    Allergies  Review of patient's allergies indicates:   Allergen Reactions    Keflex [cephalexin] Hives    Vicodin [hydrocodone-acetaminophen] Itching     itch       Social History  Social History     Socioeconomic History    Marital status:      Spouse name: Not on file    Number of children: Not on file    Years of education: Not on file    Highest education level: Not on file   Occupational History    Occupation:      Employer: [employer]   Social Needs    Financial resource strain: Not on file    Food insecurity     Worry: Not on file     Inability: Not on file    Transportation needs     Medical: Not on file     Non-medical: Not on file   Tobacco Use    Smoking " status: Former Smoker     Packs/day: 0.00     Years: 0.50     Pack years: 0.00    Smokeless tobacco: Never Used   Substance and Sexual Activity    Alcohol use: Yes     Alcohol/week: 0.0 standard drinks     Comment: seldom    Drug use: No    Sexual activity: Yes     Partners: Male     Birth control/protection: None   Lifestyle    Physical activity     Days per week: Not on file     Minutes per session: Not on file    Stress: Not on file   Relationships    Social connections     Talks on phone: Not on file     Gets together: Not on file     Attends [employer] service: Not on file     Active member of club or organization: Not on file     Attends meetings of clubs or organizations: Not on file     Relationship status: Not on file   Other Topics Concern    Are you pregnant or think you may be? No    Breast-feeding No   Social History Narrative    Not on file     Any other notable Social History as documented in HPI.    Family History  Family History   Problem Relation Age of Onset    Diabetes Mother     Hyperlipidemia Mother     Hypertension Mother     Cataracts Mother     Diabetes Father     Hypertension Father     Stroke Father     Depression Sister     Hypertension Sister     Stroke Sister     Cancer Maternal Aunt         breast x 2    Breast cancer Maternal Aunt     Cancer Maternal Uncle         prostate x 2    Cancer Paternal Aunt         breast    Breast cancer Paternal Aunt     Cancer Maternal Grandfather         lung    Early death Paternal Grandmother     Early death Paternal Grandfather     No Known Problems Brother     No Known Problems Paternal Uncle     No Known Problems Maternal Grandmother     Melanoma Neg Hx     Eczema Neg Hx     Lupus Neg Hx     Psoriasis Neg Hx     Amblyopia Neg Hx     Blindness Neg Hx     Glaucoma Neg Hx     Macular degeneration Neg Hx     Retinal detachment Neg Hx     Strabismus Neg Hx     Thyroid disease Neg Hx      Any other notable FMH as  "documented in HPI.    Physical Exam  There were no vitals taken for this visit.    General: Well-developed, well-groomed. No apparent distress  HENT: Normocephalic, atraumatic.  No carotid bruits auscultated.    Cardiovascular: Regular rate and rhythm with no murmurs, rubs or gallops.    Chest: Lungs clear to auscultation bilaterally.  No wheezes, stridor, ronchi appreciated.  Abdomen: Normoactive bowel sounds present.  Soft, nontender to palpation.  Musculoskeletal: No peripheral edema    Neurologic Exam: The patient is awake, alert and oriented. Language is fluent.  Fund of knowledge is appropriate.     Cranial nerves:   Pupils are round and reactive to light and accommodation.   Visual fields are full to confrontation.    Ocular motility is full in all cardinal positions of gaze.   Facial sensation is normal to light touch.   Facial activation symmetric  Hearing is symmetric bilaterally.   Palate elevates symmetrically.   Shoulder elevation is symmetric and full strength bilaterally.   Tongue is midline and neck rotation strength is normal bilaterally.   * Upon closing eyes, small inferior orbicularis oculi +/- zygomaticus contractures, irregular frequency.  Reproducible on repeat exam.     Motor examination of all extremities demonstrates normal bulk and tone in all four limbs. There are no atrophy or fasciculations. Strength is 5/5 in the upper and lower extremities bilaterally.    Sensory examination is normal light touch in BUE and BLE.      Deep tendon reflexes are 2+ and symmetric in the upper and lower extremities bilaterally.  No clonus b/l.    Coordination: Finger to nose intact b/l    Lab and Test Results    No recent pertinent labs    Images:  9/13/2019 Ambulatory EEG: Per prelim report from Dr. Warren,  No epileptic discharges nor seizures captured.  Formal report to follow.    4/10/19 EEG: Per report,  "IMPRESSION:  Normal EEG.     CLINICAL CORRELATION:  The patient is a 54-year-old female who had " "two events,   which consisted of left facial twitching and speech arrest with a Sal paralysis   with left facial droop post-event.  This occurred on two occasions.  She was   started on levetiracetam 1500 mg twice a day.  This recording was obtained to   evaluate for possible seizures, but this tracing is normal throughout the   recording."    3/11/19 CTH: Per report,  "1. No acute intracranial abnormalities, noting sequela of suspected chronic microvascular ischemic change."    2/16/19 Routine EEG: Per report,  "Abnormal activity:   No epileptiform discharges, periodic discharges, lateralized   rhythmic delta activity or electrographic seizures were seen.    IMPRESSION:   This is a normal routine EEG done in sleep, with only very brief   period of wakefulness."    2/16/19 MRI Brain w/ and w/o: Per report,  "1. No acute intracranial abnormality identified on today's examination.  Specifically, no evidence of acute infarction or enhancing lesion.  2. Multiple foci of T2/FLAIR signal hyperintensity in the supratentorial and periventricular white matter demonstrate no corresponding restricted diffusion or postcontrast enhancement.  These lesions are nonspecific in appearance and may be seen with accelerated small vessel ischemic disease, vasculopathies, migraine headaches, and as the residua from inflammatory and traumatic insults to the brain."    2/15/19 CTA Head and Neck: Per report,  "Small caliber right anterior cerebral artery which may be narrow on a congenital basis.  Otherwise CTA head and neck without significant focal stenosis or occlusion."    Assessment and Plan  Patient is a 55-year-old  female who has been followed in Neurology for epilepsy and tension headache.  Seizure:  Patient is on Keppra 1 g b.i.d..  Indication from Neurology off his to DMV regarding patient's driving status.  Tension headache:  Patient is on Pamelor 50 mg q.h.s. and Topamax 50 mg b.i.d.  Patient denies having any " side effects from Topamax.      Plan:  - Medrol pack.    - Education was provided about rebound headache.  -  increase Topamax to 75 mg q.h.s.  - Interested in trying nerve block for occipital neuralgia.    - Referral to physical therapy was sent but she does not have free time right now but is interested to do it in the future.  Continue Keppra 1 g b.i.d.  - RTC in 6 weeks    Problem List Items Addressed This Visit        Neuro    Hemifacial spasm    Nonintractable epilepsy without status epilepticus    Relevant Medications    levETIRAcetam (KEPPRA) 1000 MG tablet    Migraine without aura and without status migrainosus, not intractable    Relevant Medications    topiramate (TOPAMAX) 50 MG tablet    nortriptyline (PAMELOR) 50 MG capsule       Orthopedic    Occipital neuralgia of left side - Primary    Relevant Medications    topiramate (TOPAMAX) 50 MG tablet    nortriptyline (PAMELOR) 50 MG capsule    Other Relevant Orders    Nerve block    Ambulatory referral/consult to Physical/Occupational Therapy      Other Visit Diagnoses     Tension headache        Relevant Medications    nortriptyline (PAMELOR) 50 MG capsule            Selene Dunlap MD  Neurology Resident   [facility]  [address] [place] [address]

## 2020-09-28 ENCOUNTER — OFFICE VISIT (OUTPATIENT)
Dept: NEUROLOGY | Facility: CLINIC | Age: 55
End: 2020-09-28
Payer: COMMERCIAL

## 2020-09-28 VITALS
SYSTOLIC BLOOD PRESSURE: 143 MMHG | BODY MASS INDEX: 37.15 KG/M2 | WEIGHT: 209.69 LBS | HEART RATE: 69 BPM | HEIGHT: 63 IN | DIASTOLIC BLOOD PRESSURE: 91 MMHG

## 2020-09-28 DIAGNOSIS — G43.009 MIGRAINE WITHOUT AURA AND WITHOUT STATUS MIGRAINOSUS, NOT INTRACTABLE: ICD-10-CM

## 2020-09-28 DIAGNOSIS — G40.209 PARTIAL SYMPTOMATIC EPILEPSY WITH COMPLEX PARTIAL SEIZURES, NOT INTRACTABLE, WITHOUT STATUS EPILEPTICUS: ICD-10-CM

## 2020-09-28 DIAGNOSIS — G44.209 TENSION HEADACHE: ICD-10-CM

## 2020-09-28 DIAGNOSIS — M54.81 OCCIPITAL NEURALGIA OF LEFT SIDE: Primary | ICD-10-CM

## 2020-09-28 DIAGNOSIS — G51.39 HEMIFACIAL SPASM: ICD-10-CM

## 2020-09-28 PROCEDURE — 3077F PR MOST RECENT SYSTOLIC BLOOD PRESSURE >= 140 MM HG: ICD-10-PCS | Mod: CPTII,S$GLB,, | Performed by: STUDENT IN AN ORGANIZED HEALTH CARE EDUCATION/TRAINING PROGRAM

## 2020-09-28 PROCEDURE — 3080F DIAST BP >= 90 MM HG: CPT | Mod: CPTII,S$GLB,, | Performed by: STUDENT IN AN ORGANIZED HEALTH CARE EDUCATION/TRAINING PROGRAM

## 2020-09-28 PROCEDURE — 99999 PR PBB SHADOW E&M-EST. PATIENT-LVL IV: CPT | Mod: PBBFAC,,, | Performed by: STUDENT IN AN ORGANIZED HEALTH CARE EDUCATION/TRAINING PROGRAM

## 2020-09-28 PROCEDURE — 3080F PR MOST RECENT DIASTOLIC BLOOD PRESSURE >= 90 MM HG: ICD-10-PCS | Mod: CPTII,S$GLB,, | Performed by: STUDENT IN AN ORGANIZED HEALTH CARE EDUCATION/TRAINING PROGRAM

## 2020-09-28 PROCEDURE — 3077F SYST BP >= 140 MM HG: CPT | Mod: CPTII,S$GLB,, | Performed by: STUDENT IN AN ORGANIZED HEALTH CARE EDUCATION/TRAINING PROGRAM

## 2020-09-28 PROCEDURE — 99214 OFFICE O/P EST MOD 30 MIN: CPT | Mod: S$GLB,,, | Performed by: STUDENT IN AN ORGANIZED HEALTH CARE EDUCATION/TRAINING PROGRAM

## 2020-09-28 PROCEDURE — 99214 PR OFFICE/OUTPT VISIT, EST, LEVL IV, 30-39 MIN: ICD-10-PCS | Mod: S$GLB,,, | Performed by: STUDENT IN AN ORGANIZED HEALTH CARE EDUCATION/TRAINING PROGRAM

## 2020-09-28 PROCEDURE — 99999 PR PBB SHADOW E&M-EST. PATIENT-LVL IV: ICD-10-PCS | Mod: PBBFAC,,, | Performed by: STUDENT IN AN ORGANIZED HEALTH CARE EDUCATION/TRAINING PROGRAM

## 2020-09-28 RX ORDER — LEVETIRACETAM 1000 MG/1
1000 TABLET ORAL 2 TIMES DAILY
Qty: 180 TABLET | Refills: 3 | Status: SHIPPED | OUTPATIENT
Start: 2020-09-28 | End: 2022-09-30 | Stop reason: SDUPTHER

## 2020-09-28 RX ORDER — METHYLPREDNISOLONE 4 MG/1
TABLET ORAL
Qty: 1 PACKAGE | Refills: 0 | Status: SHIPPED | OUTPATIENT
Start: 2020-09-28 | End: 2021-05-20

## 2020-09-28 RX ORDER — NORTRIPTYLINE HYDROCHLORIDE 50 MG/1
50 CAPSULE ORAL NIGHTLY
Qty: 30 CAPSULE | Refills: 5 | Status: SHIPPED | OUTPATIENT
Start: 2020-09-28 | End: 2021-02-08

## 2020-09-28 RX ORDER — TOPIRAMATE 50 MG/1
75 TABLET, FILM COATED ORAL NIGHTLY
Qty: 45 TABLET | Refills: 4 | Status: SHIPPED | OUTPATIENT
Start: 2020-09-28 | End: 2021-02-08 | Stop reason: SDUPTHER

## 2020-09-29 PROBLEM — M54.81 OCCIPITAL NEURALGIA OF LEFT SIDE: Status: ACTIVE | Noted: 2020-09-29

## 2020-10-05 ENCOUNTER — PATIENT MESSAGE (OUTPATIENT)
Dept: ADMINISTRATIVE | Facility: HOSPITAL | Age: 55
End: 2020-10-05

## 2020-10-09 NOTE — TELEPHONE ENCOUNTER
----- Message from Sammy Hammonds, Patient Care Assistant sent at 10/9/2020  3:33 PM CDT -----  Name of Who is Calling: Pharmacy    What is the request in detail:Requesting a call back in regards of changing Rx BYDUREON 2 mg/0.65 mL PnIj.  Please contact to further discuss and advise      Can the clinic reply by [facility]: No    What [facility]:   [phone]

## 2020-10-12 ENCOUNTER — LAB VISIT (OUTPATIENT)
Dept: LAB | Facility: HOSPITAL | Age: 55
End: 2020-10-12
Attending: FAMILY MEDICINE
Payer: COMMERCIAL

## 2020-10-12 DIAGNOSIS — E11.9 TYPE 2 DIABETES MELLITUS WITHOUT COMPLICATION: ICD-10-CM

## 2020-10-12 LAB
ESTIMATED AVG GLUCOSE: 134 MG/DL (ref 68–131)
HBA1C MFR BLD HPLC: 6.3 % (ref 4–5.6)

## 2020-10-12 PROCEDURE — 36415 COLL VENOUS BLD VENIPUNCTURE: CPT | Mod: PO

## 2020-10-12 PROCEDURE — 83036 HEMOGLOBIN GLYCOSYLATED A1C: CPT

## 2020-11-09 ENCOUNTER — OFFICE VISIT (OUTPATIENT)
Dept: NEUROLOGY | Facility: CLINIC | Age: 55
End: 2020-11-09
Payer: COMMERCIAL

## 2020-11-09 VITALS
WEIGHT: 210.56 LBS | BODY MASS INDEX: 37.31 KG/M2 | HEIGHT: 63 IN | SYSTOLIC BLOOD PRESSURE: 134 MMHG | HEART RATE: 87 BPM | DIASTOLIC BLOOD PRESSURE: 88 MMHG

## 2020-11-09 DIAGNOSIS — M54.81 OCCIPITAL NEURALGIA OF LEFT SIDE: ICD-10-CM

## 2020-11-09 PROCEDURE — 76942 ECHO GUIDE FOR BIOPSY: CPT | Mod: S$GLB,,, | Performed by: STUDENT IN AN ORGANIZED HEALTH CARE EDUCATION/TRAINING PROGRAM

## 2020-11-09 PROCEDURE — 76942 PR U/S GUIDANCE FOR NEEDLE GUIDANCE: ICD-10-PCS | Mod: S$GLB,,, | Performed by: STUDENT IN AN ORGANIZED HEALTH CARE EDUCATION/TRAINING PROGRAM

## 2020-11-09 PROCEDURE — 99999 PR PBB SHADOW E&M-EST. PATIENT-LVL III: CPT | Mod: PBBFAC,,, | Performed by: STUDENT IN AN ORGANIZED HEALTH CARE EDUCATION/TRAINING PROGRAM

## 2020-11-09 PROCEDURE — 99499 UNLISTED E&M SERVICE: CPT | Mod: S$GLB,,, | Performed by: STUDENT IN AN ORGANIZED HEALTH CARE EDUCATION/TRAINING PROGRAM

## 2020-11-09 PROCEDURE — 64405 PR NERVE BLOCK INJ, ANES/STEROID, OCCIPITAL: ICD-10-PCS | Mod: 51,LT,S$GLB, | Performed by: PSYCHIATRY & NEUROLOGY

## 2020-11-09 PROCEDURE — 64450 NJX AA&/STRD OTHER PN/BRANCH: CPT | Mod: LT,S$GLB,, | Performed by: PSYCHIATRY & NEUROLOGY

## 2020-11-09 PROCEDURE — 99499 NO LOS: ICD-10-PCS | Mod: S$GLB,,, | Performed by: STUDENT IN AN ORGANIZED HEALTH CARE EDUCATION/TRAINING PROGRAM

## 2020-11-09 PROCEDURE — 99999 PR PBB SHADOW E&M-EST. PATIENT-LVL III: ICD-10-PCS | Mod: PBBFAC,,, | Performed by: STUDENT IN AN ORGANIZED HEALTH CARE EDUCATION/TRAINING PROGRAM

## 2020-11-09 PROCEDURE — 64450 PR NERVE BLOCK INJ, ANES/STEROID, OTHER PERIPHERAL: ICD-10-PCS | Mod: LT,S$GLB,, | Performed by: PSYCHIATRY & NEUROLOGY

## 2020-11-09 PROCEDURE — 64405 NJX AA&/STRD GR OCPL NRV: CPT | Mod: 51,LT,S$GLB, | Performed by: PSYCHIATRY & NEUROLOGY

## 2020-11-09 NOTE — PROCEDURES
"Left side graeter and lesser occipital nerve    Date/Time: 11/9/2020 8:30 AM  Performed by: Selene Dunlap MD  Authorized by: Selene Dunlap MD   Consent Done: Yes  Time out: Immediately prior to procedure a "time out" was called to verify the correct patient, procedure, equipment, support staff and site/side marked as required.  Indications: pain relief  Body area: head (Greater and lesser occipital nerve)  Laterality: left  Patient sedated: no  Medications administered: DepoMedrol 40 mg injection  Preparation: Patient was prepped and draped in the usual sterile fashion.  Patient position: prone  Needle size: 27 G  Location technique: anatomical landmarks and ultrasound guidance (Us images saved on a secured external hard drive)  Local Anesthetic: lidocaine 2% without epinephrine and bupivacaine 0.5% without epinephrine  Anesthetic total: 3 mL  Outcome: pain unchanged  Patient tolerance: Patient tolerated the procedure well with no immediate complications  Comments: Follow up in 6 weeks for re-assessment of headache and our next plans.        "

## 2020-11-30 ENCOUNTER — OFFICE VISIT (OUTPATIENT)
Dept: FAMILY MEDICINE | Facility: CLINIC | Age: 55
End: 2020-11-30
Payer: COMMERCIAL

## 2020-11-30 VITALS
SYSTOLIC BLOOD PRESSURE: 124 MMHG | BODY MASS INDEX: 36.75 KG/M2 | TEMPERATURE: 98 F | RESPIRATION RATE: 16 BRPM | HEIGHT: 63 IN | DIASTOLIC BLOOD PRESSURE: 80 MMHG | WEIGHT: 207.44 LBS

## 2020-11-30 DIAGNOSIS — N18.2 CONTROLLED TYPE 2 DIABETES MELLITUS WITH STAGE 2 CHRONIC KIDNEY DISEASE, WITHOUT LONG-TERM CURRENT USE OF INSULIN: ICD-10-CM

## 2020-11-30 DIAGNOSIS — Z23 NEED FOR SHINGLES VACCINE: Primary | ICD-10-CM

## 2020-11-30 DIAGNOSIS — E11.22 CONTROLLED TYPE 2 DIABETES MELLITUS WITH STAGE 2 CHRONIC KIDNEY DISEASE, WITHOUT LONG-TERM CURRENT USE OF INSULIN: ICD-10-CM

## 2020-11-30 PROCEDURE — 3079F DIAST BP 80-89 MM HG: CPT | Mod: CPTII,S$GLB,, | Performed by: FAMILY MEDICINE

## 2020-11-30 PROCEDURE — 90750 HZV VACC RECOMBINANT IM: CPT | Mod: S$GLB,,, | Performed by: FAMILY MEDICINE

## 2020-11-30 PROCEDURE — 99213 PR OFFICE/OUTPT VISIT, EST, LEVL III, 20-29 MIN: ICD-10-PCS | Mod: 25,S$GLB,, | Performed by: FAMILY MEDICINE

## 2020-11-30 PROCEDURE — 3044F PR MOST RECENT HEMOGLOBIN A1C LEVEL <7.0%: ICD-10-PCS | Mod: CPTII,S$GLB,, | Performed by: FAMILY MEDICINE

## 2020-11-30 PROCEDURE — 3074F PR MOST RECENT SYSTOLIC BLOOD PRESSURE < 130 MM HG: ICD-10-PCS | Mod: CPTII,S$GLB,, | Performed by: FAMILY MEDICINE

## 2020-11-30 PROCEDURE — 1126F AMNT PAIN NOTED NONE PRSNT: CPT | Mod: S$GLB,,, | Performed by: FAMILY MEDICINE

## 2020-11-30 PROCEDURE — 90471 IMMUNIZATION ADMIN: CPT | Mod: S$GLB,,, | Performed by: FAMILY MEDICINE

## 2020-11-30 PROCEDURE — 99213 OFFICE O/P EST LOW 20 MIN: CPT | Mod: 25,S$GLB,, | Performed by: FAMILY MEDICINE

## 2020-11-30 PROCEDURE — 1126F PR PAIN SEVERITY QUANTIFIED, NO PAIN PRESENT: ICD-10-PCS | Mod: S$GLB,,, | Performed by: FAMILY MEDICINE

## 2020-11-30 PROCEDURE — 99999 PR PBB SHADOW E&M-EST. PATIENT-LVL III: ICD-10-PCS | Mod: PBBFAC,,, | Performed by: FAMILY MEDICINE

## 2020-11-30 PROCEDURE — 3008F PR BODY MASS INDEX (BMI) DOCUMENTED: ICD-10-PCS | Mod: CPTII,S$GLB,, | Performed by: FAMILY MEDICINE

## 2020-11-30 PROCEDURE — 3079F PR MOST RECENT DIASTOLIC BLOOD PRESSURE 80-89 MM HG: ICD-10-PCS | Mod: CPTII,S$GLB,, | Performed by: FAMILY MEDICINE

## 2020-11-30 PROCEDURE — 3008F BODY MASS INDEX DOCD: CPT | Mod: CPTII,S$GLB,, | Performed by: FAMILY MEDICINE

## 2020-11-30 PROCEDURE — 99999 PR PBB SHADOW E&M-EST. PATIENT-LVL III: CPT | Mod: PBBFAC,,, | Performed by: FAMILY MEDICINE

## 2020-11-30 PROCEDURE — 90471 ZOSTER RECOMBINANT VACCINE: ICD-10-PCS | Mod: S$GLB,,, | Performed by: FAMILY MEDICINE

## 2020-11-30 PROCEDURE — 3074F SYST BP LT 130 MM HG: CPT | Mod: CPTII,S$GLB,, | Performed by: FAMILY MEDICINE

## 2020-11-30 PROCEDURE — 3044F HG A1C LEVEL LT 7.0%: CPT | Mod: CPTII,S$GLB,, | Performed by: FAMILY MEDICINE

## 2020-11-30 PROCEDURE — 90750 ZOSTER RECOMBINANT VACCINE: ICD-10-PCS | Mod: S$GLB,,, | Performed by: FAMILY MEDICINE

## 2020-11-30 NOTE — PROGRESS NOTES
Subjective:       Patient ID: Monalisa Valverdeite is a 55 y.o. female.    Chief Complaint: 6 MO F/U    HPI:  Here for follow up chronic stable diabetes.  Blood sugar improved, recent hba1c 6.3%.  No acute complaints.  Recent weight loss due to increased activity.  Review of Systems   Constitutional: Negative for fatigue and unexpected weight change.   Eyes: Negative for visual disturbance.   Cardiovascular: Negative for chest pain.   Endocrine: Negative for polydipsia, polyphagia and polyuria.   Integumentary:  Negative for wound.   Neurological: Negative for numbness.         Objective:      Physical Exam  Cardiovascular:      Pulses:           Dorsalis pedis pulses are 2+ on the right side and 2+ on the left side.   Musculoskeletal:      Right foot: No deformity.      Left foot: No deformity.   Feet:      Right foot:      Protective Sensation: 4 sites tested. 4 sites sensed.      Skin integrity: No ulcer.      Left foot:      Protective Sensation: 4 sites tested. 4 sites sensed.      Skin integrity: No ulcer.         Assessment:       1. Need for shingles vaccine    2. Controlled type 2 diabetes mellitus with stage 2 chronic kidney disease, without long-term current use of insulin        Plan:     Monalisa was seen today for 6 mo f/u.    Diagnoses and all orders for this visit:    Need for shingles vaccine  -     (In Office Administered) Zoster Recombinant Vaccine    Controlled type 2 diabetes mellitus with stage 2 chronic kidney disease, without long-term current use of insulin  Diabetes much improved.  Continue current regimen  -     Hemoglobin A1C; Future

## 2020-12-02 NOTE — SUBJECTIVE & OBJECTIVE
Past Medical History:   Diagnosis Date    Allergy     Anxiety     Diabetes mellitus     Heart murmur     Hypertension        Past Surgical History:   Procedure Laterality Date    CHOLECYSTECTOMY      2001 april    HYSTERECTOMY      partial  1996    OOPHORECTOMY      SHOULDER SURGERY Right     WISDOM TOOTH EXTRACTION         Review of patient's allergies indicates:   Allergen Reactions    Keflex [cephalexin] Hives    Vicodin [hydrocodone-acetaminophen] Itching     itch       No current facility-administered medications on file prior to encounter.      Current Outpatient Medications on File Prior to Encounter   Medication Sig    albuterol 90 mcg/actuation inhaler Inhale 1-2 puffs into the lungs every 4 (four) hours as needed for Wheezing. Rescue    amLODIPine (NORVASC) 10 MG tablet Take 1 tablet (10 mg total) by mouth once daily.    benazepril (LOTENSIN) 20 MG tablet Take 1 tablet (20 mg total) by mouth once daily.    metFORMIN (GLUCOPHAGE) 500 MG tablet Take 1 tablet (500 mg total) by mouth 2 (two) times daily with meals.    zolpidem (AMBIEN) 10 mg Tab TAKE 1 TABLET BY MOUTH EVERY NIGHT AT BEDTIME    aspirin 81 MG Chew Take 1 tablet (81 mg total) by mouth once daily.     Family History     Problem Relation (Age of Onset)    Breast cancer Maternal Aunt, Paternal Aunt    Cancer Maternal Aunt, Maternal Uncle, Paternal Aunt, Maternal Grandfather    Cataracts Mother    Depression Sister    Diabetes Mother, Father    Early death Paternal Grandmother, Paternal Grandfather    Hyperlipidemia Mother    Hypertension Mother, Father, Sister    No Known Problems Brother, Paternal Uncle, Maternal Grandmother    Stroke Father, Sister        Tobacco Use    Smoking status: Light Tobacco Smoker     Packs/day: 0.00     Years: 0.50     Pack years: 0.00    Smokeless tobacco: Never Used   Substance and Sexual Activity    Alcohol use: Yes     Alcohol/week: 0.0 oz     Comment: seldom    Drug use: No    Sexual activity:  Yes     Partners: Male     Birth control/protection: None     Review of Systems   Constitutional: Negative for chills, fatigue and fever.   HENT: Negative for drooling, hearing loss, sinus pressure, sinus pain and trouble swallowing.    Eyes: Negative for pain, discharge, redness and visual disturbance.   Respiratory: Negative for cough, shortness of breath and wheezing.    Cardiovascular: Negative for chest pain, palpitations and leg swelling.   Gastrointestinal: Negative for abdominal distention, abdominal pain, diarrhea, nausea and vomiting.   Genitourinary: Negative for difficulty urinating and dysuria.   Musculoskeletal: Negative for arthralgias and back pain.   Skin: Negative for rash and wound.   Neurological: Positive for tremors, speech difficulty, numbness and headaches. Negative for dizziness, syncope, weakness and light-headedness.   Psychiatric/Behavioral: Negative for agitation and confusion.     Objective:     Vital Signs (Most Recent):  Temp: 98.5 °F (36.9 °C) (02/16/19 0000)  Pulse: 63 (02/16/19 0000)  Resp: 20 (02/16/19 0000)  BP: 127/61 (02/16/19 0000)  SpO2: 96 % (02/16/19 0000) Vital Signs (24h Range):  Temp:  [98.3 °F (36.8 °C)-99 °F (37.2 °C)] 98.5 °F (36.9 °C)  Pulse:  [61-81] 63  Resp:  [16-25] 20  SpO2:  [96 %-100 %] 96 %  BP: (123-160)/(55-92) 127/61     Weight: 97.5 kg (215 lb)  Body mass index is 38.09 kg/m².    Physical Exam   Constitutional: She is oriented to person, place, and time. She appears well-developed and well-nourished. No distress.   HENT:   Head: Normocephalic and atraumatic.   Mouth/Throat: Uvula is midline and mucous membranes are normal.   Mild left eye injection   Eyes: Conjunctivae and EOM are normal. Pupils are equal, round, and reactive to light.   Neck: Normal range of motion and phonation normal. Neck supple. No spinous process tenderness and no muscular tenderness present. No neck rigidity. No tracheal deviation, no edema and normal range of motion present. No  Brudzinski's sign and no Kernig's sign noted. No thyromegaly present.   Cardiovascular: Normal rate and regular rhythm.   No murmur heard.  Pulmonary/Chest: Effort normal and breath sounds normal. No stridor. No respiratory distress. She has no wheezes.   Abdominal: Soft. Bowel sounds are normal. She exhibits no distension. There is no tenderness.   Musculoskeletal: Normal range of motion. She exhibits no edema.   Neurological: She is alert and oriented to person, place, and time. No cranial nerve deficit. GCS eye subscore is 4. GCS verbal subscore is 5. GCS motor subscore is 6.   5/5 strength throughout, speechclear, no drooling, no facial droop, AOx4, no facial tremor appreciated, no numbness to light palpation of left cheek, no temporal tenderness or tortuous arteries or temporal bruits. No crepitus or pain at TMJ   Skin: Skin is warm and dry. She is not diaphoretic.   Psychiatric: She has a normal mood and affect. Her behavior is normal.   Nursing note and vitals reviewed.        CRANIAL NERVES     CN III, IV, VI   Pupils are equal, round, and reactive to light.  Extraocular motions are normal.        Significant Labs:   BMP:   Recent Labs   Lab 02/16/19 0040   *      K 3.9      CO2 21*   BUN 11   CREATININE 0.8   CALCIUM 9.1   MG 2.0     CBC:   Recent Labs   Lab 02/16/19 0040   WBC 6.06   HGB 13.1   HCT 40.1        CMP:   Recent Labs   Lab 02/16/19 0040      K 3.9      CO2 21*   *   BUN 11   CREATININE 0.8   CALCIUM 9.1   PROT 7.1   ALBUMIN 3.4*   BILITOT 0.3   ALKPHOS 77   AST 17   ALT 24   ANIONGAP 9   EGFRNONAA >60.0     Cardiac Markers: No results for input(s): CKMB, MYOGLOBIN, BNP, TROPISTAT in the last 48 hours.  Troponin: No results for input(s): TROPONINI in the last 48 hours.  TSH:   Recent Labs   Lab 02/16/19 0040   TSH 1.377       Significant Imaging: I have reviewed all pertinent imaging results/findings within the past 24 hours.   Hydroxychloroquine Pregnancy And Lactation Text: This medication has been shown to cause fetal harm but it isn't assigned a Pregnancy Risk Category. There are small amounts excreted in breast milk.

## 2021-01-20 DIAGNOSIS — Z12.31 OTHER SCREENING MAMMOGRAM: ICD-10-CM

## 2021-01-27 DIAGNOSIS — G47.00 INSOMNIA, UNSPECIFIED TYPE: ICD-10-CM

## 2021-01-29 RX ORDER — ZOLPIDEM TARTRATE 10 MG/1
10 TABLET ORAL NIGHTLY
Qty: 30 TABLET | Refills: 5 | Status: SHIPPED | OUTPATIENT
Start: 2021-01-29 | End: 2021-08-18

## 2021-01-29 RX ORDER — HYDROCORTISONE 25 MG/G
CREAM TOPICAL
Qty: 28 G | Refills: 0 | Status: SHIPPED | OUTPATIENT
Start: 2021-01-29 | End: 2022-06-14 | Stop reason: SDUPTHER

## 2021-02-02 ENCOUNTER — CLINICAL SUPPORT (OUTPATIENT)
Dept: FAMILY MEDICINE | Facility: CLINIC | Age: 56
End: 2021-02-02
Payer: COMMERCIAL

## 2021-02-02 DIAGNOSIS — Z23 NEED FOR SHINGLES VACCINE: Primary | ICD-10-CM

## 2021-02-02 PROCEDURE — 90750 ZOSTER RECOMBINANT VACCINE: ICD-10-PCS | Mod: S$GLB,,, | Performed by: FAMILY MEDICINE

## 2021-02-02 PROCEDURE — 90750 HZV VACC RECOMBINANT IM: CPT | Mod: S$GLB,,, | Performed by: FAMILY MEDICINE

## 2021-02-02 PROCEDURE — 90471 IMMUNIZATION ADMIN: CPT | Mod: S$GLB,,, | Performed by: FAMILY MEDICINE

## 2021-02-02 PROCEDURE — 90471 ZOSTER RECOMBINANT VACCINE: ICD-10-PCS | Mod: S$GLB,,, | Performed by: FAMILY MEDICINE

## 2021-02-07 ENCOUNTER — PATIENT OUTREACH (OUTPATIENT)
Dept: ADMINISTRATIVE | Facility: OTHER | Age: 56
End: 2021-02-07

## 2021-02-08 ENCOUNTER — OFFICE VISIT (OUTPATIENT)
Dept: NEUROLOGY | Facility: CLINIC | Age: 56
End: 2021-02-08
Payer: COMMERCIAL

## 2021-02-08 VITALS
BODY MASS INDEX: 37.83 KG/M2 | HEART RATE: 88 BPM | HEIGHT: 63 IN | SYSTOLIC BLOOD PRESSURE: 136 MMHG | DIASTOLIC BLOOD PRESSURE: 83 MMHG | WEIGHT: 213.5 LBS

## 2021-02-08 DIAGNOSIS — M54.81 OCCIPITAL NEURALGIA OF LEFT SIDE: ICD-10-CM

## 2021-02-08 DIAGNOSIS — G43.009 MIGRAINE WITHOUT AURA AND WITHOUT STATUS MIGRAINOSUS, NOT INTRACTABLE: ICD-10-CM

## 2021-02-08 DIAGNOSIS — R47.1 DYSARTHRIA: ICD-10-CM

## 2021-02-08 DIAGNOSIS — G51.39 HEMIFACIAL SPASM, UNSPECIFIED LATERALITY: Primary | ICD-10-CM

## 2021-02-08 PROCEDURE — 99215 OFFICE O/P EST HI 40 MIN: CPT | Mod: S$GLB,,, | Performed by: STUDENT IN AN ORGANIZED HEALTH CARE EDUCATION/TRAINING PROGRAM

## 2021-02-08 PROCEDURE — 99999 PR PBB SHADOW E&M-EST. PATIENT-LVL III: ICD-10-PCS | Mod: PBBFAC,,, | Performed by: STUDENT IN AN ORGANIZED HEALTH CARE EDUCATION/TRAINING PROGRAM

## 2021-02-08 PROCEDURE — 99215 PR OFFICE/OUTPT VISIT, EST, LEVL V, 40-54 MIN: ICD-10-PCS | Mod: S$GLB,,, | Performed by: STUDENT IN AN ORGANIZED HEALTH CARE EDUCATION/TRAINING PROGRAM

## 2021-02-08 PROCEDURE — 99999 PR PBB SHADOW E&M-EST. PATIENT-LVL III: CPT | Mod: PBBFAC,,, | Performed by: STUDENT IN AN ORGANIZED HEALTH CARE EDUCATION/TRAINING PROGRAM

## 2021-02-08 RX ORDER — TOPIRAMATE 50 MG/1
75 TABLET, FILM COATED ORAL NIGHTLY
Qty: 45 TABLET | Refills: 4 | Status: SHIPPED | OUTPATIENT
Start: 2021-02-08 | End: 2021-05-05 | Stop reason: SDUPTHER

## 2021-02-19 ENCOUNTER — HOSPITAL ENCOUNTER (EMERGENCY)
Facility: HOSPITAL | Age: 56
Discharge: HOME | End: 2021-02-19
Attending: EMERGENCY MEDICINE
Payer: COMMERCIAL

## 2021-02-19 VITALS
BODY MASS INDEX: 35.97 KG/M2 | HEART RATE: 85 BPM | RESPIRATION RATE: 18 BRPM | SYSTOLIC BLOOD PRESSURE: 156 MMHG | DIASTOLIC BLOOD PRESSURE: 90 MMHG | OXYGEN SATURATION: 100 % | WEIGHT: 203 LBS | TEMPERATURE: 98 F | HEIGHT: 63 IN

## 2021-02-19 DIAGNOSIS — R06.02 SOB (SHORTNESS OF BREATH): ICD-10-CM

## 2021-02-19 DIAGNOSIS — R10.10 UPPER ABDOMINAL PAIN: ICD-10-CM

## 2021-02-19 DIAGNOSIS — R19.7 DIARRHEA, UNSPECIFIED TYPE: ICD-10-CM

## 2021-02-19 DIAGNOSIS — R11.2 NON-INTRACTABLE VOMITING WITH NAUSEA, UNSPECIFIED VOMITING TYPE: Primary | ICD-10-CM

## 2021-02-19 LAB
ALBUMIN SERPL BCP-MCNC: 4.1 G/DL (ref 3.5–5.2)
ALP SERPL-CCNC: 109 U/L (ref 55–135)
ALT SERPL W/O P-5'-P-CCNC: 25 U/L (ref 10–44)
ANION GAP SERPL CALC-SCNC: 13 MMOL/L (ref 8–16)
AST SERPL-CCNC: 14 U/L (ref 10–40)
BACTERIA #/AREA URNS HPF: ABNORMAL /HPF
BASOPHILS # BLD AUTO: 0.01 K/UL (ref 0–0.2)
BASOPHILS NFR BLD: 0.1 % (ref 0–1.9)
BILIRUB SERPL-MCNC: 0.6 MG/DL (ref 0.1–1)
BILIRUB UR QL STRIP: NEGATIVE
BNP SERPL-MCNC: <10 PG/ML (ref 0–99)
BUN SERPL-MCNC: 13 MG/DL (ref 6–20)
CALCIUM SERPL-MCNC: 10.4 MG/DL (ref 8.7–10.5)
CHLORIDE SERPL-SCNC: 101 MMOL/L (ref 95–110)
CLARITY UR: CLEAR
CO2 SERPL-SCNC: 23 MMOL/L (ref 23–29)
COLOR UR: YELLOW
CREAT SERPL-MCNC: 1 MG/DL (ref 0.5–1.4)
CTP QC/QA: YES
DIFFERENTIAL METHOD: ABNORMAL
EOSINOPHIL # BLD AUTO: 0.3 K/UL (ref 0–0.5)
EOSINOPHIL NFR BLD: 2.8 % (ref 0–8)
ERYTHROCYTE [DISTWIDTH] IN BLOOD BY AUTOMATED COUNT: 12.8 % (ref 11.5–14.5)
EST. GFR  (AFRICAN AMERICAN): >60 ML/MIN/1.73 M^2
EST. GFR  (NON AFRICAN AMERICAN): >60 ML/MIN/1.73 M^2
GLUCOSE SERPL-MCNC: 120 MG/DL (ref 70–110)
GLUCOSE UR QL STRIP: NEGATIVE
HCT VFR BLD AUTO: 45.9 % (ref 37–48.5)
HGB BLD-MCNC: 15.6 G/DL (ref 12–16)
HGB UR QL STRIP: NEGATIVE
HYALINE CASTS #/AREA URNS LPF: 1 /LPF
IMM GRANULOCYTES # BLD AUTO: 0.03 K/UL (ref 0–0.04)
IMM GRANULOCYTES NFR BLD AUTO: 0.3 % (ref 0–0.5)
KETONES UR QL STRIP: NEGATIVE
LEUKOCYTE ESTERASE UR QL STRIP: ABNORMAL
LIPASE SERPL-CCNC: 15 U/L (ref 4–60)
LYMPHOCYTES # BLD AUTO: 3 K/UL (ref 1–4.8)
LYMPHOCYTES NFR BLD: 27.6 % (ref 18–48)
MCH RBC QN AUTO: 30.3 PG (ref 27–31)
MCHC RBC AUTO-ENTMCNC: 34 G/DL (ref 32–36)
MCV RBC AUTO: 89 FL (ref 82–98)
MICROSCOPIC COMMENT: ABNORMAL
MONOCYTES # BLD AUTO: 0.6 K/UL (ref 0.3–1)
MONOCYTES NFR BLD: 5.8 % (ref 4–15)
NEUTROPHILS # BLD AUTO: 6.8 K/UL (ref 1.8–7.7)
NEUTROPHILS NFR BLD: 63.4 % (ref 38–73)
NITRITE UR QL STRIP: NEGATIVE
NRBC BLD-RTO: 0 /100 WBC
PH UR STRIP: 5 [PH] (ref 5–8)
PLATELET # BLD AUTO: 479 K/UL (ref 150–350)
PMV BLD AUTO: 9.5 FL (ref 9.2–12.9)
POTASSIUM SERPL-SCNC: 3.7 MMOL/L (ref 3.5–5.1)
PROT SERPL-MCNC: 9.1 G/DL (ref 6–8.4)
PROT UR QL STRIP: ABNORMAL
RBC # BLD AUTO: 5.15 M/UL (ref 4–5.4)
RBC #/AREA URNS HPF: 13 /HPF (ref 0–4)
SARS-COV-2 RDRP RESP QL NAA+PROBE: NEGATIVE
SODIUM SERPL-SCNC: 137 MMOL/L (ref 136–145)
SP GR UR STRIP: 1.03 (ref 1–1.03)
SQUAMOUS #/AREA URNS HPF: 3 /HPF
TROPONIN I SERPL DL<=0.01 NG/ML-MCNC: 0.01 NG/ML (ref 0–0.03)
URN SPEC COLLECT METH UR: ABNORMAL
UROBILINOGEN UR STRIP-ACNC: NEGATIVE EU/DL
WBC # BLD AUTO: 10.78 K/UL (ref 3.9–12.7)
WBC #/AREA URNS HPF: 16 /HPF (ref 0–5)
WBC CLUMPS URNS QL MICRO: ABNORMAL

## 2021-02-19 PROCEDURE — 63600175 PHARM REV CODE 636 W HCPCS: Performed by: PHYSICIAN ASSISTANT

## 2021-02-19 PROCEDURE — 87086 URINE CULTURE/COLONY COUNT: CPT

## 2021-02-19 PROCEDURE — 87040 BLOOD CULTURE FOR BACTERIA: CPT

## 2021-02-19 PROCEDURE — 81000 URINALYSIS NONAUTO W/SCOPE: CPT

## 2021-02-19 PROCEDURE — 84484 ASSAY OF TROPONIN QUANT: CPT

## 2021-02-19 PROCEDURE — U0003 INFECTIOUS AGENT DETECTION BY NUCLEIC ACID (DNA OR RNA); SEVERE ACUTE RESPIRATORY SYNDROME CORONAVIRUS 2 (SARS-COV-2) (CORONAVIRUS DISEASE [COVID-19]), AMPLIFIED PROBE TECHNIQUE, MAKING USE OF HIGH THROUGHPUT TECHNOLOGIES AS DESCRIBED BY CMS-2020-01-R: HCPCS

## 2021-02-19 PROCEDURE — U0002 COVID-19 LAB TEST NON-CDC: HCPCS | Performed by: PHYSICIAN ASSISTANT

## 2021-02-19 PROCEDURE — 93010 EKG 12-LEAD: ICD-10-PCS | Mod: ,,, | Performed by: INTERNAL MEDICINE

## 2021-02-19 PROCEDURE — 83880 ASSAY OF NATRIURETIC PEPTIDE: CPT

## 2021-02-19 PROCEDURE — 96361 HYDRATE IV INFUSION ADD-ON: CPT

## 2021-02-19 PROCEDURE — 83690 ASSAY OF LIPASE: CPT

## 2021-02-19 PROCEDURE — U0005 INFEC AGEN DETEC AMPLI PROBE: HCPCS

## 2021-02-19 PROCEDURE — 80053 COMPREHEN METABOLIC PANEL: CPT

## 2021-02-19 PROCEDURE — 96375 TX/PRO/DX INJ NEW DRUG ADDON: CPT

## 2021-02-19 PROCEDURE — 63600175 PHARM REV CODE 636 W HCPCS: Performed by: NURSE PRACTITIONER

## 2021-02-19 PROCEDURE — 25000003 PHARM REV CODE 250: Performed by: PHYSICIAN ASSISTANT

## 2021-02-19 PROCEDURE — 96374 THER/PROPH/DIAG INJ IV PUSH: CPT

## 2021-02-19 PROCEDURE — 85025 COMPLETE CBC W/AUTO DIFF WBC: CPT

## 2021-02-19 PROCEDURE — 93005 ELECTROCARDIOGRAM TRACING: CPT

## 2021-02-19 PROCEDURE — 99285 EMERGENCY DEPT VISIT HI MDM: CPT | Mod: 25

## 2021-02-19 PROCEDURE — 93010 ELECTROCARDIOGRAM REPORT: CPT | Mod: ,,, | Performed by: INTERNAL MEDICINE

## 2021-02-19 RX ORDER — KETOROLAC TROMETHAMINE 30 MG/ML
15 INJECTION, SOLUTION INTRAMUSCULAR; INTRAVENOUS
Status: COMPLETED | OUTPATIENT
Start: 2021-02-19 | End: 2021-02-19

## 2021-02-19 RX ORDER — ONDANSETRON 2 MG/ML
4 INJECTION INTRAMUSCULAR; INTRAVENOUS
Status: COMPLETED | OUTPATIENT
Start: 2021-02-19 | End: 2021-02-19

## 2021-02-19 RX ORDER — LIDOCAINE HYDROCHLORIDE 20 MG/ML
10 SOLUTION OROPHARYNGEAL
Status: COMPLETED | OUTPATIENT
Start: 2021-02-19 | End: 2021-02-19

## 2021-02-19 RX ORDER — DICYCLOMINE HYDROCHLORIDE 20 MG/1
20 TABLET ORAL 4 TIMES DAILY
Qty: 28 TABLET | Refills: 0 | Status: SHIPPED | OUTPATIENT
Start: 2021-02-19 | End: 2021-02-26

## 2021-02-19 RX ORDER — MORPHINE SULFATE 4 MG/ML
4 INJECTION, SOLUTION INTRAMUSCULAR; INTRAVENOUS
Status: COMPLETED | OUTPATIENT
Start: 2021-02-19 | End: 2021-02-19

## 2021-02-19 RX ORDER — PROMETHAZINE HYDROCHLORIDE 25 MG/1
25 TABLET ORAL EVERY 6 HOURS PRN
Qty: 20 TABLET | Refills: 0 | Status: SHIPPED | OUTPATIENT
Start: 2021-02-19 | End: 2021-02-24

## 2021-02-19 RX ORDER — MAG HYDROX/ALUMINUM HYD/SIMETH 200-200-20
30 SUSPENSION, ORAL (FINAL DOSE FORM) ORAL
Status: COMPLETED | OUTPATIENT
Start: 2021-02-19 | End: 2021-02-19

## 2021-02-19 RX ORDER — ACETAMINOPHEN 500 MG
500 TABLET ORAL
Status: COMPLETED | OUTPATIENT
Start: 2021-02-19 | End: 2021-02-19

## 2021-02-19 RX ORDER — ONDANSETRON 4 MG/1
4 TABLET, ORALLY DISINTEGRATING ORAL EVERY 6 HOURS PRN
Qty: 15 TABLET | Refills: 0 | Status: SHIPPED | OUTPATIENT
Start: 2021-02-19 | End: 2021-02-24

## 2021-02-19 RX ORDER — DOXYCYCLINE HYCLATE 100 MG
100 TABLET ORAL
Status: COMPLETED | OUTPATIENT
Start: 2021-02-19 | End: 2021-02-19

## 2021-02-19 RX ORDER — IBUPROFEN 600 MG/1
600 TABLET ORAL EVERY 6 HOURS PRN
Qty: 20 TABLET | Refills: 0 | Status: SHIPPED | OUTPATIENT
Start: 2021-02-19 | End: 2021-02-24

## 2021-02-19 RX ORDER — DOXYCYCLINE 100 MG/1
100 CAPSULE ORAL EVERY 12 HOURS
Qty: 14 CAPSULE | Refills: 0 | Status: SHIPPED | OUTPATIENT
Start: 2021-02-19 | End: 2021-02-26

## 2021-02-19 RX ADMIN — ONDANSETRON 4 MG: 2 INJECTION INTRAMUSCULAR; INTRAVENOUS at 02:02

## 2021-02-19 RX ADMIN — KETOROLAC TROMETHAMINE 15 MG: 30 INJECTION, SOLUTION INTRAMUSCULAR; INTRAVENOUS at 04:02

## 2021-02-19 RX ADMIN — SODIUM CHLORIDE 1000 ML: 0.9 INJECTION, SOLUTION INTRAVENOUS at 04:02

## 2021-02-19 RX ADMIN — ACETAMINOPHEN 500 MG: 500 TABLET ORAL at 04:02

## 2021-02-19 RX ADMIN — ALUMINUM HYDROXIDE, MAGNESIUM HYDROXIDE, AND SIMETHICONE 30 ML: 200; 200; 20 SUSPENSION ORAL at 05:02

## 2021-02-19 RX ADMIN — DOXYCYCLINE HYCLATE 100 MG: 100 TABLET, COATED ORAL at 05:02

## 2021-02-19 RX ADMIN — MORPHINE SULFATE 4 MG: 4 INJECTION INTRAVENOUS at 04:02

## 2021-02-19 RX ADMIN — LIDOCAINE HYDROCHLORIDE 10 ML: 20 SOLUTION ORAL; TOPICAL at 05:02

## 2021-02-21 LAB
BACTERIA UR CULT: NORMAL
SARS-COV-2 RNA RESP QL NAA+PROBE: NOT DETECTED

## 2021-02-24 DIAGNOSIS — E11.9 TYPE 2 DIABETES MELLITUS WITHOUT COMPLICATION: ICD-10-CM

## 2021-02-24 LAB
BACTERIA BLD CULT: NORMAL
BACTERIA BLD CULT: NORMAL

## 2021-03-25 ENCOUNTER — PATIENT OUTREACH (OUTPATIENT)
Dept: ADMINISTRATIVE | Facility: OTHER | Age: 56
End: 2021-03-25

## 2021-03-29 ENCOUNTER — TELEPHONE (OUTPATIENT)
Dept: NEUROLOGY | Facility: CLINIC | Age: 56
End: 2021-03-29

## 2021-04-05 ENCOUNTER — PATIENT MESSAGE (OUTPATIENT)
Dept: ADMINISTRATIVE | Facility: HOSPITAL | Age: 56
End: 2021-04-05

## 2021-04-08 ENCOUNTER — HOSPITAL ENCOUNTER (OUTPATIENT)
Dept: RADIOLOGY | Facility: HOSPITAL | Age: 56
Discharge: HOME | End: 2021-04-08
Attending: FAMILY MEDICINE
Payer: COMMERCIAL

## 2021-04-08 DIAGNOSIS — Z12.31 OTHER SCREENING MAMMOGRAM: ICD-10-CM

## 2021-04-08 PROCEDURE — 77067 MAMMO DIGITAL SCREENING BILAT WITH TOMO: ICD-10-PCS | Mod: 26,,, | Performed by: RADIOLOGY

## 2021-04-08 PROCEDURE — 77067 SCR MAMMO BI INCL CAD: CPT | Mod: 26,,, | Performed by: RADIOLOGY

## 2021-04-08 PROCEDURE — 77067 SCR MAMMO BI INCL CAD: CPT | Mod: TC,PO

## 2021-04-08 PROCEDURE — 77063 MAMMO DIGITAL SCREENING BILAT WITH TOMO: ICD-10-PCS | Mod: 26,,, | Performed by: RADIOLOGY

## 2021-04-08 PROCEDURE — 77063 BREAST TOMOSYNTHESIS BI: CPT | Mod: 26,,, | Performed by: RADIOLOGY

## 2021-04-13 ENCOUNTER — TELEPHONE (OUTPATIENT)
Dept: GASTROENTEROLOGY | Facility: CLINIC | Age: 56
End: 2021-04-13

## 2021-04-20 ENCOUNTER — TELEPHONE (OUTPATIENT)
Dept: FAMILY MEDICINE | Facility: CLINIC | Age: 56
End: 2021-04-20

## 2021-04-20 DIAGNOSIS — E11.22 CONTROLLED TYPE 2 DIABETES MELLITUS WITH STAGE 2 CHRONIC KIDNEY DISEASE, WITHOUT LONG-TERM CURRENT USE OF INSULIN: Primary | ICD-10-CM

## 2021-04-20 DIAGNOSIS — N18.2 CONTROLLED TYPE 2 DIABETES MELLITUS WITH STAGE 2 CHRONIC KIDNEY DISEASE, WITHOUT LONG-TERM CURRENT USE OF INSULIN: Primary | ICD-10-CM

## 2021-04-28 DIAGNOSIS — N18.2 CONTROLLED TYPE 2 DIABETES MELLITUS WITH STAGE 2 CHRONIC KIDNEY DISEASE, WITHOUT LONG-TERM CURRENT USE OF INSULIN: Primary | ICD-10-CM

## 2021-04-28 DIAGNOSIS — E11.22 CONTROLLED TYPE 2 DIABETES MELLITUS WITH STAGE 2 CHRONIC KIDNEY DISEASE, WITHOUT LONG-TERM CURRENT USE OF INSULIN: Primary | ICD-10-CM

## 2021-04-29 RX ORDER — EXENATIDE 2 MG/.85ML
2 INJECTION, SUSPENSION, EXTENDED RELEASE SUBCUTANEOUS
Qty: 12 PEN | Refills: 0 | Status: SHIPPED | OUTPATIENT
Start: 2021-04-29 | End: 2021-09-06 | Stop reason: SDUPTHER

## 2021-05-04 ENCOUNTER — PATIENT OUTREACH (OUTPATIENT)
Dept: ADMINISTRATIVE | Facility: OTHER | Age: 56
End: 2021-05-04

## 2021-05-05 ENCOUNTER — OFFICE VISIT (OUTPATIENT)
Dept: NEUROLOGY | Facility: CLINIC | Age: 56
End: 2021-05-05
Payer: COMMERCIAL

## 2021-05-05 VITALS
SYSTOLIC BLOOD PRESSURE: 126 MMHG | HEIGHT: 63 IN | BODY MASS INDEX: 38.9 KG/M2 | WEIGHT: 219.56 LBS | HEART RATE: 84 BPM | DIASTOLIC BLOOD PRESSURE: 78 MMHG

## 2021-05-05 DIAGNOSIS — M54.81 OCCIPITAL NEURALGIA OF LEFT SIDE: ICD-10-CM

## 2021-05-05 DIAGNOSIS — G43.009 MIGRAINE WITHOUT AURA AND WITHOUT STATUS MIGRAINOSUS, NOT INTRACTABLE: ICD-10-CM

## 2021-05-05 PROCEDURE — 99999 PR PBB SHADOW E&M-EST. PATIENT-LVL II: CPT | Mod: PBBFAC,,, | Performed by: STUDENT IN AN ORGANIZED HEALTH CARE EDUCATION/TRAINING PROGRAM

## 2021-05-05 PROCEDURE — 99999 PR PBB SHADOW E&M-EST. PATIENT-LVL II: ICD-10-PCS | Mod: PBBFAC,,, | Performed by: STUDENT IN AN ORGANIZED HEALTH CARE EDUCATION/TRAINING PROGRAM

## 2021-05-05 RX ORDER — TOPIRAMATE 50 MG/1
75 TABLET, FILM COATED ORAL NIGHTLY
Qty: 135 TABLET | Refills: 3 | Status: SHIPPED | OUTPATIENT
Start: 2021-05-05 | End: 2022-01-05

## 2021-05-05 RX ORDER — NORTRIPTYLINE HYDROCHLORIDE 25 MG/1
25 CAPSULE ORAL NIGHTLY
Qty: 30 CAPSULE | Refills: 11 | Status: SHIPPED | OUTPATIENT
Start: 2021-05-05 | End: 2022-01-05 | Stop reason: ALTCHOICE

## 2021-05-05 RX ORDER — TOPIRAMATE 50 MG/1
100 TABLET, FILM COATED ORAL NIGHTLY
Qty: 180 TABLET | Refills: 3 | Status: CANCELLED | OUTPATIENT
Start: 2021-05-05 | End: 2021-08-03

## 2021-05-18 ENCOUNTER — PATIENT OUTREACH (OUTPATIENT)
Dept: ADMINISTRATIVE | Facility: HOSPITAL | Age: 56
End: 2021-05-18

## 2021-05-18 DIAGNOSIS — E11.22 CONTROLLED TYPE 2 DIABETES MELLITUS WITH STAGE 2 CHRONIC KIDNEY DISEASE, WITHOUT LONG-TERM CURRENT USE OF INSULIN: Primary | ICD-10-CM

## 2021-05-18 DIAGNOSIS — N18.2 CONTROLLED TYPE 2 DIABETES MELLITUS WITH STAGE 2 CHRONIC KIDNEY DISEASE, WITHOUT LONG-TERM CURRENT USE OF INSULIN: Primary | ICD-10-CM

## 2021-05-18 DIAGNOSIS — Z12.11 COLON CANCER SCREENING: ICD-10-CM

## 2021-05-20 ENCOUNTER — OFFICE VISIT (OUTPATIENT)
Dept: GASTROENTEROLOGY | Facility: CLINIC | Age: 56
End: 2021-05-20
Payer: COMMERCIAL

## 2021-05-20 ENCOUNTER — PATIENT MESSAGE (OUTPATIENT)
Dept: ENDOSCOPY | Facility: HOSPITAL | Age: 56
End: 2021-05-20

## 2021-05-20 VITALS
HEART RATE: 83 BPM | HEIGHT: 63 IN | SYSTOLIC BLOOD PRESSURE: 128 MMHG | DIASTOLIC BLOOD PRESSURE: 78 MMHG | BODY MASS INDEX: 38.67 KG/M2 | WEIGHT: 218.25 LBS

## 2021-05-20 DIAGNOSIS — Z12.11 SPECIAL SCREENING FOR MALIGNANT NEOPLASMS, COLON: Primary | ICD-10-CM

## 2021-05-20 DIAGNOSIS — K21.9 GASTROESOPHAGEAL REFLUX DISEASE, UNSPECIFIED WHETHER ESOPHAGITIS PRESENT: Primary | ICD-10-CM

## 2021-05-20 DIAGNOSIS — R11.0 NAUSEA: ICD-10-CM

## 2021-05-20 DIAGNOSIS — R19.7 DIARRHEA, UNSPECIFIED TYPE: ICD-10-CM

## 2021-05-20 DIAGNOSIS — R10.10 UPPER ABDOMINAL PAIN: ICD-10-CM

## 2021-05-20 PROCEDURE — 1126F PR PAIN SEVERITY QUANTIFIED, NO PAIN PRESENT: ICD-10-PCS | Mod: S$GLB,,, | Performed by: STUDENT IN AN ORGANIZED HEALTH CARE EDUCATION/TRAINING PROGRAM

## 2021-05-20 PROCEDURE — 1126F AMNT PAIN NOTED NONE PRSNT: CPT | Mod: S$GLB,,, | Performed by: STUDENT IN AN ORGANIZED HEALTH CARE EDUCATION/TRAINING PROGRAM

## 2021-05-20 PROCEDURE — 99204 OFFICE O/P NEW MOD 45 MIN: CPT | Mod: S$GLB,,, | Performed by: STUDENT IN AN ORGANIZED HEALTH CARE EDUCATION/TRAINING PROGRAM

## 2021-05-20 PROCEDURE — 99999 PR PBB SHADOW E&M-EST. PATIENT-LVL V: CPT | Mod: PBBFAC,,, | Performed by: STUDENT IN AN ORGANIZED HEALTH CARE EDUCATION/TRAINING PROGRAM

## 2021-05-20 PROCEDURE — 99204 PR OFFICE/OUTPT VISIT, NEW, LEVL IV, 45-59 MIN: ICD-10-PCS | Mod: S$GLB,,, | Performed by: STUDENT IN AN ORGANIZED HEALTH CARE EDUCATION/TRAINING PROGRAM

## 2021-05-20 PROCEDURE — 3008F BODY MASS INDEX DOCD: CPT | Mod: CPTII,S$GLB,, | Performed by: STUDENT IN AN ORGANIZED HEALTH CARE EDUCATION/TRAINING PROGRAM

## 2021-05-20 PROCEDURE — 99999 PR PBB SHADOW E&M-EST. PATIENT-LVL V: ICD-10-PCS | Mod: PBBFAC,,, | Performed by: STUDENT IN AN ORGANIZED HEALTH CARE EDUCATION/TRAINING PROGRAM

## 2021-05-20 PROCEDURE — 3008F PR BODY MASS INDEX (BMI) DOCUMENTED: ICD-10-PCS | Mod: CPTII,S$GLB,, | Performed by: STUDENT IN AN ORGANIZED HEALTH CARE EDUCATION/TRAINING PROGRAM

## 2021-05-20 RX ORDER — PROMETHAZINE HYDROCHLORIDE 25 MG/1
TABLET ORAL
COMMUNITY
Start: 2021-05-03 | End: 2024-04-02

## 2021-05-20 RX ORDER — IBUPROFEN 600 MG/1
600 TABLET ORAL EVERY 6 HOURS PRN
COMMUNITY
Start: 2021-05-03 | End: 2021-05-31

## 2021-05-20 RX ORDER — ONDANSETRON 4 MG/1
4 TABLET, ORALLY DISINTEGRATING ORAL EVERY 6 HOURS PRN
COMMUNITY
Start: 2021-05-03 | End: 2021-05-31

## 2021-05-20 RX ORDER — MELOXICAM 15 MG
15 TABLET ORAL DAILY PRN
COMMUNITY
Start: 2021-05-03 | End: 2022-09-29 | Stop reason: SDUPTHER

## 2021-05-20 RX ORDER — DICYCLOMINE HYDROCHLORIDE 20 MG/1
20 TABLET ORAL 4 TIMES DAILY
COMMUNITY
Start: 2021-05-03

## 2021-05-20 RX ORDER — SODIUM, POTASSIUM,MAG SULFATES 17.5-3.13G
1 SOLUTION, RECONSTITUTED, ORAL ORAL DAILY
Qty: 1 KIT | Refills: 0 | Status: SHIPPED | OUTPATIENT
Start: 2021-05-20 | End: 2021-09-14

## 2021-05-20 RX ORDER — OMEPRAZOLE 20 MG/1
20 CAPSULE, DELAYED RELEASE ORAL
Qty: 60 CAPSULE | Refills: 3 | Status: SHIPPED | OUTPATIENT
Start: 2021-05-20 | End: 2021-05-25

## 2021-05-25 ENCOUNTER — TELEPHONE (OUTPATIENT)
Dept: ENDOSCOPY | Facility: HOSPITAL | Age: 56
End: 2021-05-25

## 2021-05-25 RX ORDER — PANTOPRAZOLE SODIUM 40 MG/1
40 TABLET, DELAYED RELEASE ORAL DAILY
Qty: 30 TABLET | Refills: 11 | Status: SHIPPED | OUTPATIENT
Start: 2021-05-25 | End: 2022-05-28

## 2021-05-28 ENCOUNTER — LAB VISIT (OUTPATIENT)
Dept: LAB | Facility: HOSPITAL | Age: 56
End: 2021-05-28
Attending: FAMILY MEDICINE
Payer: COMMERCIAL

## 2021-05-28 DIAGNOSIS — E11.9 TYPE 2 DIABETES MELLITUS WITHOUT COMPLICATION: ICD-10-CM

## 2021-05-28 DIAGNOSIS — N18.2 CONTROLLED TYPE 2 DIABETES MELLITUS WITH STAGE 2 CHRONIC KIDNEY DISEASE, WITHOUT LONG-TERM CURRENT USE OF INSULIN: ICD-10-CM

## 2021-05-28 DIAGNOSIS — E11.22 CONTROLLED TYPE 2 DIABETES MELLITUS WITH STAGE 2 CHRONIC KIDNEY DISEASE, WITHOUT LONG-TERM CURRENT USE OF INSULIN: ICD-10-CM

## 2021-05-28 LAB
CHOLEST SERPL-MCNC: 253 MG/DL (ref 120–199)
CHOLEST/HDLC SERPL: 5.6 {RATIO} (ref 2–5)
ESTIMATED AVG GLUCOSE: 134 MG/DL (ref 68–131)
HBA1C MFR BLD: 6.3 % (ref 4–5.6)
HDLC SERPL-MCNC: 45 MG/DL (ref 40–75)
HDLC SERPL: 17.8 % (ref 20–50)
LDLC SERPL CALC-MCNC: 154.2 MG/DL (ref 63–159)
NONHDLC SERPL-MCNC: 208 MG/DL
TRIGL SERPL-MCNC: 269 MG/DL (ref 30–150)

## 2021-05-28 PROCEDURE — 80061 LIPID PANEL: CPT | Performed by: FAMILY MEDICINE

## 2021-05-28 PROCEDURE — 36415 COLL VENOUS BLD VENIPUNCTURE: CPT | Mod: PO | Performed by: FAMILY MEDICINE

## 2021-05-28 PROCEDURE — 83036 HEMOGLOBIN GLYCOSYLATED A1C: CPT | Performed by: FAMILY MEDICINE

## 2021-05-31 ENCOUNTER — HOSPITAL ENCOUNTER (EMERGENCY)
Facility: HOSPITAL | Age: 56
Discharge: HOME | End: 2021-05-31
Attending: EMERGENCY MEDICINE
Payer: COMMERCIAL

## 2021-05-31 VITALS
SYSTOLIC BLOOD PRESSURE: 155 MMHG | RESPIRATION RATE: 16 BRPM | TEMPERATURE: 99 F | DIASTOLIC BLOOD PRESSURE: 84 MMHG | HEIGHT: 63 IN | OXYGEN SATURATION: 98 % | BODY MASS INDEX: 38.27 KG/M2 | WEIGHT: 216 LBS | HEART RATE: 69 BPM

## 2021-05-31 DIAGNOSIS — I10 HTN (HYPERTENSION): ICD-10-CM

## 2021-05-31 DIAGNOSIS — N39.0 ACUTE URINARY TRACT INFECTION: Primary | ICD-10-CM

## 2021-05-31 DIAGNOSIS — J06.9 VIRAL URI WITH COUGH: ICD-10-CM

## 2021-05-31 DIAGNOSIS — R05.9 COUGH: ICD-10-CM

## 2021-05-31 LAB
ALBUMIN SERPL-MCNC: 3.5 G/DL (ref 3.3–5.5)
ALLENS TEST: ABNORMAL
ALP SERPL-CCNC: 80 U/L (ref 42–141)
BILIRUB SERPL-MCNC: 0.7 MG/DL (ref 0.2–1.6)
BILIRUBIN, POC UA: ABNORMAL
BLOOD, POC UA: ABNORMAL
BUN SERPL-MCNC: 9 MG/DL (ref 7–22)
CALCIUM SERPL-MCNC: 10 MG/DL (ref 8–10.3)
CHLORIDE SERPL-SCNC: 107 MMOL/L (ref 98–108)
CLARITY, POC UA: CLEAR
COLOR, POC UA: ABNORMAL
CREAT SERPL-MCNC: 0.9 MG/DL (ref 0.6–1.2)
CTP QC/QA: YES
GLUCOSE SERPL-MCNC: 134 MG/DL (ref 73–118)
GLUCOSE, POC UA: NEGATIVE
HCO3 UR-SCNC: 21.7 MMOL/L (ref 24–28)
INFLUENZA A ANTIGEN, POC: NEGATIVE
INFLUENZA B ANTIGEN, POC: NEGATIVE
KETONES, POC UA: NEGATIVE
LDH SERPL L TO P-CCNC: 0.98 MMOL/L (ref 0.5–2.2)
LEUKOCYTE EST, POC UA: ABNORMAL
NITRITE, POC UA: NEGATIVE
PCO2 BLDA: 36.6 MMHG (ref 35–45)
PH SMN: 7.38 [PH] (ref 7.35–7.45)
PH UR STRIP: 5 [PH]
PO2 BLDA: 56 MMHG (ref 40–60)
POC ALT (SGPT): 43 U/L (ref 10–47)
POC AST (SGOT): 37 U/L (ref 11–38)
POC BE: -3 MMOL/L
POC CARDIAC TROPONIN I: 0.01 NG/ML
POC RAPID STREP A: NEGATIVE
POC SATURATED O2: 89 % (ref 95–100)
POC TCO2: 23 MMOL/L (ref 24–29)
POC TCO2: 25 MMOL/L (ref 18–33)
POCT GLUCOSE: 129 MG/DL (ref 70–110)
POTASSIUM BLD-SCNC: 4.2 MMOL/L (ref 3.6–5.1)
PROTEIN, POC UA: ABNORMAL
PROTEIN, POC: 8.6 G/DL (ref 6.4–8.1)
SAMPLE: ABNORMAL
SAMPLE: NORMAL
SARS-COV-2 RDRP RESP QL NAA+PROBE: NEGATIVE
SITE: ABNORMAL
SODIUM BLD-SCNC: 140 MMOL/L (ref 128–145)
SPECIFIC GRAVITY, POC UA: >=1.03
UROBILINOGEN, POC UA: 0.2 E.U./DL

## 2021-05-31 PROCEDURE — 82962 GLUCOSE BLOOD TEST: CPT | Mod: ER

## 2021-05-31 PROCEDURE — 87086 URINE CULTURE/COLONY COUNT: CPT | Performed by: EMERGENCY MEDICINE

## 2021-05-31 PROCEDURE — 81003 URINALYSIS AUTO W/O SCOPE: CPT | Mod: ER

## 2021-05-31 PROCEDURE — 84484 ASSAY OF TROPONIN QUANT: CPT | Mod: ER

## 2021-05-31 PROCEDURE — 87088 URINE BACTERIA CULTURE: CPT | Performed by: EMERGENCY MEDICINE

## 2021-05-31 PROCEDURE — 87186 SC STD MICRODIL/AGAR DIL: CPT | Performed by: EMERGENCY MEDICINE

## 2021-05-31 PROCEDURE — 80053 COMPREHEN METABOLIC PANEL: CPT | Mod: ER

## 2021-05-31 PROCEDURE — 25000003 PHARM REV CODE 250: Mod: ER | Performed by: EMERGENCY MEDICINE

## 2021-05-31 PROCEDURE — 63600175 PHARM REV CODE 636 W HCPCS: Mod: ER | Performed by: EMERGENCY MEDICINE

## 2021-05-31 PROCEDURE — 87077 CULTURE AEROBIC IDENTIFY: CPT | Performed by: EMERGENCY MEDICINE

## 2021-05-31 PROCEDURE — 82803 BLOOD GASES ANY COMBINATION: CPT | Mod: ER

## 2021-05-31 PROCEDURE — U0002 COVID-19 LAB TEST NON-CDC: HCPCS | Mod: ER | Performed by: EMERGENCY MEDICINE

## 2021-05-31 PROCEDURE — 96365 THER/PROPH/DIAG IV INF INIT: CPT | Mod: ER

## 2021-05-31 PROCEDURE — 93010 EKG 12-LEAD: ICD-10-PCS | Mod: ,,, | Performed by: INTERNAL MEDICINE

## 2021-05-31 PROCEDURE — 93010 ELECTROCARDIOGRAM REPORT: CPT | Mod: ,,, | Performed by: INTERNAL MEDICINE

## 2021-05-31 PROCEDURE — 85025 COMPLETE CBC W/AUTO DIFF WBC: CPT | Mod: ER

## 2021-05-31 PROCEDURE — 96361 HYDRATE IV INFUSION ADD-ON: CPT | Mod: ER

## 2021-05-31 PROCEDURE — 99284 EMERGENCY DEPT VISIT MOD MDM: CPT | Mod: 25,ER

## 2021-05-31 PROCEDURE — 93005 ELECTROCARDIOGRAM TRACING: CPT | Mod: ER

## 2021-05-31 RX ORDER — ACETAMINOPHEN 500 MG
1000 TABLET ORAL EVERY 6 HOURS PRN
Qty: 30 TABLET | Refills: 0 | Status: SHIPPED | OUTPATIENT
Start: 2021-05-31

## 2021-05-31 RX ORDER — LORATADINE 10 MG/1
10 TABLET ORAL DAILY
Qty: 60 TABLET | Refills: 0 | Status: SHIPPED | OUTPATIENT
Start: 2021-05-31 | End: 2022-09-21

## 2021-05-31 RX ORDER — ONDANSETRON 8 MG/1
8 TABLET, ORALLY DISINTEGRATING ORAL EVERY 6 HOURS PRN
Qty: 30 TABLET | Refills: 0 | Status: SHIPPED | OUTPATIENT
Start: 2021-05-31 | End: 2021-06-02

## 2021-05-31 RX ORDER — ONDANSETRON 4 MG/1
8 TABLET, ORALLY DISINTEGRATING ORAL ONCE
Status: COMPLETED | OUTPATIENT
Start: 2021-05-31 | End: 2021-05-31

## 2021-05-31 RX ORDER — FLUTICASONE PROPIONATE 50 MCG
1 SPRAY, SUSPENSION (ML) NASAL 2 TIMES DAILY
Qty: 16 G | Refills: 0 | Status: SHIPPED | OUTPATIENT
Start: 2021-05-31

## 2021-05-31 RX ORDER — BENZONATATE 200 MG/1
200 CAPSULE ORAL 3 TIMES DAILY PRN
Qty: 20 CAPSULE | Refills: 0 | Status: SHIPPED | OUTPATIENT
Start: 2021-05-31 | End: 2021-06-10

## 2021-05-31 RX ORDER — IBUPROFEN 600 MG/1
600 TABLET ORAL EVERY 6 HOURS PRN
Qty: 20 TABLET | Refills: 0 | Status: SHIPPED | OUTPATIENT
Start: 2021-05-31 | End: 2021-09-06 | Stop reason: SDUPTHER

## 2021-05-31 RX ORDER — CIPROFLOXACIN 2 MG/ML
400 INJECTION, SOLUTION INTRAVENOUS
Status: COMPLETED | OUTPATIENT
Start: 2021-05-31 | End: 2021-05-31

## 2021-05-31 RX ORDER — DIPHENHYDRAMINE HCL 25 MG
25 CAPSULE ORAL EVERY 6 HOURS PRN
Qty: 20 CAPSULE | Refills: 0 | Status: SHIPPED | OUTPATIENT
Start: 2021-05-31 | End: 2022-02-17

## 2021-05-31 RX ORDER — CIPROFLOXACIN 250 MG/1
250 TABLET, FILM COATED ORAL 2 TIMES DAILY
Qty: 10 TABLET | Refills: 0 | Status: SHIPPED | OUTPATIENT
Start: 2021-05-31 | End: 2021-06-05

## 2021-05-31 RX ADMIN — SODIUM CHLORIDE 1000 ML: 0.9 INJECTION, SOLUTION INTRAVENOUS at 08:05

## 2021-05-31 RX ADMIN — ONDANSETRON 8 MG: 4 TABLET, ORALLY DISINTEGRATING ORAL at 08:05

## 2021-05-31 RX ADMIN — CIPROFLOXACIN 400 MG: 2 INJECTION, SOLUTION INTRAVENOUS at 08:05

## 2021-06-02 ENCOUNTER — LAB VISIT (OUTPATIENT)
Dept: LAB | Facility: HOSPITAL | Age: 56
End: 2021-06-02
Attending: INTERNAL MEDICINE
Payer: COMMERCIAL

## 2021-06-02 ENCOUNTER — OFFICE VISIT (OUTPATIENT)
Dept: FAMILY MEDICINE | Facility: CLINIC | Age: 56
End: 2021-06-02
Payer: COMMERCIAL

## 2021-06-02 VITALS
TEMPERATURE: 98 F | BODY MASS INDEX: 38.44 KG/M2 | RESPIRATION RATE: 20 BRPM | HEART RATE: 83 BPM | DIASTOLIC BLOOD PRESSURE: 88 MMHG | OXYGEN SATURATION: 99 % | SYSTOLIC BLOOD PRESSURE: 140 MMHG | WEIGHT: 216.94 LBS | HEIGHT: 63 IN

## 2021-06-02 DIAGNOSIS — N18.2 CONTROLLED TYPE 2 DIABETES MELLITUS WITH STAGE 2 CHRONIC KIDNEY DISEASE, WITHOUT LONG-TERM CURRENT USE OF INSULIN: ICD-10-CM

## 2021-06-02 DIAGNOSIS — R19.7 DIARRHEA, UNSPECIFIED TYPE: ICD-10-CM

## 2021-06-02 DIAGNOSIS — J06.9 UPPER RESPIRATORY TRACT INFECTION, UNSPECIFIED TYPE: ICD-10-CM

## 2021-06-02 DIAGNOSIS — E11.22 CONTROLLED TYPE 2 DIABETES MELLITUS WITH STAGE 2 CHRONIC KIDNEY DISEASE, WITHOUT LONG-TERM CURRENT USE OF INSULIN: ICD-10-CM

## 2021-06-02 DIAGNOSIS — R19.7 DIARRHEA, UNSPECIFIED TYPE: Primary | ICD-10-CM

## 2021-06-02 DIAGNOSIS — I10 ESSENTIAL HYPERTENSION, BENIGN: ICD-10-CM

## 2021-06-02 LAB
ALBUMIN SERPL BCP-MCNC: 3.7 G/DL (ref 3.5–5.2)
ALP SERPL-CCNC: 94 U/L (ref 55–135)
ALT SERPL W/O P-5'-P-CCNC: 53 U/L (ref 10–44)
AMYLASE SERPL-CCNC: 69 U/L (ref 20–110)
ANION GAP SERPL CALC-SCNC: 12 MMOL/L (ref 8–16)
AST SERPL-CCNC: 32 U/L (ref 10–40)
BACTERIA UR CULT: NORMAL
BASOPHILS # BLD AUTO: 0.02 K/UL (ref 0–0.2)
BASOPHILS NFR BLD: 0.2 % (ref 0–1.9)
BILIRUB SERPL-MCNC: 0.3 MG/DL (ref 0.1–1)
BUN SERPL-MCNC: 10 MG/DL (ref 6–20)
CALCIUM SERPL-MCNC: 10.3 MG/DL (ref 8.7–10.5)
CHLORIDE SERPL-SCNC: 107 MMOL/L (ref 95–110)
CO2 SERPL-SCNC: 20 MMOL/L (ref 23–29)
CREAT SERPL-MCNC: 0.9 MG/DL (ref 0.5–1.4)
DIFFERENTIAL METHOD: ABNORMAL
EOSINOPHIL # BLD AUTO: 0.2 K/UL (ref 0–0.5)
EOSINOPHIL NFR BLD: 1.8 % (ref 0–8)
ERYTHROCYTE [DISTWIDTH] IN BLOOD BY AUTOMATED COUNT: 14.6 % (ref 11.5–14.5)
EST. GFR  (AFRICAN AMERICAN): >60 ML/MIN/1.73 M^2
EST. GFR  (NON AFRICAN AMERICAN): >60 ML/MIN/1.73 M^2
GLUCOSE SERPL-MCNC: 104 MG/DL (ref 70–110)
HCT VFR BLD AUTO: 43 % (ref 37–48.5)
HGB BLD-MCNC: 13.6 G/DL (ref 12–16)
IMM GRANULOCYTES # BLD AUTO: 0.11 K/UL (ref 0–0.04)
IMM GRANULOCYTES NFR BLD AUTO: 1.3 % (ref 0–0.5)
LIPASE SERPL-CCNC: 55 U/L (ref 4–60)
LYMPHOCYTES # BLD AUTO: 4.6 K/UL (ref 1–4.8)
LYMPHOCYTES NFR BLD: 55.3 % (ref 18–48)
MCH RBC QN AUTO: 29.4 PG (ref 27–31)
MCHC RBC AUTO-ENTMCNC: 31.6 G/DL (ref 32–36)
MCV RBC AUTO: 93 FL (ref 82–98)
MONOCYTES # BLD AUTO: 0.6 K/UL (ref 0.3–1)
MONOCYTES NFR BLD: 7.6 % (ref 4–15)
NEUTROPHILS # BLD AUTO: 2.8 K/UL (ref 1.8–7.7)
NEUTROPHILS NFR BLD: 33.8 % (ref 38–73)
NRBC BLD-RTO: 0 /100 WBC
PLATELET # BLD AUTO: 301 K/UL (ref 150–450)
PLATELET BLD QL SMEAR: ABNORMAL
PMV BLD AUTO: 11 FL (ref 9.2–12.9)
POTASSIUM SERPL-SCNC: 4.7 MMOL/L (ref 3.5–5.1)
PROT SERPL-MCNC: 8.7 G/DL (ref 6–8.4)
RBC # BLD AUTO: 4.62 M/UL (ref 4–5.4)
SODIUM SERPL-SCNC: 139 MMOL/L (ref 136–145)
WBC # BLD AUTO: 8.39 K/UL (ref 3.9–12.7)

## 2021-06-02 PROCEDURE — 3044F PR MOST RECENT HEMOGLOBIN A1C LEVEL <7.0%: ICD-10-PCS | Mod: CPTII,S$GLB,, | Performed by: INTERNAL MEDICINE

## 2021-06-02 PROCEDURE — 99214 OFFICE O/P EST MOD 30 MIN: CPT | Mod: S$GLB,,, | Performed by: INTERNAL MEDICINE

## 2021-06-02 PROCEDURE — 99214 PR OFFICE/OUTPT VISIT, EST, LEVL IV, 30-39 MIN: ICD-10-PCS | Mod: S$GLB,,, | Performed by: INTERNAL MEDICINE

## 2021-06-02 PROCEDURE — 82150 ASSAY OF AMYLASE: CPT | Performed by: INTERNAL MEDICINE

## 2021-06-02 PROCEDURE — 3008F BODY MASS INDEX DOCD: CPT | Mod: CPTII,S$GLB,, | Performed by: INTERNAL MEDICINE

## 2021-06-02 PROCEDURE — 85025 COMPLETE CBC W/AUTO DIFF WBC: CPT | Performed by: INTERNAL MEDICINE

## 2021-06-02 PROCEDURE — 1126F PR PAIN SEVERITY QUANTIFIED, NO PAIN PRESENT: ICD-10-PCS | Mod: S$GLB,,, | Performed by: INTERNAL MEDICINE

## 2021-06-02 PROCEDURE — 36415 COLL VENOUS BLD VENIPUNCTURE: CPT | Mod: PO | Performed by: INTERNAL MEDICINE

## 2021-06-02 PROCEDURE — 3008F PR BODY MASS INDEX (BMI) DOCUMENTED: ICD-10-PCS | Mod: CPTII,S$GLB,, | Performed by: INTERNAL MEDICINE

## 2021-06-02 PROCEDURE — 99999 PR PBB SHADOW E&M-EST. PATIENT-LVL III: CPT | Mod: PBBFAC,,, | Performed by: INTERNAL MEDICINE

## 2021-06-02 PROCEDURE — 99999 PR PBB SHADOW E&M-EST. PATIENT-LVL III: ICD-10-PCS | Mod: PBBFAC,,, | Performed by: INTERNAL MEDICINE

## 2021-06-02 PROCEDURE — 3044F HG A1C LEVEL LT 7.0%: CPT | Mod: CPTII,S$GLB,, | Performed by: INTERNAL MEDICINE

## 2021-06-02 PROCEDURE — 83690 ASSAY OF LIPASE: CPT | Performed by: INTERNAL MEDICINE

## 2021-06-02 PROCEDURE — 80053 COMPREHEN METABOLIC PANEL: CPT | Performed by: INTERNAL MEDICINE

## 2021-06-02 PROCEDURE — 1126F AMNT PAIN NOTED NONE PRSNT: CPT | Mod: S$GLB,,, | Performed by: INTERNAL MEDICINE

## 2021-06-02 RX ORDER — LOPERAMIDE HYDROCHLORIDE 2 MG/1
2 CAPSULE ORAL 3 TIMES DAILY PRN
Qty: 30 CAPSULE | Refills: 0 | Status: SHIPPED | OUTPATIENT
Start: 2021-06-02 | End: 2021-06-12

## 2021-06-03 ENCOUNTER — LAB VISIT (OUTPATIENT)
Dept: LAB | Facility: HOSPITAL | Age: 56
End: 2021-06-03
Attending: INTERNAL MEDICINE
Payer: COMMERCIAL

## 2021-06-03 DIAGNOSIS — R19.7 DIARRHEA, UNSPECIFIED TYPE: ICD-10-CM

## 2021-06-03 LAB
C DIFF GDH STL QL: NEGATIVE
C DIFF TOX A+B STL QL IA: NEGATIVE

## 2021-06-03 PROCEDURE — 87046 STOOL CULTR AEROBIC BACT EA: CPT | Performed by: INTERNAL MEDICINE

## 2021-06-03 PROCEDURE — 87425 ROTAVIRUS AG IA: CPT | Performed by: INTERNAL MEDICINE

## 2021-06-03 PROCEDURE — 83993 ASSAY FOR CALPROTECTIN FECAL: CPT | Performed by: INTERNAL MEDICINE

## 2021-06-03 PROCEDURE — 87209 SMEAR COMPLEX STAIN: CPT | Performed by: INTERNAL MEDICINE

## 2021-06-03 PROCEDURE — 87045 FECES CULTURE AEROBIC BACT: CPT | Performed by: INTERNAL MEDICINE

## 2021-06-03 PROCEDURE — 89125 SPECIMEN FAT STAIN: CPT | Performed by: INTERNAL MEDICINE

## 2021-06-03 PROCEDURE — 87338 HPYLORI STOOL AG IA: CPT | Performed by: INTERNAL MEDICINE

## 2021-06-03 PROCEDURE — 87324 CLOSTRIDIUM AG IA: CPT | Performed by: INTERNAL MEDICINE

## 2021-06-03 PROCEDURE — 87427 SHIGA-LIKE TOXIN AG IA: CPT | Performed by: INTERNAL MEDICINE

## 2021-06-03 PROCEDURE — 87329 GIARDIA AG IA: CPT | Performed by: INTERNAL MEDICINE

## 2021-06-03 PROCEDURE — 89055 LEUKOCYTE ASSESSMENT FECAL: CPT | Performed by: INTERNAL MEDICINE

## 2021-06-03 PROCEDURE — 82272 OCCULT BLD FECES 1-3 TESTS: CPT | Performed by: INTERNAL MEDICINE

## 2021-06-03 PROCEDURE — 82656 EL-1 FECAL QUAL/SEMIQ: CPT | Performed by: INTERNAL MEDICINE

## 2021-06-03 PROCEDURE — 87449 NOS EACH ORGANISM AG IA: CPT | Performed by: INTERNAL MEDICINE

## 2021-06-04 LAB
CRYPTOSP AG STL QL IA: NEGATIVE
G LAMBLIA AG STL QL IA: NEGATIVE
OB PNL STL: NEGATIVE
RV AG STL QL IA.RAPID: NEGATIVE
WBC #/AREA STL HPF: NORMAL /[HPF]

## 2021-06-05 LAB — ELASTASE 1, FECAL: >500 MCG/G

## 2021-06-07 LAB
BACTERIA STL CULT: NORMAL
BACTERIA STL CULT: NORMAL
FAT STL SUDAN IV STN: NORMAL
O+P STL MICRO: NORMAL

## 2021-06-08 LAB
CALPROTECTIN STL-MCNT: 32 MCG/G
H PYLORI AG STL QL IA: DETECTED

## 2021-06-09 ENCOUNTER — TELEPHONE (OUTPATIENT)
Dept: FAMILY MEDICINE | Facility: CLINIC | Age: 56
End: 2021-06-09

## 2021-06-09 DIAGNOSIS — A04.8 H. PYLORI INFECTION: Primary | ICD-10-CM

## 2021-06-09 RX ORDER — AMOXICILLIN 500 MG/1
1000 TABLET, FILM COATED ORAL 2 TIMES DAILY
Qty: 56 TABLET | Refills: 0 | Status: SHIPPED | OUTPATIENT
Start: 2021-06-09 | End: 2021-06-15

## 2021-06-09 RX ORDER — CLARITHROMYCIN 500 MG/1
500 TABLET, FILM COATED ORAL 2 TIMES DAILY
Qty: 28 TABLET | Refills: 0 | Status: SHIPPED | OUTPATIENT
Start: 2021-06-09 | End: 2021-06-23

## 2021-06-14 ENCOUNTER — TELEPHONE (OUTPATIENT)
Dept: FAMILY MEDICINE | Facility: CLINIC | Age: 56
End: 2021-06-14

## 2021-06-14 DIAGNOSIS — A04.8 H. PYLORI INFECTION: Primary | ICD-10-CM

## 2021-06-15 RX ORDER — METRONIDAZOLE 500 MG/1
500 TABLET ORAL 3 TIMES DAILY
Qty: 30 TABLET | Refills: 0 | Status: SHIPPED | OUTPATIENT
Start: 2021-06-15 | End: 2021-06-25

## 2021-06-28 ENCOUNTER — ANESTHESIA EVENT (OUTPATIENT)
Dept: ENDOSCOPY | Facility: HOSPITAL | Age: 56
End: 2021-06-28
Payer: COMMERCIAL

## 2021-06-29 ENCOUNTER — ANESTHESIA (OUTPATIENT)
Dept: ENDOSCOPY | Facility: HOSPITAL | Age: 56
End: 2021-06-29
Payer: COMMERCIAL

## 2021-06-29 ENCOUNTER — HOSPITAL ENCOUNTER (OUTPATIENT)
Facility: HOSPITAL | Age: 56
Discharge: HOME | End: 2021-06-29
Attending: STUDENT IN AN ORGANIZED HEALTH CARE EDUCATION/TRAINING PROGRAM | Admitting: STUDENT IN AN ORGANIZED HEALTH CARE EDUCATION/TRAINING PROGRAM
Payer: COMMERCIAL

## 2021-06-29 VITALS
TEMPERATURE: 98 F | HEART RATE: 76 BPM | OXYGEN SATURATION: 95 % | RESPIRATION RATE: 18 BRPM | DIASTOLIC BLOOD PRESSURE: 93 MMHG | SYSTOLIC BLOOD PRESSURE: 158 MMHG

## 2021-06-29 DIAGNOSIS — R10.10 UPPER ABDOMINAL PAIN: ICD-10-CM

## 2021-06-29 LAB — POCT GLUCOSE: 127 MG/DL (ref 70–110)

## 2021-06-29 PROCEDURE — 63600175 PHARM REV CODE 636 W HCPCS: Performed by: STUDENT IN AN ORGANIZED HEALTH CARE EDUCATION/TRAINING PROGRAM

## 2021-06-29 PROCEDURE — G0121 COLON CA SCRN NOT HI RSK IND: HCPCS

## 2021-06-29 PROCEDURE — 43239 EGD BIOPSY SINGLE/MULTIPLE: CPT | Performed by: STUDENT IN AN ORGANIZED HEALTH CARE EDUCATION/TRAINING PROGRAM

## 2021-06-29 PROCEDURE — 43239 EGD BIOPSY SINGLE/MULTIPLE: CPT | Mod: 51,,, | Performed by: STUDENT IN AN ORGANIZED HEALTH CARE EDUCATION/TRAINING PROGRAM

## 2021-06-29 PROCEDURE — 37000008 HC ANESTHESIA 1ST 15 MINUTES: Performed by: STUDENT IN AN ORGANIZED HEALTH CARE EDUCATION/TRAINING PROGRAM

## 2021-06-29 PROCEDURE — D9220A PRA ANESTHESIA: ICD-10-PCS | Mod: 33,,, | Performed by: STUDENT IN AN ORGANIZED HEALTH CARE EDUCATION/TRAINING PROGRAM

## 2021-06-29 PROCEDURE — G0121 COLON CA SCRN NOT HI RSK IND: HCPCS | Mod: ,,, | Performed by: STUDENT IN AN ORGANIZED HEALTH CARE EDUCATION/TRAINING PROGRAM

## 2021-06-29 PROCEDURE — 27201012 HC FORCEPS, HOT/COLD, DISP: Performed by: STUDENT IN AN ORGANIZED HEALTH CARE EDUCATION/TRAINING PROGRAM

## 2021-06-29 PROCEDURE — 88305 TISSUE EXAM BY PATHOLOGIST: CPT | Mod: 26,,, | Performed by: PATHOLOGY

## 2021-06-29 PROCEDURE — 88305 TISSUE EXAM BY PATHOLOGIST: CPT | Performed by: PATHOLOGY

## 2021-06-29 PROCEDURE — 43239 PR EGD, FLEX, W/BIOPSY, SGL/MULTI: ICD-10-PCS | Mod: 51,,, | Performed by: STUDENT IN AN ORGANIZED HEALTH CARE EDUCATION/TRAINING PROGRAM

## 2021-06-29 PROCEDURE — 37000009 HC ANESTHESIA EA ADD 15 MINS: Performed by: STUDENT IN AN ORGANIZED HEALTH CARE EDUCATION/TRAINING PROGRAM

## 2021-06-29 PROCEDURE — D9220A PRA ANESTHESIA: Mod: 33,,, | Performed by: ANESTHESIOLOGY

## 2021-06-29 PROCEDURE — D9220A PRA ANESTHESIA: ICD-10-PCS | Mod: 33,,, | Performed by: ANESTHESIOLOGY

## 2021-06-29 PROCEDURE — D9220A PRA ANESTHESIA: Mod: 33,,, | Performed by: STUDENT IN AN ORGANIZED HEALTH CARE EDUCATION/TRAINING PROGRAM

## 2021-06-29 PROCEDURE — 25000003 PHARM REV CODE 250: Performed by: STUDENT IN AN ORGANIZED HEALTH CARE EDUCATION/TRAINING PROGRAM

## 2021-06-29 PROCEDURE — 88305 TISSUE EXAM BY PATHOLOGIST: ICD-10-PCS | Mod: 26,,, | Performed by: PATHOLOGY

## 2021-06-29 PROCEDURE — G0121 COLON CA SCRN NOT HI RSK IND: ICD-10-PCS | Mod: ,,, | Performed by: STUDENT IN AN ORGANIZED HEALTH CARE EDUCATION/TRAINING PROGRAM

## 2021-06-29 RX ORDER — LIDOCAINE HYDROCHLORIDE 20 MG/ML
INJECTION INTRAVENOUS
Status: DISCONTINUED | OUTPATIENT
Start: 2021-06-29 | End: 2021-06-29

## 2021-06-29 RX ORDER — PROPOFOL 10 MG/ML
INJECTION, EMULSION INTRAVENOUS
Status: DISCONTINUED
Start: 2021-06-29 | End: 2021-06-29 | Stop reason: HOSPADM

## 2021-06-29 RX ORDER — LIDOCAINE HYDROCHLORIDE 20 MG/ML
INJECTION, SOLUTION EPIDURAL; INFILTRATION; INTRACAUDAL; PERINEURAL
Status: DISCONTINUED
Start: 2021-06-29 | End: 2021-06-29 | Stop reason: HOSPADM

## 2021-06-29 RX ORDER — SODIUM CHLORIDE 9 MG/ML
INJECTION, SOLUTION INTRAVENOUS CONTINUOUS
Status: DISCONTINUED | OUTPATIENT
Start: 2021-06-29 | End: 2021-06-29 | Stop reason: HOSPADM

## 2021-06-29 RX ORDER — PROPOFOL 10 MG/ML
VIAL (ML) INTRAVENOUS
Status: DISCONTINUED | OUTPATIENT
Start: 2021-06-29 | End: 2021-06-29

## 2021-06-29 RX ORDER — SODIUM CHLORIDE 9 MG/ML
INJECTION, SOLUTION INTRAVENOUS CONTINUOUS PRN
Status: DISCONTINUED | OUTPATIENT
Start: 2021-06-29 | End: 2021-06-29

## 2021-06-29 RX ADMIN — PROPOFOL 30 MG: 10 INJECTION, EMULSION INTRAVENOUS at 09:06

## 2021-06-29 RX ADMIN — Medication 100 MG: at 09:06

## 2021-06-29 RX ADMIN — SODIUM CHLORIDE: 9 INJECTION, SOLUTION INTRAVENOUS at 09:06

## 2021-06-29 RX ADMIN — PROPOFOL 80 MG: 10 INJECTION, EMULSION INTRAVENOUS at 09:06

## 2021-07-06 ENCOUNTER — PATIENT OUTREACH (OUTPATIENT)
Dept: ADMINISTRATIVE | Facility: HOSPITAL | Age: 56
End: 2021-07-06

## 2021-07-08 ENCOUNTER — TELEPHONE (OUTPATIENT)
Dept: FAMILY MEDICINE | Facility: CLINIC | Age: 56
End: 2021-07-08

## 2021-07-09 LAB
FINAL PATHOLOGIC DIAGNOSIS: NORMAL
GROSS: NORMAL
Lab: NORMAL

## 2021-07-12 ENCOUNTER — PATIENT MESSAGE (OUTPATIENT)
Dept: GASTROENTEROLOGY | Facility: CLINIC | Age: 56
End: 2021-07-12

## 2021-07-12 RX ORDER — METRONIDAZOLE 500 MG/1
500 TABLET ORAL 3 TIMES DAILY
Qty: 42 TABLET | Refills: 0 | Status: SHIPPED | OUTPATIENT
Start: 2021-07-12 | End: 2021-07-26

## 2021-07-12 RX ORDER — DOXYCYCLINE 100 MG/1
100 CAPSULE ORAL EVERY 12 HOURS
Qty: 28 CAPSULE | Refills: 0 | Status: SHIPPED | OUTPATIENT
Start: 2021-07-12 | End: 2021-07-26

## 2021-07-12 RX ORDER — BISMUTH SUBSALICYLATE 262 MG/1
1 TABLET ORAL 4 TIMES DAILY
Qty: 56 TABLET | Refills: 0 | Status: SHIPPED | OUTPATIENT
Start: 2021-07-12 | End: 2021-07-26

## 2021-07-13 ENCOUNTER — PATIENT OUTREACH (OUTPATIENT)
Dept: ADMINISTRATIVE | Facility: HOSPITAL | Age: 56
End: 2021-07-13

## 2021-07-14 DIAGNOSIS — E11.9 TYPE 2 DIABETES MELLITUS WITHOUT COMPLICATION, UNSPECIFIED WHETHER LONG TERM INSULIN USE: ICD-10-CM

## 2021-07-19 ENCOUNTER — OFFICE VISIT (OUTPATIENT)
Dept: FAMILY MEDICINE | Facility: CLINIC | Age: 56
End: 2021-07-19
Payer: COMMERCIAL

## 2021-07-19 VITALS
HEART RATE: 90 BPM | BODY MASS INDEX: 39.61 KG/M2 | DIASTOLIC BLOOD PRESSURE: 84 MMHG | WEIGHT: 223.56 LBS | HEIGHT: 63 IN | SYSTOLIC BLOOD PRESSURE: 130 MMHG | OXYGEN SATURATION: 96 % | TEMPERATURE: 99 F | RESPIRATION RATE: 16 BRPM

## 2021-07-19 DIAGNOSIS — K29.70 HELICOBACTER PYLORI GASTRITIS: Primary | ICD-10-CM

## 2021-07-19 DIAGNOSIS — G40.209 PARTIAL SYMPTOMATIC EPILEPSY WITH COMPLEX PARTIAL SEIZURES, NOT INTRACTABLE, WITHOUT STATUS EPILEPTICUS: ICD-10-CM

## 2021-07-19 DIAGNOSIS — B96.81 HELICOBACTER PYLORI GASTRITIS: Primary | ICD-10-CM

## 2021-07-19 DIAGNOSIS — R07.2 PRECORDIAL PAIN: ICD-10-CM

## 2021-07-19 DIAGNOSIS — E66.01 SEVERE OBESITY (BMI 35.0-39.9) WITH COMORBIDITY: ICD-10-CM

## 2021-07-19 DIAGNOSIS — G83.84 TODD'S PARALYSIS (POSTEPILEPTIC): ICD-10-CM

## 2021-07-19 PROCEDURE — 99214 OFFICE O/P EST MOD 30 MIN: CPT | Mod: S$GLB,,, | Performed by: FAMILY MEDICINE

## 2021-07-19 PROCEDURE — 99999 PR PBB SHADOW E&M-EST. PATIENT-LVL III: CPT | Mod: PBBFAC,,, | Performed by: FAMILY MEDICINE

## 2021-07-19 PROCEDURE — 93005 EKG 12-LEAD: ICD-10-PCS | Mod: S$GLB,,, | Performed by: FAMILY MEDICINE

## 2021-07-19 PROCEDURE — 1125F PR PAIN SEVERITY QUANTIFIED, PAIN PRESENT: ICD-10-PCS | Mod: CPTII,S$GLB,, | Performed by: FAMILY MEDICINE

## 2021-07-19 PROCEDURE — 93005 ELECTROCARDIOGRAM TRACING: CPT | Mod: S$GLB,,, | Performed by: FAMILY MEDICINE

## 2021-07-19 PROCEDURE — 3008F PR BODY MASS INDEX (BMI) DOCUMENTED: ICD-10-PCS | Mod: CPTII,S$GLB,, | Performed by: FAMILY MEDICINE

## 2021-07-19 PROCEDURE — 3008F BODY MASS INDEX DOCD: CPT | Mod: CPTII,S$GLB,, | Performed by: FAMILY MEDICINE

## 2021-07-19 PROCEDURE — 93010 ELECTROCARDIOGRAM REPORT: CPT | Mod: S$GLB,,, | Performed by: INTERNAL MEDICINE

## 2021-07-19 PROCEDURE — 99999 PR PBB SHADOW E&M-EST. PATIENT-LVL III: ICD-10-PCS | Mod: PBBFAC,,, | Performed by: FAMILY MEDICINE

## 2021-07-19 PROCEDURE — 1125F AMNT PAIN NOTED PAIN PRSNT: CPT | Mod: CPTII,S$GLB,, | Performed by: FAMILY MEDICINE

## 2021-07-19 PROCEDURE — 99214 PR OFFICE/OUTPT VISIT, EST, LEVL IV, 30-39 MIN: ICD-10-PCS | Mod: S$GLB,,, | Performed by: FAMILY MEDICINE

## 2021-07-19 PROCEDURE — 93010 EKG 12-LEAD: ICD-10-PCS | Mod: S$GLB,,, | Performed by: INTERNAL MEDICINE

## 2021-07-21 ENCOUNTER — TELEPHONE (OUTPATIENT)
Dept: FAMILY MEDICINE | Facility: CLINIC | Age: 56
End: 2021-07-21

## 2021-07-21 DIAGNOSIS — K29.70 HELICOBACTER PYLORI GASTRITIS: ICD-10-CM

## 2021-07-21 DIAGNOSIS — B96.81 HELICOBACTER PYLORI GASTRITIS: ICD-10-CM

## 2021-08-03 DIAGNOSIS — B96.81 HELICOBACTER PYLORI GASTRITIS: ICD-10-CM

## 2021-08-03 DIAGNOSIS — K29.70 HELICOBACTER PYLORI GASTRITIS: ICD-10-CM

## 2021-08-03 NOTE — TELEPHONE ENCOUNTER
Per [facility], medication comes in a kit 120 capsules for 10 days.  Need script rewritten to align with how medication is dispensed.  Please advise.

## 2021-08-03 NOTE — TELEPHONE ENCOUNTER
----- Message from Leidy Jaimes sent at 8/2/2021  2:44 PM CDT -----  Type:  Pharmacy Calling to Clarify an RX    Name of Caller: [facility]    [facility]arm[facility] Name: [facility]    Prescription Name:  metronid-tetracyc-bis subsal 250-500-262.4 mg Cmpk    What do they need to clarify?  Disp 224 , it comes in a kit of 10 days 120 capsules.     Can you be contacted via [facility]? call    Best Call Back Number: [phone]

## 2021-08-09 DIAGNOSIS — I10 UNCONTROLLED HYPERTENSION: ICD-10-CM

## 2021-08-11 RX ORDER — BENAZEPRIL HYDROCHLORIDE 40 MG/1
40 TABLET ORAL DAILY
Qty: 90 TABLET | Refills: 1 | Status: SHIPPED | OUTPATIENT
Start: 2021-08-11 | End: 2021-12-29

## 2021-08-13 DIAGNOSIS — G47.00 INSOMNIA, UNSPECIFIED TYPE: ICD-10-CM

## 2021-08-18 RX ORDER — ZOLPIDEM TARTRATE 10 MG/1
TABLET ORAL
Qty: 30 TABLET | Refills: 4 | Status: SHIPPED | OUTPATIENT
Start: 2021-08-18 | End: 2022-01-24 | Stop reason: SDUPTHER

## 2021-09-05 ENCOUNTER — HOSPITAL ENCOUNTER (EMERGENCY)
Facility: HOSPITAL | Age: 56
Discharge: HOME | End: 2021-09-06
Attending: EMERGENCY MEDICINE
Payer: COMMERCIAL

## 2021-09-05 DIAGNOSIS — M25.512 ACUTE PAIN OF LEFT SHOULDER: ICD-10-CM

## 2021-09-05 DIAGNOSIS — R07.9 CHEST PAIN: Primary | ICD-10-CM

## 2021-09-05 DIAGNOSIS — R03.0 ELEVATED BLOOD PRESSURE READING: ICD-10-CM

## 2021-09-05 LAB
ALBUMIN SERPL BCP-MCNC: 3.7 G/DL (ref 3.5–5.2)
ALP SERPL-CCNC: 82 U/L (ref 55–135)
ALT SERPL W/O P-5'-P-CCNC: 30 U/L (ref 10–44)
ANION GAP SERPL CALC-SCNC: 12 MMOL/L (ref 8–16)
AST SERPL-CCNC: 20 U/L (ref 10–40)
BASOPHILS # BLD AUTO: 0.04 K/UL (ref 0–0.2)
BASOPHILS NFR BLD: 0.6 % (ref 0–1.9)
BILIRUB SERPL-MCNC: 0.5 MG/DL (ref 0.1–1)
BNP SERPL-MCNC: 14 PG/ML (ref 0–99)
BUN SERPL-MCNC: 14 MG/DL (ref 6–20)
CALCIUM SERPL-MCNC: 9.9 MG/DL (ref 8.7–10.5)
CHLORIDE SERPL-SCNC: 107 MMOL/L (ref 95–110)
CO2 SERPL-SCNC: 22 MMOL/L (ref 23–29)
CREAT SERPL-MCNC: 0.8 MG/DL (ref 0.5–1.4)
D DIMER PPP IA.FEU-MCNC: 0.75 MG/L FEU
DIFFERENTIAL METHOD: ABNORMAL
EOSINOPHIL # BLD AUTO: 0.1 K/UL (ref 0–0.5)
EOSINOPHIL NFR BLD: 1.5 % (ref 0–8)
ERYTHROCYTE [DISTWIDTH] IN BLOOD BY AUTOMATED COUNT: 13.6 % (ref 11.5–14.5)
EST. GFR  (AFRICAN AMERICAN): >60 ML/MIN/1.73 M^2
EST. GFR  (NON AFRICAN AMERICAN): >60 ML/MIN/1.73 M^2
GLUCOSE SERPL-MCNC: 117 MG/DL (ref 70–110)
HCT VFR BLD AUTO: 40.1 % (ref 37–48.5)
HGB BLD-MCNC: 13.4 G/DL (ref 12–16)
IMM GRANULOCYTES # BLD AUTO: 0.03 K/UL (ref 0–0.04)
IMM GRANULOCYTES NFR BLD AUTO: 0.4 % (ref 0–0.5)
LYMPHOCYTES # BLD AUTO: 4 K/UL (ref 1–4.8)
LYMPHOCYTES NFR BLD: 54.8 % (ref 18–48)
MCH RBC QN AUTO: 29.7 PG (ref 27–31)
MCHC RBC AUTO-ENTMCNC: 33.4 G/DL (ref 32–36)
MCV RBC AUTO: 89 FL (ref 82–98)
MONOCYTES # BLD AUTO: 0.6 K/UL (ref 0.3–1)
MONOCYTES NFR BLD: 8.9 % (ref 4–15)
NEUTROPHILS # BLD AUTO: 2.5 K/UL (ref 1.8–7.7)
NEUTROPHILS NFR BLD: 33.8 % (ref 38–73)
NRBC BLD-RTO: 0 /100 WBC
PLATELET # BLD AUTO: 388 K/UL (ref 150–450)
PMV BLD AUTO: 9.6 FL (ref 9.2–12.9)
POTASSIUM SERPL-SCNC: 3.8 MMOL/L (ref 3.5–5.1)
PROT SERPL-MCNC: 8.4 G/DL (ref 6–8.4)
RBC # BLD AUTO: 4.51 M/UL (ref 4–5.4)
SODIUM SERPL-SCNC: 141 MMOL/L (ref 136–145)
TROPONIN I SERPL DL<=0.01 NG/ML-MCNC: 0.01 NG/ML (ref 0–0.03)
TROPONIN I SERPL DL<=0.01 NG/ML-MCNC: 0.01 NG/ML (ref 0–0.03)
WBC # BLD AUTO: 7.23 K/UL (ref 3.9–12.7)

## 2021-09-05 PROCEDURE — 99285 EMERGENCY DEPT VISIT HI MDM: CPT | Mod: 25

## 2021-09-05 PROCEDURE — 80053 COMPREHEN METABOLIC PANEL: CPT | Performed by: EMERGENCY MEDICINE

## 2021-09-05 PROCEDURE — 84484 ASSAY OF TROPONIN QUANT: CPT | Mod: 91 | Performed by: EMERGENCY MEDICINE

## 2021-09-05 PROCEDURE — 85025 COMPLETE CBC W/AUTO DIFF WBC: CPT | Performed by: EMERGENCY MEDICINE

## 2021-09-05 PROCEDURE — 93010 ELECTROCARDIOGRAM REPORT: CPT | Mod: ,,, | Performed by: INTERNAL MEDICINE

## 2021-09-05 PROCEDURE — 85379 FIBRIN DEGRADATION QUANT: CPT | Performed by: EMERGENCY MEDICINE

## 2021-09-05 PROCEDURE — 83690 ASSAY OF LIPASE: CPT | Performed by: EMERGENCY MEDICINE

## 2021-09-05 PROCEDURE — 83880 ASSAY OF NATRIURETIC PEPTIDE: CPT | Performed by: EMERGENCY MEDICINE

## 2021-09-05 PROCEDURE — 96374 THER/PROPH/DIAG INJ IV PUSH: CPT

## 2021-09-05 PROCEDURE — 93005 ELECTROCARDIOGRAM TRACING: CPT

## 2021-09-05 PROCEDURE — 96375 TX/PRO/DX INJ NEW DRUG ADDON: CPT

## 2021-09-05 PROCEDURE — 93010 EKG 12-LEAD: ICD-10-PCS | Mod: ,,, | Performed by: INTERNAL MEDICINE

## 2021-09-05 RX ORDER — ASPIRIN 325 MG
325 TABLET ORAL
Status: COMPLETED | OUTPATIENT
Start: 2021-09-06 | End: 2021-09-06

## 2021-09-05 RX ORDER — CYCLOBENZAPRINE HCL 10 MG
10 TABLET ORAL
Status: COMPLETED | OUTPATIENT
Start: 2021-09-06 | End: 2021-09-06

## 2021-09-05 RX ORDER — KETOROLAC TROMETHAMINE 30 MG/ML
30 INJECTION, SOLUTION INTRAMUSCULAR; INTRAVENOUS
Status: COMPLETED | OUTPATIENT
Start: 2021-09-06 | End: 2021-09-06

## 2021-09-06 VITALS
HEIGHT: 64 IN | OXYGEN SATURATION: 97 % | RESPIRATION RATE: 17 BRPM | WEIGHT: 215 LBS | HEART RATE: 74 BPM | TEMPERATURE: 98 F | DIASTOLIC BLOOD PRESSURE: 75 MMHG | BODY MASS INDEX: 36.7 KG/M2 | SYSTOLIC BLOOD PRESSURE: 174 MMHG

## 2021-09-06 LAB — LIPASE SERPL-CCNC: 22 U/L (ref 4–60)

## 2021-09-06 PROCEDURE — 63600175 PHARM REV CODE 636 W HCPCS: Performed by: EMERGENCY MEDICINE

## 2021-09-06 PROCEDURE — 25500020 PHARM REV CODE 255: Performed by: EMERGENCY MEDICINE

## 2021-09-06 PROCEDURE — 25000003 PHARM REV CODE 250: Performed by: EMERGENCY MEDICINE

## 2021-09-06 RX ORDER — ONDANSETRON 2 MG/ML
4 INJECTION INTRAMUSCULAR; INTRAVENOUS
Status: COMPLETED | OUTPATIENT
Start: 2021-09-06 | End: 2021-09-06

## 2021-09-06 RX ORDER — MORPHINE SULFATE 4 MG/ML
8 INJECTION, SOLUTION INTRAMUSCULAR; INTRAVENOUS
Status: COMPLETED | OUTPATIENT
Start: 2021-09-06 | End: 2021-09-06

## 2021-09-06 RX ORDER — CYCLOBENZAPRINE HCL 10 MG
10 TABLET ORAL 3 TIMES DAILY PRN
Qty: 15 TABLET | Refills: 1 | Status: SHIPPED | OUTPATIENT
Start: 2021-09-06 | End: 2021-09-11

## 2021-09-06 RX ORDER — IBUPROFEN 600 MG/1
600 TABLET ORAL EVERY 6 HOURS PRN
Qty: 20 TABLET | Refills: 1 | Status: SHIPPED | OUTPATIENT
Start: 2021-09-06 | End: 2021-10-25

## 2021-09-06 RX ORDER — OXYCODONE AND ACETAMINOPHEN 5; 325 MG/1; MG/1
1 TABLET ORAL EVERY 4 HOURS PRN
Qty: 12 TABLET | Refills: 0 | Status: SHIPPED | OUTPATIENT
Start: 2021-09-06 | End: 2022-02-17

## 2021-09-06 RX ADMIN — IOHEXOL 75 ML: 350 INJECTION, SOLUTION INTRAVENOUS at 12:09

## 2021-09-06 RX ADMIN — ASPIRIN 325 MG ORAL TABLET 325 MG: 325 PILL ORAL at 12:09

## 2021-09-06 RX ADMIN — ONDANSETRON 4 MG: 2 INJECTION INTRAMUSCULAR; INTRAVENOUS at 01:09

## 2021-09-06 RX ADMIN — CYCLOBENZAPRINE 10 MG: 10 TABLET, FILM COATED ORAL at 12:09

## 2021-09-06 RX ADMIN — KETOROLAC TROMETHAMINE 30 MG: 30 INJECTION, SOLUTION INTRAMUSCULAR; INTRAVENOUS at 12:09

## 2021-09-06 RX ADMIN — MORPHINE SULFATE 8 MG: 4 INJECTION INTRAVENOUS at 01:09

## 2021-09-13 DIAGNOSIS — E11.22 CONTROLLED TYPE 2 DIABETES MELLITUS WITH STAGE 2 CHRONIC KIDNEY DISEASE, WITHOUT LONG-TERM CURRENT USE OF INSULIN: ICD-10-CM

## 2021-09-13 DIAGNOSIS — N18.2 CONTROLLED TYPE 2 DIABETES MELLITUS WITH STAGE 2 CHRONIC KIDNEY DISEASE, WITHOUT LONG-TERM CURRENT USE OF INSULIN: ICD-10-CM

## 2021-09-14 ENCOUNTER — OFFICE VISIT (OUTPATIENT)
Dept: OPTOMETRY | Facility: CLINIC | Age: 56
End: 2021-09-14
Payer: COMMERCIAL

## 2021-09-14 DIAGNOSIS — H52.7 REFRACTIVE ERROR: ICD-10-CM

## 2021-09-14 DIAGNOSIS — Z46.0 ENCOUNTER FOR FITTING OR ADJUSTMENT OF SPECTACLES OR CONTACT LENSES: Primary | ICD-10-CM

## 2021-09-14 DIAGNOSIS — Z97.3 WEARS CONTACT LENSES: ICD-10-CM

## 2021-09-14 DIAGNOSIS — E11.9 TYPE 2 DIABETES MELLITUS WITHOUT RETINOPATHY: Primary | ICD-10-CM

## 2021-09-14 PROCEDURE — 3066F NEPHROPATHY DOC TX: CPT | Mod: CPTII,S$GLB,, | Performed by: OPTOMETRIST

## 2021-09-14 PROCEDURE — 3066F PR DOCUMENTATION OF TREATMENT FOR NEPHROPATHY: ICD-10-PCS | Mod: CPTII,S$GLB,, | Performed by: OPTOMETRIST

## 2021-09-14 PROCEDURE — 92014 COMPRE OPH EXAM EST PT 1/>: CPT | Mod: S$GLB,,, | Performed by: OPTOMETRIST

## 2021-09-14 PROCEDURE — 92014 PR EYE EXAM, EST PATIENT,COMPREHESV: ICD-10-PCS | Mod: S$GLB,,, | Performed by: OPTOMETRIST

## 2021-09-14 PROCEDURE — 99999 PR PBB SHADOW E&M-EST. PATIENT-LVL I: ICD-10-PCS | Mod: PBBFAC,,, | Performed by: OPTOMETRIST

## 2021-09-14 PROCEDURE — 92310 CONTACT LENS FITTING OU: CPT | Mod: CSM,,, | Performed by: OPTOMETRIST

## 2021-09-14 PROCEDURE — 92310 PR CONTACT LENS FITTING (NO CHANGE): ICD-10-PCS | Mod: CSM,,, | Performed by: OPTOMETRIST

## 2021-09-14 PROCEDURE — 3061F NEG MICROALBUMINURIA REV: CPT | Mod: CPTII,S$GLB,, | Performed by: OPTOMETRIST

## 2021-09-14 PROCEDURE — 92015 DETERMINE REFRACTIVE STATE: CPT | Mod: S$GLB,,, | Performed by: OPTOMETRIST

## 2021-09-14 PROCEDURE — 1160F RVW MEDS BY RX/DR IN RCRD: CPT | Mod: CPTII,S$GLB,, | Performed by: OPTOMETRIST

## 2021-09-14 PROCEDURE — 99499 UNLISTED E&M SERVICE: CPT | Mod: S$GLB,,, | Performed by: OPTOMETRIST

## 2021-09-14 PROCEDURE — 3061F PR NEG MICROALBUMINURIA RESULT DOCUMENTED/REVIEW: ICD-10-PCS | Mod: CPTII,S$GLB,, | Performed by: OPTOMETRIST

## 2021-09-14 PROCEDURE — 1160F PR REVIEW ALL MEDS BY PRESCRIBER/CLIN PHARMACIST DOCUMENTED: ICD-10-PCS | Mod: CPTII,S$GLB,, | Performed by: OPTOMETRIST

## 2021-09-14 PROCEDURE — 3044F HG A1C LEVEL LT 7.0%: CPT | Mod: CPTII,S$GLB,, | Performed by: OPTOMETRIST

## 2021-09-14 PROCEDURE — 4010F PR ACE/ARB THEARPY RXD/TAKEN: ICD-10-PCS | Mod: CPTII,S$GLB,, | Performed by: OPTOMETRIST

## 2021-09-14 PROCEDURE — 4010F ACE/ARB THERAPY RXD/TAKEN: CPT | Mod: CPTII,S$GLB,, | Performed by: OPTOMETRIST

## 2021-09-14 PROCEDURE — 99999 PR PBB SHADOW E&M-EST. PATIENT-LVL I: CPT | Mod: PBBFAC,,, | Performed by: OPTOMETRIST

## 2021-09-14 PROCEDURE — 92015 PR REFRACTION: ICD-10-PCS | Mod: S$GLB,,, | Performed by: OPTOMETRIST

## 2021-09-14 PROCEDURE — 3044F PR MOST RECENT HEMOGLOBIN A1C LEVEL <7.0%: ICD-10-PCS | Mod: CPTII,S$GLB,, | Performed by: OPTOMETRIST

## 2021-09-14 PROCEDURE — 99499 NO LOS: ICD-10-PCS | Mod: S$GLB,,, | Performed by: OPTOMETRIST

## 2021-09-14 RX ORDER — BISMUTH SUBCITRATE POTASSIUM, METRONIDAZOLE, TETRACYCLINE HYDROCHLORIDE 140; 125; 125 MG/1; MG/1; MG/1
3 CAPSULE ORAL 3 TIMES DAILY
COMMUNITY
Start: 2021-08-04 | End: 2022-02-17

## 2021-09-14 RX ORDER — EXENATIDE 2 MG/.85ML
INJECTION, SUSPENSION, EXTENDED RELEASE SUBCUTANEOUS
Qty: 12 PEN | Refills: 0 | Status: SHIPPED | OUTPATIENT
Start: 2021-09-14 | End: 2022-01-19 | Stop reason: SDUPTHER

## 2021-10-13 ENCOUNTER — TELEPHONE (OUTPATIENT)
Dept: NEUROLOGY | Facility: CLINIC | Age: 56
End: 2021-10-13

## 2021-10-19 ENCOUNTER — OFFICE VISIT (OUTPATIENT)
Dept: URGENT CARE | Facility: CLINIC | Age: 56
End: 2021-10-19
Payer: COMMERCIAL

## 2021-10-19 VITALS
WEIGHT: 215 LBS | DIASTOLIC BLOOD PRESSURE: 93 MMHG | OXYGEN SATURATION: 97 % | TEMPERATURE: 98 F | HEART RATE: 93 BPM | RESPIRATION RATE: 20 BRPM | SYSTOLIC BLOOD PRESSURE: 170 MMHG | BODY MASS INDEX: 36.7 KG/M2 | HEIGHT: 64 IN

## 2021-10-19 DIAGNOSIS — S92.515A CLOSED NONDISPLACED FRACTURE OF PROXIMAL PHALANX OF LESSER TOE OF LEFT FOOT, INITIAL ENCOUNTER: Primary | ICD-10-CM

## 2021-10-19 DIAGNOSIS — S99.922A FOOT INJURY, LEFT, INITIAL ENCOUNTER: ICD-10-CM

## 2021-10-19 PROCEDURE — 73630 X-RAY EXAM OF FOOT: CPT | Mod: LT,S$GLB,, | Performed by: RADIOLOGY

## 2021-10-19 PROCEDURE — 3066F PR DOCUMENTATION OF TREATMENT FOR NEPHROPATHY: ICD-10-PCS | Mod: CPTII,S$GLB,, | Performed by: NURSE PRACTITIONER

## 2021-10-19 PROCEDURE — 3066F NEPHROPATHY DOC TX: CPT | Mod: CPTII,S$GLB,, | Performed by: NURSE PRACTITIONER

## 2021-10-19 PROCEDURE — 4010F PR ACE/ARB THEARPY RXD/TAKEN: ICD-10-PCS | Mod: CPTII,S$GLB,, | Performed by: NURSE PRACTITIONER

## 2021-10-19 PROCEDURE — 1159F MED LIST DOCD IN RCRD: CPT | Mod: CPTII,S$GLB,, | Performed by: NURSE PRACTITIONER

## 2021-10-19 PROCEDURE — 3080F PR MOST RECENT DIASTOLIC BLOOD PRESSURE >= 90 MM HG: ICD-10-PCS | Mod: CPTII,S$GLB,, | Performed by: NURSE PRACTITIONER

## 2021-10-19 PROCEDURE — 73630 XR FOOT COMPLETE 3 VIEW LEFT: ICD-10-PCS | Mod: LT,S$GLB,, | Performed by: RADIOLOGY

## 2021-10-19 PROCEDURE — 3044F HG A1C LEVEL LT 7.0%: CPT | Mod: CPTII,S$GLB,, | Performed by: NURSE PRACTITIONER

## 2021-10-19 PROCEDURE — 3044F PR MOST RECENT HEMOGLOBIN A1C LEVEL <7.0%: ICD-10-PCS | Mod: CPTII,S$GLB,, | Performed by: NURSE PRACTITIONER

## 2021-10-19 PROCEDURE — 3080F DIAST BP >= 90 MM HG: CPT | Mod: CPTII,S$GLB,, | Performed by: NURSE PRACTITIONER

## 2021-10-19 PROCEDURE — 99203 OFFICE O/P NEW LOW 30 MIN: CPT | Mod: S$GLB,,, | Performed by: NURSE PRACTITIONER

## 2021-10-19 PROCEDURE — 3061F PR NEG MICROALBUMINURIA RESULT DOCUMENTED/REVIEW: ICD-10-PCS | Mod: CPTII,S$GLB,, | Performed by: NURSE PRACTITIONER

## 2021-10-19 PROCEDURE — 3061F NEG MICROALBUMINURIA REV: CPT | Mod: CPTII,S$GLB,, | Performed by: NURSE PRACTITIONER

## 2021-10-19 PROCEDURE — 4010F ACE/ARB THERAPY RXD/TAKEN: CPT | Mod: CPTII,S$GLB,, | Performed by: NURSE PRACTITIONER

## 2021-10-19 PROCEDURE — 99203 PR OFFICE/OUTPT VISIT, NEW, LEVL III, 30-44 MIN: ICD-10-PCS | Mod: S$GLB,,, | Performed by: NURSE PRACTITIONER

## 2021-10-19 PROCEDURE — 3077F SYST BP >= 140 MM HG: CPT | Mod: CPTII,S$GLB,, | Performed by: NURSE PRACTITIONER

## 2021-10-19 PROCEDURE — 3008F PR BODY MASS INDEX (BMI) DOCUMENTED: ICD-10-PCS | Mod: CPTII,S$GLB,, | Performed by: NURSE PRACTITIONER

## 2021-10-19 PROCEDURE — 1159F PR MEDICATION LIST DOCUMENTED IN MEDICAL RECORD: ICD-10-PCS | Mod: CPTII,S$GLB,, | Performed by: NURSE PRACTITIONER

## 2021-10-19 PROCEDURE — 3008F BODY MASS INDEX DOCD: CPT | Mod: CPTII,S$GLB,, | Performed by: NURSE PRACTITIONER

## 2021-10-19 PROCEDURE — 1160F PR REVIEW ALL MEDS BY PRESCRIBER/CLIN PHARMACIST DOCUMENTED: ICD-10-PCS | Mod: CPTII,S$GLB,, | Performed by: NURSE PRACTITIONER

## 2021-10-19 PROCEDURE — 1160F RVW MEDS BY RX/DR IN RCRD: CPT | Mod: CPTII,S$GLB,, | Performed by: NURSE PRACTITIONER

## 2021-10-19 PROCEDURE — 3077F PR MOST RECENT SYSTOLIC BLOOD PRESSURE >= 140 MM HG: ICD-10-PCS | Mod: CPTII,S$GLB,, | Performed by: NURSE PRACTITIONER

## 2021-10-19 RX ORDER — IBUPROFEN 600 MG/1
600 TABLET ORAL 3 TIMES DAILY PRN
Qty: 30 TABLET | Refills: 0 | Status: SHIPPED | OUTPATIENT
Start: 2021-10-19 | End: 2021-10-25

## 2021-10-19 RX ORDER — IBUPROFEN 200 MG
600 TABLET ORAL
Status: COMPLETED | OUTPATIENT
Start: 2021-10-19 | End: 2021-10-19

## 2021-10-19 RX ADMIN — Medication 600 MG: at 03:10

## 2021-10-25 ENCOUNTER — OFFICE VISIT (OUTPATIENT)
Dept: ORTHOPEDICS | Facility: CLINIC | Age: 56
End: 2021-10-25
Payer: COMMERCIAL

## 2021-10-25 VITALS
HEIGHT: 64 IN | HEART RATE: 73 BPM | WEIGHT: 231.5 LBS | RESPIRATION RATE: 18 BRPM | OXYGEN SATURATION: 98 % | SYSTOLIC BLOOD PRESSURE: 160 MMHG | BODY MASS INDEX: 39.52 KG/M2 | DIASTOLIC BLOOD PRESSURE: 90 MMHG

## 2021-10-25 DIAGNOSIS — S92.512A CLOSED DISPLACED FRACTURE OF PROXIMAL PHALANX OF LESSER TOE OF LEFT FOOT, INITIAL ENCOUNTER: Primary | ICD-10-CM

## 2021-10-25 PROCEDURE — 3077F PR MOST RECENT SYSTOLIC BLOOD PRESSURE >= 140 MM HG: ICD-10-PCS | Mod: CPTII,S$GLB,, | Performed by: ORTHOPAEDIC SURGERY

## 2021-10-25 PROCEDURE — 3044F HG A1C LEVEL LT 7.0%: CPT | Mod: CPTII,S$GLB,, | Performed by: ORTHOPAEDIC SURGERY

## 2021-10-25 PROCEDURE — 4010F PR ACE/ARB THEARPY RXD/TAKEN: ICD-10-PCS | Mod: CPTII,S$GLB,, | Performed by: ORTHOPAEDIC SURGERY

## 2021-10-25 PROCEDURE — 99204 OFFICE O/P NEW MOD 45 MIN: CPT | Mod: S$GLB,,, | Performed by: ORTHOPAEDIC SURGERY

## 2021-10-25 PROCEDURE — 1159F MED LIST DOCD IN RCRD: CPT | Mod: CPTII,S$GLB,, | Performed by: ORTHOPAEDIC SURGERY

## 2021-10-25 PROCEDURE — 3080F PR MOST RECENT DIASTOLIC BLOOD PRESSURE >= 90 MM HG: ICD-10-PCS | Mod: CPTII,S$GLB,, | Performed by: ORTHOPAEDIC SURGERY

## 2021-10-25 PROCEDURE — 99204 PR OFFICE/OUTPT VISIT, NEW, LEVL IV, 45-59 MIN: ICD-10-PCS | Mod: S$GLB,,, | Performed by: ORTHOPAEDIC SURGERY

## 2021-10-25 PROCEDURE — 4010F ACE/ARB THERAPY RXD/TAKEN: CPT | Mod: CPTII,S$GLB,, | Performed by: ORTHOPAEDIC SURGERY

## 2021-10-25 PROCEDURE — 3077F SYST BP >= 140 MM HG: CPT | Mod: CPTII,S$GLB,, | Performed by: ORTHOPAEDIC SURGERY

## 2021-10-25 PROCEDURE — 3066F NEPHROPATHY DOC TX: CPT | Mod: CPTII,S$GLB,, | Performed by: ORTHOPAEDIC SURGERY

## 2021-10-25 PROCEDURE — 99999 PR PBB SHADOW E&M-EST. PATIENT-LVL V: ICD-10-PCS | Mod: PBBFAC,,, | Performed by: ORTHOPAEDIC SURGERY

## 2021-10-25 PROCEDURE — 3061F PR NEG MICROALBUMINURIA RESULT DOCUMENTED/REVIEW: ICD-10-PCS | Mod: CPTII,S$GLB,, | Performed by: ORTHOPAEDIC SURGERY

## 2021-10-25 PROCEDURE — 1159F PR MEDICATION LIST DOCUMENTED IN MEDICAL RECORD: ICD-10-PCS | Mod: CPTII,S$GLB,, | Performed by: ORTHOPAEDIC SURGERY

## 2021-10-25 PROCEDURE — 3061F NEG MICROALBUMINURIA REV: CPT | Mod: CPTII,S$GLB,, | Performed by: ORTHOPAEDIC SURGERY

## 2021-10-25 PROCEDURE — 99999 PR PBB SHADOW E&M-EST. PATIENT-LVL V: CPT | Mod: PBBFAC,,, | Performed by: ORTHOPAEDIC SURGERY

## 2021-10-25 PROCEDURE — 3008F PR BODY MASS INDEX (BMI) DOCUMENTED: ICD-10-PCS | Mod: CPTII,S$GLB,, | Performed by: ORTHOPAEDIC SURGERY

## 2021-10-25 PROCEDURE — 1160F RVW MEDS BY RX/DR IN RCRD: CPT | Mod: CPTII,S$GLB,, | Performed by: ORTHOPAEDIC SURGERY

## 2021-10-25 PROCEDURE — 3044F PR MOST RECENT HEMOGLOBIN A1C LEVEL <7.0%: ICD-10-PCS | Mod: CPTII,S$GLB,, | Performed by: ORTHOPAEDIC SURGERY

## 2021-10-25 PROCEDURE — 3066F PR DOCUMENTATION OF TREATMENT FOR NEPHROPATHY: ICD-10-PCS | Mod: CPTII,S$GLB,, | Performed by: ORTHOPAEDIC SURGERY

## 2021-10-25 PROCEDURE — 1160F PR REVIEW ALL MEDS BY PRESCRIBER/CLIN PHARMACIST DOCUMENTED: ICD-10-PCS | Mod: CPTII,S$GLB,, | Performed by: ORTHOPAEDIC SURGERY

## 2021-10-25 PROCEDURE — 3080F DIAST BP >= 90 MM HG: CPT | Mod: CPTII,S$GLB,, | Performed by: ORTHOPAEDIC SURGERY

## 2021-10-25 PROCEDURE — 3008F BODY MASS INDEX DOCD: CPT | Mod: CPTII,S$GLB,, | Performed by: ORTHOPAEDIC SURGERY

## 2021-10-25 RX ORDER — TRAMADOL HYDROCHLORIDE 50 MG/1
50 TABLET ORAL EVERY 8 HOURS PRN
Qty: 21 TABLET | Refills: 0 | Status: SHIPPED | OUTPATIENT
Start: 2021-10-25 | End: 2021-11-01

## 2021-10-25 RX ORDER — IBUPROFEN 800 MG/1
800 TABLET ORAL 3 TIMES DAILY
Qty: 90 TABLET | Refills: 0 | Status: SHIPPED | OUTPATIENT
Start: 2021-10-25 | End: 2021-11-24

## 2021-11-15 ENCOUNTER — HOSPITAL ENCOUNTER (EMERGENCY)
Facility: HOSPITAL | Age: 56
Discharge: HOME | End: 2021-11-15
Attending: EMERGENCY MEDICINE
Payer: COMMERCIAL

## 2021-11-15 VITALS
WEIGHT: 225 LBS | RESPIRATION RATE: 19 BRPM | HEIGHT: 64 IN | TEMPERATURE: 99 F | SYSTOLIC BLOOD PRESSURE: 120 MMHG | HEART RATE: 77 BPM | OXYGEN SATURATION: 96 % | DIASTOLIC BLOOD PRESSURE: 90 MMHG | BODY MASS INDEX: 38.41 KG/M2

## 2021-11-15 DIAGNOSIS — M54.2 NECK PAIN: ICD-10-CM

## 2021-11-15 PROCEDURE — 99283 EMERGENCY DEPT VISIT LOW MDM: CPT | Mod: ER

## 2021-11-15 RX ORDER — ACETAMINOPHEN 325 MG/1
650 TABLET ORAL
Status: DISCONTINUED | OUTPATIENT
Start: 2021-11-15 | End: 2021-11-15 | Stop reason: HOSPADM

## 2021-11-16 ENCOUNTER — HOSPITAL ENCOUNTER (EMERGENCY)
Facility: HOSPITAL | Age: 56
Discharge: HOME | End: 2021-11-16
Attending: EMERGENCY MEDICINE
Payer: COMMERCIAL

## 2021-11-16 ENCOUNTER — OFFICE VISIT (OUTPATIENT)
Dept: URGENT CARE | Facility: CLINIC | Age: 56
End: 2021-11-16
Payer: COMMERCIAL

## 2021-11-16 VITALS
HEART RATE: 60 BPM | OXYGEN SATURATION: 98 % | SYSTOLIC BLOOD PRESSURE: 163 MMHG | DIASTOLIC BLOOD PRESSURE: 70 MMHG | TEMPERATURE: 98 F | WEIGHT: 225 LBS | BODY MASS INDEX: 38.41 KG/M2 | HEIGHT: 64 IN | RESPIRATION RATE: 19 BRPM

## 2021-11-16 VITALS
HEIGHT: 64 IN | RESPIRATION RATE: 18 BRPM | WEIGHT: 225 LBS | HEART RATE: 67 BPM | BODY MASS INDEX: 38.41 KG/M2 | SYSTOLIC BLOOD PRESSURE: 183 MMHG | TEMPERATURE: 98 F | DIASTOLIC BLOOD PRESSURE: 107 MMHG | OXYGEN SATURATION: 98 %

## 2021-11-16 DIAGNOSIS — M54.12 CERVICAL RADICULOPATHY: Primary | ICD-10-CM

## 2021-11-16 DIAGNOSIS — I10 HYPERTENSION, UNSPECIFIED TYPE: ICD-10-CM

## 2021-11-16 DIAGNOSIS — I10 HYPERTENSION: ICD-10-CM

## 2021-11-16 DIAGNOSIS — I10 HYPERTENSION, UNSPECIFIED TYPE: Primary | ICD-10-CM

## 2021-11-16 PROCEDURE — 1159F MED LIST DOCD IN RCRD: CPT | Mod: CPTII,S$GLB,, | Performed by: NURSE PRACTITIONER

## 2021-11-16 PROCEDURE — 99213 PR OFFICE/OUTPT VISIT, EST, LEVL III, 20-29 MIN: ICD-10-PCS | Mod: S$GLB,,, | Performed by: NURSE PRACTITIONER

## 2021-11-16 PROCEDURE — 1160F PR REVIEW ALL MEDS BY PRESCRIBER/CLIN PHARMACIST DOCUMENTED: ICD-10-PCS | Mod: CPTII,S$GLB,, | Performed by: NURSE PRACTITIONER

## 2021-11-16 PROCEDURE — 99213 OFFICE O/P EST LOW 20 MIN: CPT | Mod: S$GLB,,, | Performed by: NURSE PRACTITIONER

## 2021-11-16 PROCEDURE — 3066F PR DOCUMENTATION OF TREATMENT FOR NEPHROPATHY: ICD-10-PCS | Mod: CPTII,S$GLB,, | Performed by: NURSE PRACTITIONER

## 2021-11-16 PROCEDURE — 1160F RVW MEDS BY RX/DR IN RCRD: CPT | Mod: CPTII,S$GLB,, | Performed by: NURSE PRACTITIONER

## 2021-11-16 PROCEDURE — 1159F PR MEDICATION LIST DOCUMENTED IN MEDICAL RECORD: ICD-10-PCS | Mod: CPTII,S$GLB,, | Performed by: NURSE PRACTITIONER

## 2021-11-16 PROCEDURE — 3080F DIAST BP >= 90 MM HG: CPT | Mod: CPTII,S$GLB,, | Performed by: NURSE PRACTITIONER

## 2021-11-16 PROCEDURE — 3080F PR MOST RECENT DIASTOLIC BLOOD PRESSURE >= 90 MM HG: ICD-10-PCS | Mod: CPTII,S$GLB,, | Performed by: NURSE PRACTITIONER

## 2021-11-16 PROCEDURE — 63600175 PHARM REV CODE 636 W HCPCS: Mod: ER | Performed by: EMERGENCY MEDICINE

## 2021-11-16 PROCEDURE — 3077F SYST BP >= 140 MM HG: CPT | Mod: CPTII,S$GLB,, | Performed by: NURSE PRACTITIONER

## 2021-11-16 PROCEDURE — 3008F BODY MASS INDEX DOCD: CPT | Mod: CPTII,S$GLB,, | Performed by: NURSE PRACTITIONER

## 2021-11-16 PROCEDURE — 3077F PR MOST RECENT SYSTOLIC BLOOD PRESSURE >= 140 MM HG: ICD-10-PCS | Mod: CPTII,S$GLB,, | Performed by: NURSE PRACTITIONER

## 2021-11-16 PROCEDURE — 3061F NEG MICROALBUMINURIA REV: CPT | Mod: CPTII,S$GLB,, | Performed by: NURSE PRACTITIONER

## 2021-11-16 PROCEDURE — 4010F PR ACE/ARB THEARPY RXD/TAKEN: ICD-10-PCS | Mod: CPTII,S$GLB,, | Performed by: NURSE PRACTITIONER

## 2021-11-16 PROCEDURE — 3008F PR BODY MASS INDEX (BMI) DOCUMENTED: ICD-10-PCS | Mod: CPTII,S$GLB,, | Performed by: NURSE PRACTITIONER

## 2021-11-16 PROCEDURE — 3066F NEPHROPATHY DOC TX: CPT | Mod: CPTII,S$GLB,, | Performed by: NURSE PRACTITIONER

## 2021-11-16 PROCEDURE — 3044F HG A1C LEVEL LT 7.0%: CPT | Mod: CPTII,S$GLB,, | Performed by: NURSE PRACTITIONER

## 2021-11-16 PROCEDURE — 25000003 PHARM REV CODE 250: Mod: ER | Performed by: EMERGENCY MEDICINE

## 2021-11-16 PROCEDURE — 93005 ELECTROCARDIOGRAM TRACING: CPT | Mod: ER

## 2021-11-16 PROCEDURE — 4010F ACE/ARB THERAPY RXD/TAKEN: CPT | Mod: CPTII,S$GLB,, | Performed by: NURSE PRACTITIONER

## 2021-11-16 PROCEDURE — 3044F PR MOST RECENT HEMOGLOBIN A1C LEVEL <7.0%: ICD-10-PCS | Mod: CPTII,S$GLB,, | Performed by: NURSE PRACTITIONER

## 2021-11-16 PROCEDURE — 93010 ELECTROCARDIOGRAM REPORT: CPT | Mod: ,,, | Performed by: INTERNAL MEDICINE

## 2021-11-16 PROCEDURE — 3061F PR NEG MICROALBUMINURIA RESULT DOCUMENTED/REVIEW: ICD-10-PCS | Mod: CPTII,S$GLB,, | Performed by: NURSE PRACTITIONER

## 2021-11-16 PROCEDURE — 93010 EKG 12-LEAD: ICD-10-PCS | Mod: ,,, | Performed by: INTERNAL MEDICINE

## 2021-11-16 PROCEDURE — 99284 EMERGENCY DEPT VISIT MOD MDM: CPT | Mod: 25,ER

## 2021-11-16 PROCEDURE — 96372 THER/PROPH/DIAG INJ SC/IM: CPT | Mod: ER

## 2021-11-16 RX ORDER — TRAMADOL HYDROCHLORIDE 50 MG/1
50 TABLET ORAL EVERY 6 HOURS PRN
Qty: 12 TABLET | Refills: 0 | Status: SHIPPED | OUTPATIENT
Start: 2021-11-16 | End: 2022-02-17

## 2021-11-16 RX ORDER — LIDOCAINE 50 MG/G
1 PATCH TOPICAL DAILY
Qty: 15 PATCH | Refills: 0 | Status: SHIPPED | OUTPATIENT
Start: 2021-11-16

## 2021-11-16 RX ORDER — CYCLOBENZAPRINE HCL 10 MG
10 TABLET ORAL 3 TIMES DAILY PRN
Qty: 15 TABLET | Refills: 0 | Status: SHIPPED | OUTPATIENT
Start: 2021-11-16 | End: 2021-11-21

## 2021-11-16 RX ORDER — TRAMADOL HYDROCHLORIDE 50 MG/1
50 TABLET ORAL
Status: COMPLETED | OUTPATIENT
Start: 2021-11-16 | End: 2021-11-16

## 2021-11-16 RX ORDER — IBUPROFEN 400 MG/1
400 TABLET ORAL EVERY 6 HOURS PRN
Qty: 20 TABLET | Refills: 0 | Status: SHIPPED | OUTPATIENT
Start: 2021-11-16 | End: 2022-09-29

## 2021-11-16 RX ORDER — CYCLOBENZAPRINE HCL 10 MG
10 TABLET ORAL 3 TIMES DAILY PRN
Qty: 20 TABLET | Refills: 0 | Status: SHIPPED | OUTPATIENT
Start: 2021-11-16 | End: 2022-09-29

## 2021-11-16 RX ORDER — KETOROLAC TROMETHAMINE 30 MG/ML
30 INJECTION, SOLUTION INTRAMUSCULAR; INTRAVENOUS
Status: COMPLETED | OUTPATIENT
Start: 2021-11-16 | End: 2021-11-16

## 2021-11-16 RX ORDER — LIDOCAINE 50 MG/G
1 PATCH TOPICAL ONCE
Status: DISCONTINUED | OUTPATIENT
Start: 2021-11-16 | End: 2021-11-16 | Stop reason: HOSPADM

## 2021-11-16 RX ORDER — DIAZEPAM 10 MG/2ML
5 INJECTION INTRAMUSCULAR
Status: COMPLETED | OUTPATIENT
Start: 2021-11-16 | End: 2021-11-16

## 2021-11-16 RX ADMIN — DIAZEPAM 5 MG: 5 INJECTION, SOLUTION INTRAMUSCULAR; INTRAVENOUS at 05:11

## 2021-11-16 RX ADMIN — LIDOCAINE 1 PATCH: 50 PATCH TOPICAL at 05:11

## 2021-11-16 RX ADMIN — KETOROLAC TROMETHAMINE 30 MG: 30 INJECTION, SOLUTION INTRAMUSCULAR; INTRAVENOUS at 05:11

## 2021-11-16 RX ADMIN — TRAMADOL HYDROCHLORIDE 50 MG: 50 TABLET, FILM COATED ORAL at 06:11

## 2021-11-26 ENCOUNTER — PATIENT MESSAGE (OUTPATIENT)
Dept: ADMINISTRATIVE | Facility: HOSPITAL | Age: 56
End: 2021-11-26
Payer: COMMERCIAL

## 2021-12-06 ENCOUNTER — OFFICE VISIT (OUTPATIENT)
Dept: ORTHOPEDICS | Facility: CLINIC | Age: 56
End: 2021-12-06
Payer: COMMERCIAL

## 2021-12-06 ENCOUNTER — APPOINTMENT (OUTPATIENT)
Dept: RADIOLOGY | Facility: HOSPITAL | Age: 56
End: 2021-12-06
Attending: ORTHOPAEDIC SURGERY
Payer: COMMERCIAL

## 2021-12-06 VITALS
TEMPERATURE: 98 F | BODY MASS INDEX: 38.99 KG/M2 | HEART RATE: 87 BPM | HEIGHT: 64 IN | WEIGHT: 228.38 LBS | DIASTOLIC BLOOD PRESSURE: 82 MMHG | SYSTOLIC BLOOD PRESSURE: 148 MMHG | OXYGEN SATURATION: 95 %

## 2021-12-06 DIAGNOSIS — S92.512A CLOSED DISPLACED FRACTURE OF PROXIMAL PHALANX OF LESSER TOE OF LEFT FOOT, INITIAL ENCOUNTER: ICD-10-CM

## 2021-12-06 DIAGNOSIS — S92.512A CLOSED DISPLACED FRACTURE OF PROXIMAL PHALANX OF LESSER TOE OF LEFT FOOT, INITIAL ENCOUNTER: Primary | ICD-10-CM

## 2021-12-06 PROCEDURE — 4010F PR ACE/ARB THEARPY RXD/TAKEN: ICD-10-PCS | Mod: CPTII,S$GLB,, | Performed by: ORTHOPAEDIC SURGERY

## 2021-12-06 PROCEDURE — 99212 OFFICE O/P EST SF 10 MIN: CPT | Mod: S$GLB,,, | Performed by: ORTHOPAEDIC SURGERY

## 2021-12-06 PROCEDURE — 73630 XR FOOT COMPLETE 3 VIEW LEFT: ICD-10-PCS | Mod: 26,LT,, | Performed by: RADIOLOGY

## 2021-12-06 PROCEDURE — 3066F NEPHROPATHY DOC TX: CPT | Mod: CPTII,S$GLB,, | Performed by: ORTHOPAEDIC SURGERY

## 2021-12-06 PROCEDURE — 73630 X-RAY EXAM OF FOOT: CPT | Mod: 26,LT,, | Performed by: RADIOLOGY

## 2021-12-06 PROCEDURE — 73630 X-RAY EXAM OF FOOT: CPT | Mod: TC,FY,PN,LT

## 2021-12-06 PROCEDURE — 3061F NEG MICROALBUMINURIA REV: CPT | Mod: CPTII,S$GLB,, | Performed by: ORTHOPAEDIC SURGERY

## 2021-12-06 PROCEDURE — 99999 PR PBB SHADOW E&M-EST. PATIENT-LVL V: CPT | Mod: PBBFAC,,, | Performed by: ORTHOPAEDIC SURGERY

## 2021-12-06 PROCEDURE — 99999 PR PBB SHADOW E&M-EST. PATIENT-LVL V: ICD-10-PCS | Mod: PBBFAC,,, | Performed by: ORTHOPAEDIC SURGERY

## 2021-12-06 PROCEDURE — 3066F PR DOCUMENTATION OF TREATMENT FOR NEPHROPATHY: ICD-10-PCS | Mod: CPTII,S$GLB,, | Performed by: ORTHOPAEDIC SURGERY

## 2021-12-06 PROCEDURE — 4010F ACE/ARB THERAPY RXD/TAKEN: CPT | Mod: CPTII,S$GLB,, | Performed by: ORTHOPAEDIC SURGERY

## 2021-12-06 PROCEDURE — 99212 PR OFFICE/OUTPT VISIT, EST, LEVL II, 10-19 MIN: ICD-10-PCS | Mod: S$GLB,,, | Performed by: ORTHOPAEDIC SURGERY

## 2021-12-06 PROCEDURE — 3061F PR NEG MICROALBUMINURIA RESULT DOCUMENTED/REVIEW: ICD-10-PCS | Mod: CPTII,S$GLB,, | Performed by: ORTHOPAEDIC SURGERY

## 2021-12-29 ENCOUNTER — PATIENT MESSAGE (OUTPATIENT)
Dept: ADMINISTRATIVE | Facility: HOSPITAL | Age: 56
End: 2021-12-29
Payer: COMMERCIAL

## 2021-12-30 ENCOUNTER — PATIENT OUTREACH (OUTPATIENT)
Dept: ADMINISTRATIVE | Facility: OTHER | Age: 56
End: 2021-12-30
Payer: COMMERCIAL

## 2021-12-30 DIAGNOSIS — E11.9 TYPE 2 DIABETES MELLITUS WITHOUT COMPLICATION, UNSPECIFIED WHETHER LONG TERM INSULIN USE: ICD-10-CM

## 2022-01-05 ENCOUNTER — OFFICE VISIT (OUTPATIENT)
Dept: NEUROLOGY | Facility: CLINIC | Age: 57
End: 2022-01-05
Payer: COMMERCIAL

## 2022-01-05 VITALS
SYSTOLIC BLOOD PRESSURE: 155 MMHG | BODY MASS INDEX: 40.59 KG/M2 | HEART RATE: 77 BPM | WEIGHT: 229.06 LBS | DIASTOLIC BLOOD PRESSURE: 94 MMHG | HEIGHT: 63 IN

## 2022-01-05 DIAGNOSIS — G43.009 MIGRAINE WITHOUT AURA AND WITHOUT STATUS MIGRAINOSUS, NOT INTRACTABLE: ICD-10-CM

## 2022-01-05 DIAGNOSIS — G40.209 PARTIAL SYMPTOMATIC EPILEPSY WITH COMPLEX PARTIAL SEIZURES, NOT INTRACTABLE, WITHOUT STATUS EPILEPTICUS: ICD-10-CM

## 2022-01-05 DIAGNOSIS — M54.81 OCCIPITAL NEURALGIA OF LEFT SIDE: ICD-10-CM

## 2022-01-05 DIAGNOSIS — G44.221 CHRONIC TENSION-TYPE HEADACHE, INTRACTABLE: ICD-10-CM

## 2022-01-05 DIAGNOSIS — G51.33 HEMIFACIAL SPASM AFFECTING BOTH SIDES OF FACE: Primary | ICD-10-CM

## 2022-01-05 PROCEDURE — 99214 OFFICE O/P EST MOD 30 MIN: CPT | Mod: S$GLB,,, | Performed by: STUDENT IN AN ORGANIZED HEALTH CARE EDUCATION/TRAINING PROGRAM

## 2022-01-05 PROCEDURE — 99999 PR PBB SHADOW E&M-EST. PATIENT-LVL III: ICD-10-PCS | Mod: PBBFAC,,, | Performed by: STUDENT IN AN ORGANIZED HEALTH CARE EDUCATION/TRAINING PROGRAM

## 2022-01-05 PROCEDURE — 99999 PR PBB SHADOW E&M-EST. PATIENT-LVL III: CPT | Mod: PBBFAC,,, | Performed by: STUDENT IN AN ORGANIZED HEALTH CARE EDUCATION/TRAINING PROGRAM

## 2022-01-05 PROCEDURE — 99214 PR OFFICE/OUTPT VISIT, EST, LEVL IV, 30-39 MIN: ICD-10-PCS | Mod: S$GLB,,, | Performed by: STUDENT IN AN ORGANIZED HEALTH CARE EDUCATION/TRAINING PROGRAM

## 2022-01-05 RX ORDER — TOPIRAMATE 100 MG/1
100 TABLET, FILM COATED ORAL DAILY
Qty: 30 TABLET | Refills: 11 | Status: SHIPPED | OUTPATIENT
Start: 2022-01-05 | End: 2023-12-06

## 2022-01-05 NOTE — PROGRESS NOTES
[facility] - NEUROLOGY [address] [facility], [place]    Date: 1/5/22  Patient Name: Monalisa Carson   MRN: [id number]   PCP: Alisha See  Referring Provider: No ref. provider found    Assessment:   Monalisa Carson is a 56 y.o. female presenting for follow up of tension headache and Occipital Neuralgia.  Patient is on Topamax 75 mg qhs but still has frequent headaches.  S/p occipital nerve injection 11/2020 with good response although she was not able to continue due to high co-pay  Patient was started on elavil to help with sleep disturbance and headaches. She is now on Ambien as well. We discussed about stopping Elavil and increasing topamax to 100 mg daily. She denies any side effects.  Follow up in 6 weeks if headaches not improved with increased dose of topamax, otherwise annual fu    Plan:     Problem List Items Addressed This Visit        Neuro    Hemifacial spasm - Primary    Nonintractable epilepsy without status epilepticus    Tension headache, chronic    RESOLVED: Migraine without aura and without status migrainosus, not intractable    Relevant Medications    topiramate (TOPAMAX) 100 MG tablet       Orthopedic    Occipital neuralgia of left side          Selene Dunlap MD    Patient note was created using MModal Dictation.  Any errors in syntax or even information may not have been identified and edited on initial review prior to signing this note.  Subjective:   Patient seen in consultation at the request of No ref. provider found for the evaluation of headache. A copy of this note will be sent to the referring physician.        HPI:   Ms. Monalisa Carson is a 56 y.o. female presenting for follow up on headache.  She has PMH significant for Anxiety, HTN, possible focal epilepsy vs hemifacial spasm.    Headache:  ON and tension headache  Patient is on Topamax 75 mg qhs  S/p occipital nerve injection 11/2020 with good response although she was not able to  continue due to high co-pay  Patient has sleep disturbance problem which is a trigger for headaches. On our last visit , we discussed about either increasing topamax to 100 mg daily or continuing topamax with current dose and adding Nortriptyline which can help with her sleep as well. Patient chose to do the latter. She is now on Ambien as well.        Hemifacial spasm spells:    Keppra 1 g b.i.d..     She has had 2 events (more than 48 hours apart) in 2/2019 associated with L facial twitching and possible generalized shaking/LOC.  These have been treated as seizures with keppra.  She has had no further LOC/generalized events since that time, but has continued to have L facial twitching.  Her EEGs have been unrevealing (including 3 day ambulatory EEG, but no clinical events per pt).  MRI with only chronic microvascular changes.  The facial twitching worsens when a dose of keppra is missed or delayed.  On 2/14/20, this is the first visit where I have been able to reproduce her facial twitching by having her close her eyes.       Based on this, I suspect her continued facial twitching represents hemifacial spasm and less likely focal seizure activity.  I cannot rule out that the events in 2/2019 represented epileptic activity, and will therefore continue keppra.  As she has had no further generalized activity, we will lift her driving/work restrictions with the explicit instructions that should these events recur, the restrictions will be reimposed.      PAST MEDICAL HISTORY:  Past Medical History:   Diagnosis Date    Allergy     Anxiety     Diabetes mellitus     Heart murmur     Hypertension     Seizures        PAST SURGICAL HISTORY:  Past Surgical History:   Procedure Laterality Date    CHOLECYSTECTOMY      2001 april    COLONOSCOPY N/A 6/29/2021    Procedure: COLONOSCOPY;  Surgeon: Gustavo Pelletier MD;  Location: [facility];  Service: Endoscopy;  Laterality: N/A;  combined orders     ESOPHAGOGASTRODUODENOSCOPY N/A 6/29/2021    Procedure: ESOPHAGOGASTRODUODENOSCOPY (EGD);  Surgeon: Gustavo Pelletier MD;  Location[facility];  Service: Endoscopy;  Laterality: N/A;  covdi +3/29/20, fully vaccinated 3/23/21, prep instr portal -ml    HYSTERECTOMY      partial  1996    OOPHORECTOMY      SHOULDER SURGERY Right     WISDOM TOOTH EXTRACTION         CURRENT MEDS:  Current Outpatient Medications   Medication Sig Dispense Refill    acetaminophen (TYLENOL) 500 MG tablet Take 2 tablets (1,000 mg total) by mouth every 6 (six) hours as needed for Pain. 30 tablet 0    aspirin 81 MG Chew Take 1 tablet (81 mg total) by mouth once daily.      benazepriL (LOTENSIN) 40 MG tablet TAKE 1 TABLET(40 MG) BY MOUTH EVERY DAY 90 tablet 0    cyclobenzaprine (FLEXERIL) 10 MG tablet Take 1 tablet (10 mg total) by mouth 3 (three) times daily as needed for Muscle spasms. This medication will make you drowsy. 20 tablet 0    dicyclomine (BENTYL) 20 mg tablet Take 20 mg by mouth 4 (four) times daily.      diphenhydrAMINE (BENADRYL) 25 mg capsule Take 1 capsule (25 mg total) by mouth every 6 (six) hours as needed for Itching or Allergies (Take if headache does not resolve). 20 capsule 0    exenatide microspheres (BYDUREON BCISE) 2 mg/0.85 mL AtIn INJECT 2 MG UNDER THE SKIN EVERY 7 DAYS 12 pen 0    fluticasone propionate (FLONASE) 50 mcg/actuation nasal spray 2 sprays (100 mcg total) by Each Nostril route once daily. 15 g 0    fluticasone propionate (FLONASE) 50 mcg/actuation nasal spray 1 spray (50 mcg total) by Each Nostril route 2 (two) times daily. 16 g 0    hydrocortisone 2.5 % cream WOLFGANG EXT AA BID 28 g 0    ibuprofen (ADVIL,MOTRIN) 400 MG tablet Take 1 tablet (400 mg total) by mouth every 6 (six) hours as needed for Other. 20 tablet 0    levETIRAcetam (KEPPRA) 1000 MG tablet Take 1 tablet (1,000 mg total) by mouth 2 (two) times daily. 180 tablet 3    LIDOcaine (LIDODERM) 5 % Place 1 patch onto the skin once  "daily. Remove & Discard patch within 12 hours or as directed by MD 15 patch 0    lidocaine (LMX) 4 % cream Apply topically as needed.  0    loratadine (CLARITIN) 10 mg tablet Take 1 tablet (10 mg total) by mouth once daily. 60 tablet 0    metronid-tetracyc-bis subsal 250-500-262.4 mg Cmpk Take 3 tablets by mouth 2 hours after meals and at bedtime. This is Pylera 240 each 0    MOBIC 15 mg tablet Take 15 mg by mouth daily as needed.      pantoprazole (PROTONIX) 40 MG tablet Take 1 tablet (40 mg total) by mouth once daily. 30 tablet 11    pen needle, diabetic 31 gauge x 5/16" Ndle For use with bydureon 100 each 0    promethazine (PHENERGAN) 25 MG tablet       PYLERA 140-125-125 mg per capsule Take 3 capsules by mouth 3 (three) times daily.      sodium chloride (OCEAN NASAL) 0.65 % nasal spray 1 spray by Nasal route every 3 (three) hours as needed for Congestion. 1 Bottle 12    zolpidem (AMBIEN) 10 mg Tab TAKE 1 TABLET(10 MG) BY MOUTH EVERY NIGHT 30 tablet 4    albuterol (PROVENTIL/VENTOLIN HFA) 90 mcg/actuation inhaler Inhale 1-2 puffs into the lungs every 4 (four) hours as needed for Wheezing. Rescue 18 g 11    oxyCODONE-acetaminophen (PERCOCET) 5-325 mg per tablet Take 1 tablet by mouth every 4 (four) hours as needed for Pain. Causes drowsiness. Do not drive or operate machinery with this medication. Do not drink alcohol with this medication. Causes constipation. Take with stool softener. (Patient not taking: No sig reported) 12 tablet 0    rosuvastatin (CRESTOR) 20 MG tablet Take 1 tablet (20 mg total) by mouth once daily. 90 tablet 1    topiramate (TOPAMAX) 100 MG tablet Take 1 tablet (100 mg total) by mouth once daily. 30 tablet 11    traMADoL (ULTRAM) 50 mg tablet Take 1 tablet (50 mg total) by mouth every 6 (six) hours as needed for Pain. (Patient not taking: Reported on 1/5/2022) 12 tablet 0     No current facility-administered medications for this visit.       ALLERGIES:  Review of patient's " "allergies indicates:   Allergen Reactions    Keflex [cephalexin] Hives    Vicodin [hydrocodone-acetaminophen] Itching     itch       FAMILY HISTORY:  Family History   Problem Relation Age of Onset    Diabetes Mother     Hyperlipidemia Mother     Hypertension Mother     Cataracts Mother     Diabetes Father     Hypertension Father     Stroke Father     Depression Sister     Hypertension Sister     Stroke Sister     Cancer Maternal Aunt         breast x 2    Breast cancer Maternal Aunt     Cancer Maternal Uncle         prostate x 2    Cancer Paternal Aunt         breast    Breast cancer Paternal Aunt     Cancer Maternal Grandfather         lung    Early death Paternal Grandmother     Early death Paternal Grandfather     Esophageal cancer Paternal Grandfather     No Known Problems Brother     No Known Problems Paternal Uncle     No Known Problems Maternal Grandmother     Melanoma Neg Hx     Eczema Neg Hx     Lupus Neg Hx     Psoriasis Neg Hx     Amblyopia Neg Hx     Blindness Neg Hx     Glaucoma Neg Hx     Macular degeneration Neg Hx     Retinal detachment Neg Hx     Strabismus Neg Hx     Thyroid disease Neg Hx     Colon cancer Neg Hx        SOCIAL HISTORY:  Social History     Tobacco Use    Smoking status: Former Smoker     Packs/day: 0.00     Years: 0.50     Pack years: 0.00    Smokeless tobacco: Never Used   Substance Use Topics    Alcohol use: Yes     Alcohol/week: 0.0 standard drinks     Comment: seldom    Drug use: No       Review of Systems:  12 system review of systems is negative except for the symptoms mentioned in HPI.      Objective:     Vitals:    01/05/22 1023   BP: (!) 155/94   BP Location: Left arm   Patient Position: Sitting   BP Method: Large (Automatic)   Pulse: 77   Weight: 103.9 kg (229 lb 0.9 oz)   Height: 5' 3" (1.6 m)     General: NAD, well nourished   Eyes: no tearing, discharge, no erythema   ENT: moist mucous membranes of the oral cavity, nares patent  "   Neck: Supple, full range of motion  Cardiovascular: Warm and well perfused, pulses equal and symmetrical  Lungs: Normal work of breathing, normal chest wall excursions  Skin: No rash, lesions, or breakdown on exposed skin  Psychiatry: Mood and affect are appropriate   Abdomen: soft, non tender, non distended  Extremeties: No cyanosis, clubbing or edema.    Neurological   MENTAL STATUS: Alert and oriented to person, place, and time. Attention and concentration within normal limits. Speech without dysarthria, able to name and repeat without difficulty. Recent and remote memory within normal limits   CRANIAL NERVES: Visual fields intact. PERRL. EOMI. Facial sensation intact. Face symmetrical. Hearing grossly intact. Full shoulder shrug bilaterally. Tongue protrudes midline   SENSORY: Sensation is intact to light touch throughout.  Joint position perception intact. Negative Romberg.   MOTOR: Normal bulk and tone. No pronator drift.  5/5 deltoid, biceps, triceps, interosseous, hand  bilaterally. 5/5 iliopsoas, knee extension/flexion, foot dorsi/plantarflexion bilaterally.    REFLEXES: Symmetric and 2+ throughout. Toes down going bilaterally.   CEREBELLAR/COORDINATION/GAIT: Gait steady with normal arm swing and stride length.  Heel to shin intact. Finger to nose intact. Normal rapid alternating movements.

## 2022-01-06 PROBLEM — G43.009 MIGRAINE WITHOUT AURA AND WITHOUT STATUS MIGRAINOSUS, NOT INTRACTABLE: Status: RESOLVED | Noted: 2019-05-12 | Resolved: 2022-01-06

## 2022-01-19 DIAGNOSIS — N18.2 CONTROLLED TYPE 2 DIABETES MELLITUS WITH STAGE 2 CHRONIC KIDNEY DISEASE, WITHOUT LONG-TERM CURRENT USE OF INSULIN: ICD-10-CM

## 2022-01-19 DIAGNOSIS — E11.22 CONTROLLED TYPE 2 DIABETES MELLITUS WITH STAGE 2 CHRONIC KIDNEY DISEASE, WITHOUT LONG-TERM CURRENT USE OF INSULIN: ICD-10-CM

## 2022-01-19 DIAGNOSIS — G47.00 INSOMNIA, UNSPECIFIED TYPE: ICD-10-CM

## 2022-01-19 RX ORDER — ZOLPIDEM TARTRATE 10 MG/1
10 TABLET ORAL NIGHTLY
Qty: 30 TABLET | Refills: 4 | Status: CANCELLED | OUTPATIENT
Start: 2022-01-19

## 2022-01-19 NOTE — TELEPHONE ENCOUNTER
No new care gaps identified.  Powered by [employer]. Reference number: [id number].   1/19/2022 7:09:14 AM CST

## 2022-01-20 RX ORDER — EXENATIDE 2 MG/.85ML
INJECTION, SUSPENSION, EXTENDED RELEASE SUBCUTANEOUS
Qty: 12 PEN | Refills: 0 | Status: SHIPPED | OUTPATIENT
Start: 2022-01-20 | End: 2022-04-20

## 2022-01-20 NOTE — TELEPHONE ENCOUNTER
----- Message from Nahomy Felton sent at 1/20/2022 12:29 PM CST -----  Regarding: self  .Type:  Patient Returning Call    Who Called: self     Who Left Message for Patient: poly     Does the patient know what this is regarding?:meds refill perhaps     Would the patient rather a call back or a response via [facility]? Call     Best Call Back Number: .[phone]

## 2022-01-20 NOTE — TELEPHONE ENCOUNTER
LM informing pt she needs to call and schedule OV with another provider to establish care since Dr See no longer here

## 2022-01-20 NOTE — TELEPHONE ENCOUNTER
----- Message from Billie Barlow sent at 1/20/2022 10:41 AM CST -----  Type: Patient Call Back       What is the request in detail: Pt calling to speak to a nurse regarding status on med. Pt is out     exenatide microspheres (BYDUREON BCISE) 2 mg/0.85 mL AtIn       Can the clinic reply by [facility]? No       Would the patient rather a call back or a response via [facility]? Call back       Best call back number: [phone]        Thank you.

## 2022-01-21 NOTE — PROGRESS NOTES
Subjective:       Patient ID: Monalisa THOMPSON Favorite is a pleasant 56 y.o. Black or  female patient    Chief Complaint: Medication Refill      Patient is a pt I saw once only on 06/02/2021,  but she was established pt from Dr. See, last visit on 07/19/2021, see her last notes and the list of problems below.    HPI     She comes today to obtain a refill for zolpidem as per former PCP.  However she comes stating that from yesterday she has been presenting cough, body ache and fatigue.  She also has a scratchy throat and a runny nose. She is fully vaccinated for COVID. No sick contact as per her report.  She has fever at arrival.  She denies SOB. She has headache in addition to above.       Patient Active Problem List   Diagnosis    Anxiety    Essential hypertension, benign    Controlled type 2 diabetes mellitus with stage 2 chronic kidney disease, without long-term current use of insulin    HLD (hyperlipidemia)    Dysarthria    Hemifacial spasm    Nonintractable epilepsy without status epilepticus    Tension headache, chronic    2019 novel coronavirus disease (COVID-19)    Occipital neuralgia of left side    Upper abdominal pain    Severe obesity (BMI 35.0-39.9) with comorbidity    Sal's paralysis (postepileptic)    Wears contact lenses    Refractive error          ACTIVE MEDICAL ISSUES:  Documented in Problem List     PAST MEDICAL HISTORY  Documented     PAST SURGICAL HISTORY:  Documented     SOCIAL HISTORY:  Documented     FAMILY HISTORY:  Documented     ALLERGIES AND MEDICATIONS: updated and reviewed.  Documented    Review of Systems   Constitutional: Positive for activity change, fatigue and fever.   HENT: Positive for congestion and sore throat (scratchy throat).    Respiratory: Positive for cough. Negative for shortness of breath.    Musculoskeletal: Positive for myalgias.   All other systems reviewed and are negative.      Objective:      Physical Exam  Vitals and nursing note  "reviewed.   Constitutional:       Appearance: Normal appearance. She is obese.   HENT:      Right Ear: Tympanic membrane normal.      Left Ear: Tympanic membrane normal.   Neurological:      Mental Status: She is alert.         Vitals:    01/24/22 0804   BP: (!) 140/88   BP Location: Right arm   Patient Position: Sitting   BP Method: Large (Manual)   Pulse: 86   Resp: 16   Temp: (!) 100.9 °F (38.3 °C)   TempSrc: Oral   SpO2: 95%   Weight: 105.2 kg (231 lb 14.8 oz)   Height: 5' 3" (1.6 m)     Body mass index is 41.08 kg/m².    RESULTS: Reviewed labs from last 12 months    Last Lab Results:     Lab Results   Component Value Date    WBC 7.23 09/05/2021    HGB 13.4 09/05/2021    HCT 40.1 09/05/2021     09/05/2021     09/05/2021    K 3.8 09/05/2021     09/05/2021    CO2 22 (L) 09/05/2021    BUN 14 09/05/2021    CREATININE 0.8 09/05/2021    CALCIUM 9.9 09/05/2021    ALBUMIN 3.7 09/05/2021    AST 20 09/05/2021    ALT 30 09/05/2021    CHOL 253 (H) 05/28/2021    TRIG 269 (H) 05/28/2021    HDL 45 05/28/2021    LDLCALC 154.2 05/28/2021    HGBA1C 6.3 (H) 05/28/2021    TSH 1.377 02/16/2019       Assessment:       1. Suspected COVID-19 virus infection    2. Fever, unspecified fever cause    3. Cough    4. Muscle pain    5. Nonintractable headache, unspecified chronicity pattern, unspecified headache type    6. Insomnia, unspecified type    7. Establishing care with new doctor, encounter for    8. Controlled type 2 diabetes mellitus with stage 2 chronic kidney disease, without long-term current use of insulin    9. Severe obesity (BMI 35.0-39.9) with comorbidity    10. Mixed hyperlipidemia        Plan:   Monalisa was seen today for medication refill.    Diagnoses and all orders for this visit:    Suspected COVID-19 virus infection    See HPI and PE. Tested for COVID, will quarantine until results. Will order benzonatate, advised to drink plenty of fluid and to rest. She can take NSAIDs/APAP.   Of note her mother " will come and see me tomorrow as NP, she was in contact with her yesterday. Told her to stay away from her for now. Someone else will bring her tomorrow.  Told her about symptoms and signs to monitor.  I focused this visit on this specific issue and patient will come back as soon as she feels better for a regular follow-up.    Fever, unspecified fever cause  -     POCT Influenza A/B    See above. Also checked for flu, negative.    Cough  -     COVID-19 Routine Screening  -     POCT Influenza A/B    See above.      Muscle pain  -     COVID-19 Routine Screening    See above.    Nonintractable headache, unspecified chronicity pattern, unspecified headache type    See above.    Insomnia, unspecified type    Patient comes specifically to obtain a refill for zolpidem 10 mg that she has been receiving for a long time by former PCP.  Advised her that I will give her this medication for 1 month only, discussed briefly of the fact that the dosage is too high and that there were risks in taking this medication long term, I told her that we will try to decrease it progressively.    Establishing care with new doctor, encounter for    Discussed the importance of a good pt-doctor trust relationship.    Controlled type 2 diabetes mellitus with stage 2 chronic kidney disease, without long-term current use of insulin    Will discuss at next visit.     Severe obesity (BMI 35.0-39.9) with comorbidity    Will discuss at next visit.     Mixed hyperlipidemia    Will discuss at next visit.     Other orders  -     benzonatate (TESSALON) 100 MG capsule; Take 1 capsule (100 mg total) by mouth 3 (three) times daily as needed for Cough.  -     zolpidem (AMBIEN) 10 mg Tab; Take 1 tablet (10 mg total) by mouth nightly as needed (insomnia).      No follow-ups on file.    This note was created by combination of typed  and M-Modal dictation.  Transcription errors may be present.  If there are any questions, please contact me.

## 2022-01-23 NOTE — PROGRESS NOTES
[address]  Neurology Consult- Brain Fabry 1950, 70 y o  male   MRN: [id number] Unit/Bed#: FT 02 Encounter: [id number]    Inpatient consult to Neurology  Consult performed by: FEDE Gómez  Consult ordered by: Yobani Lam PA-C      Reason for Consult / Principal Problem:  Right hand weakness  Hx and PE limited by:  None  Review of previous medical records was completed  Family, specifically the patient's wife was not present at the bedside for history and examination    * Stroke-like symptoms, R hnd weakness  Assessment & Plan  Neurology is asked to see this 66-year-old gentleman after he presented yesterday to the ED with complaints of right hand weakness  There is no apparent neurologic history  He was recently seen at [facility] as a new  patient who has history includes CAD s/p LAD stent in 2003 for unstable angina  He also has a history of hypertension, hyperlipidemia, and PAD  Reportedly he has stopped his anticoagulant last year and is currently on full strength aspirin daily  He was not markedly hypertensive when he presented  Initial CT, and CTA note:  No evidence of acute internal hemorrhage  And I agree on my review  Radiology does report Severe plaque stenosis at the origin of the right vertebral artery  Radiology also appreciated a  2 8 x 1 2 cm soft tissue nodule /region identified in the posterior subcutaneous region at the level of C2, of unknown clinical significance  His MRI done this morning not read by Radiology and on my review is largely clean from a small vessel perspective  He does have the multiple, 4, punctate regions of ischemia noted high in the left posterior frontal motor cortex  His exam at this time is largely negative  He has no weakness in the right hand or the rest of the arm    There is no subjective dysesthesia although he does report that the pinprick sensation feels mildly last and on the right side See initial evaluation from P[facility].    Thank you for your referral.     compared with the left  His echocardiogram are pending, we will also plan to do a KANCHAN tomorrow  If this is negative he will need an outpatient loop monitor unless this can also be put in at the time of KANCHAN  This patient will likely be stable for discharge tomorrow with his Crestor changed to Lipitor  He will continue on this 325 aspirin with 21 days of Plavix added to as well as his other regular meds  Hyperlipidemia  Assessment & Plan  This patient is maintained on a low dose of Crestor  If his MRI does indicate that he has had an ischemic infarct he should be changed to a higher dose statin of Lipitor 40 mg  Diabetes mellitus, type 2 ([facility] [address] )  Assessment & Plan  The patient reports he is a diabetic now for several years  He has a controlled A1c at 7 0  He does have some subjective complaints as well as objective findings of diabetic neuropathy his feet bilaterally  CAD (coronary artery disease)  Assessment & Plan  His original stent was put in in 2003  He reports he is compliant with his meds  He does report an occasional flutter however it is somewhat the below the cardiac region  No other indications of AFib  This patient will need neurologic follow-up in approximately 1 month or so in our [place] office  He can be seen by any of our providers at that location  His final medication recommendations are pending his MRI and echo workup  HPI: Baljinder Gonzales is a right handed  70 y o  male who  reports himself in good health despite the hypertension diabetes a previous stent for CAD and peripheral artery disease  He reports that he generally tries to stay active etcetera  He eats and early dinner as he goes to bed early  He reports that the 1st part of his day was uneventful  After dinner around 5:00 p m  He was having some ice cream as others have noted and he went to manipulate the spoon and he was unable to control the motor movement of his fingers to grab spoon etcetera    He reports that the hand was not flaccid and weak  He reports he was able to still demonstrate some general movement in his fingers however he was unable to grasp the spoon and manipulated  He reports that this continued for approximately 20 minutes or so  It completely resolved  However he reports that he and his wife decided to still come to the ED  He reports he has had no further events overnight         ROS: 12 system cued query: This patient also reports that in the past when he has looked up or raised his arms above his head has had a syncopal episode  His CTA does note the rate vertebral artery stenosis of a severe nature  Historical Information     Past Medical History:   Diagnosis Date    Basal cell carcinoma 11/30/2021    left posterior auticular of the ear     Colon polyp     Coronary artery disease     Diabetes mellitus ([address] )     Hyperlipidemia     Hypertension      Past Surgical History:   Procedure Laterality Date    CATARACT EXTRACTION      COLONOSCOPY      CORONARY ANGIOPLASTY WITH STENT PLACEMENT  2003    CYST REMOVAL  2016    ILIAC ARTERY STENT Bilateral 01/08/2016    MOHS SURGERY Left 01/11/2022    Left posterior auricular of the ear       Social History  he is  lives with his wife of many years  No tobacco or alcohol of substance no recreationals          Family History:  Strokes in his father and mother  Family History   Problem Relation Age of Onset    Diabetes Father     Diabetes Sister     Basal cell carcinoma Daughter          No Known Allergies  Meds:all current active meds have been reviewed    Scheduled Meds:  Medication Dose Route Frequency    acetaminophen  650 mg Oral Q6H PRN    aspirin  325 mg Oral Daily    atorvastatin  40 mg Oral QPM    clopidogrel  75 mg Oral Daily    enoxaparin  40 mg Subcutaneous Daily    fluticasone  2 spray Each Nare Daily    insulin detemir  10 Units Subcutaneous HS    insulin lispro  1-5 Units Subcutaneous HS    insulin lispro  1-6 Units Subcutaneous TID AC    insulin lispro  5 Units Subcutaneous TID With Meals    loratadine  10 mg Oral Daily    pantoprazole  40 mg Oral Daily    tamsulosin  0 8 mg Oral Daily With Dinner     PRN Meds:   acetaminophen      Physical Exam:   Objective   Vitals:Blood pressure 129/66, pulse 58, temperature 98 2 °F (36 8 °C), temperature source Temporal, resp  rate 18, height 6' 3" (1 905 m), weight 99 8 kg (220 lb), SpO2 96 %  ,Body mass index is 27 5 kg/m²  Patient was examined in emergency department bed hold over bed his wife is present at the bedside  General: alert, appears stated age and cooperative  Head: Normocephalic, without obvious abnormality, atraumatic  Oral exam: lips, mucosa, and tongue moist;   Neck: no carotid bruit,   Lungs: clear to auscultation ant  bilaterally  Heart: regular rate and rhythm, S1, S2 normal, no murmur appreciated,   Abdomen: soft, +BS    Extremities: atraumatic, no cyanosis or edema    Neurologic:   Mental status: Alert, oriented, thought content appropriate  CN Exam: NOLAN, EOM's I, VF full, Gaze conjugate No sensory or motor lateralizations (No PP on face), Hearing I B, CNIX-XII I B  Motor: full power, age appropriate x 4 limbs there is no residual weakness in the right hand  Sensory: intact  X 4 limbs, 4 mod inc lt, temp, vib, is decreased below the mid shin (not PP tested)  Cerebellar: no past pointing or drift from modified Romberg position, no ataxia w maneuvers, Fine motor & finger taps, age appropriate, WNL  DTR's: Age appropriate, WNL; Plantars:  Mute bilaterally  Gait: Fluid smooth, within the space of the room        Lab Results:   I have personally reviewed pertinent reports    , CBC:   Results from last 7 days   Lab Units 01/22/22  1828   WBC Thousand/uL 9 13   RBC Million/uL 4 68   HEMOGLOBIN g/dL 15 1   HEMATOCRIT % 44 3   MCV fL 95   PLATELETS Thousands/uL 254   , BMP/CMP:   Results from last 7 days   Lab Units 01/22/22 1828 SODIUM mmol/L 139   POTASSIUM mmol/L 3 9   CHLORIDE mmol/L 106   CO2 mmol/L 24   BUN mg/dL 22   CREATININE mg/dL 0 96   CALCIUM mg/dL 9 3   AST U/L 20   ALT U/L 22   ALK PHOS U/L 81   EGFR ml/min/1 73sq m 79   , Vitamin B12:   , HgBA1C:   , TSH:   , Coagulation:   , Lipid Profile:   Results from last 7 days   Lab Units 01/23/22  0419   HDL mg/dL 35*   LDL CALC mg/dL 82   TRIGLYCERIDES mg/dL 139        Imaging Studies: I have personally reviewed pertinent films in PACS and Have reviewed the films personally including CTA as well as MRI  The films of also been reviewed with the patient  Have for the reader to the notes above and to the particular images  EEG, Echo, Pathology, and Other Studies: Echo and KANCHAN are pending  Counseling / Coordination of Care  Total time spent today Greater than 50 minutes  Greater than 50% of total time was spent with the patient and / or family counseling and / or coordination of care  A description of the counseling / coordination of care: All of the above was discussed and reviewed with the patient the bedside  He understands the changes in his meds and the rest of the workup  His wife had several questions I believe they were all answered to their satisfaction at this time  Dictation voice to text software has been used in the creation of this document  Please consider this in light of any contextual or grammatical errors

## 2022-01-24 ENCOUNTER — OFFICE VISIT (OUTPATIENT)
Dept: FAMILY MEDICINE | Facility: CLINIC | Age: 57
End: 2022-01-24
Payer: COMMERCIAL

## 2022-01-24 VITALS
SYSTOLIC BLOOD PRESSURE: 140 MMHG | TEMPERATURE: 101 F | OXYGEN SATURATION: 95 % | RESPIRATION RATE: 16 BRPM | WEIGHT: 231.94 LBS | HEART RATE: 86 BPM | BODY MASS INDEX: 41.1 KG/M2 | HEIGHT: 63 IN | DIASTOLIC BLOOD PRESSURE: 88 MMHG

## 2022-01-24 DIAGNOSIS — G47.00 INSOMNIA, UNSPECIFIED TYPE: ICD-10-CM

## 2022-01-24 DIAGNOSIS — E78.2 MIXED HYPERLIPIDEMIA: ICD-10-CM

## 2022-01-24 DIAGNOSIS — E66.01 SEVERE OBESITY (BMI 35.0-39.9) WITH COMORBIDITY: ICD-10-CM

## 2022-01-24 DIAGNOSIS — Z76.89 ESTABLISHING CARE WITH NEW DOCTOR, ENCOUNTER FOR: ICD-10-CM

## 2022-01-24 DIAGNOSIS — R50.9 FEVER, UNSPECIFIED FEVER CAUSE: ICD-10-CM

## 2022-01-24 DIAGNOSIS — R05.9 COUGH: ICD-10-CM

## 2022-01-24 DIAGNOSIS — Z20.822 SUSPECTED COVID-19 VIRUS INFECTION: ICD-10-CM

## 2022-01-24 DIAGNOSIS — R51.9 NONINTRACTABLE HEADACHE, UNSPECIFIED CHRONICITY PATTERN, UNSPECIFIED HEADACHE TYPE: ICD-10-CM

## 2022-01-24 DIAGNOSIS — M79.10 MUSCLE PAIN: ICD-10-CM

## 2022-01-24 DIAGNOSIS — N18.2 CONTROLLED TYPE 2 DIABETES MELLITUS WITH STAGE 2 CHRONIC KIDNEY DISEASE, WITHOUT LONG-TERM CURRENT USE OF INSULIN: ICD-10-CM

## 2022-01-24 DIAGNOSIS — E11.22 CONTROLLED TYPE 2 DIABETES MELLITUS WITH STAGE 2 CHRONIC KIDNEY DISEASE, WITHOUT LONG-TERM CURRENT USE OF INSULIN: ICD-10-CM

## 2022-01-24 LAB
CTP QC/QA: YES
FLUAV AG NPH QL: NEGATIVE
FLUBV AG NPH QL: NEGATIVE

## 2022-01-24 PROCEDURE — 1160F RVW MEDS BY RX/DR IN RCRD: CPT | Mod: CPTII,S$GLB,, | Performed by: INTERNAL MEDICINE

## 2022-01-24 PROCEDURE — 87804 POCT INFLUENZA A/B: ICD-10-PCS | Mod: QW,S$GLB,, | Performed by: INTERNAL MEDICINE

## 2022-01-24 PROCEDURE — 3077F PR MOST RECENT SYSTOLIC BLOOD PRESSURE >= 140 MM HG: ICD-10-PCS | Mod: CPTII,S$GLB,, | Performed by: INTERNAL MEDICINE

## 2022-01-24 PROCEDURE — 99999 PR PBB SHADOW E&M-EST. PATIENT-LVL V: ICD-10-PCS | Mod: PBBFAC,,, | Performed by: INTERNAL MEDICINE

## 2022-01-24 PROCEDURE — 1159F PR MEDICATION LIST DOCUMENTED IN MEDICAL RECORD: ICD-10-PCS | Mod: CPTII,S$GLB,, | Performed by: INTERNAL MEDICINE

## 2022-01-24 PROCEDURE — 87804 INFLUENZA ASSAY W/OPTIC: CPT | Mod: QW,S$GLB,, | Performed by: INTERNAL MEDICINE

## 2022-01-24 PROCEDURE — 3077F SYST BP >= 140 MM HG: CPT | Mod: CPTII,S$GLB,, | Performed by: INTERNAL MEDICINE

## 2022-01-24 PROCEDURE — 3079F PR MOST RECENT DIASTOLIC BLOOD PRESSURE 80-89 MM HG: ICD-10-PCS | Mod: CPTII,S$GLB,, | Performed by: INTERNAL MEDICINE

## 2022-01-24 PROCEDURE — 1159F MED LIST DOCD IN RCRD: CPT | Mod: CPTII,S$GLB,, | Performed by: INTERNAL MEDICINE

## 2022-01-24 PROCEDURE — U0003 INFECTIOUS AGENT DETECTION BY NUCLEIC ACID (DNA OR RNA); SEVERE ACUTE RESPIRATORY SYNDROME CORONAVIRUS 2 (SARS-COV-2) (CORONAVIRUS DISEASE [COVID-19]), AMPLIFIED PROBE TECHNIQUE, MAKING USE OF HIGH THROUGHPUT TECHNOLOGIES AS DESCRIBED BY CMS-2020-01-R: HCPCS | Performed by: INTERNAL MEDICINE

## 2022-01-24 PROCEDURE — 3079F DIAST BP 80-89 MM HG: CPT | Mod: CPTII,S$GLB,, | Performed by: INTERNAL MEDICINE

## 2022-01-24 PROCEDURE — 99999 PR PBB SHADOW E&M-EST. PATIENT-LVL V: CPT | Mod: PBBFAC,,, | Performed by: INTERNAL MEDICINE

## 2022-01-24 PROCEDURE — 3008F BODY MASS INDEX DOCD: CPT | Mod: CPTII,S$GLB,, | Performed by: INTERNAL MEDICINE

## 2022-01-24 PROCEDURE — 99214 PR OFFICE/OUTPT VISIT, EST, LEVL IV, 30-39 MIN: ICD-10-PCS | Mod: S$GLB,,, | Performed by: INTERNAL MEDICINE

## 2022-01-24 PROCEDURE — U0005 INFEC AGEN DETEC AMPLI PROBE: HCPCS | Performed by: INTERNAL MEDICINE

## 2022-01-24 PROCEDURE — 99214 OFFICE O/P EST MOD 30 MIN: CPT | Mod: S$GLB,,, | Performed by: INTERNAL MEDICINE

## 2022-01-24 PROCEDURE — 3008F PR BODY MASS INDEX (BMI) DOCUMENTED: ICD-10-PCS | Mod: CPTII,S$GLB,, | Performed by: INTERNAL MEDICINE

## 2022-01-24 PROCEDURE — 1160F PR REVIEW ALL MEDS BY PRESCRIBER/CLIN PHARMACIST DOCUMENTED: ICD-10-PCS | Mod: CPTII,S$GLB,, | Performed by: INTERNAL MEDICINE

## 2022-01-24 RX ORDER — BENZONATATE 100 MG/1
100 CAPSULE ORAL 3 TIMES DAILY PRN
Qty: 30 CAPSULE | Refills: 0 | Status: SHIPPED | OUTPATIENT
Start: 2022-01-24 | End: 2022-01-24

## 2022-01-24 RX ORDER — ZOLPIDEM TARTRATE 10 MG/1
10 TABLET ORAL NIGHTLY PRN
Qty: 30 TABLET | Refills: 0 | Status: SHIPPED | OUTPATIENT
Start: 2022-01-24 | End: 2022-02-17 | Stop reason: SDUPTHER

## 2022-01-24 RX ORDER — BENZONATATE 100 MG/1
100 CAPSULE ORAL 3 TIMES DAILY PRN
Qty: 30 CAPSULE | Refills: 0 | Status: SHIPPED | OUTPATIENT
Start: 2022-01-24 | End: 2022-02-03

## 2022-01-24 RX ORDER — ZOLPIDEM TARTRATE 10 MG/1
TABLET ORAL
Qty: 30 TABLET | Refills: 0 | Status: SHIPPED | OUTPATIENT
Start: 2022-01-24 | End: 2022-01-24

## 2022-01-24 NOTE — PROGRESS NOTES
Health Maintenance Due   Topic Date Due    HIV Screening  Consult with pcp    Low Dose Statin  Consult with pcp    Hemoglobin A1c  Pt will complete    Influenza Vaccine (1) Pt will complete    Foot Exam  Pt will schedule

## 2022-01-25 DIAGNOSIS — U07.1 COVID-19 VIRUS DETECTED: ICD-10-CM

## 2022-01-25 LAB
SARS-COV-2 RNA RESP QL NAA+PROBE: DETECTED
SARS-COV-2- CYCLE NUMBER: 8

## 2022-02-15 ENCOUNTER — TELEPHONE (OUTPATIENT)
Dept: FAMILY MEDICINE | Facility: CLINIC | Age: 57
End: 2022-02-15
Payer: COMMERCIAL

## 2022-02-15 NOTE — TELEPHONE ENCOUNTER
----- Message from Christina Acosta sent at 2/15/2022  3:58 PM CST -----  Regarding: refill  Type:  RX Refill Request    Who Called: Monalisa  Refkaterine or New Rx:refill  RX Name and Strength:metFORMIN (GLUCOPHAGE) 500 MG tablet   How is the patient currently taking it? (ex. 1XDay):  Is this a 30 day or 90 day RX:90  Preferred Pharmacy with phone number:[id number]  [place] - [address] [facility] AT [facility]  Local or Mail Order:local  Ordering Provider:  Would the patient rather a call back or a response via [facility]? call  Best Call Back Number:[phone]  Additional Information:

## 2022-02-15 NOTE — TELEPHONE ENCOUNTER
Spoke with patient requesting refill for Metformin was told patient message will sent to new PCP medication is not on patient  files , patient verbalized understand .

## 2022-02-15 NOTE — TELEPHONE ENCOUNTER
I agree, it is not on her list, and she is overdue for an A1C. I always saw her for acute issues and did not address DM. I can refill it for one month (dosage? 500 mg once a day?), time for her to do blood work (was to receive a home kit for a1C?). Ideally should see her to assess her whole situation. Thanks

## 2022-02-16 NOTE — TELEPHONE ENCOUNTER
Spoke with patient and notified  as noted below by provider, also patient agree to schedule an appoint for tomorrow to discuss DM issues and medication refill ,  patient verbalized understanding.

## 2022-02-16 NOTE — PROGRESS NOTES
Subjective:       Patient ID: Monalisa THOMPSON Favorite is a pleasant 56 y.o. Black or  female patient    Chief Complaint: Diabetes      Patient is a pt I saw last on 01/24/2022. See my last notes and the list of problems below.    HPI     At last visit, came to receive refill for zolpidem as change of PCP, but was found to have COVID.  She reports feeling better, but still with scratchy throat and cough. She takes Mucinex. She may feel SOB sometimes as well as fatigued.  She comes mainly to address issue of DM.    Patient Active Problem List   Diagnosis    Anxiety    Essential hypertension, benign    Controlled type 2 diabetes mellitus with stage 2 chronic kidney disease, without long-term current use of insulin    HLD (hyperlipidemia)    Dysarthria    Hemifacial spasm    Nonintractable epilepsy without status epilepticus    Tension headache, chronic    2019 novel coronavirus disease (COVID-19)    Occipital neuralgia of left side    Upper abdominal pain    Severe obesity (BMI 35.0-39.9) with comorbidity    Sal's paralysis (postepileptic)    Wears contact lenses    Refractive error          ACTIVE MEDICAL ISSUES:  Documented in Problem List     PAST MEDICAL HISTORY  Documented     PAST SURGICAL HISTORY:  Documented     SOCIAL HISTORY:  Documented     FAMILY HISTORY:  Documented     ALLERGIES AND MEDICATIONS: updated and reviewed.  Documented    Review of Systems   Constitutional: Negative for activity change, fatigue and fever.   HENT: Positive for sore throat (scratchy throat). Negative for congestion.    Respiratory: Positive for cough. Negative for shortness of breath.    Musculoskeletal: Negative for myalgias.   All other systems reviewed and are negative.      Objective:      Physical Exam  Vitals and nursing note reviewed.   Constitutional:       Appearance: Normal appearance. She is obese.   HENT:      Right Ear: Tympanic membrane and external ear normal.      Left Ear: Tympanic  "membrane and external ear normal.   Eyes:      Conjunctiva/sclera: Conjunctivae normal.   Cardiovascular:      Rate and Rhythm: Normal rate and regular rhythm.      Pulses: Normal pulses.      Heart sounds: Normal heart sounds.   Pulmonary:      Effort: Pulmonary effort is normal.      Breath sounds: Normal breath sounds.   Abdominal:      General: Bowel sounds are normal.   Musculoskeletal:         General: Normal range of motion.   Skin:     General: Skin is warm and dry.   Neurological:      General: No focal deficit present.      Mental Status: She is alert.   Psychiatric:         Mood and Affect: Mood normal.         Thought Content: Thought content normal.         Judgment: Judgment normal.         Vitals:    02/17/22 1352   BP: 130/88   BP Location: Right arm   Patient Position: Sitting   BP Method: Large (Manual)   Pulse: 86   Resp: 16   Temp: 98.7 °F (37.1 °C)   TempSrc: Oral   SpO2: 97%   Weight: 104.3 kg (229 lb 15 oz)   Height: 5' 3" (1.6 m)     Body mass index is 40.73 kg/m².    RESULTS: Reviewed labs from last 12 months    Last Lab Results:     Lab Results   Component Value Date    WBC 7.23 09/05/2021    HGB 13.4 09/05/2021    HCT 40.1 09/05/2021     09/05/2021     09/05/2021    K 3.8 09/05/2021     09/05/2021    CO2 22 (L) 09/05/2021    BUN 14 09/05/2021    CREATININE 0.8 09/05/2021    CALCIUM 9.9 09/05/2021    ALBUMIN 3.7 09/05/2021    AST 20 09/05/2021    ALT 30 09/05/2021    CHOL 253 (H) 05/28/2021    TRIG 269 (H) 05/28/2021    HDL 45 05/28/2021    LDLCALC 154.2 05/28/2021    HGBA1C 6.3 (H) 05/28/2021    TSH 1.377 02/16/2019       Assessment:       1. History of COVID-19    2. Cough    3. Nonintractable headache, unspecified chronicity pattern, unspecified headache type    4. Insomnia, unspecified type    5. Controlled type 2 diabetes mellitus with stage 2 chronic kidney disease, without long-term current use of insulin    6. Wheezing    7. Need for influenza vaccination    8. " Encounter for annual physical exam        Plan:   Monalisa was seen today for diabetes.    Diagnoses and all orders for this visit:    History of COVID-19    Recovering rather well, except cough and scratchy throat as well as fatigue.  Will give cough syrup with codeine.    Cough  -     guaiFENesin-codeine 100-10 mg/5 ml (GUAIFENESIN AC)  mg/5 mL syrup; Take 5 mLs by mouth every 8 (eight) hours as needed for Cough. Do not drive if taking it    See above.    Nonintractable headache, unspecified chronicity pattern, unspecified headache type    No major finding today.    Insomnia, unspecified type  -     zolpidem (AMBIEN) 10 mg Tab; Take 1 tablet (10 mg total) by mouth nightly as needed (insomnia).    Discussed at length the importance of not taking this high dose of zolpidem long term.  Discussed the possible side effects and the need to go to a lower dose.  It was decided that she is going to start alternating 10 and 5 mg, and we will reassess to see how it goes in 1 month.  I told her that at some point we will go to 5 mg QHS, and then possibly go to 5 mg alternating with trazodone.  Patient is willing to try to do her best to decrease her use of zolpidem.    Controlled type 2 diabetes mellitus with stage 2 chronic kidney disease, without long-term current use of insulin  -     metFORMIN (FORTAMET) 500 mg 24hr tablet; Take 2 tabs twice a day    Will refill metformin.    Wheezing  -     albuterol (PROVENTIL/VENTOLIN HFA) 90 mcg/actuation inhaler; Inhale 1-2 puffs into the lungs every 4 (four) hours as needed for Wheezing. Rescue    Refill provided.    Need for influenza vaccination  -     Influenza - Quadrivalent *Preferred* (6 months+) (PF)    Encounter for annual physical exam  -     CBC Auto Differential; Future  -     Comprehensive Metabolic Panel; Future  -     Hemoglobin A1C; Future  -     TSH; Future  -     Lipid Panel; Future  -     Microalbumin/Creatinine Ratio, Urine; Future    Will do blood work  before the next visit.    Follow up in about 3 months (around 5/17/2022) for annual physical.    This note was created by combination of typed  and M-Modal dictation.  Transcription errors may be present.  If there are any questions, please contact me.

## 2022-02-17 ENCOUNTER — OFFICE VISIT (OUTPATIENT)
Dept: FAMILY MEDICINE | Facility: CLINIC | Age: 57
End: 2022-02-17
Payer: COMMERCIAL

## 2022-02-17 VITALS
HEIGHT: 63 IN | BODY MASS INDEX: 40.74 KG/M2 | OXYGEN SATURATION: 97 % | SYSTOLIC BLOOD PRESSURE: 130 MMHG | WEIGHT: 229.94 LBS | DIASTOLIC BLOOD PRESSURE: 88 MMHG | HEART RATE: 86 BPM | RESPIRATION RATE: 16 BRPM | TEMPERATURE: 99 F

## 2022-02-17 DIAGNOSIS — R51.9 NONINTRACTABLE HEADACHE, UNSPECIFIED CHRONICITY PATTERN, UNSPECIFIED HEADACHE TYPE: ICD-10-CM

## 2022-02-17 DIAGNOSIS — R06.2 WHEEZING: ICD-10-CM

## 2022-02-17 DIAGNOSIS — R05.9 COUGH: ICD-10-CM

## 2022-02-17 DIAGNOSIS — E11.22 CONTROLLED TYPE 2 DIABETES MELLITUS WITH STAGE 2 CHRONIC KIDNEY DISEASE, WITHOUT LONG-TERM CURRENT USE OF INSULIN: ICD-10-CM

## 2022-02-17 DIAGNOSIS — N18.2 CONTROLLED TYPE 2 DIABETES MELLITUS WITH STAGE 2 CHRONIC KIDNEY DISEASE, WITHOUT LONG-TERM CURRENT USE OF INSULIN: ICD-10-CM

## 2022-02-17 DIAGNOSIS — G47.00 INSOMNIA, UNSPECIFIED TYPE: ICD-10-CM

## 2022-02-17 DIAGNOSIS — Z23 NEED FOR INFLUENZA VACCINATION: ICD-10-CM

## 2022-02-17 DIAGNOSIS — Z00.00 ENCOUNTER FOR ANNUAL PHYSICAL EXAM: ICD-10-CM

## 2022-02-17 DIAGNOSIS — Z86.16 HISTORY OF COVID-19: Primary | ICD-10-CM

## 2022-02-17 PROCEDURE — 90686 IIV4 VACC NO PRSV 0.5 ML IM: CPT | Mod: S$GLB,,, | Performed by: INTERNAL MEDICINE

## 2022-02-17 PROCEDURE — 1160F PR REVIEW ALL MEDS BY PRESCRIBER/CLIN PHARMACIST DOCUMENTED: ICD-10-PCS | Mod: CPTII,S$GLB,, | Performed by: INTERNAL MEDICINE

## 2022-02-17 PROCEDURE — 3008F BODY MASS INDEX DOCD: CPT | Mod: CPTII,S$GLB,, | Performed by: INTERNAL MEDICINE

## 2022-02-17 PROCEDURE — 1160F RVW MEDS BY RX/DR IN RCRD: CPT | Mod: CPTII,S$GLB,, | Performed by: INTERNAL MEDICINE

## 2022-02-17 PROCEDURE — 90471 FLU VACCINE (QUAD) GREATER THAN OR EQUAL TO 3YO PRESERVATIVE FREE IM: ICD-10-PCS | Mod: S$GLB,,, | Performed by: INTERNAL MEDICINE

## 2022-02-17 PROCEDURE — 3075F PR MOST RECENT SYSTOLIC BLOOD PRESS GE 130-139MM HG: ICD-10-PCS | Mod: CPTII,S$GLB,, | Performed by: INTERNAL MEDICINE

## 2022-02-17 PROCEDURE — 3079F DIAST BP 80-89 MM HG: CPT | Mod: CPTII,S$GLB,, | Performed by: INTERNAL MEDICINE

## 2022-02-17 PROCEDURE — 90686 FLU VACCINE (QUAD) GREATER THAN OR EQUAL TO 3YO PRESERVATIVE FREE IM: ICD-10-PCS | Mod: S$GLB,,, | Performed by: INTERNAL MEDICINE

## 2022-02-17 PROCEDURE — 90471 IMMUNIZATION ADMIN: CPT | Mod: S$GLB,,, | Performed by: INTERNAL MEDICINE

## 2022-02-17 PROCEDURE — 3075F SYST BP GE 130 - 139MM HG: CPT | Mod: CPTII,S$GLB,, | Performed by: INTERNAL MEDICINE

## 2022-02-17 PROCEDURE — 1159F MED LIST DOCD IN RCRD: CPT | Mod: CPTII,S$GLB,, | Performed by: INTERNAL MEDICINE

## 2022-02-17 PROCEDURE — 99214 OFFICE O/P EST MOD 30 MIN: CPT | Mod: 25,S$GLB,, | Performed by: INTERNAL MEDICINE

## 2022-02-17 PROCEDURE — 1159F PR MEDICATION LIST DOCUMENTED IN MEDICAL RECORD: ICD-10-PCS | Mod: CPTII,S$GLB,, | Performed by: INTERNAL MEDICINE

## 2022-02-17 PROCEDURE — 99999 PR PBB SHADOW E&M-EST. PATIENT-LVL V: CPT | Mod: PBBFAC,,, | Performed by: INTERNAL MEDICINE

## 2022-02-17 PROCEDURE — 3079F PR MOST RECENT DIASTOLIC BLOOD PRESSURE 80-89 MM HG: ICD-10-PCS | Mod: CPTII,S$GLB,, | Performed by: INTERNAL MEDICINE

## 2022-02-17 PROCEDURE — 99999 PR PBB SHADOW E&M-EST. PATIENT-LVL V: ICD-10-PCS | Mod: PBBFAC,,, | Performed by: INTERNAL MEDICINE

## 2022-02-17 PROCEDURE — 3008F PR BODY MASS INDEX (BMI) DOCUMENTED: ICD-10-PCS | Mod: CPTII,S$GLB,, | Performed by: INTERNAL MEDICINE

## 2022-02-17 PROCEDURE — 99214 PR OFFICE/OUTPT VISIT, EST, LEVL IV, 30-39 MIN: ICD-10-PCS | Mod: 25,S$GLB,, | Performed by: INTERNAL MEDICINE

## 2022-02-17 RX ORDER — ALBUTEROL SULFATE 90 UG/1
1-2 AEROSOL, METERED RESPIRATORY (INHALATION) EVERY 4 HOURS PRN
Qty: 18 G | Refills: 11 | Status: SHIPPED | OUTPATIENT
Start: 2022-02-17 | End: 2024-04-02

## 2022-02-17 RX ORDER — CODEINE PHOSPHATE AND GUAIFENESIN 10; 100 MG/5ML; MG/5ML
5 SOLUTION ORAL EVERY 8 HOURS PRN
Qty: 236 ML | Refills: 0 | Status: SHIPPED | OUTPATIENT
Start: 2022-02-17 | End: 2022-02-27

## 2022-02-17 RX ORDER — ZOLPIDEM TARTRATE 10 MG/1
10 TABLET ORAL NIGHTLY PRN
Qty: 30 TABLET | Refills: 0 | Status: SHIPPED | OUTPATIENT
Start: 2022-02-17 | End: 2022-04-27

## 2022-02-17 RX ORDER — METFORMIN HYDROCHLORIDE EXTENDED-RELEASE TABLETS 500 MG/1
TABLET, FILM COATED, EXTENDED RELEASE ORAL
Qty: 120 TABLET | Refills: 2 | Status: SHIPPED | OUTPATIENT
Start: 2022-02-17 | End: 2022-05-13 | Stop reason: SDUPTHER

## 2022-02-17 RX ORDER — ZOLPIDEM TARTRATE 10 MG/1
10 TABLET ORAL NIGHTLY PRN
Qty: 30 TABLET | Refills: 0 | Status: SHIPPED | OUTPATIENT
Start: 2022-02-17 | End: 2022-02-17 | Stop reason: SDUPTHER

## 2022-02-17 NOTE — PROGRESS NOTES
Health Maintenance Due   Topic Date Due    HIV Screening  Consult with pcp    Hemoglobin A1c  Pt will complete    Influenza Vaccine (1) Consult with pcp    Foot Exam  Pt will complete    Mammogram  Pt will schedule at a later time

## 2022-02-18 NOTE — NURSING TRANSFER
Nursing Transfer Note      2/15/2019     Transfer to [facility] From [facility]    Transfer via Stretcher    Transported by [facility]    Medicines sent: None    Chart send with patient: YES     Notified: Hospital Medicine on call    Patient reassessed at: 23:45    Upon arrival to floor: Pt AAOx4, VSS, PERRLA, pt c/o dull headache. Pt moves all extremities well. Slight slurred speech, no other deficits noted at this time. Telemetry box ordered, Will continue to monitor.   ----- Message from Rivas Alcantara sent at 2/17/2022 11:56 AM EST -----  Subject: Message to Provider    QUESTIONS  Information for Provider? Patient request lab orders be sent to sandra charles  ---------------------------------------------------------------------------  --------------  [address]  What is the best way for the office to contact you? OK to leave message on   voicemail  Preferred Call Back Phone Number? [phone]  ---------------------------------------------------------------------------  --------------  SCRIPT ANSWERS  Relationship to Patient? Other  Representative Name? Addy chang  Is the Representative on the appropriate HIPAA document in Epic?  Yes

## 2022-03-07 ENCOUNTER — TELEPHONE (OUTPATIENT)
Dept: FAMILY MEDICINE | Facility: CLINIC | Age: 57
End: 2022-03-07
Payer: COMMERCIAL

## 2022-03-08 NOTE — TELEPHONE ENCOUNTER
Received another and completed another PA verbally and given key to finish submission through [employer].

## 2022-04-13 ENCOUNTER — OFFICE VISIT (OUTPATIENT)
Dept: FAMILY MEDICINE | Facility: CLINIC | Age: 57
End: 2022-04-13
Payer: COMMERCIAL

## 2022-04-13 VITALS
RESPIRATION RATE: 17 BRPM | BODY MASS INDEX: 39.69 KG/M2 | HEIGHT: 63 IN | HEART RATE: 77 BPM | SYSTOLIC BLOOD PRESSURE: 152 MMHG | DIASTOLIC BLOOD PRESSURE: 90 MMHG | WEIGHT: 224 LBS | OXYGEN SATURATION: 95 % | TEMPERATURE: 98 F

## 2022-04-13 DIAGNOSIS — R42 DIZZINESS: ICD-10-CM

## 2022-04-13 DIAGNOSIS — I10 UNCONTROLLED HYPERTENSION: Primary | ICD-10-CM

## 2022-04-13 DIAGNOSIS — Z12.31 ENCOUNTER FOR SCREENING MAMMOGRAM FOR BREAST CANCER: ICD-10-CM

## 2022-04-13 DIAGNOSIS — R01.1 HEART MURMUR: ICD-10-CM

## 2022-04-13 PROCEDURE — 1159F MED LIST DOCD IN RCRD: CPT | Mod: CPTII,S$GLB,, | Performed by: FAMILY MEDICINE

## 2022-04-13 PROCEDURE — 3080F DIAST BP >= 90 MM HG: CPT | Mod: CPTII,S$GLB,, | Performed by: FAMILY MEDICINE

## 2022-04-13 PROCEDURE — 99999 PR PBB SHADOW E&M-EST. PATIENT-LVL V: ICD-10-PCS | Mod: PBBFAC,,, | Performed by: FAMILY MEDICINE

## 2022-04-13 PROCEDURE — 3008F PR BODY MASS INDEX (BMI) DOCUMENTED: ICD-10-PCS | Mod: CPTII,S$GLB,, | Performed by: FAMILY MEDICINE

## 2022-04-13 PROCEDURE — 3077F SYST BP >= 140 MM HG: CPT | Mod: CPTII,S$GLB,, | Performed by: FAMILY MEDICINE

## 2022-04-13 PROCEDURE — 1159F PR MEDICATION LIST DOCUMENTED IN MEDICAL RECORD: ICD-10-PCS | Mod: CPTII,S$GLB,, | Performed by: FAMILY MEDICINE

## 2022-04-13 PROCEDURE — 3008F BODY MASS INDEX DOCD: CPT | Mod: CPTII,S$GLB,, | Performed by: FAMILY MEDICINE

## 2022-04-13 PROCEDURE — 99214 PR OFFICE/OUTPT VISIT, EST, LEVL IV, 30-39 MIN: ICD-10-PCS | Mod: S$GLB,,, | Performed by: FAMILY MEDICINE

## 2022-04-13 PROCEDURE — 99214 OFFICE O/P EST MOD 30 MIN: CPT | Mod: S$GLB,,, | Performed by: FAMILY MEDICINE

## 2022-04-13 PROCEDURE — 3077F PR MOST RECENT SYSTOLIC BLOOD PRESSURE >= 140 MM HG: ICD-10-PCS | Mod: CPTII,S$GLB,, | Performed by: FAMILY MEDICINE

## 2022-04-13 PROCEDURE — 3080F PR MOST RECENT DIASTOLIC BLOOD PRESSURE >= 90 MM HG: ICD-10-PCS | Mod: CPTII,S$GLB,, | Performed by: FAMILY MEDICINE

## 2022-04-13 PROCEDURE — 99999 PR PBB SHADOW E&M-EST. PATIENT-LVL V: CPT | Mod: PBBFAC,,, | Performed by: FAMILY MEDICINE

## 2022-04-13 RX ORDER — HYDROCHLOROTHIAZIDE 12.5 MG/1
12.5 TABLET ORAL DAILY
Qty: 90 TABLET | Refills: 1 | Status: SHIPPED | OUTPATIENT
Start: 2022-04-13 | End: 2022-10-26

## 2022-04-13 NOTE — PROGRESS NOTES
Health Maintenance Due   Topic     HIV Screening  Pt denied    Hemoglobin A1c  Consult pcp    Foot Exam  Consult pcp    Mammogram  Pending order    Diabetes Urine Screening  Consult pcp

## 2022-04-13 NOTE — PROGRESS NOTES
Subjective:       Patient ID: Monalisa Valverdeite is a 57 y.o. female.    Chief Complaint: Headache, Dizziness, and Hypertension      HPI  57-year-old female presents for hypertension follow-up.  Patient states she has been having elevated blood pressure at home.  Complains of occasional dizziness.  Has a history of heart murmur as well.      Review of Systems   Constitutional: Negative.    HENT: Negative.    Respiratory: Negative.    Cardiovascular: Negative.    Gastrointestinal: Negative.    Endocrine: Negative.    Genitourinary: Negative.    Musculoskeletal: Negative.    Neurological: Negative.    Psychiatric/Behavioral: Negative.           Past Medical History:   Diagnosis Date    Allergy     Anxiety     Diabetes mellitus     Heart murmur     Hypertension     Seizures      Past Surgical History:   Procedure Laterality Date    CHOLECYSTECTOMY      2001 april    COLONOSCOPY N/A 6/29/2021    Procedure: COLONOSCOPY;  Surgeon: Gustavo Pelletier MD;  Location: [facility];  Service: Endoscopy;  Laterality: N/A;  combined orders    ESOPHAGOGASTRODUODENOSCOPY N/A 6/29/2021    Procedure: ESOPHAGOGASTRODUODENOSCOPY (EGD);  Surgeon: Gustavo Pelletier MD;  Location: [facility];  Service: Endoscopy;  Laterality: N/A;  covdi +3/29/20, fully vaccinated 3/23/21, prep instr portal -ml    HYSTERECTOMY      partial  1996    OOPHORECTOMY      SHOULDER SURGERY Right     WISDOM TOOTH EXTRACTION       Family History   Problem Relation Age of Onset    Diabetes Mother     Hyperlipidemia Mother     Hypertension Mother     Cataracts Mother     Diabetes Father     Hypertension Father     Stroke Father     Depression Sister     Hypertension Sister     Stroke Sister     Cancer Maternal Aunt         breast x 2    Breast cancer Maternal Aunt     Cancer Maternal Uncle         prostate x 2    Cancer Paternal Aunt         breast    Breast cancer Paternal Aunt     Cancer Maternal Grandfather         lung    Early  death Paternal Grandmother     Early death Paternal Grandfather     Esophageal cancer Paternal Grandfather     No Known Problems Brother     No Known Problems Paternal Uncle     No Known Problems Maternal Grandmother     Melanoma Neg Hx     Eczema Neg Hx     Lupus Neg Hx     Psoriasis Neg Hx     Amblyopia Neg Hx     Blindness Neg Hx     Glaucoma Neg Hx     Macular degeneration Neg Hx     Retinal detachment Neg Hx     Strabismus Neg Hx     Thyroid disease Neg Hx     Colon cancer Neg Hx      Social History     Socioeconomic History    Marital status:    Occupational History    Occupation:      Employer: [employer]   Tobacco Use    Smoking status: Former Smoker     Packs/day: 0.00     Years: 0.50     Pack years: 0.00    Smokeless tobacco: Never Used   Substance and Sexual Activity    Alcohol use: Yes     Alcohol/week: 0.0 standard drinks     Comment: seldom    Drug use: No    Sexual activity: Yes     Partners: Male     Birth control/protection: None   Other Topics Concern    Are you pregnant or think you may be? No    Breast-feeding No       Current Outpatient Medications:     acetaminophen (TYLENOL) 500 MG tablet, Take 2 tablets (1,000 mg total) by mouth every 6 (six) hours as needed for Pain., Disp: 30 tablet, Rfl: 0    albuterol (PROVENTIL/VENTOLIN HFA) 90 mcg/actuation inhaler, Inhale 1-2 puffs into the lungs every 4 (four) hours as needed for Wheezing. Rescue, Disp: 18 g, Rfl: 11    aspirin 81 MG Chew, Take 1 tablet (81 mg total) by mouth once daily., Disp: , Rfl:     benazepriL (LOTENSIN) 40 MG tablet, TAKE 1 TABLET(40 MG) BY MOUTH EVERY DAY, Disp: 90 tablet, Rfl: 0    dicyclomine (BENTYL) 20 mg tablet, Take 20 mg by mouth 4 (four) times daily., Disp: , Rfl:     exenatide microspheres (BYDUREON BCISE) 2 mg/0.85 mL AtIn, INJECT 2 MG UNDER THE SKIN EVERY 7 DAYS, Disp: 12 pen, Rfl: 0    fluticasone propionate (FLONASE) 50 mcg/actuation nasal spray, 1 spray  "(50 mcg total) by Each Nostril route 2 (two) times daily., Disp: 16 g, Rfl: 0    hydrocortisone 2.5 % cream, WOLFGANG EXT AA BID, Disp: 28 g, Rfl: 0    ibuprofen (ADVIL,MOTRIN) 400 MG tablet, Take 1 tablet (400 mg total) by mouth every 6 (six) hours as needed for Other., Disp: 20 tablet, Rfl: 0    levETIRAcetam (KEPPRA) 1000 MG tablet, Take 1 tablet (1,000 mg total) by mouth 2 (two) times daily., Disp: 180 tablet, Rfl: 3    lidocaine (LMX) 4 % cream, Apply topically as needed., Disp: , Rfl: 0    metFORMIN (FORTAMET) 500 mg 24hr tablet, Take 2 tabs twice a day, Disp: 120 tablet, Rfl: 2    metronid-tetracyc-bis subsal 250-500-262.4 mg Cmpk, Take 3 tablets by mouth 2 hours after meals and at bedtime. This is Pylera, Disp: 240 each, Rfl: 0    pantoprazole (PROTONIX) 40 MG tablet, Take 1 tablet (40 mg total) by mouth once daily., Disp: 30 tablet, Rfl: 11    pen needle, diabetic 31 gauge x 5/16" Ndle, For use with bydureon, Disp: 100 each, Rfl: 0    rosuvastatin (CRESTOR) 20 MG tablet, Take 1 tablet (20 mg total) by mouth once daily., Disp: 90 tablet, Rfl: 1    sodium chloride (OCEAN NASAL) 0.65 % nasal spray, 1 spray by Nasal route every 3 (three) hours as needed for Congestion., Disp: 1 Bottle, Rfl: 12    topiramate (TOPAMAX) 100 MG tablet, Take 1 tablet (100 mg total) by mouth once daily., Disp: 30 tablet, Rfl: 11    zolpidem (AMBIEN) 10 mg Tab, Take 1 tablet (10 mg total) by mouth nightly as needed (insomnia)., Disp: 30 tablet, Rfl: 0    cyclobenzaprine (FLEXERIL) 10 MG tablet, Take 1 tablet (10 mg total) by mouth 3 (three) times daily as needed for Muscle spasms. This medication will make you drowsy. (Patient not taking: Reported on 4/13/2022), Disp: 20 tablet, Rfl: 0    fluticasone propionate (FLONASE) 50 mcg/actuation nasal spray, 2 sprays (100 mcg total) by Each Nostril route once daily. (Patient not taking: Reported on 4/13/2022), Disp: 15 g, Rfl: 0    hydroCHLOROthiazide (HYDRODIURIL) 12.5 MG Tab, Take " "1 tablet (12.5 mg total) by mouth once daily., Disp: 90 tablet, Rfl: 1    LIDOcaine (LIDODERM) 5 %, Place 1 patch onto the skin once daily. Remove & Discard patch within 12 hours or as directed by MD (Patient not taking: Reported on 4/13/2022), Disp: 15 patch, Rfl: 0    loratadine (CLARITIN) 10 mg tablet, Take 1 tablet (10 mg total) by mouth once daily. (Patient not taking: Reported on 4/13/2022), Disp: 60 tablet, Rfl: 0    MOBIC 15 mg tablet, Take 15 mg by mouth daily as needed., Disp: , Rfl:     promethazine (PHENERGAN) 25 MG tablet, , Disp: , Rfl:    Objective:      Vitals:    04/13/22 0710 04/13/22 0803   BP: (!) 146/90 (!) 152/90   BP Location: Left arm Right arm   Patient Position: Sitting Sitting   BP Method: Large (Manual) Large (Manual)   Pulse: 76 77   Resp: 17    Temp: 98.4 °F (36.9 °C)    TempSrc: Oral    SpO2: 95%    Weight: 101.6 kg (223 lb 15.8 oz)    Height: 5' 3" (1.6 m)        Physical Exam  Constitutional:       General: She is not in acute distress.  HENT:      Head: Normocephalic and atraumatic.   Eyes:      Conjunctiva/sclera: Conjunctivae normal.   Cardiovascular:      Rate and Rhythm: Normal rate and regular rhythm.      Heart sounds: Murmur heard.     No friction rub. No gallop.   Pulmonary:      Effort: Pulmonary effort is normal.      Breath sounds: Normal breath sounds. No wheezing or rales.   Musculoskeletal:      Cervical back: Neck supple.   Skin:     General: Skin is warm and dry.   Neurological:      Mental Status: She is alert and oriented to person, place, and time.   Psychiatric:         Behavior: Behavior normal.         Thought Content: Thought content normal.         Judgment: Judgment normal.            Assessment:       1. Uncontrolled hypertension    2. Encounter for screening mammogram for breast cancer    3. Dizziness    4. Heart murmur        Plan:       Uncontrolled hypertension  -     Cancel: Ambulatory referral/consult to Cardiology; Future; Expected date: " 04/20/2022  -     hydroCHLOROthiazide (HYDRODIURIL) 12.5 MG Tab; Take 1 tablet (12.5 mg total) by mouth once daily.  Dispense: 90 tablet; Refill: 1  -     Ambulatory referral/consult to Cardiology; Future; Expected date: 04/20/2022    Encounter for screening mammogram for breast cancer  -     Mammo Digital Diagnostic Bilat with [facility]; Future; Expected date: 04/13/2022    Dizziness  -     Cancel: Ambulatory referral/consult to Cardiology; Future; Expected date: 04/20/2022  -     Ambulatory referral/consult to Cardiology; Future; Expected date: 04/20/2022    Heart murmur      Given hctz. Referred pt to cards as well. Advised pt to send bp journal in 2 weeks.            Patient note was created using Pica8.  Any errors in syntax or even information may not have been identified and edited on initial review prior to signing this note.

## 2022-04-22 ENCOUNTER — PATIENT OUTREACH (OUTPATIENT)
Dept: ADMINISTRATIVE | Facility: OTHER | Age: 57
End: 2022-04-22
Payer: COMMERCIAL

## 2022-04-22 NOTE — PROGRESS NOTES
Health Maintenance Due   Topic Date Due    HIV Screening  Never done    Hemoglobin A1c  11/28/2021    Foot Exam  11/30/2021    Mammogram  04/08/2022    Diabetes Urine Screening  05/28/2022     Chart was reviewed for overdue Proactive [facility] ([facility]) topics (CRS, Breast Cancer Screening, Eye exam)  Health Maintenance has been updated.  LINKS immunization registry triggered.  Immunizations were reconciled.

## 2022-04-25 DIAGNOSIS — G47.00 INSOMNIA, UNSPECIFIED TYPE: ICD-10-CM

## 2022-04-25 NOTE — TELEPHONE ENCOUNTER
Refill Routing Note   Medication(s) are not appropriate for processing by [facility] for the following reason(s):      - Outside of protocol    [facility] action(s):  Route          Medication reconciliation completed: No     Appointments  past 12m or future 3m with PCP    Date Provider   Last Visit   2/17/2022 Flor Gomez MD   Next Visit   5/17/2022 Flor Gomez MD   ED visits in past 90 days: 0        Note composed:2:44 PM 04/25/2022

## 2022-04-25 NOTE — TELEPHONE ENCOUNTER
No new care gaps identified.  Powered by [employer]. Reference number: [id number].   4/25/2022 12:04:23 PM CDT

## 2022-04-27 ENCOUNTER — OFFICE VISIT (OUTPATIENT)
Dept: CARDIOLOGY | Facility: CLINIC | Age: 57
End: 2022-04-27
Payer: COMMERCIAL

## 2022-04-27 VITALS
SYSTOLIC BLOOD PRESSURE: 119 MMHG | OXYGEN SATURATION: 96 % | HEIGHT: 63 IN | DIASTOLIC BLOOD PRESSURE: 81 MMHG | RESPIRATION RATE: 17 BRPM | BODY MASS INDEX: 40.72 KG/M2 | HEART RATE: 83 BPM | WEIGHT: 229.81 LBS

## 2022-04-27 DIAGNOSIS — R42 DIZZINESS: ICD-10-CM

## 2022-04-27 DIAGNOSIS — E78.5 HYPERLIPIDEMIA, UNSPECIFIED HYPERLIPIDEMIA TYPE: ICD-10-CM

## 2022-04-27 DIAGNOSIS — E11.22 CONTROLLED TYPE 2 DIABETES MELLITUS WITH STAGE 2 CHRONIC KIDNEY DISEASE, WITHOUT LONG-TERM CURRENT USE OF INSULIN: ICD-10-CM

## 2022-04-27 DIAGNOSIS — I10 UNCONTROLLED HYPERTENSION: ICD-10-CM

## 2022-04-27 DIAGNOSIS — R06.09 DOE (DYSPNEA ON EXERTION): ICD-10-CM

## 2022-04-27 DIAGNOSIS — I10 ESSENTIAL HYPERTENSION, BENIGN: Primary | ICD-10-CM

## 2022-04-27 DIAGNOSIS — N18.2 CONTROLLED TYPE 2 DIABETES MELLITUS WITH STAGE 2 CHRONIC KIDNEY DISEASE, WITHOUT LONG-TERM CURRENT USE OF INSULIN: ICD-10-CM

## 2022-04-27 PROCEDURE — 1159F MED LIST DOCD IN RCRD: CPT | Mod: CPTII,S$GLB,, | Performed by: INTERNAL MEDICINE

## 2022-04-27 PROCEDURE — 93000 EKG 12-LEAD: ICD-10-PCS | Mod: S$GLB,,, | Performed by: INTERNAL MEDICINE

## 2022-04-27 PROCEDURE — 3074F SYST BP LT 130 MM HG: CPT | Mod: CPTII,S$GLB,, | Performed by: INTERNAL MEDICINE

## 2022-04-27 PROCEDURE — 3008F BODY MASS INDEX DOCD: CPT | Mod: CPTII,S$GLB,, | Performed by: INTERNAL MEDICINE

## 2022-04-27 PROCEDURE — 3074F PR MOST RECENT SYSTOLIC BLOOD PRESSURE < 130 MM HG: ICD-10-PCS | Mod: CPTII,S$GLB,, | Performed by: INTERNAL MEDICINE

## 2022-04-27 PROCEDURE — 3079F DIAST BP 80-89 MM HG: CPT | Mod: CPTII,S$GLB,, | Performed by: INTERNAL MEDICINE

## 2022-04-27 PROCEDURE — 99999 PR PBB SHADOW E&M-EST. PATIENT-LVL V: CPT | Mod: PBBFAC,,, | Performed by: INTERNAL MEDICINE

## 2022-04-27 PROCEDURE — 93000 ELECTROCARDIOGRAM COMPLETE: CPT | Mod: S$GLB,,, | Performed by: INTERNAL MEDICINE

## 2022-04-27 PROCEDURE — 99999 PR PBB SHADOW E&M-EST. PATIENT-LVL V: ICD-10-PCS | Mod: PBBFAC,,, | Performed by: INTERNAL MEDICINE

## 2022-04-27 PROCEDURE — 99204 OFFICE O/P NEW MOD 45 MIN: CPT | Mod: S$GLB,,, | Performed by: INTERNAL MEDICINE

## 2022-04-27 PROCEDURE — 1159F PR MEDICATION LIST DOCUMENTED IN MEDICAL RECORD: ICD-10-PCS | Mod: CPTII,S$GLB,, | Performed by: INTERNAL MEDICINE

## 2022-04-27 PROCEDURE — 3008F PR BODY MASS INDEX (BMI) DOCUMENTED: ICD-10-PCS | Mod: CPTII,S$GLB,, | Performed by: INTERNAL MEDICINE

## 2022-04-27 PROCEDURE — 3079F PR MOST RECENT DIASTOLIC BLOOD PRESSURE 80-89 MM HG: ICD-10-PCS | Mod: CPTII,S$GLB,, | Performed by: INTERNAL MEDICINE

## 2022-04-27 PROCEDURE — 99204 PR OFFICE/OUTPT VISIT, NEW, LEVL IV, 45-59 MIN: ICD-10-PCS | Mod: S$GLB,,, | Performed by: INTERNAL MEDICINE

## 2022-04-27 RX ORDER — ZOLPIDEM TARTRATE 10 MG/1
TABLET ORAL
Qty: 30 TABLET | Refills: 0 | Status: SHIPPED | OUTPATIENT
Start: 2022-04-27 | End: 2022-05-30 | Stop reason: SDUPTHER

## 2022-04-27 NOTE — PROGRESS NOTES
Subjective:    Patient ID:  Monalisa Carson is a 57 y.o. female who presents for evaluation of Hypertension      HPI     HTN, HLD, DM   is my patient  Referred by Dr Romero  57-year-old female presents for hypertension follow-up.  Patient states she has been having elevated blood pressure at home.  Complains of occasional dizziness.  Has a history of heart murmur as well.    4/27/22 BP controlled after the addition of HCTZ  Denies CP, mild PATTERSON  Reports Hx murmur for years but never had echo  EKG NSR - ok    Review of Systems   Constitutional: Negative for decreased appetite.   HENT: Negative for ear discharge.    Eyes: Negative for blurred vision.   Respiratory: Negative for hemoptysis.    Endocrine: Negative for polyphagia.   Hematologic/Lymphatic: Negative for adenopathy.   Skin: Negative for color change.   Musculoskeletal: Negative for joint swelling.   Genitourinary: Negative for bladder incontinence.   Neurological: Negative for brief paralysis.   Psychiatric/Behavioral: Negative for hallucinations.   Allergic/Immunologic: Negative for hives.        Objective:    Physical Exam  Constitutional:       Appearance: She is well-developed.   HENT:      Head: Normocephalic and atraumatic.   Eyes:      Conjunctiva/sclera: Conjunctivae normal.      Pupils: Pupils are equal, round, and reactive to light.   Cardiovascular:      Rate and Rhythm: Normal rate.      Pulses: Intact distal pulses.      Heart sounds: Murmur heard.    Early systolic murmur is present with a grade of 2/6.  Pulmonary:      Effort: Pulmonary effort is normal.      Breath sounds: Normal breath sounds.   Abdominal:      General: Bowel sounds are normal.      Palpations: Abdomen is soft.   Musculoskeletal:         General: Normal range of motion.      Cervical back: Normal range of motion and neck supple.   Skin:     General: Skin is warm and dry.   Neurological:      Mental Status: She is alert and oriented to person, place, and time.            Assessment:       1. Essential hypertension, benign    2. Uncontrolled hypertension    3. Dizziness    4. Hyperlipidemia, unspecified hyperlipidemia type    5. Controlled type 2 diabetes mellitus with stage 2 chronic kidney disease, without long-term current use of insulin    6. PATTERSON (dyspnea on exertion)         Plan:       Echo for murmur and PATTERSON  BP currently controlled  Continue Rx for HTN, HLD, DM

## 2022-05-03 ENCOUNTER — HOSPITAL ENCOUNTER (OUTPATIENT)
Dept: CARDIOLOGY | Facility: HOSPITAL | Age: 57
Discharge: HOME | End: 2022-05-03
Attending: INTERNAL MEDICINE
Payer: COMMERCIAL

## 2022-05-03 VITALS — HEIGHT: 63 IN | BODY MASS INDEX: 39.51 KG/M2 | WEIGHT: 223 LBS

## 2022-05-03 DIAGNOSIS — R42 DIZZINESS: ICD-10-CM

## 2022-05-03 DIAGNOSIS — N18.2 CONTROLLED TYPE 2 DIABETES MELLITUS WITH STAGE 2 CHRONIC KIDNEY DISEASE, WITHOUT LONG-TERM CURRENT USE OF INSULIN: ICD-10-CM

## 2022-05-03 DIAGNOSIS — E78.5 HYPERLIPIDEMIA, UNSPECIFIED HYPERLIPIDEMIA TYPE: ICD-10-CM

## 2022-05-03 DIAGNOSIS — I10 UNCONTROLLED HYPERTENSION: ICD-10-CM

## 2022-05-03 DIAGNOSIS — R06.09 DOE (DYSPNEA ON EXERTION): ICD-10-CM

## 2022-05-03 DIAGNOSIS — I10 ESSENTIAL HYPERTENSION, BENIGN: ICD-10-CM

## 2022-05-03 DIAGNOSIS — E11.22 CONTROLLED TYPE 2 DIABETES MELLITUS WITH STAGE 2 CHRONIC KIDNEY DISEASE, WITHOUT LONG-TERM CURRENT USE OF INSULIN: ICD-10-CM

## 2022-05-03 LAB
ASCENDING AORTA: 2.39 CM
AV INDEX (PROSTH): 0.76
AV MEAN GRADIENT: 13 MMHG
AV PEAK GRADIENT: 18 MMHG
AV VALVE AREA: 2.39 CM2
AV VELOCITY RATIO: 0.74
BSA FOR ECHO PROCEDURE: 2.12 M2
CV ECHO LV RWT: 0.41 CM
DOP CALC AO PEAK VEL: 2.14 M/S
DOP CALC AO VTI: 43.19 CM
DOP CALC LVOT AREA: 3.1 CM2
DOP CALC LVOT DIAMETER: 2 CM
DOP CALC LVOT PEAK VEL: 1.59 M/S
DOP CALC LVOT STROKE VOLUME: 103.43 CM3
DOP CALCLVOT PEAK VEL VTI: 32.94 CM
E WAVE DECELERATION TIME: 257.13 MSEC
E/A RATIO: 1.01
E/E' RATIO: 16 M/S
ECHO LV POSTERIOR WALL: 0.89 CM (ref 0.6–1.1)
EJECTION FRACTION: 55 %
FRACTIONAL SHORTENING: 39 % (ref 28–44)
INTERVENTRICULAR SEPTUM: 1.07 CM (ref 0.6–1.1)
IVRT: 122.74 MSEC
LA MAJOR: 5.73 CM
LA MINOR: 5.49 CM
LA WIDTH: 3.59 CM
LEFT ATRIUM SIZE: 3.66 CM
LEFT ATRIUM VOLUME INDEX: 30.9 ML/M2
LEFT ATRIUM VOLUME: 62.63 CM3
LEFT INTERNAL DIMENSION IN SYSTOLE: 2.62 CM (ref 2.1–4)
LEFT VENTRICLE DIASTOLIC VOLUME INDEX: 40.75 ML/M2
LEFT VENTRICLE DIASTOLIC VOLUME: 82.73 ML
LEFT VENTRICLE MASS INDEX: 68 G/M2
LEFT VENTRICLE SYSTOLIC VOLUME INDEX: 12.3 ML/M2
LEFT VENTRICLE SYSTOLIC VOLUME: 24.97 ML
LEFT VENTRICULAR INTERNAL DIMENSION IN DIASTOLE: 4.29 CM (ref 3.5–6)
LEFT VENTRICULAR MASS: 138.04 G
LV LATERAL E/E' RATIO: 14 M/S
LV SEPTAL E/E' RATIO: 18.67 M/S
MV PEAK A VEL: 1.11 M/S
MV PEAK E VEL: 1.12 M/S
MV STENOSIS PRESSURE HALF TIME: 74.57 MS
MV VALVE AREA P 1/2 METHOD: 2.95 CM2
PISA TR MAX VEL: 2.59 M/S
PULM VEIN S/D RATIO: 1.78
PV PEAK D VEL: 0.37 M/S
PV PEAK S VEL: 0.66 M/S
PV PEAK VELOCITY: 1.46 CM/S
RA MAJOR: 5.07 CM
RA PRESSURE: 3 MMHG
RA WIDTH: 3.44 CM
RIGHT VENTRICULAR END-DIASTOLIC DIMENSION: 3.78 CM
RV TISSUE DOPPLER FREE WALL SYSTOLIC VELOCITY 1 (APICAL 4 CHAMBER VIEW): 16.35 CM/S
STJ: 2.35 CM
TDI LATERAL: 0.08 M/S
TDI SEPTAL: 0.06 M/S
TDI: 0.07 M/S
TR MAX PG: 27 MMHG
TRICUSPID ANNULAR PLANE SYSTOLIC EXCURSION: 2.66 CM
TV REST PULMONARY ARTERY PRESSURE: 30 MMHG

## 2022-05-03 PROCEDURE — 93306 TTE W/DOPPLER COMPLETE: CPT

## 2022-05-03 PROCEDURE — 93306 ECHO (CUPID ONLY): ICD-10-PCS | Mod: 26,,, | Performed by: INTERNAL MEDICINE

## 2022-05-03 PROCEDURE — 93306 TTE W/DOPPLER COMPLETE: CPT | Mod: 26,,, | Performed by: INTERNAL MEDICINE

## 2022-05-06 ENCOUNTER — TELEPHONE (OUTPATIENT)
Dept: FAMILY MEDICINE | Facility: CLINIC | Age: 57
End: 2022-05-06
Payer: COMMERCIAL

## 2022-05-06 DIAGNOSIS — Z12.31 ENCOUNTER FOR SCREENING MAMMOGRAM FOR BREAST CANCER: Primary | ICD-10-CM

## 2022-05-06 NOTE — TELEPHONE ENCOUNTER
Patient was schedule for diagnositc mammogram but stated she do not have any problems with her breast. Patient scheduled for next slot for mammogram screening. Unlink old orders.

## 2022-05-12 ENCOUNTER — OFFICE VISIT (OUTPATIENT)
Dept: CARDIOLOGY | Facility: CLINIC | Age: 57
End: 2022-05-12
Payer: COMMERCIAL

## 2022-05-12 VITALS
SYSTOLIC BLOOD PRESSURE: 141 MMHG | OXYGEN SATURATION: 98 % | BODY MASS INDEX: 39.87 KG/M2 | WEIGHT: 225 LBS | RESPIRATION RATE: 18 BRPM | HEIGHT: 63 IN | HEART RATE: 80 BPM | DIASTOLIC BLOOD PRESSURE: 87 MMHG

## 2022-05-12 DIAGNOSIS — R06.09 DOE (DYSPNEA ON EXERTION): ICD-10-CM

## 2022-05-12 DIAGNOSIS — I10 ESSENTIAL HYPERTENSION, BENIGN: Primary | ICD-10-CM

## 2022-05-12 DIAGNOSIS — E78.5 HYPERLIPIDEMIA, UNSPECIFIED HYPERLIPIDEMIA TYPE: ICD-10-CM

## 2022-05-12 PROCEDURE — 1159F MED LIST DOCD IN RCRD: CPT | Mod: CPTII,S$GLB,, | Performed by: INTERNAL MEDICINE

## 2022-05-12 PROCEDURE — 99999 PR PBB SHADOW E&M-EST. PATIENT-LVL V: CPT | Mod: PBBFAC,,, | Performed by: INTERNAL MEDICINE

## 2022-05-12 PROCEDURE — 3077F SYST BP >= 140 MM HG: CPT | Mod: CPTII,S$GLB,, | Performed by: INTERNAL MEDICINE

## 2022-05-12 PROCEDURE — 3008F BODY MASS INDEX DOCD: CPT | Mod: CPTII,S$GLB,, | Performed by: INTERNAL MEDICINE

## 2022-05-12 PROCEDURE — 3079F PR MOST RECENT DIASTOLIC BLOOD PRESSURE 80-89 MM HG: ICD-10-PCS | Mod: CPTII,S$GLB,, | Performed by: INTERNAL MEDICINE

## 2022-05-12 PROCEDURE — 99214 OFFICE O/P EST MOD 30 MIN: CPT | Mod: S$GLB,,, | Performed by: INTERNAL MEDICINE

## 2022-05-12 PROCEDURE — 3079F DIAST BP 80-89 MM HG: CPT | Mod: CPTII,S$GLB,, | Performed by: INTERNAL MEDICINE

## 2022-05-12 PROCEDURE — 1159F PR MEDICATION LIST DOCUMENTED IN MEDICAL RECORD: ICD-10-PCS | Mod: CPTII,S$GLB,, | Performed by: INTERNAL MEDICINE

## 2022-05-12 PROCEDURE — 99999 PR PBB SHADOW E&M-EST. PATIENT-LVL V: ICD-10-PCS | Mod: PBBFAC,,, | Performed by: INTERNAL MEDICINE

## 2022-05-12 PROCEDURE — 3008F PR BODY MASS INDEX (BMI) DOCUMENTED: ICD-10-PCS | Mod: CPTII,S$GLB,, | Performed by: INTERNAL MEDICINE

## 2022-05-12 PROCEDURE — 3077F PR MOST RECENT SYSTOLIC BLOOD PRESSURE >= 140 MM HG: ICD-10-PCS | Mod: CPTII,S$GLB,, | Performed by: INTERNAL MEDICINE

## 2022-05-12 PROCEDURE — 99214 PR OFFICE/OUTPT VISIT, EST, LEVL IV, 30-39 MIN: ICD-10-PCS | Mod: S$GLB,,, | Performed by: INTERNAL MEDICINE

## 2022-05-12 NOTE — PROGRESS NOTES
Subjective:    Patient ID:  Monalisa Carson is a 57 y.o. female who presents for follow-up of No chief complaint on file.      HPI     HTN, HLD, DM   is my patient  Referred by Dr Romero  57-year-old female presents for hypertension follow-up.  Patient states she has been having elevated blood pressure at home.  Complains of occasional dizziness.  Has a history of heart murmur as well.    Echo 5/3/22  · The estimated ejection fraction is 55%.  · Normal systolic function.  · Indeterminate left ventricular diastolic function.  · Normal right ventricular size with normal right ventricular systolic function.  · Mild left atrial enlargement.  · Normal central venous pressure (3 mmHg).  · The estimated PA systolic pressure is 30 mmHg.  Mild AV sclerosis       4/27/22 BP controlled after the addition of HCTZ  Denies CP, mild PATTERSON  Reports Hx murmur for years but never had echo  EKG NSR - ok   Echo for murmur and PATTERSON  BP currently controlled  Continue Rx for HTN, HLD, DM     5/12/22 Denies CP or PATTERSON   BP controlled by home readings    Review of Systems   Constitutional: Negative for decreased appetite.   HENT: Negative for ear discharge.    Eyes: Negative for blurred vision.   Respiratory: Negative for hemoptysis.    Endocrine: Negative for polyphagia.   Hematologic/Lymphatic: Negative for adenopathy.   Skin: Negative for color change.   Musculoskeletal: Negative for joint swelling.   Genitourinary: Negative for bladder incontinence.   Neurological: Negative for brief paralysis.   Psychiatric/Behavioral: Negative for hallucinations.   Allergic/Immunologic: Negative for hives.        Objective:    Physical Exam  Constitutional:       Appearance: She is well-developed.   HENT:      Head: Normocephalic and atraumatic.   Eyes:      Conjunctiva/sclera: Conjunctivae normal.      Pupils: Pupils are equal, round, and reactive to light.   Cardiovascular:      Rate and Rhythm: Normal rate.      Pulses: Intact distal  pulses.      Heart sounds: Murmur heard.    Early systolic murmur is present with a grade of 2/6.  Pulmonary:      Effort: Pulmonary effort is normal.      Breath sounds: Normal breath sounds.   Abdominal:      General: Bowel sounds are normal.      Palpations: Abdomen is soft.   Musculoskeletal:         General: Normal range of motion.      Cervical back: Normal range of motion and neck supple.   Skin:     General: Skin is warm and dry.   Neurological:      Mental Status: She is alert and oriented to person, place, and time.           Assessment:       1. Essential hypertension, benign    2. PATTERSON (dyspnea on exertion)    3. Hyperlipidemia, unspecified hyperlipidemia type         Plan:           Continue Rx for HTN, HLD, DM   OV 6 months

## 2022-05-13 ENCOUNTER — LAB VISIT (OUTPATIENT)
Dept: LAB | Facility: HOSPITAL | Age: 57
End: 2022-05-13
Attending: INTERNAL MEDICINE
Payer: COMMERCIAL

## 2022-05-13 DIAGNOSIS — Z00.00 ENCOUNTER FOR ANNUAL PHYSICAL EXAM: ICD-10-CM

## 2022-05-13 DIAGNOSIS — N18.2 CONTROLLED TYPE 2 DIABETES MELLITUS WITH STAGE 2 CHRONIC KIDNEY DISEASE, WITHOUT LONG-TERM CURRENT USE OF INSULIN: ICD-10-CM

## 2022-05-13 DIAGNOSIS — E11.22 CONTROLLED TYPE 2 DIABETES MELLITUS WITH STAGE 2 CHRONIC KIDNEY DISEASE, WITHOUT LONG-TERM CURRENT USE OF INSULIN: ICD-10-CM

## 2022-05-13 LAB
ALBUMIN SERPL BCP-MCNC: 3.5 G/DL (ref 3.5–5.2)
ALP SERPL-CCNC: 78 U/L (ref 55–135)
ALT SERPL W/O P-5'-P-CCNC: 26 U/L (ref 10–44)
ANION GAP SERPL CALC-SCNC: 10 MMOL/L (ref 8–16)
AST SERPL-CCNC: 16 U/L (ref 10–40)
BASOPHILS # BLD AUTO: 0.03 K/UL (ref 0–0.2)
BASOPHILS NFR BLD: 0.5 % (ref 0–1.9)
BILIRUB SERPL-MCNC: 0.5 MG/DL (ref 0.1–1)
BUN SERPL-MCNC: 10 MG/DL (ref 6–20)
CALCIUM SERPL-MCNC: 10.1 MG/DL (ref 8.7–10.5)
CHLORIDE SERPL-SCNC: 103 MMOL/L (ref 95–110)
CHOLEST SERPL-MCNC: 231 MG/DL (ref 120–199)
CHOLEST/HDLC SERPL: 5.1 {RATIO} (ref 2–5)
CO2 SERPL-SCNC: 25 MMOL/L (ref 23–29)
CREAT SERPL-MCNC: 0.7 MG/DL (ref 0.5–1.4)
DIFFERENTIAL METHOD: ABNORMAL
EOSINOPHIL # BLD AUTO: 0.1 K/UL (ref 0–0.5)
EOSINOPHIL NFR BLD: 1.5 % (ref 0–8)
ERYTHROCYTE [DISTWIDTH] IN BLOOD BY AUTOMATED COUNT: 13.8 % (ref 11.5–14.5)
EST. GFR  (AFRICAN AMERICAN): >60 ML/MIN/1.73 M^2
EST. GFR  (NON AFRICAN AMERICAN): >60 ML/MIN/1.73 M^2
ESTIMATED AVG GLUCOSE: 154 MG/DL (ref 68–131)
GLUCOSE SERPL-MCNC: 132 MG/DL (ref 70–110)
HBA1C MFR BLD: 7 % (ref 4–5.6)
HCT VFR BLD AUTO: 40 % (ref 37–48.5)
HDLC SERPL-MCNC: 45 MG/DL (ref 40–75)
HDLC SERPL: 19.5 % (ref 20–50)
HGB BLD-MCNC: 12.9 G/DL (ref 12–16)
IMM GRANULOCYTES # BLD AUTO: 0.01 K/UL (ref 0–0.04)
IMM GRANULOCYTES NFR BLD AUTO: 0.2 % (ref 0–0.5)
LDLC SERPL CALC-MCNC: 131.6 MG/DL (ref 63–159)
LYMPHOCYTES # BLD AUTO: 3.3 K/UL (ref 1–4.8)
LYMPHOCYTES NFR BLD: 55.5 % (ref 18–48)
MCH RBC QN AUTO: 29.9 PG (ref 27–31)
MCHC RBC AUTO-ENTMCNC: 32.3 G/DL (ref 32–36)
MCV RBC AUTO: 93 FL (ref 82–98)
MONOCYTES # BLD AUTO: 0.5 K/UL (ref 0.3–1)
MONOCYTES NFR BLD: 7.7 % (ref 4–15)
NEUTROPHILS # BLD AUTO: 2 K/UL (ref 1.8–7.7)
NEUTROPHILS NFR BLD: 34.6 % (ref 38–73)
NONHDLC SERPL-MCNC: 186 MG/DL
NRBC BLD-RTO: 0 /100 WBC
PLATELET # BLD AUTO: 411 K/UL (ref 150–450)
PMV BLD AUTO: 10.2 FL (ref 9.2–12.9)
POTASSIUM SERPL-SCNC: 4.3 MMOL/L (ref 3.5–5.1)
PROT SERPL-MCNC: 7.6 G/DL (ref 6–8.4)
RBC # BLD AUTO: 4.32 M/UL (ref 4–5.4)
SODIUM SERPL-SCNC: 138 MMOL/L (ref 136–145)
TRIGL SERPL-MCNC: 272 MG/DL (ref 30–150)
TSH SERPL DL<=0.005 MIU/L-ACNC: 2.15 UIU/ML (ref 0.4–4)
WBC # BLD AUTO: 5.87 K/UL (ref 3.9–12.7)

## 2022-05-13 PROCEDURE — 80053 COMPREHEN METABOLIC PANEL: CPT | Performed by: INTERNAL MEDICINE

## 2022-05-13 PROCEDURE — 83036 HEMOGLOBIN GLYCOSYLATED A1C: CPT | Performed by: INTERNAL MEDICINE

## 2022-05-13 PROCEDURE — 36415 COLL VENOUS BLD VENIPUNCTURE: CPT | Mod: PO | Performed by: INTERNAL MEDICINE

## 2022-05-13 PROCEDURE — 85025 COMPLETE CBC W/AUTO DIFF WBC: CPT | Performed by: INTERNAL MEDICINE

## 2022-05-13 PROCEDURE — 80061 LIPID PANEL: CPT | Performed by: INTERNAL MEDICINE

## 2022-05-13 PROCEDURE — 84443 ASSAY THYROID STIM HORMONE: CPT | Performed by: INTERNAL MEDICINE

## 2022-05-13 RX ORDER — METFORMIN HYDROCHLORIDE EXTENDED-RELEASE TABLETS 500 MG/1
TABLET, FILM COATED, EXTENDED RELEASE ORAL
Qty: 120 TABLET | Refills: 2 | Status: SHIPPED | OUTPATIENT
Start: 2022-05-13 | End: 2022-05-17

## 2022-05-13 RX ORDER — EXENATIDE 2 MG/.85ML
INJECTION, SUSPENSION, EXTENDED RELEASE SUBCUTANEOUS
Qty: 10.2 PEN | Refills: 0 | Status: SHIPPED | OUTPATIENT
Start: 2022-05-13 | End: 2022-08-19

## 2022-05-13 NOTE — TELEPHONE ENCOUNTER
No new care gaps identified.  [facility] Embedded Care Gaps. Reference number: [id number]. 5/13/2022   7:21:47 AM CDT

## 2022-05-17 ENCOUNTER — TELEPHONE (OUTPATIENT)
Dept: FAMILY MEDICINE | Facility: CLINIC | Age: 57
End: 2022-05-17
Payer: COMMERCIAL

## 2022-05-17 ENCOUNTER — OFFICE VISIT (OUTPATIENT)
Dept: FAMILY MEDICINE | Facility: CLINIC | Age: 57
End: 2022-05-17
Payer: COMMERCIAL

## 2022-05-17 VITALS
DIASTOLIC BLOOD PRESSURE: 80 MMHG | WEIGHT: 225.31 LBS | HEIGHT: 62 IN | HEART RATE: 72 BPM | BODY MASS INDEX: 41.46 KG/M2 | RESPIRATION RATE: 16 BRPM | SYSTOLIC BLOOD PRESSURE: 136 MMHG | TEMPERATURE: 98 F | OXYGEN SATURATION: 99 %

## 2022-05-17 DIAGNOSIS — E11.22 CONTROLLED TYPE 2 DIABETES MELLITUS WITH STAGE 2 CHRONIC KIDNEY DISEASE, WITHOUT LONG-TERM CURRENT USE OF INSULIN: ICD-10-CM

## 2022-05-17 DIAGNOSIS — I10 ESSENTIAL HYPERTENSION: Primary | ICD-10-CM

## 2022-05-17 DIAGNOSIS — E78.2 MIXED HYPERLIPIDEMIA: ICD-10-CM

## 2022-05-17 DIAGNOSIS — R01.1 HEART MURMUR: ICD-10-CM

## 2022-05-17 DIAGNOSIS — Z86.16 HISTORY OF COVID-19: ICD-10-CM

## 2022-05-17 DIAGNOSIS — N18.2 CONTROLLED TYPE 2 DIABETES MELLITUS WITH STAGE 2 CHRONIC KIDNEY DISEASE, WITHOUT LONG-TERM CURRENT USE OF INSULIN: ICD-10-CM

## 2022-05-17 PROCEDURE — 99214 PR OFFICE/OUTPT VISIT, EST, LEVL IV, 30-39 MIN: ICD-10-PCS | Mod: S$GLB,,, | Performed by: INTERNAL MEDICINE

## 2022-05-17 PROCEDURE — 1159F MED LIST DOCD IN RCRD: CPT | Mod: CPTII,S$GLB,, | Performed by: INTERNAL MEDICINE

## 2022-05-17 PROCEDURE — 99999 PR PBB SHADOW E&M-EST. PATIENT-LVL V: ICD-10-PCS | Mod: PBBFAC,,, | Performed by: INTERNAL MEDICINE

## 2022-05-17 PROCEDURE — 3008F BODY MASS INDEX DOCD: CPT | Mod: CPTII,S$GLB,, | Performed by: INTERNAL MEDICINE

## 2022-05-17 PROCEDURE — 99999 PR PBB SHADOW E&M-EST. PATIENT-LVL V: CPT | Mod: PBBFAC,,, | Performed by: INTERNAL MEDICINE

## 2022-05-17 PROCEDURE — 1159F PR MEDICATION LIST DOCUMENTED IN MEDICAL RECORD: ICD-10-PCS | Mod: CPTII,S$GLB,, | Performed by: INTERNAL MEDICINE

## 2022-05-17 PROCEDURE — 3079F PR MOST RECENT DIASTOLIC BLOOD PRESSURE 80-89 MM HG: ICD-10-PCS | Mod: CPTII,S$GLB,, | Performed by: INTERNAL MEDICINE

## 2022-05-17 PROCEDURE — 3079F DIAST BP 80-89 MM HG: CPT | Mod: CPTII,S$GLB,, | Performed by: INTERNAL MEDICINE

## 2022-05-17 PROCEDURE — 99214 OFFICE O/P EST MOD 30 MIN: CPT | Mod: S$GLB,,, | Performed by: INTERNAL MEDICINE

## 2022-05-17 PROCEDURE — 3051F PR MOST RECENT HEMOGLOBIN A1C LEVEL 7.0 - < 8.0%: ICD-10-PCS | Mod: CPTII,S$GLB,, | Performed by: INTERNAL MEDICINE

## 2022-05-17 PROCEDURE — 3075F SYST BP GE 130 - 139MM HG: CPT | Mod: CPTII,S$GLB,, | Performed by: INTERNAL MEDICINE

## 2022-05-17 PROCEDURE — 3008F PR BODY MASS INDEX (BMI) DOCUMENTED: ICD-10-PCS | Mod: CPTII,S$GLB,, | Performed by: INTERNAL MEDICINE

## 2022-05-17 PROCEDURE — 1160F PR REVIEW ALL MEDS BY PRESCRIBER/CLIN PHARMACIST DOCUMENTED: ICD-10-PCS | Mod: CPTII,S$GLB,, | Performed by: INTERNAL MEDICINE

## 2022-05-17 PROCEDURE — 3051F HG A1C>EQUAL 7.0%<8.0%: CPT | Mod: CPTII,S$GLB,, | Performed by: INTERNAL MEDICINE

## 2022-05-17 PROCEDURE — 3075F PR MOST RECENT SYSTOLIC BLOOD PRESS GE 130-139MM HG: ICD-10-PCS | Mod: CPTII,S$GLB,, | Performed by: INTERNAL MEDICINE

## 2022-05-17 PROCEDURE — 1160F RVW MEDS BY RX/DR IN RCRD: CPT | Mod: CPTII,S$GLB,, | Performed by: INTERNAL MEDICINE

## 2022-05-17 RX ORDER — METFORMIN HYDROCHLORIDE 500 MG/1
500 TABLET, EXTENDED RELEASE ORAL
Qty: 90 TABLET | Refills: 3 | Status: SHIPPED | OUTPATIENT
Start: 2022-05-17 | End: 2023-10-03

## 2022-05-17 NOTE — PROGRESS NOTES
Subjective:       Patient ID: Monalisa THOMPSON Favorite is a pleasant 57 y.o. Black or  female patient    Chief Complaint: Follow-up      Patient is an established pt to me.  Last visit in 02/2022.     As per problems:    HTN:  BP in clinic 136/80.   Taking lotensin and HCTZ.    Type 2 DM:  A1C 7 %. Previously 6.3.   Taking metformin that is ER, but insurance won't pay for it so has been having to take her 's one. Did not contact us to inform us at this regard. Also taking exenatide.  Lifestyle is not very good. She gained weight.  Skips breakfast, small lunch on the run, usually a sandwich for dinner. Lots of fast food, sodas.  Doesn't exercise much but is planning to start walking more with her .    Morbid obesity:  Due to weight gain, and difficulties to work on her lifestyle, would like to be referred to bariatric surgery. Interested in sleeve.       Patient Active Problem List   Diagnosis    Anxiety    Essential hypertension, benign    Controlled type 2 diabetes mellitus with stage 2 chronic kidney disease, without long-term current use of insulin    HLD (hyperlipidemia)    Dysarthria    Hemifacial spasm    Nonintractable epilepsy without status epilepticus    Tension headache, chronic    2019 novel coronavirus disease (COVID-19)    Occipital neuralgia of left side    Upper abdominal pain    Severe obesity (BMI 35.0-39.9) with comorbidity    Sal's paralysis (postepileptic)    Wears contact lenses    Refractive error    Heart murmur    PATTERSON (dyspnea on exertion)          ACTIVE MEDICAL ISSUES:  Documented in Problem List     PAST MEDICAL HISTORY  Documented     PAST SURGICAL HISTORY:  Documented     SOCIAL HISTORY:  Documented     FAMILY HISTORY:  Documented     ALLERGIES AND MEDICATIONS: updated and reviewed.  Documented    Review of Systems   Constitutional: Negative.    HENT: Negative.    Eyes: Negative.    Respiratory: Negative.    Cardiovascular: Negative.   "  Gastrointestinal: Negative.    Genitourinary: Negative.    Musculoskeletal: Negative.    Neurological: Negative.    Psychiatric/Behavioral: Negative.        Objective:      Physical Exam  Constitutional:       Appearance: She is obese.   HENT:      Head: Normocephalic and atraumatic.      Mouth/Throat:      Mouth: Mucous membranes are moist.      Pharynx: Oropharynx is clear.   Eyes:      Extraocular Movements: Extraocular movements intact.      Conjunctiva/sclera: Conjunctivae normal.   Cardiovascular:      Rate and Rhythm: Normal rate and regular rhythm.      Pulses: Normal pulses.      Heart sounds: Murmur heard.   Pulmonary:      Effort: Pulmonary effort is normal.      Breath sounds: Normal breath sounds.   Abdominal:      General: Abdomen is flat. Bowel sounds are normal.      Palpations: Abdomen is soft.   Musculoskeletal:      Right lower leg: No edema.      Left lower leg: No edema.   Skin:     General: Skin is warm and dry.   Neurological:      General: No focal deficit present.      Mental Status: She is alert and oriented to person, place, and time.   Psychiatric:         Mood and Affect: Mood normal.         Behavior: Behavior normal.         Thought Content: Thought content normal.         Judgment: Judgment normal.         Vitals:    05/17/22 1057   BP: 136/80   BP Location: Right arm   Patient Position: Sitting   BP Method: Small (Manual)   Pulse: 72   Resp: 16   Temp: 98 °F (36.7 °C)   TempSrc: Temporal   SpO2: 99%   Weight: 102.2 kg (225 lb 5 oz)   Height: 5' 2" (1.575 m)     Body mass index is 41.21 kg/m².    RESULTS: Reviewed labs from last 6 months    Assessment:       1. Hypertension  2. Hyperlipidemia  3. Type 2 diabetes  4. Obesity    Plan:     Hypertension   Continue lotensin and HCTZ   Avoid salt in diet    Hyperlipidemia   Low fat diet, avoid fast food, eat grilled meat instead of fried   Took Crestor previously but this caused muscle pain. Added to list of allergies. May benefit of "  Williams.    Type 2 diabetes   Reordered metformin XR, pt will contact me if not covered.    Continue exenatide   Avoid sugary foods and drinks, including sodas   Future A1c, urine microalb/creat, CMP ordered    Obesity   Recommend diet with lots of vegetables and lean protein. STOP SODAS AND FAST FOOD             except the rare exception/treat.   Recommend exercising 150 min per week (CV and resistance training)   Referred to bariatric surgery    Follow up in about 3 months (around 8/8/2022) for f-up after blood work.    Health Maintenance       Date Due Completion Date    HIV Screening Never done ---    Foot Exam 11/30/2021 11/30/2020    Override on 12/10/2015: Done (12/10/15 pt will get foot exam done today)    Override on 8/27/2013: Done    COVID-19 Vaccine (4 - Booster for Pfizer series) 02/16/2022 10/16/2021    Mammogram 04/08/2022 4/8/2021    Override on 11/3/2014: Done    Diabetes Urine Screening 05/28/2022 5/28/2021    Eye Exam 09/14/2022 9/14/2021    Override on 7/1/2016: Done (eye care [phone])    Override on 5/26/2015: Done (5/16/15 pt stated had eye exam about 3 weeks ago 5/2015 at [facility])    Override on 5/7/2014: Done    Hemoglobin A1c 11/13/2022 5/13/2022    Lipid Panel 05/13/2023 5/13/2022    TETANUS VACCINE 02/20/2029 2/20/2019    Colorectal Cancer Screening 06/29/2031 6/29/2021          Letty Chow, MS4  [facility]       I hereby acknowledge that I am relying upon documentation authored by a medical student working under my supervision and further I hereby attest that I have verified the student documentation or findings by personally re-performing the physical exam and medical decision making activities of the Evaluation and Management service to be billed.    Flor Gomez  This note was created by combination of typed  and M-Modal dictation.  Transcription errors may be present.  If there are any questions, please contact me.

## 2022-05-17 NOTE — PROGRESS NOTES
Subjective:       Patient ID: Monalisa THOMPSON Favorite is a pleasant 57 y.o. Black or  female patient    Chief Complaint: No chief complaint on file.      Patient is a new pt to me/established pt to me.     HPI     Patient Active Problem List   Diagnosis    Anxiety    Essential hypertension, benign    Controlled type 2 diabetes mellitus with stage 2 chronic kidney disease, without long-term current use of insulin    HLD (hyperlipidemia)    Dysarthria    Hemifacial spasm    Nonintractable epilepsy without status epilepticus    Tension headache, chronic    2019 novel coronavirus disease (COVID-19)    Occipital neuralgia of left side    Upper abdominal pain    Severe obesity (BMI 35.0-39.9) with comorbidity    Sal's paralysis (postepileptic)    Wears contact lenses    Refractive error    Heart murmur    PATTERSON (dyspnea on exertion)          ACTIVE MEDICAL ISSUES:  Documented in Problem List     PAST MEDICAL HISTORY  Documented     PAST SURGICAL HISTORY:  Documented     SOCIAL HISTORY:  Documented     FAMILY HISTORY:  Documented     ALLERGIES AND MEDICATIONS: updated and reviewed.  Documented    Review of Systems    Objective:      Physical Exam    There were no vitals filed for this visit.  There is no height or weight on file to calculate BMI.    RESULTS: Reviewed labs from last *** months    Assessment:       No diagnosis found.    Plan:   There are no diagnoses linked to this encounter.  No follow-ups on file.    This note was created by combination of typed  and M-Modal dictation.  Transcription errors may be present.  If there are any questions, please contact me.

## 2022-05-17 NOTE — PROGRESS NOTES
Health Maintenance Due   Topic Date Due    HIV Screening  Never done    Foot Exam  11/30/2021    COVID-19 Vaccine (4 - Booster for Pfizer series) 02/16/2022    Mammogram  04/08/2022    Diabetes Urine Screening  05/28/2022

## 2022-05-17 NOTE — TELEPHONE ENCOUNTER
Rx not covered by pt's plan, no alternatives given      Denied due to pt not taking 1500mg per day  Please advise

## 2022-05-18 ENCOUNTER — TELEPHONE (OUTPATIENT)
Dept: BARIATRICS | Facility: CLINIC | Age: 57
End: 2022-05-18
Payer: COMMERCIAL

## 2022-05-18 NOTE — TELEPHONE ENCOUNTER
----- Message from Sundar Perez sent at 5/18/2022  2:44 PM CDT -----  Regarding: missed call from Scott      The Pt states that she missed your call and would like a call back from Scott    # [phone]

## 2022-05-26 DIAGNOSIS — G47.00 INSOMNIA, UNSPECIFIED TYPE: ICD-10-CM

## 2022-05-27 NOTE — TELEPHONE ENCOUNTER
Refill Routing Note   Medication(s) are not appropriate for processing by [facility] for the following reason(s):      - Outside of protocol    [facility] action(s):  Route       Medication Therapy Plan: Medication non-delegated  Medication reconciliation completed: No     Appointments  past 12m or future 3m with PCP    Date Provider   Last Visit   5/17/2022 Flor Gomez MD   Next Visit   8/17/2022 Flor Gomez MD   ED visits in past 90 days: 0        Note composed:9:38 PM 05/26/2022

## 2022-05-27 NOTE — TELEPHONE ENCOUNTER
No new care gaps identified.  [facility] Embedded Care Gaps. Reference number: [id number]. 5/26/2022   9:07:30 PM CDT

## 2022-05-30 ENCOUNTER — PATIENT MESSAGE (OUTPATIENT)
Dept: ADMINISTRATIVE | Facility: HOSPITAL | Age: 57
End: 2022-05-30
Payer: COMMERCIAL

## 2022-05-30 DIAGNOSIS — G47.00 INSOMNIA, UNSPECIFIED TYPE: ICD-10-CM

## 2022-05-30 NOTE — TELEPHONE ENCOUNTER
No new care gaps identified.  [facility] Embedded Care Gaps. Reference number: [id number]. 5/30/2022   6:21:42 PM CDT

## 2022-05-31 NOTE — TELEPHONE ENCOUNTER
Last Office Visit Info:   The patient's last visit with Flor Gomez MD was on 5/17/2022.    The patient's last visit in current department was on 5/17/2022.        Last CBC Results:   Lab Results   Component Value Date    WBC 5.87 05/13/2022    HGB 12.9 05/13/2022    HCT 40.0 05/13/2022     05/13/2022       Last CMP Results  Lab Results   Component Value Date     05/13/2022    K 4.3 05/13/2022     05/13/2022    CO2 25 05/13/2022    BUN 10 05/13/2022    CREATININE 0.7 05/13/2022    CALCIUM 10.1 05/13/2022    ALBUMIN 3.5 05/13/2022    AST 16 05/13/2022    ALT 26 05/13/2022       Last Lipids  Lab Results   Component Value Date    CHOL 231 (H) 05/13/2022    TRIG 272 (H) 05/13/2022    HDL 45 05/13/2022    LDLCALC 131.6 05/13/2022       Last A1C  Lab Results   Component Value Date    HGBA1C 7.0 (H) 05/13/2022       Last TSH  Lab Results   Component Value Date    TSH 2.153 05/13/2022             Current Med Refills  Medication List with Changes/Refills   Current Medications    ACETAMINOPHEN (TYLENOL) 500 MG TABLET    Take 2 tablets (1,000 mg total) by mouth every 6 (six) hours as needed for Pain.       Start Date: 5/31/2021 End Date: --    ALBUTEROL (PROVENTIL/VENTOLIN HFA) 90 MCG/ACTUATION INHALER    Inhale 1-2 puffs into the lungs every 4 (four) hours as needed for Wheezing. Rescue       Start Date: 2/17/2022 End Date: 2/17/2023    BENAZEPRIL (LOTENSIN) 40 MG TABLET    TAKE 1 TABLET(40 MG) BY MOUTH EVERY DAY       Start Date: 12/29/2021End Date: --    CYCLOBENZAPRINE (FLEXERIL) 10 MG TABLET    Take 1 tablet (10 mg total) by mouth 3 (three) times daily as needed for Muscle spasms. This medication will make you drowsy.       Start Date: 11/16/2021End Date: --    DICYCLOMINE (BENTYL) 20 MG TABLET    Take 20 mg by mouth 4 (four) times daily.       Start Date: 5/3/2021  End Date: --    EXENATIDE MICROSPHERES (BYDUREON BCISE) 2 MG/0.85 ML ATIN    INJECT 2 MG UNDER THE SKIN EVERY 7 DAYS       Start  "Date: 5/13/2022 End Date: --    FLUTICASONE PROPIONATE (FLONASE) 50 MCG/ACTUATION NASAL SPRAY    2 sprays (100 mcg total) by Each Nostril route once daily.       Start Date: 3/22/2020 End Date: --    FLUTICASONE PROPIONATE (FLONASE) 50 MCG/ACTUATION NASAL SPRAY    1 spray (50 mcg total) by Each Nostril route 2 (two) times daily.       Start Date: 5/31/2021 End Date: --    HYDROCHLOROTHIAZIDE (HYDRODIURIL) 12.5 MG TAB    Take 1 tablet (12.5 mg total) by mouth once daily.       Start Date: 4/13/2022 End Date: 10/10/2022    HYDROCORTISONE 2.5 % CREAM    WOLFGANG EXT AA BID       Start Date: 1/29/2021 End Date: --    IBUPROFEN (ADVIL,MOTRIN) 400 MG TABLET    Take 1 tablet (400 mg total) by mouth every 6 (six) hours as needed for Other.       Start Date: 11/16/2021End Date: --    LEVETIRACETAM (KEPPRA) 1000 MG TABLET    Take 1 tablet (1,000 mg total) by mouth 2 (two) times daily.       Start Date: 9/28/2020 End Date: --    LIDOCAINE (LIDODERM) 5 %    Place 1 patch onto the skin once daily. Remove & Discard patch within 12 hours or as directed by MD       Start Date: 11/16/2021End Date: --    LIDOCAINE (LMX) 4 % CREAM    Apply topically as needed.       Start Date: 8/6/2020  End Date: --    LORATADINE (CLARITIN) 10 MG TABLET    Take 1 tablet (10 mg total) by mouth once daily.       Start Date: 5/31/2021 End Date: 5/31/2022    METFORMIN (GLUCOPHAGE-XR) 500 MG ER 24HR TABLET    Take 1 tablet (500 mg total) by mouth daily with breakfast.       Start Date: 5/17/2022 End Date: 5/17/2023    MOBIC 15 MG TABLET    Take 15 mg by mouth daily as needed.       Start Date: 5/3/2021  End Date: --    PANTOPRAZOLE (PROTONIX) 40 MG TABLET    TAKE 1 TABLET(40 MG) BY MOUTH EVERY DAY       Start Date: 5/28/2022 End Date: --    PEN NEEDLE, DIABETIC 31 GAUGE X 5/16" NDLE    For use with bydureon       Start Date: 2/24/2020 End Date: --    PROMETHAZINE (PHENERGAN) 25 MG TABLET           Start Date: 5/3/2021  End Date: --    ROSUVASTATIN (CRESTOR) 20 " MG TABLET    Take 1 tablet (20 mg total) by mouth once daily.       Start Date: 2/24/2020 End Date: 4/27/2022    SODIUM CHLORIDE (OCEAN NASAL) 0.65 % NASAL SPRAY    1 spray by Nasal route every 3 (three) hours as needed for Congestion.       Start Date: 5/31/2021 End Date: --    TOPIRAMATE (TOPAMAX) 100 MG TABLET    Take 1 tablet (100 mg total) by mouth once daily.       Start Date: 1/5/2022  End Date: 1/5/2023    ZOLPIDEM (AMBIEN) 10 MG TAB    TAKE 1 TABLET(10 MG) BY MOUTH EVERY NIGHT AS NEEDED FOR INSOMNIA       Start Date: 4/27/2022 End Date: --       Order(s) placed per written order guidelines:     Please advise.

## 2022-06-02 DIAGNOSIS — G47.00 INSOMNIA, UNSPECIFIED TYPE: ICD-10-CM

## 2022-06-02 RX ORDER — ZOLPIDEM TARTRATE 10 MG/1
10 TABLET ORAL DAILY PRN
Qty: 30 TABLET | Refills: 0 | Status: SHIPPED | OUTPATIENT
Start: 2022-06-02 | End: 2022-06-29

## 2022-06-02 RX ORDER — ZOLPIDEM TARTRATE 10 MG/1
TABLET ORAL
Qty: 30 TABLET | OUTPATIENT
Start: 2022-06-02

## 2022-06-02 NOTE — TELEPHONE ENCOUNTER
No new care gaps identified.  [facility] Embedded Care Gaps. Reference number: [id number]. 6/02/2022   10:23:20 AM CDT

## 2022-06-02 NOTE — TELEPHONE ENCOUNTER
Refill Routing Note   Medication(s) are not appropriate for processing by [facility] for the following reason(s):      - Outside of protocol    [facility] action(s):  Route          Medication reconciliation completed: No     Appointments  past 12m or future 3m with PCP    Date Provider   Last Visit   5/17/2022 Flor Gomez MD   Next Visit   8/17/2022 Flor Gomez MD   ED visits in past 90 days: 0        Note composed:12:36 PM 06/02/2022

## 2022-06-02 NOTE — TELEPHONE ENCOUNTER
----- Message from Virginia Wing sent at 6/2/2022 10:25 AM CDT -----  Type: RX Refill Request    Who Called: self     Have you contacted your pharmacy: yes     Refill or New Rx: refill     RX Name and Strength: zolpidem (AMBIEN) 10 mg Tab     Preferred Pharmacy with phone number:   [id number] - [place], [place] - [address] [facility]  [address] [address]  Phone: [phone] Fax: [phone]    Local or Mail Order: local     Would the patient rather a call back or a response via My [facility]? Call back     Best Call Back Number: [phone]

## 2022-06-03 ENCOUNTER — HOSPITAL ENCOUNTER (OUTPATIENT)
Dept: RADIOLOGY | Facility: HOSPITAL | Age: 57
Discharge: HOME | End: 2022-06-03
Attending: INTERNAL MEDICINE
Payer: COMMERCIAL

## 2022-06-03 DIAGNOSIS — Z12.31 ENCOUNTER FOR SCREENING MAMMOGRAM FOR BREAST CANCER: ICD-10-CM

## 2022-06-03 PROCEDURE — 77063 BREAST TOMOSYNTHESIS BI: CPT | Mod: 26,,, | Performed by: RADIOLOGY

## 2022-06-03 PROCEDURE — 77063 BREAST TOMOSYNTHESIS BI: CPT | Mod: TC,PO

## 2022-06-03 PROCEDURE — 77067 MAMMO DIGITAL SCREENING BILAT WITH TOMO: ICD-10-PCS | Mod: 26,,, | Performed by: RADIOLOGY

## 2022-06-03 PROCEDURE — 77063 MAMMO DIGITAL SCREENING BILAT WITH TOMO: ICD-10-PCS | Mod: 26,,, | Performed by: RADIOLOGY

## 2022-06-03 PROCEDURE — 77067 SCR MAMMO BI INCL CAD: CPT | Mod: 26,,, | Performed by: RADIOLOGY

## 2022-06-15 RX ORDER — HYDROCORTISONE 25 MG/G
CREAM TOPICAL
Qty: 28 G | Refills: 0 | Status: SHIPPED | OUTPATIENT
Start: 2022-06-15 | End: 2022-08-04 | Stop reason: SDUPTHER

## 2022-06-29 DIAGNOSIS — G47.00 INSOMNIA, UNSPECIFIED TYPE: ICD-10-CM

## 2022-06-29 RX ORDER — ZOLPIDEM TARTRATE 10 MG/1
TABLET ORAL
Qty: 20 TABLET | Refills: 0 | Status: SHIPPED | OUTPATIENT
Start: 2022-06-29 | End: 2022-08-04 | Stop reason: SDUPTHER

## 2022-06-29 NOTE — TELEPHONE ENCOUNTER
No new care gaps identified.  [facility] Embedded Care Gaps. Reference number: [id number]. 6/29/2022   4:57:42 PM CDT

## 2022-06-30 NOTE — TELEPHONE ENCOUNTER
Relayed Dr. Gomez's message to pt that 20 pills were sent in, but that she does need to start decreasing as discussed. Pt states understanding.

## 2022-09-03 DIAGNOSIS — G47.00 INSOMNIA, UNSPECIFIED TYPE: ICD-10-CM

## 2022-09-03 NOTE — TELEPHONE ENCOUNTER
No new care gaps identified.  [facility] Embedded Care Gaps. Reference number: [id number]. 9/03/2022   12:03:13 PM CDT

## 2022-09-06 RX ORDER — ZOLPIDEM TARTRATE 10 MG/1
TABLET ORAL
Qty: 20 TABLET | Refills: 0 | OUTPATIENT
Start: 2022-09-06

## 2022-09-13 NOTE — TELEPHONE ENCOUNTER
Refill on this medication will be due in 5 days. If there are no refills on the medication once the patient picks it up the pharmacy system will automatically send a refill request.

## 2022-09-20 DIAGNOSIS — R05.9 COUGH: ICD-10-CM

## 2022-09-21 ENCOUNTER — OFFICE VISIT (OUTPATIENT)
Dept: FAMILY MEDICINE | Facility: CLINIC | Age: 57
End: 2022-09-21
Payer: COMMERCIAL

## 2022-09-21 DIAGNOSIS — J06.9 UPPER RESPIRATORY TRACT INFECTION, UNSPECIFIED TYPE: Primary | ICD-10-CM

## 2022-09-21 PROCEDURE — 3051F HG A1C>EQUAL 7.0%<8.0%: CPT | Mod: CPTII,95,, | Performed by: PHYSICIAN ASSISTANT

## 2022-09-21 PROCEDURE — 99213 PR OFFICE/OUTPT VISIT, EST, LEVL III, 20-29 MIN: ICD-10-PCS | Mod: 95,,, | Performed by: PHYSICIAN ASSISTANT

## 2022-09-21 PROCEDURE — 1160F RVW MEDS BY RX/DR IN RCRD: CPT | Mod: CPTII,95,, | Performed by: PHYSICIAN ASSISTANT

## 2022-09-21 PROCEDURE — 4010F ACE/ARB THERAPY RXD/TAKEN: CPT | Mod: CPTII,95,, | Performed by: PHYSICIAN ASSISTANT

## 2022-09-21 PROCEDURE — 4010F PR ACE/ARB THEARPY RXD/TAKEN: ICD-10-PCS | Mod: CPTII,95,, | Performed by: PHYSICIAN ASSISTANT

## 2022-09-21 PROCEDURE — 1159F PR MEDICATION LIST DOCUMENTED IN MEDICAL RECORD: ICD-10-PCS | Mod: CPTII,95,, | Performed by: PHYSICIAN ASSISTANT

## 2022-09-21 PROCEDURE — 3051F PR MOST RECENT HEMOGLOBIN A1C LEVEL 7.0 - < 8.0%: ICD-10-PCS | Mod: CPTII,95,, | Performed by: PHYSICIAN ASSISTANT

## 2022-09-21 PROCEDURE — 1160F PR REVIEW ALL MEDS BY PRESCRIBER/CLIN PHARMACIST DOCUMENTED: ICD-10-PCS | Mod: CPTII,95,, | Performed by: PHYSICIAN ASSISTANT

## 2022-09-21 PROCEDURE — 1159F MED LIST DOCD IN RCRD: CPT | Mod: CPTII,95,, | Performed by: PHYSICIAN ASSISTANT

## 2022-09-21 PROCEDURE — 99213 OFFICE O/P EST LOW 20 MIN: CPT | Mod: 95,,, | Performed by: PHYSICIAN ASSISTANT

## 2022-09-21 RX ORDER — PROMETHAZINE HYDROCHLORIDE AND DEXTROMETHORPHAN HYDROBROMIDE 6.25; 15 MG/5ML; MG/5ML
5 SYRUP ORAL EVERY 4 HOURS PRN
Qty: 180 ML | Refills: 0 | Status: SHIPPED | OUTPATIENT
Start: 2022-09-21 | End: 2022-10-01

## 2022-09-21 RX ORDER — BENZONATATE 200 MG/1
200 CAPSULE ORAL 3 TIMES DAILY PRN
Qty: 30 CAPSULE | Refills: 0 | Status: SHIPPED | OUTPATIENT
Start: 2022-09-21 | End: 2022-10-01

## 2022-09-21 RX ORDER — CODEINE PHOSPHATE AND GUAIFENESIN 10; 100 MG/5ML; MG/5ML
SOLUTION ORAL
Qty: 236 ML | OUTPATIENT
Start: 2022-09-21

## 2022-09-21 NOTE — TELEPHONE ENCOUNTER
Last Office Visit Info:   The patient's last visit with Flor Gomez MD was on 5/17/2022.    The patient's last visit in current department was on 5/17/2022.        Last CBC Results:   Lab Results   Component Value Date    WBC 5.87 05/13/2022    HGB 12.9 05/13/2022    HCT 40.0 05/13/2022     05/13/2022       Last CMP Results  Lab Results   Component Value Date     05/13/2022    K 4.3 05/13/2022     05/13/2022    CO2 25 05/13/2022    BUN 10 05/13/2022    CREATININE 0.7 05/13/2022    CALCIUM 10.1 05/13/2022    ALBUMIN 3.5 05/13/2022    AST 16 05/13/2022    ALT 26 05/13/2022       Last Lipids  Lab Results   Component Value Date    CHOL 231 (H) 05/13/2022    TRIG 272 (H) 05/13/2022    HDL 45 05/13/2022    LDLCALC 131.6 05/13/2022       Last A1C  Lab Results   Component Value Date    HGBA1C 7.0 (H) 05/13/2022       Last TSH  Lab Results   Component Value Date    TSH 2.153 05/13/2022             Current Med Refills  Medication List with Changes/Refills   Current Medications    ACETAMINOPHEN (TYLENOL) 500 MG TABLET    Take 2 tablets (1,000 mg total) by mouth every 6 (six) hours as needed for Pain.       Start Date: 5/31/2021 End Date: --    ALBUTEROL (PROVENTIL/VENTOLIN HFA) 90 MCG/ACTUATION INHALER    Inhale 1-2 puffs into the lungs every 4 (four) hours as needed for Wheezing. Rescue       Start Date: 2/17/2022 End Date: 2/17/2023    BENAZEPRIL (LOTENSIN) 40 MG TABLET    TAKE 1 TABLET(40 MG) BY MOUTH EVERY DAY       Start Date: 12/29/2021End Date: --    CYCLOBENZAPRINE (FLEXERIL) 10 MG TABLET    Take 1 tablet (10 mg total) by mouth 3 (three) times daily as needed for Muscle spasms. This medication will make you drowsy.       Start Date: 11/16/2021End Date: --    DICYCLOMINE (BENTYL) 20 MG TABLET    Take 20 mg by mouth 4 (four) times daily.       Start Date: 5/3/2021  End Date: --    EXENATIDE MICROSPHERES (BYDUREON BCISE) 2 MG/0.85 ML ATIN    INJECT 2 MG( 0.85 ML) UNDER THE SKIN EVERY 7 DAYS       " Start Date: 9/13/2022 End Date: --    FLUTICASONE PROPIONATE (FLONASE) 50 MCG/ACTUATION NASAL SPRAY    2 sprays (100 mcg total) by Each Nostril route once daily.       Start Date: 3/22/2020 End Date: --    FLUTICASONE PROPIONATE (FLONASE) 50 MCG/ACTUATION NASAL SPRAY    1 spray (50 mcg total) by Each Nostril route 2 (two) times daily.       Start Date: 5/31/2021 End Date: --    HYDROCHLOROTHIAZIDE (HYDRODIURIL) 12.5 MG TAB    Take 1 tablet (12.5 mg total) by mouth once daily.       Start Date: 4/13/2022 End Date: 10/10/2022    HYDROCORTISONE 2.5 % CREAM    WOLFGANG EXT AA BID       Start Date: 8/5/2022  End Date: --    IBUPROFEN (ADVIL,MOTRIN) 400 MG TABLET    Take 1 tablet (400 mg total) by mouth every 6 (six) hours as needed for Other.       Start Date: 11/16/2021End Date: --    LEVETIRACETAM (KEPPRA) 1000 MG TABLET    Take 1 tablet (1,000 mg total) by mouth 2 (two) times daily.       Start Date: 9/28/2020 End Date: --    LIDOCAINE (LIDODERM) 5 %    Place 1 patch onto the skin once daily. Remove & Discard patch within 12 hours or as directed by MD       Start Date: 11/16/2021End Date: --    LIDOCAINE (LMX) 4 % CREAM    Apply topically as needed.       Start Date: 8/6/2020  End Date: --    LORATADINE (CLARITIN) 10 MG TABLET    Take 1 tablet (10 mg total) by mouth once daily.       Start Date: 5/31/2021 End Date: 5/31/2022    METFORMIN (GLUCOPHAGE-XR) 500 MG ER 24HR TABLET    Take 1 tablet (500 mg total) by mouth daily with breakfast.       Start Date: 5/17/2022 End Date: 5/17/2023    MOBIC 15 MG TABLET    Take 15 mg by mouth daily as needed.       Start Date: 5/3/2021  End Date: --    PANTOPRAZOLE (PROTONIX) 40 MG TABLET    TAKE 1 TABLET(40 MG) BY MOUTH EVERY DAY       Start Date: 5/28/2022 End Date: --    PEN NEEDLE, DIABETIC 31 GAUGE X 5/16" NDLE    For use with bydureon       Start Date: 2/24/2020 End Date: --    PROMETHAZINE (PHENERGAN) 25 MG TABLET           Start Date: 5/3/2021  End Date: --    ROSUVASTATIN " (CRESTOR) 20 MG TABLET    Take 1 tablet (20 mg total) by mouth once daily.       Start Date: 2/24/2020 End Date: 4/27/2022    SODIUM CHLORIDE (OCEAN NASAL) 0.65 % NASAL SPRAY    1 spray by Nasal route every 3 (three) hours as needed for Congestion.       Start Date: 5/31/2021 End Date: --    TOPIRAMATE (TOPAMAX) 100 MG TABLET    Take 1 tablet (100 mg total) by mouth once daily.       Start Date: 1/5/2022  End Date: 1/5/2023    ZOLPIDEM (AMBIEN) 10 MG TAB    TAKE 1 TABLET BY MOUTH DAILY AS NEEDED FOR INSOMNIA. TO TAKE THE LOWEST DOSE POSSIBLE IDEALLY.       Start Date: 9/13/2022 End Date: --

## 2022-09-21 NOTE — PROGRESS NOTES
Subjective:       Patient ID: Monalisa Valverdeite is a 57 y.o. female.    Chief Complaint: No chief complaint on file.    The patient location is: [place]   The chief complaint leading to consultation is: cough    Visit type: audiovisual    Face to Face time with patient: 10 min  10 minutes of total time spent on the encounter, which includes face to face time and non-face to face time preparing to see the patient (eg, review of tests), Obtaining and/or reviewing separately obtained history, Documenting clinical information in the electronic or other health record, Independently interpreting results (not separately reported) and communicating results to the patient/family/caregiver, or Care coordination (not separately reported).         Each patient to whom he or she provides medical services by telemedicine is:  (1) informed of the relationship between the physician and patient and the respective role of any other health care provider with respect to management of the patient; and (2) notified that he or she may decline to receive medical services by telemedicine and may withdraw from such care at any time.    Notes:       Cough  This is a new problem. The current episode started in the past 7 days (4 days). The problem has been gradually worsening. The problem occurs every few minutes. The cough is Productive of sputum. Associated symptoms include headaches, nasal congestion, rhinorrhea, a sore throat and wheezing. Pertinent negatives include no chest pain, ear congestion, ear pain, fever, heartburn, hemoptysis, myalgias, postnasal drip, rash, shortness of breath, sweats or weight loss. The symptoms are aggravated by nothing and lying down. Nothing and lying down aggravates the symptoms. She has tried OTC cough suppressant and a beta-agonist inhaler (mucinex, alkaselzter, tylenol) for the symptoms. The treatment provided moderate relief. Her past medical history is significant for bronchitis.     Took covid  test today- negative    Review of Systems   Constitutional:  Negative for fever and weight loss.   HENT:  Positive for rhinorrhea and sore throat. Negative for ear pain and postnasal drip.    Respiratory:  Positive for cough and wheezing. Negative for hemoptysis and shortness of breath.    Cardiovascular:  Negative for chest pain.   Gastrointestinal:  Negative for heartburn.   Musculoskeletal:  Negative for myalgias.   Integumentary:  Negative for rash.   Neurological:  Positive for headaches.       Objective:      Physical Exam  Constitutional:       Appearance: Normal appearance.   Pulmonary:      Effort: Pulmonary effort is normal. No respiratory distress.   Neurological:      Mental Status: She is alert.       Assessment:       Problem List Items Addressed This Visit    None  Visit Diagnoses       Upper respiratory tract infection, unspecified type    -  Primary    Relevant Medications    benzonatate (TESSALON) 200 MG capsule    promethazine-dextromethorphan (PROMETHAZINE-DM) 6.25-15 mg/5 mL Syrp            Plan:         Diagnoses and all orders for this visit:    Upper respiratory tract infection, unspecified type  -     benzonatate (TESSALON) 200 MG capsule; Take 1 capsule (200 mg total) by mouth 3 (three) times daily as needed.  -     promethazine-dextromethorphan (PROMETHAZINE-DM) 6.25-15 mg/5 mL Syrp; Take 5 mLs by mouth every 4 (four) hours as needed.  -     fluids, rest, mucinex as needed, salt water gargles

## 2022-09-22 DIAGNOSIS — E11.9 TYPE 2 DIABETES MELLITUS WITHOUT COMPLICATION, UNSPECIFIED WHETHER LONG TERM INSULIN USE: ICD-10-CM

## 2022-09-26 ENCOUNTER — PATIENT MESSAGE (OUTPATIENT)
Dept: ADMINISTRATIVE | Facility: HOSPITAL | Age: 57
End: 2022-09-26
Payer: COMMERCIAL

## 2022-09-29 ENCOUNTER — OFFICE VISIT (OUTPATIENT)
Dept: FAMILY MEDICINE | Facility: CLINIC | Age: 57
End: 2022-09-29
Payer: COMMERCIAL

## 2022-09-29 VITALS
OXYGEN SATURATION: 96 % | HEART RATE: 83 BPM | BODY MASS INDEX: 42.55 KG/M2 | DIASTOLIC BLOOD PRESSURE: 70 MMHG | SYSTOLIC BLOOD PRESSURE: 124 MMHG | RESPIRATION RATE: 16 BRPM | TEMPERATURE: 99 F | WEIGHT: 231.25 LBS | HEIGHT: 62 IN

## 2022-09-29 DIAGNOSIS — E11.22 CONTROLLED TYPE 2 DIABETES MELLITUS WITH STAGE 2 CHRONIC KIDNEY DISEASE, WITHOUT LONG-TERM CURRENT USE OF INSULIN: ICD-10-CM

## 2022-09-29 DIAGNOSIS — G89.29 CHRONIC LEFT SHOULDER PAIN: ICD-10-CM

## 2022-09-29 DIAGNOSIS — Z23 NEED FOR INFLUENZA VACCINATION: ICD-10-CM

## 2022-09-29 DIAGNOSIS — E78.2 MIXED HYPERLIPIDEMIA: ICD-10-CM

## 2022-09-29 DIAGNOSIS — N18.2 CONTROLLED TYPE 2 DIABETES MELLITUS WITH STAGE 2 CHRONIC KIDNEY DISEASE, WITHOUT LONG-TERM CURRENT USE OF INSULIN: ICD-10-CM

## 2022-09-29 DIAGNOSIS — Z86.16 HISTORY OF COVID-19: ICD-10-CM

## 2022-09-29 DIAGNOSIS — M25.512 CHRONIC LEFT SHOULDER PAIN: ICD-10-CM

## 2022-09-29 DIAGNOSIS — I10 ESSENTIAL HYPERTENSION: Primary | ICD-10-CM

## 2022-09-29 PROCEDURE — 1160F PR REVIEW ALL MEDS BY PRESCRIBER/CLIN PHARMACIST DOCUMENTED: ICD-10-PCS | Mod: CPTII,S$GLB,, | Performed by: INTERNAL MEDICINE

## 2022-09-29 PROCEDURE — 4010F ACE/ARB THERAPY RXD/TAKEN: CPT | Mod: CPTII,S$GLB,, | Performed by: INTERNAL MEDICINE

## 2022-09-29 PROCEDURE — 3051F PR MOST RECENT HEMOGLOBIN A1C LEVEL 7.0 - < 8.0%: ICD-10-PCS | Mod: CPTII,S$GLB,, | Performed by: INTERNAL MEDICINE

## 2022-09-29 PROCEDURE — 1159F MED LIST DOCD IN RCRD: CPT | Mod: CPTII,S$GLB,, | Performed by: INTERNAL MEDICINE

## 2022-09-29 PROCEDURE — 3051F HG A1C>EQUAL 7.0%<8.0%: CPT | Mod: CPTII,S$GLB,, | Performed by: INTERNAL MEDICINE

## 2022-09-29 PROCEDURE — 99214 PR OFFICE/OUTPT VISIT, EST, LEVL IV, 30-39 MIN: ICD-10-PCS | Mod: 25,S$GLB,, | Performed by: INTERNAL MEDICINE

## 2022-09-29 PROCEDURE — 99214 OFFICE O/P EST MOD 30 MIN: CPT | Mod: 25,S$GLB,, | Performed by: INTERNAL MEDICINE

## 2022-09-29 PROCEDURE — 90686 FLU VACCINE (QUAD) GREATER THAN OR EQUAL TO 3YO PRESERVATIVE FREE IM: ICD-10-PCS | Mod: S$GLB,,, | Performed by: INTERNAL MEDICINE

## 2022-09-29 PROCEDURE — 99999 PR PBB SHADOW E&M-EST. PATIENT-LVL V: ICD-10-PCS | Mod: PBBFAC,,, | Performed by: INTERNAL MEDICINE

## 2022-09-29 PROCEDURE — 1160F RVW MEDS BY RX/DR IN RCRD: CPT | Mod: CPTII,S$GLB,, | Performed by: INTERNAL MEDICINE

## 2022-09-29 PROCEDURE — 3078F PR MOST RECENT DIASTOLIC BLOOD PRESSURE < 80 MM HG: ICD-10-PCS | Mod: CPTII,S$GLB,, | Performed by: INTERNAL MEDICINE

## 2022-09-29 PROCEDURE — 3078F DIAST BP <80 MM HG: CPT | Mod: CPTII,S$GLB,, | Performed by: INTERNAL MEDICINE

## 2022-09-29 PROCEDURE — 3008F BODY MASS INDEX DOCD: CPT | Mod: CPTII,S$GLB,, | Performed by: INTERNAL MEDICINE

## 2022-09-29 PROCEDURE — 3008F PR BODY MASS INDEX (BMI) DOCUMENTED: ICD-10-PCS | Mod: CPTII,S$GLB,, | Performed by: INTERNAL MEDICINE

## 2022-09-29 PROCEDURE — 90471 IMMUNIZATION ADMIN: CPT | Mod: S$GLB,,, | Performed by: INTERNAL MEDICINE

## 2022-09-29 PROCEDURE — 90471 FLU VACCINE (QUAD) GREATER THAN OR EQUAL TO 3YO PRESERVATIVE FREE IM: ICD-10-PCS | Mod: S$GLB,,, | Performed by: INTERNAL MEDICINE

## 2022-09-29 PROCEDURE — 3074F PR MOST RECENT SYSTOLIC BLOOD PRESSURE < 130 MM HG: ICD-10-PCS | Mod: CPTII,S$GLB,, | Performed by: INTERNAL MEDICINE

## 2022-09-29 PROCEDURE — 99999 PR PBB SHADOW E&M-EST. PATIENT-LVL V: CPT | Mod: PBBFAC,,, | Performed by: INTERNAL MEDICINE

## 2022-09-29 PROCEDURE — 3074F SYST BP LT 130 MM HG: CPT | Mod: CPTII,S$GLB,, | Performed by: INTERNAL MEDICINE

## 2022-09-29 PROCEDURE — 1159F PR MEDICATION LIST DOCUMENTED IN MEDICAL RECORD: ICD-10-PCS | Mod: CPTII,S$GLB,, | Performed by: INTERNAL MEDICINE

## 2022-09-29 PROCEDURE — 90686 IIV4 VACC NO PRSV 0.5 ML IM: CPT | Mod: S$GLB,,, | Performed by: INTERNAL MEDICINE

## 2022-09-29 PROCEDURE — 4010F PR ACE/ARB THEARPY RXD/TAKEN: ICD-10-PCS | Mod: CPTII,S$GLB,, | Performed by: INTERNAL MEDICINE

## 2022-09-29 RX ORDER — MELOXICAM 15 MG
15 TABLET ORAL DAILY PRN
Qty: 30 TABLET | Refills: 0 | Status: SHIPPED | OUTPATIENT
Start: 2022-09-29

## 2022-09-29 RX ORDER — ESCITALOPRAM OXALATE 10 MG/1
10 TABLET ORAL DAILY
COMMUNITY
Start: 2022-08-07 | End: 2022-10-31

## 2022-09-29 NOTE — PROGRESS NOTES
I hereby acknowledge that I am relying upon documentation authored by a medical student working under my supervision and further I hereby attest that I have verified the student documentation or findings by personally re-performing the physical exam and medical decision making activities of the Evaluation and Management service to be billed.    Flor Gomez    Chief Complaint  Chief Complaint   Patient presents with    Follow-up    Numbness     Left arm and tip of pt fingers       HPI    Monalisa Carson is a 57 y.o. female with multiple medical diagnoses as listed in the medical history and problem list that presents for 3 month follow up.  her last appointment with primary care was on 9/21/2022.      Patient reports L shoulder pain for the past 2 months that radiates down her arm. She also reports numbness, tingling, and pins and needles sensation in her L thumb and L index finger for the past 3 weeks. Patient states pain and numbness/tingling are constant throughout the day and no position provides relief. She cannot sleep or lie on her L side. She has been using a heating pad on L shoulder all day with no relief. She said her rx ibuprofen provides some relief but she ran out of these.  She reports receiving steroid shot in L shoulder in past with Ortho Dr. Huang and this provided great relief. She has had shoulder sx on R shoulder but denies complaints on this side.     Patient also complains of URI for which she was seen for on 9/21/22 by primary care. She was started on Tessalon and promethazine but she states she still has nagging productive cough and trouble sleeping due to it. However mild.    Patient states she has been checking her BP at home and it is stable with her current medication regime. No complaints.     Patient reports she has been trying to improve her diet and exercise by eliminating soda, eating more green vegetables, and walking. She is still struggling to lose weight but is motivated  due to her sons' upcoming weddings. She was placed on [facility] waitlist by bariatric medicine office.     Patient reports taking metformin regularly and no more issues with insurance coverage.     Patient has not had any recent seizures.     Patient states anxiety is managed well with Lexapro.       PAST MEDICAL HISTORY:  Past Medical History:   Diagnosis Date    Allergy     Anxiety     Diabetes mellitus     Heart murmur     Hypertension     Seizures        PAST SURGICAL HISTORY:  Past Surgical History:   Procedure Laterality Date    CHOLECYSTECTOMY      2001 april    COLONOSCOPY N/A 6/29/2021    Procedure: COLONOSCOPY;  Surgeon: Gustavo Pelletier MD;  Location: [facility] ENDO;  Service: Endoscopy;  Laterality: N/A;  combined orders    ESOPHAGOGASTRODUODENOSCOPY N/A 6/29/2021    Procedure: ESOPHAGOGASTRODUODENOSCOPY (EGD);  Surgeon: Gustavo Pelletier MD;  Location: [facility] ENDO;  Service: Endoscopy;  Laterality: N/A;  covdi +3/29/20, fully vaccinated 3/23/21, prep instr portal -ml    HYSTERECTOMY      partial  1996    OOPHORECTOMY      SHOULDER SURGERY Right     WISDOM TOOTH EXTRACTION         SOCIAL HISTORY:  Social History     Socioeconomic History    Marital status:    Occupational History    Occupation:      Employer: [employer]   Tobacco Use    Smoking status: Former     Packs/day: 0.00     Years: 0.50     Pack years: 0.00     Types: Cigarettes    Smokeless tobacco: Never   Substance and Sexual Activity    Alcohol use: Yes     Alcohol/week: 0.0 standard drinks     Comment: seldom    Drug use: No    Sexual activity: Yes     Partners: Male     Birth control/protection: None   Other Topics Concern    Are you pregnant or think you may be? No    Breast-feeding No       FAMILY HISTORY:  Family History   Problem Relation Age of Onset    Diabetes Mother     Hyperlipidemia Mother     Hypertension Mother     Cataracts Mother     Diabetes Father     Hypertension Father     Stroke Father     Depression  Sister     Hypertension Sister     Stroke Sister     Cancer Maternal Aunt         breast x 2    Breast cancer Maternal Aunt     Cancer Maternal Uncle         prostate x 2    Cancer Paternal Aunt         breast    Breast cancer Paternal Aunt     Cancer Maternal Grandfather         lung    Early death Paternal Grandmother     Early death Paternal Grandfather     Esophageal cancer Paternal Grandfather     No Known Problems Brother     No Known Problems Paternal Uncle     No Known Problems Maternal Grandmother     Melanoma Neg Hx     Eczema Neg Hx     Lupus Neg Hx     Psoriasis Neg Hx     Amblyopia Neg Hx     Blindness Neg Hx     Glaucoma Neg Hx     Macular degeneration Neg Hx     Retinal detachment Neg Hx     Strabismus Neg Hx     Thyroid disease Neg Hx     Colon cancer Neg Hx        ALLERGIES AND MEDICATIONS: updated and reviewed.  Review of patient's allergies indicates:   Allergen Reactions    Keflex [cephalexin] Hives    Vicodin [hydrocodone-acetaminophen] Itching     itch    Crestor [rosuvastatin] Other (See Comments)     Muscle pain     Current Outpatient Medications   Medication Sig Dispense Refill    acetaminophen (TYLENOL) 500 MG tablet Take 2 tablets (1,000 mg total) by mouth every 6 (six) hours as needed for Pain. 30 tablet 0    albuterol (PROVENTIL/VENTOLIN HFA) 90 mcg/actuation inhaler Inhale 1-2 puffs into the lungs every 4 (four) hours as needed for Wheezing. Rescue 18 g 11    benazepriL (LOTENSIN) 40 MG tablet TAKE 1 TABLET(40 MG) BY MOUTH EVERY DAY 90 tablet 0    benzonatate (TESSALON) 200 MG capsule Take 1 capsule (200 mg total) by mouth 3 (three) times daily as needed. 30 capsule 0    dicyclomine (BENTYL) 20 mg tablet Take 20 mg by mouth 4 (four) times daily.      EScitalopram oxalate (LEXAPRO) 10 MG tablet Take 10 mg by mouth once daily.      exenatide microspheres (BYDUREON BCISE) 2 mg/0.85 mL AtIn INJECT 2 MG( 0.85 ML) UNDER THE SKIN EVERY 7 DAYS 10.2 pen 0    fluticasone propionate (FLONASE) 50  "mcg/actuation nasal spray 1 spray (50 mcg total) by Each Nostril route 2 (two) times daily. 16 g 0    hydroCHLOROthiazide (HYDRODIURIL) 12.5 MG Tab Take 1 tablet (12.5 mg total) by mouth once daily. 90 tablet 1    hydrocortisone 2.5 % cream WOL[place]ANG EXT AA BID 28 g 0    levETIRAcetam (KEPPRA) 1000 MG tablet Take 1 tablet (1,000 mg total) by mouth 2 (two) times daily. 180 tablet 3    LIDOcaine (LIDODERM) 5 % Place 1 patch onto the skin once daily. Remove & Discard patch within 12 hours or as directed by MD 15 patch 0    lidocaine (LMX) 4 % cream Apply topically as needed.  0    metFORMIN (GLUCOPHAGE-XR) 500 MG ER 24hr tablet Take 1 tablet (500 mg total) by mouth daily with breakfast. 90 tablet 3    pantoprazole (PROTONIX) 40 MG tablet TAKE 1 TABLET(40 MG) BY MOUTH EVERY DAY 30 tablet 11    pen needle, diabetic 31 gauge x 5/16" Ndle For use with bydureon 100 each 0    promethazine (PHENERGAN) 25 MG tablet       promethazine-dextromethorphan (PROMETHAZINE-DM) 6.25-15 mg/5 mL Syrp Take 5 mLs by mouth every 4 (four) hours as needed. 180 mL 0    sodium chloride (OCEAN NASAL) 0.65 % nasal spray 1 spray by Nasal route every 3 (three) hours as needed for Congestion. 1 Bottle 12    topiramate (TOPAMAX) 100 MG tablet Take 1 tablet (100 mg total) by mouth once daily. 30 tablet 11    zolpidem (AMBIEN) 10 mg Tab TAKE 1 TABLET BY MOUTH DAILY AS NEEDED FOR INSOMNIA. TO TAKE THE LOWEST DOSE POSSIBLE IDEALLY. 30 tablet 0    cyclobenzaprine (FLEXERIL) 10 MG tablet Take 1 tablet (10 mg total) by mouth 3 (three) times daily as needed for Muscle spasms. This medication will make you drowsy. (Patient not taking: No sig reported) 20 tablet 0    fluticasone propionate (FLONASE) 50 mcg/actuation nasal spray 2 sprays (100 mcg total) by Each Nostril route once daily. (Patient not taking: No sig reported) 15 g 0    MOBIC 15 mg tablet Take 1 tablet (15 mg total) by mouth daily as needed for Pain. Take on full stomach, no other NSAIDs. Not more than " "once a day 30 tablet 0    rosuvastatin (CRESTOR) 20 MG tablet Take 1 tablet (20 mg total) by mouth once daily. (Patient not taking: Reported on 9/29/2022) 90 tablet 1     No current facility-administered medications for this visit.         ROS  Review of Systems   Constitutional: Negative.    HENT: Negative.     Eyes: Negative.    Respiratory:  Positive for cough.    Cardiovascular: Negative.    Gastrointestinal: Negative.    Endocrine: Negative.    Genitourinary: Negative.    Musculoskeletal:  Positive for arthralgias and myalgias.   Skin: Negative.    Allergic/Immunologic: Negative.    Neurological:  Positive for weakness and numbness.        L arm numbness, tingling, pins and needles. L arm and hand feel weaker.    Hematological: Negative.    Psychiatric/Behavioral:  The patient is nervous/anxious.          Physical Exam  Vitals:    09/29/22 0824   BP: 124/70   BP Location: Right arm   Patient Position: Sitting   BP Method: Large (Manual)   Pulse: 83   Resp: 16   Temp: 99.2 °F (37.3 °C)   TempSrc: Oral   SpO2: 96%   Weight: 104.9 kg (231 lb 4.2 oz)   Height: 5' 2" (1.575 m)    Body mass index is 42.3 kg/m².  Weight: 104.9 kg (231 lb 4.2 oz)   Height: 5' 2" (157.5 cm)   Physical Exam  Constitutional:       Appearance: Normal appearance. She is obese.   HENT:      Head: Normocephalic and atraumatic.      Mouth/Throat:      Mouth: Mucous membranes are moist.      Pharynx: Oropharynx is clear.   Eyes:      Extraocular Movements: Extraocular movements intact.      Conjunctiva/sclera: Conjunctivae normal.      Pupils: Pupils are equal, round, and reactive to light.   Cardiovascular:      Rate and Rhythm: Normal rate and regular rhythm.      Heart sounds: Murmur heard.   Pulmonary:      Effort: Pulmonary effort is normal.      Breath sounds: Normal breath sounds.   Abdominal:      General: Abdomen is flat.      Palpations: Abdomen is soft.   Musculoskeletal:      Comments: Decreased ROM in L shoulder. Able to actively " flex L shoulder to <90 degrees and extend <45 degrees. Able to passively flex L shoulder to 90 degrees and extend <45 degrees. Some TTP. Strength 5/5 in L shoulder, arm, and fingers with mild weakness. No obvious deformity or injury. No skin changes.    Skin:     General: Skin is warm and dry.      Capillary Refill: Capillary refill takes less than 2 seconds.   Neurological:      Mental Status: She is alert.      Comments: Pins and needles sensation of L thumb and index finger with palpation. Mild weakness in  strength.    Psychiatric:         Mood and Affect: Mood normal.         Behavior: Behavior normal.     Protective Sensation (w/ 10 gram monofilament):  Right: Intact  Left: Intact    Visual Inspection:  Mild callosities side of big toes.    Pedal Pulses:   Right: Present  Left: Present    Posterior tibialis:   Right:Present  Left: Present     Health Maintenance         Date Due Completion Date    HIV Screening Never done ---    Foot Exam 11/30/2021 11/30/2020    Override on 12/10/2015: Done (12/10/15 pt will get foot exam done today)    Override on 8/27/2013: Done    Diabetes Urine Screening 05/28/2022 5/28/2021    COVID-19 Vaccine (5 - Booster for Pfizer series) 08/18/2022 6/23/2022    Influenza Vaccine (1) 09/01/2022 2/17/2022    Override on 4/3/2017: Declined    Override on 2/3/2016: Declined    Override on 10/31/2014: Done (4/22/15 pt stated had vacc done in 10/2014 at [facility])    Eye Exam 09/14/2022 9/14/2021    Override on 7/1/2016: Done (eye care [phone])    Override on 5/26/2015: Done (5/16/15 pt stated had eye exam about 3 weeks ago 5/2015 at [facility])    Override on 5/7/2014: Done    Hemoglobin A1c 11/13/2022 5/13/2022    Lipid Panel 05/13/2023 5/13/2022    Mammogram 06/03/2023 6/3/2022    Override on 11/3/2014: Done    TETANUS VACCINE 02/20/2029 2/20/2019    Pneumococcal Vaccines (Age 0-64) (3 - PPSV23 or PCV20) 03/02/2030 11/22/2019    Colorectal Cancer Screening 06/29/2031 6/29/2021             Assessment and Plan:    Monalisa was seen today for follow-up and numbness.    Diagnoses and all orders for this visit:    Essential hypertension  Patient taking HCTZ and benazepril as directed. No issues. Monitoring BP at home.     Controlled type 2 diabetes mellitus with stage 2 chronic kidney disease, without long-term current use of insulin  Patient no longer having issues with metformin coverage.   Completed annual diabetic foot exam and urine screen today. Referred to optometry for annual eye exam.     BMI 40.0-44.9, adult  Patient reduced soda intake, increased vegetable intake, walking more.   Placed on [facility] waitlist with Bariatric Medicine.     Mixed hyperlipidemia  Patient had side effects with previous use of Crestor.     History of COVID-19  No chronic issues.     Chronic left shoulder pain  Patient has 2 months of L shoulder with no relief with conservative management. Previously had steroid shot which resolved symptoms.  Placed referral with Orthopedics.     Need for influenza vaccination  -     Influenza - Quadrivalent *Preferred* (6 months+) (PF) administered today.

## 2022-09-29 NOTE — PROGRESS NOTES
Health Maintenance Due   Topic     HIV Screening      Foot Exam      Diabetes Urine Screening      COVID-19 Vaccine (5 - Booster for Pfizer series)     Influenza Vaccine (1)     Eye Exam

## 2022-09-30 DIAGNOSIS — G40.209 PARTIAL SYMPTOMATIC EPILEPSY WITH COMPLEX PARTIAL SEIZURES, NOT INTRACTABLE, WITHOUT STATUS EPILEPTICUS: ICD-10-CM

## 2022-09-30 NOTE — TELEPHONE ENCOUNTER
Spoke to pharmacist, you sent in brand name mobic so it's not covered, do you want to send in generic instead? Pt also requesting refill of keppra

## 2022-09-30 NOTE — TELEPHONE ENCOUNTER
----- Message from Nahomy Felton sent at 9/30/2022  2:52 PM CDT -----  .Type: Patient Call Back    Who called: self   What is the request in detail: states the rx muscle relaxer called in yesterday is too expensive $500 ins doesn't cover - is requesting a diff rx called in please also needs refill on levETIRAcetam (KEPPRA) 1000 MG tablet    levETIRAcetam (KEPPRA) 1000 MG tablet    Can the clinic reply by [facility]?    Would the patient rather a call back or a response via [facility]? Call     Best call back number: .[phone]    [id number] - [place] - [address]  [address] [address]  Phone: [phone] Fax: [phone]

## 2022-10-01 RX ORDER — LEVETIRACETAM 1000 MG/1
1000 TABLET ORAL 2 TIMES DAILY
Qty: 180 TABLET | Refills: 3 | Status: SHIPPED | OUTPATIENT
Start: 2022-10-01 | End: 2023-12-11

## 2022-10-10 ENCOUNTER — PATIENT MESSAGE (OUTPATIENT)
Dept: ADMINISTRATIVE | Facility: HOSPITAL | Age: 57
End: 2022-10-10
Payer: COMMERCIAL

## 2022-10-10 DIAGNOSIS — G47.00 INSOMNIA, UNSPECIFIED TYPE: ICD-10-CM

## 2022-10-11 RX ORDER — ZOLPIDEM TARTRATE 10 MG/1
TABLET ORAL
Qty: 20 TABLET | Refills: 0 | Status: SHIPPED | OUTPATIENT
Start: 2022-10-11 | End: 2022-11-14

## 2022-10-12 NOTE — TELEPHONE ENCOUNTER
LOV 9/29/2022  Last refill 9/13/2022    
No new care gaps identified.  [facility] Embedded Care Gaps. Reference number: [id number]. 10/10/2022   8:57:43 PM CDT  
Pt informed refill sent for 20 pills; she states she has been tapering the medication  
Yes

## 2022-10-26 ENCOUNTER — TELEPHONE (OUTPATIENT)
Dept: ORTHOPEDICS | Facility: CLINIC | Age: 57
End: 2022-10-26

## 2023-01-09 ENCOUNTER — PATIENT MESSAGE (OUTPATIENT)
Dept: ADMINISTRATIVE | Facility: HOSPITAL | Age: 58
End: 2023-01-09
Payer: COMMERCIAL

## 2023-01-18 ENCOUNTER — OFFICE VISIT (OUTPATIENT)
Dept: OPTOMETRY | Facility: CLINIC | Age: 58
End: 2023-01-18
Payer: COMMERCIAL

## 2023-01-18 DIAGNOSIS — Z46.0 ENCOUNTER FOR FITTING OR ADJUSTMENT OF SPECTACLES OR CONTACT LENSES: Primary | ICD-10-CM

## 2023-01-18 DIAGNOSIS — E11.9 CONTROLLED TYPE 2 DIABETES MELLITUS WITHOUT COMPLICATION, WITHOUT LONG-TERM CURRENT USE OF INSULIN: Primary | ICD-10-CM

## 2023-01-18 DIAGNOSIS — H52.7 REFRACTIVE ERROR: ICD-10-CM

## 2023-01-18 DIAGNOSIS — Z97.3 WEARS CONTACT LENSES: ICD-10-CM

## 2023-01-18 PROCEDURE — 92310 PR CONTACT LENS FITTING (NO CHANGE): ICD-10-PCS | Mod: CSM,,, | Performed by: OPTOMETRIST

## 2023-01-18 PROCEDURE — 1159F MED LIST DOCD IN RCRD: CPT | Mod: CPTII,S$GLB,, | Performed by: OPTOMETRIST

## 2023-01-18 PROCEDURE — 92015 DETERMINE REFRACTIVE STATE: CPT | Mod: S$GLB,,, | Performed by: OPTOMETRIST

## 2023-01-18 PROCEDURE — 1160F RVW MEDS BY RX/DR IN RCRD: CPT | Mod: CPTII,S$GLB,, | Performed by: OPTOMETRIST

## 2023-01-18 PROCEDURE — 1159F PR MEDICATION LIST DOCUMENTED IN MEDICAL RECORD: ICD-10-PCS | Mod: CPTII,S$GLB,, | Performed by: OPTOMETRIST

## 2023-01-18 PROCEDURE — 2023F PR DILATED RETINAL EXAM W/O EVID OF RETINOPATHY: ICD-10-PCS | Mod: CPTII,S$GLB,, | Performed by: OPTOMETRIST

## 2023-01-18 PROCEDURE — 99999 PR PBB SHADOW E&M-EST. PATIENT-LVL III: CPT | Mod: PBBFAC,,, | Performed by: OPTOMETRIST

## 2023-01-18 PROCEDURE — 92310 CONTACT LENS FITTING OU: CPT | Mod: CSM,,, | Performed by: OPTOMETRIST

## 2023-01-18 PROCEDURE — 92015 PR REFRACTION: ICD-10-PCS | Mod: S$GLB,,, | Performed by: OPTOMETRIST

## 2023-01-18 PROCEDURE — 99499 NO LOS: ICD-10-PCS | Mod: S$GLB,,, | Performed by: OPTOMETRIST

## 2023-01-18 PROCEDURE — 92014 COMPRE OPH EXAM EST PT 1/>: CPT | Mod: S$GLB,,, | Performed by: OPTOMETRIST

## 2023-01-18 PROCEDURE — 99499 UNLISTED E&M SERVICE: CPT | Mod: S$GLB,,, | Performed by: OPTOMETRIST

## 2023-01-18 PROCEDURE — 2023F DILAT RTA XM W/O RTNOPTHY: CPT | Mod: CPTII,S$GLB,, | Performed by: OPTOMETRIST

## 2023-01-18 PROCEDURE — 92014 PR EYE EXAM, EST PATIENT,COMPREHESV: ICD-10-PCS | Mod: S$GLB,,, | Performed by: OPTOMETRIST

## 2023-01-18 PROCEDURE — 1160F PR REVIEW ALL MEDS BY PRESCRIBER/CLIN PHARMACIST DOCUMENTED: ICD-10-PCS | Mod: CPTII,S$GLB,, | Performed by: OPTOMETRIST

## 2023-01-18 PROCEDURE — 99999 PR PBB SHADOW E&M-EST. PATIENT-LVL III: ICD-10-PCS | Mod: PBBFAC,,, | Performed by: OPTOMETRIST

## 2023-01-18 NOTE — PROGRESS NOTES
Subjective:       Patient ID: Monalisa THOMPSON Favorite is a 57 y.o. female      Chief Complaint   Patient presents with    Concerns About Ocular Health    Diabetic Eye Exam    Contact Lens Follow Up     History of Present Illness  Dls: 9/14/21 Dr. Casey     56 y/o female presents today for diabetic eye exam.   Pt states no changes in vision.   Pt wears single vision glasses for distance and scls   Pt wants a new rx for both.     x 1 day     No tearing  + ou itching  No burning  + ou od off/on pain  No ha's  No floaters  No flashes    Eye meds  Otc gtts ou prn     Air Optix clear and colors. Replacing every 2 weeks. Does not sleep in   lenses.     Hemoglobin A1C       Date                     Value               Ref Range             Status                05/13/2022               7.0 (H)             4.0 - 5.6 %           Final                 05/28/2021               6.3 (H)             4.0 - 5.6 %           Final                  10/12/2020               6.3 (H)             4.0 - 5.6 %           Final                 Assessment/Plan:     1. Controlled type 2 diabetes mellitus without complication, without long-term current use of insulin  No diabetic retinopathy. Discussed with pt the effects of diabetes on vision, importance of good blood sugar control, compliance with meds, and follow up care with PCP. Return in 1 year for dilated eye exam, sooner PRN.  - Ambulatory referral/consult to Optometry    2. Refractive error  Educated patient on refractive error and discussed lens options. Dispensed updated spectacle Rx. Educated about adaptation period to new specs.    Eyeglass Final Rx       Eyeglass Final Rx         Sphere Cylinder Axis Add    Right -3.75 +1.25 180 +2.00    Left -3.75 +1.25 165 +2.00      Expiration Date: 1/18/2024                    3. Wears contact lenses  Contact lens Rx released to pt. Daily wear only advised, replacement monthly with education to risks of extended wear. Okay to order if happy with  comfort and VA. Discussed lens care, compliance and solutions. RTC yearly contact lens health check.     Contact Lens Final Rx       Final Contact Lens Rx         Brand Base Curve Diameter Sphere Cylinder Axis    Right Air Optix Aqua for Astigmatism 8.7 14.5 -2.25 -1.25 090    Left Air Optix Aqua for Astigmatism 8.7 14.5 -2.25 -1.25 080      Expiration Date: 1/18/2024    Replacement: Monthly    Solutions: OptiFree PureMoist    Wearing Schedule: Daily Wear              Final Contact Lens Rx #2         Brand Base Curve Diameter Sphere Cylinder Axis    Right Air Optix Colours 8.6 14.2 -3.00 Sphere     Left Air Optix Colours 8.6 14.2 -3.00 Sphere       Expiration Date: 1/18/2024    Replacement: Monthly    Solutions: OptiFree PureMoist    Wearing Schedule: Daily Wear                      Follow up in about 1 year (around 1/18/2024) for Diabetic Eye Exam, Contact Lens.

## 2023-01-24 ENCOUNTER — PATIENT MESSAGE (OUTPATIENT)
Dept: OPTOMETRY | Facility: CLINIC | Age: 58
End: 2023-01-24
Payer: COMMERCIAL

## 2023-02-28 ENCOUNTER — PATIENT OUTREACH (OUTPATIENT)
Dept: ADMINISTRATIVE | Facility: HOSPITAL | Age: 58
End: 2023-02-28
Payer: COMMERCIAL

## 2023-03-02 ENCOUNTER — LAB VISIT (OUTPATIENT)
Dept: LAB | Facility: HOSPITAL | Age: 58
End: 2023-03-02
Attending: INTERNAL MEDICINE
Payer: COMMERCIAL

## 2023-03-02 DIAGNOSIS — I10 ESSENTIAL HYPERTENSION: ICD-10-CM

## 2023-03-02 DIAGNOSIS — E11.22 CONTROLLED TYPE 2 DIABETES MELLITUS WITH STAGE 2 CHRONIC KIDNEY DISEASE, WITHOUT LONG-TERM CURRENT USE OF INSULIN: ICD-10-CM

## 2023-03-02 DIAGNOSIS — N18.2 CONTROLLED TYPE 2 DIABETES MELLITUS WITH STAGE 2 CHRONIC KIDNEY DISEASE, WITHOUT LONG-TERM CURRENT USE OF INSULIN: ICD-10-CM

## 2023-03-02 LAB
ALBUMIN SERPL BCP-MCNC: 4 G/DL (ref 3.5–5.2)
ALBUMIN/CREAT UR: 5.4 UG/MG (ref 0–30)
ALP SERPL-CCNC: 83 U/L (ref 55–135)
ALT SERPL W/O P-5'-P-CCNC: 27 U/L (ref 10–44)
ANION GAP SERPL CALC-SCNC: 11 MMOL/L (ref 8–16)
AST SERPL-CCNC: 20 U/L (ref 10–40)
BILIRUB SERPL-MCNC: 0.6 MG/DL (ref 0.1–1)
BUN SERPL-MCNC: 15 MG/DL (ref 6–20)
CALCIUM SERPL-MCNC: 10.5 MG/DL (ref 8.7–10.5)
CHLORIDE SERPL-SCNC: 103 MMOL/L (ref 95–110)
CO2 SERPL-SCNC: 27 MMOL/L (ref 23–29)
CREAT SERPL-MCNC: 0.8 MG/DL (ref 0.5–1.4)
CREAT UR-MCNC: 315 MG/DL (ref 15–325)
EST. GFR  (NO RACE VARIABLE): >60 ML/MIN/1.73 M^2
GLUCOSE SERPL-MCNC: 98 MG/DL (ref 70–110)
MICROALBUMIN UR DL<=1MG/L-MCNC: 17 UG/ML
POTASSIUM SERPL-SCNC: 4.4 MMOL/L (ref 3.5–5.1)
PROT SERPL-MCNC: 8.7 G/DL (ref 6–8.4)
SODIUM SERPL-SCNC: 141 MMOL/L (ref 136–145)

## 2023-03-02 PROCEDURE — 83036 HEMOGLOBIN GLYCOSYLATED A1C: CPT | Performed by: INTERNAL MEDICINE

## 2023-03-02 PROCEDURE — 80053 COMPREHEN METABOLIC PANEL: CPT | Performed by: INTERNAL MEDICINE

## 2023-03-02 PROCEDURE — 36415 COLL VENOUS BLD VENIPUNCTURE: CPT | Mod: PO | Performed by: INTERNAL MEDICINE

## 2023-03-02 PROCEDURE — 82570 ASSAY OF URINE CREATININE: CPT | Performed by: INTERNAL MEDICINE

## 2023-03-03 LAB
ESTIMATED AVG GLUCOSE: 143 MG/DL (ref 68–131)
HBA1C MFR BLD: 6.6 % (ref 4–5.6)

## 2023-03-06 ENCOUNTER — OFFICE VISIT (OUTPATIENT)
Dept: FAMILY MEDICINE | Facility: CLINIC | Age: 58
End: 2023-03-06
Payer: COMMERCIAL

## 2023-03-06 VITALS
TEMPERATURE: 98 F | RESPIRATION RATE: 16 BRPM | HEART RATE: 72 BPM | SYSTOLIC BLOOD PRESSURE: 124 MMHG | OXYGEN SATURATION: 96 % | WEIGHT: 215.38 LBS | DIASTOLIC BLOOD PRESSURE: 78 MMHG | BODY MASS INDEX: 39.63 KG/M2 | HEIGHT: 62 IN

## 2023-03-06 DIAGNOSIS — N18.2 CONTROLLED TYPE 2 DIABETES MELLITUS WITH STAGE 2 CHRONIC KIDNEY DISEASE, WITHOUT LONG-TERM CURRENT USE OF INSULIN: ICD-10-CM

## 2023-03-06 DIAGNOSIS — I10 ESSENTIAL HYPERTENSION: Primary | ICD-10-CM

## 2023-03-06 DIAGNOSIS — E78.2 MIXED HYPERLIPIDEMIA: ICD-10-CM

## 2023-03-06 DIAGNOSIS — F41.9 ANXIETY: ICD-10-CM

## 2023-03-06 DIAGNOSIS — E11.22 CONTROLLED TYPE 2 DIABETES MELLITUS WITH STAGE 2 CHRONIC KIDNEY DISEASE, WITHOUT LONG-TERM CURRENT USE OF INSULIN: ICD-10-CM

## 2023-03-06 PROCEDURE — 4010F PR ACE/ARB THEARPY RXD/TAKEN: ICD-10-PCS | Mod: CPTII,S$GLB,, | Performed by: INTERNAL MEDICINE

## 2023-03-06 PROCEDURE — 4010F ACE/ARB THERAPY RXD/TAKEN: CPT | Mod: CPTII,S$GLB,, | Performed by: INTERNAL MEDICINE

## 2023-03-06 PROCEDURE — 3061F NEG MICROALBUMINURIA REV: CPT | Mod: CPTII,S$GLB,, | Performed by: INTERNAL MEDICINE

## 2023-03-06 PROCEDURE — 3074F SYST BP LT 130 MM HG: CPT | Mod: CPTII,S$GLB,, | Performed by: INTERNAL MEDICINE

## 2023-03-06 PROCEDURE — 1159F PR MEDICATION LIST DOCUMENTED IN MEDICAL RECORD: ICD-10-PCS | Mod: CPTII,S$GLB,, | Performed by: INTERNAL MEDICINE

## 2023-03-06 PROCEDURE — 99999 PR PBB SHADOW E&M-EST. PATIENT-LVL V: CPT | Mod: PBBFAC,,, | Performed by: INTERNAL MEDICINE

## 2023-03-06 PROCEDURE — 99214 PR OFFICE/OUTPT VISIT, EST, LEVL IV, 30-39 MIN: ICD-10-PCS | Mod: S$GLB,,, | Performed by: INTERNAL MEDICINE

## 2023-03-06 PROCEDURE — 3061F PR NEG MICROALBUMINURIA RESULT DOCUMENTED/REVIEW: ICD-10-PCS | Mod: CPTII,S$GLB,, | Performed by: INTERNAL MEDICINE

## 2023-03-06 PROCEDURE — 3008F BODY MASS INDEX DOCD: CPT | Mod: CPTII,S$GLB,, | Performed by: INTERNAL MEDICINE

## 2023-03-06 PROCEDURE — 99214 OFFICE O/P EST MOD 30 MIN: CPT | Mod: S$GLB,,, | Performed by: INTERNAL MEDICINE

## 2023-03-06 PROCEDURE — 3074F PR MOST RECENT SYSTOLIC BLOOD PRESSURE < 130 MM HG: ICD-10-PCS | Mod: CPTII,S$GLB,, | Performed by: INTERNAL MEDICINE

## 2023-03-06 PROCEDURE — 3066F PR DOCUMENTATION OF TREATMENT FOR NEPHROPATHY: ICD-10-PCS | Mod: CPTII,S$GLB,, | Performed by: INTERNAL MEDICINE

## 2023-03-06 PROCEDURE — 3044F PR MOST RECENT HEMOGLOBIN A1C LEVEL <7.0%: ICD-10-PCS | Mod: CPTII,S$GLB,, | Performed by: INTERNAL MEDICINE

## 2023-03-06 PROCEDURE — 3008F PR BODY MASS INDEX (BMI) DOCUMENTED: ICD-10-PCS | Mod: CPTII,S$GLB,, | Performed by: INTERNAL MEDICINE

## 2023-03-06 PROCEDURE — 3044F HG A1C LEVEL LT 7.0%: CPT | Mod: CPTII,S$GLB,, | Performed by: INTERNAL MEDICINE

## 2023-03-06 PROCEDURE — 1160F PR REVIEW ALL MEDS BY PRESCRIBER/CLIN PHARMACIST DOCUMENTED: ICD-10-PCS | Mod: CPTII,S$GLB,, | Performed by: INTERNAL MEDICINE

## 2023-03-06 PROCEDURE — 1160F RVW MEDS BY RX/DR IN RCRD: CPT | Mod: CPTII,S$GLB,, | Performed by: INTERNAL MEDICINE

## 2023-03-06 PROCEDURE — 3066F NEPHROPATHY DOC TX: CPT | Mod: CPTII,S$GLB,, | Performed by: INTERNAL MEDICINE

## 2023-03-06 PROCEDURE — 3078F PR MOST RECENT DIASTOLIC BLOOD PRESSURE < 80 MM HG: ICD-10-PCS | Mod: CPTII,S$GLB,, | Performed by: INTERNAL MEDICINE

## 2023-03-06 PROCEDURE — 3078F DIAST BP <80 MM HG: CPT | Mod: CPTII,S$GLB,, | Performed by: INTERNAL MEDICINE

## 2023-03-06 PROCEDURE — 1159F MED LIST DOCD IN RCRD: CPT | Mod: CPTII,S$GLB,, | Performed by: INTERNAL MEDICINE

## 2023-03-06 PROCEDURE — 99999 PR PBB SHADOW E&M-EST. PATIENT-LVL V: ICD-10-PCS | Mod: PBBFAC,,, | Performed by: INTERNAL MEDICINE

## 2023-03-06 RX ORDER — PROMETHAZINE HYDROCHLORIDE AND DEXTROMETHORPHAN HYDROBROMIDE 6.25; 15 MG/5ML; MG/5ML
SYRUP ORAL
COMMUNITY
Start: 2023-01-25 | End: 2024-04-02

## 2023-03-06 RX ORDER — PROMETHAZINE HYDROCHLORIDE AND DEXTROMETHORPHAN HYDROBROMIDE 6.25; 15 MG/5ML; MG/5ML
SYRUP ORAL
Status: CANCELLED | OUTPATIENT
Start: 2023-03-06

## 2023-03-06 RX ORDER — ESCITALOPRAM OXALATE 10 MG/1
10 TABLET ORAL DAILY
Qty: 90 TABLET | Refills: 1 | Status: SHIPPED | OUTPATIENT
Start: 2023-03-06

## 2023-03-06 RX ORDER — GABAPENTIN 100 MG/1
100 CAPSULE ORAL 3 TIMES DAILY
COMMUNITY
Start: 2023-01-25

## 2023-03-06 RX ORDER — PROMETHAZINE HYDROCHLORIDE 25 MG/1
TABLET ORAL
Status: CANCELLED | OUTPATIENT
Start: 2023-03-06

## 2023-03-06 RX ORDER — BUSPIRONE HYDROCHLORIDE 10 MG/1
10 TABLET ORAL 3 TIMES DAILY PRN
Qty: 90 TABLET | Refills: 2 | Status: SHIPPED | OUTPATIENT
Start: 2023-03-06 | End: 2024-03-05
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
## 2023-04-28 DIAGNOSIS — N18.2 CONTROLLED TYPE 2 DIABETES MELLITUS WITH STAGE 2 CHRONIC KIDNEY DISEASE, WITHOUT LONG-TERM CURRENT USE OF INSULIN: ICD-10-CM

## 2023-04-28 DIAGNOSIS — E11.22 CONTROLLED TYPE 2 DIABETES MELLITUS WITH STAGE 2 CHRONIC KIDNEY DISEASE, WITHOUT LONG-TERM CURRENT USE OF INSULIN: ICD-10-CM

## 2023-04-28 RX ORDER — EXENATIDE 2 MG/.85ML
INJECTION, SUSPENSION, EXTENDED RELEASE SUBCUTANEOUS
Qty: 12 PEN | Refills: 0 | Status: SHIPPED | OUTPATIENT
Start: 2023-04-28 | End: 2023-05-06

## 2023-05-01 ENCOUNTER — TELEPHONE (OUTPATIENT)
Dept: FAMILY MEDICINE | Facility: CLINIC | Age: 58
End: 2023-05-01
Payer: COMMERCIAL

## 2023-05-01 NOTE — TELEPHONE ENCOUNTER
----- Message from Raquel Bentley sent at 5/1/2023  9:58 AM CDT -----  Regarding: Self/  [phone]  Type: Patient Call Back    Who called:  Patient    What is the request in detail:  Insurance denied exenatide microspheres (BYDUREON BCISE) 2 mg/0.85 mL AtIn, patient is needing something else called in or please call the pharmacy.  Thank you    Would the patient rather a call back or a response via [facility]?   Call back    Best call back number:  [phone] (home)         Thank you

## 2023-05-01 NOTE — TELEPHONE ENCOUNTER
----- Message from Tim Hernandez sent at 5/1/2023 12:51 PM CDT -----  Regarding: Returning Call  .Type:  Patient Returning Call    Who Called: Self     Who Left Message for Patient: Radha    Does the patient know what this is regarding?: No    Would the patient rather a call back or a response via [facility]? Call back    Best Call Back Number: [phone]     Additional Information:

## 2023-05-01 NOTE — TELEPHONE ENCOUNTER
PA started for the below medication. Left a voice mail for a call back to tell the patient a PA was started.

## 2023-05-06 ENCOUNTER — TELEPHONE (OUTPATIENT)
Dept: FAMILY MEDICINE | Facility: CLINIC | Age: 58
End: 2023-05-06
Payer: COMMERCIAL

## 2023-05-06 DIAGNOSIS — N18.2 CONTROLLED TYPE 2 DIABETES MELLITUS WITH STAGE 2 CHRONIC KIDNEY DISEASE, WITHOUT LONG-TERM CURRENT USE OF INSULIN: Primary | ICD-10-CM

## 2023-05-06 DIAGNOSIS — E11.22 CONTROLLED TYPE 2 DIABETES MELLITUS WITH STAGE 2 CHRONIC KIDNEY DISEASE, WITHOUT LONG-TERM CURRENT USE OF INSULIN: Primary | ICD-10-CM

## 2023-05-06 RX ORDER — DULAGLUTIDE 0.75 MG/.5ML
0.75 INJECTION, SOLUTION SUBCUTANEOUS
Qty: 4 PEN | Refills: 11 | Status: SHIPPED | OUTPATIENT
Start: 2023-05-06 | End: 2024-05-05

## 2023-05-23 RX ORDER — PANTOPRAZOLE SODIUM 40 MG/1
TABLET, DELAYED RELEASE ORAL
Qty: 30 TABLET | Refills: 11 | Status: SHIPPED | OUTPATIENT
Start: 2023-05-23 | End: 2023-06-06 | Stop reason: SDUPTHER

## 2023-05-24 DIAGNOSIS — E11.9 TYPE 2 DIABETES MELLITUS WITHOUT COMPLICATION: ICD-10-CM

## 2023-05-29 ENCOUNTER — PATIENT MESSAGE (OUTPATIENT)
Dept: ADMINISTRATIVE | Facility: HOSPITAL | Age: 58
End: 2023-05-29
Payer: COMMERCIAL

## 2023-06-05 NOTE — PROGRESS NOTES
Subjective:       Patient ID: Monalisa THOMPSON Favorite is a pleasant 58 y.o. Black or  female patient    Chief Complaint: Follow-up      Patient is a pt I saw last on 03/06/2023, see my last notes.    HPI     Pt with past medical history as per list of problems below coming today for regular follow-up visit.    In the interval:  - Optometry  - Dr Huang for cervical radiculopathy  She had one tooth pulled L sided last week, has been rather painful and swollen, started to get better.  She lost 10 pounds from last visit. She wanted to be on semaglutide but not covered by insurance.  Would like to go on with zolpidem.  One of her son is getting  on Saturday.    Patient Active Problem List   Diagnosis    Anxiety    Essential hypertension, benign    Controlled type 2 diabetes mellitus with stage 2 chronic kidney disease, without long-term current use of insulin    HLD (hyperlipidemia)    Dysarthria    Hemifacial spasm    Nonintractable epilepsy without status epilepticus    Tension headache, chronic    2019 novel coronavirus disease (COVID-19)    Occipital neuralgia of left side    Upper abdominal pain    Severe obesity (BMI 35.0-39.9) with comorbidity    Sal's paralysis (postepileptic)    Wears contact lenses    Refractive error    Heart murmur    PATTERSON (dyspnea on exertion)          ACTIVE MEDICAL ISSUES:  Documented in Problem List     PAST MEDICAL HISTORY  Documented     PAST SURGICAL HISTORY:  Documented     SOCIAL HISTORY:  Documented     FAMILY HISTORY:  Documented     ALLERGIES AND MEDICATIONS: updated and reviewed.  Documented    Review of Systems   Constitutional: Negative.    HENT:  Positive for dental problem.    Eyes: Negative.    Respiratory:  Negative for cough.    Cardiovascular: Negative.    Gastrointestinal: Negative.    Endocrine: Negative.    Genitourinary: Negative.    Musculoskeletal:  Negative for arthralgias and myalgias.   Skin: Negative.    Allergic/Immunologic: Negative.   "  Neurological:  Negative for weakness and numbness.   Hematological: Negative.    Psychiatric/Behavioral:  Positive for sleep disturbance. The patient is nervous/anxious.      Objective:      Physical Exam  Vitals and nursing note reviewed.   Constitutional:       Appearance: Normal appearance. She is obese.   HENT:      Right Ear: Tympanic membrane normal.      Left Ear: Tympanic membrane normal.      Mouth/Throat:      Comments: Tooth pulled L side, no major swelling  Eyes:      Conjunctiva/sclera: Conjunctivae normal.   Cardiovascular:      Rate and Rhythm: Normal rate and regular rhythm.      Pulses: Normal pulses.      Heart sounds: Normal heart sounds.   Pulmonary:      Effort: Pulmonary effort is normal.      Breath sounds: Normal breath sounds.   Abdominal:      General: Bowel sounds are normal.   Skin:     General: Skin is warm and dry.   Neurological:      General: No focal deficit present.      Mental Status: She is alert and oriented to person, place, and time.   Psychiatric:         Mood and Affect: Mood normal.         Behavior: Behavior normal.         Thought Content: Thought content normal.         Judgment: Judgment normal.       Vitals:    06/06/23 1056   BP: 122/74   BP Location: Right arm   Patient Position: Sitting   BP Method: Large (Manual)   Pulse: 70   Resp: 16   Temp: 97.6 °F (36.4 °C)   TempSrc: Oral   SpO2: 99%   Weight: 97.3 kg (214 lb 8.1 oz)   Height: 5' 2" (1.575 m)     Body mass index is 39.23 kg/m².    RESULTS: Reviewed labs from last 12 months    Last Lab Results:     Lab Results   Component Value Date    WBC 5.87 05/13/2022    HGB 12.9 05/13/2022    HCT 40.0 05/13/2022     05/13/2022     03/02/2023    K 4.4 03/02/2023     03/02/2023    CO2 27 03/02/2023    BUN 15 03/02/2023    CREATININE 0.8 03/02/2023    CALCIUM 10.5 03/02/2023    ALBUMIN 4.0 03/02/2023    AST 20 03/02/2023    ALT 27 03/02/2023    CHOL 231 (H) 05/13/2022    TRIG 272 (H) 05/13/2022    HDL 45 " 2022    LDLCALC 131.6 2022    HGBA1C 6.6 (H) 2023    TSH 2.153 2022       Assessment:       1. Essential hypertension    2. Insomnia, unspecified type    3. Mixed hyperlipidemia    4. Controlled type 2 diabetes mellitus with stage 2 chronic kidney disease, without long-term current use of insulin    5. BMI 40.0-44.9, adult    6. Encounter for screening mammogram for malignant neoplasm of breast    7. Gastroesophageal reflux disease without esophagitis        Plan:   Monalisa was seen today for follow-up.    Diagnoses and all orders for this visit:    Essential hypertension    BP at goal, same treatment.    Insomnia, unspecified type  -     zolpidem (AMBIEN) 5 MG Tab; Take one pill in the evening, PRN, lowest dose possible    Pt was on 10 mg zolpidem as per former PCP, advised tapering down, now will be on 5 mg.    Mixed hyperlipidemia    Monitored and treated.    Controlled type 2 diabetes mellitus with stage 2 chronic kidney disease, without long-term current use of insulin    Most recent A1C 6.6 %.    Severe obesity (BMI 35.0-39.9) with comorbidity    We discussed weight issues and safe, effective ways of losing pounds, includin) diet:  low carbohydrate, low fat diet, stay away from fast food, fried and processed food, use whole grain, lot of fruits and vegetables, use healthy fat such as avocado, nuts and olive oil in reasonable quantity, stay away from sodas. Regular meals with lean proteins.  2) physical activity: ideally 150 min a week, with cardiovascular and resistance activity.  Patient was encouraged to set realistic attainable goals for weight loss, and we will follow up periodically.  Pt was not eligible for semaglutide.    Encounter for screening mammogram for malignant neoplasm of breast  -     Mammo Digital Screening Bilat; Future    Gastroesophageal reflux disease without esophagitis  -     pantoprazole (PROTONIX) 40 MG tablet; Take 1 tablet (40 mg total) by mouth once  daily.      Follow up in about 6 months (around 12/6/2023) for f-up.    This note was created by combination of typed  and M-Modal dictation.  Transcription errors may be present.  If there are any questions, please contact me.

## 2023-06-06 ENCOUNTER — OFFICE VISIT (OUTPATIENT)
Dept: FAMILY MEDICINE | Facility: CLINIC | Age: 58
End: 2023-06-06
Payer: COMMERCIAL

## 2023-06-06 VITALS
HEIGHT: 62 IN | BODY MASS INDEX: 39.47 KG/M2 | OXYGEN SATURATION: 99 % | RESPIRATION RATE: 16 BRPM | WEIGHT: 214.5 LBS | HEART RATE: 70 BPM | DIASTOLIC BLOOD PRESSURE: 74 MMHG | TEMPERATURE: 98 F | SYSTOLIC BLOOD PRESSURE: 122 MMHG

## 2023-06-06 DIAGNOSIS — E11.22 CONTROLLED TYPE 2 DIABETES MELLITUS WITH STAGE 2 CHRONIC KIDNEY DISEASE, WITHOUT LONG-TERM CURRENT USE OF INSULIN: ICD-10-CM

## 2023-06-06 DIAGNOSIS — N18.2 CONTROLLED TYPE 2 DIABETES MELLITUS WITH STAGE 2 CHRONIC KIDNEY DISEASE, WITHOUT LONG-TERM CURRENT USE OF INSULIN: ICD-10-CM

## 2023-06-06 DIAGNOSIS — Z12.31 ENCOUNTER FOR SCREENING MAMMOGRAM FOR MALIGNANT NEOPLASM OF BREAST: ICD-10-CM

## 2023-06-06 DIAGNOSIS — I10 ESSENTIAL HYPERTENSION: Primary | ICD-10-CM

## 2023-06-06 DIAGNOSIS — Z78.9 STATIN INTOLERANCE: ICD-10-CM

## 2023-06-06 DIAGNOSIS — K21.9 GASTROESOPHAGEAL REFLUX DISEASE WITHOUT ESOPHAGITIS: ICD-10-CM

## 2023-06-06 DIAGNOSIS — M79.10 MYALGIA: ICD-10-CM

## 2023-06-06 DIAGNOSIS — E66.01 SEVERE OBESITY (BMI 35.0-39.9) WITH COMORBIDITY: ICD-10-CM

## 2023-06-06 DIAGNOSIS — E78.2 MIXED HYPERLIPIDEMIA: ICD-10-CM

## 2023-06-06 DIAGNOSIS — G47.00 INSOMNIA, UNSPECIFIED TYPE: ICD-10-CM

## 2023-06-06 DIAGNOSIS — G40.209 PARTIAL SYMPTOMATIC EPILEPSY WITH COMPLEX PARTIAL SEIZURES, NOT INTRACTABLE, WITHOUT STATUS EPILEPTICUS: ICD-10-CM

## 2023-06-06 PROCEDURE — 4010F ACE/ARB THERAPY RXD/TAKEN: CPT | Mod: CPTII,S$GLB,, | Performed by: INTERNAL MEDICINE

## 2023-06-06 PROCEDURE — 3044F HG A1C LEVEL LT 7.0%: CPT | Mod: CPTII,S$GLB,, | Performed by: INTERNAL MEDICINE

## 2023-06-06 PROCEDURE — 1160F PR REVIEW ALL MEDS BY PRESCRIBER/CLIN PHARMACIST DOCUMENTED: ICD-10-PCS | Mod: CPTII,S$GLB,, | Performed by: INTERNAL MEDICINE

## 2023-06-06 PROCEDURE — 3061F NEG MICROALBUMINURIA REV: CPT | Mod: CPTII,S$GLB,, | Performed by: INTERNAL MEDICINE

## 2023-06-06 PROCEDURE — 3066F NEPHROPATHY DOC TX: CPT | Mod: CPTII,S$GLB,, | Performed by: INTERNAL MEDICINE

## 2023-06-06 PROCEDURE — 3078F PR MOST RECENT DIASTOLIC BLOOD PRESSURE < 80 MM HG: ICD-10-PCS | Mod: CPTII,S$GLB,, | Performed by: INTERNAL MEDICINE

## 2023-06-06 PROCEDURE — 3078F DIAST BP <80 MM HG: CPT | Mod: CPTII,S$GLB,, | Performed by: INTERNAL MEDICINE

## 2023-06-06 PROCEDURE — 4010F PR ACE/ARB THEARPY RXD/TAKEN: ICD-10-PCS | Mod: CPTII,S$GLB,, | Performed by: INTERNAL MEDICINE

## 2023-06-06 PROCEDURE — 3044F PR MOST RECENT HEMOGLOBIN A1C LEVEL <7.0%: ICD-10-PCS | Mod: CPTII,S$GLB,, | Performed by: INTERNAL MEDICINE

## 2023-06-06 PROCEDURE — 3008F PR BODY MASS INDEX (BMI) DOCUMENTED: ICD-10-PCS | Mod: CPTII,S$GLB,, | Performed by: INTERNAL MEDICINE

## 2023-06-06 PROCEDURE — 99999 PR PBB SHADOW E&M-EST. PATIENT-LVL V: ICD-10-PCS | Mod: PBBFAC,,, | Performed by: INTERNAL MEDICINE

## 2023-06-06 PROCEDURE — 3074F SYST BP LT 130 MM HG: CPT | Mod: CPTII,S$GLB,, | Performed by: INTERNAL MEDICINE

## 2023-06-06 PROCEDURE — 3074F PR MOST RECENT SYSTOLIC BLOOD PRESSURE < 130 MM HG: ICD-10-PCS | Mod: CPTII,S$GLB,, | Performed by: INTERNAL MEDICINE

## 2023-06-06 PROCEDURE — 1160F RVW MEDS BY RX/DR IN RCRD: CPT | Mod: CPTII,S$GLB,, | Performed by: INTERNAL MEDICINE

## 2023-06-06 PROCEDURE — 3008F BODY MASS INDEX DOCD: CPT | Mod: CPTII,S$GLB,, | Performed by: INTERNAL MEDICINE

## 2023-06-06 PROCEDURE — 1159F PR MEDICATION LIST DOCUMENTED IN MEDICAL RECORD: ICD-10-PCS | Mod: CPTII,S$GLB,, | Performed by: INTERNAL MEDICINE

## 2023-06-06 PROCEDURE — 99999 PR PBB SHADOW E&M-EST. PATIENT-LVL V: CPT | Mod: PBBFAC,,, | Performed by: INTERNAL MEDICINE

## 2023-06-06 PROCEDURE — 99214 PR OFFICE/OUTPT VISIT, EST, LEVL IV, 30-39 MIN: ICD-10-PCS | Mod: S$GLB,,, | Performed by: INTERNAL MEDICINE

## 2023-06-06 PROCEDURE — 1159F MED LIST DOCD IN RCRD: CPT | Mod: CPTII,S$GLB,, | Performed by: INTERNAL MEDICINE

## 2023-06-06 PROCEDURE — 99214 OFFICE O/P EST MOD 30 MIN: CPT | Mod: S$GLB,,, | Performed by: INTERNAL MEDICINE

## 2023-06-06 PROCEDURE — 3061F PR NEG MICROALBUMINURIA RESULT DOCUMENTED/REVIEW: ICD-10-PCS | Mod: CPTII,S$GLB,, | Performed by: INTERNAL MEDICINE

## 2023-06-06 PROCEDURE — 3066F PR DOCUMENTATION OF TREATMENT FOR NEPHROPATHY: ICD-10-PCS | Mod: CPTII,S$GLB,, | Performed by: INTERNAL MEDICINE

## 2023-06-06 RX ORDER — ZOLPIDEM TARTRATE 10 MG/1
TABLET ORAL
Qty: 20 TABLET | Refills: 0 | Status: CANCELLED | OUTPATIENT
Start: 2023-06-06

## 2023-06-06 RX ORDER — ZOLPIDEM TARTRATE 5 MG/1
TABLET ORAL
Qty: 30 TABLET | Refills: 2 | Status: SHIPPED | OUTPATIENT
Start: 2023-06-06 | End: 2023-12-15

## 2023-06-06 RX ORDER — PANTOPRAZOLE SODIUM 40 MG/1
40 TABLET, DELAYED RELEASE ORAL DAILY
Qty: 90 TABLET | Refills: 3 | Status: SHIPPED | OUTPATIENT
Start: 2023-06-06 | End: 2023-11-14

## 2023-06-06 RX ORDER — IBUPROFEN 800 MG/1
TABLET ORAL
COMMUNITY
Start: 2023-05-31

## 2023-06-06 RX ORDER — HYDROCODONE BITARTRATE AND ACETAMINOPHEN 7.5; 325 MG/1; MG/1
1 TABLET ORAL EVERY 6 HOURS PRN
COMMUNITY
Start: 2023-05-31

## 2023-06-06 NOTE — PROGRESS NOTES
Health Maintenance Due   Topic     HIV Screening      Low Dose Statin      Lipid Panel      Mammogram

## 2023-07-08 ENCOUNTER — HOSPITAL ENCOUNTER (EMERGENCY)
Facility: HOSPITAL | Age: 58
Discharge: HOME | End: 2023-07-08
Attending: EMERGENCY MEDICINE
Payer: COMMERCIAL

## 2023-07-08 VITALS
HEART RATE: 81 BPM | TEMPERATURE: 98 F | BODY MASS INDEX: 39.32 KG/M2 | WEIGHT: 215 LBS | RESPIRATION RATE: 18 BRPM | OXYGEN SATURATION: 98 % | SYSTOLIC BLOOD PRESSURE: 154 MMHG | DIASTOLIC BLOOD PRESSURE: 93 MMHG
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
## 2023-07-08 LAB — POCT GLUCOSE: 142 MG/DL (ref 70–110)

## 2023-07-08 PROCEDURE — 82962 GLUCOSE BLOOD TEST: CPT | Mod: ER

## 2023-07-08 PROCEDURE — 25000003 PHARM REV CODE 250: Mod: ER | Performed by: EMERGENCY MEDICINE

## 2023-07-08 PROCEDURE — 99284 EMERGENCY DEPT VISIT MOD MDM: CPT | Mod: ER

## 2023-07-08 RX ORDER — SULFAMETHOXAZOLE AND TRIMETHOPRIM 800; 160 MG/1; MG/1
1 TABLET ORAL 2 TIMES DAILY
Qty: 14 TABLET | Refills: 0 | Status: SHIPPED | OUTPATIENT
Start: 2023-07-08 | End: 2023-07-15

## 2023-07-08 RX ORDER — SULFAMETHOXAZOLE AND TRIMETHOPRIM 800; 160 MG/1; MG/1
1 TABLET ORAL
Status: COMPLETED | OUTPATIENT
Start: 2023-07-08 | End: 2023-07-08

## 2023-07-08 RX ORDER — IBUPROFEN 400 MG/1
400 TABLET ORAL
Status: COMPLETED | OUTPATIENT
Start: 2023-07-08 | End: 2023-07-08

## 2023-07-08 RX ORDER — HYDROCORTISONE VALERATE CREAM 2 MG/G
CREAM TOPICAL 2 TIMES DAILY
Qty: 60 G | Refills: 0 | Status: SHIPPED | OUTPATIENT
Start: 2023-07-08 | End: 2024-03-20

## 2023-07-08 RX ORDER — DIPHENHYDRAMINE HCL 25 MG
25 CAPSULE ORAL
Status: COMPLETED | OUTPATIENT
Start: 2023-07-08 | End: 2023-07-08

## 2023-07-08 RX ORDER — HYDROXYZINE HYDROCHLORIDE 25 MG/1
25 TABLET, FILM COATED ORAL
Qty: 30 TABLET | Refills: 0 | Status: SHIPPED | OUTPATIENT
Start: 2023-07-08

## 2023-07-08 RX ADMIN — SULFAMETHOXAZOLE AND TRIMETHOPRIM 1 TABLET: 800; 160 TABLET ORAL at 09:07

## 2023-07-08 RX ADMIN — DIPHENHYDRAMINE HYDROCHLORIDE 25 MG: 25 CAPSULE ORAL at 09:07

## 2023-07-08 RX ADMIN — IBUPROFEN 400 MG: 400 TABLET ORAL at 09:07

## 2023-07-08 NOTE — ED PROVIDER NOTES
Encounter Date: 7/8/2023    SCRIBE #1 NOTE: I, Zaina Manuel, am scribing for, and in the presence of,  Tomasz Monet MD. I have scribed the following portions of the note - Other sections scribed: HPI; ROS; PE.     History     Chief Complaint   Patient presents with    Leg Pain     Monalisa Carson, a 58 y.o. female presents to the ED via PV with CC of L leg pain and localized swelling to the L upper anterior thigh. Pt denies inset bites, falls, injury, or trauma.         Monalisa Carson is a 58 y.o. female with medical history of Diabetes Mellitus and Hypertension who presents to the Emergency Department for chief complaint of redness, swelling, and warmth to the anterior left thigh onset 1 day ago. Patient reports she felt something bite her last night and woke up at 3 AM with her symptoms. She states the area is itchy and painful. Patient denies associated throat swelling, vomiting, diarrhea, or shortness of breath. No further complaints at this time.       The history is provided by the patient. No  was used.   Review of patient's allergies indicates:   Allergen Reactions    Keflex [cephalexin] Hives    Vicodin [hydrocodone-acetaminophen] Itching     itch    Crestor [rosuvastatin] Other (See Comments)     Muscle pain     Past Medical History:   Diagnosis Date    Allergy     Anxiety     Diabetes mellitus     Heart murmur     Hypertension     Seizures      Past Surgical History:   Procedure Laterality Date    CHOLECYSTECTOMY      2001 april    COLONOSCOPY N/A 6/29/2021    Procedure: COLONOSCOPY;  Surgeon: Gustavo Pelletier MD;  Location: [facility];  Service: Endoscopy;  Laterality: N/A;  combined orders    ESOPHAGOGASTRODUODENOSCOPY N/A 6/29/2021    Procedure: ESOPHAGOGASTRODUODENOSCOPY (EGD);  Surgeon: Gustavo Pelletier MD;  Location: [facility];  Service: Endoscopy;  Laterality: N/A;  covdi +3/29/20, fully vaccinated 3/23/21, prep instr portal -ml    HYSTERECTOMY      partial   1996    OOPHORECTOMY      SHOULDER SURGERY Right     WISDOM TOOTH EXTRACTION       Family History   Problem Relation Age of Onset    Diabetes Mother     Hyperlipidemia Mother     Hypertension Mother     Cataracts Mother     Diabetes Father     Hypertension Father     Stroke Father     Depression Sister     Hypertension Sister     Stroke Sister     No Known Problems Brother     Cancer Maternal Aunt         breast x 2    Breast cancer Maternal Aunt     Cancer Maternal Uncle         prostate x 2    Cancer Paternal Aunt         breast    Breast cancer Paternal Aunt     No Known Problems Paternal Uncle     No Known Problems Maternal Grandmother     Cancer Maternal Grandfather         lung    Early death Paternal Grandmother     Early death Paternal Grandfather     Esophageal cancer Paternal Grandfather     No Known Problems Other     Melanoma Neg Hx     Eczema Neg Hx     Lupus Neg Hx     Psoriasis Neg Hx     Amblyopia Neg Hx     Blindness Neg Hx     Glaucoma Neg Hx     Macular degeneration Neg Hx     Retinal detachment Neg Hx     Strabismus Neg Hx     Thyroid disease Neg Hx     Colon cancer Neg Hx      Social History     Tobacco Use    Smoking status: Former     Packs/day: 0.00     Years: 0.50     Pack years: 0.00     Types: Cigarettes    Smokeless tobacco: Never   Substance Use Topics    Alcohol use: Yes     Alcohol/week: 0.0 standard drinks     Comment: seldom    Drug use: No     Review of Systems   Constitutional:  Negative for fever.   HENT:  Negative for sore throat.    Eyes:  Negative for visual disturbance.   Respiratory:  Negative for shortness of breath.    Cardiovascular:  Negative for chest pain.   Gastrointestinal:  Negative for abdominal pain, diarrhea and vomiting.   Genitourinary:  Negative for dysuria.   Musculoskeletal:  Negative for back pain.   Skin:  Positive for color change. Negative for rash.     Physical Exam     Initial Vitals [07/08/23 0840]   BP Pulse Resp Temp SpO2   (!) 154/93 81 18 98.1 °F  (36.7 °C) 98 %      MAP       --         Physical Exam    Nursing note and vitals reviewed.  Constitutional: She appears well-developed and well-nourished.   Eyes: EOM are normal. Pupils are equal, round, and reactive to light.   Neck: Neck supple. No thyromegaly present. No JVD present.   Normal range of motion.  Cardiovascular:  Normal rate, regular rhythm, normal heart sounds and intact distal pulses.     Exam reveals no gallop and no friction rub.       No murmur heard.  Pulmonary/Chest: Breath sounds normal. No respiratory distress.   Abdominal: Abdomen is soft. Bowel sounds are normal.   Musculoskeletal:         General: No tenderness or edema. Normal range of motion.      Cervical back: Normal range of motion and neck supple.        Legs:      Neurological: She is alert and oriented to person, place, and time. She has normal strength.   Skin: Skin is warm and dry.       ED Course   Procedures  Labs Reviewed   POCT GLUCOSE - Abnormal; Notable for the following components:       Result Value    POCT Glucose 142 (*)     All other components within normal limits   No fluctuance.  No abscess.  There is warmth and erythema.  Localized reaction to envenomation versus early cellulitis is indistinguishable.  Will cover for both.       Imaging Results    None          Medications   diphenhydrAMINE capsule 25 mg (25 mg Oral Given 7/8/23 0905)   ibuprofen tablet 400 mg (400 mg Oral Given 7/8/23 0906)   sulfamethoxazole-trimethoprim 800-160mg per tablet 1 tablet (1 tablet Oral Given 7/8/23 0905)     Medical Decision Making:   History:   Old Medical Records: I decided to obtain old medical records.  Old Records Summarized: records from clinic visits and other records.       <> Summary of Records: External documents reviewed.   Initial Assessment:   This is an emergent evaluation of a 58 y.o. female who presents with redness, swelling, and warmth to the anterior left thigh. The patient was seen and examined. The history and  physical exam was obtained. The nursing notes and vital signs were reviewed. Will treat with diphenhydramine, ibuprofen, and sulfamethoxazole-trimethoprim.  Clinical Tests:   Lab Tests: Ordered and Reviewed        Scribe Attestation:   Scribe #1: I performed the above scribed service and the documentation accurately describes the services I performed. I attest to the accuracy of the note.            I, Tomasz Monet, personally performed the services described in this documentation. All medical record entries made by the scribe were at my direction and in my presence.  I have reviewed the chart and agree that the record reflects my personal performance and is accurate and complete         Clinical Impression:   Final diagnoses:  [W57.XXXA] Bug bite with infection, initial encounter (Primary)        ED Disposition Condition    Discharge Stable          ED Prescriptions       Medication Sig Dispense Start Date End Date Auth. Provider    hydrocortisone (WESTCORT) 0.2 % cream Apply topically 2 (two) times daily. for 5 days 60 g 7/8/2023 7/13/2023 Tomasz Monet MD    sulfamethoxazole-trimethoprim 800-160mg (BACTRIM DS) 800-160 mg Tab Take 1 tablet by mouth 2 (two) times daily. for 7 days 14 tablet 7/8/2023 7/15/2023 Tomasz Monet MD    hydrOXYzine HCL (ATARAX) 25 MG tablet Take 1 tablet (25 mg total) by mouth every 4 to 6 hours as needed for Itching (redness). 30 tablet 7/8/2023 -- Tomasz Monet MD          Follow-up Information       Follow up With Specialties Details Why Contact [facility] ED Emergency Medicine  As needed, If symptoms worsen or new symptoms develop [address] [facility] [id number] [phone]             Tomasz Monet MD  07/09/23 6230

## 2023-07-31 DIAGNOSIS — I10 UNCONTROLLED HYPERTENSION: ICD-10-CM

## 2023-07-31 RX ORDER — BENAZEPRIL HYDROCHLORIDE 40 MG/1
TABLET ORAL
Qty: 90 TABLET | Refills: 0 | Status: SHIPPED | OUTPATIENT
Start: 2023-07-31 | End: 2023-08-08

## 2023-07-31 NOTE — TELEPHONE ENCOUNTER
Last Office Visit Info:   The patient's last visit with Flor Gomez MD was on 6/6/2023.    The patient's last visit in current department was on 6/6/2023.        Last CBC Results:   Lab Results   Component Value Date    WBC 5.87 05/13/2022    HGB 12.9 05/13/2022    HCT 40.0 05/13/2022     05/13/2022       Last CMP Results  Lab Results   Component Value Date     03/02/2023    K 4.4 03/02/2023     03/02/2023    CO2 27 03/02/2023    BUN 15 03/02/2023    CREATININE 0.8 03/02/2023    CALCIUM 10.5 03/02/2023    ALBUMIN 4.0 03/02/2023    AST 20 03/02/2023    ALT 27 03/02/2023       Last Lipids  Lab Results   Component Value Date    CHOL 231 (H) 05/13/2022    TRIG 272 (H) 05/13/2022    HDL 45 05/13/2022    LDLCALC 131.6 05/13/2022       Last A1C  Lab Results   Component Value Date    HGBA1C 6.6 (H) 03/02/2023       Last TSH  Lab Results   Component Value Date    TSH 2.153 05/13/2022             Current Med Refills  Medication List with Changes/Refills   Current Medications    ACETAMINOPHEN (TYLENOL) 500 MG TABLET    Take 2 tablets (1,000 mg total) by mouth every 6 (six) hours as needed for Pain.       Start Date: 5/31/2021 End Date: --    ALBUTEROL (PROVENTIL/VENTOLIN HFA) 90 MCG/ACTUATION INHALER    Inhale 1-2 puffs into the lungs every 4 (four) hours as needed for Wheezing. Rescue       Start Date: 2/17/2022 End Date: 6/6/2023    BENAZEPRIL (LOTENSIN) 40 MG TABLET    TAKE 1 TABLET(40 MG) BY MOUTH EVERY DAY       Start Date: 10/26/2022End Date: --    BUSPIRONE (BUSPAR) 10 MG TABLET    Take 1 tablet (10 mg total) by mouth 3 (three) times daily as needed (anxiety).       Start Date: 3/6/2023  End Date: 3/5/2024    DICYCLOMINE (BENTYL) 20 MG TABLET    Take 20 mg by mouth 4 (four) times daily.       Start Date: 5/3/2021  End Date: --    DULAGLUTIDE (TRULICITY) 0.75 MG/0.5 ML PEN INJECTOR    Inject 0.75 mg into the skin every 7 days.       Start Date: 5/6/2023  End Date: 5/5/2024    ESCITALOPRAM  OXALATE (LEXAPRO) 10 MG TABLET    Take 1 tablet (10 mg total) by mouth once daily.       Start Date: 3/6/2023  End Date: --    FLUTICASONE PROPIONATE (FLONASE) 50 MCG/ACTUATION NASAL SPRAY    1 spray (50 mcg total) by Each Nostril route 2 (two) times daily.       Start Date: 5/31/2021 End Date: --    GABAPENTIN (NEURONTIN) 100 MG CAPSULE    Take 100 mg by mouth 3 (three) times daily.       Start Date: 1/25/2023 End Date: --    HYDROCHLOROTHIAZIDE (HYDRODIURIL) 12.5 MG TAB    TAKE 1 TABLET(12.5 MG) BY MOUTH EVERY DAY       Start Date: 10/26/2022End Date: --    HYDROCODONE-ACETAMINOPHEN (NORCO) 7.5-325 MG PER TABLET    Take 1 tablet by mouth every 6 (six) hours as needed.       Start Date: 5/31/2023 End Date: --    HYDROCORTISONE (WESTCORT) 0.2 % CREAM    Apply topically 2 (two) times daily. for 5 days       Start Date: 7/8/2023  End Date: 7/13/2023    HYDROCORTISONE 2.5 % CREAM    WOLFGANG EXT AA BID       Start Date: 8/5/2022  End Date: --    HYDROXYZINE HCL (ATARAX) 25 MG TABLET    Take 1 tablet (25 mg total) by mouth every 4 to 6 hours as needed for Itching (redness).       Start Date: 7/8/2023  End Date: --    IBUPROFEN (ADVIL,MOTRIN) 800 MG TABLET    Take by mouth.       Start Date: 5/31/2023 End Date: --    LEVETIRACETAM (KEPPRA) 1000 MG TABLET    Take 1 tablet (1,000 mg total) by mouth 2 (two) times daily.       Start Date: 10/1/2022 End Date: --    LIDOCAINE (LIDODERM) 5 %    Place 1 patch onto the skin once daily. Remove & Discard patch within 12 hours or as directed by MD       Start Date: 11/16/2021End Date: --    LIDOCAINE (LMX) 4 % CREAM    Apply topically as needed.       Start Date: 8/6/2020  End Date: --    METFORMIN (GLUCOPHAGE-XR) 500 MG ER 24HR TABLET    Take 1 tablet (500 mg total) by mouth daily with breakfast.       Start Date: 5/17/2022 End Date: 6/6/2023    MOBIC 15 MG TABLET    Take 1 tablet (15 mg total) by mouth daily as needed for Pain. Take on full stomach, no other NSAIDs. Not more than once  "a day       Start Date: 9/29/2022 End Date: --    PANTOPRAZOLE (PROTONIX) 40 MG TABLET    Take 1 tablet (40 mg total) by mouth once daily.       Start Date: 6/6/2023  End Date: --    PEN NEEDLE, DIABETIC 31 GAUGE X 5/16" NDLE    For use with bydureon       Start Date: 2/24/2020 End Date: --    PROMETHAZINE (PHENERGAN) 25 MG TABLET           Start Date: 5/3/2021  End Date: --    PROMETHAZINE-DEXTROMETHORPHAN (PROMETHAZINE-DM) 6.25-15 MG/5 ML SYRP           Start Date: 1/25/2023 End Date: --    SODIUM CHLORIDE (OCEAN NASAL) 0.65 % NASAL SPRAY    1 spray by Nasal route every 3 (three) hours as needed for Congestion.       Start Date: 5/31/2021 End Date: --    TOPIRAMATE (TOPAMAX) 100 MG TABLET    Take 1 tablet (100 mg total) by mouth once daily.       Start Date: 1/5/2022  End Date: 6/6/2023    ZOLPIDEM (AMBIEN) 5 MG TAB    Take one pill in the evening, PRN, lowest dose possible       Start Date: 6/6/2023  End Date: --       Order(s) placed per written order guidelines: none    Please advise.                      "

## 2023-08-02 DIAGNOSIS — I10 UNCONTROLLED HYPERTENSION: ICD-10-CM

## 2023-08-02 RX ORDER — HYDROCHLOROTHIAZIDE 12.5 MG/1
12.5 TABLET ORAL DAILY
Qty: 90 TABLET | Refills: 1 | Status: SHIPPED | OUTPATIENT
Start: 2023-08-02 | End: 2023-11-15

## 2023-08-02 NOTE — TELEPHONE ENCOUNTER
No care due was identified.  [facility] Embedded Care Due Messages. Reference number: [id number].   8/02/2023 9:34:49 AM CDT

## 2023-08-03 ENCOUNTER — TELEPHONE (OUTPATIENT)
Dept: ADMINISTRATIVE | Facility: HOSPITAL | Age: 58
End: 2023-08-03
Payer: COMMERCIAL

## 2023-08-03 ENCOUNTER — PATIENT OUTREACH (OUTPATIENT)
Dept: ADMINISTRATIVE | Facility: HOSPITAL | Age: 58
End: 2023-08-03
Payer: COMMERCIAL

## 2023-08-03 NOTE — TELEPHONE ENCOUNTER
Patient on [employer] community gap report recommend low dose statin but have crestor on allergy list. Can you please document in patient chart with one of these codes please.    his is a list of the codes that are most commonly used to document when a patient is Statin Intolerant:         CPT Code:  G72.0  Drug-induced myopathy    G72.9  Myopathy, unspecified    M60.9  Myositis, unspecified    M79.10  Myalgia, unspecified site

## 2023-08-03 NOTE — PROGRESS NOTES
Additional Care Coordinator Notes:     HM and immunization's reviewed and updated.    Further Action Needed If Patient Returns Outreach:    Population Health Chart Review & Patient Outreach Details:     Reason for Outreach Encounter:     [x]  Non-Compliant Report   []  Payor Report ([employer], PHN, BCBS, MSSP, MCIP, UHC, etc.)   []  Pre-Visit Chart Review     Updates Requested / Reviewed:     [x]  Care Everywhere    []     []  External Sources ([facility], [facility], DIS, etc.)   []  Care Team Updated    Patient Outreach Method:    [x]  Telephone Outreach Completed   [x] Successful   [] Left Voicemail   [] Unable to Contact (wrong number, no voicemail)  []  [facility] Outreach Sent  []  Letter Outreach Mailed  []  Fax Sent for External Records  []  External Records Upload    Health Maintenance Topics Addressed and Outreach Outcomes / Actions Taken:        [x]      Breast Cancer Screening [x]  Mammo Scheduled      []  External Records Requested     []  Added Reminder to Complete to Upcoming Primary Care Appt Notes     []  Patient Declined     []  Patient Will Call Back to Schedule     []  Patient Will Schedule with External Provider / Order Routed if Applicable             []       Cervical Cancer Screening []  Pap Scheduled      []  External Records Requested     []  Added Reminder to Complete to Upcoming Primary Care Appt Notes     []  Patient Declined     []  Patient Will Call Back to Schedule     []  Patient Will Schedule with External Provider               []          Colorectal Cancer Screening []  Colonoscopy Case Request or Referral Placed     []  External Records Requested     []  Added Reminder to Complete to Upcoming Primary Care Appt Notes     []  Patient Declined     []  Patient Will Call Back to Schedule     []  Patient Will Schedule with External Provider     []  Fit Kit Mailed (add the SmartPhrase under additional notes)     [] PAT Scheduled      [] Cologuard orders placed.      []  Reminded  Patient to Complete Home Test             []      Diabetic Eye Exam []  Eye Camera Scheduled or Optometry Referral Placed     []  External Records Requested     []  Added Reminder to Complete to Upcoming Primary Care Appt Notes     []  Patient Declined     []  Patient Will Call Back to Schedule     []  Patient Will Schedule with External Provider             []      Blood Pressure Control []  Primary Care Follow Up Visit Scheduled     []  Remote Blood Pressure Reading Captured     []  Added Reminder to Complete to Upcoming Primary Care Appt Notes     []  Patient Declined     []  Patient Will Call Back / Patient Will Send Portal Message with Reading     []  Patient Will Call Back to Schedule Provider Visit             [x]       HbA1c & Other Labs [x]  Lab Appt Scheduled for Due Labs     []  Primary Care Follow Up Visit Scheduled      []  Reminded Patient to Complete Home Test     []  Added Reminder to Complete to Upcoming Primary Care Appt Notes     []  Patient Declined     []  Patient Will Call Back to Schedule     []  Patient Will Schedule with External Provider / Order Routed if Applicable           []    Schedule Primary Care Appt []  Primary Care Appt Scheduled     []  Patient Declined     []  Patient Will Call Back to Schedule     []  Pt Established with External Provider & Updated Care Team             []      Medication Adherence []  Primary Care Appointment Scheduled     []  Added Reminder to Upcoming Primary Care Appt Notes     []  Patient Reminded to  Prescription     []  Patient Declined, Provider Notified if Needed     []  Sent Provider Message to Review and/or Add Exclusion to Problem List             []      Osteoporosis Screening []  DXA Appointment Scheduled     []  External Records Requested     []  Added Reminder to Complete to Upcoming Primary Care Appt Notes     []  Patient Declined     []  Patient Will Call Back to Schedule     []  Patient Will Schedule with External Provider / Order  Routed if Applicable

## 2023-08-08 DIAGNOSIS — I10 UNCONTROLLED HYPERTENSION: ICD-10-CM

## 2023-08-08 RX ORDER — BENAZEPRIL HYDROCHLORIDE 40 MG/1
TABLET ORAL
Qty: 90 TABLET | Refills: 0 | Status: SHIPPED | OUTPATIENT
Start: 2023-08-08

## 2023-08-08 NOTE — TELEPHONE ENCOUNTER
No care due was identified.  [facility] Embedded Care Due Messages. Reference number: [id number].   8/08/2023 8:10:34 AM CDT

## 2023-08-08 NOTE — TELEPHONE ENCOUNTER
Last Office Visit Info:   The patient's last visit with Flor Gomez MD was on 6/6/2023.    The patient's last visit in current department was on 6/6/2023.        Last CBC Results:   Lab Results   Component Value Date    WBC 5.87 05/13/2022    HGB 12.9 05/13/2022    HCT 40.0 05/13/2022     05/13/2022       Last CMP Results  Lab Results   Component Value Date     03/02/2023    K 4.4 03/02/2023     03/02/2023    CO2 27 03/02/2023    BUN 15 03/02/2023    CREATININE 0.8 03/02/2023    CALCIUM 10.5 03/02/2023    ALBUMIN 4.0 03/02/2023    AST 20 03/02/2023    ALT 27 03/02/2023       Last Lipids  Lab Results   Component Value Date    CHOL 231 (H) 05/13/2022    TRIG 272 (H) 05/13/2022    HDL 45 05/13/2022    LDLCALC 131.6 05/13/2022       Last A1C  Lab Results   Component Value Date    HGBA1C 6.6 (H) 03/02/2023       Last TSH  Lab Results   Component Value Date    TSH 2.153 05/13/2022             Current Med Refills  Medication List with Changes/Refills   Current Medications    ACETAMINOPHEN (TYLENOL) 500 MG TABLET    Take 2 tablets (1,000 mg total) by mouth every 6 (six) hours as needed for Pain.       Start Date: 5/31/2021 End Date: --    ALBUTEROL (PROVENTIL/VENTOLIN HFA) 90 MCG/ACTUATION INHALER    Inhale 1-2 puffs into the lungs every 4 (four) hours as needed for Wheezing. Rescue       Start Date: 2/17/2022 End Date: 6/6/2023    BENAZEPRIL (LOTENSIN) 40 MG TABLET    TAKE 1 TABLET(40 MG) BY MOUTH EVERY DAY       Start Date: 7/31/2023 End Date: --    BUSPIRONE (BUSPAR) 10 MG TABLET    Take 1 tablet (10 mg total) by mouth 3 (three) times daily as needed (anxiety).       Start Date: 3/6/2023  End Date: 3/5/2024    DICYCLOMINE (BENTYL) 20 MG TABLET    Take 20 mg by mouth 4 (four) times daily.       Start Date: 5/3/2021  End Date: --    DULAGLUTIDE (TRULICITY) 0.75 MG/0.5 ML PEN INJECTOR    Inject 0.75 mg into the skin every 7 days.       Start Date: 5/6/2023  End Date: 5/5/2024    ESCITALOPRAM  OXALATE (LEXAPRO) 10 MG TABLET    Take 1 tablet (10 mg total) by mouth once daily.       Start Date: 3/6/2023  End Date: --    FLUTICASONE PROPIONATE (FLONASE) 50 MCG/ACTUATION NASAL SPRAY    1 spray (50 mcg total) by Each Nostril route 2 (two) times daily.       Start Date: 5/31/2021 End Date: --    GABAPENTIN (NEURONTIN) 100 MG CAPSULE    Take 100 mg by mouth 3 (three) times daily.       Start Date: 1/25/2023 End Date: --    HYDROCHLOROTHIAZIDE (HYDRODIURIL) 12.5 MG TAB    Take 1 tablet (12.5 mg total) by mouth once daily.       Start Date: 8/2/2023  End Date: --    HYDROCODONE-ACETAMINOPHEN (NORCO) 7.5-325 MG PER TABLET    Take 1 tablet by mouth every 6 (six) hours as needed.       Start Date: 5/31/2023 End Date: --    HYDROCORTISONE (WESTCORT) 0.2 % CREAM    Apply topically 2 (two) times daily. for 5 days       Start Date: 7/8/2023  End Date: 7/13/2023    HYDROCORTISONE 2.5 % CREAM    [facility] EXT AA BID       Start Date: 8/5/2022  End Date: --    HYDROXYZINE HCL (ATARAX) 25 MG TABLET    Take 1 tablet (25 mg total) by mouth every 4 to 6 hours as needed for Itching (redness).       Start Date: 7/8/2023  End Date: --    IBUPROFEN (ADVIL,MOTRIN) 800 MG TABLET    Take by mouth.       Start Date: 5/31/2023 End Date: --    LEVETIRACETAM (KEPPRA) 1000 MG TABLET    Take 1 tablet (1,000 mg total) by mouth 2 (two) times daily.       Start Date: 10/1/2022 End Date: --    LIDOCAINE (LIDODERM) 5 %    Place 1 patch onto the skin once daily. Remove & Discard patch within 12 hours or as directed by MD       Start Date: 11/16/2021End Date: --    LIDOCAINE (LMX) 4 % CREAM    Apply topically as needed.       Start Date: 8/6/2020  End Date: --    METFORMIN (GLUCOPHAGE-XR) 500 MG ER 24HR TABLET    Take 1 tablet (500 mg total) by mouth daily with breakfast.       Start Date: 5/17/2022 End Date: 6/6/2023    MOBIC 15 MG TABLET    Take 1 tablet (15 mg total) by mouth daily as needed for Pain. Take on full stomach, no other NSAIDs. Not more  "than once a day       Start Date: 9/29/2022 End Date: --    PANTOPRAZOLE (PROTONIX) 40 MG TABLET    Take 1 tablet (40 mg total) by mouth once daily.       Start Date: 6/6/2023  End Date: --    PEN NEEDLE, DIABETIC 31 GAUGE X 5/16" NDLE    For use with bydureon       Start Date: 2/24/2020 End Date: --    PROMETHAZINE (PHENERGAN) 25 MG TABLET           Start Date: 5/3/2021  End Date: --    PROMETHAZINE-DEXTROMETHORPHAN (PROMETHAZINE-DM) 6.25-15 MG/5 ML SYRP           Start Date: 1/25/2023 End Date: --    SODIUM CHLORIDE (OCEAN NASAL) 0.65 % NASAL SPRAY    1 spray by Nasal route every 3 (three) hours as needed for Congestion.       Start Date: 5/31/2021 End Date: --    TOPIRAMATE (TOPAMAX) 100 MG TABLET    Take 1 tablet (100 mg total) by mouth once daily.       Start Date: 1/5/2022  End Date: 6/6/2023    ZOLPIDEM (AMBIEN) 5 MG TAB    Take one pill in the evening, PRN, lowest dose possible       Start Date: 6/6/2023  End Date: --       Order(s) placed per written order guidelines: none    Please advise.              "

## 2023-08-09 ENCOUNTER — HOSPITAL ENCOUNTER (OUTPATIENT)
Dept: RADIOLOGY | Facility: HOSPITAL | Age: 58
Discharge: HOME | End: 2023-08-09
Attending: INTERNAL MEDICINE
Payer: COMMERCIAL

## 2023-08-09 DIAGNOSIS — Z12.31 ENCOUNTER FOR SCREENING MAMMOGRAM FOR MALIGNANT NEOPLASM OF BREAST: ICD-10-CM

## 2023-08-09 PROCEDURE — 77067 SCR MAMMO BI INCL CAD: CPT | Mod: 26,,, | Performed by: RADIOLOGY

## 2023-08-09 PROCEDURE — 77067 MAMMO DIGITAL SCREENING BILAT WITH TOMO: ICD-10-PCS | Mod: 26,,, | Performed by: RADIOLOGY

## 2023-08-09 PROCEDURE — 77063 MAMMO DIGITAL SCREENING BILAT WITH TOMO: ICD-10-PCS | Mod: 26,,, | Performed by: RADIOLOGY

## 2023-08-09 PROCEDURE — 77063 BREAST TOMOSYNTHESIS BI: CPT | Mod: 26,,, | Performed by: RADIOLOGY

## 2023-08-09 PROCEDURE — 77067 SCR MAMMO BI INCL CAD: CPT | Mod: TC,PO

## 2023-09-05 ENCOUNTER — TELEPHONE (OUTPATIENT)
Dept: BARIATRICS | Facility: CLINIC | Age: 58
End: 2023-09-05
Payer: COMMERCIAL

## 2023-09-06 ENCOUNTER — HOSPITAL ENCOUNTER (EMERGENCY)
Facility: HOSPITAL | Age: 58
Discharge: HOME | End: 2023-09-06
Attending: EMERGENCY MEDICINE
Payer: COMMERCIAL

## 2023-09-06 VITALS
HEART RATE: 73 BPM | SYSTOLIC BLOOD PRESSURE: 122 MMHG | OXYGEN SATURATION: 97 % | BODY MASS INDEX: 38.98 KG/M2 | HEIGHT: 63 IN | DIASTOLIC BLOOD PRESSURE: 77 MMHG | TEMPERATURE: 98 F | RESPIRATION RATE: 16 BRPM | WEIGHT: 220 LBS

## 2023-09-06 DIAGNOSIS — U07.1 COVID-19: Primary | ICD-10-CM

## 2023-09-06 LAB — POCT GLUCOSE: 139 MG/DL (ref 70–110)

## 2023-09-06 PROCEDURE — 99284 EMERGENCY DEPT VISIT MOD MDM: CPT | Mod: ER

## 2023-09-06 PROCEDURE — 82962 GLUCOSE BLOOD TEST: CPT | Mod: ER

## 2023-09-06 PROCEDURE — 25000003 PHARM REV CODE 250: Mod: ER

## 2023-09-06 RX ORDER — ONDANSETRON 4 MG/1
4 TABLET, ORALLY DISINTEGRATING ORAL EVERY 6 HOURS PRN
Qty: 20 TABLET | Refills: 0 | Status: SHIPPED | OUTPATIENT
Start: 2023-09-06

## 2023-09-06 RX ORDER — PHENOL 1.4 %
AEROSOL, SPRAY (ML) MUCOUS MEMBRANE
Qty: 177 ML | Refills: 0 | Status: SHIPPED | OUTPATIENT
Start: 2023-09-06

## 2023-09-06 RX ORDER — BENZONATATE 100 MG/1
100 CAPSULE ORAL 3 TIMES DAILY PRN
Qty: 20 CAPSULE | Refills: 0 | Status: SHIPPED | OUTPATIENT
Start: 2023-09-06 | End: 2023-09-16

## 2023-09-06 RX ORDER — ONDANSETRON 4 MG/1
4 TABLET, ORALLY DISINTEGRATING ORAL
Status: COMPLETED | OUTPATIENT
Start: 2023-09-06 | End: 2023-09-06

## 2023-09-06 RX ORDER — ACETAMINOPHEN 500 MG
500 TABLET ORAL EVERY 4 HOURS PRN
Qty: 30 TABLET | Refills: 0 | Status: SHIPPED | OUTPATIENT
Start: 2023-09-06

## 2023-09-06 RX ADMIN — ONDANSETRON 4 MG: 4 TABLET, ORALLY DISINTEGRATING ORAL at 10:09

## 2023-09-06 NOTE — DISCHARGE INSTRUCTIONS

## 2023-09-06 NOTE — Clinical Note
"Monalisa Carsno (Michelle) was seen and treated in our emergency department on 9/6/2023.  She may return to work on 09/10/2023.       If you have any questions or concerns, please don't hesitate to call.      Taz Angeles PA-C"

## 2023-09-06 NOTE — Clinical Note
"Monalisajaquan Carson (Michelle) was seen and treated in our emergency department on 9/6/2023.     COVID-19 is present in our communities across the state. There is limited testing for COVID at this time, so not all patients can be tested. In this situation, your employee meets the following criteria:    Monalisa Carson has met the criteria for COVID-19 testing and has a POSITIVE result. She can return to work once they are asymptomatic for 24 hours without the use of fever reducing medications AND at least five days from the first positive result. A mask is recommended for 5 days post quarantine.     If you have any questions or concerns, or if I can be of further assistance, please do not hesitate to contact me.    Sincerely,             Taz Angeles PA-C"

## 2023-09-06 NOTE — ED PROVIDER NOTES
Encounter Date: 9/6/2023       History     Chief Complaint   Patient presents with    COVID-19 Concerns     Complains of headache, nasal congestion, cough, nausea and generalized body aches x2 days. +COVID test at home.     58-year-old female with past medical history of anxiety, diabetes type 2, hypertension, seizures presents to ED for acute onset fever (T-max 101° yesterday), nonproductive cough, chills, sore throat, nasal congestion, generalized myalgias, nausea, rhinorrhea that started yesterday.  Patient reports taking a home COVID test yesterday which was positive.  She attempted Tylenol (last dose at 7:00 a.m. this morning) and Nila-Luverne with mild relief.  She denies any sick contacts.  She denies any dysphagia, chest pain, shortness of breath, abdominal pain, hemoptysis, vomiting, diarrhea, dysuria, hematuria.  No other symptoms reported.    The history is provided by the patient. No  was used.     Review of patient's allergies indicates:   Allergen Reactions    Keflex [cephalexin] Hives    Vicodin [hydrocodone-acetaminophen] Itching     itch    Crestor [rosuvastatin] Other (See Comments)     Muscle pain     Past Medical History:   Diagnosis Date    Allergy     Anxiety     Diabetes mellitus     Heart murmur     Hypertension     Seizures      Past Surgical History:   Procedure Laterality Date    CHOLECYSTECTOMY      2001 april    COLONOSCOPY N/A 6/29/2021    Procedure: COLONOSCOPY;  Surgeon: Gustavo Pelletier MD;  Location: [facility];  Service: Endoscopy;  Laterality: N/A;  combined orders    ESOPHAGOGASTRODUODENOSCOPY N/A 6/29/2021    Procedure: ESOPHAGOGASTRODUODENOSCOPY (EGD);  Surgeon: Gustavo Pelletier MD;  Location: [facility];  Service: Endoscopy;  Laterality: N/A;  covdi +3/29/20, fully vaccinated 3/23/21, prep instr portal -ml    HYSTERECTOMY      partial  1996    OOPHORECTOMY      SHOULDER SURGERY Right     WISDOM TOOTH EXTRACTION       Family History   Problem Relation  Age of Onset    Diabetes Mother     Hyperlipidemia Mother     Hypertension Mother     Cataracts Mother     Diabetes Father     Hypertension Father     Stroke Father     Depression Sister     Hypertension Sister     Stroke Sister     No Known Problems Brother     Cancer Maternal Aunt         breast x 2    Breast cancer Maternal Aunt     Cancer Maternal Uncle         prostate x 2    Cancer Paternal Aunt         breast    Breast cancer Paternal Aunt     No Known Problems Paternal Uncle     No Known Problems Maternal Grandmother     Cancer Maternal Grandfather         lung    Early death Paternal Grandmother     Early death Paternal Grandfather     Esophageal cancer Paternal Grandfather     No Known Problems Other     Melanoma Neg Hx     Eczema Neg Hx     Lupus Neg Hx     Psoriasis Neg Hx     Amblyopia Neg Hx     Blindness Neg Hx     Glaucoma Neg Hx     Macular degeneration Neg Hx     Retinal detachment Neg Hx     Strabismus Neg Hx     Thyroid disease Neg Hx     Colon cancer Neg Hx      Social History     Tobacco Use    Smoking status: Former     Current packs/day: 0.00     Types: Cigarettes    Smokeless tobacco: Never   Substance Use Topics    Alcohol use: Yes     Alcohol/week: 0.0 standard drinks of alcohol     Comment: seldom    Drug use: No     Review of Systems   Constitutional:  Positive for chills and fever.   HENT:  Positive for congestion, rhinorrhea and sore throat. Negative for ear pain and trouble swallowing.    Eyes:  Negative for redness.   Respiratory:  Positive for cough. Negative for shortness of breath.    Cardiovascular:  Negative for chest pain.   Gastrointestinal:  Positive for nausea. Negative for abdominal pain, diarrhea and vomiting.   Genitourinary:  Negative for decreased urine volume, difficulty urinating, dysuria, frequency, hematuria and urgency.   Musculoskeletal:  Positive for myalgias. Negative for back pain and neck pain.   Skin:  Negative for rash.   Neurological:  Negative for  headaches.   Psychiatric/Behavioral:  Negative for confusion.        Physical Exam     Initial Vitals [09/06/23 0906]   BP Pulse Resp Temp SpO2   122/77 73 16 98.4 °F (36.9 °C) 97 %      MAP       --         Physical Exam    Nursing note and vitals reviewed.  Constitutional: She appears well-developed and well-nourished.  Non-toxic appearance. She does not appear ill.   HENT:   Head: Normocephalic and atraumatic.   Right Ear: Hearing, tympanic membrane, external ear and ear canal normal. Tympanic membrane is not perforated, not erythematous and not bulging.   Left Ear: Hearing, tympanic membrane, external ear and ear canal normal. Tympanic membrane is not perforated, not erythematous and not bulging.   Nose: Nose normal.   Mouth/Throat: Uvula is midline, oropharynx is clear and moist and mucous membranes are normal.   Eyes: Conjunctivae and EOM are normal.   Neck: Neck supple.   Normal range of motion.   Full passive range of motion without pain.     Cardiovascular:  Normal rate and regular rhythm.           Pulses:       Radial pulses are 2+ on the right side and 2+ on the left side.   Pulmonary/Chest: Effort normal and breath sounds normal. No accessory muscle usage. No respiratory distress. She has no decreased breath sounds.   Abdominal: Abdomen is soft. Bowel sounds are normal. She exhibits no distension. There is no abdominal tenderness. There is no rebound and no guarding.   Musculoskeletal:         General: Normal range of motion.      Cervical back: Full passive range of motion without pain, normal range of motion and neck supple. No rigidity.     Neurological: She is alert. No cranial nerve deficit.   Neuro intact.  Strength and sensation intact bilateral upper and lower extremities.   Skin: Skin is warm and dry.   Psychiatric: She has a normal mood and affect.         ED Course   Procedures  Labs Reviewed   POCT GLUCOSE - Abnormal; Notable for the following components:       Result Value    POCT Glucose  139 (*)     All other components within normal limits   POCT GLUCOSE, HAND-HELD DEVICE          Imaging Results    None          Medications   ondansetron disintegrating tablet 4 mg (4 mg Oral Given 9/6/23 1002)     Medical Decision Making  This is a 58-year-old female with past medical history of anxiety, diabetes type 2, hypertension, seizures presents to ED for acute onset fever (T-max 101° yesterday), nonproductive cough, chills, sore throat, nasal congestion, generalized myalgias, nausea, rhinorrhea that started yesterday. On physical exam, patient is well-appearing and in no acute distress.  Nontoxic appearing.  Lungs are clear to auscultation bilaterally.  Abdomen is soft and nontender.  No guarding, rigidity, rebound.  2+ radial pulses bilaterally.  Posterior oropharynx is not erythematous.  No edema or exudate.  Uvula midline.  Bilateral tympanic membrane is normal.  No erythema, bulging, or perforations.  Neuro intact.  Strength and sensation intact bilateral upper and lower extremities.  Full range of motion neck.  No neck rigidity.  Point of care glucose 139.  Patient tested positive for COVID at home.  Offered patient Paxlovid versus symptomatic treatment.  Patient would like Paxlovid at this time.  Normal renal function.  Will discharge patient on Paxlovid, Tylenol, Tessalon Perles, Chloraseptic throat spray, and Zofran.  Ambulatory O2 97. Encouraged the patient to drink lot of fluids upon discharge.  Urged prompt follow-up with PCP for further evaluation.    Strict return precautions given. I discussed with the patient/family the diagnosis, treatment plan, indications for return to the emergency department, and for expected follow-up. The patient/family verbalized an understanding. The patient/family is asked if there are any questions or concerns. We discuss the case, until all issues are addressed to the patient/family's satisfaction. Patient/family understands and is agreeable to the plan. Patient  is stable and ready for discharge.      Amount and/or Complexity of Data Reviewed  Labs: ordered.    Risk  OTC drugs.  Prescription drug management.                               Clinical Impression:   Final diagnoses:  [U07.1] COVID-19 (Primary)        ED Disposition Condition    Discharge Stable          ED Prescriptions       Medication Sig Dispense Start Date End Date Auth. Provider    nirmatrelvir-ritonavir 300 mg (150 mg x 2)-100 mg copackaged tablets (EUA) Take 3 tablets by mouth 2 (two) times daily for 5 days. Each dose contains 2 nirmatrelvir (pink tablets) and 1 ritonavir (white tablet). Take all 3 tablets together 30 tablet 9/6/2023 9/11/2023 Taz Angeles PA-C    acetaminophen (TYLENOL) 500 MG tablet Take 1 tablet (500 mg total) by mouth every 4 (four) hours as needed for Pain or Temperature greater than (100.5 or greater). 30 tablet 9/6/2023 -- Taz Angeles PA-C    ondansetron (ZOFRAN-ODT) 4 MG TbDL Take 1 tablet (4 mg total) by mouth every 6 (six) hours as needed (nausea). 20 tablet 9/6/2023 -- Taz Angeles PA-C    benzonatate (TESSALON) 100 MG capsule Take 1 capsule (100 mg total) by mouth 3 (three) times daily as needed for Cough. 20 capsule 9/6/2023 9/16/2023 Taz Angeles PA-C    phenoL (CHLORASEPTIC THROAT SPRAY) 1.4 % SprA by Mucous Membrane route every 2 (two) hours. 177 mL 9/6/2023 -- Taz Angeles PA-C          Follow-up Information       Follow up With Specialties Details Why Contact Info    Flor Gomez MD Family Medicine, Internal Medicine In 2 days for further evaluation [address] [place] [id number] [phone]      [place] [facility] ED Emergency Medicine In 2 days If symptoms worsen [address] [facility] [address] [phone]             Taz Angeles PA-C  09/06/23 8233

## 2023-09-12 ENCOUNTER — OFFICE VISIT (OUTPATIENT)
Dept: URGENT CARE | Facility: CLINIC | Age: 58
End: 2023-09-12
Payer: COMMERCIAL

## 2023-09-12 ENCOUNTER — TELEPHONE (OUTPATIENT)
Dept: BARIATRICS | Facility: CLINIC | Age: 58
End: 2023-09-12
Payer: COMMERCIAL

## 2023-09-12 VITALS
HEIGHT: 63 IN | DIASTOLIC BLOOD PRESSURE: 83 MMHG | BODY MASS INDEX: 38.98 KG/M2 | OXYGEN SATURATION: 98 % | RESPIRATION RATE: 18 BRPM | SYSTOLIC BLOOD PRESSURE: 129 MMHG | TEMPERATURE: 98 F | HEART RATE: 96 BPM | WEIGHT: 220 LBS

## 2023-09-12 DIAGNOSIS — M54.12 CERVICAL RADICULOPATHY: ICD-10-CM

## 2023-09-12 DIAGNOSIS — M54.2 NECK PAIN ON RIGHT SIDE: Primary | ICD-10-CM

## 2023-09-12 PROCEDURE — 99213 PR OFFICE/OUTPT VISIT, EST, LEVL III, 20-29 MIN: ICD-10-PCS | Mod: 25,S$GLB,, | Performed by: NURSE PRACTITIONER

## 2023-09-12 PROCEDURE — 96372 PR INJECTION,THERAP/PROPH/DIAG2ST, IM OR SUBCUT: ICD-10-PCS | Mod: S$GLB,,, | Performed by: NURSE PRACTITIONER

## 2023-09-12 PROCEDURE — 96372 THER/PROPH/DIAG INJ SC/IM: CPT | Mod: S$GLB,,, | Performed by: NURSE PRACTITIONER

## 2023-09-12 PROCEDURE — 99213 OFFICE O/P EST LOW 20 MIN: CPT | Mod: 25,S$GLB,, | Performed by: NURSE PRACTITIONER

## 2023-09-12 RX ORDER — KETOROLAC TROMETHAMINE 30 MG/ML
30 INJECTION, SOLUTION INTRAMUSCULAR; INTRAVENOUS
Status: COMPLETED | OUTPATIENT
Start: 2023-09-12 | End: 2023-09-12

## 2023-09-12 RX ORDER — NAPROXEN 500 MG/1
500 TABLET ORAL 2 TIMES DAILY WITH MEALS
Qty: 20 TABLET | Refills: 0 | Status: SHIPPED | OUTPATIENT
Start: 2023-09-12 | End: 2023-09-22

## 2023-09-12 RX ORDER — METHOCARBAMOL 500 MG/1
500 TABLET, FILM COATED ORAL 3 TIMES DAILY
Qty: 30 TABLET | Refills: 0 | Status: SHIPPED | OUTPATIENT
Start: 2023-09-12 | End: 2023-09-22

## 2023-09-12 RX ADMIN — KETOROLAC TROMETHAMINE 30 MG: 30 INJECTION, SOLUTION INTRAMUSCULAR; INTRAVENOUS at 03:09

## 2023-09-12 NOTE — TELEPHONE ENCOUNTER
Spoke with pt and would like to proceed with appointments to workup for wt loss surgery. Discussed Bariatric program, standard and possible orders, insurance requirements found in guarantor note.    Scheduled [facility] consult and nutrition consult. Pt aware to look out for portal message with seminar link and to complete the online seminar and the assigned questionnaire as soon as possible as this is a requirement. Dashboard updated to consult scheduled and guarantor note copied into bariatric dashboard.   Instructed pt to reply to portal message or call [phone] with any further questions.     TAZ Blanton RN

## 2023-09-12 NOTE — PATIENT INSTRUCTIONS
Start Naproxen tomorrow as you were given Toradol injection today in clinic.    Please drink plenty of fluids.  Please get plenty of rest.  Please return here or go to the Emergency Department for any concerns or worsening of condition.  If you were prescribed a muscle relaxant medication, do not drive or operate heavy equipment or machinery while taking these medications.  If you were not prescribed an anti-inflammatory medication, and if you do not have any history of stomach/intestinal ulcers, or kidney disease, or are not taking a blood thinner such as Coumadin, Plavix, Pradaxa, Eloquis, or Xaralta for example, it is OK to take over the counter Ibuprofen or Advil or Motrin or Aleve as directed.  Do not take these medications on an empty stomach.  If you lose control or sensation of any extremity, please go to the nearest Emergency Department immediately.  Please follow up with your primary care doctor or specialist as needed.    If you  smoke, please stop smoking.

## 2023-09-12 NOTE — PROGRESS NOTES
"Subjective:      Patient ID: Monalisa Carson is a 58 y.o. female.    Vitals:  height is 5' 3" (1.6 m) and weight is 99.8 kg (220 lb). Her oral temperature is 98.3 °F (36.8 °C). Her blood pressure is 129/83 and her pulse is 96. Her respiration is 18 and oxygen saturation is 98%.     Chief Complaint: Neck Pain    Patient states of having pain in the right side of neck that is radiating down the right arm. Pt states that majority of the pain is in trapezius region.  Tylenol at home w/ no relief.    Neck Pain   This is a new problem. The current episode started in the past 7 days. The problem occurs constantly. The problem has been gradually worsening. The pain is associated with nothing. The pain is present in the right side. The quality of the pain is described as aching, shooting and stabbing. The pain is at a severity of 10/10. The pain is severe. Exacerbated by: movement. The pain is Same all the time. Pertinent negatives include no chest pain, fever, headaches, leg pain, numbness, pain with swallowing, paresis, photophobia, syncope, tingling, trouble swallowing, visual change, weakness or weight loss. She has tried acetaminophen and heat for the symptoms. The treatment provided no relief.       Constitution: Negative for fever.   HENT:  Negative for trouble swallowing.    Neck: Positive for neck pain.   Cardiovascular:  Negative for chest pain and passing out.   Eyes:  Negative for photophobia.   Neurological:  Negative for headaches and numbness.      Objective:     Physical Exam   Constitutional: She is oriented to person, place, and time. She appears well-developed. She is cooperative.  Non-toxic appearance. She does not appear ill. No distress.   HENT:   Head: Normocephalic and atraumatic.   Ears:   Right Ear: Hearing, tympanic membrane, external ear and ear canal normal.   Left Ear: Hearing, tympanic membrane, external ear and ear canal normal.   Nose: Nose normal. No mucosal edema, rhinorrhea or nasal " deformity. No epistaxis. Right sinus exhibits no maxillary sinus tenderness and no frontal sinus tenderness. Left sinus exhibits no maxillary sinus tenderness and no frontal sinus tenderness.   Mouth/Throat: Uvula is midline, oropharynx is clear and moist and mucous membranes are normal. No trismus in the jaw. Normal dentition. No uvula swelling. No posterior oropharyngeal erythema.   Eyes: Conjunctivae, EOM and lids are normal. Pupils are equal, round, and reactive to light. No scleral icterus.   Neck: Trachea normal and phonation normal. Neck supple. No crepitus. There are no signs of injury. No torticollis present.      Comments: + Spurling's test  Good strength and sensation distally.  +2 pulses. No neck rigidity present. No decreased range of motion present. pain with movement present. No spinous process tenderness present. muscular tenderness (right trapezius) present.   Cardiovascular: Normal rate, regular rhythm, normal heart sounds and normal pulses.   Pulmonary/Chest: Effort normal and breath sounds normal. No respiratory distress.   Abdominal: Normal appearance and bowel sounds are normal. She exhibits no distension and no mass. Soft. There is no abdominal tenderness.   Musculoskeletal: Normal range of motion.         General: No deformity. Normal range of motion.   Neurological: no focal deficit. She is alert and oriented to person, place, and time. She has normal strength and normal reflexes. She displays no weakness and normal reflexes. No cranial nerve deficit or sensory deficit. She exhibits normal muscle tone. Coordination and gait normal.   Skin: Skin is warm, dry, intact, not diaphoretic and not pale.   Psychiatric: Her speech is normal and behavior is normal. Judgment and thought content normal.   Nursing note and vitals reviewed.      Assessment:     1. Neck pain on right side    2. Cervical radiculopathy        Plan:   Chart reviewed and queried.  Previously treated for this in the ER in 2021  and patient notes that she had improvement with injection of toradol and muscle relaxant.  Denies trauma or injury.  Good strength and sensation with +2 pulses.  + spurling's test.      Neck pain on right side  -     ketorolac injection 30 mg  -     naproxen (NAPROSYN) 500 MG tablet; Take 1 tablet (500 mg total) by mouth 2 (two) times daily with meals. for 10 days  Dispense: 20 tablet; Refill: 0  -     methocarbamoL (ROBAXIN) 500 MG Tab; Take 1 tablet (500 mg total) by mouth 3 (three) times daily. As needed for muscle spasm. May cause drowsiness for 10 days  Dispense: 30 tablet; Refill: 0    Cervical radiculopathy  -     ketorolac injection 30 mg  -     naproxen (NAPROSYN) 500 MG tablet; Take 1 tablet (500 mg total) by mouth 2 (two) times daily with meals. for 10 days  Dispense: 20 tablet; Refill: 0  -     methocarbamoL (ROBAXIN) 500 MG Tab; Take 1 tablet (500 mg total) by mouth 3 (three) times daily. As needed for muscle spasm. May cause drowsiness for 10 days  Dispense: 30 tablet; Refill: 0      Patient Instructions   Start Naproxen tomorrow as you were given Toradol injection today in clinic.    Please drink plenty of fluids.  Please get plenty of rest.  Please return here or go to the Emergency Department for any concerns or worsening of condition.  If you were prescribed a muscle relaxant medication, do not drive or operate heavy equipment or machinery while taking these medications.  If you were not prescribed an anti-inflammatory medication, and if you do not have any history of stomach/intestinal ulcers, or kidney disease, or are not taking a blood thinner such as Coumadin, Plavix, Pradaxa, Eloquis, or Xaralta for example, it is OK to take over the counter Ibuprofen or Advil or Motrin or Aleve as directed.  Do not take these medications on an empty stomach.  If you lose control or sensation of any extremity, please go to the nearest Emergency Department immediately.  Please follow up with your primary care  doctor or specialist as needed.    If you  smoke, please stop smoking.

## 2023-09-25 ENCOUNTER — CLINICAL SUPPORT (OUTPATIENT)
Dept: BARIATRICS | Facility: CLINIC | Age: 58
End: 2023-09-25
Payer: COMMERCIAL

## 2023-09-25 ENCOUNTER — OFFICE VISIT (OUTPATIENT)
Dept: BARIATRICS | Facility: CLINIC | Age: 58
End: 2023-09-25
Payer: COMMERCIAL

## 2023-09-25 VITALS
WEIGHT: 224.88 LBS | HEART RATE: 89 BPM | SYSTOLIC BLOOD PRESSURE: 132 MMHG | OXYGEN SATURATION: 98 % | BODY MASS INDEX: 39.84 KG/M2 | HEIGHT: 63 IN | DIASTOLIC BLOOD PRESSURE: 80 MMHG

## 2023-09-25 DIAGNOSIS — E11.22 CONTROLLED TYPE 2 DIABETES MELLITUS WITH STAGE 2 CHRONIC KIDNEY DISEASE, WITHOUT LONG-TERM CURRENT USE OF INSULIN: ICD-10-CM

## 2023-09-25 DIAGNOSIS — E66.01 SEVERE OBESITY (BMI 35.0-39.9) WITH COMORBIDITY: ICD-10-CM

## 2023-09-25 DIAGNOSIS — N18.2 CONTROLLED TYPE 2 DIABETES MELLITUS WITH STAGE 2 CHRONIC KIDNEY DISEASE, WITHOUT LONG-TERM CURRENT USE OF INSULIN: ICD-10-CM

## 2023-09-25 DIAGNOSIS — Z71.3 DIETARY COUNSELING AND SURVEILLANCE: ICD-10-CM

## 2023-09-25 DIAGNOSIS — K21.00 GASTROESOPHAGEAL REFLUX DISEASE WITH ESOPHAGITIS, UNSPECIFIED WHETHER HEMORRHAGE: ICD-10-CM

## 2023-09-25 DIAGNOSIS — E66.9 OBESITY (BMI 30-39.9): ICD-10-CM

## 2023-09-25 DIAGNOSIS — E78.00 PURE HYPERCHOLESTEROLEMIA: ICD-10-CM

## 2023-09-25 DIAGNOSIS — I10 ESSENTIAL HYPERTENSION, BENIGN: ICD-10-CM

## 2023-09-25 DIAGNOSIS — E78.00 PURE HYPERCHOLESTEROLEMIA: Primary | ICD-10-CM

## 2023-09-25 PROCEDURE — 3075F PR MOST RECENT SYSTOLIC BLOOD PRESS GE 130-139MM HG: ICD-10-PCS | Mod: CPTII,S$GLB,, | Performed by: PHYSICIAN ASSISTANT

## 2023-09-25 PROCEDURE — 99205 OFFICE O/P NEW HI 60 MIN: CPT | Mod: S$GLB,,, | Performed by: PHYSICIAN ASSISTANT

## 2023-09-25 PROCEDURE — 4010F PR ACE/ARB THEARPY RXD/TAKEN: ICD-10-PCS | Mod: CPTII,S$GLB,, | Performed by: PHYSICIAN ASSISTANT

## 2023-09-25 PROCEDURE — 1159F MED LIST DOCD IN RCRD: CPT | Mod: CPTII,S$GLB,, | Performed by: PHYSICIAN ASSISTANT

## 2023-09-25 PROCEDURE — 3061F PR NEG MICROALBUMINURIA RESULT DOCUMENTED/REVIEW: ICD-10-PCS | Mod: CPTII,S$GLB,, | Performed by: PHYSICIAN ASSISTANT

## 2023-09-25 PROCEDURE — 4010F ACE/ARB THERAPY RXD/TAKEN: CPT | Mod: CPTII,S$GLB,, | Performed by: PHYSICIAN ASSISTANT

## 2023-09-25 PROCEDURE — 3044F PR MOST RECENT HEMOGLOBIN A1C LEVEL <7.0%: ICD-10-PCS | Mod: CPTII,S$GLB,, | Performed by: PHYSICIAN ASSISTANT

## 2023-09-25 PROCEDURE — 3008F BODY MASS INDEX DOCD: CPT | Mod: CPTII,S$GLB,, | Performed by: PHYSICIAN ASSISTANT

## 2023-09-25 PROCEDURE — 99999 PR PBB SHADOW E&M-EST. PATIENT-LVL I: CPT | Mod: PBBFAC,,, | Performed by: DIETITIAN, REGISTERED

## 2023-09-25 PROCEDURE — 99999 PR PBB SHADOW E&M-EST. PATIENT-LVL I: ICD-10-PCS | Mod: PBBFAC,,, | Performed by: DIETITIAN, REGISTERED

## 2023-09-25 PROCEDURE — 3061F NEG MICROALBUMINURIA REV: CPT | Mod: CPTII,S$GLB,, | Performed by: PHYSICIAN ASSISTANT

## 2023-09-25 PROCEDURE — 3044F HG A1C LEVEL LT 7.0%: CPT | Mod: CPTII,S$GLB,, | Performed by: PHYSICIAN ASSISTANT

## 2023-09-25 PROCEDURE — 3008F PR BODY MASS INDEX (BMI) DOCUMENTED: ICD-10-PCS | Mod: CPTII,S$GLB,, | Performed by: PHYSICIAN ASSISTANT

## 2023-09-25 PROCEDURE — 3066F PR DOCUMENTATION OF TREATMENT FOR NEPHROPATHY: ICD-10-PCS | Mod: CPTII,S$GLB,, | Performed by: PHYSICIAN ASSISTANT

## 2023-09-25 PROCEDURE — 3079F PR MOST RECENT DIASTOLIC BLOOD PRESSURE 80-89 MM HG: ICD-10-PCS | Mod: CPTII,S$GLB,, | Performed by: PHYSICIAN ASSISTANT

## 2023-09-25 PROCEDURE — 99499 NO LOS: ICD-10-PCS | Mod: S$GLB,,, | Performed by: DIETITIAN, REGISTERED

## 2023-09-25 PROCEDURE — 99999 PR PBB SHADOW E&M-EST. PATIENT-LVL V: CPT | Mod: PBBFAC,,, | Performed by: PHYSICIAN ASSISTANT

## 2023-09-25 PROCEDURE — 1159F PR MEDICATION LIST DOCUMENTED IN MEDICAL RECORD: ICD-10-PCS | Mod: CPTII,S$GLB,, | Performed by: PHYSICIAN ASSISTANT

## 2023-09-25 PROCEDURE — 3079F DIAST BP 80-89 MM HG: CPT | Mod: CPTII,S$GLB,, | Performed by: PHYSICIAN ASSISTANT

## 2023-09-25 PROCEDURE — 99999 PR PBB SHADOW E&M-EST. PATIENT-LVL V: ICD-10-PCS | Mod: PBBFAC,,, | Performed by: PHYSICIAN ASSISTANT

## 2023-09-25 PROCEDURE — 1160F PR REVIEW ALL MEDS BY PRESCRIBER/CLIN PHARMACIST DOCUMENTED: ICD-10-PCS | Mod: CPTII,S$GLB,, | Performed by: PHYSICIAN ASSISTANT

## 2023-09-25 PROCEDURE — 3075F SYST BP GE 130 - 139MM HG: CPT | Mod: CPTII,S$GLB,, | Performed by: PHYSICIAN ASSISTANT

## 2023-09-25 PROCEDURE — 3066F NEPHROPATHY DOC TX: CPT | Mod: CPTII,S$GLB,, | Performed by: PHYSICIAN ASSISTANT

## 2023-09-25 PROCEDURE — 99499 UNLISTED E&M SERVICE: CPT | Mod: S$GLB,,, | Performed by: DIETITIAN, REGISTERED

## 2023-09-25 PROCEDURE — 99205 PR OFFICE/OUTPT VISIT, NEW, LEVL V, 60-74 MIN: ICD-10-PCS | Mod: S$GLB,,, | Performed by: PHYSICIAN ASSISTANT

## 2023-09-25 PROCEDURE — 1160F RVW MEDS BY RX/DR IN RCRD: CPT | Mod: CPTII,S$GLB,, | Performed by: PHYSICIAN ASSISTANT

## 2023-09-25 NOTE — PROGRESS NOTES
BARIATRIC NEW PATIENT EVALUATION    CHIEF COMPLAINT:   Morbid obesity, body mass index is 39.83 kg/m². and inability to maintain weight loss and lose weight.    HPI:  Monalisa Carson is a 58 y.o. morbidly obese female. Her current body mass index is 39.83 kg/m². She has multiple associated comorbidities including diabetes mellitus type 2 non insulin dependent , essential hypertension, GERD on daily medications, and anxiety.  She has struggled with excess weight since around the age 45.  Her highest adult weight was 230 lbs at age 58, and her lowest adult weight was 160 lbs at age 40. The patient has tried phentermine (Adipex-P) and weight loss medications portion control and low carb .  The patient was most successful with adipex b12 injections and exercising with a weight loss of 30 lbs.  Her current exercise includes none 0 times a week. She denies any history of eating disorder such as anorexia, bulimia, or taking laxatives for weight loss, and denies any addiction including illicit substances, alcohol, or gambling.  Patient states she has a poor  support system states that her  eats and eats and she eats with him.   She lives with  and three grand kids.  She is retired.  She  endorses a 10 year history of GERD. States that medications work but if she stops taking them she gets reflux really bad, coughing nausea and vomiting along with water brash The patient's goal is 160-175 lbs.    ESS: Score of 6, reviewed 09/25/2023.  Does not need Sleep Study.    PAST MEDICAL HISTORY:  Past Medical History:   Diagnosis Date    Allergy     Anxiety     Diabetes mellitus     Heart murmur     Hypertension     Seizures        PAST SURGICAL HISTORY:  Past Surgical History:   Procedure Laterality Date    CHOLECYSTECTOMY      2001 april    COLONOSCOPY N/A 6/29/2021    Procedure: COLONOSCOPY;  Surgeon: Gutsavo Pelletier MD;  Location: [facility];  Service: Endoscopy;  Laterality: N/A;  combined orders     ESOPHAGOGASTRODUODENOSCOPY N/A 6/29/2021    Procedure: ESOPHAGOGASTRODUODENOSCOPY (EGD);  Surgeon: Gustavo Pelletier MD;  Location: [facility];  Service: Endoscopy;  Laterality: N/A;  covdi +3/29/20, fully vaccinated 3/23/21, prep instr portal -ml    HYSTERECTOMY      partial  1996    OOPHORECTOMY      SHOULDER SURGERY Right     WISDOM TOOTH EXTRACTION         FAMILY HISTORY:  Family History   Problem Relation Age of Onset    Diabetes Mother     Hyperlipidemia Mother     Hypertension Mother     Cataracts Mother     Diabetes Father     Hypertension Father     Stroke Father     Depression Sister     Hypertension Sister     Stroke Sister     No Known Problems Brother     Cancer Maternal Aunt         breast x 2    Breast cancer Maternal Aunt     Cancer Maternal Uncle         prostate x 2    Cancer Paternal Aunt         breast    Breast cancer Paternal Aunt     No Known Problems Paternal Uncle     No Known Problems Maternal Grandmother     Cancer Maternal Grandfather         lung    Early death Paternal Grandmother     Early death Paternal Grandfather     Esophageal cancer Paternal Grandfather     No Known Problems Other     Melanoma Neg Hx     Eczema Neg Hx     Lupus Neg Hx     Psoriasis Neg Hx     Amblyopia Neg Hx     Blindness Neg Hx     Glaucoma Neg Hx     Macular degeneration Neg Hx     Retinal detachment Neg Hx     Strabismus Neg Hx     Thyroid disease Neg Hx     Colon cancer Neg Hx         SOCIAL HISTORY:  Social History     Socioeconomic History    Marital status:    Occupational History    Occupation:      Employer: [employer]   Tobacco Use    Smoking status: Former     Current packs/day: 0.00     Types: Cigarettes    Smokeless tobacco: Never   Substance and Sexual Activity    Alcohol use: Yes     Alcohol/week: 0.0 standard drinks of alcohol     Comment: seldom    Drug use: No    Sexual activity: Yes     Partners: Male     Birth  control/protection: None   Other Topics Concern    Are you pregnant or think you may be? No    Breast-feeding No       MEDICATIONS:    Current Outpatient Medications:     acetaminophen (TYLENOL) 500 MG tablet, Take 2 tablets (1,000 mg total) by mouth every 6 (six) hours as needed for Pain., Disp: 30 tablet, Rfl: 0    acetaminophen (TYLENOL) 500 MG tablet, Take 1 tablet (500 mg total) by mouth every 4 (four) hours as needed for Pain or Temperature greater than (100.5 or greater)., Disp: 30 tablet, Rfl: 0    albuterol (PROVENTIL/VENTOLIN HFA) 90 mcg/actuation inhaler, Inhale 1-2 puffs into the lungs every 4 (four) hours as needed for Wheezing. Rescue, Disp: 18 g, Rfl: 11    benazepriL (LOTENSIN) 40 MG tablet, TAKE 1 TABLET(40 MG) BY MOUTH EVERY DAY, Disp: 90 tablet, Rfl: 0    busPIRone (BUSPAR) 10 MG tablet, Take 1 tablet (10 mg total) by mouth 3 (three) times daily as needed (anxiety)., Disp: 90 tablet, Rfl: 2    dicyclomine (BENTYL) 20 mg tablet, Take 20 mg by mouth 4 (four) times daily., Disp: , Rfl:     dulaglutide (TRULICITY) 0.75 mg/0.5 mL pen injector, Inject 0.75 mg into the skin every 7 days., Disp: 4 pen, Rfl: 11    EScitalopram oxalate (LEXAPRO) 10 MG tablet, Take 1 tablet (10 mg total) by mouth once daily., Disp: 90 tablet, Rfl: 1    fluticasone propionate (FLONASE) 50 mcg/actuation nasal spray, 1 spray (50 mcg total) by Each Nostril route 2 (two) times daily., Disp: 16 g, Rfl: 0    gabapentin (NEURONTIN) 100 MG capsule, Take 100 mg by mouth 3 (three) times daily., Disp: , Rfl:     hydroCHLOROthiazide (HYDRODIURIL) 12.5 MG Tab, Take 1 tablet (12.5 mg total) by mouth once daily., Disp: 90 tablet, Rfl: 1    HYDROcodone-acetaminophen (NORCO) 7.5-325 mg per tablet, Take 1 tablet by mouth every 6 (six) hours as needed., Disp: , Rfl:     hydrocortisone (WESTCORT) 0.2 % cream, Apply topically 2 (two) times daily. for 5 days, Disp: 60 g, Rfl: 0    hydrocortisone 2.5 % cream, WOLFGANG EXT AA BID, Disp:  "28 g, Rfl: 0    hydrOXYzine HCL (ATARAX) 25 MG tablet, Take 1 tablet (25 mg total) by mouth every 4 to 6 hours as needed for Itching (redness)., Disp: 30 tablet, Rfl: 0    ibuprofen (ADVIL,MOTRIN) 800 MG tablet, Take by mouth., Disp: , Rfl:     levETIRAcetam (KEPPRA) 1000 MG tablet, Take 1 tablet (1,000 mg total) by mouth 2 (two) times daily., Disp: 180 tablet, Rfl: 3    LIDOcaine (LIDODERM) 5 %, Place 1 patch onto the skin once daily. Remove & Discard patch within 12 hours or as directed by MD, Disp: 15 patch, Rfl: 0    lidocaine (LMX) 4 % cream, Apply topically as needed., Disp: , Rfl: 0    metFORMIN (GLUCOPHAGE-XR) 500 MG ER 24hr tablet, Take 1 tablet (500 mg total) by mouth daily with breakfast., Disp: 90 tablet, Rfl: 3    MOBIC 15 mg tablet, Take 1 tablet (15 mg total) by mouth daily as needed for Pain. Take on full stomach, no other NSAIDs. Not more than once a day, Disp: 30 tablet, Rfl: 0    ondansetron (ZOFRAN-ODT) 4 MG TbDL, Take 1 tablet (4 mg total) by mouth every 6 (six) hours as needed (nausea)., Disp: 20 tablet, Rfl: 0    pantoprazole (PROTONIX) 40 MG tablet, Take 1 tablet (40 mg total) by mouth once daily., Disp: 90 tablet, Rfl: 3    pen needle, diabetic 31 gauge x 5/16" Ndle, For use with bydureon, Disp: 100 each, Rfl: 0    phenoL (CHLORASEPTIC THROAT SPRAY) 1.4 % SprA, by Mucous Membrane route every 2 (two) hours., Disp: 177 mL, Rfl: 0    promethazine (PHENERGAN) 25 MG tablet, , Disp: , Rfl:     promethazine-dextromethorphan (PROMETHAZINE-DM) 6.25-15 mg/5 mL Syrp, , Disp: , Rfl:     sodium chloride (OCEAN NASAL) 0.65 % nasal spray, 1 spray by Nasal route every 3 (three) hours as needed for Congestion., Disp: 1 Bottle, Rfl: 12    topiramate (TOPAMAX) 100 MG tablet, Take 1 tablet (100 mg total) by mouth once daily., Disp: 30 tablet, Rfl: 11    zolpidem (AMBIEN) 5 MG Tab, Take one pill in the evening, PRN, lowest dose possible, Disp: 30 tablet, Rfl: 2    ALLERGIES:  Review of patient's " "allergies indicates:   Allergen Reactions    Keflex [cephalexin] Hives    Vicodin [hydrocodone-acetaminophen] Itching     itch    Crestor [rosuvastatin] Other (See Comments)     Muscle pain       Review of Systems   Constitutional:  Negative for chills and fever.   HENT:  Negative for sore throat and tinnitus.    Eyes:  Negative for blurred vision and double vision.   Respiratory:  Negative for cough and shortness of breath.    Cardiovascular:  Negative for chest pain, palpitations and leg swelling.   Gastrointestinal:  Positive for heartburn. Negative for abdominal pain, constipation, diarrhea, nausea and vomiting.   Genitourinary:  Negative for dysuria and hematuria.   Musculoskeletal:  Positive for neck pain. Negative for back pain, falls and joint pain.   Skin:  Negative for rash.   Neurological:  Negative for dizziness, tingling and headaches.   Psychiatric/Behavioral:  Negative for depression and suicidal ideas. The patient is not nervous/anxious.      Vitals:    09/25/23 1301   BP: 132/80   Pulse: 89   SpO2: 98%   Weight: 102 kg (224 lb 13.9 oz)   Height: 5' 3" (1.6 m)   PainSc:   9   PainLoc: Shoulder       Physical Exam  Constitutional:       Appearance: She is obese.   HENT:      Head: Normocephalic and atraumatic.   Eyes:      Extraocular Movements: Extraocular movements intact.      Conjunctiva/sclera: Conjunctivae normal.      Pupils: Pupils are equal, round, and reactive to light.   Cardiovascular:      Rate and Rhythm: Normal rate and regular rhythm.      Heart sounds: Normal heart sounds. No murmur heard.  Pulmonary:      Effort: Pulmonary effort is normal. No respiratory distress.      Breath sounds: Normal breath sounds.   Abdominal:      Palpations: Abdomen is soft.      Tenderness: There is no abdominal tenderness.   Musculoskeletal:         General: No swelling. Normal range of motion.      Cervical back: Normal range of motion and neck supple.   Skin:     General: Skin is dry.      Capillary " Refill: Capillary refill takes less than 2 seconds.   Neurological:      General: No focal deficit present.      Mental Status: She is alert and oriented to person, place, and time.   Psychiatric:         Mood and Affect: Mood normal.         Behavior: Behavior normal.         Thought Content: Thought content normal.         Judgment: Judgment normal.        DIAGNOSIS:  1. Morbid obesity, body mass index is 39.83 kg/m². and inability to maintain weight loss and lose weight.  2. Co-morbidities: diabetes mellitus type 2 non insulin dependent , essential hypertension, hyperlipidemia, GERD on daily medications, and anxiety    PLAN:  The patient is a good candidate for Bariatric Surgery. The patient is interested in laparoscopic sleeve gastrectomy with Dr Weaver. The surgery and post-op care was discussed in detail with the patient. All questions were answered.      Pt aware that her reflux could be worsened with her history of GERD, willing to take that risk.      The patient understands that bariatric surgery is a tool to aid in weight loss and that they need to be committed to the diet and exercise post-operatively for successful weight loss. Discussed with patient that bariatric surgery is not the easy way out and that it will take plenty of dedication on the patient's part to be successful. Also discussed the possibility of weight regain if the patient strays from the diet guidelines or exercise requirements. Patient verbalized understanding and wishes to proceed with the work-up.    Estimated Goal weight is 181 lbs.    ORDERS:  1. Stress Test, Chest X-Ray, EKG, and EGD  2. Psychological Consult, Bariatric Dietician Consult, and PCP Clearance  3. Bariatric Labs: Per orders.  4. No NSAIDS after surgery due to increased risk of stomach ulcers. Talk to your prescribing provider about alternative options for your pain.      PCP: Flor Gomez MD  RTC: As scheduled.- Check labs today.

## 2023-09-25 NOTE — PROGRESS NOTES
"NUTRITIONAL CONSULT    Referring Physician: Apoorva Weaver M.D.   Reason for MNT Referral: Initial assessment for sleeve gastrectomy work-up    PAST MEDICAL HISTORY:   58 y.o. female    Weight history includes: Highest adult weight is 230 lbs at age 58. Lowest adult weigh 165 lbs at age 40.  Dieting attempts include: Adipex, keto. Lost 30 lbs with Adipex before .    Past Medical History:   Diagnosis Date    Allergy     Anxiety     Diabetes mellitus     GERD (gastroesophageal reflux disease)     Heart murmur     Hyperlipidemia     Hypertension     Seizures     5-6 years ago, spontaneous       CLINICAL DATA:  58 y.o.-year-old Black or  female.  Height: 5'3"  Weight: 224 lbs  IBW: 135 lbs  BMI: 39.83  The patient's goal weight (50% EBW): 180 lbs  Personal goal weight: 160-175 lbs    Goal for Bariatric Surgery: to improve health, to improve quality of life, to lose weight, and have more energy, get off medicines    DAILY NUTRITIONAL NEEDS: pre-op nutritional bariatric guidelines to promote weight loss  6002-6131 Calories    Grams Protein    NUTRITION & HEALTH HISTORY:  Greatest challenge: irregular meal patterns, high-fat diet, and sugar-sweetened beverages    Current diet recall:     Coffee with cream and Splenda  B: Usually skips. PT takes care of mother. May eat what her mother does not finish (grits, turkey pool, or sausage)  L: May  shrimp or hot sausage sandwich or a chicken plate with fries and salad or leftovers  S: Chips  D: Stuffed bell peppers with ground beef and shrimp and canned saba greens   S: Ice cream sandwich    Current Diet:  Meal pattern: 2 meals  Protein supplements: 0  Snackin / day  Vegetables: Likes a variety. Eats almost daily. Likes all  Fruits: Likes a variety. Eats  3 x week . Likes all. Bananas, apples, oranges, grapes, peaches, plums  Beverages: soda, sweet tea, coffee without sugar, and Arnold Flores  Dining out:  3-4 x week  Mostly " take-out. Lunch place  Cooking at home:  3 x week  Mostly baked, smothered, fried, and air fried  meat, fish, starchy CHO, and vegetables. 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  Lives with  and three grandchildren (11, 16, 21).  Grocery shopping and food prep: PT.  Patient believes the household will be supportive after surgery.  Alcohol:  A few times/year .  Smoking: Vaping (not every day)    ASSESSMENT:  Patient reports attempts at weight loss, only to regain lost weight.  Patient demonstrated knowledge of healthy eating behaviors and exercise patterns; admits to not eating healthy and not exercising at this point.  Patient states willingness to change lifestyle and make behavior modifications     Barriers to Education: none    Stage of change: determination    NUTRITION DIAGNOSIS:    Morbid Obesity related to Excessive carbohydrate intake, Excessive calorie intake, and Physical inactivity as evidence by BMI.    BARIATRIC DIET DISCUSSION/PLAN:  Discussed diet after surgery and related to patient's food record.  Reviewed nutrition guidelines for before and after surgery.  Answered all questions.  Discussed protein content of yogurts, sweet tea, breads  Discussed high protein snacks - suggested nuts, cheese, yogurt instead of chips  Discussed not skipping meals - have protein food or shake  Work on gradually cutting back on starchy CHO in the diet.  Begin trying various protein supplements to determine preference.  Start including protein supplements in the diet plan daily.  Reduce frequency of dining out.  Increase exercise.  Start cutting back on sugar-sweetened beverages/switch to sugar free or  diet versions    PT Plan  Start having salad for lunch or dinner (lettuce, tomatoes, cucumbers onions, beets, cheese, and rotisserie chicken)  Start walking 30 mins 3 x week    RECOMMENDATIONS:  Pt is a potential candidate for bariatric surgery.    Follow up in one month.  Needs additional visits with RD.    Patient verbalized understanding.    Communicated nutrition plan with bariatric team.    SESSION TIME:  60 minutes

## 2023-09-25 NOTE — PATIENT INSTRUCTIONS
Prior to surgery you will need to complete:  - Dietitian consult and follow up appointments as needed  - PCP clearance  - Labs  - Chest X-ray  - EKG  - Psychological evaluation, Please call psychiatry [phone] to schedule  - Stress echo   - EGD  ( [phone])     In preparation for bariatric surgery, please complete the following:   Discuss your current medications with your primary care provider, remember your medications will need to be crushed, chewable, or in liquid form for the first 3-6 months after a gastric bypass or sleeve.  For a gastric band, your medications will need to be crushed indefinitely.    If you take a blood thinner such as: Coumadin (warfarin), Pradaxa (dabigatran), or Plavix (clopidogrel), you will need to speak with your prescribing provider on how or if this medication can be stopped before surgery.   If you take a medication for depression or anxiety, you will need to begin crushing or opening the capsule 1-3 months prior to surgery.  Remember to discuss this with the psychologist or psychiatrist that you see.   If you take medication for arthritis on a daily basis that is considered a non-steroidal anti-inflammatory (NSAID), please discuss with your prescribing physician an alternative medication.  After having gastric bypass or gastric sleeve, this group of medications is not appropriate to take due to increased risk of bleeding stomach ulcers.      DEFINITIONS  Appointments: Pre-scheduled meetings or consultations with any physician, advanced practice provider, dietitian, or psychologist, and labs, imaging studies, sleep studies, etc.   Late cancellation: Cancelling an appointment 24-48 hours prior to scheduled time.  No-Show appointment:  is when   You do NOT arrive to your appointment at the time its scheduled.  You call to cancel or cancel via [facility] less than 24 hours in advance of your scheduled appointment  You show up 15 minutes AFTER your scheduled appointment time  without any notification of being late.     POLICY  You are allowed up to 3 cancellations for appointments.   After 3 cancellations your case will be placed on hold for 2 months and after that time you can resume the program.   You are allowed only 1 no-show for an appointment.   You will be re-scheduled one time and if there is a 2nd no-show at any point, your case will be placed on hold for 3 months.  After 3 months you can resume the program.     Upon resuming the program after being placed on hold for either above mentioned reasons, if you have a late cancel or no show for any appointment, the bariatric team will review if youre an appropriate candidate for surgery at the monthly meeting.

## 2023-09-27 DIAGNOSIS — E11.22 CONTROLLED TYPE 2 DIABETES MELLITUS WITH STAGE 2 CHRONIC KIDNEY DISEASE, WITHOUT LONG-TERM CURRENT USE OF INSULIN: ICD-10-CM

## 2023-09-27 DIAGNOSIS — N18.2 CONTROLLED TYPE 2 DIABETES MELLITUS WITH STAGE 2 CHRONIC KIDNEY DISEASE, WITHOUT LONG-TERM CURRENT USE OF INSULIN: ICD-10-CM

## 2023-09-27 NOTE — TELEPHONE ENCOUNTER
Last Office Visit Info:   The patient's last visit with Flor Gomez MD was on 6/6/2023.    The patient's last visit in current department was on 6/6/2023.        Last CBC Results:   Lab Results   Component Value Date    WBC 5.87 05/13/2022    HGB 12.9 05/13/2022    HCT 40.0 05/13/2022     05/13/2022       Last CMP Results  Lab Results   Component Value Date     03/02/2023    K 4.4 03/02/2023     03/02/2023    CO2 27 03/02/2023    BUN 15 03/02/2023    CREATININE 0.8 03/02/2023    CALCIUM 10.5 03/02/2023    ALBUMIN 4.0 03/02/2023    AST 20 03/02/2023    ALT 27 03/02/2023       Last Lipids  Lab Results   Component Value Date    CHOL 231 (H) 05/13/2022    TRIG 272 (H) 05/13/2022    HDL 45 05/13/2022    LDLCALC 131.6 05/13/2022       Last A1C  Lab Results   Component Value Date    HGBA1C 6.6 (H) 03/02/2023       Last TSH  Lab Results   Component Value Date    TSH 2.153 05/13/2022             Current Med Refills  Medication List with Changes/Refills   Current Medications    ACETAMINOPHEN (TYLENOL) 500 MG TABLET    Take 2 tablets (1,000 mg total) by mouth every 6 (six) hours as needed for Pain.       Start Date: 5/31/2021 End Date: --    ACETAMINOPHEN (TYLENOL) 500 MG TABLET    Take 1 tablet (500 mg total) by mouth every 4 (four) hours as needed for Pain or Temperature greater than (100.5 or greater).       Start Date: 9/6/2023  End Date: --    ALBUTEROL (PROVENTIL/VENTOLIN HFA) 90 MCG/ACTUATION INHALER    Inhale 1-2 puffs into the lungs every 4 (four) hours as needed for Wheezing. Rescue       Start Date: 2/17/2022 End Date: 9/25/2023    BENAZEPRIL (LOTENSIN) 40 MG TABLET    TAKE 1 TABLET(40 MG) BY MOUTH EVERY DAY       Start Date: 8/8/2023  End Date: --    BUSPIRONE (BUSPAR) 10 MG TABLET    Take 1 tablet (10 mg total) by mouth 3 (three) times daily as needed (anxiety).       Start Date: 3/6/2023  End Date: 3/5/2024    DICYCLOMINE (BENTYL) 20 MG TABLET    Take 20 mg by mouth 4 (four) times  daily.       Start Date: 5/3/2021  End Date: --    DULAGLUTIDE (TRULICITY) 0.75 MG/0.5 ML PEN INJECTOR    Inject 0.75 mg into the skin every 7 days.       Start Date: 5/6/2023  End Date: 5/5/2024    ESCITALOPRAM OXALATE (LEXAPRO) 10 MG TABLET    Take 1 tablet (10 mg total) by mouth once daily.       Start Date: 3/6/2023  End Date: --    FLUTICASONE PROPIONATE (FLONASE) 50 MCG/ACTUATION NASAL SPRAY    1 spray (50 mcg total) by Each Nostril route 2 (two) times daily.       Start Date: 5/31/2021 End Date: --    GABAPENTIN (NEURONTIN) 100 MG CAPSULE    Take 100 mg by mouth 3 (three) times daily.       Start Date: 1/25/2023 End Date: --    HYDROCHLOROTHIAZIDE (HYDRODIURIL) 12.5 MG TAB    Take 1 tablet (12.5 mg total) by mouth once daily.       Start Date: 8/2/2023  End Date: --    HYDROCODONE-ACETAMINOPHEN (NORCO) 7.5-325 MG PER TABLET    Take 1 tablet by mouth every 6 (six) hours as needed.       Start Date: 5/31/2023 End Date: --    HYDROCORTISONE (WESTCORT) 0.2 % CREAM    Apply topically 2 (two) times daily. for 5 days       Start Date: 7/8/2023  End Date: 7/13/2023    HYDROCORTISONE 2.5 % CREAM    WOLFGANG EXT AA BID       Start Date: 8/5/2022  End Date: --    HYDROXYZINE HCL (ATARAX) 25 MG TABLET    Take 1 tablet (25 mg total) by mouth every 4 to 6 hours as needed for Itching (redness).       Start Date: 7/8/2023  End Date: --    IBUPROFEN (ADVIL,MOTRIN) 800 MG TABLET    Take by mouth.       Start Date: 5/31/2023 End Date: --    LEVETIRACETAM (KEPPRA) 1000 MG TABLET    Take 1 tablet (1,000 mg total) by mouth 2 (two) times daily.       Start Date: 10/1/2022 End Date: --    LIDOCAINE (LIDODERM) 5 %    Place 1 patch onto the skin once daily. Remove & Discard patch within 12 hours or as directed by MD       Start Date: 11/16/2021End Date: --    LIDOCAINE (LMX) 4 % CREAM    Apply topically as needed.       Start Date: 8/6/2020  End Date: --    METFORMIN (GLUCOPHAGE-XR) 500 MG ER 24HR TABLET    Take 1 tablet (500 mg total) by  "mouth daily with breakfast.       Start Date: 5/17/2022 End Date: 9/25/2023    MOBIC 15 MG TABLET    Take 1 tablet (15 mg total) by mouth daily as needed for Pain. Take on full stomach, no other NSAIDs. Not more than once a day       Start Date: 9/29/2022 End Date: --    ONDANSETRON (ZOFRAN-ODT) 4 MG TBDL    Take 1 tablet (4 mg total) by mouth every 6 (six) hours as needed (nausea).       Start Date: 9/6/2023  End Date: --    PANTOPRAZOLE (PROTONIX) 40 MG TABLET    Take 1 tablet (40 mg total) by mouth once daily.       Start Date: 6/6/2023  End Date: --    PEN NEEDLE, DIABETIC 31 GAUGE X 5/16" NDLE    For use with bydureon       Start Date: 2/24/2020 End Date: --    PHENOL (CHLORASEPTIC THROAT SPRAY) 1.4 % SPRA    by Mucous Membrane route every 2 (two) hours.       Start Date: 9/6/2023  End Date: --    PROMETHAZINE (PHENERGAN) 25 MG TABLET           Start Date: 5/3/2021  End Date: --    PROMETHAZINE-DEXTROMETHORPHAN (PROMETHAZINE-DM) 6.25-15 MG/5 ML SYRP           Start Date: 1/25/2023 End Date: --    SODIUM CHLORIDE (OCEAN NASAL) 0.65 % NASAL SPRAY    1 spray by Nasal route every 3 (three) hours as needed for Congestion.       Start Date: 5/31/2021 End Date: --    TOPIRAMATE (TOPAMAX) 100 MG TABLET    Take 1 tablet (100 mg total) by mouth once daily.       Start Date: 1/5/2022  End Date: 9/25/2023    ZOLPIDEM (AMBIEN) 5 MG TAB    Take one pill in the evening, PRN, lowest dose possible       Start Date: 6/6/2023  End Date: --       Order(s) placed per written order guidelines: none    Please advise.              "

## 2023-09-27 NOTE — TELEPHONE ENCOUNTER
No care due was identified.  [facility] Embedded Care Due Messages. Reference number: [id number].   9/27/2023 12:28:22 PM CDT

## 2023-09-28 ENCOUNTER — HOSPITAL ENCOUNTER (OUTPATIENT)
Dept: RADIOLOGY | Facility: HOSPITAL | Age: 58
Discharge: HOME | End: 2023-09-28
Attending: PHYSICIAN ASSISTANT
Payer: COMMERCIAL

## 2023-09-28 ENCOUNTER — HOSPITAL ENCOUNTER (OUTPATIENT)
Dept: CARDIOLOGY | Facility: HOSPITAL | Age: 58
Discharge: HOME | End: 2023-09-28
Attending: PHYSICIAN ASSISTANT
Payer: COMMERCIAL

## 2023-09-28 DIAGNOSIS — E66.01 SEVERE OBESITY (BMI 35.0-39.9) WITH COMORBIDITY: ICD-10-CM

## 2023-09-28 DIAGNOSIS — E11.22 CONTROLLED TYPE 2 DIABETES MELLITUS WITH STAGE 2 CHRONIC KIDNEY DISEASE, WITHOUT LONG-TERM CURRENT USE OF INSULIN: ICD-10-CM

## 2023-09-28 DIAGNOSIS — N18.2 CONTROLLED TYPE 2 DIABETES MELLITUS WITH STAGE 2 CHRONIC KIDNEY DISEASE, WITHOUT LONG-TERM CURRENT USE OF INSULIN: ICD-10-CM

## 2023-09-28 DIAGNOSIS — E78.00 PURE HYPERCHOLESTEROLEMIA: ICD-10-CM

## 2023-09-28 DIAGNOSIS — I10 ESSENTIAL HYPERTENSION, BENIGN: ICD-10-CM

## 2023-09-28 PROCEDURE — 93005 ELECTROCARDIOGRAM TRACING: CPT

## 2023-09-28 PROCEDURE — 71046 XR CHEST PA AND LATERAL: ICD-10-PCS | Mod: 26,,, | Performed by: RADIOLOGY

## 2023-09-28 PROCEDURE — 71046 X-RAY EXAM CHEST 2 VIEWS: CPT | Mod: TC,FY

## 2023-09-28 PROCEDURE — 71046 X-RAY EXAM CHEST 2 VIEWS: CPT | Mod: 26,,, | Performed by: RADIOLOGY

## 2023-09-28 PROCEDURE — 93010 EKG 12-LEAD: ICD-10-PCS | Mod: ,,, | Performed by: INTERNAL MEDICINE

## 2023-09-28 PROCEDURE — 93010 ELECTROCARDIOGRAM REPORT: CPT | Mod: ,,, | Performed by: INTERNAL MEDICINE

## 2023-10-02 DIAGNOSIS — K21.9 GASTROESOPHAGEAL REFLUX DISEASE WITHOUT ESOPHAGITIS: ICD-10-CM

## 2023-10-02 RX ORDER — CLARITHROMYCIN 500 MG/1
500 TABLET, FILM COATED ORAL EVERY 12 HOURS
Qty: 28 TABLET | Refills: 0 | Status: SHIPPED | OUTPATIENT
Start: 2023-10-02 | End: 2023-10-16

## 2023-10-02 RX ORDER — METRONIDAZOLE 500 MG/1
500 TABLET ORAL EVERY 8 HOURS
Qty: 42 TABLET | Refills: 0 | Status: SHIPPED | OUTPATIENT
Start: 2023-10-02 | End: 2023-10-16

## 2023-10-02 RX ORDER — PANTOPRAZOLE SODIUM 40 MG/1
40 TABLET, DELAYED RELEASE ORAL 2 TIMES DAILY
Qty: 28 TABLET | Refills: 0 | Status: SHIPPED | OUTPATIENT
Start: 2023-10-02 | End: 2023-10-16

## 2023-10-03 RX ORDER — METFORMIN HYDROCHLORIDE 500 MG/1
500 TABLET, EXTENDED RELEASE ORAL
Qty: 90 TABLET | Refills: 3 | Status: SHIPPED | OUTPATIENT
Start: 2023-10-03

## 2023-10-04 ENCOUNTER — TELEPHONE (OUTPATIENT)
Dept: BARIATRICS | Facility: CLINIC | Age: 58
End: 2023-10-04
Payer: COMMERCIAL

## 2023-10-04 ENCOUNTER — PATIENT MESSAGE (OUTPATIENT)
Dept: BARIATRICS | Facility: CLINIC | Age: 58
End: 2023-10-04
Payer: COMMERCIAL

## 2023-10-04 NOTE — TELEPHONE ENCOUNTER
Returned pt's portal message regarding Rx meds. Pt stated she went to the pharmacy to  her meds and was provided extra medication and she did not know what they were for. Pt was informed of the positive H. Pylori result and the 3 medications that are used to treat this bacteria. Informed pt that SILVIA Smyth NP prescribed the medication and an explanation was in her portal. Pt stated she did not see portal message. H. Pylori and treatment medication explanation. Understanding stated. Pt thanked me. All questions and concerns addressed.

## 2023-10-04 NOTE — TELEPHONE ENCOUNTER
Returned pt's portal message regarding prescribed medications. No answer, left VM w/call back name and number. Will send portal message.

## 2023-10-10 ENCOUNTER — PATIENT MESSAGE (OUTPATIENT)
Dept: BARIATRICS | Facility: CLINIC | Age: 58
End: 2023-10-10
Payer: COMMERCIAL

## 2023-10-25 ENCOUNTER — CLINICAL SUPPORT (OUTPATIENT)
Dept: BARIATRICS | Facility: CLINIC | Age: 58
End: 2023-10-25
Payer: COMMERCIAL

## 2023-10-25 DIAGNOSIS — Z71.3 DIETARY COUNSELING AND SURVEILLANCE: ICD-10-CM

## 2023-10-25 DIAGNOSIS — E11.22 CONTROLLED TYPE 2 DIABETES MELLITUS WITH STAGE 2 CHRONIC KIDNEY DISEASE, WITHOUT LONG-TERM CURRENT USE OF INSULIN: ICD-10-CM

## 2023-10-25 DIAGNOSIS — N18.2 CONTROLLED TYPE 2 DIABETES MELLITUS WITH STAGE 2 CHRONIC KIDNEY DISEASE, WITHOUT LONG-TERM CURRENT USE OF INSULIN: ICD-10-CM

## 2023-10-25 DIAGNOSIS — K21.9 GASTROESOPHAGEAL REFLUX DISEASE WITHOUT ESOPHAGITIS: ICD-10-CM

## 2023-10-25 DIAGNOSIS — E78.00 PURE HYPERCHOLESTEROLEMIA: ICD-10-CM

## 2023-10-25 DIAGNOSIS — E66.9 OBESITY (BMI 30-39.9): ICD-10-CM

## 2023-10-25 PROCEDURE — 99499 NO LOS: ICD-10-PCS | Mod: 95,,, | Performed by: DIETITIAN, REGISTERED

## 2023-10-25 PROCEDURE — 97803 MED NUTRITION INDIV SUBSEQ: CPT | Mod: 95,,, | Performed by: DIETITIAN, REGISTERED

## 2023-10-25 PROCEDURE — 97803 PR MED NUTR THER, SUBSQ, INDIV, EA 15 MIN: ICD-10-PCS | Mod: 95,,, | Performed by: DIETITIAN, REGISTERED

## 2023-10-25 PROCEDURE — 99499 UNLISTED E&M SERVICE: CPT | Mod: 95,,, | Performed by: DIETITIAN, REGISTERED

## 2023-10-25 NOTE — PROGRESS NOTES
The patient location is: home ([place])  The chief complaint leading to consultation is: 4 months Medically Supervised Diet pending Gastric Sleeve  Visit type: audiovisual     Face to Face time with patient: 18 min    22 minutes of total time spent on the encounter, which includes face to face time and non-face to face time preparing to see the patient (eg, review of tests), Obtaining and/or reviewing separately obtained history, Documenting clinical information in the electronic or other health record, Independently interpreting results (not separately reported) and communicating results to the patient/family/caregiver, or Care coordination (not separately reported).       Each patient to whom he or she provides medical services by telemedicine is:  (1) informed of the relationship between the physician and patient and the respective role of any other health care provider with respect to management of the patient; and (2) notified that he or she may decline to receive medical services by telemedicine and may withdraw from such care at any time.    NUTRITION NOTE    Referring Physician: Apoorva Weaver M.D.   Reason for MNT Referral: 4 months Medically Supervised Diet pending Gastric Sleeve    Patient presents for follow-up visit for MSD with weight loss over the past month; 3 lbs total weight loss by making numerous dietary and lifestyle changes.    CLINICAL DATA:  58 y.o. female.    Initial weight: 224 lbs  Current Weight: 221 lbs (PT reported)  Weight Change Since Initial Visit: -3 lbs  Ideal Body Weight: 135 lbs  BMI: 39.1    DAILY NUTRITIONAL NEEDS: pre-op nutritional bariatric guidelines to promote weight loss  [id number] Calories    Grams Protein    CURRENT DIET:  Regular diet.  Diet Recall: Food records are not present.  Greatest challenge: irregular meal patterns, high-fat diet, and sugar-sweetened beverages  Progress: Started exercising, tried Boost nutritional shake (20 g pro, 11 g sugar), switched  to Zero soda drinks, snacking on nuts and light yogurt, stopped eating fried foods from lunch place, cut out ice cream sandwich    Current diet recall:      Coffee with cream and Splenda  B: Boiled egg with toast  L: Chicken salad sandwich or turkey sandwich with lettuce, tomatoes  S: Nuts (peanuts) and lite yogurt  D: Tacos with ground beef, cheese, lettuce, on flour tortilla or Lean Cuisine      Diet Includes: 3 meals   Meal Pattern: Improved.  Protein Supplements: 0-1 per day.  Snacking: Adequate. Snacks include healthy choices.  Vegetables: Likes a variety. Eats almost daily. Likes all  Fruits: Likes a variety. Eats  3 x week . Likes all. Bananas, apples, oranges, grapes, peaches, plums  Beverages: Zero sugar Coke and Dr Pepper, coffee without sugar, sweet tea, and Arnold Flores  Dining out: None   Cooking at home: 3 x week  Mostly baked, smothered, fried, and air fried. Meat, fish, starchy CHO, and vegetables. Okra, meatloaf, smothered chicken, steak, burgers, fried fish, boiled seafood, rice    Food Allergies:   Fresh pineapple (canned ok)     Vitamins / Minerals / Herbs:   Fish oil  Multivitamin     Labs:   Reviewed     Exercise:  Current exercise: Fair  Walking 2 x week 30 mins each   Restrictions to exercise: None     Social:  Retired.  Lives with  and three grandchildren (11, 16, 21).  Grocery shopping and food prep: PT.  Patient believes the household will be supportive after surgery.  Alcohol:  A few times/year .  Smoking: Vaping (not every day)    ASSESSMENT:  Patient demonstrates some willingness to change lifestyle habits as evidenced by weight loss, dietary changes, reduced dining out, and increased exercise .    Doing well with working on greatest challenges (irregular meal patterns and high-fat diet).    Barriers to Education:  none  Stage of Change:  action    NUTRITION DIAGNOSIS:  Morbid Obesity related to Excessive carbohydrate intake, Excessive calorie intake, and Physical inactivity as  evidence by BMI.  Obesity Status: Same    BARIATRIC DIET DISCUSSION/PLAN:  Discussed diet after surgery and related to patient's food record.  Reviewed nutrition guidelines for before and after surgery.  Answered all questions.  Reviewed protein and sugar requirements for protein shakes  Work on gradually cutting back on starchy CHO in the diet.  Start including protein supplements in the diet plan daily.  Increase exercise.    RECOMMENDATIONS:  Pt is potential candidate for surgery.    Diet: Adjust diet plan.  Switch to protein shake with > 20 g protein and < 4 g sugar    Exercise: Increase.    Behavior Modification: Begin to document food & activity logs daily.    Return to clinic in one month.    Needs additional visits with RD.    Communicated nutrition plan with bariatric team.    SESSION TIME:  30 minutes

## 2023-10-27 ENCOUNTER — PATIENT MESSAGE (OUTPATIENT)
Dept: PSYCHIATRY | Facility: CLINIC | Age: 58
End: 2023-10-27

## 2023-11-01 ENCOUNTER — OFFICE VISIT (OUTPATIENT)
Dept: BEHAVIORAL HEALTH | Facility: CLINIC | Age: 58
End: 2023-11-01
Payer: COMMERCIAL

## 2023-11-01 DIAGNOSIS — Z01.818 PREOP EXAMINATION: Primary | ICD-10-CM

## 2023-11-01 DIAGNOSIS — I10 ESSENTIAL HYPERTENSION, BENIGN: ICD-10-CM

## 2023-11-01 DIAGNOSIS — E66.01 MORBID OBESITY DUE TO EXCESS CALORIES: ICD-10-CM

## 2023-11-01 DIAGNOSIS — Z86.59 HISTORY OF ANXIETY: ICD-10-CM

## 2023-11-01 DIAGNOSIS — E11.22 CONTROLLED TYPE 2 DIABETES MELLITUS WITH STAGE 2 CHRONIC KIDNEY DISEASE, WITHOUT LONG-TERM CURRENT USE OF INSULIN: ICD-10-CM

## 2023-11-01 DIAGNOSIS — N18.2 CONTROLLED TYPE 2 DIABETES MELLITUS WITH STAGE 2 CHRONIC KIDNEY DISEASE, WITHOUT LONG-TERM CURRENT USE OF INSULIN: ICD-10-CM

## 2023-11-01 DIAGNOSIS — E78.5 HYPERLIPIDEMIA, UNSPECIFIED HYPERLIPIDEMIA TYPE: ICD-10-CM

## 2023-11-01 PROCEDURE — 3066F PR DOCUMENTATION OF TREATMENT FOR NEPHROPATHY: ICD-10-PCS | Mod: CPTII,95,, | Performed by: PSYCHOLOGIST

## 2023-11-01 PROCEDURE — 3061F NEG MICROALBUMINURIA REV: CPT | Mod: CPTII,95,, | Performed by: PSYCHOLOGIST

## 2023-11-01 PROCEDURE — 3061F PR NEG MICROALBUMINURIA RESULT DOCUMENTED/REVIEW: ICD-10-PCS | Mod: CPTII,95,, | Performed by: PSYCHOLOGIST

## 2023-11-01 PROCEDURE — 90791 PR PSYCHIATRIC DIAGNOSTIC EVALUATION: ICD-10-PCS | Mod: 95,,, | Performed by: PSYCHOLOGIST

## 2023-11-01 PROCEDURE — 96130 PR PSYCHOLOGIC TEST EVAL SVCS, 1ST HR: ICD-10-PCS | Mod: 95,,, | Performed by: PSYCHOLOGIST

## 2023-11-01 PROCEDURE — 4010F PR ACE/ARB THEARPY RXD/TAKEN: ICD-10-PCS | Mod: CPTII,95,, | Performed by: PSYCHOLOGIST

## 2023-11-01 PROCEDURE — 4010F ACE/ARB THERAPY RXD/TAKEN: CPT | Mod: CPTII,95,, | Performed by: PSYCHOLOGIST

## 2023-11-01 PROCEDURE — 3066F NEPHROPATHY DOC TX: CPT | Mod: CPTII,95,, | Performed by: PSYCHOLOGIST

## 2023-11-01 PROCEDURE — 3051F PR MOST RECENT HEMOGLOBIN A1C LEVEL 7.0 - < 8.0%: ICD-10-PCS | Mod: CPTII,95,, | Performed by: PSYCHOLOGIST

## 2023-11-01 PROCEDURE — 96130 PSYCL TST EVAL PHYS/QHP 1ST: CPT | Mod: 95,,, | Performed by: PSYCHOLOGIST

## 2023-11-01 PROCEDURE — 96146 PSYCL/NRPSYC TST AUTO RESULT: CPT | Mod: 95,59,, | Performed by: PSYCHOLOGIST

## 2023-11-01 PROCEDURE — 3051F HG A1C>EQUAL 7.0%<8.0%: CPT | Mod: CPTII,95,, | Performed by: PSYCHOLOGIST

## 2023-11-01 PROCEDURE — 96146 PR PSYCH/NEUROPSYCH TEST ADMIN, ELEC PLATFORM, AUTO RESULT ONLY: ICD-10-PCS | Mod: 95,59,, | Performed by: PSYCHOLOGIST

## 2023-11-01 PROCEDURE — 90791 PSYCH DIAGNOSTIC EVALUATION: CPT | Mod: 95,,, | Performed by: PSYCHOLOGIST

## 2023-11-01 NOTE — PSYCH TESTING
[facility]  [address] [place]  [address]  [phone]     REPORT OF PSYCHOLOGICAL TESTING     NAME: Monalisa Carson  MRN #: [id number]  : 1965     REFERRED BY: Apoorva Weaver MD     EVALUATED BY:  Kimberly Baxter, Ph.D., Psychologist  GURVINDER Lr, Psychometrician     DATES OF EVALUATION: 10/27/2023, 2023     EVALUATION PROCEDURES AND TIMES:  Conducted by Psychologist (1 hour):  Integration of patient data, interpretation of standardized test results and clinical data, clinical decision-making, treatment planning and report, and interactive feedback to the patient  CPT Codes:  52860 - 1 hour    Conducted by Technician (30 minutes):  Psychological or neuropsychological test administration, with single automated instrument via electronic platform, with automated result only: [place] Multiphasic Personality Inventory - 3 (MMPI-3)  CPT Codes:  54513     EVALUATION FINDINGS:  Before this evaluation was initiated, the purposes and process of the assessment and the limits of confidentiality were discussed with the patient who expressed understanding of these issues and orally consented to proceed with the evaluation.     Ms. Carson has struggled with weight since COVID-19 pandemic started from stress, relatives and friends dying from COVID-19, and stress eating being home more.  Ms. Carson acknowledges that she is an emotional eater, with stress as her primary emotional trigger to overeat.  She is working on increasing her coping mechanisms for stress, including taking rides with her  and going to the casino.  She has tried many weight loss methods on her own (i.e., phentermine (Adipex-P) and weight loss medications portion control and low carb) with most success with Adipex.  Her motivation for seeking surgery now is I want to be able to cut back off my medicine.  I want to be able to do more with the grandkids.  Her postsurgical goals include  I want to be able to do more, get out with the kids, have more energy, and want my health to be better.    Ms. Carson has no history of significant psychological difficulties.  She was prescribed psychotropic medication by her primary care provider for stress-related anxiety with caring for her mother; however, she noted her stress level has improved and she no longer takes such medication.  She has never been hospitalized for psychiatric reasons and denied any history of suicidality.  She denies a history of disordered eating.     At her initial consultation, her BMI was 39.83 kg/m².  Her medical comorbidities include:  diabetes mellitus type 2 non-insulin dependent, essential hypertension, hyperlipidemia, GERD on daily medications, and anxiety.  She has met with a bariatric dietician and reports to have made small lifestyle changes since these meetings. She was aware of details regarding the sleeve procedure, as well as risks of the procedures and post-operative eating restrictions.  She appears motivated for change at this time. She was fully cooperative and engaged in the assessment process.     TEST DATA:  The MMPI-3 provides an assessment of personality and psychopathology with specific evaluation of psychosocial risk factors associated with outcomes of bariatric surgery.   Ms. Carson produced an invalid and uninterpretable MMPI-3 protocol due to a significant level of inconsistent responding.     DIAGNOSTIC IMPRESSIONS:    ICD-10-CM ICD-9-CM   1. Preop examination  Z01.818 V72.84   2. Morbid obesity due to excess calories  E66.01 278.01   3. Essential hypertension, benign  I10 401.1   4. Controlled type 2 diabetes mellitus with stage 2 chronic kidney disease, without long-term current use of insulin  E11.22 250.40    N18.2 585.2   5. Hyperlipidemia, unspecified hyperlipidemia type  E78.5 272.4   6. BMI 39.0-39.9,adult  Z68.39 V85.39   7. History of anxiety  Z86.59 V11.8       SUMMARY AND  RECOMMENDATIONS:  Ms. Carson has a long history of weight problems and is pursuing bariatric surgery at this time in an effort to improve her health and quality of life. Results of personality testing are uninterpretable due to high levels of inconsistent responding making the protocol invalid.  Therefore, no conclusions can be drawn from her testing results.     In the clinical interview, Ms. Carson denied past psychiatric history and treatment.  Currently she reports no psychiatric problems, major psychosocial stressors, or other problems that would contraindicate surgery or impact her ability to engage in treatment.  She has adequate and appropriate knowledge and expectations regarding surgery and is motivated and willing to engage in behaviors to achieve successful outcomes with bariatric surgery. She has multiple protective factors that should help her during surgery and recovery.  This evaluation revealed no overt psychological contraindications to bariatric surgery.  There are no recommendations for psychological intervention at this time.

## 2023-11-01 NOTE — PROGRESS NOTES
Psychiatry Initial Visit (PhD/LCSW)   Diagnostic Interview - CPT 75409     Date: 11/1/2023    The patient location is: Patient's home in [place], [place]  The chief complaint leading to consultation is: pre-surgery evaluation    Visit type: audiovisual    Face to Face time with patient: 40 minutes  60 minutes of total time spent on the encounter, which includes face to face time and non-face to face time preparing to see the patient (eg, review of tests), Obtaining and/or reviewing separately obtained history, Documenting clinical information in the electronic or other health record, Independently interpreting results (not separately reported) and communicating results to the patient/family/caregiver, or Care coordination (not separately reported).     Each patient to whom he or she provides medical services by telemedicine is:  (1) informed of the relationship between the physician and patient and the respective role of any other health care provider with respect to management of the patient; and (2) notified that he or she may decline to receive medical services by telemedicine and may withdraw from such care at any time.        Referral source: Apoorva Weaver MD    Clinical status of patient: Outpatient     Ms. Carson, a 58 y.o.  female, presented for initial evaluation visit. Before this evaluation was initiated, the purposes and process of the assessment and the limits of confidentiality were discussed with the patient who expressed understanding of these issues and orally consented to proceed with the evaluation.     Chief complaint/reason for encounter: Routine psychological evaluation prior to bariatric surgery.     Type of surgery sought:  sleeve gastrectomy    History of present illness:  Ms. Carson is a 58 y.o.   female who is pursuing bariatric surgery to improve her health and quality of life.  She denied a history of mental health treatment or  psychiatric hospitalization.  Her primary care physician prescribed her medication for anxiety when her stress level was high caring for her mother.  She stated she does not take this medication anymore as her stress has decreased, and she does not think she needed it.  She denied any history of suicidality or non-suicidal self-injury.  She has attempted making positive lifestyle changes in anticipation for surgery, with reported benefit.  The patient has a Body Mass Index of 39.83 kg/m² as documented by the referring provider.    Ms. Carson has struggled with weight since COVID-19 pandemic started from stress, relatives and friends dying from COVID-19, and stress eating being home more.  Ms. Carson acknowledges that she is an emotional eater, with stress as her primary emotional trigger to overeat.  She is working on increasing her coping mechanisms for stress, including taking rides with her  and going to the [employer].  She has tried many weight loss methods on her own (i.e., phentermine (Adipex-P) and weight loss medications portion control and low carb) with most success with Adipex.  Her motivation for seeking surgery now is I want to be able to cut back off my medicine.  I want to be able to do more with the grandkids.  Her postsurgical goals include I want to be able to do more, get out with the kids, have more energy, and want my health to be better.    Ms. Carson has met with bariatric nutritionist Tracy Grimes RD, and reports that she has made the following lifestyle changes since beginning the bariatric program:  cut back on fried food, eating more baked foods, cut back on starches and sugary drinks.  She acknowledges she needs to meal prep more and eat more consistent meals.  She must continue meeting with Ms. Grimes to demonstrate the implementation of lifestyle changes prior to clearance for bariatric surgery.  She chose the gastric sleeve because her brother had the surgery, and he did well  with it.  She has friends who had the bypass and  later from complications.  She understood that she needs to focus on protein intake after surgery, which includes eating lean meats.  She noted that she will need to stay away from sugars, starches, and fried foods after surgery.  She hopes to lose 50 pounds and expects it will take six months to a year.  She plans to take the recommended time to recover after surgery and has made arrangements for someone else to care for her mother during this time.  Her  and daughters-in-law will be available to help her during recovery.    Medical History:  diabetes mellitus type 2 non-insulin dependent, essential hypertension, hyperlipidemia, GERD on daily medications, and anxiety    Current medications and drug reactions:  see medication list.    Pain:  None reported.    Psychiatric Symptoms:  Depression:  Denied.  She denied episodes of depressed mood or depression-related anhedonia, lack of motivation, lethargy, difficulty concentrating, feelings of worthlessness or guilt, hopelessness, appetite changes, or psychomotor changes.  Based on her reports, her symptoms do not meet full criteria for Major Depressive Disorder.  [place]/Hypomania:  Denied.  She denied periods of elevated mood or abnormally increased energy or goal-directed activity.  Anxiety:  Denied.  She denied experiencing excessive, exaggerated anxiety that was unmanageable.  Based on her reports, her symptoms do not meet full criteria for Generalized Anxiety Disorder.  Thoughts:  Denied delusions, hallucinations.  Suicidal thoughts/behaviors:  Denied.  Self-injury:  Denied.  Sleep:  Denied problems.  Cognitive functioning:  Denied problems.    History of eating disorders:  History of bulimia:  She denied recurrent episodes of binge eating and inappropriate compensatory behaviors.  History of binge-eating episodes:  She denied eating excessive amounts of food within a discrete time period with a lack of  control during eating.  She denied eating more rapidly than normal, eating until uncomfortably full, eating large amounts of food when not physically hungry, eating alone due to embarrassment, or negative emotions (i.e., disgusted, guilty, depressed) afterwards.    Current psychosocial stressors:  She reported, I'm a little stressed because I feel like my mother is not a burden, but if my siblings would do more, so I could do more things with my family, I could be less stressed.  Report of coping skills:  She stated, Eating.  I know that's not good.  Sometimes my  and I will take a ride and go to the casino.  Support system:  My  and a good friend, my kids are too.  Strengths and liabilities:  Strength: Patient accepts guidance/feedback, Strength: Patient is expressive/articulate., Strength: Patient is motivated for change., Strength: Patient has positive support network., Strength: Patient has reasonable judgment., Strength: Patient is stable.    Current and past substance use:  Alcohol: Denied current use; denied history of abuse or dependency.   Drugs: Denied current use; denied history of abuse or dependency.  Tobacco: None.   Caffeine: She drinks coffee and sugar-free drinks.    Current Psychiatric Treatment:  Medications:  She is prescribed medication for anxiety by her primary care physician; however, she no longer takes the medication.  She indicated she was prescribed the medications (Buspar and Lexapro) when she was experiencing more stress caring for her mother.  She found ways to lower her stress level and does not need it anymore.  She also noted she did not like the way the medication made her feel.  Psychotherapy:  Denied.    Psychiatric History:  Previous diagnosis:  anxiety  Previous hospitalizations:  None reported.  History of outpatient treatment:  Denied.  Previous suicide attempt:  Denied.  Family history of psychiatric illness:  None reported.    Trauma history:  None  reported.    Social history (marriage, employment, etc.):  Ms. Carson was born and raised in [place], [place] by her biological parents.  She went to the country every summer with her grandparents in [place].  She has seven siblings.  She described her childhood as pretty good.  She denied childhood trauma, abuse, and neglect.  She struggled in school because I was lazy.  She earned a high school diploma.  She then went to [employer] and worked a variety of jobs.  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  She is currently retired.  She is not on disability and finances are adequate.  She has been  to her  for 17 years, and they have been together for 27 years.  She has three children (adult age), two stepsons, and thirteen grandchildren.  She currently lives with her  and three grandchildren.  She is busy taking care of her mother and does not currently have hobbies.  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    Legal history: Denied.    Mental Status Exam:   General appearance:  appears stated age, neatly dressed, well groomed  Speech:  normal rate and tone  Level of cooperation:  cooperative  Thought processes:  logical, goal-directed  Mood:  euthymic  Thought content:  no illusions, no visual hallucinations, no auditory hallucinations, no delusions, no active or passive homicidal thoughts, no active or passive suicidal ideation, no obsessions, no compulsions, no violence  Affect:  appropriate  Orientation:  oriented to person, place, and date  Judgment and insight: fair  Language:  intact    Diagnostic Impression:    ICD-10-CM ICD-9-CM   1. Preop examination  Z01.818 V72.84   2. Morbid obesity due to excess calories  E66.01 278.01   3. Essential hypertension, benign  I10 401.1   4. Controlled type 2 diabetes mellitus with stage 2 chronic kidney disease, without long-term current use of insulin  E11.22 250.40    N18.2 585.2   5.  Hyperlipidemia, unspecified hyperlipidemia type  E78.5 272.4   6. BMI 39.0-39.9,adult  Z68.39 V85.39   7. History of anxiety  Z86.59 V11.8     Summary/Conclusion:   Ms. Carson is a 58-year-old female referred for presurgical psychological evaluation prior to bariatric surgery.  There are no overt psychological contraindications for proceeding with bariatric surgery.  Overall, Ms. Carson is at LOW risk for adverse postsurgical outcomes based on the following considerations:  There are no indications of disabling psychopathology, substance use/abuse, cognitive problems, or disabilities that would prevent understanding and competence with medical treatment.  There are no reports or major psychosocial stressors that would interfere with her adherence to treatment recommendations.  There is no evidence of suicidality.  She exhibits high social stability and fair social support.  She has adequate coping strategies to deal with stress and the demands of surgery and recovery.  She has good knowledge about the surgical procedure, good knowledge about the required dietary and lifestyle changes, and adequate understanding of possible risks of this treatment option. She reports adequate compliance with prior medical regimens.     Recommendations:  - There are no recommendations for psychological treatment at this time. The patient is aware of resources available should her needs change in the future.      Please see Psychological Testing report available in Notes tab of Chart Review in Epic for results of psychological testing for additional details.    Length of Session:  40 minutes

## 2023-11-01 NOTE — Clinical Note
This evaluation revealed no overt psychological contraindications to bariatric surgery.  There are no recommendations for psychological intervention at this time.

## 2023-11-08 ENCOUNTER — HOSPITAL ENCOUNTER (OUTPATIENT)
Dept: CARDIOLOGY | Facility: HOSPITAL | Age: 58
Discharge: HOME | End: 2023-11-08
Attending: PHYSICIAN ASSISTANT
Payer: COMMERCIAL

## 2023-11-08 VITALS
DIASTOLIC BLOOD PRESSURE: 64 MMHG | HEART RATE: 73 BPM | RESPIRATION RATE: 16 BRPM | BODY MASS INDEX: 38.98 KG/M2 | HEIGHT: 63 IN | SYSTOLIC BLOOD PRESSURE: 120 MMHG | WEIGHT: 220 LBS

## 2023-11-08 DIAGNOSIS — E66.01 SEVERE OBESITY (BMI 35.0-39.9) WITH COMORBIDITY: ICD-10-CM

## 2023-11-08 LAB
ASCENDING AORTA: 2.81 CM
AV INDEX (PROSTH): 0.84
AV MEAN GRADIENT: 13 MMHG
AV PEAK GRADIENT: 22 MMHG
AV VALVE AREA BY VELOCITY RATIO: 2.4 CM²
AV VALVE AREA: 2.54 CM²
AV VELOCITY RATIO: 0.8
BSA FOR ECHO PROCEDURE: 2.11 M2
CV ECHO LV RWT: 0.43 CM
CV STRESS BASE HR: 73 BPM
DIASTOLIC BLOOD PRESSURE: 74 MMHG
DOP CALC AO PEAK VEL: 2.32 M/S
DOP CALC AO VTI: 39.58 CM
DOP CALC LVOT AREA: 3 CM2
DOP CALC LVOT DIAMETER: 1.96 CM
DOP CALC LVOT PEAK VEL: 1.85 M/S
DOP CALC LVOT STROKE VOLUME: 100.51 CM3
DOP CALCLVOT PEAK VEL VTI: 33.33 CM
E WAVE DECELERATION TIME: 297.53 MSEC
E/A RATIO: 0.78
E/E' RATIO: 12.29 M/S
ECHO LV POSTERIOR WALL: 0.9 CM (ref 0.6–1.1)
FRACTIONAL SHORTENING: 37 % (ref 28–44)
INTERVENTRICULAR SEPTUM: 0.8 CM (ref 0.6–1.1)
IVRT: 128.45 MSEC
LA MAJOR: 5.27 CM
LA MINOR: 5.36 CM
LA WIDTH: 3.1 CM
LEFT ATRIUM SIZE: 3.42 CM
LEFT ATRIUM VOLUME INDEX: 23.8 ML/M2
LEFT ATRIUM VOLUME: 47.89 CM3
LEFT INTERNAL DIMENSION IN SYSTOLE: 2.64 CM (ref 2.1–4)
LEFT VENTRICLE DIASTOLIC VOLUME INDEX: 28.91 ML/M2
LEFT VENTRICLE DIASTOLIC VOLUME: 58.1 ML
LEFT VENTRICLE MASS INDEX: 55 G/M2
LEFT VENTRICLE SYSTOLIC VOLUME INDEX: 12.7 ML/M2
LEFT VENTRICLE SYSTOLIC VOLUME: 25.46 ML
LEFT VENTRICULAR INTERNAL DIMENSION IN DIASTOLE: 4.2 CM (ref 3.5–6)
LEFT VENTRICULAR MASS: 109.83 G
LV LATERAL E/E' RATIO: 10.75 M/S
LV SEPTAL E/E' RATIO: 14.33 M/S
MV A" WAVE DURATION": 10.56 MSEC
MV PEAK A VEL: 1.1 M/S
MV PEAK E VEL: 0.86 M/S
MV STENOSIS PRESSURE HALF TIME: 86.28 MS
MV VALVE AREA P 1/2 METHOD: 2.55 CM2
OHS CV CPX 1 MINUTE RECOVERY HEART RATE: 129 BPM
OHS CV CPX 85 PERCENT MAX PREDICTED HEART RATE MALE: 138
OHS CV CPX MAX PREDICTED HEART RATE: 162
OHS CV CPX PATIENT IS FEMALE: 1
OHS CV CPX PATIENT IS MALE: 0
OHS CV CPX PEAK DIASTOLIC BLOOD PRESSURE: 57 MMHG
OHS CV CPX PEAK HEAR RATE: 137 BPM
OHS CV CPX PEAK RATE PRESSURE PRODUCT: NORMAL
OHS CV CPX PEAK SYSTOLIC BLOOD PRESSURE: 174 MMHG
OHS CV CPX PERCENT MAX PREDICTED HEART RATE ACHIEVED: 88
OHS CV CPX RATE PRESSURE PRODUCT PRESENTING: 9636
PISA TR MAX VEL: 2.22 M/S
PULM VEIN S/D RATIO: 1.74
PV PEAK D VEL: 0.34 M/S
PV PEAK S VEL: 0.59 M/S
RA MAJOR: 4.74 CM
RA PRESSURE ESTIMATED: 3 MMHG
RA WIDTH: 2.29 CM
RIGHT VENTRICULAR END-DIASTOLIC DIMENSION: 2.99 CM
RV TB RVSP: 5 MMHG
SINUS: 2.86 CM
STJ: 2.71 CM
SYSTOLIC BLOOD PRESSURE: 132 MMHG
TDI LATERAL: 0.08 M/S
TDI SEPTAL: 0.06 M/S
TDI: 0.07 M/S
TR MAX PG: 20 MMHG
TRICUSPID ANNULAR PLANE SYSTOLIC EXCURSION: 2.77 CM
TV REST PULMONARY ARTERY PRESSURE: 23 MMHG
Z-SCORE OF LEFT VENTRICULAR DIMENSION IN END DIASTOLE: -3.38
Z-SCORE OF LEFT VENTRICULAR DIMENSION IN END SYSTOLE: -2.48

## 2023-11-08 PROCEDURE — 93351 STRESS TTE COMPLETE: CPT | Mod: 26,,, | Performed by: INTERNAL MEDICINE

## 2023-11-08 PROCEDURE — 63600175 PHARM REV CODE 636 W HCPCS: Performed by: PHYSICIAN ASSISTANT

## 2023-11-08 PROCEDURE — 93351 STRESS TTE COMPLETE: CPT

## 2023-11-08 PROCEDURE — 93351 STRESS ECHO (CUPID ONLY): ICD-10-PCS | Mod: 26,,, | Performed by: INTERNAL MEDICINE

## 2023-11-08 RX ORDER — ATROPINE SULFATE 0.1 MG/ML
1 INJECTION INTRAVENOUS
Status: COMPLETED | OUTPATIENT
Start: 2023-11-08 | End: 2023-11-08

## 2023-11-08 RX ORDER — DOBUTAMINE HYDROCHLORIDE 400 MG/100ML
10 INJECTION INTRAVENOUS
Status: COMPLETED | OUTPATIENT
Start: 2023-11-08 | End: 2023-11-08

## 2023-11-08 RX ADMIN — ATROPINE SULFATE 1 MG: 0.1 INJECTION INTRAVENOUS at 03:11

## 2023-11-08 RX ADMIN — DOBUTAMINE HYDROCHLORIDE 10 MCG/KG/MIN: 400 INJECTION INTRAVENOUS at 03:11

## 2023-11-14 ENCOUNTER — PATIENT MESSAGE (OUTPATIENT)
Dept: BARIATRICS | Facility: CLINIC | Age: 58
End: 2023-11-14
Payer: COMMERCIAL

## 2023-11-14 DIAGNOSIS — I10 UNCONTROLLED HYPERTENSION: ICD-10-CM

## 2023-11-14 DIAGNOSIS — K21.9 GASTROESOPHAGEAL REFLUX DISEASE WITHOUT ESOPHAGITIS: ICD-10-CM

## 2023-11-14 RX ORDER — PANTOPRAZOLE SODIUM 40 MG/1
40 TABLET, DELAYED RELEASE ORAL DAILY
Qty: 90 TABLET | Refills: 3 | Status: SHIPPED | OUTPATIENT
Start: 2023-11-14 | End: 2024-04-02

## 2023-11-14 NOTE — TELEPHONE ENCOUNTER
No care due was identified.  [facility] Embedded Care Due Messages. Reference number: [id number].   11/14/2023 9:24:14 AM CST

## 2023-11-15 RX ORDER — HYDROCHLOROTHIAZIDE 12.5 MG/1
12.5 TABLET ORAL
Qty: 90 TABLET | Refills: 1 | Status: SHIPPED | OUTPATIENT
Start: 2023-11-15

## 2023-11-24 ENCOUNTER — CLINICAL SUPPORT (OUTPATIENT)
Dept: BARIATRICS | Facility: CLINIC | Age: 58
End: 2023-11-24
Payer: COMMERCIAL

## 2023-11-24 DIAGNOSIS — K21.9 GASTROESOPHAGEAL REFLUX DISEASE WITHOUT ESOPHAGITIS: ICD-10-CM

## 2023-11-24 DIAGNOSIS — E78.00 PURE HYPERCHOLESTEROLEMIA: ICD-10-CM

## 2023-11-24 DIAGNOSIS — Z71.3 DIETARY COUNSELING AND SURVEILLANCE: ICD-10-CM

## 2023-11-24 DIAGNOSIS — E11.22 CONTROLLED TYPE 2 DIABETES MELLITUS WITH STAGE 2 CHRONIC KIDNEY DISEASE, WITHOUT LONG-TERM CURRENT USE OF INSULIN: Primary | ICD-10-CM

## 2023-11-24 DIAGNOSIS — N18.2 CONTROLLED TYPE 2 DIABETES MELLITUS WITH STAGE 2 CHRONIC KIDNEY DISEASE, WITHOUT LONG-TERM CURRENT USE OF INSULIN: Primary | ICD-10-CM

## 2023-11-24 DIAGNOSIS — E66.9 OBESITY (BMI 30-39.9): ICD-10-CM

## 2023-11-24 PROCEDURE — 97803 MED NUTRITION INDIV SUBSEQ: CPT | Mod: 95,,, | Performed by: DIETITIAN, REGISTERED

## 2023-11-24 PROCEDURE — 97803 PR MED NUTR THER, SUBSQ, INDIV, EA 15 MIN: ICD-10-PCS | Mod: 95,,, | Performed by: DIETITIAN, REGISTERED

## 2023-11-24 NOTE — PROGRESS NOTES
The patient location is: home ([place])  The chief complaint leading to consultation is:   Visit type: audiovisual     Face to Face time with patient: 20 min    25 minutes of total time spent on the encounter, which includes face to face time and non-face to face time preparing to see the patient (eg, review of tests), Obtaining and/or reviewing separately obtained history, Documenting clinical information in the electronic or other health record, Independently interpreting results (not separately reported) and communicating results to the patient/family/caregiver, or Care coordination (not separately reported).       Each patient to whom he or she provides medical services by telemedicine is:  (1) informed of the relationship between the physician and patient and the respective role of any other health care provider with respect to management of the patient; and (2) notified that he or she may decline to receive medical services by telemedicine and may withdraw from such care at any time.    NUTRITION NOTE    Referring Physician: Apoorva Weaver M.D.   Reason for MNT Referral: 3 months Medically Supervised Diet pending Gastric Sleeve    Patient presents for follow-up visit for MSD with weight loss over the past month; 4 lbs total weight loss by making dietary and lifestyle changes.    CLINICAL DATA:  58 y.o. female.    Initial weight: 224 lbs  Current Weight: 220 lbs (11/8/23)  Weight Change Since Initial Visit: -4 lbs  Ideal Body Weight: 135 lbs  BMI: 38.97    DAILY NUTRITIONAL NEEDS: pre-op nutritional bariatric guidelines to promote weight loss  7397-9294 Calories    Grams Protein    CURRENT DIET:  Regular diet.  Diet Recall: Food records are not present.  Greatest challenge: irregular meal patterns, high-fat diet, and sugar-sweetened beverages  Progress: Switched to Boost Max Protein, cut out sweet tea and Arnold Flores      Current diet recall:      Thanksgiving Day  Coffee with cream and Splenda  B:  Slice of turkey pool and slice of toast  L: Seafood file gumbo w/1 tbsp rice   S: Egg nog daquiri  D: Lamb chop     Coffee with cream and Splenda  B: Boost with slice of turkey pool  L: Quizno veggie kermit  S: Boost  D: Soup with beef, vegetables, and spaghetti noodles    Diet Includes: 3 meals   Meal Pattern: Improved.  Protein Supplements: Usually 2 per day, morning and snack  Snacking: Adequate. Snacks include healthy choices.  Vegetables: Likes a variety. Eats almost daily. Likes all  Fruits: Likes a variety. Eats 3 x week . Likes all. Bananas, apples, oranges, grapes, peaches, plums  Beverages: Water, Zero sugar Coke and Dr Pepper, coffee without sugar  Dining out: Once a week. Mostly take out. [place]  Cooking at home: 3 x week  Mostly baked, smothered, fried, and air fried. Meat, fish, starchy CHO, and vegetables. Okra, meatloaf, smothered chicken, steak, burgers, fried fish, boiled seafood, rice     Food Allergies:   Fresh pineapple (canned ok)     Vitamins / Minerals / Herbs:   Fish oil  Multivitamin     Labs:   No recent     Exercise:  Current exercise: None      Restrictions to exercise: None     Social:  Retired.  Lives with  and three grandchildren (11, 16, 21).  Grocery shopping and food prep: PT.  Patient believes the household will be supportive after surgery.  Alcohol:  A few times/year  Smoking: PT reports having stopped in September.    ASSESSMENT:  Patient demonstrates some willingness to change lifestyle habits as evidenced by weight loss and including protein drinks.    Doing well with working on greatest challenges (irregular meal patterns, high-fat diet, and sugar sweetened beverages ).    Barriers to Education:  none  Stage of Change:  action    NUTRITION DIAGNOSIS:  Morbid Obesity related to Excessive carbohydrate intake, Excessive calorie intake, and Physical inactivity as evidence by BMI.  Obesity Status: Same    BARIATRIC DIET DISCUSSION/PLAN:  Discussed diet after surgery and  related to patient's food record.  Reviewed nutrition guidelines for before and after surgery.  Answered all questions.  Continue to review Bariatric Nutrition Guidebook at home and call with any questions.  Increase exercise.    RECOMMENDATIONS:  Pt is potential candidate for surgery.    Diet: Maintain diet plan.  Increase exercise.    Exercise: Increase.    Behavior Modification: Begin to document food & activity logs daily.    Return to clinic in one month.    Needs additional visits with RD.    Communicated nutrition plan with bariatric team.    SESSION TIME:  25 minutes

## 2023-11-27 ENCOUNTER — TELEPHONE (OUTPATIENT)
Dept: ENDOSCOPY | Facility: HOSPITAL | Age: 58
End: 2023-11-27
Payer: COMMERCIAL

## 2023-11-27 DIAGNOSIS — R10.10 UPPER ABDOMINAL PAIN: Primary | ICD-10-CM

## 2023-11-27 NOTE — TELEPHONE ENCOUNTER
Spoke to pt to schedule procedure(s) Upper Endoscopy (EGD)       Physician to perform procedure(s) Dr. ARMAAN Fine  Date of Procedure (s) 11/29/23  Arrival Time 12:00 PM  Time of Procedure(s) 1:00 PM   Location of Procedure(s) [address]  Type of Rx Prep sent to patient: N/A  Instructions provided to patient via [facility]    Patient was informed on the following information and verbalized understanding. Screening questionnaire reviewed with patient and complete. If procedure requires anesthesia, a responsible adult needs to be present to accompany the patient home, patient cannot drive after receiving anesthesia. Appointment details are tentative, especially check-in time. Patient will receive a prep-op call 7 days prior to confirm check-in time for procedure. If applicable the patient should contact their pharmacy to verify Rx for procedure prep is ready for pick-up. Patient was advised to call the scheduling department at [phone] if pharmacy states no Rx is available. Patient was advised to call the endoscopy scheduling department if any questions or concerns arise.       Endoscopy Scheduling Department                EGD Procedure Prep Instructions      Date of procedure: 11/29/23 Arrive at: 12:00PM      Location of Department: [address] [place] [id number]  Take the Elevators to 2nd Floor Endoscopy Procedural Area    How to prep:    Day Before Procedure: 11/28/23     You may have a light evening meal.   No solid food after 7:00 pm.   Continue drinking clear liquids.       Day of the Procedure:  11/29/23     You may have water/clear liquids until 4 hours before your procedure or as directed by the scheduling nurse  9:00AM. See below for list.    What You CANNOT do:   Do not drink milk or anything colored red.  Do not drink alcohol.  No gum chewing or candy morning of procedure.    Liquids That Are OK to Drink:   Water  Sports drinks (Gatorade, Power-Aid)  Coffee or tea (no cream or  nondairy creamer)  Clear juices without pulp (apple, white grape)  Gelatin desserts (no fruit or toppings)  Clear soda (sprite, coke, ginger ale)  Chicken broth (until 12 midnight the night before procedure)      Comments:     If you are taking any injectable medication (s) for weight loss and/or diabetes  weekly, please hold for 7 days prior to your scheduled procedure hold dose on Tuesday 11/28/23.

## 2023-11-28 ENCOUNTER — ANESTHESIA EVENT (OUTPATIENT)
Dept: ENDOSCOPY | Facility: HOSPITAL | Age: 58
End: 2023-11-28
Payer: COMMERCIAL

## 2023-11-28 RX ORDER — LIDOCAINE HYDROCHLORIDE 10 MG/ML
1 INJECTION, SOLUTION EPIDURAL; INFILTRATION; INTRACAUDAL; PERINEURAL ONCE
Status: CANCELLED | OUTPATIENT
Start: 2023-11-28 | End: 2023-11-28

## 2023-11-29 ENCOUNTER — ANESTHESIA (OUTPATIENT)
Dept: ENDOSCOPY | Facility: HOSPITAL | Age: 58
End: 2023-11-29
Payer: COMMERCIAL

## 2023-11-29 ENCOUNTER — HOSPITAL ENCOUNTER (OUTPATIENT)
Facility: HOSPITAL | Age: 58
Discharge: HOME | End: 2023-11-29
Attending: INTERNAL MEDICINE | Admitting: INTERNAL MEDICINE
Payer: COMMERCIAL

## 2023-11-29 VITALS
HEART RATE: 67 BPM | OXYGEN SATURATION: 100 % | DIASTOLIC BLOOD PRESSURE: 93 MMHG | SYSTOLIC BLOOD PRESSURE: 165 MMHG | TEMPERATURE: 97 F | RESPIRATION RATE: 16 BRPM

## 2023-11-29 DIAGNOSIS — R10.10 UPPER ABDOMINAL PAIN: ICD-10-CM

## 2023-11-29 LAB — POCT GLUCOSE: 109 MG/DL (ref 70–110)

## 2023-11-29 PROCEDURE — D9220A PRA ANESTHESIA: ICD-10-PCS | Mod: CRNA,,, | Performed by: STUDENT IN AN ORGANIZED HEALTH CARE EDUCATION/TRAINING PROGRAM

## 2023-11-29 PROCEDURE — 88305 TISSUE EXAM BY PATHOLOGIST: CPT | Performed by: PATHOLOGY

## 2023-11-29 PROCEDURE — 43239 PR EGD, FLEX, W/BIOPSY, SGL/MULTI: ICD-10-PCS | Mod: ,,, | Performed by: INTERNAL MEDICINE

## 2023-11-29 PROCEDURE — 43239 EGD BIOPSY SINGLE/MULTIPLE: CPT | Mod: ,,, | Performed by: INTERNAL MEDICINE

## 2023-11-29 PROCEDURE — D9220A PRA ANESTHESIA: Mod: CRNA,,, | Performed by: STUDENT IN AN ORGANIZED HEALTH CARE EDUCATION/TRAINING PROGRAM

## 2023-11-29 PROCEDURE — 43239 EGD BIOPSY SINGLE/MULTIPLE: CPT | Performed by: INTERNAL MEDICINE

## 2023-11-29 PROCEDURE — 37000008 HC ANESTHESIA 1ST 15 MINUTES: Performed by: INTERNAL MEDICINE

## 2023-11-29 PROCEDURE — 88342 IMHCHEM/IMCYTCHM 1ST ANTB: CPT | Performed by: PATHOLOGY

## 2023-11-29 PROCEDURE — 88305 TISSUE EXAM BY PATHOLOGIST: CPT | Mod: 26,,, | Performed by: PATHOLOGY

## 2023-11-29 PROCEDURE — 25000003 PHARM REV CODE 250: Performed by: STUDENT IN AN ORGANIZED HEALTH CARE EDUCATION/TRAINING PROGRAM

## 2023-11-29 PROCEDURE — 88342 IMHCHEM/IMCYTCHM 1ST ANTB: CPT | Mod: 26,,, | Performed by: PATHOLOGY

## 2023-11-29 PROCEDURE — D9220A PRA ANESTHESIA: ICD-10-PCS | Mod: ANES,,, | Performed by: ANESTHESIOLOGY

## 2023-11-29 PROCEDURE — D9220A PRA ANESTHESIA: Mod: ANES,,, | Performed by: ANESTHESIOLOGY

## 2023-11-29 PROCEDURE — 25000003 PHARM REV CODE 250: Performed by: ANESTHESIOLOGY

## 2023-11-29 PROCEDURE — 27201012 HC FORCEPS, HOT/COLD, DISP: Performed by: INTERNAL MEDICINE

## 2023-11-29 PROCEDURE — 88305 TISSUE EXAM BY PATHOLOGIST: ICD-10-PCS | Mod: 26,,, | Performed by: PATHOLOGY

## 2023-11-29 PROCEDURE — 88342 CHG IMMUNOCYTOCHEMISTRY: ICD-10-PCS | Mod: 26,,, | Performed by: PATHOLOGY

## 2023-11-29 RX ORDER — LIDOCAINE HYDROCHLORIDE 20 MG/ML
INJECTION INTRAVENOUS
Status: DISCONTINUED | OUTPATIENT
Start: 2023-11-29 | End: 2023-11-29

## 2023-11-29 RX ORDER — SODIUM CHLORIDE 9 MG/ML
INJECTION, SOLUTION INTRAVENOUS CONTINUOUS
Status: CANCELLED | OUTPATIENT
Start: 2023-11-29

## 2023-11-29 RX ORDER — PROPOFOL 10 MG/ML
VIAL (ML) INTRAVENOUS
Status: DISCONTINUED | OUTPATIENT
Start: 2023-11-29 | End: 2023-11-29

## 2023-11-29 RX ORDER — SODIUM CHLORIDE 9 MG/ML
INJECTION, SOLUTION INTRAVENOUS CONTINUOUS
Status: DISCONTINUED | OUTPATIENT
Start: 2023-11-29 | End: 2023-11-29 | Stop reason: HOSPADM

## 2023-11-29 RX ORDER — LIDOCAINE HYDROCHLORIDE 40 MG/ML
SOLUTION TOPICAL
Status: DISCONTINUED | OUTPATIENT
Start: 2023-11-29 | End: 2023-11-29

## 2023-11-29 RX ADMIN — Medication 40 MG: at 12:11

## 2023-11-29 RX ADMIN — LIDOCAINE HYDROCHLORIDE 40 MG: 20 INJECTION, SOLUTION INTRAVENOUS at 12:11

## 2023-11-29 RX ADMIN — Medication 120 MG: at 12:11

## 2023-11-29 RX ADMIN — LIDOCAINE HYDROCHLORIDE 4 ML: 40 SOLUTION TOPICAL at 12:11

## 2023-11-29 RX ADMIN — SODIUM CHLORIDE: 0.9 INJECTION, SOLUTION INTRAVENOUS at 12:11

## 2023-11-29 RX ADMIN — Medication 30 MG: at 12:11

## 2023-11-29 NOTE — TRANSFER OF CARE
Anesthesia Transfer of Care Note    Patient: Monalisa THOMPSON Favorite    Procedure(s) Performed: Procedure(s) (LRB):  EGD (ESOPHAGOGASTRODUODENOSCOPY) (N/A)    Patient location: GI    Anesthesia Type: general    Transport from OR: Transported from OR on room air with adequate spontaneous ventilation    Post pain: adequate analgesia    Post assessment: no apparent anesthetic complications and tolerated procedure well    Post vital signs: stable    Level of consciousness: lethargic    Nausea/Vomiting: no nausea/vomiting    Complications: none    Transfer of care protocol was followed      Last vitals: Visit Vitals  BP (!) 142/80 (BP Location: Left arm, Patient Position: Lying)   Pulse 73   Temp 36.3 °C (97.3 °F) (Oral)   Resp 15   SpO2 95%   Breastfeeding No

## 2023-11-29 NOTE — H&P
[facility]  Gastroenterology  H&P    Patient Name: Monalisa Carson  MRN: [id number]  Admission Date: 11/29/2023  Code Status: Prior    Attending Provider: fortunato wynne  Primary Care Physician: Flor Gomez MD  Principal Problem:<principal problem not specified>    Subjective:     History of Present Illness: reflux, pre-op bariatric surgery    Past Medical History:   Diagnosis Date    Allergy     Anxiety     Diabetes mellitus     GERD (gastroesophageal reflux disease)     Heart murmur     Hyperlipidemia     Hypertension     Seizures     5-6 years ago, spontaneous       Past Surgical History:   Procedure Laterality Date    CHOLECYSTECTOMY      2001 april    COLONOSCOPY N/A 06/29/2021    Procedure: COLONOSCOPY;  Surgeon: Gustavo Pelletier MD;  Location: [facility];  Service: Endoscopy;  Laterality: N/A;  combined orders    ESOPHAGOGASTRODUODENOSCOPY N/A 06/29/2021    Procedure: ESOPHAGOGASTRODUODENOSCOPY (EGD);  Surgeon: Gustavo Pelletier MD;  Location: [facility];  Service: Endoscopy;  Laterality: N/A;  covdi +3/29/20, fully vaccinated 3/23/21, prep instr portal -ml    HYSTERECTOMY      partial  1996    OOPHORECTOMY      SHOULDER SURGERY Right     WISDOM TOOTH EXTRACTION         Review of patient's allergies indicates:   Allergen Reactions    Keflex [cephalexin] Hives    Vicodin [hydrocodone-acetaminophen] Itching     itch    Crestor [rosuvastatin] Other (See Comments)     Muscle pain     Family History       Problem Relation (Age of Onset)    Breast cancer Maternal Aunt, Paternal Aunt    Cancer Maternal Aunt, Maternal Uncle, Paternal Aunt, Maternal Grandfather    Cataracts Mother    Depression Sister    Diabetes Mother, Father    Early death Paternal Grandmother, Paternal Grandfather    Esophageal cancer Paternal Grandfather    Hyperlipidemia Mother    Hypertension Mother, Father, Sister    No Known Problems Brother, Paternal Uncle, Maternal Grandmother, Other    Stroke Father, Sister           Tobacco Use    Smoking status: Former     Current packs/day: 0.00     Types: Cigarettes    Smokeless tobacco: Never   Substance and Sexual Activity    Alcohol use: Yes     Alcohol/week: 0.0 standard drinks of alcohol     Comment: seldom    Drug use: No    Sexual activity: Yes     Partners: Male     Birth control/protection: None     Review of Systems   Respiratory: Negative.     Cardiovascular: Negative.      Objective:     Vital Signs (Most Recent):  Temp: 97.3 °F (36.3 °C) (11/29/23 1254)  Pulse: 73 (11/29/23 1254)  Resp: 15 (11/29/23 1254)  BP: (!) 142/80 (11/29/23 1254)  SpO2: 95 % (11/29/23 1254) Vital Signs (24h Range):  Temp:  [97.3 °F (36.3 °C)-98 °F (36.7 °C)] 97.3 °F (36.3 °C)  Pulse:  [66-73] 73  Resp:  [15-17] 15  SpO2:  [95 %-100 %] 95 %  BP: (142-164)/(70-80) 142/80        There is no height or weight on file to calculate BMI.      Intake/Output Summary (Last 24 hours) at 11/29/2023 1256  Last data filed at 11/29/2023 1255  Gross per 24 hour   Intake 200 ml   Output --   Net 200 ml       Lines/Drains/Airways       Peripheral Intravenous Line  Duration                  Peripheral IV - Single Lumen 11/29/23 1230 22 G Posterior;Right Hand <1 day                    Physical Exam  Cardiovascular:      Rate and Rhythm: Normal rate and regular rhythm.   Pulmonary:      Effort: Pulmonary effort is normal.      Breath sounds: Normal breath sounds.         Significant Labs:      Significant Imaging:      Assessment/Plan:     There are no hospital problems to display for this patient.      Indication for procedure:    ASA:II  Airway normal  Malampati class:    Personal and family history negative for anesthesia problems    Plan:egd  Anesthesia plan: general      Maciej Fine MD  Gastroenterology  [facility] [place] - Endoscopy

## 2023-11-29 NOTE — PROVATION PATIENT INSTRUCTIONS
Discharge Summary/Instructions after an Endoscopic Procedure  Patient Name: Monalisa Carson  Patient MRN: [id number]  Patient YOB: 1965  Wednesday, November 29, 2023  Maciej Fine MD  Dear patient,  As a result of recent federal legislation (The Federal Cures Act), you may   receive lab or pathology results from your procedure in your [facility]   account before your physician is able to contact you. Your physician or   their representative will relay the results to you with their   recommendations at their soonest availability.  Thank you,  RESTRICTIONS:  During your procedure today, you received medications for sedation.  These   medications may affect your judgment, balance and coordination.  Therefore,   for 24 hours, you have the following restrictions:   - DO NOT drive a car, operate machinery, make legal/financial decisions,   sign important papers or drink alcohol.    ACTIVITY:  Today: no heavy lifting, straining or running due to procedural   sedation/anesthesia.  The following day: return to full activity including work.  DIET:  Eat and drink normally unless instructed otherwise.     TREATMENT FOR COMMON SIDE EFFECTS:  - Mild abdominal pain, nausea, belching, bloating or excessive gas:  rest,   eat lightly and use a heating pad.  - Sore Throat: treat with throat lozenges and/or gargle with warm salt   water.  - Because air was used during the procedure, expelling large amounts of air   from your rectum or belching is normal.  - If a bowel prep was taken, you may not have a bowel movement for 1-3 days.    This is normal.  SYMPTOMS TO WATCH FOR AND REPORT TO YOUR PHYSICIAN:  1. Abdominal pain or bloating, other than gas cramps.  2. Chest pain.  3. Back pain.  4. Signs of infection such as: chills or fever occurring within 24 hours   after the procedure.  5. Rectal bleeding, which would show as bright red, maroon, or black stools.   (A tablespoon of blood from the rectum is not serious, especially  if   hemorrhoids are present.)  6. Vomiting.  7. Weakness or dizziness.  GO DIRECTLY TO THE NEAREST EMERGENCY ROOM IF YOU HAVE ANY OF THE FOLLOWING:      Difficulty breathing              Chills and/or fever over 101 F   Persistent vomiting and/or vomiting blood   Severe abdominal pain   Severe chest pain   Black, tarry stools   Bleeding- more than one tablespoon   Any other symptom or condition that you feel may need urgent attention  Your doctor recommends these additional instructions:  If any biopsies were taken, your doctors clinic will contact you in 1 to 2   weeks with any results.  - Patient has a contact number available for emergencies.  The signs and   symptoms of potential delayed complications were discussed with the   patient.  Return to normal activities tomorrow.  Written discharge   instructions were provided to the patient.   - Discharge patient to home (ambulatory).   - Resume previous diet.   - Continue present medications.   - Await pathology results.   - Return to referring physician after studies are complete.  For questions, problems or results please call your physician - Maciej Fine MD at Work:  [phone].  [facility] can be reached at [phone]     IF A COMPLICATION OR EMERGENCY SITUATION ARISES AND YOU ARE UNABLE TO REACH   YOUR PHYSICIAN - GO DIRECTLY TO THE EMERGENCY ROOM.  Maciej Fine MD  11/29/2023 12:54:04 PM  This report has been verified and signed electronically.  Dear patient,  As a result of recent federal legislation (The Federal Cures Act), you may   receive lab or pathology results from your procedure in your [facility]   account before your physician is able to contact you. Your physician or   their representative will relay the results to you with their   recommendations at their soonest availability.  Thank you,  PROVATION

## 2023-11-29 NOTE — ANESTHESIA PREPROCEDURE EVALUATION
11/29/2023    Pre-operative evaluation for Procedure(s) (LRB):  EGD (ESOPHAGOGASTRODUODENOSCOPY) (N/A)    Monalisa Valverdeite is a 58 y.o. female     Patient Active Problem List   Diagnosis    Anxiety    Essential hypertension, benign    Controlled type 2 diabetes mellitus with stage 2 chronic kidney disease, without long-term current use of insulin    HLD (hyperlipidemia)    Dysarthria    Hemifacial spasm    Nonintractable epilepsy without status epilepticus    Tension headache, chronic    2019 novel coronavirus disease (COVID-19)    Occipital neuralgia of left side    Upper abdominal pain    Severe obesity (BMI 35.0-39.9) with comorbidity    Sal's paralysis (postepileptic)    Wears contact lenses    Refractive error    Heart murmur    PATTERSON (dyspnea on exertion)       Review of patient's allergies indicates:   Allergen Reactions    Keflex [cephalexin] Hives    Vicodin [hydrocodone-acetaminophen] Itching     itch    Crestor [rosuvastatin] Other (See Comments)     Muscle pain       No current facility-administered medications on file prior to encounter.     Current Outpatient Medications on File Prior to Encounter   Medication Sig Dispense Refill    hydroCHLOROthiazide (HYDRODIURIL) 12.5 MG Tab TAKE 1 TABLET(12.5 MG) BY MOUTH EVERY DAY 90 tablet 1    acetaminophen (TYLENOL) 500 MG tablet Take 2 tablets (1,000 mg total) by mouth every 6 (six) hours as needed for Pain. (Patient not taking: Reported on 9/25/2023) 30 tablet 0    acetaminophen (TYLENOL) 500 MG tablet Take 1 tablet (500 mg total) by mouth every 4 (four) hours as needed for Pain or Temperature greater than (100.5 or greater). 30 tablet 0    albuterol (PROVENTIL/VENTOLIN HFA) 90 mcg/actuation inhaler Inhale 1-2 puffs into the lungs every 4 (four) hours as needed for Wheezing. Rescue 18 g 11    benazepriL (LOTENSIN) 40 MG tablet TAKE 1 TABLET(40 MG) BY  MOUTH EVERY DAY 90 tablet 0    busPIRone (BUSPAR) 10 MG tablet Take 1 tablet (10 mg total) by mouth 3 (three) times daily as needed (anxiety). 90 tablet 2    dicyclomine (BENTYL) 20 mg tablet Take 20 mg by mouth 4 (four) times daily.      dulaglutide (TRULICITY) 0.75 mg/0.5 mL pen injector Inject 0.75 mg into the skin every 7 days. 4 pen 11    EScitalopram oxalate (LEXAPRO) 10 MG tablet Take 1 tablet (10 mg total) by mouth once daily. 90 tablet 1    fluticasone propionate (FLONASE) 50 mcg/actuation nasal spray 1 spray (50 mcg total) by Each Nostril route 2 (two) times daily. 16 g 0    gabapentin (NEURONTIN) 100 MG capsule Take 100 mg by mouth 3 (three) times daily.      HYDROcodone-acetaminophen (NORCO) 7.5-325 mg per tablet Take 1 tablet by mouth every 6 (six) hours as needed.      hydrocortisone (WESTCORT) 0.2 % cream Apply topically 2 (two) times daily. for 5 days 60 g 0    hydrocortisone 2.5 % cream [place] EXT AA BID 28 g 0    hydrOXYzine HCL (ATARAX) 25 MG tablet Take 1 tablet (25 mg total) by mouth every 4 to 6 hours as needed for Itching (redness). 30 tablet 0    ibuprofen (ADVIL,MOTRIN) 800 MG tablet Take by mouth.      k phos di & mono-sod phos mono (K-PHOS-NEUTRAL) 250 mg Tab Take 1 tablet by mouth 2 (two) times a day. for 3 days 6 tablet 0    levETIRAcetam (KEPPRA) 1000 MG tablet Take 1 tablet (1,000 mg total) by mouth 2 (two) times daily. 180 tablet 3    LIDOcaine (LIDODERM) 5 % Place 1 patch onto the skin once daily. Remove & Discard patch within 12 hours or as directed by MD (Patient not taking: Reported on 9/25/2023) 15 patch 0    lidocaine (LMX) 4 % cream Apply topically as needed. (Patient not taking: Reported on 9/25/2023)  0    metFORMIN (GLUCOPHAGE-XR) 500 MG ER 24hr tablet TAKE 1 TABLET(500 MG) BY MOUTH DAILY WITH BREAKFAST 90 tablet 3    MOBIC 15 mg tablet Take 1 tablet (15 mg total) by mouth daily as needed for Pain. Take on full stomach, no other NSAIDs. Not more than once a day (Patient not  "taking: Reported on 9/25/2023) 30 tablet 0    ondansetron (ZOFRAN-ODT) 4 MG TbDL Take 1 tablet (4 mg total) by mouth every 6 (six) hours as needed (nausea). 20 tablet 0    pantoprazole (PROTONIX) 40 MG tablet Take 1 tablet (40 mg total) by mouth once daily. 90 tablet 3    pen needle, diabetic 31 gauge x 5/16" Ndle For use with bydureon 100 each 0    phenoL (CHLORASEPTIC THROAT SPRAY) 1.4 % SprA by Mucous Membrane route every 2 (two) hours. (Patient not taking: Reported on 9/25/2023) 177 mL 0    promethazine (PHENERGAN) 25 MG tablet       promethazine-dextromethorphan (PROMETHAZINE-DM) 6.25-15 mg/5 mL Syrp       sodium chloride (OCEAN NASAL) 0.65 % nasal spray 1 spray by Nasal route every 3 (three) hours as needed for Congestion. (Patient not taking: Reported on 9/25/2023) 1 Bottle 12    topiramate (TOPAMAX) 100 MG tablet Take 1 tablet (100 mg total) by mouth once daily. 30 tablet 11    zolpidem (AMBIEN) 5 MG Tab Take one pill in the evening, PRN, lowest dose possible 30 tablet 2         Pre-op Assessment    I have reviewed the Patient Summary Reports.    I have reviewed the NPO Status.   I have reviewed the Medications.     Review of Systems  Anesthesia Hx:  No problems with previous Anesthesia   History of prior surgery of interest to airway management or planning:          Denies Family Hx of Anesthesia complications.    Denies Personal Hx of Anesthesia complications.                    Social:  Non-Smoker       EENT/Dental:  EENT/Dental Normal           Cardiovascular:     Hypertension           hyperlipidemia                             Pulmonary:  Pulmonary Normal                       Renal/:  Renal/ Normal                 Hepatic/GI:  Bowel Prep. Denies PUD.  GERD             Neurological:       Seizures                                Endocrine:  Diabetes, type 2         Obesity / BMI > 30  Psych:   anxiety                 Physical Exam  General: Well nourished, Cooperative, Alert and " Oriented    Airway:  Mallampati: II   Mouth Opening: Normal  TM Distance: Normal  Tongue: Normal  Neck ROM: Normal ROM    Dental:  Intact    Chest/Lungs:  Normal Respiratory Rate    Heart:  Rate: Normal  Rhythm: Regular Rhythm        Anesthesia Plan  Type of Anesthesia, risks & benefits discussed:    Anesthesia Type: Gen Natural Airway  Intra-op Monitoring Plan: Standard ASA Monitors  Induction:  IV  Informed Consent: Informed consent signed with the Patient and all parties understand the risks and agree with anesthesia plan.  All questions answered.   ASA Score: 3    Ready For Surgery From Anesthesia Perspective.     .

## 2023-12-05 ENCOUNTER — PATIENT MESSAGE (OUTPATIENT)
Dept: GASTROENTEROLOGY | Facility: HOSPITAL | Age: 58
End: 2023-12-05
Payer: COMMERCIAL

## 2023-12-05 ENCOUNTER — DOCUMENTATION ONLY (OUTPATIENT)
Dept: GASTROENTEROLOGY | Facility: HOSPITAL | Age: 58
End: 2023-12-05
Payer: COMMERCIAL

## 2023-12-05 NOTE — PROGRESS NOTES
Biopsy from the stomach demonstrated intestinal metaplasia  I sent a note to the patient through the portal recommending follow-up colonoscopy with surveillance biopsies in 2 years

## 2023-12-06 ENCOUNTER — LAB VISIT (OUTPATIENT)
Dept: LAB | Facility: HOSPITAL | Age: 58
End: 2023-12-06
Attending: INTERNAL MEDICINE
Payer: COMMERCIAL

## 2023-12-06 ENCOUNTER — OFFICE VISIT (OUTPATIENT)
Dept: FAMILY MEDICINE | Facility: CLINIC | Age: 58
End: 2023-12-06
Payer: COMMERCIAL

## 2023-12-06 VITALS
WEIGHT: 218.94 LBS | HEIGHT: 63 IN | HEART RATE: 73 BPM | BODY MASS INDEX: 38.79 KG/M2 | RESPIRATION RATE: 16 BRPM | SYSTOLIC BLOOD PRESSURE: 138 MMHG | TEMPERATURE: 98 F | OXYGEN SATURATION: 97 % | DIASTOLIC BLOOD PRESSURE: 78 MMHG

## 2023-12-06 DIAGNOSIS — N18.2 CONTROLLED TYPE 2 DIABETES MELLITUS WITH STAGE 2 CHRONIC KIDNEY DISEASE, WITHOUT LONG-TERM CURRENT USE OF INSULIN: ICD-10-CM

## 2023-12-06 DIAGNOSIS — I10 ESSENTIAL HYPERTENSION, BENIGN: ICD-10-CM

## 2023-12-06 DIAGNOSIS — E78.5 HYPERLIPIDEMIA, UNSPECIFIED HYPERLIPIDEMIA TYPE: ICD-10-CM

## 2023-12-06 DIAGNOSIS — Z86.16 HISTORY OF COVID-19: ICD-10-CM

## 2023-12-06 DIAGNOSIS — G40.209 PARTIAL SYMPTOMATIC EPILEPSY WITH COMPLEX PARTIAL SEIZURES, NOT INTRACTABLE, WITHOUT STATUS EPILEPTICUS: ICD-10-CM

## 2023-12-06 DIAGNOSIS — Z01.818 PREOPERATIVE CLEARANCE: ICD-10-CM

## 2023-12-06 DIAGNOSIS — E11.22 CONTROLLED TYPE 2 DIABETES MELLITUS WITH STAGE 2 CHRONIC KIDNEY DISEASE, WITHOUT LONG-TERM CURRENT USE OF INSULIN: ICD-10-CM

## 2023-12-06 DIAGNOSIS — G44.221 CHRONIC TENSION-TYPE HEADACHE, INTRACTABLE: ICD-10-CM

## 2023-12-06 DIAGNOSIS — E66.01 SEVERE OBESITY (BMI 35.0-39.9) WITH COMORBIDITY: ICD-10-CM

## 2023-12-06 DIAGNOSIS — Z01.818 PREOPERATIVE CLEARANCE: Primary | ICD-10-CM

## 2023-12-06 DIAGNOSIS — Z78.9 STATIN INTOLERANCE: ICD-10-CM

## 2023-12-06 LAB
ALBUMIN SERPL BCP-MCNC: 3.9 G/DL (ref 3.5–5.2)
ALP SERPL-CCNC: 76 U/L (ref 55–135)
ALT SERPL W/O P-5'-P-CCNC: 23 U/L (ref 10–44)
ANION GAP SERPL CALC-SCNC: 11 MMOL/L (ref 8–16)
APTT PPP: 24.2 SEC (ref 21–32)
AST SERPL-CCNC: 22 U/L (ref 10–40)
BASOPHILS # BLD AUTO: 0.03 K/UL (ref 0–0.2)
BASOPHILS NFR BLD: 0.4 % (ref 0–1.9)
BILIRUB SERPL-MCNC: 0.5 MG/DL (ref 0.1–1)
BUN SERPL-MCNC: 11 MG/DL (ref 6–20)
CALCIUM SERPL-MCNC: 10.9 MG/DL (ref 8.7–10.5)
CHLORIDE SERPL-SCNC: 105 MMOL/L (ref 95–110)
CHOLEST SERPL-MCNC: 256 MG/DL (ref 120–199)
CHOLEST/HDLC SERPL: 6.1 {RATIO} (ref 2–5)
CO2 SERPL-SCNC: 26 MMOL/L (ref 23–29)
CREAT SERPL-MCNC: 0.9 MG/DL (ref 0.5–1.4)
DIFFERENTIAL METHOD: ABNORMAL
EOSINOPHIL # BLD AUTO: 0.1 K/UL (ref 0–0.5)
EOSINOPHIL NFR BLD: 0.9 % (ref 0–8)
ERYTHROCYTE [DISTWIDTH] IN BLOOD BY AUTOMATED COUNT: 13.7 % (ref 11.5–14.5)
EST. GFR  (NO RACE VARIABLE): >60 ML/MIN/1.73 M^2
ESTIMATED AVG GLUCOSE: 143 MG/DL (ref 68–131)
FINAL PATHOLOGIC DIAGNOSIS: NORMAL
GLUCOSE SERPL-MCNC: 127 MG/DL (ref 70–110)
GROSS: NORMAL
HBA1C MFR BLD: 6.6 % (ref 4–5.6)
HCT VFR BLD AUTO: 40.9 % (ref 37–48.5)
HDLC SERPL-MCNC: 42 MG/DL (ref 40–75)
HDLC SERPL: 16.4 % (ref 20–50)
HGB BLD-MCNC: 14 G/DL (ref 12–16)
IMM GRANULOCYTES # BLD AUTO: 0.03 K/UL (ref 0–0.04)
IMM GRANULOCYTES NFR BLD AUTO: 0.4 % (ref 0–0.5)
INR PPP: 1 (ref 0.8–1.2)
LDLC SERPL CALC-MCNC: 163 MG/DL (ref 63–159)
LYMPHOCYTES # BLD AUTO: 3.9 K/UL (ref 1–4.8)
LYMPHOCYTES NFR BLD: 50.3 % (ref 18–48)
Lab: NORMAL
MCH RBC QN AUTO: 29.4 PG (ref 27–31)
MCHC RBC AUTO-ENTMCNC: 34.2 G/DL (ref 32–36)
MCV RBC AUTO: 86 FL (ref 82–98)
MONOCYTES # BLD AUTO: 0.6 K/UL (ref 0.3–1)
MONOCYTES NFR BLD: 7.1 % (ref 4–15)
NEUTROPHILS # BLD AUTO: 3.2 K/UL (ref 1.8–7.7)
NEUTROPHILS NFR BLD: 40.9 % (ref 38–73)
NONHDLC SERPL-MCNC: 214 MG/DL
NRBC BLD-RTO: 0 /100 WBC
PLATELET # BLD AUTO: 432 K/UL (ref 150–450)
PMV BLD AUTO: 10.1 FL (ref 9.2–12.9)
POTASSIUM SERPL-SCNC: 4.2 MMOL/L (ref 3.5–5.1)
PROT SERPL-MCNC: 8.5 G/DL (ref 6–8.4)
PROTHROMBIN TIME: 10.3 SEC (ref 9–12.5)
RBC # BLD AUTO: 4.77 M/UL (ref 4–5.4)
SODIUM SERPL-SCNC: 142 MMOL/L (ref 136–145)
SUPPLEMENTAL DIAGNOSIS: NORMAL
TRIGL SERPL-MCNC: 255 MG/DL (ref 30–150)
TSH SERPL DL<=0.005 MIU/L-ACNC: 1.39 UIU/ML (ref 0.4–4)
WBC # BLD AUTO: 7.73 K/UL (ref 3.9–12.7)

## 2023-12-06 PROCEDURE — 1159F PR MEDICATION LIST DOCUMENTED IN MEDICAL RECORD: ICD-10-PCS | Mod: CPTII,S$GLB,, | Performed by: INTERNAL MEDICINE

## 2023-12-06 PROCEDURE — 3075F SYST BP GE 130 - 139MM HG: CPT | Mod: CPTII,S$GLB,, | Performed by: INTERNAL MEDICINE

## 2023-12-06 PROCEDURE — 99999 PR PBB SHADOW E&M-EST. PATIENT-LVL V: CPT | Mod: PBBFAC,,, | Performed by: INTERNAL MEDICINE

## 2023-12-06 PROCEDURE — 85610 PROTHROMBIN TIME: CPT | Performed by: INTERNAL MEDICINE

## 2023-12-06 PROCEDURE — 3008F PR BODY MASS INDEX (BMI) DOCUMENTED: ICD-10-PCS | Mod: CPTII,S$GLB,, | Performed by: INTERNAL MEDICINE

## 2023-12-06 PROCEDURE — 83036 HEMOGLOBIN GLYCOSYLATED A1C: CPT | Performed by: INTERNAL MEDICINE

## 2023-12-06 PROCEDURE — 3061F PR NEG MICROALBUMINURIA RESULT DOCUMENTED/REVIEW: ICD-10-PCS | Mod: CPTII,S$GLB,, | Performed by: INTERNAL MEDICINE

## 2023-12-06 PROCEDURE — 4010F ACE/ARB THERAPY RXD/TAKEN: CPT | Mod: CPTII,S$GLB,, | Performed by: INTERNAL MEDICINE

## 2023-12-06 PROCEDURE — 3075F PR MOST RECENT SYSTOLIC BLOOD PRESS GE 130-139MM HG: ICD-10-PCS | Mod: CPTII,S$GLB,, | Performed by: INTERNAL MEDICINE

## 2023-12-06 PROCEDURE — 1160F PR REVIEW ALL MEDS BY PRESCRIBER/CLIN PHARMACIST DOCUMENTED: ICD-10-PCS | Mod: CPTII,S$GLB,, | Performed by: INTERNAL MEDICINE

## 2023-12-06 PROCEDURE — 1160F RVW MEDS BY RX/DR IN RCRD: CPT | Mod: CPTII,S$GLB,, | Performed by: INTERNAL MEDICINE

## 2023-12-06 PROCEDURE — 85730 THROMBOPLASTIN TIME PARTIAL: CPT | Performed by: INTERNAL MEDICINE

## 2023-12-06 PROCEDURE — 3078F DIAST BP <80 MM HG: CPT | Mod: CPTII,S$GLB,, | Performed by: INTERNAL MEDICINE

## 2023-12-06 PROCEDURE — 85025 COMPLETE CBC W/AUTO DIFF WBC: CPT | Performed by: INTERNAL MEDICINE

## 2023-12-06 PROCEDURE — 3051F HG A1C>EQUAL 7.0%<8.0%: CPT | Mod: CPTII,S$GLB,, | Performed by: INTERNAL MEDICINE

## 2023-12-06 PROCEDURE — 1159F MED LIST DOCD IN RCRD: CPT | Mod: CPTII,S$GLB,, | Performed by: INTERNAL MEDICINE

## 2023-12-06 PROCEDURE — 99214 PR OFFICE/OUTPT VISIT, EST, LEVL IV, 30-39 MIN: ICD-10-PCS | Mod: S$GLB,,, | Performed by: INTERNAL MEDICINE

## 2023-12-06 PROCEDURE — 3066F NEPHROPATHY DOC TX: CPT | Mod: CPTII,S$GLB,, | Performed by: INTERNAL MEDICINE

## 2023-12-06 PROCEDURE — 80061 LIPID PANEL: CPT | Performed by: INTERNAL MEDICINE

## 2023-12-06 PROCEDURE — 36415 COLL VENOUS BLD VENIPUNCTURE: CPT | Mod: PO | Performed by: INTERNAL MEDICINE

## 2023-12-06 PROCEDURE — 3051F PR MOST RECENT HEMOGLOBIN A1C LEVEL 7.0 - < 8.0%: ICD-10-PCS | Mod: CPTII,S$GLB,, | Performed by: INTERNAL MEDICINE

## 2023-12-06 PROCEDURE — 3066F PR DOCUMENTATION OF TREATMENT FOR NEPHROPATHY: ICD-10-PCS | Mod: CPTII,S$GLB,, | Performed by: INTERNAL MEDICINE

## 2023-12-06 PROCEDURE — 3061F NEG MICROALBUMINURIA REV: CPT | Mod: CPTII,S$GLB,, | Performed by: INTERNAL MEDICINE

## 2023-12-06 PROCEDURE — 99214 OFFICE O/P EST MOD 30 MIN: CPT | Mod: S$GLB,,, | Performed by: INTERNAL MEDICINE

## 2023-12-06 PROCEDURE — 3078F PR MOST RECENT DIASTOLIC BLOOD PRESSURE < 80 MM HG: ICD-10-PCS | Mod: CPTII,S$GLB,, | Performed by: INTERNAL MEDICINE

## 2023-12-06 PROCEDURE — 80053 COMPREHEN METABOLIC PANEL: CPT | Performed by: INTERNAL MEDICINE

## 2023-12-06 PROCEDURE — 3008F BODY MASS INDEX DOCD: CPT | Mod: CPTII,S$GLB,, | Performed by: INTERNAL MEDICINE

## 2023-12-06 PROCEDURE — 99999 PR PBB SHADOW E&M-EST. PATIENT-LVL V: ICD-10-PCS | Mod: PBBFAC,,, | Performed by: INTERNAL MEDICINE

## 2023-12-06 PROCEDURE — 84443 ASSAY THYROID STIM HORMONE: CPT | Performed by: INTERNAL MEDICINE

## 2023-12-06 PROCEDURE — 4010F PR ACE/ARB THEARPY RXD/TAKEN: ICD-10-PCS | Mod: CPTII,S$GLB,, | Performed by: INTERNAL MEDICINE

## 2023-12-06 NOTE — PROGRESS NOTES
Health Maintenance Due   Topic     HIV Screening      Influenza Vaccine (1)     COVID-19 Vaccine (6 - 2023-24 season)     Foot Exam      Eye Exam

## 2023-12-06 NOTE — PROGRESS NOTES
Subjective:       Patient ID: Monalisa THOMPSON Favorite is a pleasant 58 y.o. Black or  female patient    Chief Complaint: Follow-up      Patient is a pt I saw last on 06/06/2023, see my last notes.    HPI     Pt with PMH as per list of problems below coming today for a regular f-up visit.  From our last visit:  - COVID in 09/2023  - Orthopedics  - Bariatric surgery..  - Behavioral  - EGD  - Stress test.  She is stated to come today for a regular f-up visit, however reports she needs medical clearance for a bariatric surgery.  She reports she may have this procedure done in February, pending my clearance.  She has no major issue to report today.  She is excited to have her procedure done soon.  She had all the tests/f-up visits done for this.  She had a stress test last month.   She had EGD done on 11/29/2023 (Dr. Fine): intestinal metaplasia. F-up scope every 2 years. Was still pending results re: H. Pylori.      Patient Active Problem List   Diagnosis    Anxiety    Essential hypertension, benign    Controlled type 2 diabetes mellitus with stage 2 chronic kidney disease, without long-term current use of insulin    HLD (hyperlipidemia)    Dysarthria    Hemifacial spasm    Nonintractable epilepsy without status epilepticus    Tension headache, chronic    2019 novel coronavirus disease (COVID-19)    Occipital neuralgia of left side    Upper abdominal pain    Severe obesity (BMI 35.0-39.9) with comorbidity    Sal's paralysis (postepileptic)    Wears contact lenses    Refractive error    Heart murmur    PATTERSON (dyspnea on exertion)          ACTIVE MEDICAL ISSUES:  Documented in Problem List     PAST MEDICAL HISTORY  Documented     PAST SURGICAL HISTORY:  Documented     SOCIAL HISTORY:  Documented     FAMILY HISTORY:  Documented     ALLERGIES AND MEDICATIONS: updated and reviewed.  Documented    Review of Systems   Constitutional: Negative.    HENT:  Negative for dental problem.    Eyes: Negative.   "  Respiratory:  Negative for cough.    Cardiovascular: Negative.    Gastrointestinal: Negative.    Endocrine: Negative.    Genitourinary: Negative.    Musculoskeletal:  Negative for arthralgias and myalgias.   Skin: Negative.    Allergic/Immunologic: Negative.    Neurological:  Negative for weakness and numbness.   Hematological: Negative.    Psychiatric/Behavioral:  Positive for sleep disturbance. The patient is nervous/anxious.        Objective:      Physical Exam  Vitals and nursing note reviewed.   Constitutional:       Appearance: Normal appearance. She is obese.   HENT:      Right Ear: Tympanic membrane and external ear normal.      Left Ear: Tympanic membrane and external ear normal.   Eyes:      Conjunctiva/sclera: Conjunctivae normal.   Cardiovascular:      Rate and Rhythm: Normal rate and regular rhythm.      Pulses: Normal pulses.      Heart sounds: Normal heart sounds.   Pulmonary:      Effort: Pulmonary effort is normal.      Breath sounds: Normal breath sounds.   Abdominal:      General: Bowel sounds are normal.   Skin:     General: Skin is warm and dry.   Neurological:      General: No focal deficit present.      Mental Status: She is alert and oriented to person, place, and time.   Psychiatric:         Mood and Affect: Mood normal.         Behavior: Behavior normal.         Thought Content: Thought content normal.         Judgment: Judgment normal.         Vitals:    12/06/23 1053   BP: 138/78   BP Location: Right arm   Patient Position: Sitting   BP Method: Large (Manual)   Pulse: 73   Resp: 16   Temp: 98.2 °F (36.8 °C)   TempSrc: Oral   SpO2: 97%   Weight: 99.3 kg (218 lb 14.7 oz)   Height: 5' 3" (1.6 m)     Body mass index is 38.78 kg/m².    RESULTS: Reviewed labs from last 12 months    Last Lab Results:     Lab Results   Component Value Date    WBC 6.59 09/28/2023    HGB 12.2 09/28/2023    HCT 38.8 09/28/2023     09/28/2023     09/28/2023     09/28/2023    K 3.7 09/28/2023    K " 3.7 09/28/2023     09/28/2023     09/28/2023    CO2 21 (L) 09/28/2023    CO2 21 (L) 09/28/2023    BUN 12 09/28/2023    BUN 12 09/28/2023    CREATININE 0.8 09/28/2023    CREATININE 0.8 09/28/2023    CALCIUM 9.5 09/28/2023    CALCIUM 9.5 09/28/2023    ALBUMIN 3.7 09/28/2023    ALBUMIN 3.7 09/28/2023    AST 19 09/28/2023    AST 19 09/28/2023    ALT 26 09/28/2023    ALT 26 09/28/2023    CHOL 256 (H) 09/28/2023    CHOL 256 (H) 09/28/2023    TRIG 206 (H) 09/28/2023    TRIG 206 (H) 09/28/2023    HDL 45 09/28/2023    HDL 45 09/28/2023    LDLCALC 169.8 (H) 09/28/2023    LDLCALC 169.8 (H) 09/28/2023    HGBA1C 7.0 (H) 09/28/2023    HGBA1C 7.0 (H) 09/28/2023    TSH 1.291 09/28/2023       EKG 09/29/2023:    Vent. Rate : 074 BPM     Atrial Rate : 074 BPM      P-R Int : 182 ms          QRS Dur : 096 ms       QT Int : 374 ms       P-R-T Axes : 064 062 075 degrees      QTc Int : 415 ms     Normal sinus rhythm   Incomplete right bundle branch block   Borderline Abnormal ECG   When compared with ECG of 27-APR-2022 13:31,   Significant changes have occurred     Echocardiogram dobutamine stress test 11/08/2023:    Left Ventricle: The left ventricle is normal in size. Normal wall thickness. There is concentric remodeling. Normal wall motion. There is normal systolic function with a visually estimated ejection fraction of 60 - 65%. There is normal diastolic function.    Right Ventricle: Normal right ventricular cavity size. Wall thickness is normal. Right ventricle wall motion  is normal. Systolic function is normal.    Aortic Valve: The aortic valve is a trileaflet valve. There is mild aortic valve sclerosis. There is mild annular calcification present.    Mitral Valve: There is mild mitral annular calcification present.    Aorta: Aortic root is normal in size measuring 2.86 cm. Ascending aorta is normal measuring 2.81 cm.    Pulmonary Artery: The estimated pulmonary artery systolic pressure is 23 mmHg.    IVC/SVC: Normal  venous pressure at 3 mmHg.    Stress Protocol: The patient reported no symptoms during the stress test.    Baseline ECG: The Baseline ECG reveals sinus rhythm. The axis is normal. The ST segments are normal.    Stress ECG: There are no ST segment deviation identified during the protocol. There are no arrhythmias during stress. There is normal blood pressure response with stress.    ECG Conclusion: The ECG portion of the study is negative for ischemia.    Post-stress Echo: The left ventricle systolic function is hyperdynamic. Right ventricle cavity size is small. Right ventricle systolic function is hyperdynamic.    Post-stress Impression: The study is negative with no echocardiographic evidence of stress induced ischemia.      1. Preoperative clearance    2. Essential hypertension, benign    3. Controlled type 2 diabetes mellitus with stage 2 chronic kidney disease, without long-term current use of insulin    4. Hyperlipidemia, unspecified hyperlipidemia type    5. Statin intolerance    6. Severe obesity (BMI 35.0-39.9) with comorbidity    7. Partial symptomatic epilepsy with complex partial seizures, not intractable, without status epilepticus    8. Chronic tension-type headache, intractable    9. History of COVID-19        Plan:   Monalisa was seen today for follow-up.    Diagnoses and all orders for this visit:    Preoperative clearance  -     PROTIME-INR; Future  -     APTT; Future    For bariatric surgery. Will do usual blood work, pt is low cardiac risk for moderate risk surgery. She is medical cleared. States a clearance is due before obtaining a date for surgery. Will send my notes to [facility], Bariatric Surgery. Advised re: need to hold NSAIDs and ASA for one week before surgery, also the need to hold metformin from the day before, and to hold Trulicity one week before.     Essential hypertension, benign  -     CBC Auto Differential; Future  -     Comprehensive Metabolic Panel; Future  -     TSH;  Future    Monitored and controlled.    Controlled type 2 diabetes mellitus with stage 2 chronic kidney disease, without long-term current use of insulin  -     Hemoglobin A1C; Future    Controlled, same treatment for now.     Hyperlipidemia, unspecified hyperlipidemia type  -     Lipid Panel; Future    Statin intolerance    Same.    Severe obesity (BMI 35.0-39.9) with comorbidity    See request of clearance for sleeve.    Partial symptomatic epilepsy with complex partial seizures, not intractable, without status epilepticus    No recent episode.    Chronic tension-type headache, intractable    Not a major issue.    History of COVID-19    Pt had COVID 3 months ago, she may take the new booster in one month. Also advised re: flu.    No follow-ups on file.    This note was created by combination of typed  and M-Modal dictation.  Transcription errors may be present.  If there are any questions, please contact me.

## 2023-12-12 ENCOUNTER — PATIENT MESSAGE (OUTPATIENT)
Dept: BARIATRICS | Facility: CLINIC | Age: 58
End: 2023-12-12
Payer: COMMERCIAL

## 2023-12-12 DIAGNOSIS — G47.00 INSOMNIA, UNSPECIFIED TYPE: ICD-10-CM

## 2023-12-12 NOTE — TELEPHONE ENCOUNTER
Last Office Visit Info:   The patient's last visit with Flor Gomez MD was on 12/6/2023.    The patient's last visit in current department was on 12/6/2023.        Last CBC Results:   Lab Results   Component Value Date    WBC 7.73 12/06/2023    HGB 14.0 12/06/2023    HCT 40.9 12/06/2023     12/06/2023       Last CMP Results  Lab Results   Component Value Date     12/06/2023    K 4.2 12/06/2023     12/06/2023    CO2 26 12/06/2023    BUN 11 12/06/2023    CREATININE 0.9 12/06/2023    CALCIUM 10.9 (H) 12/06/2023    ALBUMIN 3.9 12/06/2023    AST 22 12/06/2023    ALT 23 12/06/2023       Last Lipids  Lab Results   Component Value Date    CHOL 256 (H) 12/06/2023    TRIG 255 (H) 12/06/2023    HDL 42 12/06/2023    LDLCALC 163.0 (H) 12/06/2023       Last A1C  Lab Results   Component Value Date    HGBA1C 6.6 (H) 12/06/2023       Last TSH  Lab Results   Component Value Date    TSH 1.387 12/06/2023             Current Med Refills  Medication List with Changes/Refills   Current Medications    ACETAMINOPHEN (TYLENOL) 500 MG TABLET    Take 2 tablets (1,000 mg total) by mouth every 6 (six) hours as needed for Pain.       Start Date: 5/31/2021 End Date: --    ACETAMINOPHEN (TYLENOL) 500 MG TABLET    Take 1 tablet (500 mg total) by mouth every 4 (four) hours as needed for Pain or Temperature greater than (100.5 or greater).       Start Date: 9/6/2023  End Date: --    ALBUTEROL (PROVENTIL/VENTOLIN HFA) 90 MCG/ACTUATION INHALER    Inhale 1-2 puffs into the lungs every 4 (four) hours as needed for Wheezing. Rescue       Start Date: 2/17/2022 End Date: 12/6/2023    BENAZEPRIL (LOTENSIN) 40 MG TABLET    TAKE 1 TABLET(40 MG) BY MOUTH EVERY DAY       Start Date: 8/8/2023  End Date: --    BUSPIRONE (BUSPAR) 10 MG TABLET    Take 1 tablet (10 mg total) by mouth 3 (three) times daily as needed (anxiety).       Start Date: 3/6/2023  End Date: 3/5/2024    DICYCLOMINE (BENTYL) 20 MG TABLET    Take 20 mg by mouth 4 (four)  times daily.       Start Date: 5/3/2021  End Date: --    DULAGLUTIDE (TRULICITY) 0.75 MG/0.5 ML PEN INJECTOR    Inject 0.75 mg into the skin every 7 days.       Start Date: 5/6/2023  End Date: 5/5/2024    ESCITALOPRAM OXALATE (LEXAPRO) 10 MG TABLET    Take 1 tablet (10 mg total) by mouth once daily.       Start Date: 3/6/2023  End Date: --    FLUTICASONE PROPIONATE (FLONASE) 50 MCG/ACTUATION NASAL SPRAY    1 spray (50 mcg total) by Each Nostril route 2 (two) times daily.       Start Date: 5/31/2021 End Date: --    GABAPENTIN (NEURONTIN) 100 MG CAPSULE    Take 100 mg by mouth 3 (three) times daily.       Start Date: 1/25/2023 End Date: --    HYDROCHLOROTHIAZIDE (HYDRODIURIL) 12.5 MG TAB    TAKE 1 TABLET(12.5 MG) BY MOUTH EVERY DAY       Start Date: 11/15/2023End Date: --    HYDROCODONE-ACETAMINOPHEN (NORCO) 7.5-325 MG PER TABLET    Take 1 tablet by mouth every 6 (six) hours as needed.       Start Date: 5/31/2023 End Date: --    HYDROCORTISONE (WESTCORT) 0.2 % CREAM    Apply topically 2 (two) times daily. for 5 days       Start Date: 7/8/2023  End Date: 12/6/2023    HYDROCORTISONE 2.5 % CREAM    WOLFGANG EXT AA BID       Start Date: 8/5/2022  End Date: --    HYDROXYZINE HCL (ATARAX) 25 MG TABLET    Take 1 tablet (25 mg total) by mouth every 4 to 6 hours as needed for Itching (redness).       Start Date: 7/8/2023  End Date: --    IBUPROFEN (ADVIL,MOTRIN) 800 MG TABLET    Take by mouth.       Start Date: 5/31/2023 End Date: --    K PHOS DI & MONO-SOD PHOS MONO (K-PHOS-NEUTRAL) 250 MG TAB    Take 1 tablet by mouth 2 (two) times a day. for 3 days       Start Date: 9/28/2023 End Date: 10/1/2023    LEVETIRACETAM (KEPPRA) 1000 MG TABLET    TAKE 1 TABLET(1000 MG) BY MOUTH TWICE DAILY       Start Date: 12/11/2023End Date: --    LIDOCAINE (LIDODERM) 5 %    Place 1 patch onto the skin once daily. Remove & Discard patch within 12 hours or as directed by MD       Start Date: 11/16/2021End Date: --    LIDOCAINE (LMX) 4 % CREAM    Apply  "topically as needed.       Start Date: 8/6/2020  End Date: --    METFORMIN (GLUCOPHAGE-XR) 500 MG ER 24HR TABLET    TAKE 1 TABLET(500 MG) BY MOUTH DAILY WITH BREAKFAST       Start Date: 10/3/2023 End Date: --    MOBIC 15 MG TABLET    Take 1 tablet (15 mg total) by mouth daily as needed for Pain. Take on full stomach, no other NSAIDs. Not more than once a day       Start Date: 9/29/2022 End Date: --    ONDANSETRON (ZOFRAN-ODT) 4 MG TBDL    Take 1 tablet (4 mg total) by mouth every 6 (six) hours as needed (nausea).       Start Date: 9/6/2023  End Date: --    PANTOPRAZOLE (PROTONIX) 40 MG TABLET    Take 1 tablet (40 mg total) by mouth once daily.       Start Date: 11/14/2023End Date: 2/12/2024    PEN NEEDLE, DIABETIC 31 GAUGE X 5/16" NDLE    For use with bydureon       Start Date: 2/24/2020 End Date: --    PHENOL (CHLORASEPTIC THROAT SPRAY) 1.4 % SPRA    by Mucous Membrane route every 2 (two) hours.       Start Date: 9/6/2023  End Date: --    PROMETHAZINE (PHENERGAN) 25 MG TABLET           Start Date: 5/3/2021  End Date: --    PROMETHAZINE-DEXTROMETHORPHAN (PROMETHAZINE-DM) 6.25-15 MG/5 ML SYRP           Start Date: 1/25/2023 End Date: --    SODIUM CHLORIDE (OCEAN NASAL) 0.65 % NASAL SPRAY    1 spray by Nasal route every 3 (three) hours as needed for Congestion.       Start Date: 5/31/2021 End Date: --    TOPIRAMATE (TOPAMAX) 100 MG TABLET    Take 1 tablet (100 mg total) by mouth once daily.       Start Date: 1/5/2022  End Date: 12/6/2023    ZOLPIDEM (AMBIEN) 5 MG TAB    Take one pill in the evening, PRN, lowest dose possible       Start Date: 6/6/2023  End Date: --                       "

## 2023-12-12 NOTE — TELEPHONE ENCOUNTER
No care due was identified.  [facility] Embedded Care Due Messages. Reference number: [id number].   12/12/2023 2:31:14 PM CST

## 2023-12-13 ENCOUNTER — PATIENT MESSAGE (OUTPATIENT)
Dept: BARIATRICS | Facility: CLINIC | Age: 58
End: 2023-12-13
Payer: COMMERCIAL

## 2023-12-15 RX ORDER — ZOLPIDEM TARTRATE 5 MG/1
TABLET ORAL
Qty: 30 TABLET | Refills: 0 | Status: SHIPPED | OUTPATIENT
Start: 2023-12-15 | End: 2024-02-12

## 2023-12-18 ENCOUNTER — PATIENT MESSAGE (OUTPATIENT)
Dept: BARIATRICS | Facility: CLINIC | Age: 58
End: 2023-12-18
Payer: COMMERCIAL

## 2023-12-22 ENCOUNTER — CLINICAL SUPPORT (OUTPATIENT)
Dept: BARIATRICS | Facility: CLINIC | Age: 58
End: 2023-12-22
Payer: COMMERCIAL

## 2023-12-22 DIAGNOSIS — K21.9 GASTROESOPHAGEAL REFLUX DISEASE WITHOUT ESOPHAGITIS: ICD-10-CM

## 2023-12-22 DIAGNOSIS — E66.9 OBESITY (BMI 30-39.9): ICD-10-CM

## 2023-12-22 DIAGNOSIS — N18.2 CONTROLLED TYPE 2 DIABETES MELLITUS WITH STAGE 2 CHRONIC KIDNEY DISEASE, WITHOUT LONG-TERM CURRENT USE OF INSULIN: Primary | ICD-10-CM

## 2023-12-22 DIAGNOSIS — E78.00 PURE HYPERCHOLESTEROLEMIA: ICD-10-CM

## 2023-12-22 DIAGNOSIS — E11.22 CONTROLLED TYPE 2 DIABETES MELLITUS WITH STAGE 2 CHRONIC KIDNEY DISEASE, WITHOUT LONG-TERM CURRENT USE OF INSULIN: Primary | ICD-10-CM

## 2023-12-22 DIAGNOSIS — Z71.3 DIETARY COUNSELING AND SURVEILLANCE: ICD-10-CM

## 2023-12-22 PROCEDURE — 97803 MED NUTRITION INDIV SUBSEQ: CPT | Mod: 95,,, | Performed by: DIETITIAN, REGISTERED

## 2023-12-22 PROCEDURE — 99499 NO LOS: ICD-10-PCS | Mod: 95,,, | Performed by: DIETITIAN, REGISTERED

## 2023-12-22 PROCEDURE — 97803 PR MED NUTR THER, SUBSQ, INDIV, EA 15 MIN: ICD-10-PCS | Mod: 95,,, | Performed by: DIETITIAN, REGISTERED

## 2023-12-22 PROCEDURE — 99499 UNLISTED E&M SERVICE: CPT | Mod: 95,,, | Performed by: DIETITIAN, REGISTERED

## 2023-12-22 NOTE — PROGRESS NOTES
The patient location is: car, not driving ([place])  The chief complaint leading to consultation is: 4 months Medically Supervised Diet pending Gastric Sleeve  Visit type: audiovisual     Face to Face time with patient: 15 min    20 minutes of total time spent on the encounter, which includes face to face time and non-face to face time preparing to see the patient (eg, review of tests), Obtaining and/or reviewing separately obtained history, Documenting clinical information in the electronic or other health record, Independently interpreting results (not separately reported) and communicating results to the patient/family/caregiver, or Care coordination (not separately reported).       Each patient to whom he or she provides medical services by telemedicine is:  (1) informed of the relationship between the physician and patient and the respective role of any other health care provider with respect to management of the patient; and (2) notified that he or she may decline to receive medical services by telemedicine and may withdraw from such care at any time.    NUTRITION NOTE    Referring Physician: Apoorva Weaver M.D.   Reason for MNT Referral: 4 months Medically Supervised Diet pending Gastric Sleeve    Patient presents for follow-up visit for MSD with weight loss over the past month; total weight loss by making numerous dietary and lifestyle changes.    PT reports being stressed - mother was just brought to hospital    CLINICAL DATA:  58 y.o. female.    Initial weight: 224 lbs  Current Weight: 218 lbs (12/6/23)  Weight Change Since Initial Visit: -6 lbs  Ideal Body Weight: 135 lbs  BMI: 38.78    DAILY NUTRITIONAL NEEDS: pre-op nutritional bariatric guidelines to promote weight loss  1715-4410 Calories    Grams Protein    CURRENT DIET:  Regular diet.  Diet Recall: Food records are not present.  Greatest challenge: irregular meal patterns, high-fat diet, and sugar-sweetened beverages  Progress: Started  exercising     Current diet recall:      Coffee with cream and Splenda  B: Boost Max Protein  L: Boost Max Protein  S: Orange  D: Salad (lettuce, cucumber, tomatoes, red onions, and [employer] lite Italian) with air fried chicken    Diet Includes: 1 meal, 2 protein shakes  Meal Pattern: Improved.  Protein Supplements:  2 per day breakfast and snack or lunch Boost Max Protein  Snacking: Adequate. Snacks include healthy choices.  Vegetables: Likes a variety. Eats almost daily. Likes all  Fruits: Likes a variety. Eats 3 x week. Likes all. Bananas, apples, oranges, grapes, peaches, plums  Beverages: Water, Zero sugar Coke and Dr Pepper, coffee without sugar  Dining out: Once a week. Mostly take out. Turkey wing  Cooking at home: 3 x week  Mostly baked, smothered, fried, and air fried. Meat, fish, starchy CHO, and vegetables. Okra, meatloaf, smothered chicken, steak, burgers, fried fish, boiled seafood, rice     Food Allergies:   Fresh pineapple (canned ok)     Vitamins / Minerals / Herbs:   Fish oil  Multivitamin     Labs:   No recent     Exercise:  Current exercise: None  3 days/week walking 30 minutes     Restrictions to exercise: None     Social:  Retired.  Lives with  and three grandchildren (11, 16, 21).  Grocery shopping and food prep: PT.  Patient believes the household will be supportive after surgery.  Alcohol:  A few times/year  Smoking: PT reports having stopped in September.    ASSESSMENT:  Patient demonstrates some willingness to change lifestyle habits as evidenced by weight loss, good exercise, and including protein drinks.    Doing well with working on greatest challenges (irregular meal patterns, high-fat diet, and sugar-sweetened beverages ).    Barriers to Education:  none  Stage of Change:  action    NUTRITION DIAGNOSIS:  Morbid Obesity related to Excessive carbohydrate intake, Excessive calorie intake, and Physical inactivity as evidence by BMI.  Obesity Status: Same    BARIATRIC DIET  DISCUSSION/PLAN:  Discussed diet after surgery and related to patient's food record.  Reviewed nutrition guidelines for before and after surgery.  Answered all questions.  Continue to review Bariatric Nutrition Guidebook at home and call with any questions.    RECOMMENDATIONS:  Pt is potential candidate for surgery.    Diet: Maintain diet plan.  Continue to review Bariatric Nutrition Guidebook at home and call with any questions.    Exercise: Maintain.    Behavior Modification: Begin to document food & activity logs daily.    Return to clinic in one month.    Needs additional visits with RD.    Communicated nutrition plan with bariatric team.    SESSION TIME:  20 minutes

## 2024-01-09 ENCOUNTER — PATIENT MESSAGE (OUTPATIENT)
Dept: BARIATRICS | Facility: CLINIC | Age: 59
End: 2024-01-09
Payer: COMMERCIAL

## 2024-01-22 ENCOUNTER — CLINICAL SUPPORT (OUTPATIENT)
Dept: BARIATRICS | Facility: CLINIC | Age: 59
End: 2024-01-22
Payer: COMMERCIAL

## 2024-01-22 VITALS — WEIGHT: 219.38 LBS | BODY MASS INDEX: 38.86 KG/M2

## 2024-01-22 DIAGNOSIS — E66.9 OBESITY (BMI 30-39.9): ICD-10-CM

## 2024-01-22 DIAGNOSIS — K21.9 GASTROESOPHAGEAL REFLUX DISEASE WITHOUT ESOPHAGITIS: ICD-10-CM

## 2024-01-22 DIAGNOSIS — E78.00 PURE HYPERCHOLESTEROLEMIA: ICD-10-CM

## 2024-01-22 DIAGNOSIS — N18.2 CONTROLLED TYPE 2 DIABETES MELLITUS WITH STAGE 2 CHRONIC KIDNEY DISEASE, WITHOUT LONG-TERM CURRENT USE OF INSULIN: Primary | ICD-10-CM

## 2024-01-22 DIAGNOSIS — Z71.3 DIETARY COUNSELING AND SURVEILLANCE: ICD-10-CM

## 2024-01-22 DIAGNOSIS — E11.22 CONTROLLED TYPE 2 DIABETES MELLITUS WITH STAGE 2 CHRONIC KIDNEY DISEASE, WITHOUT LONG-TERM CURRENT USE OF INSULIN: Primary | ICD-10-CM

## 2024-01-22 PROCEDURE — 99499 UNLISTED E&M SERVICE: CPT | Mod: S$GLB,,, | Performed by: DIETITIAN, REGISTERED

## 2024-01-22 PROCEDURE — 99999 PR PBB SHADOW E&M-EST. PATIENT-LVL I: CPT | Mod: PBBFAC,,, | Performed by: DIETITIAN, REGISTERED

## 2024-01-22 PROCEDURE — 97803 MED NUTRITION INDIV SUBSEQ: CPT | Mod: S$GLB,,, | Performed by: DIETITIAN, REGISTERED

## 2024-01-22 NOTE — PROGRESS NOTES
NUTRITION NOTE    Referring Physician: Apoorva Weaver M.D.   Reason for MNT Referral: 4 months Medically Supervised Diet pending Gastric Sleeve    Patient presents for follow-up visit for MSD with weight gain over the past month; 5 lbs total weight loss     PT admits to having a cigarette yesterday. PT reports mother in hospital and not eating regularly or exercising in the past week.    CLINICAL DATA:  58 y.o. female.    Initial weight: 224 lbs  Current Weight: 219 lbs  Weight Change Since Initial Visit: -5 lbs  Ideal Body Weight: 135 lbs  BMI: 38.86    DAILY NUTRITIONAL NEEDS: pre-op nutritional bariatric guidelines to promote weight loss  9894-4056 Calories    Grams Protein    CURRENT DIET:  Regular diet.  Diet Recall: Food records are not present.  Greatest challenge: irregular meal patterns, high-fat diet, and sugar-sweetened beverages     Current diet recall:      Coffee with cream and Splenda  L: 1/2 BLT  S: Fruit  D: Chicken noodle soup    Usual diet:  2 Boost Max Protein  Salad with air fried chicken for dinner  Fruit for snack     Food Allergies:   Fresh pineapple (canned ok)     Vitamins / Minerals / Herbs:   Fish oil  Multivitamin     Labs:   No recent     Exercise:  Current exercise: None  3 days/week walking 30 minutes     Restrictions to exercise: None     Social:  Retired.  Lives with  and three grandchildren (11, 16, 21).  Grocery shopping and food prep: PT.  Patient believes the household will be supportive after surgery.  Alcohol:  A few times/year  Smoking: PT reports having stopped in September.    ASSESSMENT:  Patient demonstrates some willingness to change lifestyle habits as evidenced by weight loss and including protein drinks.    Doing poorly with working on greatest challenges (irregular meal patterns).  Doing fairly well with working on greatest challenges (high-fat diet and sugar sweetened beverages).    Barriers to Education:  none  Stage of Change:   relapse    NUTRITION DIAGNOSIS:  Morbid Obesity related to Excessive carbohydrate intake, Excessive calorie intake, and Physical inactivity as evidence by BMI.  Obesity Status: Same    BARIATRIC DIET DISCUSSION/PLAN:  Discussed diet after surgery and related to patient's food record.  Reviewed nutrition guidelines for before and after surgery.  Answered all questions.  Discussed not skipping meals, using protein shakes as meals    RECOMMENDATIONS:  Pt is potential candidate for surgery.    Diet: Adjust diet plan.  Do not skip meals, use protein shakes as meals    Exercise:  Resume when able .    Behavior Modification: Begin to document food & activity logs daily.    Return to clinic in two weeks.    Needs additional visits with RD.    Communicated nutrition plan with bariatric team.    SESSION TIME:  15  minutes

## 2024-02-05 ENCOUNTER — CLINICAL SUPPORT (OUTPATIENT)
Dept: BARIATRICS | Facility: CLINIC | Age: 59
End: 2024-02-05
Payer: COMMERCIAL

## 2024-02-05 DIAGNOSIS — E78.00 PURE HYPERCHOLESTEROLEMIA: ICD-10-CM

## 2024-02-05 DIAGNOSIS — E11.22 CONTROLLED TYPE 2 DIABETES MELLITUS WITH STAGE 2 CHRONIC KIDNEY DISEASE, WITHOUT LONG-TERM CURRENT USE OF INSULIN: Primary | ICD-10-CM

## 2024-02-05 DIAGNOSIS — K21.9 GASTROESOPHAGEAL REFLUX DISEASE WITHOUT ESOPHAGITIS: ICD-10-CM

## 2024-02-05 DIAGNOSIS — N18.2 CONTROLLED TYPE 2 DIABETES MELLITUS WITH STAGE 2 CHRONIC KIDNEY DISEASE, WITHOUT LONG-TERM CURRENT USE OF INSULIN: Primary | ICD-10-CM

## 2024-02-05 DIAGNOSIS — E66.9 OBESITY (BMI 30-39.9): ICD-10-CM

## 2024-02-05 DIAGNOSIS — Z71.3 DIETARY COUNSELING AND SURVEILLANCE: ICD-10-CM

## 2024-02-05 PROCEDURE — 97803 MED NUTRITION INDIV SUBSEQ: CPT | Mod: 95,,, | Performed by: DIETITIAN, REGISTERED

## 2024-02-05 PROCEDURE — 99499 UNLISTED E&M SERVICE: CPT | Mod: 95,,, | Performed by: DIETITIAN, REGISTERED

## 2024-02-05 NOTE — PROGRESS NOTES
The patient location is: home ([place])  The chief complaint leading to consultation is: 4 months Medically Supervised Diet pending Gastric Sleeve  Visit type: audiovisual     Face to Face time with patient: 20 min    30 minutes of total time spent on the encounter, which includes face to face time and non-face to face time preparing to see the patient (eg, review of tests), Obtaining and/or reviewing separately obtained history, Documenting clinical information in the electronic or other health record, Independently interpreting results (not separately reported) and communicating results to the patient/family/caregiver, or Care coordination (not separately reported).       Each patient to whom he or she provides medical services by telemedicine is:  (1) informed of the relationship between the physician and patient and the respective role of any other health care provider with respect to management of the patient; and (2) notified that he or she may decline to receive medical services by telemedicine and may withdraw from such care at any time.    NUTRITION NOTE    Referring Physician: Apoorva Weaver M.D.   Reason for MNT Referral: 4 months Medically Supervised Diet pending Gastric Sleeve    Patient presents for follow-up visit for MSD with weight  NA  over the past two weeks; 5 lbs total weight loss.    PT reports she has been staying with mother, who is recovering from hospital visit. PT states she returned to regular routine last Friday.    CLINICAL DATA:  58 y.o. female.    Initial weight: 224 lbs  Current Weight: 219 lbs (1/22/24)  Weight Change Since Initial Visit: -5 lbs  Ideal Body Weight: 135 lbs  BMI: 38.86    DAILY NUTRITIONAL NEEDS: pre-op nutritional bariatric guidelines to promote weight loss  5434-7868 Calories    Grams Protein    CURRENT DIET:  Regular diet.  Diet Recall: Food records are not present.  Greatest challenge: irregular meal patterns, high-fat diet, and sugar-sweetened  beverages     Current diet recall:     Coffee with cream and Splenda  B: Boost Max Protein  L: 1/2 tuna sandwich  D: Baked chicken with salad    Diet Includes: 2 meal, 1 protein shake  Meal Pattern: Improved.  Protein Supplements:  1 per day   Snacking: Adequate. Snacks include healthy choices.  Vegetables: Likes a variety. Eats almost daily. Likes all  Fruits: Likes a variety. Eats 3 x week. Likes all. Bananas, apples, oranges, grapes, peaches, plums  Beverages: Water, Zero sugar Coke and Dr Pepper, coffee without sugar, Cirkul water bottle  Dining out: Once a week. Mostly take out. Turkey wing  Cooking at home: 3 x week  Mostly baked, smothered, fried, and air fried. Meat, fish, starchy CHO, and vegetables. Okra, meatloaf, smothered chicken, steak, burgers, fried fish, boiled seafood, rice     Food Allergies:   Fresh pineapple (canned ok)     Vitamins / Minerals / Herbs:   Fish oil  Multivitamin     Labs:   No recent     Exercise:  Current exercise: Fair  Walking 2-3 x week 30 mins     Restrictions to exercise: None     Social:  Retired.  Lives with  and three grandchildren (11, 16, 21).  Grocery shopping and food prep: PT.  Patient believes the household will be supportive after surgery.  Alcohol:  A few times/year  Smoking: PT reports having stopped in September.    ASSESSMENT:  Patient demonstrates some willingness to change lifestyle habits as evidenced by weight loss, dietary changes, and including protein drinks.    Doing well with working on greatest challenges ( rregular meal patterns, high-fat diet, and sugar-sweetened beverages ).    Barriers to Education:  none  Stage of Change:  action    NUTRITION DIAGNOSIS:  Morbid Obesity related to Excessive carbohydrate intake, Excessive calorie intake, and Physical inactivity as evidence by BMI.  Obesity Status: Same    BARIATRIC DIET DISCUSSION/PLAN:  Discussed diet after surgery and related to patient's food record.  Reviewed nutrition guidelines for  before and after surgery.  Answered all questions.  Discussed pre-op liquid diet - handout provided in AVS  Discussed vitamins and minerals - handout provided in AVS  Start shopping for bariatric vitamins & minerals.  Continue to cut down on starchy CHO    RECOMMENDATIONS:  Pt is good candidate for surgery.    Diet: Adjust diet plan.  Start shopping for bariatric vitamins & minerals.  Continue to cut down on starchy CHO    Exercise: Increase.    Behavior Modification: Begin to document food & activity logs daily.    Communicated nutrition plan with bariatric team.    SESSION TIME:  30 minutes

## 2024-02-05 NOTE — PATIENT INSTRUCTIONS
Bariatric Vitamin and Mineral Needs    Bariatric surgery can change the way your body absorbs certain vitamins and minerals. With a smaller stomach, it is also harder for you to get all of the nutrients you need through food. It is essential to take these vitamins and minerals for the rest of your life to avoid nutrient deficiency.    Remember:  *NO gummy multivitamins due to poor quality and sugar content  *Do NOT take calcium citrate and iron within 2 hours of each other due to poor absorption  *Pills may be swallowed if they are the size of a No. 2 pencil eraser (or smaller). They may be cut to this size with a pill cutter and swallowed if tolerated.    Sleeve Gastrectomy: No swallowing large whole pills for 2 weeks after surgery  Gastric Bypass: No swallowing large whole pills for 1 month after surgery    Vitamins and minerals can be purchased in stores and at these on bariatric specialty sites:  https://www.I-Market.AdFinance/ (use verification code [employer] for discount)  https://store.bariatricJana Mobile.AdFinance/collections/bariatric-vitamins (use discount code NYHAI415 for 20% off your first order)  https://Apax Grouptevitamins.AdFinance/  https://www.bariatricfusion.com/  https://unjury.com/          Pre-Op Liquid Diet  Protein drinks and powders are available in many places. They can be found at the local grocery, health or drug store, and online. When choosing a protein drink, please read the label. Make sure there is the protein drink has at least 20 grams of protein, no more than 4 grams of sugar per serving, and 100% whey protein (not collagen) or soy or pea protein if plant-based.  Aim to take in 600-800 calories and  grams of protein per day while on the liquid diet.     Ready-to-Drink Protein Shakes    Premier Protein  30 g protein   Equate High Performance  30 g protein   Muscle Milk*  30 g protein     SUNDAYTOZ Core Power*  26 g protein   SUNDAYTOZ Nutrition Plan*  30 g protein   Quest  30 g protein     [facility]  Total Lean*  20 g protein   Ensure Max Protein  30 g protein   Boost Glucose Control Max  30 g protein   * Lactose free    Protein Powders  Protein powders may be mixed with water, fat-free or 1% milk, and unsweetened non-dairy milks like almond and soy.          25 g protein   ISOPURE+  20-25 g protein   Pure Protein  25 g protein     Body Fortress  30 g protein   Unjury+  21 g protein Orgain*  21 g protein       Daniel Lazaro*+  24 g protein   Muscle Milk*  32 g protein   Premier Protein+  30 g protein   * Lactose free  + Available flavored and unflavored      Ready-to-Drink Protein Shoemaker    Protein 2.0  20 g protein   Premier Clear  20 g protein   Isopure Zero Carb                 32 g protein     Protein Soups  Choose soups without any solids (noodles, vegetables, etc.) in them, such as bouillon and cream soups. If you have hypertension, look for soups with sodium under 20% per serving.    Bariatric Pal  15 g protein   Unjury  21 g protein   Celebrate  15 g protein     Websites:  www.Cellvine  wwwTerritorial Prescience  www.[employer] Grocery  www.celebrateQuote Rollers.Ayannah    Low Calorie/Sugar-Free Drinks  All fluids before and after surgery should be sugar-free, non-carbonated, and low calorie - no more than 15 calories per serving.      Plain water   Infuse with fruit as desired   Non-carbonated flavored water   Unsweetened tea  (no green tea)     Sugar-free drink powders   Sugar-free drink drops   Diet juices     Sugar-free Jell-o   Sugar-free popsicles   Low sodium broths     Tea/Coffee  Limit caffeinated tea or coffee to one 12 oz serving per day.  No green tea of any kind.    To vivienne, use a sugar substitute like Splenda, stevia, etc. To lighten, use fat free or 1% milk, unsweetened almond or soy milk, or a sugar-free creamer. Watch the serving size of the milks or creamer! Remember, no more than 15 calories per serving. Or, try using a little protein shake!

## 2024-02-11 DIAGNOSIS — G47.00 INSOMNIA, UNSPECIFIED TYPE: ICD-10-CM

## 2024-02-11 NOTE — TELEPHONE ENCOUNTER
No care due was identified.  [facility] Embedded Care Due Messages. Reference number: [id number].   2/11/2024 4:30:20 PM CST

## 2024-02-12 RX ORDER — ZOLPIDEM TARTRATE 5 MG/1
TABLET ORAL
Qty: 30 TABLET | Refills: 0 | Status: SHIPPED | OUTPATIENT
Start: 2024-02-12 | End: 2024-04-15

## 2024-02-14 ENCOUNTER — PATIENT MESSAGE (OUTPATIENT)
Dept: BARIATRICS | Facility: CLINIC | Age: 59
End: 2024-02-14
Payer: COMMERCIAL

## 2024-02-16 ENCOUNTER — TELEPHONE (OUTPATIENT)
Dept: BARIATRICS | Facility: CLINIC | Age: 59
End: 2024-02-16
Payer: COMMERCIAL

## 2024-02-19 ENCOUNTER — PATIENT MESSAGE (OUTPATIENT)
Dept: ADMINISTRATIVE | Facility: HOSPITAL | Age: 59
End: 2024-02-19
Payer: COMMERCIAL

## 2024-02-19 ENCOUNTER — TELEPHONE (OUTPATIENT)
Dept: BARIATRICS | Facility: CLINIC | Age: 59
End: 2024-02-19
Payer: COMMERCIAL

## 2024-02-19 NOTE — TELEPHONE ENCOUNTER
Spoke with patient to discuss a possible surgery date. She was in an appointment and could not talk in detail. Agreed with patient to call her back tomorrow morning around 9:15.

## 2024-02-20 ENCOUNTER — PATIENT OUTREACH (OUTPATIENT)
Dept: ADMINISTRATIVE | Facility: HOSPITAL | Age: 59
End: 2024-02-20
Payer: COMMERCIAL

## 2024-02-20 ENCOUNTER — PATIENT MESSAGE (OUTPATIENT)
Dept: BARIATRICS | Facility: CLINIC | Age: 59
End: 2024-02-20
Payer: COMMERCIAL

## 2024-02-20 ENCOUNTER — TELEPHONE (OUTPATIENT)
Dept: BARIATRICS | Facility: CLINIC | Age: 59
End: 2024-02-20
Payer: COMMERCIAL

## 2024-02-20 DIAGNOSIS — E78.5 HYPERLIPIDEMIA, UNSPECIFIED HYPERLIPIDEMIA TYPE: ICD-10-CM

## 2024-02-20 DIAGNOSIS — I10 HYPERTENSION, UNSPECIFIED TYPE: ICD-10-CM

## 2024-02-20 DIAGNOSIS — K21.9 GASTROESOPHAGEAL REFLUX DISEASE, UNSPECIFIED WHETHER ESOPHAGITIS PRESENT: ICD-10-CM

## 2024-02-20 DIAGNOSIS — E11.9 TYPE 2 DIABETES MELLITUS WITHOUT COMPLICATION, UNSPECIFIED WHETHER LONG TERM INSULIN USE: Primary | ICD-10-CM

## 2024-02-20 DIAGNOSIS — E11.9 TYPE 2 DIABETES MELLITUS WITHOUT COMPLICATION, WITHOUT LONG-TERM CURRENT USE OF INSULIN: ICD-10-CM

## 2024-02-20 DIAGNOSIS — E66.01 MORBID OBESITY: Primary | ICD-10-CM

## 2024-02-20 NOTE — LETTER
"  [facility] B[facility] 2nd Fl  [address]  [facility] [address]  Phone: [phone]  Fax: [phone] 2024       Attn: Pre-determination Dept.    RE:  Monalisa Carson          OC #: [id number]          : 1965    To Whom It May Concern:  I am sending this letter on behalf of Monalisa Carson (a 58 y.o. female) for pre-approval for Bariatric Surgery; specifically the laparoscopic sleeve gastrectomy. Monalisa  has a past medical history of Morbid Obesity, Hypertension, Diabetes, GERD (gastroesophageal reflux disease), and  Hyperlipidemia..  Monalisa Carson  has made numerous attempts at dieting and exercise programs, and has failed to maintain any sustained weight loss.  Weight/Height/BMI  Estimated body mass index is 38.86 kg/m² as calculated from the following:    Height as of 23: 5' 3" (1.6 m).    Weight as of 24: 99.5 kg (219 lb 5.7 oz).   Monalisa Carson has been evaluated in our bariatric program by myself, the , and the program dietician and is felt to be an excellent candidate for surgery.  In addition, she has undergone a psychological evaluation, from which a letter is enclosed.  Monalisa Carson has undergone extensive pre-operative education and understands all the risks, benefits, and possible complications of surgery.  She has also undergone dietary education and thorough nutritional evaluation via a registered dietician.  Our program provides long term nutritional counseling with unlimited consults with the dietician.    Our team is sending this letter to receive pre-approval for the indicated procedure.  Please let us know if you have any questions or require any further information.  Sincerely,        Maciej Kennedy MD  General, Laparoscopic, and Bariatric Surgery  [facility] [place], [place]        "

## 2024-02-20 NOTE — TELEPHONE ENCOUNTER
"Spoke with patient and confirmed the surgical procedure of Laparoscopic Sleeve Gastrectomy with Dr Kennedy on 4/04/24.  Scheduled preop appts/surgery date/2 week and 8 week post op appts. All dates and times agreed upon. Pt aware that if they are required to have PCP clearance, it must be within 6 months of surgery, unless their medical history has changed, it should be dated, signed and in chart for preop appointment. All medications have been reviewed regarding the necessity to be crushed or broken into pieces smaller that the tip of a pencil eraser for 2 weeks following gastric sleeve surgery and 4 weeks following gastric bypass surgery. Pt instructed to stop taking all NSAIDS 1 week before surgery and for life after surgery. Pt aware that protein liquid diet start date is 3/28/24. Patient is doing well with their diet. Patient was instructed about the progression of the diet phases. The patient's current weight is 219lbs, height is 5'3", and BMI is 38.86. Refer to medical letter of necessity from Surgeon. Discussed the importance of increased physical activity and dieting lifestyle changes to improve weight loss and meet goals. Screened patient for history of UTI per protocol. Discussed with patient to avoid antibiotics and elective procedures involving sedation/anesthesia within 30 days of surgery unless cleared by the bariatric department. Patient instructed to call the bariatric clinic post op for any s/s of UTI. Patient's mailing address confirmed and informed to expect a manilla envelop containing bariatric surgery pre op booklet, appointment reminders, protein and fluid log sheets, and liquid diet and vitamin information sheets. Pt aware that all appts can be seen in my [facility] patient portal at this time. Confirmed email address and informed patient that they will be enrolled in the Patient Reported Outcomes program to track their progress and successes. The first email will be sent 2-3 weeks before " surgery and then every year on your surgery anniversary date. Office phone and fax number given to patient for any future questions/concerns. Discussed the pre-surgery complex carbohydrate beverage to purchase in [facility] pharmacy to drink 30 minutes before the surgery arrival time. Reviewed the policy of scheduling a covid test 72 hours prior to surgery if necessary.

## 2024-02-20 NOTE — PROGRESS NOTES
Population Health Chart Review & Patient Outreach Details      Further Action Needed If Patient Returns Outreach:      Schedule eye exam.      Updates Requested / Reviewed:     [x]  Care Everywhere    []     []  External Sources ([facility], [facility], [facility], etc.)    [] [facility]   [] [facility]   [] Other:    [x]  Care Team Updated   []  Removed  or Duplicate Orders   []  Immunization Reconciliation Completed / Queried    [] [place]   [] [place]   [] [place]   [] [place]      Health Maintenance Topics Addressed and Outreach Outcomes / Actions Taken:             Breast Cancer Screening []  Mammogram Order Placed    []  Mammogram Screening Scheduled    []  External Records Requested & Care Team Updated if Applicable    []  External Records Uploaded & Care Team Updated if Applicable    []  Pt Declined Scheduling Mammogram    []  Pt Will Schedule with External Provider / Order Routed & Care Team Updated if Applicable              Cervical Cancer Screening []  Pap Smear Scheduled in Primary Care or OBGYN    []  External Records Requested & Care Team Updated if Applicable       []  External Records Uploaded, Care Team Updated, & History Updated if Applicable    []  Patient Declined Scheduling Pap Smear    []  Patient Will Schedule with External Provider & Care Team Updated if Applicable                  Colorectal Cancer Screening []  Colonoscopy Case Request / Referral / Home Test Order Placed    []  External Records Requested & Care Team Updated if Applicable    []  External Records Uploaded, Care Team Updated, & History Updated if Applicable    []  Patient Declined Completing Colon Cancer Screening    []  Patient Will Schedule with External Provider & Care Team Updated if Applicable    []  Fit Kit Mailed (add the SmartPhrase under additional notes)    []  Reminded Patient to Complete Home Test                Diabetic Eye Exam []  Eye Exam Screening Order Placed    []  Eye Camera Scheduled or  Optometry/Ophthalmology Referral Placed    []  External Records Requested & Care Team Updated if Applicable    []  External Records Uploaded, Care Team Updated, & History Updated if Applicable    []  Patient Declined Scheduling Eye Exam    []  Patient Will Schedule with External Provider & Care Team Updated if Applicable             Blood Pressure Control []  Primary Care Follow Up Visit Scheduled     []  Remote Blood Pressure Reading Captured    []  Patient Declined Remote Reading or Scheduling Appt - Escalated to PCP    []  Patient Will Call Back or Send Portal Message with Reading                 HbA1c & Other Labs []  Overdue Lab(s) Ordered    []  Overdue Lab(s) Scheduled    []  External Records Uploaded & Care Team Updated if Applicable    []  Primary Care Follow Up Visit Scheduled     []  Reminded Patient to Complete A1c Home Test    []  Patient Declined Scheduling Labs or Will Call Back to Schedule    []  Patient Will Schedule with External Provider / Order Routed, & Care Team Updated if Applicable           Primary Care Appointment []  Primary Care Appt Scheduled    []  Patient Declined Scheduling or Will Call Back to Schedule    []  Pt Established with External Provider, Updated Care Team, & Informed Pt to Notify Payor if Applicable           Medication Adherence /    Statin Use []  Primary Care Appointment Scheduled    []  Patient Reminded to  Prescription    []  Patient Declined, Provider Notified if Needed    []  Sent Provider Message to Review to Evaluate Pt for Statin, Add Exclusion Dx Codes, Document   Exclusion in Problem List, Change Statin Intensity Level to Moderate or High Intensity if Applicable                Osteoporosis Screening []  Dexa Order Placed    []  Dexa Appointment Scheduled    []  External Records Requested & Care Team Updated    []  External Records Uploaded, Care Team Updated, & History Updated if Applicable    []  Patient Declined Scheduling Dexa or Will Call Back to  Schedule    []  Patient Will Schedule with External Provider / Order Routed & Care Team Updated if Applicable       Additional Notes:     Campaign message -- requesting to schedule eye exam. LM for patient to call back. Sent portal message.

## 2024-02-20 NOTE — PROGRESS NOTES
Population Health Chart Review & Patient Outreach Details      Further Action Needed If Patient Returns Outreach:            Updates Requested / Reviewed:     [x]  Care Everywhere    []     []  External Sources ([facility], [facility], [facility], etc.)    [] [facility]   [] [facility]   [] Other:    []  Care Team Updated   []  Removed  or Duplicate Orders   []  Immunization Reconciliation Completed / Queried    [] [place]   [] [place]   [] [place]   [] [place]      Health Maintenance Topics Addressed and Outreach Outcomes / Actions Taken:             Breast Cancer Screening []  Mammogram Order Placed    []  Mammogram Screening Scheduled    []  External Records Requested & Care Team Updated if Applicable    []  External Records Uploaded & Care Team Updated if Applicable    []  Pt Declined Scheduling Mammogram    []  Pt Will Schedule with External Provider / Order Routed & Care Team Updated if Applicable              Cervical Cancer Screening []  Pap Smear Scheduled in Primary Care or OBGYN    []  External Records Requested & Care Team Updated if Applicable       []  External Records Uploaded, Care Team Updated, & History Updated if Applicable    []  Patient Declined Scheduling Pap Smear    []  Patient Will Schedule with External Provider & Care Team Updated if Applicable                  Colorectal Cancer Screening []  Colonoscopy Case Request / Referral / Home Test Order Placed    []  External Records Requested & Care Team Updated if Applicable    []  External Records Uploaded, Care Team Updated, & History Updated if Applicable    []  Patient Declined Completing Colon Cancer Screening    []  Patient Will Schedule with External Provider & Care Team Updated if Applicable    []  Fit Kit Mailed (add the SmartPhrase under additional notes)    []  Reminded Patient to Complete Home Test                Diabetic Eye Exam []  Eye Exam Screening Order Placed    [x]  Eye Camera Scheduled or Optometry/Ophthalmology Referral  Placed    []  External Records Requested & Care Team Updated if Applicable    []  External Records Uploaded, Care Team Updated, & History Updated if Applicable    []  Patient Declined Scheduling Eye Exam    []  Patient Will Schedule with External Provider & Care Team Updated if Applicable             Blood Pressure Control []  Primary Care Follow Up Visit Scheduled     []  Remote Blood Pressure Reading Captured    []  Patient Declined Remote Reading or Scheduling Appt - Escalated to PCP    []  Patient Will Call Back or Send Portal Message with Reading                 HbA1c & Other Labs []  Overdue Lab(s) Ordered    []  Overdue Lab(s) Scheduled    []  External Records Uploaded & Care Team Updated if Applicable    []  Primary Care Follow Up Visit Scheduled     []  Reminded Patient to Complete A1c Home Test    []  Patient Declined Scheduling Labs or Will Call Back to Schedule    []  Patient Will Schedule with External Provider / Order Routed, & Care Team Updated if Applicable           Primary Care Appointment []  Primary Care Appt Scheduled    []  Patient Declined Scheduling or Will Call Back to Schedule    []  Pt Established with External Provider, Updated Care Team, & Informed Pt to Notify Payor if Applicable           Medication Adherence /    Statin Use []  Primary Care Appointment Scheduled    []  Patient Reminded to  Prescription    []  Patient Declined, Provider Notified if Needed    []  Sent Provider Message to Review to Evaluate Pt for Statin, Add Exclusion Dx Codes, Document   Exclusion in Problem List, Change Statin Intensity Level to Moderate or High Intensity if Applicable                Osteoporosis Screening []  Dexa Order Placed    []  Dexa Appointment Scheduled    []  External Records Requested & Care Team Updated    []  External Records Uploaded, Care Team Updated, & History Updated if Applicable    []  Patient Declined Scheduling Dexa or Will Call Back to Schedule    []  Patient Will Schedule  with External Provider / Order Routed & Care Team Updated if Applicable       Additional Notes:     Campaign message -- requesting to schedule eye exam. Scheduled  Flu vaccine patient will plan to get it when she get a chance.

## 2024-02-29 ENCOUNTER — PATIENT MESSAGE (OUTPATIENT)
Dept: BARIATRICS | Facility: CLINIC | Age: 59
End: 2024-02-29
Payer: COMMERCIAL

## 2024-03-06 ENCOUNTER — PATIENT MESSAGE (OUTPATIENT)
Dept: BARIATRICS | Facility: CLINIC | Age: 59
End: 2024-03-06
Payer: COMMERCIAL

## 2024-03-14 ENCOUNTER — TELEPHONE (OUTPATIENT)
Dept: FAMILY MEDICINE | Facility: CLINIC | Age: 59
End: 2024-03-14
Payer: COMMERCIAL

## 2024-03-14 NOTE — TELEPHONE ENCOUNTER
----- Message from Antonella Cunningham sent at 3/14/2024  1:35 PM CDT -----  Type: RX Refill Request    Who Called: pt     Have you contacted your pharmacy:    Refill or New Rx:pantoprazole (PROTONIX) 40 MG tablet - needs pa      RX Name and Strength:    How is the patient currently taking it? (ex. 1XDay):    Is this a 30 day or 90 day RX:    Preferred Pharmacy with phone number:  [id number] - [place] - [facility] AT [facility] & [address]  [place] [address]  Phone: [phone] Fax: [phone]        Local or Mail Order:    Ordering Provider:    Would the patient rather a call back or a response via [facility]? call    Best Call Back Number:[phone] (home)       Additional Information:

## 2024-03-18 ENCOUNTER — PATIENT MESSAGE (OUTPATIENT)
Dept: ADMINISTRATIVE | Facility: HOSPITAL | Age: 59
End: 2024-03-18
Payer: COMMERCIAL

## 2024-03-18 ENCOUNTER — PATIENT OUTREACH (OUTPATIENT)
Dept: ADMINISTRATIVE | Facility: HOSPITAL | Age: 59
End: 2024-03-18
Payer: COMMERCIAL

## 2024-03-18 DIAGNOSIS — N18.2 CONTROLLED TYPE 2 DIABETES MELLITUS WITH STAGE 2 CHRONIC KIDNEY DISEASE, WITHOUT LONG-TERM CURRENT USE OF INSULIN: Primary | ICD-10-CM

## 2024-03-18 DIAGNOSIS — E11.22 CONTROLLED TYPE 2 DIABETES MELLITUS WITH STAGE 2 CHRONIC KIDNEY DISEASE, WITHOUT LONG-TERM CURRENT USE OF INSULIN: Primary | ICD-10-CM

## 2024-03-18 NOTE — PROGRESS NOTES
Population Health Chart Review & Patient Outreach Details      Additional [facility] Health Notes:    Campaign message -- requesting to schedule urine screening - Ordered and sent portal message of when patient would like to come in.           Updates Requested / Reviewed:      Updated Care Coordination Note and Care Everywhere         Health Maintenance Topics Overdue:      VB Score: 2     Urine Screening  Foot Exam                       Health Maintenance Topic(s) Outreach Outcomes & Actions Taken:    Lab(s) - Outreach Outcomes & Actions Taken  : Overdue Lab(s) Ordered and sent portal message of when patient would like to come in.

## 2024-03-20 ENCOUNTER — LAB VISIT (OUTPATIENT)
Dept: LAB | Facility: HOSPITAL | Age: 59
End: 2024-03-20
Attending: SURGERY
Payer: COMMERCIAL

## 2024-03-20 ENCOUNTER — OFFICE VISIT (OUTPATIENT)
Dept: BARIATRICS | Facility: CLINIC | Age: 59
End: 2024-03-20
Payer: COMMERCIAL

## 2024-03-20 VITALS
BODY MASS INDEX: 39.41 KG/M2 | WEIGHT: 222.44 LBS | HEIGHT: 63 IN | DIASTOLIC BLOOD PRESSURE: 83 MMHG | HEART RATE: 68 BPM | SYSTOLIC BLOOD PRESSURE: 146 MMHG | OXYGEN SATURATION: 98 %

## 2024-03-20 DIAGNOSIS — E11.9 TYPE 2 DIABETES MELLITUS WITHOUT COMPLICATION, WITHOUT LONG-TERM CURRENT USE OF INSULIN: ICD-10-CM

## 2024-03-20 DIAGNOSIS — K21.9 GASTROESOPHAGEAL REFLUX DISEASE, UNSPECIFIED WHETHER ESOPHAGITIS PRESENT: ICD-10-CM

## 2024-03-20 DIAGNOSIS — E78.5 HYPERLIPIDEMIA, UNSPECIFIED HYPERLIPIDEMIA TYPE: ICD-10-CM

## 2024-03-20 DIAGNOSIS — I10 HYPERTENSION, UNSPECIFIED TYPE: ICD-10-CM

## 2024-03-20 DIAGNOSIS — E66.01 MORBID OBESITY: ICD-10-CM

## 2024-03-20 LAB
ALBUMIN SERPL BCP-MCNC: 3.9 G/DL (ref 3.5–5.2)
ALP SERPL-CCNC: 83 U/L (ref 55–135)
ALT SERPL W/O P-5'-P-CCNC: 21 U/L (ref 10–44)
ANION GAP SERPL CALC-SCNC: 8 MMOL/L (ref 8–16)
AST SERPL-CCNC: 17 U/L (ref 10–40)
BASOPHILS # BLD AUTO: 0.03 K/UL (ref 0–0.2)
BASOPHILS NFR BLD: 0.5 % (ref 0–1.9)
BILIRUB SERPL-MCNC: 0.4 MG/DL (ref 0.1–1)
BUN SERPL-MCNC: 13 MG/DL (ref 6–20)
CALCIUM SERPL-MCNC: 10.5 MG/DL (ref 8.7–10.5)
CHLORIDE SERPL-SCNC: 102 MMOL/L (ref 95–110)
CO2 SERPL-SCNC: 28 MMOL/L (ref 23–29)
CREAT SERPL-MCNC: 0.8 MG/DL (ref 0.5–1.4)
DIFFERENTIAL METHOD BLD: ABNORMAL
EOSINOPHIL # BLD AUTO: 0.1 K/UL (ref 0–0.5)
EOSINOPHIL NFR BLD: 1.4 % (ref 0–8)
ERYTHROCYTE [DISTWIDTH] IN BLOOD BY AUTOMATED COUNT: 13.5 % (ref 11.5–14.5)
EST. GFR  (NO RACE VARIABLE): >60 ML/MIN/1.73 M^2
ESTIMATED AVG GLUCOSE: 146 MG/DL (ref 68–131)
GLUCOSE SERPL-MCNC: 115 MG/DL (ref 70–110)
HBA1C MFR BLD: 6.7 % (ref 4–5.6)
HCT VFR BLD AUTO: 41.7 % (ref 37–48.5)
HGB BLD-MCNC: 13.6 G/DL (ref 12–16)
IMM GRANULOCYTES # BLD AUTO: 0.01 K/UL (ref 0–0.04)
IMM GRANULOCYTES NFR BLD AUTO: 0.2 % (ref 0–0.5)
LYMPHOCYTES # BLD AUTO: 3.7 K/UL (ref 1–4.8)
LYMPHOCYTES NFR BLD: 55.5 % (ref 18–48)
MCH RBC QN AUTO: 29.5 PG (ref 27–31)
MCHC RBC AUTO-ENTMCNC: 32.6 G/DL (ref 32–36)
MCV RBC AUTO: 91 FL (ref 82–98)
MONOCYTES # BLD AUTO: 0.5 K/UL (ref 0.3–1)
MONOCYTES NFR BLD: 7.1 % (ref 4–15)
NEUTROPHILS # BLD AUTO: 2.4 K/UL (ref 1.8–7.7)
NEUTROPHILS NFR BLD: 35.3 % (ref 38–73)
NRBC BLD-RTO: 0 /100 WBC
PLATELET # BLD AUTO: 410 K/UL (ref 150–450)
PMV BLD AUTO: 10.4 FL (ref 9.2–12.9)
POTASSIUM SERPL-SCNC: 4.5 MMOL/L (ref 3.5–5.1)
PROT SERPL-MCNC: 7.9 G/DL (ref 6–8.4)
RBC # BLD AUTO: 4.61 M/UL (ref 4–5.4)
SODIUM SERPL-SCNC: 138 MMOL/L (ref 136–145)
WBC # BLD AUTO: 6.65 K/UL (ref 3.9–12.7)

## 2024-03-20 PROCEDURE — 1159F MED LIST DOCD IN RCRD: CPT | Mod: CPTII,S$GLB,, | Performed by: SURGERY

## 2024-03-20 PROCEDURE — 3008F BODY MASS INDEX DOCD: CPT | Mod: CPTII,S$GLB,, | Performed by: SURGERY

## 2024-03-20 PROCEDURE — 3079F DIAST BP 80-89 MM HG: CPT | Mod: CPTII,S$GLB,, | Performed by: SURGERY

## 2024-03-20 PROCEDURE — 85025 COMPLETE CBC W/AUTO DIFF WBC: CPT | Performed by: SURGERY

## 2024-03-20 PROCEDURE — 3077F SYST BP >= 140 MM HG: CPT | Mod: CPTII,S$GLB,, | Performed by: SURGERY

## 2024-03-20 PROCEDURE — 99999 PR PBB SHADOW E&M-EST. PATIENT-LVL V: CPT | Mod: PBBFAC,,, | Performed by: SURGERY

## 2024-03-20 PROCEDURE — 36415 COLL VENOUS BLD VENIPUNCTURE: CPT | Performed by: SURGERY

## 2024-03-20 PROCEDURE — 80053 COMPREHEN METABOLIC PANEL: CPT | Performed by: SURGERY

## 2024-03-20 PROCEDURE — 3044F HG A1C LEVEL LT 7.0%: CPT | Mod: CPTII,S$GLB,, | Performed by: SURGERY

## 2024-03-20 PROCEDURE — 83036 HEMOGLOBIN GLYCOSYLATED A1C: CPT | Performed by: SURGERY

## 2024-03-20 PROCEDURE — 3072F LOW RISK FOR RETINOPATHY: CPT | Mod: CPTII,S$GLB,, | Performed by: SURGERY

## 2024-03-20 PROCEDURE — 99213 OFFICE O/P EST LOW 20 MIN: CPT | Mod: S$GLB,,, | Performed by: SURGERY

## 2024-03-20 RX ORDER — ONDANSETRON HYDROCHLORIDE 2 MG/ML
8 INJECTION, SOLUTION INTRAVENOUS EVERY 6 HOURS PRN
Status: CANCELLED | OUTPATIENT
Start: 2024-03-20

## 2024-03-20 RX ORDER — HYDROCODONE BITARTRATE AND ACETAMINOPHEN 7.5; 325 MG/15ML; MG/15ML
15 SOLUTION ORAL EVERY 4 HOURS PRN
Status: CANCELLED | OUTPATIENT
Start: 2024-03-21

## 2024-03-20 RX ORDER — LIDOCAINE HYDROCHLORIDE 10 MG/ML
1 INJECTION, SOLUTION EPIDURAL; INFILTRATION; INTRACAUDAL; PERINEURAL ONCE
Status: CANCELLED | OUTPATIENT
Start: 2024-03-20 | End: 2024-03-20

## 2024-03-20 RX ORDER — FAMOTIDINE 10 MG/ML
20 INJECTION INTRAVENOUS ONCE
Status: CANCELLED | OUTPATIENT
Start: 2024-03-20 | End: 2024-03-20

## 2024-03-20 RX ORDER — PROMETHAZINE HYDROCHLORIDE 6.25 MG/5ML
5-10 SYRUP ORAL EVERY 6 HOURS
Qty: 280 ML | Refills: 0 | Status: SHIPPED | OUTPATIENT
Start: 2024-03-20 | End: 2024-05-10

## 2024-03-20 RX ORDER — GABAPENTIN 250 MG/5ML
300 SOLUTION ORAL 3 TIMES DAILY
Status: CANCELLED | OUTPATIENT
Start: 2024-03-20

## 2024-03-20 RX ORDER — PROCHLORPERAZINE EDISYLATE 5 MG/ML
5 INJECTION INTRAMUSCULAR; INTRAVENOUS EVERY 6 HOURS PRN
Status: CANCELLED | OUTPATIENT
Start: 2024-03-20

## 2024-03-20 RX ORDER — HYDROCODONE BITARTRATE AND ACETAMINOPHEN 7.5; 325 MG/15ML; MG/15ML
15 SOLUTION ORAL 4 TIMES DAILY PRN
Qty: 118 ML | Refills: 0 | Status: SHIPPED | OUTPATIENT
Start: 2024-03-20 | End: 2024-05-10

## 2024-03-20 RX ORDER — SODIUM CHLORIDE 9 MG/ML
INJECTION, SOLUTION INTRAVENOUS CONTINUOUS
Status: CANCELLED | OUTPATIENT
Start: 2024-03-20

## 2024-03-20 RX ORDER — MUPIROCIN 20 MG/G
OINTMENT TOPICAL
Status: CANCELLED | OUTPATIENT
Start: 2024-03-20

## 2024-03-20 RX ORDER — CLINDAMYCIN PHOSPHATE 900 MG/50ML
900 INJECTION, SOLUTION INTRAVENOUS
Status: CANCELLED | OUTPATIENT
Start: 2024-03-20

## 2024-03-20 RX ORDER — ACETAMINOPHEN 650 MG/20.3ML
500 LIQUID ORAL EVERY 8 HOURS
Status: CANCELLED | OUTPATIENT
Start: 2024-03-20 | End: 2024-03-21

## 2024-03-20 RX ORDER — HYDROMORPHONE HYDROCHLORIDE 1 MG/ML
0.5 INJECTION, SOLUTION INTRAMUSCULAR; INTRAVENOUS; SUBCUTANEOUS
Status: CANCELLED | OUTPATIENT
Start: 2024-03-20

## 2024-03-20 RX ORDER — SODIUM CHLORIDE, SODIUM LACTATE, POTASSIUM CHLORIDE, CALCIUM CHLORIDE 600; 310; 30; 20 MG/100ML; MG/100ML; MG/100ML; MG/100ML
INJECTION, SOLUTION INTRAVENOUS CONTINUOUS
Status: CANCELLED | OUTPATIENT
Start: 2024-03-20

## 2024-03-20 RX ORDER — POLYETHYLENE GLYCOL 3350 17 G/17G
17 POWDER, FOR SOLUTION ORAL DAILY
Qty: 238 G | Refills: 0 | Status: SHIPPED | OUTPATIENT
Start: 2024-03-20 | End: 2024-04-20

## 2024-03-20 RX ORDER — ONDANSETRON 8 MG/1
8 TABLET, ORALLY DISINTEGRATING ORAL EVERY 6 HOURS PRN
Qty: 30 TABLET | Refills: 0 | Status: SHIPPED | OUTPATIENT
Start: 2024-03-20

## 2024-03-20 RX ORDER — HEPARIN SODIUM 5000 [USP'U]/ML
5000 INJECTION, SOLUTION INTRAVENOUS; SUBCUTANEOUS ONCE
Status: CANCELLED | OUTPATIENT
Start: 2024-03-20 | End: 2024-03-20

## 2024-03-20 RX ORDER — DEXTROMETHORPHAN/PSEUDOEPHED 2.5-7.5/.8
40 DROPS ORAL 4 TIMES DAILY PRN
Status: CANCELLED | OUTPATIENT
Start: 2024-03-20

## 2024-03-20 RX ORDER — ENOXAPARIN SODIUM 100 MG/ML
40 INJECTION SUBCUTANEOUS EVERY 24 HOURS
Status: CANCELLED | OUTPATIENT
Start: 2024-03-20

## 2024-03-20 RX ORDER — OMEPRAZOLE 40 MG/1
40 CAPSULE, DELAYED RELEASE ORAL EVERY MORNING
Qty: 30 CAPSULE | Refills: 2 | Status: SHIPPED | OUTPATIENT
Start: 2024-03-20 | End: 2024-04-18

## 2024-03-20 RX ORDER — PANTOPRAZOLE SODIUM 40 MG/10ML
40 INJECTION, POWDER, LYOPHILIZED, FOR SOLUTION INTRAVENOUS 2 TIMES DAILY
Status: CANCELLED | OUTPATIENT
Start: 2024-03-20

## 2024-03-20 NOTE — PROGRESS NOTES
Subjective:  The patient is a 59 y.o. obese female who presents for pre op for gastric sleeve surgery.   She has multiple associated comorbidities including diabetes mellitus type 2 non insulin dependent , essential hypertension, GERD on daily medications, and anxiety.  She has struggled with excess weight since around the age 45.  Her highest adult weight was 230 lbs at age 58, and her lowest adult weight was 160 lbs at age 40. The patient has tried phentermine (Adipex-P) and weight loss medications portion control and low carb .  The patient was most successful with adipex b12 injections and exercising with a weight loss of 30 lbs.  Her current exercise includes none 0 times a week. She denies any history of eating disorder such as anorexia, bulimia, or taking laxatives for weight loss, and denies any addiction including illicit substances, alcohol, or gambling.  Patient states she has a poor  support system states that her  eats and eats and she eats with him.   She lives with  and three grand kids.  She is retired.  She  endorses a 10 year history of GERD. States that medications work but if she stops taking them she gets reflux really bad, coughing nausea and vomiting along with water brash  All workup has been reviewed in clinic today and there is nothing on the review that would prevent us from proceeding with surgery.  All questions were answered in clinic today prior to leaving.  Body mass index is 39.4 kg/m².       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  He feels much better than previous days.  States he had a poor appetite previously with nausea and has a follow-up with GI as outpatient.  Concerned office cough and phlegm for the past week with some sore throat this morning.  Continue to work with therapy.  Denies any pain at the site of sore on his face.  10/31.  Patient seen and examined at bedside.  Complains of coughing still tolerating diet well.  Hopeful to be discharged home once better.  11/1.  Patient seen and examined at bedside.  Complains of postprandial nausea and mid epigastric left upper quadrant abdominal pain after eating food.  Was not able to do much therapy because of that.  Still with poor appetite and needs replacement for phosphate and magnesium.  Likely depleted electrolytes from alcohol use.  11/2.  Patient seen and examined at bedside.  Still with some heartburn and requesting Tums.  No chest pain or shortness of breath some cough present.  Discussed results of CT chest and abdominal with patient regarding likely pneumonia needs follow-up with chest CT as outpatient.  Is aware of pulmonary consult.  Tolerating diet.  Objective   I/O's           Intake/Output Summary (Last 24 hours) at 11/2/2023 1147  Last data filed at 11/2/2023 0508  Gross per 24 hour   Intake 420.07 ml   Output 2050 ml   Net -1629.93 ml       Last Recorded Vitals  Visit Vitals  /62   Pulse 80   Temp 99.9 °F (37.7 °C) (Oral)   Resp 15   Ht 5' 9\" (1.753 m)   Wt 58.4 kg (128 lb 12 oz)   SpO2 95%   BMI 19.01 kg/m²       Physical Exam  Vitals and nursing note reviewed.   Constitutional:       Appearance: Normal appearance.   HENT:      Head: Normocephalic and atraumatic.      Nose: Nose normal.      Mouth/Throat:      Mouth: Mucous membranes are moist.      Neck: Normal range of motion.   Eyes:      Extraocular Movements: Extraocular movements intact.      Conjunctiva/sclera:  Conjunctivae normal.      Pupils: Pupils are equal, round, and reactive to light.   Cardiovascular:      Rate and Rhythm: Normal rate and regular rhythm.      Pulses: Normal pulses.      Heart sounds: Normal heart sounds.   Pulmonary:      Effort: Pulmonary effort is normal.      Breath sounds: Normal breath sounds.   Abdominal:      General: Abdomen is flat. Bowel sounds are normal.      Palpations: Abdomen is soft.   Genitourinary:     Comments: Guevara catheter present.  Musculoskeletal:         General: Normal range of motion.   Skin:     General: Skin is warm.      Comments: Left-sided facial bruising present specially left eyebrow and around the left eye.   Neurological:      General: No focal deficit present.      Mental Status: He is alert and oriented to person, place, and time. Mental status is at baseline.      Motor: Weakness present.   Psychiatric:         Behavior: Behavior normal.         Judgment: Judgment normal.         Labs       Recent Results (from the past 48 hour(s))   Basic Metabolic Panel    Collection Time: 11/01/23  6:17 AM   Result Value Ref Range    Fasting Status      Sodium 132 (L) 135 - 145 mmol/L    Potassium 3.7 3.4 - 5.1 mmol/L    Chloride 102 97 - 110 mmol/L    Carbon Dioxide 27 21 - 32 mmol/L    Anion Gap 7 7 - 19 mmol/L    Glucose 111 (H) 70 - 99 mg/dL    BUN 20 6 - 20 mg/dL    Creatinine 0.62 (L) 0.67 - 1.17 mg/dL    Glomerular Filtration Rate >90 >=60    BUN/Cr 32 (H) 7 - 25    Calcium 8.0 (L) 8.4 - 10.2 mg/dL   Phosphorus    Collection Time: 11/01/23  6:17 AM   Result Value Ref Range    Phosphorus 1.1 (L) 2.4 - 4.7 mg/dL   Lavender Top    Collection Time: 11/01/23  6:17 AM   Result Value Ref Range    Extra Tube Hold for Add Ons    Lipase    Collection Time: 11/01/23  9:12 AM   Result Value Ref Range    Lipase 45 15 - 77 Units/L   Creatine Kinase    Collection Time: 11/01/23  9:12 AM   Result Value Ref Range     (H) 39 - 308 Units/L   CBC No Differential    Collection  Nonintractable epilepsy without status epilepticus 03/31/2019    Dysarthria 02/16/2019    Hemifacial spasm 02/16/2019    HLD (hyperlipidemia) 07/19/2013    Anxiety 09/27/2012    Essential hypertension, benign 09/27/2012    Controlled type 2 diabetes mellitus with stage 2 chronic kidney disease, without long-term current use of insulin 09/27/2012     Past Medical History:   Diagnosis Date    Allergy     Anxiety     Diabetes mellitus     GERD (gastroesophageal reflux disease)     Heart murmur     Hyperlipidemia     Hypertension     Seizures     5-6 years ago, spontaneous      Past Surgical History:   Procedure Laterality Date    CHOLECYSTECTOMY      2001 april    COLONOSCOPY N/A 06/29/2021    Procedure: COLONOSCOPY;  Surgeon: Gustavo Pelletier MD;  Location: [facility] ENDO;  Service: Endoscopy;  Laterality: N/A;  combined orders    ESOPHAGOGASTRODUODENOSCOPY N/A 06/29/2021    Procedure: ESOPHAGOGASTRODUODENOSCOPY (EGD);  Surgeon: Gustavo Pelletier MD;  Location: [facility] ENDO;  Service: Endoscopy;  Laterality: N/A;  covdi +3/29/20, fully vaccinated 3/23/21, prep instr portal -ml    ESOPHAGOGASTRODUODENOSCOPY N/A 11/29/2023    Procedure: EGD (ESOPHAGOGASTRODUODENOSCOPY);  Surgeon: Maciej Fine MD;  Location: [facility] ENDO;  Service: Endoscopy;  Laterality: N/A;  referred by Cristhian RAHMAN, [facility] takes on Tuesdays will hold dose on Tuesday 11/28/23, instructions per [facility].    HYSTERECTOMY      partial  1996    OOPHORECTOMY      SHOULDER SURGERY Right     WISDOM TOOTH EXTRACTION        (Not in a hospital admission)    Review of patient's allergies indicates:   Allergen Reactions    Keflex [cephalexin] Hives    Vicodin [hydrocodone-acetaminophen] Itching     itch    Crestor [rosuvastatin] Other (See Comments)     Muscle pain      Social History     Tobacco Use    Smoking status: Former     Current packs/day: 0.00     Types: Cigarettes    Smokeless tobacco: Never   Substance Use Topics    Alcohol use: Yes     Alcohol/week:  "0.0 standard drinks of alcohol     Comment: seldom      Family History   Problem Relation Age of Onset    Diabetes Mother     Hyperlipidemia Mother     Hypertension Mother     Cataracts Mother     Diabetes Father     Hypertension Father     Stroke Father     Depression Sister     Hypertension Sister     Stroke Sister     No Known Problems Brother     Cancer Maternal Aunt         breast x 2    Breast cancer Maternal Aunt     Cancer Maternal Uncle         prostate x 2    Cancer Paternal Aunt         breast    Breast cancer Paternal Aunt     No Known Problems Paternal Uncle     No Known Problems Maternal Grandmother     Cancer Maternal Grandfather         lung    Early death Paternal Grandmother     Early death Paternal Grandfather     Esophageal cancer Paternal Grandfather     No Known Problems Other     Melanoma Neg Hx     Eczema Neg Hx     Lupus Neg Hx     Psoriasis Neg Hx     Amblyopia Neg Hx     Blindness Neg Hx     Glaucoma Neg Hx     Macular degeneration Neg Hx     Retinal detachment Neg Hx     Strabismus Neg Hx     Thyroid disease Neg Hx     Colon cancer Neg Hx         Review of Systems  Constitutional: negative for anorexia, chills and fatigue  Eyes: negative for icterus, irritation and redness  Respiratory: negative for cough and dyspnea on exertion  Cardiovascular: negative for chest pain and chest pressure/discomfort  Gastrointestinal: negative for abdominal pain, change in bowel habits, constipation and diarrhea  Musculoskeletal:negative for arthralgias and back pain  Neurological: negative for coordination problems and dizziness  Behavioral/Psych: negative for anxiety and bad mood    Objective:  Vital signs in last 24 hours:  Vitals:    03/20/24 1317   Weight: 100.9 kg (222 lb 7.1 oz)   Height: 5' 3" (1.6 m)             General appearance: alert, appears stated age and cooperative  Head: Normocephalic, without obvious abnormality, atraumatic  Eyes: negative findings: lids and lashes normal and " Time: 11/02/23  5:02 AM   Result Value Ref Range    WBC 7.1 4.2 - 11.0 K/mcL    RBC 3.23 (L) 4.50 - 5.90 mil/mcL    HGB 10.7 (L) 13.0 - 17.0 g/dL    HCT 30.3 (L) 39.0 - 51.0 %    MCV 93.8 78.0 - 100.0 fl    MCH 33.1 26.0 - 34.0 pg    MCHC 35.3 32.0 - 36.5 g/dL     140 - 450 K/mcL    RDW-CV 12.9 11.0 - 15.0 %    RDW-SD 44.2 39.0 - 50.0 fL    NRBC 0 <=0 /100 WBC       Imaging    CT CERVICAL SPINE WO CONTRAST    Result Date: 10/29/2023  Narrative: EXAM: CT CERVICAL SPINE WO CONTRAST CLINICAL INDICATION: Fall. Neck pain COMPARISON: No previous available. TECHNIQUE:Automated dose reduction technique was used.  Axial noncontrast CT scan through the cervical spine was obtained.  Coronal and sagittal reformats were performed.  FINDINGS:  The vertebral alignment is normal.  The vertebral body heights are preserved. There is moderate multilevel degenerative disc disease, with narrowing of intervertebral disc spaces and disc osteophyte complexes. If detailed evaluation of degenerative changes is clinically indicated, MRI of the cervical spine is recommended. Prevertebral soft tissues are within normal limits. There is no evidence of acute fracture or dislocation. No bony lesions are identified. Lung apices are clear.     Impression: 1. No acute fracture or dislocation. 2. Multilevel degenerative disc disease, as described above. Electronically Signed by: SUZI HELLER M.D. Signed on: 10/29/2023 10:54 AM Workstation ID: [id number]    CT HEAD WO CONTRAST    Result Date: 10/29/2023  Narrative: EXAM: CT HEAD WO CONTRAST CLINICAL INDICATION: Fall. Left eye bruising. TECHNIQUE: Automated dose reduction technique was used. Axial noncontrast CT scans through the brain was obtained.  Coronal and sagittal reformats were performed. FINDINGS:  The ventricles and cortical sulci are enlarged, compatible with the age-appropriate mild to moderate cerebral volume loss.  There are nonspecific ill-defined hypodensities in the  conjunctivae and sclerae normal  Lungs: non labored breathing  Heart: regular rate and rhythm  Abdomen: soft, non-tender  Extremities: extremities normal, atraumatic, no cyanosis or edema  Skin: Skin color, texture, turgor normal. No rashes or lesions  Neurologic: Grossly normal    Data Review:  All Reviewed    Assessment/Plan:  Morbid obesity with failure of conservative therapy.    The patient was informed that risks include, but are not limited to: death, leak, obstruction, bleeding, and sepsis. Any of these could require further surgery. Other risks include DVT, PE, pneumonia, wound dehiscence, hernia, wound infection, the need for dilatations and the inability to lose appropriate weight and keep it off.     We discussed that our goal is to ameliorate her medical problems and not to obtain a specific body mass index. She understands the risks and benefits and wishes to proceed with the procedure. She has signed a consent form.       Maciej Kennedy MD       periventricular and subcortical white matter, likely representing mild to moderate chronic changes of microvascular ischemic disease. Otherwise, no parenchymal abnormalities are identified. There is no intracranial hemorrhage.  There is no mass, mass effect or midline shift. There is no extra-axial fluid collection. The bony calvarium is unremarkable.  Paranasal sinuses and mastoid air cells are clear.     Impression: No acute intracranial abnormalities. Electronically Signed by: SUZI HELLER M.D. Signed on: 10/29/2023 10:51 AM Workstation ID: [id number]    XR CHEST PA OR AP 1 VIEW    Result Date: 10/29/2023  Narrative: EXAM: XR CHEST AP OR PA CLINICAL INDICATION: Fall with pain COMPARISON: 7/15/2022 FINDINGS: Portable chest radiograph shows normal heart size and pulmonary vasculature. No infiltrate, effusion or pneumothorax. Multiple old healed right-sided posterolateral rib fractures seen without acute osseous abnormality. There is also old healed fracture deformity right mid clavicle.     Impression: 1.   No acute cardiopulmonary findings seen. Electronically Signed by: YADIRA CANNON M.D. Signed on: 10/29/2023 9:55 AM Workstation ID: [id number]      Assessment & Plan   Gerardo is a 73 year old male who presented to the emergency room today after falling down the stairs yesterday.  He has a past medical history of CVA hypertension hyperlipidemia DORIE and EtOH abuse.      Acute severe Hyponatremia sec to siadh? With lung mass, with associated multiple falls at home and was on the floor for an undisclosed period of time, decreased p.o. intake nausea and vomiting and with increased alcohol use over the past few days.  Nephrology on consult  IV fluid per nephrology  CPK 9522>> 4805 down to 1907 down to 842.. and leave IV fluid at 50ml/hr per nephro  CMP in a.m.    Postprandial abdominal pain with nausea and diaphoresis.  Lipase level normal.  CT abdomen pelvis showed mostly constipation and right lung  mass.  GI consulted and added stool softeners.  Patient still with movements.  Added suppository.  On Linzess.    New lung mass.  Incidental 2.5 cm masslike airspace consolidation right lower lobe.  Pulmonary consulted.  Empirically started on antibiotics.  Discussed with pulmonary will follow as outpatient if does not improve after antibiotics.    Acute urinary retention present on admission status post Guevara catheter placement.    Esophagitis on PPI twice daily.  GI consulted.     Acute hypokalemia likely due to poor p.o. intake and vomiting>> no diarrhea reported  Replete per protocol  Current IV fluid per nephro has potassium in it     Acute traumatic rhabdomyolysis poa with elevated CPK likely due to to immobility status post fall  CPK 9522 to 1907 10/31..  0.9 ivf pr renal  CT head and cervical spine within normal limits.    Chronic moderate protein jasmina malnutrition poa  Encourage po intake.     YRIS likely due to dehydration possibly rhabdo and EtOH resolved.  Nephro on consult   IV fluid as ordered per nephro  Trend renal indices improved  Avoid nephrotoxins when able     Acute EtOH  Alcohol level 0  CIWA protocol as ordered and counseled against.     Acute leukocytosis likely related to above  Afebrile  resolved  UA negative  Chest x-ray negative     Acute transaminitis likely due to EtOH and dehydration  IV fluid as ordered  CMP in a.m.     Obstructive sleep apnea  No CPAP reported     Hypophosphatemia replace.    DVT Prophylaxis    enoxaparin - 40 MG/0.4ML     Disposition : home vs ecf on dc pending pt/ot clearance.    Valeriano:  Friday if continues to improve to [facility] [address].             Frannie Dennis MD   11/2/2023 11:47 AM

## 2024-03-20 NOTE — H&P (VIEW-ONLY)
Subjective:  The patient is a 59 y.o. obese female who presents for pre op for gastric sleeve surgery.   She has multiple associated comorbidities including diabetes mellitus type 2 non insulin dependent , essential hypertension, GERD on daily medications, and anxiety.  She has struggled with excess weight since around the age 45.  Her highest adult weight was 230 lbs at age 58, and her lowest adult weight was 160 lbs at age 40. The patient has tried phentermine (Adipex-P) and weight loss medications portion control and low carb .  The patient was most successful with adipex b12 injections and exercising with a weight loss of 30 lbs.  Her current exercise includes none 0 times a week. She denies any history of eating disorder such as anorexia, bulimia, or taking laxatives for weight loss, and denies any addiction including illicit substances, alcohol, or gambling.  Patient states she has a poor  support system states that her  eats and eats and she eats with him.   She lives with  and three grand kids.  She is retired.  She  endorses a 10 year history of GERD. States that medications work but if she stops taking them she gets reflux really bad, coughing nausea and vomiting along with water brash  All workup has been reviewed in clinic today and there is nothing on the review that would prevent us from proceeding with surgery.  All questions were answered in clinic today prior to leaving.  Body mass index is 39.4 kg/m².       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    Upper abdominal pain 06/29/2021    Occipital neuralgia of left side 09/29/2020    2019 novel coronavirus disease (COVID-19) 03/29/2020    Tension headache, chronic 04/17/2019     Nonintractable epilepsy without status epilepticus 03/31/2019    Dysarthria 02/16/2019    Hemifacial spasm 02/16/2019    HLD (hyperlipidemia) 07/19/2013    Anxiety 09/27/2012    Essential hypertension, benign 09/27/2012    Controlled type 2 diabetes mellitus with stage 2 chronic kidney disease, without long-term current use of insulin 09/27/2012     Past Medical History:   Diagnosis Date    Allergy     Anxiety     Diabetes mellitus     GERD (gastroesophageal reflux disease)     Heart murmur     Hyperlipidemia     Hypertension     Seizures     5-6 years ago, spontaneous      Past Surgical History:   Procedure Laterality Date    CHOLECYSTECTOMY      2001 april    COLONOSCOPY N/A 06/29/2021    Procedure: COLONOSCOPY;  Surgeon: Gustavo Pelletier MD;  Location: [facility] ENDO;  Service: Endoscopy;  Laterality: N/A;  combined orders    ESOPHAGOGASTRODUODENOSCOPY N/A 06/29/2021    Procedure: ESOPHAGOGASTRODUODENOSCOPY (EGD);  Surgeon: Gustavo Pelletier MD;  Location: [facility] ENDO;  Service: Endoscopy;  Laterality: N/A;  covdi +3/29/20, fully vaccinated 3/23/21, prep instr portal -ml    ESOPHAGOGASTRODUODENOSCOPY N/A 11/29/2023    Procedure: EGD (ESOPHAGOGASTRODUODENOSCOPY);  Surgeon: Maciej Fine MD;  Location: [facility] ENDO;  Service: Endoscopy;  Laterality: N/A;  referred by Cristhian RAHMAN, [facility] takes on Tuesdays will hold dose on Tuesday 11/28/23, instructions per [facility].    HYSTERECTOMY      partial  1996    OOPHORECTOMY      SHOULDER SURGERY Right     WISDOM TOOTH EXTRACTION        (Not in a hospital admission)    Review of patient's allergies indicates:   Allergen Reactions    Keflex [cephalexin] Hives    Vicodin [hydrocodone-acetaminophen] Itching     itch    Crestor [rosuvastatin] Other (See Comments)     Muscle pain      Social History     Tobacco Use    Smoking status: Former     Current packs/day: 0.00     Types: Cigarettes    Smokeless tobacco: Never   Substance Use Topics    Alcohol use: Yes     Alcohol/week:  "0.0 standard drinks of alcohol     Comment: seldom      Family History   Problem Relation Age of Onset    Diabetes Mother     Hyperlipidemia Mother     Hypertension Mother     Cataracts Mother     Diabetes Father     Hypertension Father     Stroke Father     Depression Sister     Hypertension Sister     Stroke Sister     No Known Problems Brother     Cancer Maternal Aunt         breast x 2    Breast cancer Maternal Aunt     Cancer Maternal Uncle         prostate x 2    Cancer Paternal Aunt         breast    Breast cancer Paternal Aunt     No Known Problems Paternal Uncle     No Known Problems Maternal Grandmother     Cancer Maternal Grandfather         lung    Early death Paternal Grandmother     Early death Paternal Grandfather     Esophageal cancer Paternal Grandfather     No Known Problems Other     Melanoma Neg Hx     Eczema Neg Hx     Lupus Neg Hx     Psoriasis Neg Hx     Amblyopia Neg Hx     Blindness Neg Hx     Glaucoma Neg Hx     Macular degeneration Neg Hx     Retinal detachment Neg Hx     Strabismus Neg Hx     Thyroid disease Neg Hx     Colon cancer Neg Hx         Review of Systems  Constitutional: negative for anorexia, chills and fatigue  Eyes: negative for icterus, irritation and redness  Respiratory: negative for cough and dyspnea on exertion  Cardiovascular: negative for chest pain and chest pressure/discomfort  Gastrointestinal: negative for abdominal pain, change in bowel habits, constipation and diarrhea  Musculoskeletal:negative for arthralgias and back pain  Neurological: negative for coordination problems and dizziness  Behavioral/Psych: negative for anxiety and bad mood    Objective:  Vital signs in last 24 hours:  Vitals:    03/20/24 1317   Weight: 100.9 kg (222 lb 7.1 oz)   Height: 5' 3" (1.6 m)             General appearance: alert, appears stated age and cooperative  Head: Normocephalic, without obvious abnormality, atraumatic  Eyes: negative findings: lids and lashes normal and " conjunctivae and sclerae normal  Lungs: non labored breathing  Heart: regular rate and rhythm  Abdomen: soft, non-tender  Extremities: extremities normal, atraumatic, no cyanosis or edema  Skin: Skin color, texture, turgor normal. No rashes or lesions  Neurologic: Grossly normal    Data Review:  All Reviewed    Assessment/Plan:  Morbid obesity with failure of conservative therapy.    The patient was informed that risks include, but are not limited to: death, leak, obstruction, bleeding, and sepsis. Any of these could require further surgery. Other risks include DVT, PE, pneumonia, wound dehiscence, hernia, wound infection, the need for dilatations and the inability to lose appropriate weight and keep it off.     We discussed that our goal is to ameliorate her medical problems and not to obtain a specific body mass index. She understands the risks and benefits and wishes to proceed with the procedure. She has signed a consent form.       Maciej Kennedy MD

## 2024-03-28 ENCOUNTER — CLINICAL SUPPORT (OUTPATIENT)
Dept: BARIATRICS | Facility: CLINIC | Age: 59
End: 2024-03-28
Payer: COMMERCIAL

## 2024-03-28 DIAGNOSIS — E78.5 HYPERLIPIDEMIA, UNSPECIFIED HYPERLIPIDEMIA TYPE: ICD-10-CM

## 2024-03-28 DIAGNOSIS — E11.9 TYPE 2 DIABETES MELLITUS WITHOUT COMPLICATION, WITHOUT LONG-TERM CURRENT USE OF INSULIN: Primary | ICD-10-CM

## 2024-03-28 DIAGNOSIS — K21.9 GASTROESOPHAGEAL REFLUX DISEASE, UNSPECIFIED WHETHER ESOPHAGITIS PRESENT: ICD-10-CM

## 2024-03-28 DIAGNOSIS — Z71.3 DIETARY COUNSELING AND SURVEILLANCE: ICD-10-CM

## 2024-03-28 DIAGNOSIS — I10 HYPERTENSION, UNSPECIFIED TYPE: ICD-10-CM

## 2024-03-28 DIAGNOSIS — E66.01 SEVERE OBESITY (BMI 35.0-39.9) WITH COMORBIDITY: ICD-10-CM

## 2024-03-28 PROCEDURE — 99499 UNLISTED E&M SERVICE: CPT | Mod: 95,,, | Performed by: DIETITIAN, REGISTERED

## 2024-03-28 NOTE — PROGRESS NOTES
Audio Only Telehealth Visit     The patient location is: home ([place])  The chief complaint leading to consultation is: Pre-op liquid diet and nutrition instructions  Visit type: Virtual visit with audio only (telephone)  Total time spent with patient: 15 mins     The reason for the audio only service rather than synchronous audio and video virtual visit was related to technical difficulties or patient preference/necessity.     Each patient to whom I provide medical services by telemedicine is:  (1) informed of the relationship between the physician and patient and the respective role of any other health care provider with respect to management of the patient; and (2) notified that they may decline to receive medical services by telemedicine and may withdraw from such care at any time. Patient verbally consented to receive this service via voice-only telephone call.    This service was not originating from a related E/M service provided within the previous 7 days nor will  to an E/M service or procedure within the next 24 hours or my soonest available appointment.  Prevailing standard of care was able to be met in this audio-only visit.      NUTRITION NOTE    Bariatric Surgeon: Apoorva Weaver M.D.  Reason for MNT Referral: Pre-op liquid diet and nutrition instructions  Procedure: Sleeve  Date of Surgery: 4/4/24    Pre-op liquid diet  Pt using Premier Protein for preop liquid diet  Fluids include: Water, broth, SF jello, SF drink powders    Discussion:  -  gms of protein per day  - 600-800 calories per day   - Less than 4gm sugar per shake  - Sugar-free, decaffeinated, non-carbonated fluids  - No fruits, juices, yogurt or pudding on liquid diet  - No vitamins for 1 week prior to surgery  - No herbal supplements including green tea and fish oils for 2 weeks prior to surgery     Post-op instructions  - Reviewed nutritional guidelines for post-surgery.    - 1 ounce medicine cups and tracking sheet  provided for patient to measure and track fluid intake after surgery.  - Start with 1 oz clear liquids every 15 minutes following surgery, and to increase as tolerated. Aim for 48 fl oz clear fluids daily (includes clear protein drinks and protein soups).  - May begin sipping on protein shakes, as long as maintaining 48 fl oz non-caffeinated clear liquids per day.  - Reviewed bariatric vitamin/mineral regimen, starts 1 week after surgery as tolerated.  - Reviewed common nutritional concerns and prevention tips after bariatric surgery.  - Reminded not to lift anything greater than 10 lbs for 6 week post-surgery   - Encouraged PT to walk as much as tolerable after surgery.     Pt has the following vitamins and minerals to start taking one week after being discharged from hospital:    Barimelt Multivitamin with 18 mg iron 1 x day  EZ Melt B-1/ with 25 mg thiamine 2 x day  Barimelt Calcium Citrate 500 mg with vitamin D  3 x day  EZ Melt Vitamin B-12 sublingual 2500 mcg 1 x week  EZ Melt biotin    Reviewed dosage and timing of vitamin/mineral guidelines.    Remind pt per nursing and medical team to inform our department if taking antibiotics within the 30 days post bariatric surgery or it any other surgeries/procedures are scheduled within 30 days after bariatric surgery.    Pt verbalized understanding of information provided with appropriate questions and comments.     SESSION TIME: 15 minutes

## 2024-04-02 NOTE — PRE-PROCEDURE INSTRUCTIONS
PreOp Instructions given:   - Verbal medication information (what to hold and what to take)   - NPO guidelines given per Bariatric Sx Dept  - Arrival place directions given; time to be given the day before procedure by the   Surgeon's Office DOSC  - Bathing with antibacterial soap   - Don't wear any jewelry or bring any valuables AM of surgery   - No makeup or moisturizer to face   - No perfume/cologne, powder, lotions or aftershave   Pt. verbalized understanding.   Pt denies any h/o Anesthesia/Sedation complications or side effects.  Patient does not know arrival time.  Explained that this information comes from the surgeon's office and if they haven't heard from them by 2 or 3 pm to call the office.  Patient stated an understanding.

## 2024-04-03 ENCOUNTER — PATIENT MESSAGE (OUTPATIENT)
Dept: BARIATRICS | Facility: CLINIC | Age: 59
End: 2024-04-03
Payer: COMMERCIAL

## 2024-04-03 ENCOUNTER — TELEPHONE (OUTPATIENT)
Dept: BARIATRICS | Facility: CLINIC | Age: 59
End: 2024-04-03
Payer: COMMERCIAL

## 2024-04-03 ENCOUNTER — ANESTHESIA EVENT (OUTPATIENT)
Dept: SURGERY | Facility: HOSPITAL | Age: 59
DRG: 621 | End: 2024-04-03
Payer: COMMERCIAL

## 2024-04-03 DIAGNOSIS — E11.9 TYPE 2 DIABETES MELLITUS WITHOUT COMPLICATION, UNSPECIFIED WHETHER LONG TERM INSULIN USE: ICD-10-CM

## 2024-04-03 NOTE — ANESTHESIA PREPROCEDURE EVALUATION
MD notified of pt not meeting O2 goal of >92%. O2 increased to 1.5L NC.    [facility]  Anesthesia Pre-Operative Evaluation   04/03/2024        Monalisa Carson, 1965  [id number]  Procedure(s) (LRB):  GASTRECTOMY, SLEEVE, LAPAROSCOPIC with intraop EGD (OGB, please submit auth to insurance on or after 3/14/24) (N/A)    Subjective    Monalisa Carson is a 59 y.o. female w/ a significant PMHx of HTN, HLD, Seizure disorder (on AEDs), CKD2, GERD, Anxiety, Obesity.    Patient now presents for above procedure(s).       ECHO:  Results for orders placed during the hospital encounter of 05/03/22    Echo    Interpretation Summary  · The estimated ejection fraction is 55%.  · Normal systolic function.  · Indeterminate left ventricular diastolic function.  · Normal right ventricular size with normal right ventricular systolic function.  · Mild left atrial enlargement.  · Normal central venous pressure (3 mmHg).  · The estimated PA systolic pressure is 30 mmHg.      Prev Airway: None documented.    LDA: None documented.       Drips: None documented.      Patient Active Problem List   Diagnosis    Anxiety    Essential hypertension, benign    Controlled type 2 diabetes mellitus with stage 2 chronic kidney disease, without long-term current use of insulin    HLD (hyperlipidemia)    Dysarthria    Hemifacial spasm    Nonintractable epilepsy without status epilepticus    Tension headache, chronic    2019 novel coronavirus disease (COVID-19)    Occipital neuralgia of left side    Upper abdominal pain    Severe obesity (BMI 35.0-39.9) with comorbidity    Sal's paralysis (postepileptic)    Wears contact lenses    Refractive error    Heart murmur    PATTERSON (dyspnea on exertion)       Review of patient's allergies indicates:   Allergen Reactions    Keflex [cephalexin] Hives    Vicodin [hydrocodone-acetaminophen] Itching     itch    Crestor [rosuvastatin] Other (See Comments)     Muscle pain       Current Inpatient Medications:       No current facility-administered medications on file prior  to encounter.     Current Outpatient Medications on File Prior to Encounter   Medication Sig Dispense Refill    albuterol (PROVENTIL/VENTOLIN HFA) 90 mcg/actuation inhaler Inhale 1-2 puffs into the lungs every 4 (four) hours as needed for Wheezing. Rescue 18 g 11    benazepriL (LOTENSIN) 40 MG tablet TAKE 1 TABLET(40 MG) BY MOUTH EVERY DAY 90 tablet 0    pantoprazole (PROTONIX) 40 MG tablet Take 1 tablet (40 mg total) by mouth once daily. 90 tablet 3    acetaminophen (TYLENOL) 500 MG tablet Take 2 tablets (1,000 mg total) by mouth every 6 (six) hours as needed for Pain. 30 tablet 0    acetaminophen (TYLENOL) 500 MG tablet Take 1 tablet (500 mg total) by mouth every 4 (four) hours as needed for Pain or Temperature greater than (100.5 or greater). 30 tablet 0    busPIRone (BUSPAR) 10 MG tablet Take 1 tablet (10 mg total) by mouth 3 (three) times daily as needed (anxiety). (Patient not taking: Reported on 12/6/2023) 90 tablet 2    dicyclomine (BENTYL) 20 mg tablet Take 20 mg by mouth 4 (four) times daily.      dulaglutide (TRULICITY) 0.75 mg/0.5 mL pen injector Inject 0.75 mg into the skin every 7 days. 4 pen 11    EScitalopram oxalate (LEXAPRO) 10 MG tablet Take 1 tablet (10 mg total) by mouth once daily. (Patient not taking: Reported on 3/20/2024) 90 tablet 1    fluticasone propionate (FLONASE) 50 mcg/actuation nasal spray 1 spray (50 mcg total) by Each Nostril route 2 (two) times daily. (Patient not taking: Reported on 3/20/2024) 16 g 0    gabapentin (NEURONTIN) 100 MG capsule Take 100 mg by mouth 3 (three) times daily.      hydroCHLOROthiazide (HYDRODIURIL) 12.5 MG Tab TAKE 1 TABLET(12.5 MG) BY MOUTH EVERY DAY 90 tablet 1    HYDROcodone-acetaminophen (NORCO) 7.5-325 mg per tablet Take 1 tablet by mouth every 6 (six) hours as needed.      hydrocortisone (WESTCORT) 0.2 % cream Apply topically 2 (two) times daily. for 5 days 60 g 0    hydrocortisone 2.5 % cream WOL[place]ANG EXT AA BID 28 g 0    hydrOXYzine HCL (ATARAX) 25 MG  "tablet Take 1 tablet (25 mg total) by mouth every 4 to 6 hours as needed for Itching (redness). (Patient not taking: Reported on 3/20/2024) 30 tablet 0    ibuprofen (ADVIL,MOTRIN) 800 MG tablet Take by mouth.      k phos di & mono-sod phos mono (K-PHOS-NEUTRAL) 250 mg Tab Take 1 tablet by mouth 2 (two) times a day. for 3 days (Patient not taking: Reported on 12/6/2023) 6 tablet 0    levETIRAcetam (KEPPRA) 1000 MG tablet TAKE 1 TABLET(1000 MG) BY MOUTH TWICE DAILY 180 tablet 3    LIDOcaine (LIDODERM) 5 % Place 1 patch onto the skin once daily. Remove & Discard patch within 12 hours or as directed by MD (Patient not taking: Reported on 3/20/2024) 15 patch 0    lidocaine (LMX) 4 % cream Apply topically as needed. (Patient not taking: Reported on 3/20/2024)  0    metFORMIN (GLUCOPHAGE-XR) 500 MG ER 24hr tablet TAKE 1 TABLET(500 MG) BY MOUTH DAILY WITH BREAKFAST (Patient not taking: Reported on 4/2/2024) 90 tablet 3    MOBIC 15 mg tablet Take 1 tablet (15 mg total) by mouth daily as needed for Pain. Take on full stomach, no other NSAIDs. Not more than once a day (Patient not taking: Reported on 3/20/2024) 30 tablet 0    ondansetron (ZOFRAN-ODT) 4 MG TbDL Take 1 tablet (4 mg total) by mouth every 6 (six) hours as needed (nausea). (Patient not taking: Reported on 3/20/2024) 20 tablet 0    pen needle, diabetic 31 gauge x 5/16" Ndle For use with bydureon 100 each 0    phenoL (CHLORASEPTIC THROAT SPRAY) 1.4 % SprA by Mucous Membrane route every 2 (two) hours. (Patient not taking: Reported on 12/6/2023) 177 mL 0    sodium chloride (OCEAN NASAL) 0.65 % nasal spray 1 spray by Nasal route every 3 (three) hours as needed for Congestion. (Patient not taking: Reported on 9/25/2023) 1 Bottle 12    topiramate (TOPAMAX) 100 MG tablet Take 1 tablet (100 mg total) by mouth once daily. (Patient not taking: Reported on 3/20/2024) 30 tablet 11    zolpidem (AMBIEN) 5 MG Tab TAKE 1 TABLET BY MOUTH IN THE EVENING AS NEEDED, take sparingly 30 " tablet 0       Past Surgical History:   Procedure Laterality Date    CHOLECYSTECTOMY      2001 april    COLONOSCOPY N/A 06/29/2021    Procedure: COLONOSCOPY;  Surgeon: Gustavo Pelletier MD;  Location: [facility] ENDO;  Service: Endoscopy;  Laterality: N/A;  combined orders    ESOPHAGOGASTRODUODENOSCOPY N/A 06/29/2021    Procedure: ESOPHAGOGASTRODUODENOSCOPY (EGD);  Surgeon: Gustavo Pelletier MD;  Location: [facility] ENDO;  Service: Endoscopy;  Laterality: N/A;  covdi +3/29/20, fully vaccinated 3/23/21, prep instr portal -ml    ESOPHAGOGASTRODUODENOSCOPY N/A 11/29/2023    Procedure: EGD (ESOPHAGOGASTRODUODENOSCOPY);  Surgeon: Maciej Fine MD;  Location: [facility] ENDO;  Service: Endoscopy;  Laterality: N/A;  referred by Cristhian RAHMAN, [facility] takes on Tuesdays will hold dose on Tuesday 11/28/23, instructions per [facility].    HYSTERECTOMY      partial  1996    OOPHORECTOMY      SHOULDER SURGERY Right     WISDOM TOOTH EXTRACTION         Social History:  Tobacco Use: Medium Risk (3/20/2024)    Patient History     Smoking Tobacco Use: Former     Smokeless Tobacco Use: Never     Passive Exposure: Not on file       Alcohol Use: Not At Risk (12/22/2023)    AUDIT-C     Frequency of Alcohol Consumption: Never     Average Number of Drinks: 1 or 2     Frequency of Binge Drinking: Never       Objective    Vital Signs Range:  BMI Readings from Last 1 Encounters:   03/20/24 39.40 kg/m²               Significant Labs:        Component Value Date/Time    WBC 6.65 03/20/2024 1246    HGB 13.6 03/20/2024 1246    HCT 41.7 03/20/2024 1246     03/20/2024 1246     03/20/2024 1307    K 4.5 03/20/2024 1307     03/20/2024 1307    CO2 28 03/20/2024 1307     (H) 03/20/2024 1307    BUN 13 03/20/2024 1307    CREATININE 0.8 03/20/2024 1307    MG 1.7 09/28/2023 0825    PHOS 2.5 (L) 09/28/2023 0825    CALCIUM 10.5 03/20/2024 1307    ALBUMIN 3.9 03/20/2024 1307    PROT 7.9 03/20/2024 1307    ALKPHOS 83 03/20/2024 1307    BILITOT 0.4  03/20/2024 1307    AST 17 03/20/2024 1307    ALT 21 03/20/2024 1307    INR 1.0 12/06/2023 1137    INR 1.0 04/08/2019 1005    HGBA1C 6.7 (H) 03/20/2024 1307        Please see Results Review for additional labs.     Diagnostic Studies: All relevant studies, reviewed.      EKG:   Results for orders placed or performed during the hospital encounter of 09/28/23   EKG    Collection Time: 09/28/23 12:48 PM    Narrative    Test Reason : E78.00,I10,E11.22,N18.2,E66.01,    Vent. Rate : 074 BPM     Atrial Rate : 074 BPM     P-R Int : 182 ms          QRS Dur : 096 ms      QT Int : 374 ms       P-R-T Axes : 064 062 075 degrees     QTc Int : 415 ms    Normal sinus rhythm  Incomplete right bundle branch block  Borderline Abnormal ECG  When compared with ECG of 27-APR-2022 13:31,  Significant changes have occurred  Confirmed by Naseem Marx MD ([id number]) on 9/29/2023 12:52:10 PM    Referred By: YE CONTRERAS           Confirmed By:Naseem Marx MD                                                                                                                  04/03/2024  Monalisa Carson is a 59 y.o., female.      Pre-op Assessment    I have reviewed the Patient Summary Reports.     I have reviewed the Nursing Notes. I have reviewed the NPO Status.   I have reviewed the Medications.     Review of Systems  Anesthesia Hx:  No problems with previous Anesthesia   History of prior surgery of interest to airway management or planning:          Denies Family Hx of Anesthesia complications.    Denies Personal Hx of Anesthesia complications.                    Social:  Non-Smoker       EENT/Dental:  EENT/Dental Normal           Cardiovascular:  Exercise tolerance: good   Hypertension    Denies CAD.    Denies CABG/stent.     Denies CHF.    hyperlipidemia PATTERSON  ECG has been reviewed.                          Pulmonary:  Pulmonary Normal   Denies COPD.  Denies Asthma.     Denies Sleep Apnea.                Renal/:  Chronic Renal Disease, CKD                 Hepatic/GI:  Bowel Prep. Denies PUD.  GERD             Neurological:    Denies CVA.   Headaches Seizures                                Endocrine:  Diabetes, type 2         Obesity / BMI > 30  Psych:  Denies Psychiatric History. anxiety                    Anesthesia Plan  Type of Anesthesia, risks & benefits discussed:    Anesthesia Type: Gen ETT  Intra-op Monitoring Plan: Standard ASA Monitors  Post Op Pain Control Plan: multimodal analgesia and IV/PO Opioids PRN  Induction:  IV  Airway Plan: Direct and Video, Post-Induction  Informed Consent: Informed consent signed with the Patient and all parties understand the risks and agree with anesthesia plan.  All questions answered. Patient consented to blood products? Yes  ASA Score: 3  Day of Surgery Review of History & Physical: H&P Update referred to the surgeon/provider.    Ready For Surgery From Anesthesia Perspective.     .

## 2024-04-03 NOTE — TELEPHONE ENCOUNTER
Notified patient of arrival time to the [facility] 2nd floor [facility] at 7am with expected surgery start time 9am on 4/4/2024. Instructed patient regarding pre-op instructions including no protein shakes or sugar free clear liquids after midnight but can have a rare sip of water for comfort, showering and preop medications to hold/take per anesthesia/preop. Reminded pt to drink pre-surgery beverage or 8 oz water at 06:30am. Instructed patient on the s/s of dehydration and for patient to call at the first sign of dehydration. Informed patient that someone from bariatrics will call them 1 week post op to review diet/fluid intake and to ensure adequate hydration. Reminded patient not to take antibiotics for 30 days following surgery or schedule elective procedures involving anesthesia/sedation for 30 days following surgery unless checking with the bariatric clinic first. Pt verbalized understanding. Pt given office phone number for any additional questions/concerns.

## 2024-04-04 ENCOUNTER — ANESTHESIA (OUTPATIENT)
Dept: SURGERY | Facility: HOSPITAL | Age: 59
DRG: 621 | End: 2024-04-04
Payer: COMMERCIAL

## 2024-04-04 ENCOUNTER — HOSPITAL ENCOUNTER (INPATIENT)
Facility: HOSPITAL | Age: 59
LOS: 2 days | Discharge: HOME | DRG: 621 | End: 2024-04-06
Attending: SURGERY | Admitting: SURGERY
Payer: COMMERCIAL

## 2024-04-04 PROBLEM — R56.9 SEIZURES: Status: ACTIVE | Noted: 2024-04-04

## 2024-04-04 PROBLEM — E66.9 OBESITY: Status: ACTIVE | Noted: 2024-04-04

## 2024-04-04 LAB
POCT GLUCOSE: 139 MG/DL (ref 70–110)
POCT GLUCOSE: 151 MG/DL (ref 70–110)

## 2024-04-04 PROCEDURE — 88307 TISSUE EXAM BY PATHOLOGIST: CPT | Mod: 26,,, | Performed by: PATHOLOGY

## 2024-04-04 PROCEDURE — D9220A PRA ANESTHESIA: Mod: CRNA,,, | Performed by: NURSE ANESTHETIST, CERTIFIED REGISTERED

## 2024-04-04 PROCEDURE — 71000015 HC POSTOP RECOV 1ST HR: Performed by: SURGERY

## 2024-04-04 PROCEDURE — 0DB64Z3 EXCISION OF STOMACH, PERCUTANEOUS ENDOSCOPIC APPROACH, VERTICAL: ICD-10-PCS | Performed by: SURGERY

## 2024-04-04 PROCEDURE — 36000711: Performed by: SURGERY

## 2024-04-04 PROCEDURE — 71000016 HC POSTOP RECOV ADDL HR: Performed by: SURGERY

## 2024-04-04 PROCEDURE — 63600175 PHARM REV CODE 636 W HCPCS: Performed by: NURSE ANESTHETIST, CERTIFIED REGISTERED

## 2024-04-04 PROCEDURE — 71000039 HC RECOVERY, EACH ADD'L HOUR: Performed by: SURGERY

## 2024-04-04 PROCEDURE — 63600175 PHARM REV CODE 636 W HCPCS: Performed by: SURGERY

## 2024-04-04 PROCEDURE — 63600175 PHARM REV CODE 636 W HCPCS

## 2024-04-04 PROCEDURE — 71000033 HC RECOVERY, INTIAL HOUR: Performed by: SURGERY

## 2024-04-04 PROCEDURE — 99900035 HC TECH TIME PER 15 MIN (STAT)

## 2024-04-04 PROCEDURE — D9220A PRA ANESTHESIA: Mod: ANES,,, | Performed by: ANESTHESIOLOGY

## 2024-04-04 PROCEDURE — 88342 IMHCHEM/IMCYTCHM 1ST ANTB: CPT | Performed by: PATHOLOGY

## 2024-04-04 PROCEDURE — 37000008 HC ANESTHESIA 1ST 15 MINUTES: Performed by: SURGERY

## 2024-04-04 PROCEDURE — 27000221 HC OXYGEN, UP TO 24 HOURS

## 2024-04-04 PROCEDURE — C1781 MESH (IMPLANTABLE): HCPCS | Performed by: SURGERY

## 2024-04-04 PROCEDURE — 37000009 HC ANESTHESIA EA ADD 15 MINS: Performed by: SURGERY

## 2024-04-04 PROCEDURE — 27201423 OPTIME MED/SURG SUP & DEVICES STERILE SUPPLY: Performed by: SURGERY

## 2024-04-04 PROCEDURE — 25000003 PHARM REV CODE 250: Performed by: SURGERY

## 2024-04-04 PROCEDURE — 88342 IMHCHEM/IMCYTCHM 1ST ANTB: CPT | Mod: 26,,, | Performed by: PATHOLOGY

## 2024-04-04 PROCEDURE — C9113 INJ PANTOPRAZOLE SODIUM, VIA: HCPCS | Performed by: SURGERY

## 2024-04-04 PROCEDURE — 25000003 PHARM REV CODE 250: Performed by: NURSE ANESTHETIST, CERTIFIED REGISTERED

## 2024-04-04 PROCEDURE — 36000710: Performed by: SURGERY

## 2024-04-04 PROCEDURE — 43775 LAP SLEEVE GASTRECTOMY: CPT | Mod: ,,, | Performed by: SURGERY

## 2024-04-04 PROCEDURE — 88307 TISSUE EXAM BY PATHOLOGIST: CPT | Performed by: PATHOLOGY

## 2024-04-04 PROCEDURE — 82962 GLUCOSE BLOOD TEST: CPT | Performed by: SURGERY

## 2024-04-04 PROCEDURE — 0DJ08ZZ INSPECTION OF UPPER INTESTINAL TRACT, VIA NATURAL OR ARTIFICIAL OPENING ENDOSCOPIC: ICD-10-PCS | Performed by: SURGERY

## 2024-04-04 PROCEDURE — 94761 N-INVAS EAR/PLS OXIMETRY MLT: CPT

## 2024-04-04 PROCEDURE — 11000001 HC ACUTE MED/SURG PRIVATE ROOM

## 2024-04-04 DEVICE — SEAMGUARD ESCHELON 60 MM.: Type: IMPLANTABLE DEVICE | Site: ABDOMEN | Status: FUNCTIONAL

## 2024-04-04 RX ORDER — CLINDAMYCIN PHOSPHATE 900 MG/50ML
900 INJECTION, SOLUTION INTRAVENOUS
Status: COMPLETED | OUTPATIENT
Start: 2024-04-04 | End: 2024-04-04

## 2024-04-04 RX ORDER — SODIUM CHLORIDE, SODIUM LACTATE, POTASSIUM CHLORIDE, CALCIUM CHLORIDE 600; 310; 30; 20 MG/100ML; MG/100ML; MG/100ML; MG/100ML
INJECTION, SOLUTION INTRAVENOUS CONTINUOUS
Status: DISCONTINUED | OUTPATIENT
Start: 2024-04-04 | End: 2024-04-06

## 2024-04-04 RX ORDER — LIDOCAINE HYDROCHLORIDE AND EPINEPHRINE 10; 10 MG/ML; UG/ML
INJECTION, SOLUTION INFILTRATION; PERINEURAL
Status: DISCONTINUED | OUTPATIENT
Start: 2024-04-04 | End: 2024-04-04 | Stop reason: HOSPADM

## 2024-04-04 RX ORDER — ACETAMINOPHEN 500 MG
1000 TABLET ORAL
Status: DISCONTINUED | OUTPATIENT
Start: 2024-04-04 | End: 2024-04-04

## 2024-04-04 RX ORDER — PROPOFOL 10 MG/ML
VIAL (ML) INTRAVENOUS
Status: DISCONTINUED | OUTPATIENT
Start: 2024-04-04 | End: 2024-04-04

## 2024-04-04 RX ORDER — ONDANSETRON HYDROCHLORIDE 2 MG/ML
8 INJECTION, SOLUTION INTRAVENOUS EVERY 6 HOURS
Status: DISCONTINUED | OUTPATIENT
Start: 2024-04-04 | End: 2024-04-06 | Stop reason: HOSPADM

## 2024-04-04 RX ORDER — HYDROMORPHONE HYDROCHLORIDE 1 MG/ML
0.5 INJECTION, SOLUTION INTRAMUSCULAR; INTRAVENOUS; SUBCUTANEOUS
Status: DISCONTINUED | OUTPATIENT
Start: 2024-04-04 | End: 2024-04-06 | Stop reason: HOSPADM

## 2024-04-04 RX ORDER — LABETALOL HCL 20 MG/4 ML
SYRINGE (ML) INTRAVENOUS
Status: DISPENSED
Start: 2024-04-04 | End: 2024-04-04

## 2024-04-04 RX ORDER — LIDOCAINE HYDROCHLORIDE 20 MG/ML
INJECTION INTRAVENOUS
Status: DISCONTINUED | OUTPATIENT
Start: 2024-04-04 | End: 2024-04-04

## 2024-04-04 RX ORDER — SODIUM CHLORIDE 0.9 % (FLUSH) 0.9 %
10 SYRINGE (ML) INJECTION
Status: DISCONTINUED | OUTPATIENT
Start: 2024-04-04 | End: 2024-04-04 | Stop reason: HOSPADM

## 2024-04-04 RX ORDER — HYDRALAZINE HYDROCHLORIDE 20 MG/ML
10 INJECTION INTRAMUSCULAR; INTRAVENOUS ONCE
Status: DISCONTINUED | OUTPATIENT
Start: 2024-04-04 | End: 2024-04-04

## 2024-04-04 RX ORDER — HYDROMORPHONE HYDROCHLORIDE 1 MG/ML
0.2 INJECTION, SOLUTION INTRAMUSCULAR; INTRAVENOUS; SUBCUTANEOUS EVERY 5 MIN PRN
Status: DISCONTINUED | OUTPATIENT
Start: 2024-04-04 | End: 2024-04-04 | Stop reason: HOSPADM

## 2024-04-04 RX ORDER — LIDOCAINE HYDROCHLORIDE 10 MG/ML
1 INJECTION, SOLUTION EPIDURAL; INFILTRATION; INTRACAUDAL; PERINEURAL ONCE
Status: DISCONTINUED | OUTPATIENT
Start: 2024-04-04 | End: 2024-04-04

## 2024-04-04 RX ORDER — DEXAMETHASONE SODIUM PHOSPHATE 4 MG/ML
INJECTION, SOLUTION INTRA-ARTICULAR; INTRALESIONAL; INTRAMUSCULAR; INTRAVENOUS; SOFT TISSUE
Status: DISCONTINUED | OUTPATIENT
Start: 2024-04-04 | End: 2024-04-04

## 2024-04-04 RX ORDER — PROCHLORPERAZINE EDISYLATE 5 MG/ML
5 INJECTION INTRAMUSCULAR; INTRAVENOUS EVERY 6 HOURS PRN
Status: DISCONTINUED | OUTPATIENT
Start: 2024-04-04 | End: 2024-04-06 | Stop reason: HOSPADM

## 2024-04-04 RX ORDER — HALOPERIDOL 5 MG/ML
0.5 INJECTION INTRAMUSCULAR EVERY 10 MIN PRN
Status: COMPLETED | OUTPATIENT
Start: 2024-04-04 | End: 2024-04-04

## 2024-04-04 RX ORDER — HYDROCODONE BITARTRATE AND ACETAMINOPHEN 7.5; 325 MG/15ML; MG/15ML
15 SOLUTION ORAL EVERY 4 HOURS PRN
Status: DISCONTINUED | OUTPATIENT
Start: 2024-04-04 | End: 2024-04-06 | Stop reason: HOSPADM

## 2024-04-04 RX ORDER — SODIUM CHLORIDE 9 MG/ML
INJECTION, SOLUTION INTRAVENOUS CONTINUOUS
Status: DISCONTINUED | OUTPATIENT
Start: 2024-04-04 | End: 2024-04-04

## 2024-04-04 RX ORDER — ROCURONIUM BROMIDE 10 MG/ML
INJECTION, SOLUTION INTRAVENOUS
Status: DISCONTINUED | OUTPATIENT
Start: 2024-04-04 | End: 2024-04-04

## 2024-04-04 RX ORDER — FAMOTIDINE 10 MG/ML
20 INJECTION INTRAVENOUS ONCE
Status: COMPLETED | OUTPATIENT
Start: 2024-04-04 | End: 2024-04-04

## 2024-04-04 RX ORDER — DEXMEDETOMIDINE HYDROCHLORIDE 100 UG/ML
INJECTION, SOLUTION INTRAVENOUS
Status: DISCONTINUED | OUTPATIENT
Start: 2024-04-04 | End: 2024-04-04

## 2024-04-04 RX ORDER — GABAPENTIN 250 MG/5ML
300 SOLUTION ORAL 3 TIMES DAILY
Status: DISCONTINUED | OUTPATIENT
Start: 2024-04-04 | End: 2024-04-06 | Stop reason: HOSPADM

## 2024-04-04 RX ORDER — HYDRALAZINE HYDROCHLORIDE 20 MG/ML
INJECTION INTRAMUSCULAR; INTRAVENOUS
Status: COMPLETED
Start: 2024-04-04 | End: 2024-04-04

## 2024-04-04 RX ORDER — ENOXAPARIN SODIUM 100 MG/ML
40 INJECTION SUBCUTANEOUS EVERY 24 HOURS
Status: DISCONTINUED | OUTPATIENT
Start: 2024-04-04 | End: 2024-04-06 | Stop reason: HOSPADM

## 2024-04-04 RX ORDER — HYDRALAZINE HYDROCHLORIDE 20 MG/ML
10 INJECTION INTRAMUSCULAR; INTRAVENOUS ONCE
Status: COMPLETED | OUTPATIENT
Start: 2024-04-04 | End: 2024-04-04

## 2024-04-04 RX ORDER — SUCCINYLCHOLINE CHLORIDE 20 MG/ML
INJECTION INTRAMUSCULAR; INTRAVENOUS
Status: DISCONTINUED | OUTPATIENT
Start: 2024-04-04 | End: 2024-04-04

## 2024-04-04 RX ORDER — ACETAMINOPHEN 650 MG/20.3ML
500 LIQUID ORAL EVERY 8 HOURS
Status: COMPLETED | OUTPATIENT
Start: 2024-04-04 | End: 2024-04-05

## 2024-04-04 RX ORDER — ONDANSETRON HYDROCHLORIDE 2 MG/ML
INJECTION, SOLUTION INTRAVENOUS
Status: DISCONTINUED | OUTPATIENT
Start: 2024-04-04 | End: 2024-04-04

## 2024-04-04 RX ORDER — FENTANYL CITRATE 50 UG/ML
INJECTION, SOLUTION INTRAMUSCULAR; INTRAVENOUS
Status: DISCONTINUED | OUTPATIENT
Start: 2024-04-04 | End: 2024-04-04

## 2024-04-04 RX ORDER — ONDANSETRON HYDROCHLORIDE 2 MG/ML
8 INJECTION, SOLUTION INTRAVENOUS EVERY 6 HOURS PRN
Status: DISCONTINUED | OUTPATIENT
Start: 2024-04-04 | End: 2024-04-04

## 2024-04-04 RX ORDER — MIDAZOLAM HYDROCHLORIDE 1 MG/ML
INJECTION INTRAMUSCULAR; INTRAVENOUS
Status: DISCONTINUED | OUTPATIENT
Start: 2024-04-04 | End: 2024-04-04

## 2024-04-04 RX ORDER — BUPIVACAINE HYDROCHLORIDE 2.5 MG/ML
INJECTION, SOLUTION EPIDURAL; INFILTRATION; INTRACAUDAL
Status: DISCONTINUED | OUTPATIENT
Start: 2024-04-04 | End: 2024-04-04 | Stop reason: HOSPADM

## 2024-04-04 RX ORDER — PANTOPRAZOLE SODIUM 40 MG/10ML
40 INJECTION, POWDER, LYOPHILIZED, FOR SOLUTION INTRAVENOUS 2 TIMES DAILY
Status: DISCONTINUED | OUTPATIENT
Start: 2024-04-04 | End: 2024-04-06 | Stop reason: HOSPADM

## 2024-04-04 RX ORDER — KETAMINE HCL IN 0.9 % NACL 50 MG/5 ML
SYRINGE (ML) INTRAVENOUS
Status: DISCONTINUED | OUTPATIENT
Start: 2024-04-04 | End: 2024-04-04

## 2024-04-04 RX ORDER — HEPARIN SODIUM 5000 [USP'U]/ML
5000 INJECTION, SOLUTION INTRAVENOUS; SUBCUTANEOUS ONCE
Status: COMPLETED | OUTPATIENT
Start: 2024-04-04 | End: 2024-04-04

## 2024-04-04 RX ORDER — MUPIROCIN 20 MG/G
OINTMENT TOPICAL
Status: DISCONTINUED | OUTPATIENT
Start: 2024-04-04 | End: 2024-04-04 | Stop reason: HOSPADM

## 2024-04-04 RX ORDER — LABETALOL HYDROCHLORIDE 5 MG/ML
INJECTION, SOLUTION INTRAVENOUS
Status: DISCONTINUED | OUTPATIENT
Start: 2024-04-04 | End: 2024-04-04

## 2024-04-04 RX ADMIN — ACETAMINOPHEN 499.51 MG: 650 SOLUTION ORAL at 03:04

## 2024-04-04 RX ADMIN — HYDRALAZINE HYDROCHLORIDE 10 MG: 20 INJECTION INTRAMUSCULAR; INTRAVENOUS at 11:04

## 2024-04-04 RX ADMIN — SODIUM CHLORIDE, SODIUM GLUCONATE, SODIUM ACETATE, POTASSIUM CHLORIDE, MAGNESIUM CHLORIDE, SODIUM PHOSPHATE, DIBASIC, AND POTASSIUM PHOSPHATE: .53; .5; .37; .037; .03; .012; .00082 INJECTION, SOLUTION INTRAVENOUS at 10:04

## 2024-04-04 RX ADMIN — GABAPENTIN 300 MG: 250 SOLUTION ORAL at 08:04

## 2024-04-04 RX ADMIN — SODIUM CHLORIDE, POTASSIUM CHLORIDE, SODIUM LACTATE AND CALCIUM CHLORIDE: 600; 310; 30; 20 INJECTION, SOLUTION INTRAVENOUS at 10:04

## 2024-04-04 RX ADMIN — HYDROMORPHONE HYDROCHLORIDE 0.2 MG: 1 INJECTION, SOLUTION INTRAMUSCULAR; INTRAVENOUS; SUBCUTANEOUS at 10:04

## 2024-04-04 RX ADMIN — MUPIROCIN: 20 OINTMENT TOPICAL at 08:04

## 2024-04-04 RX ADMIN — HYDROMORPHONE HYDROCHLORIDE 0.2 MG: 1 INJECTION, SOLUTION INTRAMUSCULAR; INTRAVENOUS; SUBCUTANEOUS at 11:04

## 2024-04-04 RX ADMIN — ROCURONIUM BROMIDE 10 MG: 10 INJECTION, SOLUTION INTRAVENOUS at 09:04

## 2024-04-04 RX ADMIN — HYDRALAZINE HYDROCHLORIDE 10 MG: 20 INJECTION, SOLUTION INTRAMUSCULAR; INTRAVENOUS at 11:04

## 2024-04-04 RX ADMIN — GABAPENTIN 300 MG: 250 SOLUTION ORAL at 03:04

## 2024-04-04 RX ADMIN — PROCHLORPERAZINE EDISYLATE 5 MG: 5 INJECTION INTRAMUSCULAR; INTRAVENOUS at 06:04

## 2024-04-04 RX ADMIN — FENTANYL CITRATE 100 MCG: 50 INJECTION, SOLUTION INTRAMUSCULAR; INTRAVENOUS at 09:04

## 2024-04-04 RX ADMIN — LABETALOL HYDROCHLORIDE 10 MG: 5 INJECTION, SOLUTION INTRAVENOUS at 10:04

## 2024-04-04 RX ADMIN — ONDANSETRON 8 MG: 2 INJECTION INTRAMUSCULAR; INTRAVENOUS at 08:04

## 2024-04-04 RX ADMIN — HYDROCODONE BITARTRATE AND ACETAMINOPHEN 15 ML: 7.5; 325 SOLUTION ORAL at 06:04

## 2024-04-04 RX ADMIN — ACETAMINOPHEN 499.51 MG: 650 SOLUTION ORAL at 09:04

## 2024-04-04 RX ADMIN — DEXMEDETOMIDINE 12 MCG: 100 INJECTION, SOLUTION, CONCENTRATE INTRAVENOUS at 09:04

## 2024-04-04 RX ADMIN — SODIUM CHLORIDE: 0.9 INJECTION, SOLUTION INTRAVENOUS at 09:04

## 2024-04-04 RX ADMIN — SUCCINYLCHOLINE CHLORIDE 200 MG: 20 INJECTION, SOLUTION INTRAMUSCULAR; INTRAVENOUS at 09:04

## 2024-04-04 RX ADMIN — ONDANSETRON 4 MG: 2 INJECTION INTRAMUSCULAR; INTRAVENOUS at 10:04

## 2024-04-04 RX ADMIN — ONDANSETRON 8 MG: 2 INJECTION INTRAMUSCULAR; INTRAVENOUS at 03:04

## 2024-04-04 RX ADMIN — CLINDAMYCIN IN 5 PERCENT DEXTROSE 900 MG: 18 INJECTION, SOLUTION INTRAVENOUS at 08:04

## 2024-04-04 RX ADMIN — MIDAZOLAM HYDROCHLORIDE 2 MG: 2 INJECTION, SOLUTION INTRAMUSCULAR; INTRAVENOUS at 09:04

## 2024-04-04 RX ADMIN — Medication 30 MG: at 09:04

## 2024-04-04 RX ADMIN — HALOPERIDOL LACTATE 0.5 MG: 5 INJECTION, SOLUTION INTRAMUSCULAR at 10:04

## 2024-04-04 RX ADMIN — HEPARIN SODIUM 5000 UNITS: 5000 INJECTION INTRAVENOUS; SUBCUTANEOUS at 08:04

## 2024-04-04 RX ADMIN — SIMETHICONE 40 MG: 20 SUSPENSION/ DROPS ORAL at 11:04

## 2024-04-04 RX ADMIN — DEXAMETHASONE SODIUM PHOSPHATE 8 MG: 4 INJECTION, SOLUTION INTRAMUSCULAR; INTRAVENOUS at 09:04

## 2024-04-04 RX ADMIN — ENOXAPARIN SODIUM 40 MG: 40 INJECTION SUBCUTANEOUS at 03:04

## 2024-04-04 RX ADMIN — HALOPERIDOL LACTATE 0.5 MG: 5 INJECTION, SOLUTION INTRAMUSCULAR at 11:04

## 2024-04-04 RX ADMIN — PANTOPRAZOLE SODIUM 40 MG: 40 INJECTION, POWDER, FOR SOLUTION INTRAVENOUS at 08:04

## 2024-04-04 RX ADMIN — SODIUM CHLORIDE, POTASSIUM CHLORIDE, SODIUM LACTATE AND CALCIUM CHLORIDE: 600; 310; 30; 20 INJECTION, SOLUTION INTRAVENOUS at 07:04

## 2024-04-04 RX ADMIN — PROPOFOL 200 MG: 10 INJECTION, EMULSION INTRAVENOUS at 09:04

## 2024-04-04 RX ADMIN — LIDOCAINE HYDROCHLORIDE 100 MG: 20 INJECTION INTRAVENOUS at 09:04

## 2024-04-04 RX ADMIN — Medication 20 MG: at 10:04

## 2024-04-04 RX ADMIN — PANTOPRAZOLE SODIUM 40 MG: 40 INJECTION, POWDER, FOR SOLUTION INTRAVENOUS at 10:04

## 2024-04-04 RX ADMIN — ROCURONIUM BROMIDE 40 MG: 10 INJECTION, SOLUTION INTRAVENOUS at 09:04

## 2024-04-04 RX ADMIN — HYDROCODONE BITARTRATE AND ACETAMINOPHEN 15 ML: 7.5; 325 SOLUTION ORAL at 11:04

## 2024-04-04 RX ADMIN — SUGAMMADEX 200 MG: 100 INJECTION, SOLUTION INTRAVENOUS at 10:04

## 2024-04-04 RX ADMIN — FAMOTIDINE 20 MG: 10 INJECTION, SOLUTION INTRAVENOUS at 08:04

## 2024-04-04 NOTE — TRANSFER OF CARE
"Anesthesia Transfer of Care Note    Patient: Monalisa THOMPSON Favorite    Procedure(s) Performed: Procedure(s) (LRB):  GASTRECTOMY, SLEEVE, LAPAROSCOPIC with intraop EGD (OGB, please submit auth to insurance on or after 3/14/24) (N/A)    Patient location: PACU    Anesthesia Type: general    Transport from OR: Transported from OR on 6-10 L/min O2 by face mask with adequate spontaneous ventilation    Post pain: adequate analgesia    Post assessment: no apparent anesthetic complications and tolerated procedure well    Post vital signs: stable    Level of consciousness: sedated    Nausea/Vomiting: no nausea/vomiting    Complications: none    Transfer of care protocol was followed    Last vitals: Visit Vitals  BP (!) 216/101 (BP Location: Left arm, Patient Position: Lying)   Pulse 65   Temp 36.8 °C (98.2 °F) (Temporal)   Resp (!) 31   Ht 5' 3" (1.6 m)   Wt 98.1 kg (216 lb 4.3 oz)   SpO2 100%   Breastfeeding No   BMI 38.31 kg/m²     "

## 2024-04-04 NOTE — ANESTHESIA POSTPROCEDURE EVALUATION
Anesthesia Post Evaluation    Patient: Monalisa THOMPSON Favorite    Procedure(s) Performed: Procedure(s) (LRB):  GASTRECTOMY, SLEEVE, LAPAROSCOPIC with intraop EGD (OGB, please submit auth to insurance on or after 3/14/24) (N/A)    Final Anesthesia Type: general      Patient location during evaluation: PACU  Patient participation: Yes- Able to Participate  Level of consciousness: awake and alert  Post-procedure vital signs: reviewed and stable  Pain management: adequate  Airway patency: patent    PONV status at discharge: No PONV  Anesthetic complications: no      Cardiovascular status: blood pressure returned to baseline  Respiratory status: spontaneous ventilation and room air  Hydration status: euvolemic  Follow-up not needed.              Vitals Value Taken Time   /78 04/04/24 1303   Temp 36.4 °C (97.5 °F) 04/04/24 1230   Pulse 88 04/04/24 1309   Resp 27 04/04/24 1309   SpO2 94 % 04/04/24 1309   Vitals shown include unvalidated device data.      Event Time   Out of Recovery 11:40:00         Pain/Nitin Score: Pain Rating Prior to Med Admin: 6 (4/4/2024 11:30 AM)  Pain Rating Post Med Admin: 5 (4/4/2024 11:40 AM)  Nitin Score: 9 (4/4/2024 11:45 AM)

## 2024-04-04 NOTE — NURSING
Nurses Note -- 4 Eyes      4/4/2024   6:44 PM      Skin assessed during: Admit      [x] No Altered Skin Integrity Present    []Prevention Measures Documented      [] Yes- Altered Skin Integrity Present or Discovered   [] LDA Added if Not in Epic (Describe Wound)   [] New Altered Skin Integrity was Present on Admit and Documented in LDA   [] Wound Image Taken    Wound Care Consulted? No    Attending Nurse:  Ana Shepherd RN    Second RN/Staff Member:   Yael Muse LPN

## 2024-04-04 NOTE — PROGRESS NOTES
1040: Patient arrived to PACU with /101. Recheck showing 204/91. HR NSR 60's. EPI Landa administered Labetalol 10mg IVP. See MAR    1105: Dr. Darnell with anesthesia notified of patient's BP unresponsive to labetalol. /90 HR 60 NSR. Left and Right arm pressure correlating. Orders to give Hydralazine 10mg IVP x 1 dose.     1130: Dr. Darnell notified that patient had very little response to hydralazine (/88) and pain is being controlled. Orders to repeat another dose of Hydralazine 10mg IVP.    1145: BP improved 154/77 - Within 20% of pre-operative baseline. Nitin met.

## 2024-04-04 NOTE — BRIEF OP NOTE
[facility] - Surgery ([facility])  Brief Operative Note    SUMMARY     Surgery Date: 4/4/2024     Surgeon(s) and Role:     * Maciej Kennedy Jr., MD - Primary     * Clary Lord MD - Resident - Assisting        Pre-op Diagnosis:  Morbid obesity [E66.01]  BMI 39.0-39.9,adult [Z68.39]  Hypertension, unspecified type [I10]  Type 2 diabetes mellitus without complication, without long-term current use of insulin [E11.9]  Gastroesophageal reflux disease, unspecified whether esophagitis present [K21.9]  Hyperlipidemia, unspecified hyperlipidemia type [E78.5]    Post-op Diagnosis:  Post-Op Diagnosis Codes:     * Morbid obesity [E66.01]     * BMI 39.0-39.9,adult [Z68.39]     * Hypertension, unspecified type [I10]     * Type 2 diabetes mellitus without complication, without long-term current use of insulin [E11.9]     * Gastroesophageal reflux disease, unspecified whether esophagitis present [K21.9]     * Hyperlipidemia, unspecified hyperlipidemia type [E78.5]    Procedure(s) (LRB):  GASTRECTOMY, SLEEVE, LAPAROSCOPIC with intraop EGD (OGB, please submit auth to insurance on or after 3/14/24) (N/A)    Anesthesia: General    Implants:  Implant Name Type Inv. Item Serial No.  Lot No. LRB No. Used Action   SEAMGUARD ESCHELON 60 MM. - [id number]  SEAMGUARD ESCHELON 60 MM.  [employer] [id number] N/A 5 Implanted       Operative Findings: Laparoscopic sleeve gastrectomy with intraoperative EGD    Estimated Blood Loss: < 10 ml     Estimated Blood Loss has been documented.         Specimens:   Specimen (24h ago, onward)       Start     Ordered    04/04/24 1011  Specimen to Pathology, Surgery General Surgery  Once        Comments: Pre-op Diagnosis: Morbid obesity [E66.01]BMI 39.0-39.9,adult [Z68.39]Hypertension, unspecified type [I10]Type 2 diabetes mellitus without complication, without long-term current use of insulin [E11.9]Gastroesophageal reflux disease, unspecified whether esophagitis present [K21.9]Hyperlipidemia,  unspecified hyperlipidemia type [E78.5]Procedure(s):GASTRECTOMY, SLEEVE, LAPAROSCOPIC with intraop EGD (OGB, please submit auth to insurance on or after 3/14/24) Number of specimens: 1Name of specimens: 1- stomach- permanent     References:    Click here for ordering Quick Tip   Question Answer Comment   Procedure Type: General Surgery    Specimen Class: Complex case/Special    Which provider would you like to cc? ARBEN PIPER JR    Release to patient Immediate        04/04/24 1010                    [id number]

## 2024-04-04 NOTE — OP NOTE
DATE OF PROCEDURE: 04/04/2024   SERVICE: Bariatric Surgery.   Surgeon(s) and Role:     * Maciej Kennedy Jr., MD - Primary     * Clary Lord MD - Resident - Assisting  PREOPERATIVE DIAGNOSES: Morbid obesity with a Body mass index is 38.31 kg/m².   along with benign hypertension, non-insulin dependent diabetes, GERD, and hyperlipidemia.   POSTOPERATIVE DIAGNOSES:Same  PROCEDURE: Laparoscopic sleeve gastrectomy and EGD.  ANESTHESIA: General endotracheal and local.   DESCRIPTION OF PROCEDURE: The patient was taken to the Operating Room, placed under general anesthesia, prepped and draped in sterile fashion. At this time,   incision was made approximately 15 cm from xiphoid and 5 cm to the left of   midline after infiltrating with local anesthetic. Using Optiview trocar,   intraabdominal access was obtained under direct visualization without difficulty   and pneumoperitoneum was obtained. Further ports were then placed including   bilateral anterior axillary subcostal 5 mm ports and midclavicular subcostal 5   mm port on the right and a second 12 port approximately 15 cm from xiphoid just   to right of midline. These were all placed after infiltrating with local   anesthetic and under direct visualization. Once the ports were placed, the   patient was placed in steep reverse Trendelenburg. A liver retractor was   placed. The hiatus was then examined. No hernia was noted. Once this was   complete, we turned attention to the sleeve itself. An area on the greater   curve approximately 6 cm proximal from the pylorus was identified and using a   Sonicision, the gastrocolic ligament was opened, exposing the lesser sac.   We continued to take down attachments along the greater curve including the   short gastrics to the angle of His, freeing the lateral part of the stomach. A   42-Spanish bougie was then guided by Anesthesia into the stomach and from the   laparoscopic view guided along the lesser curve. Once the  bougie was in   position along the lesser curve, we began the sleeve with a green load stapler   with SeamGuard at the area 6 cm proximal from the pylorus using the bougie as a   guide on the lesser curve and fired the green load stapler beginning the sleeve.   Further staple loads, blue with SeamGuard were fired along the bougie on the   lesser curve towards the angle of His, completely freeing the lateral part of   the stomach. Once this was complete, the stomach was removed from the incision   approximately 15 cm from xiphoid just to right of midline after it was slightly   incising the skin and stretching the fascia with a Marixa. The specimen was   removed and passed off the table with ease. At this time, an 0 Vicryl suture   was then used to close the fascial defect on a suture passer device under direct   visualization. The suture was placed in figure-of-8 fashion, however, it was   not tied down at this time, the patient was laid flat. The bougie was removed.   Attention then turned to an EGD. The scope was placed in the oropharynx,   guided down the esophagus and into the sleeve through the sleeve and into the   antrum with ease. At this time, the area was infiltrated with air and we slowly   pulled the scope back inspecting the sleeve itself. The staple line showed no   signs of bleeding or other abnormalities. The sleeve itself was in good   condition with no abnormalities, no twisting or obstruction and nice uniform   size. After inspection, we suctioned all the air from the antrum and sleeve and   removed the scope under direct visualization, turned our attention back to the   laparoscopic view. We inspected the staple line, no signs of bleeding or other   abnormalities from the staple line. The liver retractor was removed. The ports   were removed under direct visualization. The suture was then placed in the   fascia of the incision 15 cm from xiphoid just to right of midline, was tied   down closing  the fascia of this incision and the incision was irrigated   copiously. At this time, the skin of all five port sites was closed with 4-0   Monocryl suture in subcuticular fashion. Mastisol and Steri-Strips were placed.   The patient was allowed to awake from general anesthesia, transferred to bed   for transfer to Recovery.   COMPLICATIONS: None.   SPONGE COUNT: Correct.   BLOOD LOSS: 15 mL.   FLUIDS: Per Anesthesia.   BLOOD GIVEN: None.   DRAINS: None.   SPECIMENS: Stomach.   CONDITION OF THE PATIENT: Good.   I was present for the entire procedure.

## 2024-04-04 NOTE — NURSING TRANSFER
Nursing Transfer Note      4/4/2024   1:20 PM    Reason patient is being transferred: Recovery criteria met    PACU nurse giving handoff: JEEVAN Vickers    Nurse receiving handoff: JEEVAN Edward    Transfer to 510    Transfer via bed    Transfer with O2 @ 2LPM    Transported by Patient Escort/Transport/PCT    Transfer Vital Signs @ 1300  Temperature: 97.5  Blood Pressure: 169/78  Heart Rate: 86 NSR  O2 Sat: 95% on 2LPM  Respirations:24    Medicines/Equipment sent with patient: LR IVF infusing per pump    Any special needs or follow-up needed: Bariatric Pathway    Patient belongings transferred with patient: No No belongings in DOSC    Chart send with patient: Yes    Notified: Spouse, Lola    Patient reassessed prior to transfer at: 4/4/24 @ 1314

## 2024-04-04 NOTE — NURSING
Pt had an episode of vomiting immediately after receiving oral PRN pain medication. PRN nausea medication dispensed.

## 2024-04-05 LAB
ANION GAP SERPL CALC-SCNC: 8 MMOL/L (ref 8–16)
BASOPHILS # BLD AUTO: 0.01 K/UL (ref 0–0.2)
BASOPHILS NFR BLD: 0.1 % (ref 0–1.9)
BUN SERPL-MCNC: 8 MG/DL (ref 6–20)
CALCIUM SERPL-MCNC: 9.5 MG/DL (ref 8.7–10.5)
CHLORIDE SERPL-SCNC: 106 MMOL/L (ref 95–110)
CO2 SERPL-SCNC: 21 MMOL/L (ref 23–29)
CREAT SERPL-MCNC: 0.8 MG/DL (ref 0.5–1.4)
DIFFERENTIAL METHOD BLD: NORMAL
EOSINOPHIL # BLD AUTO: 0 K/UL (ref 0–0.5)
EOSINOPHIL NFR BLD: 0 % (ref 0–8)
ERYTHROCYTE [DISTWIDTH] IN BLOOD BY AUTOMATED COUNT: 13.7 % (ref 11.5–14.5)
EST. GFR  (NO RACE VARIABLE): >60 ML/MIN/1.73 M^2
GLUCOSE SERPL-MCNC: 135 MG/DL (ref 70–110)
HCT VFR BLD AUTO: 38.2 % (ref 37–48.5)
HGB BLD-MCNC: 12.7 G/DL (ref 12–16)
IMM GRANULOCYTES # BLD AUTO: 0.02 K/UL (ref 0–0.04)
IMM GRANULOCYTES NFR BLD AUTO: 0.2 % (ref 0–0.5)
LYMPHOCYTES # BLD AUTO: 2 K/UL (ref 1–4.8)
LYMPHOCYTES NFR BLD: 20.6 % (ref 18–48)
MAGNESIUM SERPL-MCNC: 1.7 MG/DL (ref 1.6–2.6)
MCH RBC QN AUTO: 29.5 PG (ref 27–31)
MCHC RBC AUTO-ENTMCNC: 33.2 G/DL (ref 32–36)
MCV RBC AUTO: 89 FL (ref 82–98)
MONOCYTES # BLD AUTO: 0.6 K/UL (ref 0.3–1)
MONOCYTES NFR BLD: 6.2 % (ref 4–15)
NEUTROPHILS # BLD AUTO: 7 K/UL (ref 1.8–7.7)
NEUTROPHILS NFR BLD: 72.9 % (ref 38–73)
NRBC BLD-RTO: 0 /100 WBC
PHOSPHATE SERPL-MCNC: 3.5 MG/DL (ref 2.7–4.5)
PLATELET # BLD AUTO: 387 K/UL (ref 150–450)
PMV BLD AUTO: 10.4 FL (ref 9.2–12.9)
POTASSIUM SERPL-SCNC: 3.6 MMOL/L (ref 3.5–5.1)
RBC # BLD AUTO: 4.31 M/UL (ref 4–5.4)
SODIUM SERPL-SCNC: 135 MMOL/L (ref 136–145)
WBC # BLD AUTO: 9.59 K/UL (ref 3.9–12.7)

## 2024-04-05 PROCEDURE — 25000003 PHARM REV CODE 250: Performed by: SURGERY

## 2024-04-05 PROCEDURE — 80048 BASIC METABOLIC PNL TOTAL CA: CPT | Performed by: SURGERY

## 2024-04-05 PROCEDURE — 83735 ASSAY OF MAGNESIUM: CPT | Performed by: SURGERY

## 2024-04-05 PROCEDURE — C9113 INJ PANTOPRAZOLE SODIUM, VIA: HCPCS | Performed by: SURGERY

## 2024-04-05 PROCEDURE — 85025 COMPLETE CBC W/AUTO DIFF WBC: CPT | Performed by: SURGERY

## 2024-04-05 PROCEDURE — 84100 ASSAY OF PHOSPHORUS: CPT | Performed by: SURGERY

## 2024-04-05 PROCEDURE — 25000003 PHARM REV CODE 250

## 2024-04-05 PROCEDURE — 63600175 PHARM REV CODE 636 W HCPCS

## 2024-04-05 PROCEDURE — 94799 UNLISTED PULMONARY SVC/PX: CPT

## 2024-04-05 PROCEDURE — 94761 N-INVAS EAR/PLS OXIMETRY MLT: CPT

## 2024-04-05 PROCEDURE — 11000001 HC ACUTE MED/SURG PRIVATE ROOM

## 2024-04-05 PROCEDURE — 36415 COLL VENOUS BLD VENIPUNCTURE: CPT | Performed by: SURGERY

## 2024-04-05 PROCEDURE — 63600175 PHARM REV CODE 636 W HCPCS: Performed by: SURGERY

## 2024-04-05 RX ORDER — SCOLOPAMINE TRANSDERMAL SYSTEM 1 MG/1
1 PATCH, EXTENDED RELEASE TRANSDERMAL
Status: DISCONTINUED | OUTPATIENT
Start: 2024-04-05 | End: 2024-04-06 | Stop reason: HOSPADM

## 2024-04-05 RX ADMIN — HYDROMORPHONE HYDROCHLORIDE 0.5 MG: 0.5 INJECTION, SOLUTION INTRAMUSCULAR; INTRAVENOUS; SUBCUTANEOUS at 07:04

## 2024-04-05 RX ADMIN — ONDANSETRON 8 MG: 2 INJECTION INTRAMUSCULAR; INTRAVENOUS at 05:04

## 2024-04-05 RX ADMIN — PANTOPRAZOLE SODIUM 40 MG: 40 INJECTION, POWDER, FOR SOLUTION INTRAVENOUS at 08:04

## 2024-04-05 RX ADMIN — GABAPENTIN 300 MG: 250 SOLUTION ORAL at 03:04

## 2024-04-05 RX ADMIN — HYDROMORPHONE HYDROCHLORIDE 0.5 MG: 0.5 INJECTION, SOLUTION INTRAMUSCULAR; INTRAVENOUS; SUBCUTANEOUS at 02:04

## 2024-04-05 RX ADMIN — PROCHLORPERAZINE EDISYLATE 5 MG: 5 INJECTION INTRAMUSCULAR; INTRAVENOUS at 08:04

## 2024-04-05 RX ADMIN — SODIUM CHLORIDE, POTASSIUM CHLORIDE, SODIUM LACTATE AND CALCIUM CHLORIDE: 600; 310; 30; 20 INJECTION, SOLUTION INTRAVENOUS at 05:04

## 2024-04-05 RX ADMIN — GABAPENTIN 300 MG: 250 SOLUTION ORAL at 08:04

## 2024-04-05 RX ADMIN — ENOXAPARIN SODIUM 40 MG: 40 INJECTION SUBCUTANEOUS at 03:04

## 2024-04-05 RX ADMIN — ACETAMINOPHEN 499.51 MG: 650 SOLUTION ORAL at 05:04

## 2024-04-05 RX ADMIN — ONDANSETRON 8 MG: 2 INJECTION INTRAMUSCULAR; INTRAVENOUS at 11:04

## 2024-04-05 RX ADMIN — SCOPALAMINE 1 PATCH: 1 PATCH, EXTENDED RELEASE TRANSDERMAL at 03:04

## 2024-04-05 RX ADMIN — HYDROMORPHONE HYDROCHLORIDE 0.5 MG: 0.5 INJECTION, SOLUTION INTRAMUSCULAR; INTRAVENOUS; SUBCUTANEOUS at 11:04

## 2024-04-05 NOTE — CARE UPDATE
I have reviewed the chart of Monalisa Carson and collaborated with Maciej Kennedy Jr.* in the care of the patient who is hospitalized for the following:    Active Hospital Problems    Diagnosis    *Obesity    Seizures    HLD (hyperlipidemia)    Essential hypertension, benign    Controlled type 2 diabetes mellitus with stage 2 chronic kidney disease, without long-term current use of insulin          I have reviewed Monalisa Carson with the multidisciplinary team during discharge huddle.       Roberta Sims PA-C  [facility]

## 2024-04-05 NOTE — NURSING
Nurses Note -- 4 Eyes      4/5/2024   5:29 PM      Skin assessed during: Daily Assessment      [x] No Altered Skin Integrity Present    []Prevention Measures Documented      [] Yes- Altered Skin Integrity Present or Discovered   [] LDA Added if Not in Epic (Describe Wound)   [] New Altered Skin Integrity was Present on Admit and Documented in LDA   [] Wound Image Taken    Wound Care Consulted? No    Attending Nurse:  Brittni Hudson LPN    Second RN/Staff Member:   Eryn Hammond LPN

## 2024-04-05 NOTE — ASSESSMENT & PLAN NOTE
POD#1 s/p laparoscopic sleeve gastrectomy with EGD. Some nausea with emesis x2 after pain medications.     - water protocol this am  - multimodal pain: tylenol, oxycodone    - PO nausea meds   - OOB, ambulation  - Acapella, IS use  - DVT PPX    Dispo: Once tolerating diet and adequate pain control, possibly today

## 2024-04-05 NOTE — PROGRESS NOTES
[facility]  General Surgery  Progress Note    Subjective:       Post-Op Info:  Procedure(s) (LRB):  GASTRECTOMY, SLEEVE, LAPAROSCOPIC with intraop EGD ([facility], please submit auth to insurance on or after 3/14/24) (N/A)   1 Day Post-Op     Interval History: No issues overnight, some nausea with emesis x 2 after the oral tylenol and prn pain medications. Pain well controlled.     Medications:  Continuous Infusions:   lactated ringers 125 mL/hr at 04/05/24 0511     Scheduled Meds:   enoxparin  40 mg Subcutaneous Q24H (treatment, non-standard time)    gabapentin  300 mg Oral TID    ondansetron  8 mg Intravenous Q6H    pantoprazole  40 mg Intravenous BID     PRN Meds:hydrocodone-apap 7.5-325 MG/15 ML, HYDROmorphone, prochlorperazine, simethicone     Review of patient's allergies indicates:   Allergen Reactions    Keflex [cephalexin] Hives    Vicodin [hydrocodone-acetaminophen] Itching     itch    Crestor [rosuvastatin] Other (See Comments)     Muscle pain     Objective:     Vital Signs (Most Recent):  Temp: 98.6 °F (37 °C) (04/05/24 0451)  Pulse: 73 (04/05/24 0451)  Resp: 18 (04/05/24 0451)  BP: (!) 146/67 (04/05/24 0451)  SpO2: (!) 94 % (04/05/24 0451) Vital Signs (24h Range):  Temp:  [97 °F (36.1 °C)-98.6 °F (37 °C)] 98.6 °F (37 °C)  Pulse:  [60-97] 73  Resp:  [16-26] 18  SpO2:  [94 %-100 %] 94 %  BP: (135-216)/() 146/67     Weight: 98.1 kg (216 lb 4.3 oz)  Body mass index is 38.31 kg/m².    Intake/Output - Last 3 Shifts         04/03 0700  04/04 0659 04/04 0700  04/05 0659    I.V. (mL/kg)  1000 (10.2)    IV Piggyback  200    Total Intake(mL/kg)  1200 (12.2)    Urine (mL/kg/hr)  700    Stool  0    Total Output  700    Net  +500          Stool Occurrence  0 x             Physical Exam  Vitals and nursing note reviewed.   Constitutional:       General: She is not in acute distress.  Cardiovascular:      Rate and Rhythm: Normal rate.      Pulses: Normal pulses.   Pulmonary:      Effort: Pulmonary effort is  normal. No respiratory distress.   Abdominal:      General: There is no distension.      Palpations: Abdomen is soft.      Tenderness: There is no abdominal tenderness.      Comments: Incisions clean/dry intact with overlying steri-strips. No erythema or drainage   Neurological:      Mental Status: She is alert.          Significant Labs:  I have reviewed all pertinent lab results within the past 24 hours.  CBC:   Recent Labs   Lab 04/05/24 0218   WBC 9.59   RBC 4.31   HGB 12.7   HCT 38.2      MCV 89   MCH 29.5   MCHC 33.2     BMP:   Recent Labs   Lab 04/05/24 0218   *   *   K 3.6      CO2 21*   BUN 8   CREATININE 0.8   CALCIUM 9.5   MG 1.7     CMP:   Recent Labs   Lab 04/05/24 0218   *   CALCIUM 9.5   *   K 3.6   CO2 21*      BUN 8   CREATININE 0.8       Significant Diagnostics:  I have reviewed all pertinent imaging results/findings within the past 24 hours.  Assessment/Plan:     * Obesity  POD#1 s/p laparoscopic sleeve gastrectomy with EGD. Some nausea with emesis x2 after pain medications.     - water protocol this am, will transition to bariatic CLD if tolerates  - will DC mIVF once tolerating CLD  - multimodal pain: tylenol, oxycodone    - PO nausea meds   - OOB, ambulation  - Acapella, IS use  - DVT PPX    Dispo: Once tolerating diet and adequate pain control, possibly today           Clary Lord MD  General Surgery  [facility]

## 2024-04-05 NOTE — SUBJECTIVE & OBJECTIVE
Interval History: No issues overnight, some nausea with emesis x 2 after the oral tylenol and prn pain medications. Pain well controlled.     Medications:  Continuous Infusions:   lactated ringers 125 mL/hr at 04/05/24 0511     Scheduled Meds:   enoxparin  40 mg Subcutaneous Q24H (treatment, non-standard time)    gabapentin  300 mg Oral TID    ondansetron  8 mg Intravenous Q6H    pantoprazole  40 mg Intravenous BID     PRN Meds:hydrocodone-apap 7.5-325 MG/15 ML, HYDROmorphone, prochlorperazine, simethicone     Review of patient's allergies indicates:   Allergen Reactions    Keflex [cephalexin] Hives    Vicodin [hydrocodone-acetaminophen] Itching     itch    Crestor [rosuvastatin] Other (See Comments)     Muscle pain     Objective:     Vital Signs (Most Recent):  Temp: 98.6 °F (37 °C) (04/05/24 0451)  Pulse: 73 (04/05/24 0451)  Resp: 18 (04/05/24 0451)  BP: (!) 146/67 (04/05/24 0451)  SpO2: (!) 94 % (04/05/24 0451) Vital Signs (24h Range):  Temp:  [97 °F (36.1 °C)-98.6 °F (37 °C)] 98.6 °F (37 °C)  Pulse:  [60-97] 73  Resp:  [16-26] 18  SpO2:  [94 %-100 %] 94 %  BP: (135-216)/() 146/67     Weight: 98.1 kg (216 lb 4.3 oz)  Body mass index is 38.31 kg/m².    Intake/Output - Last 3 Shifts         04/03 0700  04/04 0659 04/04 0700  04/05 0659    I.V. (mL/kg)  1000 (10.2)    IV Piggyback  200    Total Intake(mL/kg)  1200 (12.2)    Urine (mL/kg/hr)  700    Stool  0    Total Output  700    Net  +500          Stool Occurrence  0 x             Physical Exam  Vitals and nursing note reviewed.   Constitutional:       General: She is not in acute distress.  Cardiovascular:      Rate and Rhythm: Normal rate.      Pulses: Normal pulses.   Pulmonary:      Effort: Pulmonary effort is normal. No respiratory distress.   Abdominal:      General: There is no distension.      Palpations: Abdomen is soft.      Tenderness: There is no abdominal tenderness.      Comments: Incisions clean/dry intact with overlying steri-strips. No  erythema or drainage   Neurological:      Mental Status: She is alert.          Significant Labs:  I have reviewed all pertinent lab results within the past 24 hours.  CBC:   Recent Labs   Lab 04/05/24 0218   WBC 9.59   RBC 4.31   HGB 12.7   HCT 38.2      MCV 89   MCH 29.5   MCHC 33.2     BMP:   Recent Labs   Lab 04/05/24 0218   *   *   K 3.6      CO2 21*   BUN 8   CREATININE 0.8   CALCIUM 9.5   MG 1.7     CMP:   Recent Labs   Lab 04/05/24 0218   *   CALCIUM 9.5   *   K 3.6   CO2 21*      BUN 8   CREATININE 0.8       Significant Diagnostics:  I have reviewed all pertinent imaging results/findings within the past 24 hours.

## 2024-04-05 NOTE — PLAN OF CARE
Problem: Adult Inpatient Plan of Care  Goal: Plan of Care Review  Outcome: Ongoing, Progressing  Goal: Patient-Specific Goal (Individualized)  Outcome: Ongoing, Progressing  Goal: Absence of Hospital-Acquired Illness or Injury  Outcome: Ongoing, Progressing  Goal: Optimal Comfort and Wellbeing  Outcome: Ongoing, Progressing  Goal: Readiness for Transition of Care  Outcome: Ongoing, Progressing     Problem: Diabetes Comorbidity  Goal: Blood Glucose Level Within Targeted Range  Outcome: Ongoing, Progressing     Problem: Bariatric Care Environmental Safety (Bariatric Surgery)  Goal: Safety Maintained with Care  Outcome: Ongoing, Progressing     Problem: Bleeding (Bariatric Surgery)  Goal: Absence of Bleeding  Outcome: Ongoing, Progressing     Problem: Bowel Motility Impaired (Bariatric Surgery)  Goal: Effective Bowel Motility and Elimination  Outcome: Ongoing, Progressing     Problem: Fluid and Electrolyte Imbalance (Bariatric Surgery)  Goal: Fluid and Electrolyte Balance  Outcome: Ongoing, Progressing     Problem: Glycemic Control Impaired (Bariatric Surgery)  Goal: Blood Glucose Level Within Desired Range  Outcome: Ongoing, Progressing     Problem: Infection (Bariatric Surgery)  Goal: Absence of Infection Signs and Symptoms  Outcome: Ongoing, Progressing     Problem: Ongoing Anesthesia Effects (Bariatric Surgery)  Goal: Anesthesia/Sedation Recovery  Outcome: Ongoing, Progressing     Problem: Pain (Bariatric Surgery)  Goal: Acceptable Pain Control  Outcome: Ongoing, Progressing     Problem: Postoperative Nausea and Vomiting (Bariatric Surgery)  Goal: Nausea and Vomiting Relief  Outcome: Ongoing, Progressing     Problem: Postoperative Urinary Retention (Bariatric Surgery)  Goal: Effective Urinary Elimination  Outcome: Ongoing, Progressing     Problem: Respiratory Compromise (Bariatric Surgery)  Goal: Effective Oxygenation and Ventilation  Outcome: Ongoing, Progressing     Problem: Post-Bariatric Surgery Care  Goal:  Bariatric Home Regimen Maintained  Outcome: Ongoing, Progressing

## 2024-04-05 NOTE — PLAN OF CARE
Pt BP stabilized. All other VS WNL. Pt lethargic but oriented and easily arousable. Incision sites x5 C/D/I. Pt states no N/V. Ambulation to hallway and in room; pt tolerated well. Water protocol initiated at 6am; pt tolerating well. Call light within reach. Will continue plan of care     Problem: Adult Inpatient Plan of Care  Goal: Plan of Care Review  Outcome: Ongoing, Progressing  Goal: Patient-Specific Goal (Individualized)  Outcome: Ongoing, Progressing  Goal: Absence of Hospital-Acquired Illness or Injury  Outcome: Ongoing, Progressing  Goal: Optimal Comfort and Wellbeing  Outcome: Ongoing, Progressing  Goal: Readiness for Transition of Care  Outcome: Ongoing, Progressing     Problem: Diabetes Comorbidity  Goal: Blood Glucose Level Within Targeted Range  Outcome: Ongoing, Progressing     Problem: Bariatric Care Environmental Safety (Bariatric Surgery)  Goal: Safety Maintained with Care  Outcome: Ongoing, Progressing     Problem: Bleeding (Bariatric Surgery)  Goal: Absence of Bleeding  Outcome: Ongoing, Progressing     Problem: Bowel Motility Impaired (Bariatric Surgery)  Goal: Effective Bowel Motility and Elimination  Outcome: Ongoing, Progressing     Problem: Fluid and Electrolyte Imbalance (Bariatric Surgery)  Goal: Fluid and Electrolyte Balance  Outcome: Ongoing, Progressing     Problem: Glycemic Control Impaired (Bariatric Surgery)  Goal: Blood Glucose Level Within Desired Range  Outcome: Ongoing, Progressing     Problem: Infection (Bariatric Surgery)  Goal: Absence of Infection Signs and Symptoms  Outcome: Ongoing, Progressing     Problem: Ongoing Anesthesia Effects (Bariatric Surgery)  Goal: Anesthesia/Sedation Recovery  Outcome: Ongoing, Progressing     Problem: Pain (Bariatric Surgery)  Goal: Acceptable Pain Control  Outcome: Ongoing, Progressing     Problem: Postoperative Nausea and Vomiting (Bariatric Surgery)  Goal: Nausea and Vomiting Relief  Outcome: Ongoing, Progressing     Problem: Postoperative  Urinary Retention (Bariatric Surgery)  Goal: Effective Urinary Elimination  Outcome: Ongoing, Progressing     Problem: Respiratory Compromise (Bariatric Surgery)  Goal: Effective Oxygenation and Ventilation  Outcome: Ongoing, Progressing     Problem: Post-Bariatric Surgery Care  Goal: Bariatric Home Regimen Maintained  Outcome: Ongoing, Progressing

## 2024-04-05 NOTE — NURSING
Nurses Note -- 4 Eyes      4/4/2024   10:28 PM      Skin assessed during: Q Shift Change      [x] No Altered Skin Integrity Present    []Prevention Measures Documented      [] Yes- Altered Skin Integrity Present or Discovered   [] LDA Added if Not in Epic (Describe Wound)   [] New Altered Skin Integrity was Present on Admit and Documented in LDA   [] Wound Image Taken    Wound Care Consulted? No    Attending Nurse:  Ave Crowe RN/Staff Member:   JEEVAN Ma            Statement Selected

## 2024-04-05 NOTE — PLAN OF CARE
[facility] - [facility]  Initial Discharge Assessment       Primary Care Provider: Flor Gomez MD    Admission Diagnosis: Morbid obesity [E66.01]  BMI 39.0-39.9,adult [Z68.39]  Hypertension, unspecified type [I10]  Type 2 diabetes mellitus without complication, without long-term current use of insulin [E11.9]  Gastroesophageal reflux disease, unspecified whether esophagitis present [K21.9]  Hyperlipidemia, unspecified hyperlipidemia type [E78.5]  Obesity [E66.9]    Admission Date: 4/4/2024  Expected Discharge Date: 4/5/2024         Payor: [employer] BLUE SHIELD / Plan: BCBS OF [facility] LOCAL PLUS / Product Type: Commercial /     Extended Emergency Contact Information  Primary Emergency Contact: Lola Carson   [place]  Mobile Phone: [phone]  Relation: Spouse    Discharge Plan A: Home with family         [id number] [place], [place] - [address] [facility]  [address]  [place] [address]  Phone: [phone] Fax: [phone]      Initial Assessment (most recent)       Adult Discharge Assessment - 04/05/24 1135          Discharge Assessment    Assessment Type Discharge Planning Assessment     Confirmed/corrected address, phone number and insurance Yes     Confirmed Demographics Correct on Facesheet     Source of Information patient     Does patient/caregiver understand observation status Yes     Communicated YANELY with patient/caregiver Yes     People in Home spouse;grandchild(purvi)     Do you expect to return to your current living situation? Yes     Do you have help at home or someone to help you manage your care at home? Yes     Who are your caregiver(s) and their phone number(s)? Lola Carson (Spouse) [phone]     Prior to hospitilization cognitive status: Alert/Oriented     Current cognitive status: Alert/Oriented     Walking or Climbing Stairs Difficulty no     Dressing/Bathing Difficulty no     Equipment Currently Used at Home  none     Readmission within 30 days? No     Patient currently being followed by outpatient case management? No     Do you currently have service(s) that help you manage your care at home? No     Do you take prescription medications? Yes     Do you have prescription coverage? Yes     Do you have any problems affording any of your prescribed medications? No     Is the patient taking medications as prescribed? yes     Who is going to help you get home at discharge? Spouse-Lola     How do you get to doctors appointments? family or friend will provide     Are you on dialysis? No     Do you take coumadin? No     Discharge Plan A Home with family        Physical Activity    On average, how many days per week do you engage in moderate to strenuous exercise (like a brisk walk)? 0 days     On average, how many minutes do you engage in exercise at this level? 0 min        Financial Resource Strain    How hard is it for you to pay for the very basics like food, housing, medical care, and heating? Not hard at all        Housing Stability    In the last 12 months, was there a time when you were not able to pay the mortgage or rent on time? No     In the last 12 months, how many places have you lived? 1     In the last 12 months, was there a time when you did not have a steady place to sleep or slept in a shelter (including now)? No        Transportation Needs    In the past 12 months, has lack of transportation kept you from medical appointments or from getting medications? No     In the past 12 months, has lack of transportation kept you from meetings, work, or from getting things needed for daily living? No        Food Insecurity    Within the past 12 months, you worried that your food would run out before you got the money to buy more. Never true     Within the past 12 months, the food you bought just didn't last and you didn't have money to get more. Never true        Stress    Do you feel stress - tense, restless,  nervous, or anxious, or unable to sleep at night because your mind is troubled all the time - these days? Not at all        Social Connections    In a typical week, how many times do you talk on the phone with family, friends, or neighbors? More than three times a week     How often do you get together with friends or relatives? More than three times a week     How often do you attend Caodaism or Confucianist services? More than 4 times per year     Do you belong to any clubs or organizations such as Caodaism groups, unions, fraternal or athletic groups, or school groups? No     How often do you attend meetings of the clubs or organizations you belong to? Never     Are you , , , , never , or living with a partner?         Alcohol Use    Q1: How often do you have a drink containing alcohol? Never     Q2: How many drinks containing alcohol do you have on a typical day when you are drinking? Patient does not drink     Q3: How often do you have six or more drinks on one occasion? Never                      Spoke with patient at bedside to complete d/c planning assessment. Patient lives with spouse and 3 grandsons ages 21, 16 and 12. Home is single story without steps. Independent with ADL's. No DME in home. Verified PCP, Pharmacy and Health insurance. Patient will have help at home from family. Expected to discharge later today if meeting post-op milestones. Discharge Plan A and Plan B have been determined by review of patient's clinical status, future medical and therapeutic needs, and coverage/benefits for post-acute care in coordination with multidisciplinary team members.        Donna TRACY  Case Management  [facility]  [phone]

## 2024-04-06 VITALS
WEIGHT: 216.25 LBS | DIASTOLIC BLOOD PRESSURE: 81 MMHG | RESPIRATION RATE: 18 BRPM | SYSTOLIC BLOOD PRESSURE: 153 MMHG | HEART RATE: 72 BPM | TEMPERATURE: 99 F | BODY MASS INDEX: 38.32 KG/M2 | OXYGEN SATURATION: 94 % | HEIGHT: 63 IN

## 2024-04-06 LAB
ANION GAP SERPL CALC-SCNC: 10 MMOL/L (ref 8–16)
BASOPHILS # BLD AUTO: 0.03 K/UL (ref 0–0.2)
BASOPHILS NFR BLD: 0.3 % (ref 0–1.9)
BUN SERPL-MCNC: 6 MG/DL (ref 6–20)
CALCIUM SERPL-MCNC: 9.3 MG/DL (ref 8.7–10.5)
CHLORIDE SERPL-SCNC: 104 MMOL/L (ref 95–110)
CO2 SERPL-SCNC: 22 MMOL/L (ref 23–29)
CREAT SERPL-MCNC: 0.8 MG/DL (ref 0.5–1.4)
DIFFERENTIAL METHOD BLD: NORMAL
EOSINOPHIL # BLD AUTO: 0 K/UL (ref 0–0.5)
EOSINOPHIL NFR BLD: 0.1 % (ref 0–8)
ERYTHROCYTE [DISTWIDTH] IN BLOOD BY AUTOMATED COUNT: 13.8 % (ref 11.5–14.5)
EST. GFR  (NO RACE VARIABLE): >60 ML/MIN/1.73 M^2
GLUCOSE SERPL-MCNC: 129 MG/DL (ref 70–110)
HCT VFR BLD AUTO: 37.9 % (ref 37–48.5)
HGB BLD-MCNC: 12.7 G/DL (ref 12–16)
IMM GRANULOCYTES # BLD AUTO: 0.04 K/UL (ref 0–0.04)
IMM GRANULOCYTES NFR BLD AUTO: 0.4 % (ref 0–0.5)
LYMPHOCYTES # BLD AUTO: 2.6 K/UL (ref 1–4.8)
LYMPHOCYTES NFR BLD: 26.4 % (ref 18–48)
MAGNESIUM SERPL-MCNC: 1.7 MG/DL (ref 1.6–2.6)
MCH RBC QN AUTO: 30.4 PG (ref 27–31)
MCHC RBC AUTO-ENTMCNC: 33.5 G/DL (ref 32–36)
MCV RBC AUTO: 91 FL (ref 82–98)
MONOCYTES # BLD AUTO: 0.9 K/UL (ref 0.3–1)
MONOCYTES NFR BLD: 9.3 % (ref 4–15)
NEUTROPHILS # BLD AUTO: 6.2 K/UL (ref 1.8–7.7)
NEUTROPHILS NFR BLD: 63.5 % (ref 38–73)
NRBC BLD-RTO: 0 /100 WBC
PHOSPHATE SERPL-MCNC: 2 MG/DL (ref 2.7–4.5)
PLATELET # BLD AUTO: 347 K/UL (ref 150–450)
PMV BLD AUTO: 10.2 FL (ref 9.2–12.9)
POTASSIUM SERPL-SCNC: 3.5 MMOL/L (ref 3.5–5.1)
RBC # BLD AUTO: 4.18 M/UL (ref 4–5.4)
SODIUM SERPL-SCNC: 136 MMOL/L (ref 136–145)
WBC # BLD AUTO: 9.78 K/UL (ref 3.9–12.7)

## 2024-04-06 PROCEDURE — 36415 COLL VENOUS BLD VENIPUNCTURE: CPT | Performed by: SURGERY

## 2024-04-06 PROCEDURE — 25000003 PHARM REV CODE 250: Performed by: STUDENT IN AN ORGANIZED HEALTH CARE EDUCATION/TRAINING PROGRAM

## 2024-04-06 PROCEDURE — 25000003 PHARM REV CODE 250: Performed by: SURGERY

## 2024-04-06 PROCEDURE — 84100 ASSAY OF PHOSPHORUS: CPT | Performed by: SURGERY

## 2024-04-06 PROCEDURE — C9113 INJ PANTOPRAZOLE SODIUM, VIA: HCPCS | Performed by: SURGERY

## 2024-04-06 PROCEDURE — 83735 ASSAY OF MAGNESIUM: CPT | Performed by: SURGERY

## 2024-04-06 PROCEDURE — 63600175 PHARM REV CODE 636 W HCPCS: Performed by: STUDENT IN AN ORGANIZED HEALTH CARE EDUCATION/TRAINING PROGRAM

## 2024-04-06 PROCEDURE — 80048 BASIC METABOLIC PNL TOTAL CA: CPT | Performed by: SURGERY

## 2024-04-06 PROCEDURE — 63600175 PHARM REV CODE 636 W HCPCS: Performed by: SURGERY

## 2024-04-06 PROCEDURE — 63600175 PHARM REV CODE 636 W HCPCS

## 2024-04-06 PROCEDURE — 85025 COMPLETE CBC W/AUTO DIFF WBC: CPT | Performed by: SURGERY

## 2024-04-06 RX ORDER — HYDROCODONE BITARTRATE AND ACETAMINOPHEN 7.5; 325 MG/15ML; MG/15ML
15 SOLUTION ORAL 4 TIMES DAILY PRN
Qty: 200 ML | Refills: 0 | Status: SHIPPED | OUTPATIENT
Start: 2024-04-06 | End: 2024-05-10

## 2024-04-06 RX ORDER — PROCHLORPERAZINE EDISYLATE 5 MG/ML
5 INJECTION INTRAMUSCULAR; INTRAVENOUS
Status: DISCONTINUED | OUTPATIENT
Start: 2024-04-06 | End: 2024-04-06 | Stop reason: HOSPADM

## 2024-04-06 RX ORDER — SODIUM,POTASSIUM PHOSPHATES 280-250MG
2 POWDER IN PACKET (EA) ORAL ONCE
Status: COMPLETED | OUTPATIENT
Start: 2024-04-06 | End: 2024-04-06

## 2024-04-06 RX ORDER — OMEPRAZOLE 40 MG/1
40 CAPSULE, DELAYED RELEASE ORAL EVERY MORNING
Qty: 60 CAPSULE | Refills: 0 | Status: SHIPPED | OUTPATIENT
Start: 2024-04-06 | End: 2024-04-18

## 2024-04-06 RX ORDER — POLYETHYLENE GLYCOL 3350 17 G/17G
17 POWDER, FOR SOLUTION ORAL DAILY
Qty: 238 G | Refills: 2 | Status: SHIPPED | OUTPATIENT
Start: 2024-04-06 | End: 2024-05-10

## 2024-04-06 RX ORDER — ONDANSETRON 8 MG/1
8 TABLET, ORALLY DISINTEGRATING ORAL EVERY 6 HOURS PRN
Qty: 30 TABLET | Refills: 0 | Status: SHIPPED | OUTPATIENT
Start: 2024-04-06

## 2024-04-06 RX ORDER — APREPITANT 40 MG/1
40 CAPSULE ORAL DAILY
Status: DISCONTINUED | OUTPATIENT
Start: 2024-04-06 | End: 2024-04-06

## 2024-04-06 RX ADMIN — ONDANSETRON 8 MG: 2 INJECTION INTRAMUSCULAR; INTRAVENOUS at 06:04

## 2024-04-06 RX ADMIN — ENOXAPARIN SODIUM 40 MG: 40 INJECTION SUBCUTANEOUS at 02:04

## 2024-04-06 RX ADMIN — PROCHLORPERAZINE EDISYLATE 5 MG: 5 INJECTION INTRAMUSCULAR; INTRAVENOUS at 03:04

## 2024-04-06 RX ADMIN — HYDROMORPHONE HYDROCHLORIDE 0.5 MG: 0.5 INJECTION, SOLUTION INTRAMUSCULAR; INTRAVENOUS; SUBCUTANEOUS at 12:04

## 2024-04-06 RX ADMIN — POTASSIUM & SODIUM PHOSPHATES POWDER PACK 280-160-250 MG 2 PACKET: 280-160-250 PACK at 09:04

## 2024-04-06 RX ADMIN — GABAPENTIN 300 MG: 250 SOLUTION ORAL at 03:04

## 2024-04-06 RX ADMIN — GABAPENTIN 300 MG: 250 SOLUTION ORAL at 09:04

## 2024-04-06 RX ADMIN — ONDANSETRON 8 MG: 2 INJECTION INTRAMUSCULAR; INTRAVENOUS at 11:04

## 2024-04-06 RX ADMIN — HYDROMORPHONE HYDROCHLORIDE 0.5 MG: 0.5 INJECTION, SOLUTION INTRAMUSCULAR; INTRAVENOUS; SUBCUTANEOUS at 09:04

## 2024-04-06 RX ADMIN — PANTOPRAZOLE SODIUM 40 MG: 40 INJECTION, POWDER, FOR SOLUTION INTRAVENOUS at 09:04

## 2024-04-06 RX ADMIN — ONDANSETRON 8 MG: 2 INJECTION INTRAMUSCULAR; INTRAVENOUS at 12:04

## 2024-04-06 NOTE — PLAN OF CARE
Pt VS stable. Pt ambulating. PRN pain med given; pt states relief of pain. Surgical sites x5 C/D/I with steri-strips in place. 2nd attempt of water protocol initiated at 0600. No N/V episodes overnight. Call light within reach. Will continue plan of care     Problem: Adult Inpatient Plan of Care  Goal: Plan of Care Review  Outcome: Ongoing, Progressing  Goal: Patient-Specific Goal (Individualized)  Outcome: Ongoing, Progressing  Goal: Absence of Hospital-Acquired Illness or Injury  Outcome: Ongoing, Progressing  Goal: Optimal Comfort and Wellbeing  Outcome: Ongoing, Progressing  Goal: Readiness for Transition of Care  Outcome: Ongoing, Progressing     Problem: Diabetes Comorbidity  Goal: Blood Glucose Level Within Targeted Range  Outcome: Ongoing, Progressing     Problem: Bariatric Care Environmental Safety (Bariatric Surgery)  Goal: Safety Maintained with Care  Outcome: Ongoing, Progressing     Problem: Bleeding (Bariatric Surgery)  Goal: Absence of Bleeding  Outcome: Ongoing, Progressing     Problem: Bowel Motility Impaired (Bariatric Surgery)  Goal: Effective Bowel Motility and Elimination  Outcome: Ongoing, Progressing     Problem: Fluid and Electrolyte Imbalance (Bariatric Surgery)  Goal: Fluid and Electrolyte Balance  Outcome: Ongoing, Progressing     Problem: Glycemic Control Impaired (Bariatric Surgery)  Goal: Blood Glucose Level Within Desired Range  Outcome: Ongoing, Progressing     Problem: Infection (Bariatric Surgery)  Goal: Absence of Infection Signs and Symptoms  Outcome: Ongoing, Progressing     Problem: Ongoing Anesthesia Effects (Bariatric Surgery)  Goal: Anesthesia/Sedation Recovery  Outcome: Ongoing, Progressing     Problem: Pain (Bariatric Surgery)  Goal: Acceptable Pain Control  Outcome: Ongoing, Progressing     Problem: Postoperative Nausea and Vomiting (Bariatric Surgery)  Goal: Nausea and Vomiting Relief  Outcome: Ongoing, Progressing     Problem: Postoperative Urinary Retention (Bariatric  Surgery)  Goal: Effective Urinary Elimination  Outcome: Ongoing, Progressing     Problem: Respiratory Compromise (Bariatric Surgery)  Goal: Effective Oxygenation and Ventilation  Outcome: Ongoing, Progressing     Problem: Post-Bariatric Surgery Care  Goal: Bariatric Home Regimen Maintained  Outcome: Ongoing, Progressing

## 2024-04-06 NOTE — NURSING
Nurses Note -- 4 Eyes      4/5/2024   11:11 PM      Skin assessed during: Q Shift Change      [] No Altered Skin Integrity Present    []Prevention Measures Documented      [] Yes- Altered Skin Integrity Present or Discovered   [] LDA Added if Not in Epic (Describe Wound)   [] New Altered Skin Integrity was Present on Admit and Documented in LDA   [] Wound Image Taken    Wound Care Consulted? No    Attending Nurse:  Anil Crowe RN/Staff Member:   JEEVAN Dorado

## 2024-04-06 NOTE — ASSESSMENT & PLAN NOTE
POD#2 s/p laparoscopic sleeve gastrectomy with EGD. Improved nausea this am, repeating water protocol    - water protocol this am  - multimodal pain: tylenol, oxycodone    - PO nausea meds   - OOB, ambulation  - Acapella, IS use  - DVT PPX    Dispo: Once tolerating diet and adequate pain control, possibly today

## 2024-04-06 NOTE — PROGRESS NOTES
[facility] - Surgery  General Surgery  Progress Note    Subjective:         Post-Op Info:  Procedure(s) (LRB):  GASTRECTOMY, SLEEVE, LAPAROSCOPIC with intraop EGD (O[facility], please submit auth to insurance on or after 3/14/24) (N/A)   2 Days Post-Op     Interval History: Feeling better today, repeating water protocol this am. No emesis with water this am. Pain improved.     Medications:  Continuous Infusions:   lactated ringers Stopped (04/05/24 1113)     Scheduled Meds:   aprepitant  40 mg Oral Daily    enoxparin  40 mg Subcutaneous Q24H (treatment, non-standard time)    gabapentin  300 mg Oral TID    ondansetron  8 mg Intravenous Q6H    pantoprazole  40 mg Intravenous BID    scopolamine  1 patch Transdermal Q3 Days     PRN Meds:hydrocodone-apap 7.5-325 MG/15 ML, HYDROmorphone, prochlorperazine, simethicone     Review of patient's allergies indicates:   Allergen Reactions    Keflex [cephalexin] Hives    Vicodin [hydrocodone-acetaminophen] Itching     itch    Crestor [rosuvastatin] Other (See Comments)     Muscle pain     Objective:     Vital Signs (Most Recent):  Temp: 98.2 °F (36.8 °C) (04/06/24 0800)  Pulse: 77 (04/06/24 0800)  Resp: 18 (04/06/24 0908)  BP: (!) 175/74 (04/06/24 0800)  SpO2: (!) 94 % (04/06/24 0800) Vital Signs (24h Range):  Temp:  [98.2 °F (36.8 °C)-100 °F (37.8 °C)] 98.2 °F (36.8 °C)  Pulse:  [70-88] 77  Resp:  [16-20] 18  SpO2:  [89 %-95 %] 94 %  BP: (136-176)/(63-78) 175/74     Weight: 98.1 kg (216 lb 4.3 oz)  Body mass index is 38.31 kg/m².    Intake/Output - Last 3 Shifts         04/04 0700  04/05 0659 04/05 0700  04/06 0659 04/06 0700  04/07 0659    I.V. (mL/kg) 1000 (10.2)      IV Piggyback 200      Total Intake(mL/kg) 1200 (12.2)      Urine (mL/kg/hr) 700      Stool 0      Total Output 700      Net +500             Urine Occurrence  2 x     Stool Occurrence 0 x               Physical Exam  Vitals and nursing note reviewed.   Constitutional:       General: She is not in acute  distress.  Cardiovascular:      Rate and Rhythm: Normal rate.      Pulses: Normal pulses.   Pulmonary:      Effort: Pulmonary effort is normal. No respiratory distress.   Abdominal:      General: There is no distension.      Palpations: Abdomen is soft.      Tenderness: There is no abdominal tenderness.      Comments: Incisions clean/dry   Neurological:      General: No focal deficit present.      Mental Status: She is alert and oriented to person, place, and time.          Significant Labs:  I have reviewed all pertinent lab results within the past 24 hours.  CBC:   Recent Labs   Lab 04/06/24 0452   WBC 9.78   RBC 4.18   HGB 12.7   HCT 37.9      MCV 91   MCH 30.4   MCHC 33.5     BMP:   Recent Labs   Lab 04/06/24 0452   *      K 3.5      CO2 22*   BUN 6   CREATININE 0.8   CALCIUM 9.3   MG 1.7     CMP:   Recent Labs   Lab 04/06/24 0452   *   CALCIUM 9.3      K 3.5   CO2 22*      BUN 6   CREATININE 0.8       Significant Diagnostics:  I have reviewed all pertinent imaging results/findings within the past 24 hours.  Assessment/Plan:     * Obesity  POD#2 s/p laparoscopic sleeve gastrectomy with EGD. Improved nausea this am, repeating water protocol    - water protocol this am  - multimodal pain: tylenol, oxycodone    - scheduled anti-emetics: emend (now refractory to compazine, zofran)  - PO nausea meds   - OOB, ambulation  - Acapella, IS use  - DVT PPX    Dispo: Once tolerating diet and adequate pain control, possibly today           Clary Lord MD  General Surgery  [facility] - Surgery

## 2024-04-06 NOTE — SUBJECTIVE & OBJECTIVE
Interval History: Feeling better today, repeating water protocol this am. No emesis with water this am. Pain improved.     Medications:  Continuous Infusions:   lactated ringers Stopped (04/05/24 1113)     Scheduled Meds:   aprepitant  40 mg Oral Daily    enoxparin  40 mg Subcutaneous Q24H (treatment, non-standard time)    gabapentin  300 mg Oral TID    ondansetron  8 mg Intravenous Q6H    pantoprazole  40 mg Intravenous BID    scopolamine  1 patch Transdermal Q3 Days     PRN Meds:hydrocodone-apap 7.5-325 MG/15 ML, HYDROmorphone, prochlorperazine, simethicone     Review of patient's allergies indicates:   Allergen Reactions    Keflex [cephalexin] Hives    Vicodin [hydrocodone-acetaminophen] Itching     itch    Crestor [rosuvastatin] Other (See Comments)     Muscle pain     Objective:     Vital Signs (Most Recent):  Temp: 98.2 °F (36.8 °C) (04/06/24 0800)  Pulse: 77 (04/06/24 0800)  Resp: 18 (04/06/24 0908)  BP: (!) 175/74 (04/06/24 0800)  SpO2: (!) 94 % (04/06/24 0800) Vital Signs (24h Range):  Temp:  [98.2 °F (36.8 °C)-100 °F (37.8 °C)] 98.2 °F (36.8 °C)  Pulse:  [70-88] 77  Resp:  [16-20] 18  SpO2:  [89 %-95 %] 94 %  BP: (136-176)/(63-78) 175/74     Weight: 98.1 kg (216 lb 4.3 oz)  Body mass index is 38.31 kg/m².    Intake/Output - Last 3 Shifts         04/04 0700  04/05 0659 04/05 0700 04/06 0659 04/06 0700  04/07 0659    I.V. (mL/kg) 1000 (10.2)      IV Piggyback 200      Total Intake(mL/kg) 1200 (12.2)      Urine (mL/kg/hr) 700      Stool 0      Total Output 700      Net +500             Urine Occurrence  2 x     Stool Occurrence 0 x               Physical Exam  Vitals and nursing note reviewed.   Constitutional:       General: She is not in acute distress.  Cardiovascular:      Rate and Rhythm: Normal rate.      Pulses: Normal pulses.   Pulmonary:      Effort: Pulmonary effort is normal. No respiratory distress.   Abdominal:      General: There is no distension.      Palpations: Abdomen is soft.       Tenderness: There is no abdominal tenderness.      Comments: Incisions clean/dry   Neurological:      General: No focal deficit present.      Mental Status: She is alert and oriented to person, place, and time.          Significant Labs:  I have reviewed all pertinent lab results within the past 24 hours.  CBC:   Recent Labs   Lab 04/06/24  0452   WBC 9.78   RBC 4.18   HGB 12.7   HCT 37.9      MCV 91   MCH 30.4   MCHC 33.5     BMP:   Recent Labs   Lab 04/06/24  0452   *      K 3.5      CO2 22*   BUN 6   CREATININE 0.8   CALCIUM 9.3   MG 1.7     CMP:   Recent Labs   Lab 04/06/24  0452   *   CALCIUM 9.3      K 3.5   CO2 22*      BUN 6   CREATININE 0.8       Significant Diagnostics:  I have reviewed all pertinent imaging results/findings within the past 24 hours.

## 2024-04-06 NOTE — NURSING
Pt given written and verbal discharge instructions. Pt verbalized understanding and follow up appts were fully explained. Pt IV access removed, nad or pain noted, dressing applied. Pt also given prescriptions by bedside pharmacy and currently waiting for family to arrive.

## 2024-04-06 NOTE — PROGRESS NOTES
[facility] - Surgery  Discharge Final Note    Primary Care Provider: Flor Gomez MD    Expected Discharge Date: 4/6/2024    Final Discharge Note (most recent)       Final Note - 04/05/24 1135          Final Note    Assessment Type Discharge Planning Assessment                     Important Message from Medicare             Contact Info       Tracy Grimes RD   Specialty: Nutrition, Bariatrics    [address] [id number]   Phone: [phone]       Next Steps: Follow up on 4/11/2024    Instructions: OUT PATIENT  SERVICES/Nutrition- Please report to the clinic at 11:00 am to meet with the Nutritionist    Cristhian Cuadra PA-C   Specialty: General Surgery    [address] [facility]  2ND FLOOR, [facility] [address]   Phone: [phone]       Next Steps: Follow up    Instructions: OUT PATIENT  SERVICES/Post Op- Please report to the clinic at 9:00 am to meet with the LACEY Martines        Patient is cleared for discharge per Cm.

## 2024-04-06 NOTE — DISCHARGE SUMMARY
[facility]  General Surgery  Discharge Summary      Patient Name: Monalisa Carson  MRN: [id number]  Admission Date: 4/4/2024  Hospital Length of Stay: 2 days  Discharge Date and Time:  04/06/2024 3:35 PM  Attending Physician: Maciej Kennedy Jr.*   Discharging Provider: Clary Lord MD  Primary Care Provider: Flor Gomez MD     HPI:  Monalisa Carson is a 59 y.o. obese female who presents for pre op for gastric sleeve surgery.   She has multiple associated comorbidities including diabetes mellitus type 2 non insulin dependent , essential hypertension, GERD on daily medications, and anxiety.  She has struggled with excess weight since around the age 45.  Her highest adult weight was 230 lbs at age 58, and her lowest adult weight was 160 lbs at age 40. The patient has tried phentermine (Adipex-P) and weight loss medications portion control and low carb .  The patient was most successful with adipex b12 injections and exercising with a weight loss of 30 lbs.  Her current exercise includes none 0 times a week. She denies any history of eating disorder such as anorexia, bulimia, or taking laxatives for weight loss, and denies any addiction including illicit substances, alcohol, or gambling.  Patient states she has a poor  support system states that her  eats and eats and she eats with him.   She lives with  and three grand kids.  She is retired.  She  endorses a 10 year history of GERD. States that medications work but if she stops taking them she gets reflux really bad, coughing nausea and vomiting along with water brash  All workup has been reviewed in clinic today and there is nothing on the review that would prevent us from proceeding with surgery.  All questions were answered in clinic today prior to leaving.  Body mass index is 39.4 kg/m².        Procedure(s) (LRB):  GASTRECTOMY, SLEEVE, LAPAROSCOPIC with intraop EGD (O[facility], please submit auth to insurance on or  after 3/14/24) (N/A)     Hospital Course: Patient was admitted to the general surgery service for the above stated procedure. Please see op note for full details. The patient tolerated the procedure well, was extubated in the OR, and transferred to the PACU. Post-operatively the patient is tolerating a regular diet, pain is well controlled and incision is looking well. Ambulating without difficulty. After an uncomplicated post-operative recovery period, meeting appropriate milestones, patient was deemed ready for discharge. They will follow-up with Dr. Kennedy as an outpatient.           Significant Diagnostic Studies: Labs: BMP:   Recent Labs   Lab 04/05/24 0218 04/06/24  0452   * 129*   * 136   K 3.6 3.5    104   CO2 21* 22*   BUN 8 6   CREATININE 0.8 0.8   CALCIUM 9.5 9.3   MG 1.7 1.7   , CMP   Recent Labs   Lab 04/05/24 0218 04/06/24  0452   * 136   K 3.6 3.5    104   CO2 21* 22*   * 129*   BUN 8 6   CREATININE 0.8 0.8   CALCIUM 9.5 9.3   ANIONGAP 8 10   , and CBC   Recent Labs   Lab 04/05/24 0218 04/06/24  0452   WBC 9.59 9.78   HGB 12.7 12.7   HCT 38.2 37.9    347       Pending Diagnostic Studies:       Procedure Component Value Units Date/Time    Specimen to Pathology, Surgery General Surgery [[id number]] Collected: 04/04/24 1011    Order Status: Sent Lab Status: In process Updated: 04/04/24 1635    Specimen: Tissue           Final Active Diagnoses:    Diagnosis Date Noted POA    PRINCIPAL PROBLEM:  Obesity [E66.9] 04/04/2024 Yes    Seizures [R56.9] 04/04/2024 Yes    HLD (hyperlipidemia) [E78.5] 07/19/2013 Yes    Essential hypertension, benign [I10] 09/27/2012 Yes    Controlled type 2 diabetes mellitus with stage 2 chronic kidney disease, without long-term current use of insulin [E11.22, N18.2] 09/27/2012 Yes      Problems Resolved During this Admission:      Discharged Condition: good    Disposition: Home or Self Care    Follow Up: Dr. Kennedy in 2 weeks      Patient Instructions:      Diet Bariatric High Protein Clear Liquid   Order Comments: No soft drinks     Lifting restrictions (nothing greater than 10lbs)   Scheduling Instructions: Lifting restrictions:  nothing greater than 10lbs     No driving until:   Order Comments: No driving until off narcotic pain medication.  Can turn around without pain off narcotics.  At least 1 week.     Notify your health care provider if you experience any of the following:   Order Comments: temperature > 100.4, persistent nausea and vomiting or diarrhea, severe uncontrolled pain, redness, tenderness, or signs of infection (pain, swelling, redness, odor or green/yellow discharge around incision site), difficulty breathing or increased cough, severe persistent headache, worsening rash, persistent dizziness, light-headedness, or visual disturbances, increased confusion or weakness     Activity as tolerated     Shower on day two and no bath     Medications:  Reconciled Home Medications:      Medication List        ASK your doctor about these medications      * acetaminophen 500 MG tablet  Commonly known as: TYLENOL  Take 2 tablets (1,000 mg total) by mouth every 6 (six) hours as needed for Pain.     * acetaminophen 500 MG tablet  Commonly known as: TYLENOL  Take 1 tablet (500 mg total) by mouth every 4 (four) hours as needed for Pain or Temperature greater than (100.5 or greater).     albuterol 90 mcg/actuation inhaler  Commonly known as: PROVENTIL/VENTOLIN HFA  Inhale 1-2 puffs into the lungs every 4 (four) hours as needed for Wheezing. Rescue     benazepriL 40 MG tablet  Commonly known as: LOTENSIN  TAKE 1 TABLET(40 MG) BY MOUTH EVERY DAY     busPIRone 10 MG tablet  Commonly known as: BUSPAR  Take 1 tablet (10 mg total) by mouth 3 (three) times daily as needed (anxiety).     CHLORASEPTIC THROAT SPRAY 1.4 % Spra  Generic drug: phenoL  by Mucous Membrane route every 2 (two) hours.     dicyclomine 20 mg tablet  Commonly known as:  BENTYL  Take 20 mg by mouth 4 (four) times daily.     EScitalopram oxalate 10 MG tablet  Commonly known as: LEXAPRO  Take 1 tablet (10 mg total) by mouth once daily.     fluticasone propionate 50 mcg/actuation nasal spray  Commonly known as: FLONASE  1 spray (50 mcg total) by Each Nostril route 2 (two) times daily.     gabapentin 100 MG capsule  Commonly known as: NEURONTIN  Take 100 mg by mouth 3 (three) times daily.     hydroCHLOROthiazide 12.5 MG Tab  Commonly known as: HYDRODIURIL  TAKE 1 TABLET(12.5 MG) BY MOUTH EVERY DAY     * HYDROcodone-acetaminophen 7.5-325 mg per tablet  Commonly known as: NORCO  Take 1 tablet by mouth every 6 (six) hours as needed.  Ask about: Which instructions should I use?     * hydrocodone-apap 7.5-325 MG/15 ML oral solution  Commonly known as: HYCET  Take 15 mLs by mouth 4 (four) times daily as needed for Pain.  Ask about: Which instructions should I use?     * hydrocodone-apap 7.5-325 MG/15 ML oral solution  Commonly known as: HYCET  Take 15 mLs by mouth 4 (four) times daily as needed for Pain.  Ask about: Which instructions should I use?     hydrocortisone 0.2 % cream  Commonly known as: WESTCORT  Apply topically 2 (two) times daily. for 5 days     hydrocortisone 2.5 % cream  WOLFGANG EXT AA BID     hydrOXYzine HCL 25 MG tablet  Commonly known as: ATARAX  Take 1 tablet (25 mg total) by mouth every 4 to 6 hours as needed for Itching (redness).     ibuprofen 800 MG tablet  Commonly known as: ADVIL,MOTRIN  Take by mouth.     k phos di & mono-sod phos mono 250 mg Tab  Commonly known as: K-Phos-NeutraL  Take 1 tablet by mouth 2 (two) times a day. for 3 days     levETIRAcetam 1000 MG tablet  Commonly known as: KEPPRA  TAKE 1 TABLET(1000 MG) BY MOUTH TWICE DAILY     * LIDOcaine 4 % cream  Commonly known as: LMX  Apply topically as needed.     * LIDOcaine 5 %  Commonly known as: LIDODERM  Place 1 patch onto the skin once daily. Remove & Discard patch within 12 hours or as directed by MD    "  metFORMIN 500 MG ER 24hr tablet  Commonly known as: GLUCOPHAGE-XR  TAKE 1 TABLET(500 MG) BY MOUTH DAILY WITH BREAKFAST     MOBIC 15 MG tablet  Generic drug: meloxicam  Take 1 tablet (15 mg total) by mouth daily as needed for Pain. Take on full stomach, no other NSAIDs. Not more than once a day     * omeprazole 40 MG capsule  Commonly known as: PRILOSEC  Take 1 capsule (40 mg total) by mouth every morning. Open capsule and take with apple sauce  Ask about: Which instructions should I use?     * omeprazole 40 MG capsule  Commonly known as: PRILOSEC  Take 1 capsule (40 mg total) by mouth every morning.  Ask about: Which instructions should I use?     * ondansetron 4 MG Tbdl  Commonly known as: ZOFRAN-ODT  Take 1 tablet (4 mg total) by mouth every 6 (six) hours as needed (nausea).  Ask about: Which instructions should I use?     * ondansetron 8 MG Tbdl  Commonly known as: ZOFRAN-ODT  Dissolve 1 tablet (8 mg total) by mouth every 6 (six) hours as needed.  Ask about: Which instructions should I use?     * ondansetron 8 MG Tbdl  Commonly known as: ZOFRAN-ODT  Dissolve 1 tablet (8 mg total) by mouth every 6 (six) hours as needed.  Ask about: Which instructions should I use?     pantoprazole 40 MG tablet  Commonly known as: PROTONIX  Take 1 tablet (40 mg total) by mouth once daily.     pen needle, diabetic 31 gauge x 5/16" Ndle  For use with bydureon     * polyethylene glycol 17 gram/dose powder  Commonly known as: GLYCOLAX  Use cap to measure 17 grams.  Dissolve as directed and take by mouth once daily for 14 days.  Ask about: Which instructions should I use?     * polyethylene glycol 17 gram/dose powder  Commonly known as: GLYCOLAX  Use cap to measure 17 grams, mix in liquid and take by mouth once daily.  Ask about: Which instructions should I use?     promethazine 6.25 mg/5 mL syrup  Commonly known as: PHENERGAN  Take 5-10 mLs (6.25-12.5 mg total) by mouth every 6 (six) hours.     sodium chloride 0.65 % nasal " spray  Commonly known as: OCEAN NASAL  1 spray by Nasal route every 3 (three) hours as needed for Congestion.     topiramate 100 MG tablet  Commonly known as: TOPAMAX  Take 1 tablet (100 mg total) by mouth once daily.     TRULICITY 0.75 mg/0.5 mL pen injector  Generic drug: dulaglutide  Inject 0.75 mg into the skin every 7 days.     zolpidem 5 MG Tab  Commonly known as: AMBIEN  TAKE 1 TABLET BY MOUTH IN THE EVENING AS NEEDED, take sparingly           * This list has 14 medication(s) that are the same as other medications prescribed for you. Read the directions carefully, and ask your doctor or other care provider to review them with you.                  Clary Lord MD  General Surgery  [facility]

## 2024-04-08 ENCOUNTER — PATIENT OUTREACH (OUTPATIENT)
Dept: ADMINISTRATIVE | Facility: CLINIC | Age: 59
End: 2024-04-08
Payer: COMMERCIAL

## 2024-04-09 ENCOUNTER — PATIENT MESSAGE (OUTPATIENT)
Dept: BARIATRICS | Facility: CLINIC | Age: 59
End: 2024-04-09
Payer: COMMERCIAL

## 2024-04-09 LAB
FINAL PATHOLOGIC DIAGNOSIS: NORMAL
GROSS: NORMAL
Lab: NORMAL

## 2024-04-11 ENCOUNTER — CLINICAL SUPPORT (OUTPATIENT)
Dept: BARIATRICS | Facility: CLINIC | Age: 59
End: 2024-04-11
Payer: COMMERCIAL

## 2024-04-11 DIAGNOSIS — Z71.3 DIETARY COUNSELING AND SURVEILLANCE: ICD-10-CM

## 2024-04-11 DIAGNOSIS — K21.9 GASTROESOPHAGEAL REFLUX DISEASE, UNSPECIFIED WHETHER ESOPHAGITIS PRESENT: ICD-10-CM

## 2024-04-11 DIAGNOSIS — Z98.84 S/P BARIATRIC SURGERY: ICD-10-CM

## 2024-04-11 DIAGNOSIS — E11.9 TYPE 2 DIABETES MELLITUS WITHOUT COMPLICATION, WITHOUT LONG-TERM CURRENT USE OF INSULIN: Primary | ICD-10-CM

## 2024-04-11 DIAGNOSIS — E66.9 OBESITY (BMI 30-39.9): ICD-10-CM

## 2024-04-11 DIAGNOSIS — E78.5 HYPERLIPIDEMIA, UNSPECIFIED HYPERLIPIDEMIA TYPE: ICD-10-CM

## 2024-04-11 DIAGNOSIS — I10 HYPERTENSION, UNSPECIFIED TYPE: ICD-10-CM

## 2024-04-11 NOTE — PROGRESS NOTES
Audio Only Telehealth Visit     The patient location is: home ([place])  The chief complaint leading to consultation is: Follow-up 1 Week s/p Gastric Sleeve  Visit type: Virtual visit with audio only (telephone)  Total time spent with patient: 15 mins     The reason for the audio only service rather than synchronous audio and video virtual visit was related to technical difficulties or patient preference/necessity.     Each patient to whom I provide medical services by telemedicine is:  (1) informed of the relationship between the physician and patient and the respective role of any other health care provider with respect to management of the patient; and (2) notified that they may decline to receive medical services by telemedicine and may withdraw from such care at any time. Patient verbally consented to receive this service via voice-only telephone call.    This service was not originating from a related E/M service provided within the previous 7 days nor will  to an E/M service or procedure within the next 24 hours or my soonest available appointment.  Prevailing standard of care was able to be met in this audio-only visit.      NUTRITION NOTE    Referring Physician: Maciej Kennedy M.D.   Reason for MNT Referral: Follow-up 1 Week s/p Gastric Sleeve    CURRENT DIET:  Bariatric Liquid Diet    Dehydration assessment:  Urine output/color: dark yellow  Chest pain: N  Persistent increased heart rate: N  Fatigue: Y  N/V: N  Dizzy/weak: N dizziness   BM: Y    Protein and fluid intake assessment: (food diary)    Fluids include: Water, broth, SF jello, SF popsicles, SF drink powders, diet peach tea, Body Armor Lyte  Fluid intake: 42 oz   Protein supplements: Premier Protein  Protein intake yesterday: 0    Vitamins  Barimelt Multivitamin with 18 mg iron 1 x day  EZ Melt B-1/with 25 mg thiamine 2 x day  Barimelt Calcium Citrate 500 mg with vitamin D  3 x day  EZ Melt Vitamin B-12 sublingual 2500 mcg 1 x week  EZ Melt  biotin    Medications  Omeprazole: Y  Hycet: Y  How are you tolerating pain at this time? (rate on a scale from 1 to 10; >7 notify PA/MD): 5  How is the support system at home? Good  Exercise reminder (light exercise at this time, no lifting above 10 lbs)     Questions for nurse/MA/PA:     BARIATRIC DIET DISCUSSION:  Reinforced post-op nutrition guidelines.  Continue to work on fluid and protein intake.    PLAN/RECOMMONDATIONS:  Continue bariatric high protein liquid diet.  Maintain protein intake or increase gradually to goal of 60 g prot/day.  Maintain fluid intake or increase gradually to goal of 64 floz/day.  Begin or continue light exercise.  Begin or continue appropriate vitamins & minerals.     Confirmed date and time for 2 week po labs and clinic visit     SESSION TIME: 15 minutes

## 2024-04-13 DIAGNOSIS — G47.00 INSOMNIA, UNSPECIFIED TYPE: ICD-10-CM

## 2024-04-13 NOTE — TELEPHONE ENCOUNTER
No care due was identified.  [facility] Embedded Care Due Messages. Reference number: [id number].   4/13/2024 6:32:56 PM CDT

## 2024-04-15 RX ORDER — ZOLPIDEM TARTRATE 5 MG/1
TABLET ORAL
Qty: 30 TABLET | Refills: 0 | Status: SHIPPED | OUTPATIENT
Start: 2024-04-15

## 2024-04-17 NOTE — PROGRESS NOTES
NUTRITION NOTE    Referring Physician: Maciej Kennedy M.D.   Reason for MNT Referral: Follow-up 2 Weeks s/p Gastric Sleeve    PAST MEDICAL HISTORY:  Denies nausea, vomiting, constipation, and diarrhea.  Reports doing well.    Past Medical History:   Diagnosis Date    Allergy     Anxiety     Diabetes mellitus     GERD (gastroesophageal reflux disease)     Heart murmur     Hyperlipidemia     Hypertension     Seizures     5-6 years ago, spontaneous       CLINICAL DATA:  59 y.o.-year-old Black or  female.    Current Weight: 201 lbs  BMI: 35.77  Total Weight Loss: 21    Excess Weight Loss: 24%      CURRENT DIET:  Bariatric Liquid Diet    Diet Recall: 52 grams of protein/day; 70 oz of fluids/day    Diet Includes:  Fluids include: Water,  SF jello, SF popsicles, SF drink powders,   Protein supplements: [employer]    EXERCISE:  Walking     LABS:  None available at time of visit    VITAMINS/MINERALS:  Barimelt Multivitamin with 18 mg iron 1 x day  EZ Melt B-1/with 25 mg thiamine 2 x day  Barimelt Calcium Citrate 500 mg with vitamin D  3 x day  EZ Melt Vitamin B-12 sublingual 2500 mcg 1 x week  EZ Melt biotin    ASSESSMENT:  Doing fairly well overall.  Adequate protein intake.  Adequate fluid intake.    BARIATRIC DIET DISCUSSION:  Instructed and provided written materials on bariatric puree diet plan   Bariatric soft diet plan to start in 2 weeks as jaguar  Pt may swallow tablets and pills at 2 week post op for sleeve surgery or at 4 week post op for bypass surgery  Reinforced post-op nutrition guidelines.    PLAN/RECOMMONDATIONS:  Advance to bariatric puree diet  Increase protein intake.  Maintain fluid intake.  Continue light exercise.  Continue appropriate vitamins & minerals.    Return to clinic in 6 weeks.    SESSION TIME: 30 minutes

## 2024-04-18 ENCOUNTER — OFFICE VISIT (OUTPATIENT)
Dept: BARIATRICS | Facility: CLINIC | Age: 59
End: 2024-04-18
Payer: COMMERCIAL

## 2024-04-18 ENCOUNTER — CLINICAL SUPPORT (OUTPATIENT)
Dept: BARIATRICS | Facility: CLINIC | Age: 59
End: 2024-04-18
Payer: COMMERCIAL

## 2024-04-18 ENCOUNTER — LAB VISIT (OUTPATIENT)
Dept: LAB | Facility: HOSPITAL | Age: 59
End: 2024-04-18
Payer: COMMERCIAL

## 2024-04-18 VITALS
OXYGEN SATURATION: 100 % | DIASTOLIC BLOOD PRESSURE: 86 MMHG | HEIGHT: 63 IN | HEART RATE: 60 BPM | WEIGHT: 201.94 LBS | TEMPERATURE: 98 F | SYSTOLIC BLOOD PRESSURE: 138 MMHG | BODY MASS INDEX: 35.78 KG/M2

## 2024-04-18 DIAGNOSIS — R63.4 WEIGHT LOSS: ICD-10-CM

## 2024-04-18 DIAGNOSIS — Z98.84 S/P BARIATRIC SURGERY: ICD-10-CM

## 2024-04-18 DIAGNOSIS — I10 HYPERTENSION, UNSPECIFIED TYPE: ICD-10-CM

## 2024-04-18 DIAGNOSIS — E78.5 HYPERLIPIDEMIA, UNSPECIFIED HYPERLIPIDEMIA TYPE: ICD-10-CM

## 2024-04-18 DIAGNOSIS — Z98.890 POST-OPERATIVE STATE: Primary | ICD-10-CM

## 2024-04-18 DIAGNOSIS — E11.9 TYPE 2 DIABETES MELLITUS WITHOUT COMPLICATION, WITHOUT LONG-TERM CURRENT USE OF INSULIN: ICD-10-CM

## 2024-04-18 DIAGNOSIS — K21.9 GASTROESOPHAGEAL REFLUX DISEASE, UNSPECIFIED WHETHER ESOPHAGITIS PRESENT: ICD-10-CM

## 2024-04-18 DIAGNOSIS — E66.01 MORBID OBESITY: ICD-10-CM

## 2024-04-18 DIAGNOSIS — E66.9 OBESITY (BMI 30-39.9): ICD-10-CM

## 2024-04-18 DIAGNOSIS — Z71.3 DIETARY COUNSELING AND SURVEILLANCE: ICD-10-CM

## 2024-04-18 DIAGNOSIS — E11.9 TYPE 2 DIABETES MELLITUS WITHOUT COMPLICATION, WITHOUT LONG-TERM CURRENT USE OF INSULIN: Primary | ICD-10-CM

## 2024-04-18 DIAGNOSIS — Z90.3 S/P GASTRIC SLEEVE PROCEDURE: ICD-10-CM

## 2024-04-18 LAB
ALBUMIN SERPL BCP-MCNC: 3.6 G/DL (ref 3.5–5.2)
ALP SERPL-CCNC: 83 U/L (ref 55–135)
ALT SERPL W/O P-5'-P-CCNC: 73 U/L (ref 10–44)
ANION GAP SERPL CALC-SCNC: 9 MMOL/L (ref 8–16)
AST SERPL-CCNC: 40 U/L (ref 10–40)
BASOPHILS # BLD AUTO: 0.04 K/UL (ref 0–0.2)
BASOPHILS NFR BLD: 0.6 % (ref 0–1.9)
BILIRUB SERPL-MCNC: 0.5 MG/DL (ref 0.1–1)
BUN SERPL-MCNC: 10 MG/DL (ref 6–20)
CALCIUM SERPL-MCNC: 10.3 MG/DL (ref 8.7–10.5)
CHLORIDE SERPL-SCNC: 106 MMOL/L (ref 95–110)
CO2 SERPL-SCNC: 26 MMOL/L (ref 23–29)
CREAT SERPL-MCNC: 0.9 MG/DL (ref 0.5–1.4)
DIFFERENTIAL METHOD BLD: ABNORMAL
EOSINOPHIL # BLD AUTO: 0.2 K/UL (ref 0–0.5)
EOSINOPHIL NFR BLD: 2.8 % (ref 0–8)
ERYTHROCYTE [DISTWIDTH] IN BLOOD BY AUTOMATED COUNT: 13.3 % (ref 11.5–14.5)
EST. GFR  (NO RACE VARIABLE): >60 ML/MIN/1.73 M^2
GLUCOSE SERPL-MCNC: 110 MG/DL (ref 70–110)
HCT VFR BLD AUTO: 43 % (ref 37–48.5)
HGB BLD-MCNC: 13.7 G/DL (ref 12–16)
IMM GRANULOCYTES # BLD AUTO: 0.01 K/UL (ref 0–0.04)
IMM GRANULOCYTES NFR BLD AUTO: 0.1 % (ref 0–0.5)
LYMPHOCYTES # BLD AUTO: 3.9 K/UL (ref 1–4.8)
LYMPHOCYTES NFR BLD: 57.6 % (ref 18–48)
MCH RBC QN AUTO: 29.3 PG (ref 27–31)
MCHC RBC AUTO-ENTMCNC: 31.9 G/DL (ref 32–36)
MCV RBC AUTO: 92 FL (ref 82–98)
MONOCYTES # BLD AUTO: 0.5 K/UL (ref 0.3–1)
MONOCYTES NFR BLD: 6.7 % (ref 4–15)
NEUTROPHILS # BLD AUTO: 2.2 K/UL (ref 1.8–7.7)
NEUTROPHILS NFR BLD: 32.2 % (ref 38–73)
NRBC BLD-RTO: 0 /100 WBC
PLATELET # BLD AUTO: 458 K/UL (ref 150–450)
PMV BLD AUTO: 10.4 FL (ref 9.2–12.9)
POTASSIUM SERPL-SCNC: 4.7 MMOL/L (ref 3.5–5.1)
PROT SERPL-MCNC: 8.1 G/DL (ref 6–8.4)
RBC # BLD AUTO: 4.67 M/UL (ref 4–5.4)
SODIUM SERPL-SCNC: 141 MMOL/L (ref 136–145)
VIT B12 SERPL-MCNC: >2000 PG/ML (ref 210–950)
WBC # BLD AUTO: 6.82 K/UL (ref 3.9–12.7)

## 2024-04-18 PROCEDURE — 3075F SYST BP GE 130 - 139MM HG: CPT | Mod: CPTII,S$GLB,, | Performed by: PHYSICIAN ASSISTANT

## 2024-04-18 PROCEDURE — 1159F MED LIST DOCD IN RCRD: CPT | Mod: CPTII,S$GLB,, | Performed by: PHYSICIAN ASSISTANT

## 2024-04-18 PROCEDURE — 3044F HG A1C LEVEL LT 7.0%: CPT | Mod: CPTII,S$GLB,, | Performed by: PHYSICIAN ASSISTANT

## 2024-04-18 PROCEDURE — 85025 COMPLETE CBC W/AUTO DIFF WBC: CPT | Performed by: PHYSICIAN ASSISTANT

## 2024-04-18 PROCEDURE — 3079F DIAST BP 80-89 MM HG: CPT | Mod: CPTII,S$GLB,, | Performed by: PHYSICIAN ASSISTANT

## 2024-04-18 PROCEDURE — 99499 UNLISTED E&M SERVICE: CPT | Mod: S$GLB,,, | Performed by: DIETITIAN, REGISTERED

## 2024-04-18 PROCEDURE — 99999 PR PBB SHADOW E&M-EST. PATIENT-LVL I: CPT | Mod: PBBFAC,,, | Performed by: DIETITIAN, REGISTERED

## 2024-04-18 PROCEDURE — 1160F RVW MEDS BY RX/DR IN RCRD: CPT | Mod: CPTII,S$GLB,, | Performed by: PHYSICIAN ASSISTANT

## 2024-04-18 PROCEDURE — 99999 PR PBB SHADOW E&M-EST. PATIENT-LVL V: CPT | Mod: PBBFAC,,, | Performed by: PHYSICIAN ASSISTANT

## 2024-04-18 PROCEDURE — 80053 COMPREHEN METABOLIC PANEL: CPT | Performed by: PHYSICIAN ASSISTANT

## 2024-04-18 PROCEDURE — 99024 POSTOP FOLLOW-UP VISIT: CPT | Mod: S$GLB,,, | Performed by: PHYSICIAN ASSISTANT

## 2024-04-18 PROCEDURE — 82607 VITAMIN B-12: CPT | Performed by: PHYSICIAN ASSISTANT

## 2024-04-18 PROCEDURE — 36415 COLL VENOUS BLD VENIPUNCTURE: CPT | Performed by: PHYSICIAN ASSISTANT

## 2024-04-18 PROCEDURE — 84425 ASSAY OF VITAMIN B-1: CPT | Performed by: PHYSICIAN ASSISTANT

## 2024-04-18 PROCEDURE — 3072F LOW RISK FOR RETINOPATHY: CPT | Mod: CPTII,S$GLB,, | Performed by: PHYSICIAN ASSISTANT

## 2024-04-18 RX ORDER — OMEPRAZOLE 40 MG/1
40 CAPSULE, DELAYED RELEASE ORAL DAILY
Qty: 90 CAPSULE | Refills: 3 | Status: SHIPPED | OUTPATIENT
Start: 2024-04-18 | End: 2025-04-18

## 2024-04-18 NOTE — PROGRESS NOTES
BARIATRIC POST-OPERATIVE VISIT:    HPI:  Monalisa Carson is a 59 y.o. year old female presents for 2 week post op visit following s/p sleeve.  she is doing well and tolerating the diet without difficulty.  she has no complaints.    States that when she drinks water it sometimes feels like a bubble in her stomach but the belching helps relieve  Denies: nausea, vomiting, abdominal pain, changes in bowel movement pattern, fever, chills, dysphagia, chest pain, and shortness of breath.    Review of Systems   Constitutional:  Negative for fatigue and fever.   HENT:  Negative for tinnitus and trouble swallowing.    Eyes:  Negative for visual disturbance.   Respiratory:  Negative for chest tightness and shortness of breath.    Cardiovascular:  Negative for chest pain, palpitations and leg swelling.   Gastrointestinal:  Negative for abdominal pain, constipation, diarrhea, nausea and vomiting.   Genitourinary:  Negative for decreased urine volume.   Skin:  Negative for rash.   Neurological:  Negative for light-headedness and headaches.   Psychiatric/Behavioral:  Negative for dysphoric mood, self-injury and sleep disturbance. The patient is not nervous/anxious.        EXERCISE & VITAMINS:  See Bariatric Assessment  Adherent to vitamins   MEDICATIONS/ALLERGIES:  Have been reviewed.    DIET: Liquid Bariatric Diet.  2 protein shakes daily, ~60 grams protein.  36 fl oz SF clear beverage.  See Dietician note from today for a more detailed assessment.      Physical Exam  Constitutional:       Appearance: She is obese.   HENT:      Head: Normocephalic and atraumatic.   Eyes:      Extraocular Movements: Extraocular movements intact.      Conjunctiva/sclera: Conjunctivae normal.      Pupils: Pupils are equal, round, and reactive to light.   Cardiovascular:      Rate and Rhythm: Normal rate and regular rhythm.      Pulses: Normal pulses.      Heart sounds: Normal heart sounds.   Pulmonary:      Effort: Pulmonary effort is normal. No  respiratory distress.      Breath sounds: Normal breath sounds.   Abdominal:      General: Bowel sounds are normal.      Palpations: Abdomen is soft.      Tenderness: There is no abdominal tenderness.   Musculoskeletal:      Cervical back: Normal range of motion and neck supple.   Skin:     Capillary Refill: Capillary refill takes less than 2 seconds.   Neurological:      General: No focal deficit present.      Mental Status: She is alert and oriented to person, place, and time.   Psychiatric:         Mood and Affect: Mood normal.         Behavior: Behavior normal.         Thought Content: Thought content normal.         Judgment: Judgment normal.         ASSESSMENT:  - Morbid obesity s/p sleeve gastrectomy   - Co-morbidities: diabetes mellitus, dyslipidemia, and hypertension  - Good Weight loss, 21#'s and 24% EWL  - No formal Exercise routine  - Fair Diet  - Good Vitamin regimen    PLAN:  - PCP appt  - Anti-Acid medication, ppi daily for 3 months  - No lifting more than 10 lbs for 6 weeks  - Miralax daily for constipation  - Emphasized the importance of regular exercise and adherence to bariatric diet to achieve maximum weight loss.  - Encouraged patient to start/continue regular exercise.  - Follow-up with dietician to advance diet.  - Continue daily vitamins and medications.  - RTC in 2 weeks or sooner if needed.  - Call the office for any issues.  - Check labs today.

## 2024-04-18 NOTE — PATIENT INSTRUCTIONS
High Protein Pureed Diet    2 weeks after gastric bypass and sleeve you may be ready to add pureed food to your diet.  All food should be the consistency of baby food, or thinner.  Follow pureed diet for the next 2 weeks.    Protein - It is very important to pay attention to protein intake during this time.      Inadequate protein intake can cause:  Delayed Wound Healing  Hair Loss  Muscle Breakdown    Meal Plan - Eat 3-4 meals per day (2-4 tbsp each), with protein supplements in between to meet protein needs.  Meeting protein needs daily will help increase healing, decrease muscle loss, and increase weight loss.  Your goal is  grams of protein a day.    Protein First - Always eat the foods with the highest protein first.  Foods high in protein include milk, yogurt, cheese, egg whites, and blenderized meat, seafood, and beans.    Fluids - Keep track in your journal of how much you are drinking; you should try to drink at least 64oz of fluids every day.      Foods allowed: Portion size Protein (g)   Sugar-free clear liquids As desired 0   Skim or 1% milk ½ cup 4   Sugar free pudding, light yogurt, custard (use skim or 1% milk in preparation) 3 oz 2.5   Strained baby food meats, or home-made pureed lean meats and shrimp 1 oz 7   Beans (red, white, black, lima, acevedo, fat free refried, hummus) and lentils ¼ cup 4   Low-fat/fat free cheese.(cottage cheese, mozzarella string cheese, ricotta cheese, Laughing Cow, Baby Bell, cheddar, etc) ¼ cup 7-8   Scrambled eggs or Egg Beaters 1 or ¼ cup 6   Edamame or Tofu, mashed ¼ cup 5   Unflavored protein powder (add to 1 scoop to  98% fat free soups or SF pudding) 3 Tbsp 9   *PB2: peanut powder (45 calories) 2 Tbsp 5     *PB2 powdered peanut butter: 45 calories vs. 190 calories in 2 tbsp of regular peanut butter. Purchase online at [employer], or  at various [employer], [employer], [employer], [employer] and [employer].        Bariatric Liquid/Pureed Sample Menu    3-4 small meals  plus 2-3 protein drinks per day.    8am 1 egg or ¼ cup Egg Beaters   9am 1 cup water, or decaf coffee or tea   10am Protein drink, 30g protein   11am 2 tbsp low-fat cottage cheese, and 1 tbsp pureed peaches   12pm 1 cup water, or sugar-free lemonade    1pm 2 tbsp pureed chicken, and 1 tbsp pureed carrots    2pm 1 cup water, or sugar-free lemonade   3pm Protein drink, 30g protein   5pm 1 cup water    6pm 1 cup hi-protein creamy chicken soup 14g protein (see Recipe below)   7pm 1 cup water, or sugar-free fruit punch    8pm 1 cup water     This sample menu provides approx. 80g protein and 64oz fluids.  Liquid protein supplements should contain 20-30g protein and less than 4 grams of sugar each.    Sip fluids continuously in between meals.  Drink at least ¼ cup every 15 minutes.  For fluids: ¼ cup = 2 oz = 4 tbsp       RECIPE IDEAS for Bariatric Pureed Diet:    Hi-Protein Creamy Chicken Soup: (10g protein per 1 cup serving)  Empty 1 can of 98% fat free cream of chicken soup into saucepan. Then  blend 1 scoop of unflavored protein powder with 1 can of skim milk until smooth.  Add protein milk to saucepan and heat to warm. (Note: Do NOT boil. Protein powder may clump if heated too hot).     Hi-Protein Pudding: (14g protein per ½ cup serving)  Add 2 scoops protein powder to 2 cups cold skim milk and mix well.  Stir in dry Jell-O Sugar-Free Instant Pudding mix.  Chill and Enjoy!    Tuna Mousse (12g protein per ¼ cup serving) Page 135 in book Eating Well After Weight Loss Surgery.  In a  or , combine all ingredients and pulse until smooth.  2 6-ounce cans tuna packed in water, drained  2 tbsp low-fat mayonnaise  2 tbsp fat-free sour cream  2 tbsp fat-free cream cheese, softened  ½ cup shallots, finely chopped  1 tbsp lemon juice  ¼ tsp ground pepper  ½ tsp celery seed    Chocolate Peanut Butter Mousse  (28g protein total)  6oz plain Greek yogurt  4 tbsp chocolate PB2

## 2024-04-22 ENCOUNTER — TELEPHONE (OUTPATIENT)
Dept: BARIATRICS | Facility: CLINIC | Age: 59
End: 2024-04-22
Payer: COMMERCIAL

## 2024-04-22 NOTE — TELEPHONE ENCOUNTER
Returned pt's portal message c/o stomach hurting. Pt stated she was constipated yesterday (4-21-24), strained and had a BM and that is when her abdominal pain started. Pt stated had no blood in her stool. Pt stated she took 2 Tylenol tabs(1000mg) last night and the pain stopped and 2 again this morning. Pt stated that the pain comes back after the medicine wears off. Pt is unable to describe pain bedside that it hurts and she is walking a little bent over.Message to be sent to [facility] and constipation regime to be sent to pt.

## 2024-04-23 DIAGNOSIS — R10.33 PERIUMBILICAL ABDOMINAL PAIN: Primary | ICD-10-CM

## 2024-04-23 LAB — VIT B1 BLD-MCNC: 46 UG/L (ref 38–122)

## 2024-04-23 NOTE — TELEPHONE ENCOUNTER
Returned pt's portal message regarding abdominal on today. Pt states she is still hurt today. 8 out of 10 on pain scale in the  middle of her stomach over navel, no drainage, has a scab and feels hard. Pt states site is not hot or swollen. Pt states she has had BM x1 w/no blood seen. She has taken 2 Tylenol (1000mg) and the pain  stops . Once Tylenol wears off it hurts again. Pt did not does not sound in distress. Message sent to [facility] for further instructions. Awaiting a response.   New orders placed per SILVIA Smyth NP. Called pt to schedule labs and CT scan. Time and date approved per pt. Pt asked if she was tolerating her puree foods well. Pt stated she is eating gumbo broth. Pt was educated that gumbo broth contained lots of fat and flour is in the jasmine. Pt informed that she is not ready for such foods and the jasmine is loaded w/carbs. Understanding stated. All questions and concerns addressed.

## 2024-04-24 ENCOUNTER — HOSPITAL ENCOUNTER (INPATIENT)
Facility: HOSPITAL | Age: 59
LOS: 4 days | Discharge: HOME | DRG: 393 | End: 2024-04-29
Attending: STUDENT IN AN ORGANIZED HEALTH CARE EDUCATION/TRAINING PROGRAM | Admitting: SURGERY
Payer: COMMERCIAL

## 2024-04-24 ENCOUNTER — HOSPITAL ENCOUNTER (OUTPATIENT)
Dept: RADIOLOGY | Facility: HOSPITAL | Age: 59
Discharge: HOME | DRG: 393 | End: 2024-04-24
Attending: NURSE PRACTITIONER
Payer: COMMERCIAL

## 2024-04-24 DIAGNOSIS — K55.069 SUPERIOR MESENTERIC VEIN THROMBOSIS: ICD-10-CM

## 2024-04-24 DIAGNOSIS — R10.33 PERIUMBILICAL ABDOMINAL PAIN: ICD-10-CM

## 2024-04-24 DIAGNOSIS — Z98.84 S/P LAPAROSCOPIC SLEEVE GASTRECTOMY: Primary | ICD-10-CM

## 2024-04-24 LAB
ALBUMIN SERPL BCP-MCNC: 3.2 G/DL (ref 3.5–5.2)
ALP SERPL-CCNC: 97 U/L (ref 55–135)
ALT SERPL W/O P-5'-P-CCNC: 72 U/L (ref 10–44)
ANION GAP SERPL CALC-SCNC: 12 MMOL/L (ref 8–16)
APTT PPP: 23.4 SEC (ref 21–32)
AST SERPL-CCNC: 53 U/L (ref 10–40)
BASOPHILS # BLD AUTO: 0.02 K/UL (ref 0–0.2)
BASOPHILS # BLD AUTO: 0.03 K/UL (ref 0–0.2)
BASOPHILS NFR BLD: 0.3 % (ref 0–1.9)
BASOPHILS NFR BLD: 0.4 % (ref 0–1.9)
BILIRUB SERPL-MCNC: 0.5 MG/DL (ref 0.1–1)
BUN SERPL-MCNC: 8 MG/DL (ref 6–20)
CALCIUM SERPL-MCNC: 9.9 MG/DL (ref 8.7–10.5)
CHLORIDE SERPL-SCNC: 104 MMOL/L (ref 95–110)
CO2 SERPL-SCNC: 23 MMOL/L (ref 23–29)
CREAT SERPL-MCNC: 0.7 MG/DL (ref 0.5–1.4)
DIFFERENTIAL METHOD BLD: ABNORMAL
DIFFERENTIAL METHOD BLD: ABNORMAL
EOSINOPHIL # BLD AUTO: 0 K/UL (ref 0–0.5)
EOSINOPHIL # BLD AUTO: 0 K/UL (ref 0–0.5)
EOSINOPHIL NFR BLD: 0.3 % (ref 0–8)
EOSINOPHIL NFR BLD: 0.4 % (ref 0–8)
ERYTHROCYTE [DISTWIDTH] IN BLOOD BY AUTOMATED COUNT: 13.2 % (ref 11.5–14.5)
ERYTHROCYTE [DISTWIDTH] IN BLOOD BY AUTOMATED COUNT: 13.5 % (ref 11.5–14.5)
EST. GFR  (NO RACE VARIABLE): >60 ML/MIN/1.73 M^2
GLUCOSE SERPL-MCNC: 122 MG/DL (ref 70–110)
HCT VFR BLD AUTO: 38.7 % (ref 37–48.5)
HCT VFR BLD AUTO: 39.1 % (ref 37–48.5)
HGB BLD-MCNC: 12.2 G/DL (ref 12–16)
HGB BLD-MCNC: 12.9 G/DL (ref 12–16)
IMM GRANULOCYTES # BLD AUTO: 0.02 K/UL (ref 0–0.04)
IMM GRANULOCYTES # BLD AUTO: 0.02 K/UL (ref 0–0.04)
IMM GRANULOCYTES NFR BLD AUTO: 0.3 % (ref 0–0.5)
IMM GRANULOCYTES NFR BLD AUTO: 0.3 % (ref 0–0.5)
INR PPP: 1 (ref 0.8–1.2)
LACTATE SERPL-SCNC: 1.1 MMOL/L (ref 0.5–2.2)
LIPASE SERPL-CCNC: 23 U/L (ref 4–60)
LYMPHOCYTES # BLD AUTO: 2.5 K/UL (ref 1–4.8)
LYMPHOCYTES # BLD AUTO: 3.2 K/UL (ref 1–4.8)
LYMPHOCYTES NFR BLD: 34.3 % (ref 18–48)
LYMPHOCYTES NFR BLD: 41.7 % (ref 18–48)
MCH RBC QN AUTO: 29.3 PG (ref 27–31)
MCH RBC QN AUTO: 29.5 PG (ref 27–31)
MCHC RBC AUTO-ENTMCNC: 31.5 G/DL (ref 32–36)
MCHC RBC AUTO-ENTMCNC: 33 G/DL (ref 32–36)
MCV RBC AUTO: 90 FL (ref 82–98)
MCV RBC AUTO: 93 FL (ref 82–98)
MONOCYTES # BLD AUTO: 0.4 K/UL (ref 0.3–1)
MONOCYTES # BLD AUTO: 0.5 K/UL (ref 0.3–1)
MONOCYTES NFR BLD: 5.8 % (ref 4–15)
MONOCYTES NFR BLD: 6.6 % (ref 4–15)
NEUTROPHILS # BLD AUTO: 3.9 K/UL (ref 1.8–7.7)
NEUTROPHILS # BLD AUTO: 4.3 K/UL (ref 1.8–7.7)
NEUTROPHILS NFR BLD: 50.7 % (ref 38–73)
NEUTROPHILS NFR BLD: 58.9 % (ref 38–73)
NRBC BLD-RTO: 0 /100 WBC
NRBC BLD-RTO: 0 /100 WBC
PLATELET # BLD AUTO: 396 K/UL (ref 150–450)
PLATELET # BLD AUTO: 477 K/UL (ref 150–450)
PMV BLD AUTO: 10.3 FL (ref 9.2–12.9)
PMV BLD AUTO: 9.9 FL (ref 9.2–12.9)
POTASSIUM SERPL-SCNC: 4.1 MMOL/L (ref 3.5–5.1)
PROT SERPL-MCNC: 8 G/DL (ref 6–8.4)
PROTHROMBIN TIME: 11 SEC (ref 9–12.5)
RBC # BLD AUTO: 4.17 M/UL (ref 4–5.4)
RBC # BLD AUTO: 4.37 M/UL (ref 4–5.4)
SODIUM SERPL-SCNC: 139 MMOL/L (ref 136–145)
WBC # BLD AUTO: 7.25 K/UL (ref 3.9–12.7)
WBC # BLD AUTO: 7.6 K/UL (ref 3.9–12.7)

## 2024-04-24 PROCEDURE — 74177 CT ABD & PELVIS W/CONTRAST: CPT | Mod: 26,,, | Performed by: RADIOLOGY

## 2024-04-24 PROCEDURE — 85025 COMPLETE CBC W/AUTO DIFF WBC: CPT | Mod: 91

## 2024-04-24 PROCEDURE — 85025 COMPLETE CBC W/AUTO DIFF WBC: CPT | Performed by: STUDENT IN AN ORGANIZED HEALTH CARE EDUCATION/TRAINING PROGRAM

## 2024-04-24 PROCEDURE — 74177 CT ABD & PELVIS W/CONTRAST: CPT | Mod: TC

## 2024-04-24 PROCEDURE — G0378 HOSPITAL OBSERVATION PER HR: HCPCS

## 2024-04-24 PROCEDURE — 85610 PROTHROMBIN TIME: CPT

## 2024-04-24 PROCEDURE — 83690 ASSAY OF LIPASE: CPT | Performed by: STUDENT IN AN ORGANIZED HEALTH CARE EDUCATION/TRAINING PROGRAM

## 2024-04-24 PROCEDURE — 83605 ASSAY OF LACTIC ACID: CPT | Performed by: STUDENT IN AN ORGANIZED HEALTH CARE EDUCATION/TRAINING PROGRAM

## 2024-04-24 PROCEDURE — 99285 EMERGENCY DEPT VISIT HI MDM: CPT | Mod: 25

## 2024-04-24 PROCEDURE — 96375 TX/PRO/DX INJ NEW DRUG ADDON: CPT

## 2024-04-24 PROCEDURE — 63600175 PHARM REV CODE 636 W HCPCS

## 2024-04-24 PROCEDURE — 80053 COMPREHEN METABOLIC PANEL: CPT | Mod: 91 | Performed by: STUDENT IN AN ORGANIZED HEALTH CARE EDUCATION/TRAINING PROGRAM

## 2024-04-24 PROCEDURE — 99024 POSTOP FOLLOW-UP VISIT: CPT | Mod: ,,, | Performed by: SURGERY

## 2024-04-24 PROCEDURE — 25500020 PHARM REV CODE 255: Performed by: NURSE PRACTITIONER

## 2024-04-24 PROCEDURE — 96374 THER/PROPH/DIAG INJ IV PUSH: CPT

## 2024-04-24 PROCEDURE — 63600175 PHARM REV CODE 636 W HCPCS: Performed by: STUDENT IN AN ORGANIZED HEALTH CARE EDUCATION/TRAINING PROGRAM

## 2024-04-24 PROCEDURE — 85730 THROMBOPLASTIN TIME PARTIAL: CPT

## 2024-04-24 RX ORDER — ONDANSETRON HYDROCHLORIDE 2 MG/ML
4 INJECTION, SOLUTION INTRAVENOUS
Status: COMPLETED | OUTPATIENT
Start: 2024-04-24 | End: 2024-04-24

## 2024-04-24 RX ORDER — ONDANSETRON 8 MG/1
8 TABLET, ORALLY DISINTEGRATING ORAL EVERY 8 HOURS PRN
Status: DISCONTINUED | OUTPATIENT
Start: 2024-04-24 | End: 2024-04-24

## 2024-04-24 RX ORDER — SODIUM CHLORIDE 0.9 % (FLUSH) 0.9 %
10 SYRINGE (ML) INJECTION
Status: DISCONTINUED | OUTPATIENT
Start: 2024-04-24 | End: 2024-04-29 | Stop reason: HOSPADM

## 2024-04-24 RX ORDER — TOPIRAMATE 25 MG/1
100 TABLET ORAL DAILY
Status: DISCONTINUED | OUTPATIENT
Start: 2024-04-25 | End: 2024-04-29 | Stop reason: HOSPADM

## 2024-04-24 RX ORDER — LEVETIRACETAM 500 MG/5ML
1000 INJECTION, SOLUTION, CONCENTRATE INTRAVENOUS EVERY 12 HOURS
Status: DISCONTINUED | OUTPATIENT
Start: 2024-04-24 | End: 2024-04-26

## 2024-04-24 RX ORDER — ONDANSETRON HYDROCHLORIDE 2 MG/ML
8 INJECTION, SOLUTION INTRAVENOUS EVERY 8 HOURS PRN
Status: DISCONTINUED | OUTPATIENT
Start: 2024-04-24 | End: 2024-04-29 | Stop reason: HOSPADM

## 2024-04-24 RX ORDER — TALC
6 POWDER (GRAM) TOPICAL NIGHTLY PRN
Status: DISCONTINUED | OUTPATIENT
Start: 2024-04-24 | End: 2024-04-29 | Stop reason: HOSPADM

## 2024-04-24 RX ORDER — MORPHINE SULFATE 4 MG/ML
4 INJECTION, SOLUTION INTRAMUSCULAR; INTRAVENOUS EVERY 4 HOURS PRN
Status: DISCONTINUED | OUTPATIENT
Start: 2024-04-24 | End: 2024-04-26

## 2024-04-24 RX ORDER — HEPARIN SODIUM,PORCINE/D5W 25000/250
0-40 INTRAVENOUS SOLUTION INTRAVENOUS CONTINUOUS
Status: DISCONTINUED | OUTPATIENT
Start: 2024-04-24 | End: 2024-04-29

## 2024-04-24 RX ORDER — LIDOCAINE HYDROCHLORIDE 10 MG/ML
1 INJECTION, SOLUTION EPIDURAL; INFILTRATION; INTRACAUDAL; PERINEURAL ONCE AS NEEDED
Status: DISCONTINUED | OUTPATIENT
Start: 2024-04-24 | End: 2024-04-29 | Stop reason: HOSPADM

## 2024-04-24 RX ORDER — ALBUTEROL SULFATE 90 UG/1
1 AEROSOL, METERED RESPIRATORY (INHALATION) EVERY 4 HOURS PRN
Status: DISCONTINUED | OUTPATIENT
Start: 2024-04-24 | End: 2024-04-29 | Stop reason: HOSPADM

## 2024-04-24 RX ORDER — SODIUM CHLORIDE, SODIUM LACTATE, POTASSIUM CHLORIDE, CALCIUM CHLORIDE 600; 310; 30; 20 MG/100ML; MG/100ML; MG/100ML; MG/100ML
INJECTION, SOLUTION INTRAVENOUS CONTINUOUS
Status: DISCONTINUED | OUTPATIENT
Start: 2024-04-24 | End: 2024-04-28

## 2024-04-24 RX ORDER — MORPHINE SULFATE 4 MG/ML
4 INJECTION, SOLUTION INTRAMUSCULAR; INTRAVENOUS EVERY 4 HOURS PRN
Status: DISCONTINUED | OUTPATIENT
Start: 2024-04-24 | End: 2024-04-24

## 2024-04-24 RX ORDER — MORPHINE SULFATE 2 MG/ML
2 INJECTION, SOLUTION INTRAMUSCULAR; INTRAVENOUS EVERY 4 HOURS PRN
Status: DISCONTINUED | OUTPATIENT
Start: 2024-04-24 | End: 2024-04-24

## 2024-04-24 RX ORDER — MORPHINE SULFATE 2 MG/ML
6 INJECTION, SOLUTION INTRAMUSCULAR; INTRAVENOUS
Status: COMPLETED | OUTPATIENT
Start: 2024-04-24 | End: 2024-04-24

## 2024-04-24 RX ORDER — MORPHINE SULFATE 2 MG/ML
6 INJECTION, SOLUTION INTRAMUSCULAR; INTRAVENOUS EVERY 4 HOURS PRN
Status: DISCONTINUED | OUTPATIENT
Start: 2024-04-24 | End: 2024-04-26

## 2024-04-24 RX ORDER — ACETAMINOPHEN 325 MG/1
650 TABLET ORAL EVERY 8 HOURS PRN
Status: DISCONTINUED | OUTPATIENT
Start: 2024-04-24 | End: 2024-04-29 | Stop reason: HOSPADM

## 2024-04-24 RX ADMIN — MORPHINE SULFATE 6 MG: 2 INJECTION, SOLUTION INTRAMUSCULAR; INTRAVENOUS at 03:04

## 2024-04-24 RX ADMIN — ONDANSETRON 4 MG: 2 INJECTION INTRAMUSCULAR; INTRAVENOUS at 03:04

## 2024-04-24 RX ADMIN — MORPHINE SULFATE 4 MG: 4 INJECTION INTRAVENOUS at 07:04

## 2024-04-24 RX ADMIN — SODIUM CHLORIDE, POTASSIUM CHLORIDE, SODIUM LACTATE AND CALCIUM CHLORIDE: 600; 310; 30; 20 INJECTION, SOLUTION INTRAVENOUS at 07:04

## 2024-04-24 RX ADMIN — IOHEXOL 100 ML: 350 INJECTION, SOLUTION INTRAVENOUS at 11:04

## 2024-04-24 RX ADMIN — HEPARIN SODIUM 18 UNITS/KG/HR: 10000 INJECTION, SOLUTION INTRAVENOUS at 07:04

## 2024-04-24 RX ADMIN — IOHEXOL 15 ML: 300 INJECTION, SOLUTION INTRAVENOUS at 11:04

## 2024-04-24 NOTE — ASSESSMENT & PLAN NOTE
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 Bowel looks good. Vascular surgery consulted.     No intervention  Needs anticoagulation starting with heparin. Make sure gets therapeutic. Can transition to oral prior to discharge.  Recommend heme/onc consult for work-up of thrombophilia.  Keep NPO for now until symptoms improve.   Please call with questions

## 2024-04-24 NOTE — CONSULTS
[facility]  General Surgery  Consult Note    Patient Name: Monalisa Carson  MRN: [id number]  Code Status: Full Code  Admission Date: 4/24/2024  Hospital Length of Stay: 0 days  Attending Physician: Gavin Sultana MD  Primary Care Provider: Flor Gomez MD    Patient information was obtained from patient, past medical records, and ER records.     Inpatient consult to General surgery  Consult performed by: Rasheeda Kennedy MD  Consult ordered by: Mateus Frederick MD        Subjective:     Principal Problem: Superior mesenteric vein thrombosis    History of Present Illness: Monalisa Carson is a 59 year old female with a PMH of diabetes mellitus type 2 non insulin dependent , essential hypertension, GERD on daily medications, and anxiety who presents with abdominal pain that began on Sunday. She reports that this is in the upper abdomen that radiates to the RUQ and is sharp. Pain is improved with medication in the ED. She has also had associated nausea and vomiting that started yesterday. She is s/p sleeve gastrectomy on 4/4/24.    Labs without significant abnormalities. CT scan with SMV thrombosis.     Current Facility-Administered Medications   Medication Dose Route Frequency Provider Last Rate Last Admin    acetaminophen tablet 650 mg  650 mg Oral Q8H PRN Rasheeda Kennedy MD        albuterol inhaler 1 puff  1 puff Inhalation Q4H PRN Rasheeda Kennedy MD        heparin 25,000 units in dextrose 5% (100 units/ml) IV bolus from bag HIGH INTENSITY nomogram - [facility]  80 Units/kg (Adjusted) Intravenous Once Rasheeda Kennedy MD        heparin 25,000 units in dextrose 5% (100 units/ml) IV bolus from bag HIGH INTENSITY nomogram - [facility]  60 Units/kg (Adjusted) Intravenous PRN Rasheeda Kennedy MD        heparin 25,000 units in dextrose 5% (100 units/ml) IV bolus from bag HIGH INTENSITY nomogram - [facility]  30 Units/kg (Adjusted) Intravenous PRN Rasheeda Kenendy MD        heparin 25,000 units in dextrose 5% 250 mL (100  units/mL) infusion HIGH INTENSITY nomogram - [facility]  0-40 Units/kg/hr (Adjusted) Intravenous Continuous Rasheeda Kennedy MD        lactated ringers infusion   Intravenous Continuous Rasheeda Kennedy MD        levETIRAcetam injection 1,000 mg  1,000 mg Intravenous Q12H Rasheeda Kennedy MD        LIDOcaine (PF) 10 mg/ml (1%) injection 10 mg  1 mL Intradermal Once PRN Rasheeda Kennedy MD        melatonin tablet 6 mg  6 mg Oral Nightly PRN Rasheeda Kennedy MD        morphine injection 2 mg  2 mg Intravenous Q4H PRN Rasheeda Kennedy MD        morphine injection 4 mg  4 mg Intravenous Q4H PRN Rasheeda Kennedy MD        ondansetron disintegrating tablet 8 mg  8 mg Oral Q8H PRN Rasheeda Kennedy MD        sodium chloride 0.9% flush 10 mL  10 mL Intravenous PRN Rasheeda Kennedy MD        [START ON 4/25/2024] topiramate tablet 100 mg  100 mg Oral Daily Rasheeda Kennedy MD         Current Outpatient Medications   Medication Sig Dispense Refill    acetaminophen (TYLENOL) 500 MG tablet Take 2 tablets (1,000 mg total) by mouth every 6 (six) hours as needed for Pain. 30 tablet 0    acetaminophen (TYLENOL) 500 MG tablet Take 1 tablet (500 mg total) by mouth every 4 (four) hours as needed for Pain or Temperature greater than (100.5 or greater). 30 tablet 0    albuterol (PROVENTIL/VENTOLIN HFA) 90 mcg/actuation inhaler Inhale 1-2 puffs into the lungs every 4 (four) hours as needed for Wheezing. Rescue 18 g 11    benazepriL (LOTENSIN) 40 MG tablet TAKE 1 TABLET(40 MG) BY MOUTH EVERY DAY 90 tablet 0    busPIRone (BUSPAR) 10 MG tablet Take 1 tablet (10 mg total) by mouth 3 (three) times daily as needed (anxiety). (Patient not taking: Reported on 12/6/2023) 90 tablet 2    dicyclomine (BENTYL) 20 mg tablet Take 20 mg by mouth 4 (four) times daily.      dulaglutide (TRULICITY) 0.75 mg/0.5 mL pen injector Inject 0.75 mg into the skin every 7 days. 4 pen 11    EScitalopram oxalate (LEXAPRO) 10 MG tablet Take 1 tablet (10 mg total) by mouth once daily. (Patient not  taking: Reported on 3/20/2024) 90 tablet 1    fluticasone propionate (FLONASE) 50 mcg/actuation nasal spray 1 spray (50 mcg total) by Each Nostril route 2 (two) times daily. (Patient not taking: Reported on 3/20/2024) 16 g 0    gabapentin (NEURONTIN) 100 MG capsule Take 100 mg by mouth 3 (three) times daily.      hydroCHLOROthiazide (HYDRODIURIL) 12.5 MG Tab TAKE 1 TABLET(12.5 MG) BY MOUTH EVERY DAY 90 tablet 1    hydrocodone-acetaminophen (HYCET) solution 7.5-325 mg/15mL Take 15 mLs by mouth 4 (four) times daily as needed for Pain. 118 mL 0    hydrocodone-acetaminophen (HYCET) solution 7.5-325 mg/15mL Take 15 mLs by mouth 4 (four) times daily as needed for Pain. 200 mL 0    HYDROcodone-acetaminophen (NORCO) 7.5-325 mg per tablet Take 1 tablet by mouth every 6 (six) hours as needed.      hydrocortisone (WESTCORT) 0.2 % cream Apply topically 2 (two) times daily. for 5 days 60 g 0    hydrocortisone 2.5 % cream [place] EXT AA BID 28 g 0    hydrOXYzine HCL (ATARAX) 25 MG tablet Take 1 tablet (25 mg total) by mouth every 4 to 6 hours as needed for Itching (redness). (Patient not taking: Reported on 3/20/2024) 30 tablet 0    ibuprofen (ADVIL,MOTRIN) 800 MG tablet Take by mouth.      k phos di & mono-sod phos mono (K-PHOS-NEUTRAL) 250 mg Tab Take 1 tablet by mouth 2 (two) times a day. for 3 days (Patient not taking: Reported on 12/6/2023) 6 tablet 0    levETIRAcetam (KEPPRA) 1000 MG tablet TAKE 1 TABLET(1000 MG) BY MOUTH TWICE DAILY 180 tablet 3    LIDOcaine (LIDODERM) 5 % Place 1 patch onto the skin once daily. Remove & Discard patch within 12 hours or as directed by MD (Patient not taking: Reported on 3/20/2024) 15 patch 0    lidocaine (LMX) 4 % cream Apply topically as needed. (Patient not taking: Reported on 3/20/2024)  0    metFORMIN (GLUCOPHAGE-XR) 500 MG ER 24hr tablet TAKE 1 TABLET(500 MG) BY MOUTH DAILY WITH BREAKFAST (Patient not taking: Reported on 4/2/2024) 90 tablet 3    MOBIC 15 mg tablet Take 1 tablet (15 mg  "total) by mouth daily as needed for Pain. Take on full stomach, no other NSAIDs. Not more than once a day (Patient not taking: Reported on 3/20/2024) 30 tablet 0    omeprazole (PRILOSEC) 40 MG capsule Take 1 capsule (40 mg total) by mouth once daily. 90 capsule 3    ondansetron (ZOFRAN-ODT) 4 MG TbDL Take 1 tablet (4 mg total) by mouth every 6 (six) hours as needed (nausea). (Patient not taking: Reported on 3/20/2024) 20 tablet 0    ondansetron (ZOFRAN-ODT) 8 MG TbDL Dissolve 1 tablet (8 mg total) by mouth every 6 (six) hours as needed. 30 tablet 0    ondansetron (ZOFRAN-ODT) 8 MG TbDL Dissolve 1 tablet (8 mg total) by mouth every 6 (six) hours as needed. 30 tablet 0    pantoprazole (PROTONIX) 40 MG tablet Take 1 tablet (40 mg total) by mouth once daily. 90 tablet 3    pen needle, diabetic 31 gauge x 5/16" Ndle For use with bydureon 100 each 0    phenoL (CHLORASEPTIC THROAT SPRAY) 1.4 % SprA by Mucous Membrane route every 2 (two) hours. (Patient not taking: Reported on 12/6/2023) 177 mL 0    polyethylene glycol (GLYCOLAX) 17 gram/dose powder Use cap to measure 17 grams, mix in liquid and take by mouth once daily. 238 g 2    promethazine (PHENERGAN) 6.25 mg/5 mL syrup Take 5-10 mLs (6.25-12.5 mg total) by mouth every 6 (six) hours. 280 mL 0    sodium chloride (OCEAN NASAL) 0.65 % nasal spray 1 spray by Nasal route every 3 (three) hours as needed for Congestion. (Patient not taking: Reported on 9/25/2023) 1 Bottle 12    topiramate (TOPAMAX) 100 MG tablet Take 1 tablet (100 mg total) by mouth once daily. (Patient not taking: Reported on 3/20/2024) 30 tablet 11    zolpidem (AMBIEN) 5 MG Tab TAKE 1 TABLET BY MOUTH IN THE EVENING SPARINGLY AS NEEDED 30 tablet 0       Review of patient's allergies indicates:   Allergen Reactions    Keflex [cephalexin] Hives    Vicodin [hydrocodone-acetaminophen] Itching     itch    Crestor [rosuvastatin] Other (See Comments)     Muscle pain       Past Medical History:   Diagnosis Date    " Allergy     Anxiety     Diabetes mellitus     GERD (gastroesophageal reflux disease)     Heart murmur     Hyperlipidemia     Hypertension     Seizures     5-6 years ago, spontaneous     Past Surgical History:   Procedure Laterality Date    CHOLECYSTECTOMY      2001 april    COLONOSCOPY N/A 06/29/2021    Procedure: COLONOSCOPY;  Surgeon: Gustavo Pelletier MD;  Location: [facility];  Service: Endoscopy;  Laterality: N/A;  combined orders    ESOPHAGOGASTRODUODENOSCOPY N/A 06/29/2021    Procedure: ESOPHAGOGASTRODUODENOSCOPY (EGD);  Surgeon: Gustavo Pelletier MD;  Location: [facility];  Service: Endoscopy;  Laterality: N/A;  covdi +3/29/20, fully vaccinated 3/23/21, prep instr portal -ml    ESOPHAGOGASTRODUODENOSCOPY N/A 11/29/2023    Procedure: EGD (ESOPHAGOGASTRODUODENOSCOPY);  Surgeon: Maciej Fine MD;  Location: [facility];  Service: Endoscopy;  Laterality: N/A;  referred by Cristhian RAHMAN, [facility] takes on Tuesdays will hold dose on Tuesday 11/28/23, instructions per [facility].    HYSTERECTOMY      partial  1996    LAPAROSCOPIC SLEEVE GASTRECTOMY N/A 4/4/2024    Procedure: GASTRECTOMY, SLEEVE, LAPAROSCOPIC with intraop EGD ([facility], please submit auth to insurance on or after 3/14/24);  Surgeon: Maciej Kennedy Jr., MD;  Location: [address];  Service: General;  Laterality: N/A;  with poss hiatal hernia repair    OOPHORECTOMY      SHOULDER SURGERY Right     WISDOM TOOTH EXTRACTION       Family History       Problem Relation (Age of Onset)    Breast cancer Maternal Aunt, Paternal Aunt    Cancer Maternal Aunt, Maternal Uncle, Paternal Aunt, Maternal Grandfather    Cataracts Mother    Depression Sister    Diabetes Mother, Father    Early death Paternal Grandmother, Paternal Grandfather    Esophageal cancer Paternal Grandfather    Hyperlipidemia Mother    Hypertension Mother, Father, Sister    No Known Problems Brother, Paternal Uncle, Maternal Grandmother, Other    Stroke Father, Sister          Tobacco Use     Smoking status: Former     Current packs/day: 0.00     Types: Cigarettes    Smokeless tobacco: Never   Substance and Sexual Activity    Alcohol use: Not Currently    Drug use: No    Sexual activity: Yes     Partners: Male     Birth control/protection: None     Review of Systems   Constitutional:  Positive for chills. Negative for fever.   Respiratory:  Negative for chest tightness and shortness of breath.    Cardiovascular:  Negative for chest pain and leg swelling.   Gastrointestinal:  Positive for abdominal pain, constipation, nausea and vomiting. Negative for diarrhea.   Neurological:  Negative for dizziness and weakness.   All other systems reviewed and are negative.    Objective:     Vital Signs (Most Recent):  Temp: 98.5 °F (36.9 °C) (04/24/24 1600)  Pulse: 79 (04/24/24 1630)  Resp: (!) 29 (04/24/24 1630)  BP: (!) 163/78 (04/24/24 1630)  SpO2: 99 % (04/24/24 1630) Vital Signs (24h Range):  Temp:  [98.4 °F (36.9 °C)-98.5 °F (36.9 °C)] 98.5 °F (36.9 °C)  Pulse:  [77-87] 79  Resp:  [15-29] 29  SpO2:  [98 %-99 %] 99 %  BP: (145-163)/(76-84) 163/78     Weight: 89.4 kg (197 lb)  Body mass index is 34.9 kg/m².     Physical Exam  Vitals and nursing note reviewed.   Constitutional:       General: She is not in acute distress.  HENT:      Head: Normocephalic and atraumatic.      Mouth/Throat:      Mouth: Mucous membranes are moist.   Cardiovascular:      Rate and Rhythm: Normal rate and regular rhythm.   Pulmonary:      Effort: Pulmonary effort is normal. No respiratory distress.   Abdominal:      General: There is no distension.      Palpations: Abdomen is soft.      Tenderness: There is no abdominal tenderness.      Comments: Incisions c/d/i   Skin:     General: Skin is warm and dry.   Neurological:      General: No focal deficit present.      Mental Status: She is alert and oriented to person, place, and time.            I have reviewed all pertinent lab results within the past 24 hours.  CBC:   Recent Labs   Lab  04/24/24  1546   WBC 7.25   RBC 4.37   HGB 12.9   HCT 39.1   *   MCV 90   MCH 29.5   MCHC 33.0     CMP:   Recent Labs   Lab 04/24/24  1014   *   CALCIUM 10.5   ALBUMIN 3.5   PROT 8.5*      K 4.6   CO2 24      BUN 9   CREATININE 0.8   ALKPHOS 102   ALT 75*   AST 56*   BILITOT 0.5       Significant Diagnostics:  I have reviewed all pertinent imaging results/findings within the past 24 hours.    Assessment/Plan:     * Superior mesenteric vein thrombosis  59 year old female with a PMH of diabetes mellitus type 2 non insulin dependent , essential hypertension, GERD on daily medications, and anxiety s/p sleeve gastrectomy on 4/4/ now with SMV thrombosis. No concern for ischemic bowel at this time due to CT, labs, and exam.     Admit to obs  Bowel rest, NPO  mIVF  MM pain control  PRN  nausea medications  Heparin drip  Vascular consult to evaluate for thrombectomy  Home medications. All need to be liquid or crushed      VTE Risk Mitigation (From admission, onward)           Ordered     heparin 25,000 units in dextrose 5% (100 units/ml) IV bolus from bag HIGH INTENSITY nomogram - [facility]  As needed (PRN)        Question:  Heparin Infusion Adjustment (DO NOT MODIFY ANSWER)  Answer:  \\[email or web address]\epic\Images\Pharmacy\HeparinInfusions\heparin HIGH INTENSITY nomogram for [facility] [id number].pdf    04/24/24 1713     heparin 25,000 units in dextrose 5% (100 units/ml) IV bolus from bag HIGH INTENSITY nomogram - [facility]  As needed (PRN)        Question:  Heparin Infusion Adjustment (DO NOT MODIFY ANSWER)  Answer:  \\[facility]\epic\Images\Pharmacy\HeparinInfusions\heparin HIGH INTENSITY nomogram for [facility] [id number].pdf    04/24/24 1713     heparin 25,000 units in dextrose 5% (100 units/ml) IV bolus from bag HIGH INTENSITY nomogram - [facility]  Once        Question:  Heparin Infusion Adjustment (DO NOT MODIFY ANSWER)  Answer:  \\[facility]\epic\Images\Pharmacy\HeparinInfusions\heparin HIGH INTENSITY nomogram for [facility] [id number].pdf     04/24/24 1713     heparin 25,000 units in dextrose 5% 250 mL (100 units/mL) infusion HIGH INTENSITY nomogram - [facility]  Continuous        Question:  Begin at (units/kg/hr)  Answer:  18    04/24/24 1713     IP VTE HIGH RISK PATIENT  Once         04/24/24 1713     Place sequential compression device  Until discontinued         04/24/24 1713                    Thank you for your consult. I will follow-up with patient. Please contact us if you have any additional questions.    Rasheeda Kennedy MD  General Surgery  [facility]

## 2024-04-24 NOTE — ED PROVIDER NOTES
Encounter Date: 4/24/2024       History     Chief Complaint   Patient presents with    Abdominal Pain     Had bariatric surgery on 4/4/24 having possible bowel infarct/thrombosis sent here for emergency gen surg consult     59 y.o. female with GERD, DM, HLD, HTN, history of seizures, anxiety presents for patient started having periumbilical, epigastric, and right upper quadrant abdominal pain over the past 1-2 days.  She is 3 weeks out from bariatric surgery (sleeve gastrectomy).  Her pain has been better after the surgery, however it has been acutely worsening over the past 1-2 days.  She has associated nausea and has vomited.  She reports decreased bowel movements but did have 1 earlier today.  She denies any fever/chills, dysuria, or drainage from her surgical sites      The history is provided by the patient and medical records.     Review of patient's allergies indicates:   Allergen Reactions    Keflex [cephalexin] Hives    Vicodin [hydrocodone-acetaminophen] Itching     itch    Crestor [rosuvastatin] Other (See Comments)     Muscle pain     Past Medical History:   Diagnosis Date    Allergy     Anxiety     Diabetes mellitus     GERD (gastroesophageal reflux disease)     Heart murmur     Hyperlipidemia     Hypertension     Seizures     5-6 years ago, spontaneous     Past Surgical History:   Procedure Laterality Date    CHOLECYSTECTOMY      2001 april    COLONOSCOPY N/A 06/29/2021    Procedure: COLONOSCOPY;  Surgeon: Gustavo Pelletier MD;  Location: [facility];  Service: Endoscopy;  Laterality: N/A;  combined orders    ESOPHAGOGASTRODUODENOSCOPY N/A 06/29/2021    Procedure: ESOPHAGOGASTRODUODENOSCOPY (EGD);  Surgeon: Gustavo Pelletier MD;  Location: [facility];  Service: Endoscopy;  Laterality: N/A;  covdi +3/29/20, fully vaccinated 3/23/21, prep instr portal -ml    ESOPHAGOGASTRODUODENOSCOPY N/A 11/29/2023    Procedure: EGD (ESOPHAGOGASTRODUODENOSCOPY);  Surgeon: Maciej Fine MD;  Location: [facility];   Service: Endoscopy;  Laterality: N/A;  referred by Cristhian RAHMAN, [facility] takes on Tuesdays will hold dose on Tuesday 11/28/23, instructions per [facility].    HYSTERECTOMY      partial  1996    LAPAROSCOPIC SLEEVE GASTRECTOMY N/A 4/4/2024    Procedure: GASTRECTOMY, SLEEVE, LAPAROSCOPIC with intraop EGD ([facility], please submit auth to insurance on or after 3/14/24);  Surgeon: Maciej Kennedy Jr., MD;  Location: [facility] [address] [place] [address];  Service: General;  Laterality: N/A;  with poss hiatal hernia repair    OOPHORECTOMY      SHOULDER SURGERY Right     WISDOM TOOTH EXTRACTION       Family History   Problem Relation Name Age of Onset    Diabetes Mother      Hyperlipidemia Mother      Hypertension Mother      Cataracts Mother      Diabetes Father      Hypertension Father      Stroke Father      Depression Sister      Hypertension Sister      Stroke Sister      No Known Problems Brother      Cancer Maternal Aunt          breast x 2    Breast cancer Maternal Aunt      Cancer Maternal Uncle          prostate x 2    Cancer Paternal Aunt          breast    Breast cancer Paternal Aunt      No Known Problems Paternal Uncle      No Known Problems Maternal Grandmother      Cancer Maternal Grandfather          lung    Early death Paternal Grandmother      Early death Paternal Grandfather      Esophageal cancer Paternal Grandfather      No Known Problems Other      Melanoma Neg Hx      Eczema Neg Hx      Lupus Neg Hx      Psoriasis Neg Hx      Amblyopia Neg Hx      Blindness Neg Hx      Glaucoma Neg Hx      Macular degeneration Neg Hx      Retinal detachment Neg Hx      Strabismus Neg Hx      Thyroid disease Neg Hx      Colon cancer Neg Hx       Social History     Tobacco Use    Smoking status: Former     Current packs/day: 0.00     Types: Cigarettes    Smokeless tobacco: Never   Substance Use Topics    Alcohol use: Not Currently    Drug use: No     Review of Systems   Reason unable to perform ROS: See HPI for relevant ROS.       Physical  Exam     Initial Vitals [04/24/24 1441]   BP Pulse Resp Temp SpO2   (!) 145/84 87 16 98.4 °F (36.9 °C) 98 %      MAP       --         Physical Exam    Nursing note and vitals reviewed.  Constitutional:   Alert, normal work of breathing, no acute distress   Eyes: Conjunctivae are normal. No scleral icterus.   Cardiovascular:  Normal rate, regular rhythm and intact distal pulses.           Pulmonary/Chest: Breath sounds normal. No stridor. No respiratory distress.   Abdominal:   Abdomen is soft and nondistended.  Epigastric and right-sided abdominal tenderness, no tenderness to percussion  Multiple small surgical incisions with tissue adhesive intact, no erythema or drainage   Musculoskeletal:         General: No edema.     Neurological: She is alert.   Skin: Skin is warm and dry.         ED Course   Procedures  Labs Reviewed   CBC W/ AUTO DIFFERENTIAL - Abnormal; Notable for the following components:       Result Value    Platelets 477 (*)     All other components within normal limits   COMPREHENSIVE METABOLIC PANEL - Abnormal; Notable for the following components:    Glucose 122 (*)     Albumin 3.2 (*)     AST 53 (*)     ALT 72 (*)     All other components within normal limits   CBC W/ AUTO DIFFERENTIAL - Abnormal; Notable for the following components:    MCHC 31.5 (*)     All other components within normal limits    Narrative:     Draw baseline aPTT prior to starting the heparin bolus or  infusion  (if patient is on warfarin prior to heparin therapy)   LACTIC ACID, PLASMA   APTT    Narrative:     Draw baseline aPTT prior to starting the heparin bolus or  infusion  (if patient is on warfarin prior to heparin therapy)   PROTIME-INR    Narrative:     Draw baseline aPTT prior to starting the heparin bolus or  infusion  (if patient is on warfarin prior to heparin therapy)   LIPASE   LIPASE    Narrative:     Add on LIPAS per MD [facility]QUERA [facility] order [id number]  04/24/2024  17:54           Imaging Results    None           Medications   albuterol inhaler 1 puff (has no administration in time range)   levETIRAcetam injection 1,000 mg (has no administration in time range)   topiramate tablet 100 mg (has no administration in time range)   LIDOcaine (PF) 10 mg/ml (1%) injection 10 mg (has no administration in time range)   sodium chloride 0.9% flush 10 mL (has no administration in time range)   ondansetron disintegrating tablet 8 mg (has no administration in time range)   melatonin tablet 6 mg (has no administration in time range)   acetaminophen tablet 650 mg (has no administration in time range)   lactated ringers infusion ( Intravenous New Bag 4/24/24 1956)   heparin 25,000 units in dextrose 5% 250 mL (100 units/mL) infusion HIGH INTENSITY nomogram - [facility] (18 Units/kg/hr × 67.2 kg (Adjusted) Intravenous New Bag 4/24/24 1905)   heparin 25,000 units in dextrose 5% (100 units/ml) IV bolus from bag HIGH INTENSITY nomogram - [facility] (has no administration in time range)   heparin 25,000 units in dextrose 5% (100 units/ml) IV bolus from bag HIGH INTENSITY nomogram - [facility] (has no administration in time range)   morphine injection 4 mg (has no administration in time range)   morphine injection 6 mg (has no administration in time range)   morphine injection 6 mg (6 mg Intravenous Given 4/24/24 1556)   ondansetron injection 4 mg (4 mg Intravenous Given 4/24/24 1555)   heparin 25,000 units in dextrose 5% (100 units/ml) IV bolus from bag HIGH INTENSITY nomogram - [facility] (5,376 Units Intravenous Bolus from Bag 4/24/24 1906)     Medical Decision Making  59 y.o. female with GERD, DM, HLD, HTN, history of seizures, anxiety presents for patient started having periumbilical, epigastric, and right upper quadrant abdominal pain over the past 1-2 days  Differentials include superior mesenteric vein thrombosis, postop pain, bowel necrosis, bowel obstruction  Patient hemodynamically stable, alert, in no acute distress.  Appears uncomfortable, has epigastric and  right-sided abdominal tenderness.  Found to have SMV thrombosis on outside imaging.  Abdomen is soft, no significant peritonitic signs      Amount and/or Complexity of Data Reviewed  Labs: ordered. Decision-making details documented in ED Course.    Risk  Prescription drug management.  Decision regarding hospitalization.               ED Course as of 04/24/24 2108 Wed Apr 24, 2024   1621 CBC W/ AUTO DIFFERENTIAL(!)  No leukocytosis or anemia [OK]   1641 Discussed with general surgery who evaluated patient [OK]   1715 Patient admitted to general surgery [OK]      ED Course User Index  [OK] Mateus Frederick MD                             Clinical Impression:  Final diagnoses:  [K55.069] Superior mesenteric vein thrombosis          ED Disposition Condition    Observation                 Mateus Frederick MD  04/24/24 2108

## 2024-04-24 NOTE — HPI
Monalisa Carson is a 59 year old female with a PMH of diabetes mellitus type 2 non insulin dependent , essential hypertension, GERD on daily medications, and anxiety who presents with abdominal pain that began on Sunday. She reports that this is in the upper abdomen that radiates to the RUQ and is sharp. Pain is improved with medication in the ED. She has also had associated nausea and vomiting that started yesterday. She is s/p sleeve gastrectomy on 4/4/24.    Labs without significant abnormalities. CT scan with SMV thrombosis.

## 2024-04-24 NOTE — H&P
[facility]  General Surgery  History & Physical    Patient Name: Monalisa Carson  MRN: [id number]  Admission Date: 4/24/2024  Attending Physician: Gavin Sultana MD   Primary Care Provider: Flor Gomez MD    Please see consult note for full H&P.    Rasheeda Kennedy MD  General Surgery  [facility]

## 2024-04-24 NOTE — HPI
59F  PMH of diabetes mellitus type 2 non insulin dependent , essential hypertension, GERD, and anxiety who presents with abdominal pain that began on Sunday. She reports that this generalized pain. She developed nausea, vomiting and unable to tolerate PO yesterday. Pain is improved with medication in the ED.  She is s/p sleeve gastrectomy on 4/4/24. Labs without significant abnormalities. Mild elevation LFT.  CT scan with SMV thrombosis. Bowel looks good. Pt states father had DVT after stroke but no other fam history of blood clots. Vascular surgery consulted.     Former light intermittent smoker. Quit 2 years ago.   No MI or Stroke

## 2024-04-24 NOTE — ED TRIAGE NOTES
Monalisa DONNA Carson, a 59 y.o. female presents to the ED w/ complaint of abdominal pain starting Sunday. Patient also has been experiencing N/V.     Triage note:  Chief Complaint   Patient presents with    Abdominal Pain     Had bariatric surgery on 4/4/24 having possible bowel infarct/thrombosis sent here for emergency gen surg consult     Review of patient's allergies indicates:   Allergen Reactions    Keflex [cephalexin] Hives    Vicodin [hydrocodone-acetaminophen] Itching     itch    Crestor [rosuvastatin] Other (See Comments)     Muscle pain     Past Medical History:   Diagnosis Date    Allergy     Anxiety     Diabetes mellitus     GERD (gastroesophageal reflux disease)     Heart murmur     Hyperlipidemia     Hypertension     Seizures     5-6 years ago, spontaneous

## 2024-04-24 NOTE — CONSULTS
[facility] - Emergency Dept  Vascular Surgery  Consult Note    Inpatient consult to Vascular Surgery  Consult performed by: Primitivo Boyce MD  Consult ordered by: Rasheeda Kennedy MD  Assessment/Recommendations: See below        Subjective:     Chief Complaint/Reason for Admission: Abdominal pain    History of Present Illness: 59F  PMH of diabetes mellitus type 2 non insulin dependent , essential hypertension, GERD, and anxiety who presents with abdominal pain that began on Sunday. She reports that this generalized pain. She developed nausea, vomiting and unable to tolerate PO yesterday. Pain is improved with medication in the ED.  She is s/p sleeve gastrectomy on 4/4/24. Labs without significant abnormalities. Mild elevation LFT.  CT scan with SMV thrombosis. Bowel looks good. Pt states father had DVT after stroke but no other fam history of blood clots. Vascular surgery consulted.     Former light intermittent smoker. Quit 2 years ago.   No MI or Stroke      (Not in a hospital admission)      Review of patient's allergies indicates:   Allergen Reactions    Keflex [cephalexin] Hives    Vicodin [hydrocodone-acetaminophen] Itching     itch    Crestor [rosuvastatin] Other (See Comments)     Muscle pain       Past Medical History:   Diagnosis Date    Allergy     Anxiety     Diabetes mellitus     GERD (gastroesophageal reflux disease)     Heart murmur     Hyperlipidemia     Hypertension     Seizures     5-6 years ago, spontaneous     Past Surgical History:   Procedure Laterality Date    CHOLECYSTECTOMY      2001 april    COLONOSCOPY N/A 06/29/2021    Procedure: COLONOSCOPY;  Surgeon: Gustavo Pelletier MD;  Location: [facility];  Service: Endoscopy;  Laterality: N/A;  combined orders    ESOPHAGOGASTRODUODENOSCOPY N/A 06/29/2021    Procedure: ESOPHAGOGASTRODUODENOSCOPY (EGD);  Surgeon: Gustavo Pelletier MD;  Location: [facility];  Service: Endoscopy;  Laterality: N/A;  covdi +3/29/20, fully vaccinated 3/23/21, prep instr  portal -ml    ESOPHAGOGASTRODUODENOSCOPY N/A 11/29/2023    Procedure: EGD (ESOPHAGOGASTRODUODENOSCOPY);  Surgeon: Maciej Fine MD;  Location: [facility];  Service: Endoscopy;  Laterality: N/A;  referred by Cristhian RAHMAN, [facility] takes on Tuesdays will hold dose on Tuesday 11/28/23, instructions per [facility].    HYSTERECTOMY      partial  1996    LAPAROSCOPIC SLEEVE GASTRECTOMY N/A 4/4/2024    Procedure: GASTRECTOMY, SLEEVE, LAPAROSCOPIC with intraop EGD ([facility], please submit auth to insurance on or after 3/14/24);  Surgeon: Maciej Kennedy Jr., MD;  Location: [facility] OR [address] [place] [address];  Service: General;  Laterality: N/A;  with poss hiatal hernia repair    OOPHORECTOMY      SHOULDER SURGERY Right     WISDOM TOOTH EXTRACTION       Family History       Problem Relation (Age of Onset)    Breast cancer Maternal Aunt, Paternal Aunt    Cancer Maternal Aunt, Maternal Uncle, Paternal Aunt, Maternal Grandfather    Cataracts Mother    Depression Sister    Diabetes Mother, Father    Early death Paternal Grandmother, Paternal Grandfather    Esophageal cancer Paternal Grandfather    Hyperlipidemia Mother    Hypertension Mother, Father, Sister    No Known Problems Brother, Paternal Uncle, Maternal Grandmother, Other    Stroke Father, Sister          Tobacco Use    Smoking status: Former     Current packs/day: 0.00     Types: Cigarettes    Smokeless tobacco: Never   Substance and Sexual Activity    Alcohol use: Not Currently    Drug use: No    Sexual activity: Yes     Partners: Male     Birth control/protection: None     Review of Systems   Gastrointestinal:  Positive for abdominal pain, nausea and vomiting.   All other systems reviewed and are negative.  Objective:     Vital Signs (Most Recent):  Temp: 98.5 °F (36.9 °C) (04/24/24 1600)  Pulse: 72 (04/24/24 1700)  Resp: 15 (04/24/24 1700)  BP: (!) 148/84 (04/24/24 1700)  SpO2: 98 % (04/24/24 1700) Vital Signs (24h Range):  Temp:  [98.4 °F (36.9 °C)-98.5 °F (36.9 °C)] 98.5 °F (36.9  °C)  Pulse:  [72-87] 72  Resp:  [15-29] 15  SpO2:  [98 %-99 %] 98 %  BP: (145-163)/(76-84) 148/84     Weight: 89.4 kg (197 lb)  Body mass index is 34.9 kg/m².      Physical Exam  Vitals reviewed.   Constitutional:       General: She is not in acute distress.  HENT:      Head: Normocephalic and atraumatic.      Nose: Nose normal.   Eyes:      General:         Right eye: No discharge.         Left eye: No discharge.   Cardiovascular:      Rate and Rhythm: Normal rate and regular rhythm.   Pulmonary:      Effort: Pulmonary effort is normal. No respiratory distress.      Breath sounds: No stridor.   Abdominal:      Comments: Surgical scars well healed  Mild generalized abdominal pain  Soft , ND   Musculoskeletal:         General: Normal range of motion.      Cervical back: Normal range of motion.   Skin:     General: Skin is warm and dry.   Neurological:      General: No focal deficit present.      Mental Status: She is alert and oriented to person, place, and time.   Psychiatric:         Mood and Affect: Mood normal.         Behavior: Behavior normal.      Significant Labs:  All pertinent labs from the last 24 hours have been reviewed.    Significant Diagnostics:  I have reviewed all pertinent imaging results/findings within the past 24 hours.  Assessment/Plan:     * Superior mesenteric vein thrombosis  59F s/p sleeve gastrectomy on 4/4 now admitted with abdominal pain and found to have SMV thrombosis. Bowel looks good. Vascular surgery consulted.     No intervention  Needs anticoagulation starting with heparin. Make sure gets therapeutic. Can transition to oral prior to discharge.  Recommend heme/onc consult for work-up of thrombophilia.  Keep NPO  and hydrated for now until symptoms improve.   Please call with questions          Thank you for your consult. I will follow-up with patient. Please contact us if you have any additional questions.    Primitivo Boyce MD  Vascular Surgery  [facility] - Emergency Dept

## 2024-04-24 NOTE — SUBJECTIVE & OBJECTIVE
Current Facility-Administered Medications   Medication Dose Route Frequency Provider Last Rate Last Admin    acetaminophen tablet 650 mg  650 mg Oral Q8H PRN Rasheeda Kennedy MD        albuterol inhaler 1 puff  1 puff Inhalation Q4H PRN Rasheeda Kennedy MD        heparin 25,000 units in dextrose 5% (100 units/ml) IV bolus from bag HIGH INTENSITY nomogram - [facility]  80 Units/kg (Adjusted) Intravenous Once Rasheeda Kennedy MD        heparin 25,000 units in dextrose 5% (100 units/ml) IV bolus from bag HIGH INTENSITY nomogram - [facility]  60 Units/kg (Adjusted) Intravenous PRN Rasheeda Kennedy MD        heparin 25,000 units in dextrose 5% (100 units/ml) IV bolus from bag HIGH INTENSITY nomogram - [facility]  30 Units/kg (Adjusted) Intravenous PRN Rasheeda Kennedy MD        heparin 25,000 units in dextrose 5% 250 mL (100 units/mL) infusion HIGH INTENSITY nomogram - [facility]  0-40 Units/kg/hr (Adjusted) Intravenous Continuous Rasheeda Kennedy MD        lactated ringers infusion   Intravenous Continuous Rasheeda Kennedy MD        levETIRAcetam injection 1,000 mg  1,000 mg Intravenous Q12H Rasheeda Kennedy MD        LIDOcaine (PF) 10 mg/ml (1%) injection 10 mg  1 mL Intradermal Once PRN Rasheeda Kennedy MD        melatonin tablet 6 mg  6 mg Oral Nightly PRN Rasheeda Kennedy MD        morphine injection 2 mg  2 mg Intravenous Q4H PRN Rasheeda Kennedy MD        morphine injection 4 mg  4 mg Intravenous Q4H PRN Rasheeda Kennedy MD        ondansetron disintegrating tablet 8 mg  8 mg Oral Q8H PRN Rasheeda Kennedy MD        sodium chloride 0.9% flush 10 mL  10 mL Intravenous PRN Rasheeda Kennedy MD        [START ON 4/25/2024] topiramate tablet 100 mg  100 mg Oral Daily Rasheeda Kennedy MD         Current Outpatient Medications   Medication Sig Dispense Refill    acetaminophen (TYLENOL) 500 MG tablet Take 2 tablets (1,000 mg total) by mouth every 6 (six) hours as needed for Pain. 30 tablet 0    acetaminophen (TYLENOL) 500 MG tablet Take 1 tablet (500 mg total) by mouth every 4  (four) hours as needed for Pain or Temperature greater than (100.5 or greater). 30 tablet 0    albuterol (PROVENTIL/VENTOLIN HFA) 90 mcg/actuation inhaler Inhale 1-2 puffs into the lungs every 4 (four) hours as needed for Wheezing. Rescue 18 g 11    benazepriL (LOTENSIN) 40 MG tablet TAKE 1 TABLET(40 MG) BY MOUTH EVERY DAY 90 tablet 0    busPIRone (BUSPAR) 10 MG tablet Take 1 tablet (10 mg total) by mouth 3 (three) times daily as needed (anxiety). (Patient not taking: Reported on 12/6/2023) 90 tablet 2    dicyclomine (BENTYL) 20 mg tablet Take 20 mg by mouth 4 (four) times daily.      dulaglutide (TRULICITY) 0.75 mg/0.5 mL pen injector Inject 0.75 mg into the skin every 7 days. 4 pen 11    EScitalopram oxalate (LEXAPRO) 10 MG tablet Take 1 tablet (10 mg total) by mouth once daily. (Patient not taking: Reported on 3/20/2024) 90 tablet 1    fluticasone propionate (FLONASE) 50 mcg/actuation nasal spray 1 spray (50 mcg total) by Each Nostril route 2 (two) times daily. (Patient not taking: Reported on 3/20/2024) 16 g 0    gabapentin (NEURONTIN) 100 MG capsule Take 100 mg by mouth 3 (three) times daily.      hydroCHLOROthiazide (HYDRODIURIL) 12.5 MG Tab TAKE 1 TABLET(12.5 MG) BY MOUTH EVERY DAY 90 tablet 1    hydrocodone-acetaminophen (HYCET) solution 7.5-325 mg/15mL Take 15 mLs by mouth 4 (four) times daily as needed for Pain. 118 mL 0    hydrocodone-acetaminophen (HYCET) solution 7.5-325 mg/15mL Take 15 mLs by mouth 4 (four) times daily as needed for Pain. 200 mL 0    HYDROcodone-acetaminophen (NORCO) 7.5-325 mg per tablet Take 1 tablet by mouth every 6 (six) hours as needed.      hydrocortisone (WESTCORT) 0.2 % cream Apply topically 2 (two) times daily. for 5 days 60 g 0    hydrocortisone 2.5 % cream [place]ANG EXT AA BID 28 g 0    hydrOXYzine HCL (ATARAX) 25 MG tablet Take 1 tablet (25 mg total) by mouth every 4 to 6 hours as needed for Itching (redness). (Patient not taking: Reported on 3/20/2024) 30 tablet 0    ibuprofen  "(ADVIL,MOTRIN) 800 MG tablet Take by mouth.      k phos di & mono-sod phos mono (K-PHOS-NEUTRAL) 250 mg Tab Take 1 tablet by mouth 2 (two) times a day. for 3 days (Patient not taking: Reported on 12/6/2023) 6 tablet 0    levETIRAcetam (KEPPRA) 1000 MG tablet TAKE 1 TABLET(1000 MG) BY MOUTH TWICE DAILY 180 tablet 3    LIDOcaine (LIDODERM) 5 % Place 1 patch onto the skin once daily. Remove & Discard patch within 12 hours or as directed by MD (Patient not taking: Reported on 3/20/2024) 15 patch 0    lidocaine (LMX) 4 % cream Apply topically as needed. (Patient not taking: Reported on 3/20/2024)  0    metFORMIN (GLUCOPHAGE-XR) 500 MG ER 24hr tablet TAKE 1 TABLET(500 MG) BY MOUTH DAILY WITH BREAKFAST (Patient not taking: Reported on 4/2/2024) 90 tablet 3    MOBIC 15 mg tablet Take 1 tablet (15 mg total) by mouth daily as needed for Pain. Take on full stomach, no other NSAIDs. Not more than once a day (Patient not taking: Reported on 3/20/2024) 30 tablet 0    omeprazole (PRILOSEC) 40 MG capsule Take 1 capsule (40 mg total) by mouth once daily. 90 capsule 3    ondansetron (ZOFRAN-ODT) 4 MG TbDL Take 1 tablet (4 mg total) by mouth every 6 (six) hours as needed (nausea). (Patient not taking: Reported on 3/20/2024) 20 tablet 0    ondansetron (ZOFRAN-ODT) 8 MG TbDL Dissolve 1 tablet (8 mg total) by mouth every 6 (six) hours as needed. 30 tablet 0    ondansetron (ZOFRAN-ODT) 8 MG TbDL Dissolve 1 tablet (8 mg total) by mouth every 6 (six) hours as needed. 30 tablet 0    pantoprazole (PROTONIX) 40 MG tablet Take 1 tablet (40 mg total) by mouth once daily. 90 tablet 3    pen needle, diabetic 31 gauge x 5/16" Ndle For use with bydureon 100 each 0    phenoL (CHLORASEPTIC THROAT SPRAY) 1.4 % SprA by Mucous Membrane route every 2 (two) hours. (Patient not taking: Reported on 12/6/2023) 177 mL 0    polyethylene glycol (GLYCOLAX) 17 gram/dose powder Use cap to measure 17 grams, mix in liquid and take by mouth once daily. 238 g 2    " promethazine (PHENERGAN) 6.25 mg/5 mL syrup Take 5-10 mLs (6.25-12.5 mg total) by mouth every 6 (six) hours. 280 mL 0    sodium chloride (OCEAN NASAL) 0.65 % nasal spray 1 spray by Nasal route every 3 (three) hours as needed for Congestion. (Patient not taking: Reported on 9/25/2023) 1 Bottle 12    topiramate (TOPAMAX) 100 MG tablet Take 1 tablet (100 mg total) by mouth once daily. (Patient not taking: Reported on 3/20/2024) 30 tablet 11    zolpidem (AMBIEN) 5 MG Tab TAKE 1 TABLET BY MOUTH IN THE EVENING SPARINGLY AS NEEDED 30 tablet 0       Review of patient's allergies indicates:   Allergen Reactions    Keflex [cephalexin] Hives    Vicodin [hydrocodone-acetaminophen] Itching     itch    Crestor [rosuvastatin] Other (See Comments)     Muscle pain       Past Medical History:   Diagnosis Date    Allergy     Anxiety     Diabetes mellitus     GERD (gastroesophageal reflux disease)     Heart murmur     Hyperlipidemia     Hypertension     Seizures     5-6 years ago, spontaneous     Past Surgical History:   Procedure Laterality Date    CHOLECYSTECTOMY      2001 april    COLONOSCOPY N/A 06/29/2021    Procedure: COLONOSCOPY;  Surgeon: Gustavo Pelletier MD;  Location: [facility];  Service: Endoscopy;  Laterality: N/A;  combined orders    ESOPHAGOGASTRODUODENOSCOPY N/A 06/29/2021    Procedure: ESOPHAGOGASTRODUODENOSCOPY (EGD);  Surgeon: Gustavo Pelletier MD;  Location: [facility];  Service: Endoscopy;  Laterality: N/A;  covdi +3/29/20, fully vaccinated 3/23/21, prep instr portal -ml    ESOPHAGOGASTRODUODENOSCOPY N/A 11/29/2023    Procedure: EGD (ESOPHAGOGASTRODUODENOSCOPY);  Surgeon: Maciej Fine MD;  Location: [facility];  Service: Endoscopy;  Laterality: N/A;  referred by Cristhian RAHMAN, [facility] takes on Tuesdays will hold dose on Tuesday 11/28/23, instructions per [facility].    HYSTERECTOMY      partial  1996    LAPAROSCOPIC SLEEVE GASTRECTOMY N/A 4/4/2024    Procedure: GASTRECTOMY, SLEEVE, LAPAROSCOPIC with intraop EGD  ([facility], please submit auth to insurance on or after 3/14/24);  Surgeon: Maciej Kennedy Jr., MD;  Location: [facility] OR [address] [place] [address];  Service: General;  Laterality: N/A;  with poss hiatal hernia repair    OOPHORECTOMY      SHOULDER SURGERY Right     WISDOM TOOTH EXTRACTION       Family History       Problem Relation (Age of Onset)    Breast cancer Maternal Aunt, Paternal Aunt    Cancer Maternal Aunt, Maternal Uncle, Paternal Aunt, Maternal Grandfather    Cataracts Mother    Depression Sister    Diabetes Mother, Father    Early death Paternal Grandmother, Paternal Grandfather    Esophageal cancer Paternal Grandfather    Hyperlipidemia Mother    Hypertension Mother, Father, Sister    No Known Problems Brother, Paternal Uncle, Maternal Grandmother, Other    Stroke Father, Sister          Tobacco Use    Smoking status: Former     Current packs/day: 0.00     Types: Cigarettes    Smokeless tobacco: Never   Substance and Sexual Activity    Alcohol use: Not Currently    Drug use: No    Sexual activity: Yes     Partners: Male     Birth control/protection: None     Review of Systems   Constitutional:  Positive for chills. Negative for fever.   Respiratory:  Negative for chest tightness and shortness of breath.    Cardiovascular:  Negative for chest pain and leg swelling.   Gastrointestinal:  Positive for abdominal pain, constipation, nausea and vomiting. Negative for diarrhea.   Neurological:  Negative for dizziness and weakness.   All other systems reviewed and are negative.    Objective:     Vital Signs (Most Recent):  Temp: 98.5 °F (36.9 °C) (04/24/24 1600)  Pulse: 79 (04/24/24 1630)  Resp: (!) 29 (04/24/24 1630)  BP: (!) 163/78 (04/24/24 1630)  SpO2: 99 % (04/24/24 1630) Vital Signs (24h Range):  Temp:  [98.4 °F (36.9 °C)-98.5 °F (36.9 °C)] 98.5 °F (36.9 °C)  Pulse:  [77-87] 79  Resp:  [15-29] 29  SpO2:  [98 %-99 %] 99 %  BP: (145-163)/(76-84) 163/78     Weight: 89.4 kg (197 lb)  Body mass index is 34.9 kg/m².     Physical  Exam  Vitals and nursing note reviewed.   Constitutional:       General: She is not in acute distress.  HENT:      Head: Normocephalic and atraumatic.      Mouth/Throat:      Mouth: Mucous membranes are moist.   Cardiovascular:      Rate and Rhythm: Normal rate and regular rhythm.   Pulmonary:      Effort: Pulmonary effort is normal. No respiratory distress.   Abdominal:      General: There is no distension.      Palpations: Abdomen is soft.      Tenderness: There is no abdominal tenderness.      Comments: Incisions c/d/i   Skin:     General: Skin is warm and dry.   Neurological:      General: No focal deficit present.      Mental Status: She is alert and oriented to person, place, and time.            I have reviewed all pertinent lab results within the past 24 hours.  CBC:   Recent Labs   Lab 04/24/24  1546   WBC 7.25   RBC 4.37   HGB 12.9   HCT 39.1   *   MCV 90   MCH 29.5   MCHC 33.0     CMP:   Recent Labs   Lab 04/24/24  1014   *   CALCIUM 10.5   ALBUMIN 3.5   PROT 8.5*      K 4.6   CO2 24      BUN 9   CREATININE 0.8   ALKPHOS 102   ALT 75*   AST 56*   BILITOT 0.5       Significant Diagnostics:  I have reviewed all pertinent imaging results/findings within the past 24 hours.

## 2024-04-24 NOTE — ASSESSMENT & PLAN NOTE
59 year old female with a PMH of diabetes mellitus type 2 non insulin dependent , essential hypertension, GERD on daily medications, and anxiety s/p sleeve gastrectomy on 4/4/ now with SMV thrombosis. No concern for ischemic bowel at this time due to CT, labs, and exam.     Admit to obs  Bowel rest, NPO  mIVF  MM pain control  PRN  nausea medications  Heparin drip  Vascular consult to evaluate for thrombectomy  Home medications. All need to be liquid or crushed

## 2024-04-24 NOTE — SUBJECTIVE & OBJECTIVE
(Not in a hospital admission)      Review of patient's allergies indicates:   Allergen Reactions    Keflex [cephalexin] Hives    Vicodin [hydrocodone-acetaminophen] Itching     itch    Crestor [rosuvastatin] Other (See Comments)     Muscle pain       Past Medical History:   Diagnosis Date    Allergy     Anxiety     Diabetes mellitus     GERD (gastroesophageal reflux disease)     Heart murmur     Hyperlipidemia     Hypertension     Seizures     5-6 years ago, spontaneous     Past Surgical History:   Procedure Laterality Date    CHOLECYSTECTOMY      2001 april    COLONOSCOPY N/A 06/29/2021    Procedure: COLONOSCOPY;  Surgeon: Gustavo Pelletier MD;  Location: [facility];  Service: Endoscopy;  Laterality: N/A;  combined orders    ESOPHAGOGASTRODUODENOSCOPY N/A 06/29/2021    Procedure: ESOPHAGOGASTRODUODENOSCOPY (EGD);  Surgeon: Gustavo Pelletier MD;  Location: [facility];  Service: Endoscopy;  Laterality: N/A;  covdi +3/29/20, fully vaccinated 3/23/21, prep instr portal -ml    ESOPHAGOGASTRODUODENOSCOPY N/A 11/29/2023    Procedure: EGD (ESOPHAGOGASTRODUODENOSCOPY);  Surgeon: Maciej Fine MD;  Location: [facility];  Service: Endoscopy;  Laterality: N/A;  referred by Cristhian RAHMAN, [facility] takes on Tuesdays will hold dose on Tuesday 11/28/23, instructions per [facility].    HYSTERECTOMY      partial  1996    LAPAROSCOPIC SLEEVE GASTRECTOMY N/A 4/4/2024    Procedure: GASTRECTOMY, SLEEVE, LAPAROSCOPIC with intraop EGD ([facility], please submit auth to insurance on or after 3/14/24);  Surgeon: Maciej Kennedy Jr., MD;  Location: [facility] OR [address] [place] [address];  Service: General;  Laterality: N/A;  with poss hiatal hernia repair    OOPHORECTOMY      SHOULDER SURGERY Right     WISDOM TOOTH EXTRACTION       Family History       Problem Relation (Age of Onset)    Breast cancer Maternal Aunt, Paternal Aunt    Cancer Maternal Aunt, Maternal Uncle, Paternal Aunt, Maternal Grandfather    Cataracts Mother    Depression Sister    Diabetes Mother,  Father    Early death Paternal Grandmother, Paternal Grandfather    Esophageal cancer Paternal Grandfather    Hyperlipidemia Mother    Hypertension Mother, Father, Sister    No Known Problems Brother, Paternal Uncle, Maternal Grandmother, Other    Stroke Father, Sister          Tobacco Use    Smoking status: Former     Current packs/day: 0.00     Types: Cigarettes    Smokeless tobacco: Never   Substance and Sexual Activity    Alcohol use: Not Currently    Drug use: No    Sexual activity: Yes     Partners: Male     Birth control/protection: None     Review of Systems   Gastrointestinal:  Positive for abdominal pain, nausea and vomiting.   All other systems reviewed and are negative.  Objective:     Vital Signs (Most Recent):  Temp: 98.5 °F (36.9 °C) (04/24/24 1600)  Pulse: 72 (04/24/24 1700)  Resp: 15 (04/24/24 1700)  BP: (!) 148/84 (04/24/24 1700)  SpO2: 98 % (04/24/24 1700) Vital Signs (24h Range):  Temp:  [98.4 °F (36.9 °C)-98.5 °F (36.9 °C)] 98.5 °F (36.9 °C)  Pulse:  [72-87] 72  Resp:  [15-29] 15  SpO2:  [98 %-99 %] 98 %  BP: (145-163)/(76-84) 148/84     Weight: 89.4 kg (197 lb)  Body mass index is 34.9 kg/m².      Physical Exam  Vitals reviewed.   Constitutional:       General: She is not in acute distress.  HENT:      Head: Normocephalic and atraumatic.      Nose: Nose normal.   Eyes:      General:         Right eye: No discharge.         Left eye: No discharge.   Cardiovascular:      Rate and Rhythm: Normal rate and regular rhythm.   Pulmonary:      Effort: Pulmonary effort is normal. No respiratory distress.      Breath sounds: No stridor.   Abdominal:      Comments: Surgical scars well healed  Mild generalized abdominal pain  Soft , ND   Musculoskeletal:         General: Normal range of motion.      Cervical back: Normal range of motion.   Skin:     General: Skin is warm and dry.   Neurological:      General: No focal deficit present.      Mental Status: She is alert and oriented to person, place, and time.    Psychiatric:         Mood and Affect: Mood normal.         Behavior: Behavior normal.      Significant Labs:  All pertinent labs from the last 24 hours have been reviewed.    Significant Diagnostics:  I have reviewed all pertinent imaging results/findings within the past 24 hours.

## 2024-04-25 PROBLEM — Z86.73 HISTORY OF STROKE: Status: ACTIVE | Noted: 2024-04-25

## 2024-04-25 LAB
ANION GAP SERPL CALC-SCNC: 10 MMOL/L (ref 8–16)
APTT PPP: 51.7 SEC (ref 21–32)
APTT PPP: 52.3 SEC (ref 21–32)
APTT PPP: 53 SEC (ref 21–32)
APTT PPP: 80 SEC (ref 21–32)
BASOPHILS # BLD AUTO: 0.03 K/UL (ref 0–0.2)
BASOPHILS # BLD AUTO: 0.03 K/UL (ref 0–0.2)
BASOPHILS NFR BLD: 0.5 % (ref 0–1.9)
BASOPHILS NFR BLD: 0.5 % (ref 0–1.9)
BUN SERPL-MCNC: 8 MG/DL (ref 6–20)
CALCIUM SERPL-MCNC: 9.5 MG/DL (ref 8.7–10.5)
CHLORIDE SERPL-SCNC: 104 MMOL/L (ref 95–110)
CO2 SERPL-SCNC: 23 MMOL/L (ref 23–29)
CREAT SERPL-MCNC: 0.7 MG/DL (ref 0.5–1.4)
DIFFERENTIAL METHOD BLD: ABNORMAL
DIFFERENTIAL METHOD BLD: ABNORMAL
EOSINOPHIL # BLD AUTO: 0.1 K/UL (ref 0–0.5)
EOSINOPHIL # BLD AUTO: 0.1 K/UL (ref 0–0.5)
EOSINOPHIL NFR BLD: 1.3 % (ref 0–8)
EOSINOPHIL NFR BLD: 1.3 % (ref 0–8)
ERYTHROCYTE [DISTWIDTH] IN BLOOD BY AUTOMATED COUNT: 13.4 % (ref 11.5–14.5)
ERYTHROCYTE [DISTWIDTH] IN BLOOD BY AUTOMATED COUNT: 13.4 % (ref 11.5–14.5)
EST. GFR  (NO RACE VARIABLE): >60 ML/MIN/1.73 M^2
GLUCOSE SERPL-MCNC: 116 MG/DL (ref 70–110)
HCT VFR BLD AUTO: 34.4 % (ref 37–48.5)
HCT VFR BLD AUTO: 34.4 % (ref 37–48.5)
HGB BLD-MCNC: 11.2 G/DL (ref 12–16)
HGB BLD-MCNC: 11.2 G/DL (ref 12–16)
IMM GRANULOCYTES # BLD AUTO: 0.01 K/UL (ref 0–0.04)
IMM GRANULOCYTES # BLD AUTO: 0.01 K/UL (ref 0–0.04)
IMM GRANULOCYTES NFR BLD AUTO: 0.2 % (ref 0–0.5)
IMM GRANULOCYTES NFR BLD AUTO: 0.2 % (ref 0–0.5)
LYMPHOCYTES # BLD AUTO: 3 K/UL (ref 1–4.8)
LYMPHOCYTES # BLD AUTO: 3 K/UL (ref 1–4.8)
LYMPHOCYTES NFR BLD: 48.9 % (ref 18–48)
LYMPHOCYTES NFR BLD: 48.9 % (ref 18–48)
MAGNESIUM SERPL-MCNC: 1.9 MG/DL (ref 1.6–2.6)
MCH RBC QN AUTO: 29.2 PG (ref 27–31)
MCH RBC QN AUTO: 29.2 PG (ref 27–31)
MCHC RBC AUTO-ENTMCNC: 32.6 G/DL (ref 32–36)
MCHC RBC AUTO-ENTMCNC: 32.6 G/DL (ref 32–36)
MCV RBC AUTO: 90 FL (ref 82–98)
MCV RBC AUTO: 90 FL (ref 82–98)
MONOCYTES # BLD AUTO: 0.6 K/UL (ref 0.3–1)
MONOCYTES # BLD AUTO: 0.6 K/UL (ref 0.3–1)
MONOCYTES NFR BLD: 9.5 % (ref 4–15)
MONOCYTES NFR BLD: 9.5 % (ref 4–15)
NEUTROPHILS # BLD AUTO: 2.4 K/UL (ref 1.8–7.7)
NEUTROPHILS # BLD AUTO: 2.4 K/UL (ref 1.8–7.7)
NEUTROPHILS NFR BLD: 39.6 % (ref 38–73)
NEUTROPHILS NFR BLD: 39.6 % (ref 38–73)
NRBC BLD-RTO: 0 /100 WBC
NRBC BLD-RTO: 0 /100 WBC
PHOSPHATE SERPL-MCNC: 3.8 MG/DL (ref 2.7–4.5)
PLATELET # BLD AUTO: 420 K/UL (ref 150–450)
PLATELET # BLD AUTO: 420 K/UL (ref 150–450)
PMV BLD AUTO: 10.2 FL (ref 9.2–12.9)
PMV BLD AUTO: 10.2 FL (ref 9.2–12.9)
POTASSIUM SERPL-SCNC: 3.8 MMOL/L (ref 3.5–5.1)
RBC # BLD AUTO: 3.83 M/UL (ref 4–5.4)
RBC # BLD AUTO: 3.83 M/UL (ref 4–5.4)
SODIUM SERPL-SCNC: 137 MMOL/L (ref 136–145)
WBC # BLD AUTO: 6.09 K/UL (ref 3.9–12.7)
WBC # BLD AUTO: 6.09 K/UL (ref 3.9–12.7)

## 2024-04-25 PROCEDURE — 99222 1ST HOSP IP/OBS MODERATE 55: CPT | Mod: ,,, | Performed by: INTERNAL MEDICINE

## 2024-04-25 PROCEDURE — 63600175 PHARM REV CODE 636 W HCPCS

## 2024-04-25 PROCEDURE — 85730 THROMBOPLASTIN TIME PARTIAL: CPT | Mod: 91 | Performed by: SURGERY

## 2024-04-25 PROCEDURE — 99024 POSTOP FOLLOW-UP VISIT: CPT | Mod: ,,, | Performed by: SURGERY

## 2024-04-25 PROCEDURE — 84100 ASSAY OF PHOSPHORUS: CPT

## 2024-04-25 PROCEDURE — 36415 COLL VENOUS BLD VENIPUNCTURE: CPT | Mod: XB | Performed by: SURGERY

## 2024-04-25 PROCEDURE — 80048 BASIC METABOLIC PNL TOTAL CA: CPT

## 2024-04-25 PROCEDURE — 85025 COMPLETE CBC W/AUTO DIFF WBC: CPT

## 2024-04-25 PROCEDURE — 25000003 PHARM REV CODE 250

## 2024-04-25 PROCEDURE — 94761 N-INVAS EAR/PLS OXIMETRY MLT: CPT

## 2024-04-25 PROCEDURE — 36415 COLL VENOUS BLD VENIPUNCTURE: CPT

## 2024-04-25 PROCEDURE — 11000001 HC ACUTE MED/SURG PRIVATE ROOM

## 2024-04-25 PROCEDURE — 83735 ASSAY OF MAGNESIUM: CPT

## 2024-04-25 PROCEDURE — 63600175 PHARM REV CODE 636 W HCPCS: Performed by: STUDENT IN AN ORGANIZED HEALTH CARE EDUCATION/TRAINING PROGRAM

## 2024-04-25 PROCEDURE — 85730 THROMBOPLASTIN TIME PARTIAL: CPT | Performed by: SURGERY

## 2024-04-25 RX ORDER — CALCIUM CARBONATE 200(500)MG
500 TABLET,CHEWABLE ORAL 2 TIMES DAILY PRN
Status: DISCONTINUED | OUTPATIENT
Start: 2024-04-25 | End: 2024-04-29 | Stop reason: HOSPADM

## 2024-04-25 RX ORDER — DIPHENHYDRAMINE HYDROCHLORIDE 50 MG/ML
25 INJECTION INTRAMUSCULAR; INTRAVENOUS EVERY 6 HOURS PRN
Status: DISCONTINUED | OUTPATIENT
Start: 2024-04-25 | End: 2024-04-25

## 2024-04-25 RX ORDER — DIPHENHYDRAMINE HYDROCHLORIDE 50 MG/ML
25 INJECTION INTRAMUSCULAR; INTRAVENOUS ONCE
Status: COMPLETED | OUTPATIENT
Start: 2024-04-25 | End: 2024-04-25

## 2024-04-25 RX ADMIN — Medication 6 MG: at 12:04

## 2024-04-25 RX ADMIN — LEVETIRACETAM 1000 MG: 100 INJECTION INTRAVENOUS at 12:04

## 2024-04-25 RX ADMIN — CALCIUM CARBONATE (ANTACID) CHEW TAB 500 MG 500 MG: 500 CHEW TAB at 02:04

## 2024-04-25 RX ADMIN — DIPHENHYDRAMINE HYDROCHLORIDE 25 MG: 50 INJECTION INTRAMUSCULAR; INTRAVENOUS at 09:04

## 2024-04-25 RX ADMIN — LEVETIRACETAM 1000 MG: 100 INJECTION INTRAVENOUS at 09:04

## 2024-04-25 RX ADMIN — SODIUM CHLORIDE, POTASSIUM CHLORIDE, SODIUM LACTATE AND CALCIUM CHLORIDE: 600; 310; 30; 20 INJECTION, SOLUTION INTRAVENOUS at 04:04

## 2024-04-25 RX ADMIN — MORPHINE SULFATE 6 MG: 2 INJECTION, SOLUTION INTRAMUSCULAR; INTRAVENOUS at 04:04

## 2024-04-25 RX ADMIN — ONDANSETRON 8 MG: 2 INJECTION INTRAMUSCULAR; INTRAVENOUS at 04:04

## 2024-04-25 RX ADMIN — ONDANSETRON 8 MG: 2 INJECTION INTRAMUSCULAR; INTRAVENOUS at 01:04

## 2024-04-25 RX ADMIN — ONDANSETRON 8 MG: 2 INJECTION INTRAMUSCULAR; INTRAVENOUS at 09:04

## 2024-04-25 RX ADMIN — TOPIRAMATE 100 MG: 25 TABLET, FILM COATED ORAL at 09:04

## 2024-04-25 RX ADMIN — MORPHINE SULFATE 6 MG: 2 INJECTION, SOLUTION INTRAMUSCULAR; INTRAVENOUS at 07:04

## 2024-04-25 RX ADMIN — HEPARIN SODIUM 15 UNITS/KG/HR: 10000 INJECTION, SOLUTION INTRAVENOUS at 02:04

## 2024-04-25 RX ADMIN — SODIUM CHLORIDE, POTASSIUM CHLORIDE, SODIUM LACTATE AND CALCIUM CHLORIDE: 600; 310; 30; 20 INJECTION, SOLUTION INTRAVENOUS at 07:04

## 2024-04-25 RX ADMIN — LEVETIRACETAM 1000 MG: 100 INJECTION INTRAVENOUS at 08:04

## 2024-04-25 NOTE — PLAN OF CARE
Problem: Adult Inpatient Plan of Care  Goal: Plan of Care Review  Outcome: Progressing  Goal: Patient-Specific Goal (Individualized)  Outcome: Progressing  Goal: Absence of Hospital-Acquired Illness or Injury  Outcome: Progressing  Goal: Optimal Comfort and Wellbeing  Outcome: Progressing  Goal: Readiness for Transition of Care  Outcome: Progressing     Problem: Diabetes Comorbidity  Goal: Blood Glucose Level Within Targeted Range  Outcome: Progressing     Problem: Skin Injury Risk Increased  Goal: Skin Health and Integrity  Outcome: Progressing

## 2024-04-25 NOTE — NURSING
Nurses Note -- 4 Eyes      4/25/2024   12:40 PM      Skin assessed during: Daily Assessment      [x] No Altered Skin Integrity Present    []Prevention Measures Documented      [] Yes- Altered Skin Integrity Present or Discovered   [] LDA Added if Not in Epic (Describe Wound)   [] New Altered Skin Integrity was Present on Admit and Documented in LDA   [] Wound Image Taken    Wound Care Consulted? No    Attending Nurse:  JEEVAN Garcia    Second RN/Staff Member:   JEEVAN Espinoza

## 2024-04-25 NOTE — ASSESSMENT & PLAN NOTE
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 Bowel looks good. Vascular surgery consulted.     No intervention  Needs anticoagulation starting with heparin. Make sure gets therapeutic. Can transition to oral prior to discharge.  Recommend heme/onc consult for work-up of thrombophilia.  Rest of care per primary  Please call with questions

## 2024-04-25 NOTE — ASSESSMENT & PLAN NOTE
59 year old female with a PMH of diabetes mellitus type 2 non insulin dependent , essential hypertension, GERD on daily medications, and anxiety s/p sleeve gastrectomy on 4/4/ now with SMV thrombosis. HOD 1 she continues to show no signs of ischemic bowel at this time due to CT, labs, and exam.     CLD  mIVF until tolerating adequate PO  MM pain control  PRN nausea medications  Heparin drip - adjust based on PTT  Vascular consult - no indication for thrombectomy, continue anticoagulation  Interventional cardiology - pending  Hematology/Oncology - pending  Home medications. All need to be liquid or crushed    Dispo: pending IR and hematology recommendations, and toleration of diet, TDD 4/25 vs 4/26 w/o home health needs

## 2024-04-25 NOTE — SUBJECTIVE & OBJECTIVE
Interval History: NAEON. Pt overall feeling well. Denies N/V. Admits flatus. Would like to trial diet. Reports pain is well controlled. OOB as tolerated.     Medications:  Continuous Infusions:  Current Facility-Administered Medications   Medication Dose Route Frequency Last Rate Last Admin    heparin (porcine) in D5W  0-40 Units/kg/hr (Adjusted) Intravenous Continuous 10.1 mL/hr at 04/25/24 0224 15 Units/kg/hr at 04/25/24 0224    lactated ringers   Intravenous Continuous 125 mL/hr at 04/25/24 0751 New Bag at 04/25/24 0751     Scheduled Meds:  Current Facility-Administered Medications   Medication Dose Route Frequency    levETIRAcetam (Keppra) IV (PEDS and ADULTS)  1,000 mg Intravenous Q12H    topiramate  100 mg Oral Daily     PRN Meds:  Current Facility-Administered Medications:     acetaminophen, 650 mg, Oral, Q8H PRN    albuterol, 1 puff, Inhalation, Q4H PRN    heparin (PORCINE), 60 Units/kg (Adjusted), Intravenous, PRN    heparin (PORCINE), 30 Units/kg (Adjusted), Intravenous, PRN    LIDOcaine (PF) 10 mg/ml (1%), 1 mL, Intradermal, Once PRN    melatonin, 6 mg, Oral, Nightly PRN    morphine, 4 mg, Intravenous, Q4H PRN    morphine, 6 mg, Intravenous, Q4H PRN    ondansetron, 8 mg, Intravenous, Q8H PRN    sodium chloride 0.9%, 10 mL, Intravenous, PRN     Review of patient's allergies indicates:   Allergen Reactions    Keflex [cephalexin] Hives    Vicodin [hydrocodone-acetaminophen] Itching     itch    Crestor [rosuvastatin] Other (See Comments)     Muscle pain     Objective:     Vital Signs (Most Recent):  Temp: 97.9 °F (36.6 °C) (04/25/24 0725)  Pulse: 63 (04/25/24 0725)  Resp: 18 (04/25/24 0743)  BP: 133/69 (04/25/24 0725)  SpO2: 95 % (04/25/24 0725) Vital Signs (24h Range):  Temp:  [97.1 °F (36.2 °C)-98.5 °F (36.9 °C)] 97.9 °F (36.6 °C)  Pulse:  [63-87] 63  Resp:  [15-29] 18  SpO2:  [95 %-99 %] 95 %  BP: (132-163)/(66-84) 133/69     Weight: 88.4 kg (194 lb 14.2 oz)  Body mass index is 34.53  kg/m².    Intake/Output - Last 3 Shifts       None             Physical Exam  Constitutional:       Appearance: Normal appearance.   HENT:      Head: Normocephalic and atraumatic.      Nose: Nose normal.      Mouth/Throat:      Mouth: Mucous membranes are moist.      Pharynx: Oropharynx is clear.   Eyes:      Extraocular Movements: Extraocular movements intact.      Conjunctiva/sclera: Conjunctivae normal.   Cardiovascular:      Rate and Rhythm: Normal rate and regular rhythm.   Pulmonary:      Effort: Pulmonary effort is normal.   Abdominal:      General: Abdomen is flat.      Palpations: Abdomen is soft.      Comments: Port incisions well healed   Skin:     General: Skin is warm and dry.      Capillary Refill: Capillary refill takes less than 2 seconds.   Neurological:      General: No focal deficit present.      Mental Status: She is alert.          Significant Labs:  I have reviewed all pertinent lab results within the past 24 hours.    Significant Diagnostics:  I have reviewed all pertinent imaging results/findings within the past 24 hours.

## 2024-04-25 NOTE — PLAN OF CARE
Problem: Adult Inpatient Plan of Care  Goal: Plan of Care Review  Outcome: Progressing  Goal: Patient-Specific Goal (Individualized)  Outcome: Progressing  Goal: Absence of Hospital-Acquired Illness or Injury  Outcome: Progressing  Goal: Optimal Comfort and Wellbeing  Outcome: Progressing  Goal: Readiness for Transition of Care  Outcome: Progressing     Problem: Fall Injury Risk  Goal: Absence of Fall and Fall-Related Injury  Outcome: Progressing     Problem: Skin Injury Risk Increased  Goal: Skin Health and Integrity  Outcome: Progressing

## 2024-04-25 NOTE — CONSULTS
Patient with recent sleeve gastrectomy on 4/4/24 presenting with abdominal pain. Had a CT done which revealed SMV thrombosis. Vascular evaluated the patient who recommend anticoagulation with heparin during admission with transition to oral AC upon discharge.     IR consulted for possible thrombectomy. Imaging reviewed by IR staff. Recommend obtaining a CT triple phase on 4/28/24 or 4/29/24 to further assess the clot. If the clot is improved, then would recommend continued anticoagulation. If the clot remains stable and there is no change, can consider possible thrombectomy.     In the interim, recommend trending lactic acid levels, checking for peritoneal signs, and continued monitoring for venous mesenteric ischemia. Discussed with surgery team via [employer] secure chat.     Anita Oquendo PA-C  Interventional Radiology  Spectra: [phone]  4/25/2024

## 2024-04-25 NOTE — PROGRESS NOTES
[facility]  General Surgery  Progress Note    Subjective:     History of Present Illness:  Monalisa Carson is a 59 year old female with a PMH of diabetes mellitus type 2 non insulin dependent , essential hypertension, GERD on daily medications, and anxiety who presents with abdominal pain that began on Sunday. She reports that this is in the upper abdomen that radiates to the RUQ and is sharp. Pain is improved with medication in the ED. She has also had associated nausea and vomiting that started yesterday. She is s/p sleeve gastrectomy on 4/4/24.    Labs without significant abnormalities. CT scan with SMV thrombosis.     Post-Op Info:  * No surgery found *         Interval History: NAEON. Pt overall feeling well. Denies N/V. Admits flatus. Would like to trial diet. Reports pain is well controlled. OOB as tolerated.     Medications:  Continuous Infusions:  Current Facility-Administered Medications   Medication Dose Route Frequency Last Rate Last Admin    heparin (porcine) in D5W  0-40 Units/kg/hr (Adjusted) Intravenous Continuous 10.1 mL/hr at 04/25/24 0224 15 Units/kg/hr at 04/25/24 0224    lactated ringers   Intravenous Continuous 125 mL/hr at 04/25/24 0751 New Bag at 04/25/24 0751     Scheduled Meds:  Current Facility-Administered Medications   Medication Dose Route Frequency    levETIRAcetam (Keppra) IV (PEDS and ADULTS)  1,000 mg Intravenous Q12H    topiramate  100 mg Oral Daily     PRN Meds:  Current Facility-Administered Medications:     acetaminophen, 650 mg, Oral, Q8H PRN    albuterol, 1 puff, Inhalation, Q4H PRN    heparin (PORCINE), 60 Units/kg (Adjusted), Intravenous, PRN    heparin (PORCINE), 30 Units/kg (Adjusted), Intravenous, PRN    LIDOcaine (PF) 10 mg/ml (1%), 1 mL, Intradermal, Once PRN    melatonin, 6 mg, Oral, Nightly PRN    morphine, 4 mg, Intravenous, Q4H PRN    morphine, 6 mg, Intravenous, Q4H PRN    ondansetron, 8 mg, Intravenous, Q8H PRN    sodium chloride 0.9%, 10 mL,  Intravenous, PRN     Review of patient's allergies indicates:   Allergen Reactions    Keflex [cephalexin] Hives    Vicodin [hydrocodone-acetaminophen] Itching     itch    Crestor [rosuvastatin] Other (See Comments)     Muscle pain     Objective:     Vital Signs (Most Recent):  Temp: 97.9 °F (36.6 °C) (04/25/24 0725)  Pulse: 63 (04/25/24 0725)  Resp: 18 (04/25/24 0743)  BP: 133/69 (04/25/24 0725)  SpO2: 95 % (04/25/24 0725) Vital Signs (24h Range):  Temp:  [97.1 °F (36.2 °C)-98.5 °F (36.9 °C)] 97.9 °F (36.6 °C)  Pulse:  [63-87] 63  Resp:  [15-29] 18  SpO2:  [95 %-99 %] 95 %  BP: (132-163)/(66-84) 133/69     Weight: 88.4 kg (194 lb 14.2 oz)  Body mass index is 34.53 kg/m².    Intake/Output - Last 3 Shifts       None             Physical Exam  Constitutional:       Appearance: Normal appearance.   HENT:      Head: Normocephalic and atraumatic.      Nose: Nose normal.      Mouth/Throat:      Mouth: Mucous membranes are moist.      Pharynx: Oropharynx is clear.   Eyes:      Extraocular Movements: Extraocular movements intact.      Conjunctiva/sclera: Conjunctivae normal.   Cardiovascular:      Rate and Rhythm: Normal rate and regular rhythm.   Pulmonary:      Effort: Pulmonary effort is normal.   Abdominal:      General: Abdomen is flat.      Palpations: Abdomen is soft.      Comments: Port incisions well healed   Skin:     General: Skin is warm and dry.      Capillary Refill: Capillary refill takes less than 2 seconds.   Neurological:      General: No focal deficit present.      Mental Status: She is alert.          Significant Labs:  I have reviewed all pertinent lab results within the past 24 hours.    Significant Diagnostics:  I have reviewed all pertinent imaging results/findings within the past 24 hours.  Assessment/Plan:     * Superior mesenteric vein thrombosis  59 year old female with a PMH of diabetes mellitus type 2 non insulin dependent , essential hypertension, GERD on daily medications, and anxiety s/p  sleeve gastrectomy on 4/4/ now with SMV thrombosis. HOD 1 she continues to show no signs of ischemic bowel at this time due to CT, labs, and exam.     CLD  mIVF until tolerating adequate PO  MM pain control  PRN nausea medications  Heparin drip - adjust based on PTT  Vascular consult - no indication for thrombectomy, continue anticoagulation  Interventional cardiology - pending  Hematology/Oncology - pending  Home medications. All need to be liquid or crushed    Dispo: pending IR and hematology recommendations, and toleration of diet, TDD 4/25 vs 4/26 w/o home health needs        Thomas Gilbert MD  General Surgery  [facility]

## 2024-04-25 NOTE — NURSING
Nurses Note -- 4 Eyes      4/24/2024   9:52 PM      Skin assessed during: Admit      [x] No Altered Skin Integrity Present    []Prevention Measures Documented      [] Yes- Altered Skin Integrity Present or Discovered   [] LDA Added if Not in Epic (Describe Wound)   [] New Altered Skin Integrity was Present on Admit and Documented in LDA   [] Wound Image Taken    Wound Care Consulted? No    Attending Nurse:  Kriss KIM.    Second RN/Staff Member: Iva CHEW/Tiana RN.

## 2024-04-25 NOTE — PROGRESS NOTES
[facility]  Vascular Surgery  Progress Note    Patient Name: Monalisa Carson  MRN: [id number]  Admission Date: 4/24/2024  Primary Care Provider: Flor Gomez MD    Subjective:     Interval History: No acute events overnight, pain is improving    Post-Op Info:  * No surgery found *         Medications:  Continuous Infusions:  Current Facility-Administered Medications   Medication Dose Route Frequency Last Rate Last Admin    heparin (porcine) in D5W  0-40 Units/kg/hr (Adjusted) Intravenous Continuous 10.1 mL/hr at 04/25/24 0224 15 Units/kg/hr at 04/25/24 0224    lactated ringers   Intravenous Continuous 125 mL/hr at 04/24/24 1956 New Bag at 04/24/24 1956     Scheduled Meds:  Current Facility-Administered Medications   Medication Dose Route Frequency    levETIRAcetam (Keppra) IV (PEDS and ADULTS)  1,000 mg Intravenous Q12H    topiramate  100 mg Oral Daily     PRN Meds:  Current Facility-Administered Medications:     acetaminophen, 650 mg, Oral, Q8H PRN    albuterol, 1 puff, Inhalation, Q4H PRN    heparin (PORCINE), 60 Units/kg (Adjusted), Intravenous, PRN    heparin (PORCINE), 30 Units/kg (Adjusted), Intravenous, PRN    LIDOcaine (PF) 10 mg/ml (1%), 1 mL, Intradermal, Once PRN    melatonin, 6 mg, Oral, Nightly PRN    morphine, 4 mg, Intravenous, Q4H PRN    morphine, 6 mg, Intravenous, Q4H PRN    ondansetron, 8 mg, Intravenous, Q8H PRN    sodium chloride 0.9%, 10 mL, Intravenous, PRN     Objective:     Vital Signs (Most Recent):  Temp: 97.8 °F (36.6 °C) (04/25/24 0503)  Pulse: 65 (04/25/24 0503)  Resp: 18 (04/25/24 0503)  BP: 132/79 (04/25/24 0503)  SpO2: 98 % (04/25/24 0503) Vital Signs (24h Range):  Temp:  [97.1 °F (36.2 °C)-98.5 °F (36.9 °C)] 97.8 °F (36.6 °C)  Pulse:  [65-87] 65  Resp:  [15-29] 18  SpO2:  [96 %-99 %] 98 %  BP: (132-163)/(66-84) 132/79          Physical Exam  Vitals reviewed.   Constitutional:       General: She is not in acute distress.  HENT:      Head: Normocephalic and atraumatic.       Nose: Nose normal.   Eyes:      General:         Right eye: No discharge.         Left eye: No discharge.   Cardiovascular:      Rate and Rhythm: Normal rate and regular rhythm.   Pulmonary:      Effort: Pulmonary effort is normal. No respiratory distress.      Breath sounds: No stridor.   Abdominal:      Comments: Surgical scars well healed  Mild generalized abdominal pain  Soft , ND   Musculoskeletal:         General: Normal range of motion.      Cervical back: Normal range of motion.   Skin:     General: Skin is warm and dry.   Neurological:      General: No focal deficit present.      Mental Status: She is alert and oriented to person, place, and time.   Psychiatric:         Mood and Affect: Mood normal.         Behavior: Behavior normal.          Significant Labs:  All pertinent labs from the last 24 hours have been reviewed.    Significant Diagnostics:  I have reviewed all pertinent imaging results/findings within the past 24 hours.  Assessment/Plan:     * Superior mesenteric vein thrombosis  59F s/p sleeve gastrectomy on 4/4 now admitted with abdominal pain and found to have SMV thrombosis. Bowel looks good. Vascular surgery consulted.     No intervention  Needs anticoagulation starting with heparin. Make sure gets therapeutic. Can transition to oral prior to discharge.  Recommend heme/onc consult for work-up of thrombophilia.  Rest of care per primary  Please call with questions          New Doe MD  Vascular Surgery  [facility]

## 2024-04-25 NOTE — PROGRESS NOTES
[facility]  Initial Discharge Assessment       Primary Care Provider: Flor Gomez MD    Admission Diagnosis: Superior mesenteric vein thrombosis [K55.069]  S/P laparoscopic sleeve gastrectomy [Z98.84]    Admission Date: 4/24/2024  Expected Discharge Date: 4/26/2024    Transition of Care Barriers: None    Payor: BLUE CROSS BLUE SHIELD / Plan: [employer] OF [place] [place] [facility]\"" LOCAL PLUS / Product Type: Commercial /     Extended Emergency Contact Information  Primary Emergency Contact: Lola Carson   [place]  Mobile Phone: [phone]  Relation: Spouse    Discharge Plan A: Home, Home with family         [id number] - [place], [place] - [address] AT [facility] [address]  [address]  [place] [address]  Phone: [phone] Fax: [phone]      Initial Assessment (most recent)       Adult Discharge Assessment - 04/25/24 1132          Discharge Assessment    Assessment Type Discharge Planning Assessment     Confirmed/corrected address, phone number and insurance Yes     Confirmed Demographics Correct on Facesheet     Source of Information patient     If unable to respond/provide information was family/caregiver contacted? Yes     Contact Name/Number Lola Carson ,  at [phone]     Does patient/caregiver understand observation status Yes     Communicated YANELY with patient/caregiver Yes     Reason For Admission patient was admitted for Superior mesenteric vein Thrombosis     People in Home spouse;grandchild(purvi)     Facility Arrived From: patient presented to the Ed from home     Do you expect to return to your current living situation? Yes     Do you have help at home or someone to help you manage your care at home? Yes     Who are your caregiver(s) and their phone number(s)? Lola Carson ,  at [phone]     Prior to hospitilization cognitive status: Alert/Oriented     Current cognitive status: Alert/Oriented     Walking or Climbing  Stairs Difficulty no     Readmission within 30 days? No     Patient currently being followed by outpatient case management? No     Do you currently have service(s) that help you manage your care at home? No     Do you take prescription medications? No     Do you have prescription coverage? Yes     Coverage Blue Cross /Blue shield     Do you have any problems affording any of your prescribed medications? No     Is the patient taking medications as prescribed? yes     Who is going to help you get home at discharge? Kellenusama Valverdeite ,  at [phone]     How do you get to doctors appointments? car, drives self;family or friend will provide     Are you on dialysis? No     Do you take coumadin? No     Discharge Plan A Home;Home with family     DME Needed Upon Discharge  none     Discharge Plan discussed with: Patient     Transition of Care Barriers None        OTHER    Name(s) of People in Home Skylervan Valverdeite ,  at [phone]

## 2024-04-25 NOTE — CONSULTS
Classical Hematology Consult Note    Inpatient consult to Hematology/Oncology  Consult performed by: Lesvia Garcia MD  Consult ordered by: Primitivo Boyce MD        SUBJECTIVE:     History of Present Illness:  Patient is a 59 y.o. female presents with hx of metabolic syndrome, obesity s/p sleeve gastrectomy 4/4/2024. Admitted with acute superior mesenteric thrombosis. Hematology consulted regarding hypercoagulable workup.     At bedside, patient complained of abdominal pain but overall improving since admission. Patient states that this is the first episode of thrombosis that she experienced. Has family hx of VTE in her father at age 72 experienced DVT and her mother at age 80 experienced PE. No other family history of thrombosis. No family hx of malignancy.     Patient is a former smoker. No hx of recreational drug use. No hx of alcohol use. Uptodate on cancer screening and she reported no positive findings in recent colonoscopy, mammogram. No pap smears have been done as she has had hysterectomy due to uterine fibroids.     Review of patient's allergies indicates:   Allergen Reactions    Keflex [cephalexin] Hives    Vicodin [hydrocodone-acetaminophen] Itching     itch    Crestor [rosuvastatin] Other (See Comments)     Muscle pain     Past Medical History:   Diagnosis Date    Allergy     Anxiety     Diabetes mellitus     GERD (gastroesophageal reflux disease)     Heart murmur     Hyperlipidemia     Hypertension     Seizures     5-6 years ago, spontaneous     Past Surgical History:   Procedure Laterality Date    CHOLECYSTECTOMY      2001 april    COLONOSCOPY N/A 06/29/2021    Procedure: COLONOSCOPY;  Surgeon: Gustavo Pelletier MD;  Location: [facility];  Service: Endoscopy;  Laterality: N/A;  combined orders    ESOPHAGOGASTRODUODENOSCOPY N/A 06/29/2021    Procedure: ESOPHAGOGASTRODUODENOSCOPY (EGD);  Surgeon: Gustavo Pelletier MD;  Location: [facility];  Service: Endoscopy;  Laterality: N/A;  covdi  +3/29/20, fully vaccinated 3/23/21, prep instr portal -ml    ESOPHAGOGASTRODUODENOSCOPY N/A 11/29/2023    Procedure: EGD (ESOPHAGOGASTRODUODENOSCOPY);  Surgeon: Maciej Fine MD;  Location: [facility];  Service: Endoscopy;  Laterality: N/A;  referred by Cristhian RAHMAN, [facility] takes on Tuesdays will hold dose on Tuesday 11/28/23, instructions per [facility].    HYSTERECTOMY      partial  1996    LAPAROSCOPIC SLEEVE GASTRECTOMY N/A 4/4/2024    Procedure: GASTRECTOMY, SLEEVE, LAPAROSCOPIC with intraop EGD (OGB, please submit auth to insurance on or after 3/14/24);  Surgeon: Maciej Kennedy Jr., MD;  Location: [address];  Service: General;  Laterality: N/A;  with poss hiatal hernia repair    OOPHORECTOMY      SHOULDER SURGERY Right     WISDOM TOOTH EXTRACTION       Family History   Problem Relation Name Age of Onset    Diabetes Mother      Hyperlipidemia Mother      Hypertension Mother      Cataracts Mother      Diabetes Father      Hypertension Father      Stroke Father      Depression Sister      Hypertension Sister      Stroke Sister      No Known Problems Brother      Cancer Maternal Aunt          breast x 2    Breast cancer Maternal Aunt      Cancer Maternal Uncle          prostate x 2    Cancer Paternal Aunt          breast    Breast cancer Paternal Aunt      No Known Problems Paternal Uncle      No Known Problems Maternal Grandmother      Cancer Maternal Grandfather          lung    Early death Paternal Grandmother      Early death Paternal Grandfather      Esophageal cancer Paternal Grandfather      No Known Problems Other      Melanoma Neg Hx      Eczema Neg Hx      Lupus Neg Hx      Psoriasis Neg Hx      Amblyopia Neg Hx      Blindness Neg Hx      Glaucoma Neg Hx      Macular degeneration Neg Hx      Retinal detachment Neg Hx      Strabismus Neg Hx      Thyroid disease Neg Hx      Colon cancer Neg Hx       Social History     Tobacco Use    Smoking status: Former     Current packs/day: 0.00     Types:  Cigarettes    Smokeless tobacco: Never   Substance Use Topics    Alcohol use: Not Currently    Drug use: No     Review of Systems   Constitutional:  Negative for chills and fever.   HENT:  Negative for sore throat.    Eyes:  Negative for blurred vision and double vision.   Respiratory:  Negative for cough and shortness of breath.    Cardiovascular:  Negative for chest pain, palpitations and leg swelling.   Gastrointestinal:  Positive for abdominal pain. Negative for constipation, diarrhea, nausea and vomiting.   Genitourinary:  Negative for dysuria.   Skin:  Negative for rash.   Neurological:  Negative for dizziness, weakness and headaches.   Psychiatric/Behavioral:  The patient is not nervous/anxious.      OBJECTIVE:     Vital Signs:  Temp:  [97.8 °F (36.6 °C)-97.9 °F (36.6 °C)]   Pulse:  [60-65]   Resp:  [18-20]   BP: (118-133)/(66-79)   SpO2:  [95 %-98 %]     Physical Exam  Constitutional:       General: She is not in acute distress.     Appearance: Normal appearance. She is not toxic-appearing.   HENT:      Head: Normocephalic and atraumatic.      Nose: Nose normal.      Mouth/Throat:      Mouth: Mucous membranes are moist.      Pharynx: Oropharynx is clear.   Eyes:      General: No scleral icterus.     Conjunctiva/sclera: Conjunctivae normal.   Cardiovascular:      Rate and Rhythm: Normal rate.   Pulmonary:      Effort: Pulmonary effort is normal. No respiratory distress.      Breath sounds: No wheezing.   Abdominal:      General: There is no distension.      Palpations: Abdomen is soft.      Tenderness: There is abdominal tenderness. There is no guarding.   Musculoskeletal:         General: No tenderness.      Cervical back: Normal range of motion.      Right lower leg: No edema.      Left lower leg: No edema.   Skin:     General: Skin is warm and dry.      Findings: No rash.   Neurological:      Mental Status: She is alert and oriented to person, place, and time.      Motor: No weakness.   Psychiatric:          Mood and Affect: Mood normal.         Behavior: Behavior normal.         Thought Content: Thought content normal.       Laboratory:  CBC:   Recent Labs   Lab 04/25/24  0523   WBC 6.09  6.09   RBC 3.83*  3.83*   HGB 11.2*  11.2*   HCT 34.4*  34.4*     420   MCV 90  90   MCH 29.2  29.2   MCHC 32.6  32.6     CMP:   Recent Labs   Lab 04/24/24  1643 04/25/24  0523   * 116*   CALCIUM 9.9 9.5   ALBUMIN 3.2*  --    PROT 8.0  --     137   K 4.1 3.8   CO2 23 23    104   BUN 8 8   CREATININE 0.7 0.7   ALKPHOS 97  --    ALT 72*  --    AST 53*  --    BILITOT 0.5  --      Coagulation:   Recent Labs   Lab 04/24/24  1746 04/25/24  0042 04/25/24  1141   LABPROT 11.0  --   --    INR 1.0  --   --    APTT 23.4   < > 51.7*    < > = values in this interval not displayed.       Diagnostic Results:  CT: Reviewed    ASSESSMENT/PLAN:     Acute superior mesenteric thrombosis in patient with recent sleeve gastrectomy 4/4/2024  Post op first episode provoked mesenteric thrombosis    Family hx that was reported includes family members age 72 and 80. Beatrice mesenteric vein thrombosis post gastric sleeve surgery has been clearly reported and documented. Median presentation is estimated around 20 days postop. In a retrospective study of 1236 patients, around 0.4% developed mesentric vein thrombosis, a history of smoking was a predominant risk factor. 2 patients tested positive for protein C deficiency but their ages were 43 and 29.   In this patient with clear risk factor (sleeve gastrectomy) and age > 45 with no concerning family hx, the suspicion for inherited thrombophilia is low.   Recommend anticoagulation for at least 3 months. Can transition to eliquis prior to discharge.   Will request follow up as outpatient in 2-3 months to guide duration of anticoagulation and possible thrombophilia workup after discontinuation of anticoagulation to rule out inherited thrombophilia.   Hematology will sign off, please  don't hesitate to contact us with any questions or concerns     Lesvia Garcia MD  Hematology and Medical Oncology fellow     Portomesenteric Vein Thrombosis After Laparoscopic Sleeve Gastrectomy: Incidence, Analysis and Follow-Up in 1236 Consecutive Cases

## 2024-04-25 NOTE — SUBJECTIVE & OBJECTIVE
Medications:  Continuous Infusions:  Current Facility-Administered Medications   Medication Dose Route Frequency Last Rate Last Admin    heparin (porcine) in D5W  0-40 Units/kg/hr (Adjusted) Intravenous Continuous 10.1 mL/hr at 04/25/24 0224 15 Units/kg/hr at 04/25/24 0224    lactated ringers   Intravenous Continuous 125 mL/hr at 04/24/24 1956 New Bag at 04/24/24 1956     Scheduled Meds:  Current Facility-Administered Medications   Medication Dose Route Frequency    levETIRAcetam (Keppra) IV (PEDS and ADULTS)  1,000 mg Intravenous Q12H    topiramate  100 mg Oral Daily     PRN Meds:  Current Facility-Administered Medications:     acetaminophen, 650 mg, Oral, Q8H PRN    albuterol, 1 puff, Inhalation, Q4H PRN    heparin (PORCINE), 60 Units/kg (Adjusted), Intravenous, PRN    heparin (PORCINE), 30 Units/kg (Adjusted), Intravenous, PRN    LIDOcaine (PF) 10 mg/ml (1%), 1 mL, Intradermal, Once PRN    melatonin, 6 mg, Oral, Nightly PRN    morphine, 4 mg, Intravenous, Q4H PRN    morphine, 6 mg, Intravenous, Q4H PRN    ondansetron, 8 mg, Intravenous, Q8H PRN    sodium chloride 0.9%, 10 mL, Intravenous, PRN     Objective:     Vital Signs (Most Recent):  Temp: 97.8 °F (36.6 °C) (04/25/24 0503)  Pulse: 65 (04/25/24 0503)  Resp: 18 (04/25/24 0503)  BP: 132/79 (04/25/24 0503)  SpO2: 98 % (04/25/24 0503) Vital Signs (24h Range):  Temp:  [97.1 °F (36.2 °C)-98.5 °F (36.9 °C)] 97.8 °F (36.6 °C)  Pulse:  [65-87] 65  Resp:  [15-29] 18  SpO2:  [96 %-99 %] 98 %  BP: (132-163)/(66-84) 132/79          Physical Exam  Vitals reviewed.   Constitutional:       General: She is not in acute distress.  HENT:      Head: Normocephalic and atraumatic.      Nose: Nose normal.   Eyes:      General:         Right eye: No discharge.         Left eye: No discharge.   Cardiovascular:      Rate and Rhythm: Normal rate and regular rhythm.   Pulmonary:      Effort: Pulmonary effort is normal. No respiratory distress.      Breath sounds: No stridor.    Abdominal:      Comments: Surgical scars well healed  Mild generalized abdominal pain  Soft , ND   Musculoskeletal:         General: Normal range of motion.      Cervical back: Normal range of motion.   Skin:     General: Skin is warm and dry.   Neurological:      General: No focal deficit present.      Mental Status: She is alert and oriented to person, place, and time.   Psychiatric:         Mood and Affect: Mood normal.         Behavior: Behavior normal.          Significant Labs:  All pertinent labs from the last 24 hours have been reviewed.    Significant Diagnostics:  I have reviewed all pertinent imaging results/findings within the past 24 hours.   intact

## 2024-04-25 NOTE — PROGRESS NOTES
Discharge Plan A and Plan B have been determined by review of patient's clinical status, future medical and therapeutic needs, and coverage/benefits for post-acute care in coordination with multidisciplinary team members.

## 2024-04-25 NOTE — PLAN OF CARE
04/25/24 1132   Discharge Assessment   Assessment Type Discharge Planning Assessment   Confirmed/corrected address, phone number and insurance Yes   Confirmed Demographics Correct on Facesheet   Source of Information patient   If unable to respond/provide information was family/caregiver contacted? Yes   Contact Name/Number Lola Carson ,  at [phone]   Does patient/caregiver understand observation status Yes   Communicated YANELY with patient/caregiver Yes   Reason For Admission patient was admitted for Superior mesenteric vein Thrombosis   People in Home spouse;grandchild(purvi)   Facility Arrived From: patient presented to the Ed from home   Do you expect to return to your current living situation? Yes   Do you have help at home or someone to help you manage your care at home? Yes   Who are your caregiver(s) and their phone number(s)? Lola aCrson ,  at [phone]   Prior to hospitilization cognitive status: Alert/Oriented   Current cognitive status: Alert/Oriented   Walking or Climbing Stairs Difficulty no   Readmission within 30 days? No   Patient currently being followed by outpatient case management? No   Do you currently have service(s) that help you manage your care at home? No   Do you take prescription medications? No   Do you have prescription coverage? Yes   Coverage Blue Cross /Blue shield   Do you have any problems affording any of your prescribed medications? No   Is the patient taking medications as prescribed? yes   Who is going to help you get home at discharge? Lola Carson ,  at [phone]   How do you get to doctors appointments? car, drives self;family or friend will provide   Are you on dialysis? No   Do you take coumadin? No   Discharge Plan A Home;Home with family   DME Needed Upon Discharge  none   Discharge Plan discussed with: Patient   Transition of Care Barriers None   OTHER   Name(s) of People in Home Lola Carson ,  at  [phone]     SW met with patient at bedside to complete discharge planning assessment.  Patient alert and oriented xs 4.  Patient verified all demographic information on facesheet is correct.  Patient verified PCP is Dr Flor De Leon.  Patient verified primary health insurance is Blue Cross /Blue shield and Medicaid [employer] .  Patient with NO home health and no DME .  Patient with Reynoldo Favorite ,  at [phone] .  Patient not on dialysis or medication coumadin.  Patient with no 30 day admission.  Patient with no financial issues at this time.  Patient family will provide transportation upon discharge from facility.  Patient independent with ADLs, live with spouse, drives self.

## 2024-04-26 LAB
ANION GAP SERPL CALC-SCNC: 9 MMOL/L (ref 8–16)
APTT PPP: 50.3 SEC (ref 21–32)
BASOPHILS # BLD AUTO: 0.03 K/UL (ref 0–0.2)
BASOPHILS # BLD AUTO: 0.03 K/UL (ref 0–0.2)
BASOPHILS NFR BLD: 0.6 % (ref 0–1.9)
BASOPHILS NFR BLD: 0.6 % (ref 0–1.9)
BUN SERPL-MCNC: 6 MG/DL (ref 6–20)
CALCIUM SERPL-MCNC: 9.8 MG/DL (ref 8.7–10.5)
CHLORIDE SERPL-SCNC: 107 MMOL/L (ref 95–110)
CO2 SERPL-SCNC: 23 MMOL/L (ref 23–29)
CREAT SERPL-MCNC: 0.8 MG/DL (ref 0.5–1.4)
DIFFERENTIAL METHOD BLD: ABNORMAL
DIFFERENTIAL METHOD BLD: ABNORMAL
EOSINOPHIL # BLD AUTO: 0.2 K/UL (ref 0–0.5)
EOSINOPHIL # BLD AUTO: 0.2 K/UL (ref 0–0.5)
EOSINOPHIL NFR BLD: 4.3 % (ref 0–8)
EOSINOPHIL NFR BLD: 4.3 % (ref 0–8)
ERYTHROCYTE [DISTWIDTH] IN BLOOD BY AUTOMATED COUNT: 13.2 % (ref 11.5–14.5)
ERYTHROCYTE [DISTWIDTH] IN BLOOD BY AUTOMATED COUNT: 13.2 % (ref 11.5–14.5)
EST. GFR  (NO RACE VARIABLE): >60 ML/MIN/1.73 M^2
GLUCOSE SERPL-MCNC: 120 MG/DL (ref 70–110)
HCT VFR BLD AUTO: 35.5 % (ref 37–48.5)
HCT VFR BLD AUTO: 35.5 % (ref 37–48.5)
HGB BLD-MCNC: 11.4 G/DL (ref 12–16)
HGB BLD-MCNC: 11.4 G/DL (ref 12–16)
IMM GRANULOCYTES # BLD AUTO: 0.01 K/UL (ref 0–0.04)
IMM GRANULOCYTES # BLD AUTO: 0.01 K/UL (ref 0–0.04)
IMM GRANULOCYTES NFR BLD AUTO: 0.2 % (ref 0–0.5)
IMM GRANULOCYTES NFR BLD AUTO: 0.2 % (ref 0–0.5)
LYMPHOCYTES # BLD AUTO: 2.5 K/UL (ref 1–4.8)
LYMPHOCYTES # BLD AUTO: 2.5 K/UL (ref 1–4.8)
LYMPHOCYTES NFR BLD: 49.5 % (ref 18–48)
LYMPHOCYTES NFR BLD: 49.5 % (ref 18–48)
MAGNESIUM SERPL-MCNC: 2 MG/DL (ref 1.6–2.6)
MCH RBC QN AUTO: 28.7 PG (ref 27–31)
MCH RBC QN AUTO: 28.7 PG (ref 27–31)
MCHC RBC AUTO-ENTMCNC: 32.1 G/DL (ref 32–36)
MCHC RBC AUTO-ENTMCNC: 32.1 G/DL (ref 32–36)
MCV RBC AUTO: 89 FL (ref 82–98)
MCV RBC AUTO: 89 FL (ref 82–98)
MONOCYTES # BLD AUTO: 0.5 K/UL (ref 0.3–1)
MONOCYTES # BLD AUTO: 0.5 K/UL (ref 0.3–1)
MONOCYTES NFR BLD: 8.8 % (ref 4–15)
MONOCYTES NFR BLD: 8.8 % (ref 4–15)
NEUTROPHILS # BLD AUTO: 1.9 K/UL (ref 1.8–7.7)
NEUTROPHILS # BLD AUTO: 1.9 K/UL (ref 1.8–7.7)
NEUTROPHILS NFR BLD: 36.6 % (ref 38–73)
NEUTROPHILS NFR BLD: 36.6 % (ref 38–73)
NRBC BLD-RTO: 0 /100 WBC
NRBC BLD-RTO: 0 /100 WBC
PHOSPHATE SERPL-MCNC: 3.7 MG/DL (ref 2.7–4.5)
PLATELET # BLD AUTO: 446 K/UL (ref 150–450)
PLATELET # BLD AUTO: 446 K/UL (ref 150–450)
PMV BLD AUTO: 10.4 FL (ref 9.2–12.9)
PMV BLD AUTO: 10.4 FL (ref 9.2–12.9)
POTASSIUM SERPL-SCNC: 3.9 MMOL/L (ref 3.5–5.1)
RBC # BLD AUTO: 3.97 M/UL (ref 4–5.4)
RBC # BLD AUTO: 3.97 M/UL (ref 4–5.4)
SODIUM SERPL-SCNC: 139 MMOL/L (ref 136–145)
WBC # BLD AUTO: 5.13 K/UL (ref 3.9–12.7)
WBC # BLD AUTO: 5.13 K/UL (ref 3.9–12.7)

## 2024-04-26 PROCEDURE — 25000003 PHARM REV CODE 250

## 2024-04-26 PROCEDURE — 36415 COLL VENOUS BLD VENIPUNCTURE: CPT | Performed by: SURGERY

## 2024-04-26 PROCEDURE — 85730 THROMBOPLASTIN TIME PARTIAL: CPT | Performed by: SURGERY

## 2024-04-26 PROCEDURE — 84100 ASSAY OF PHOSPHORUS: CPT

## 2024-04-26 PROCEDURE — 85025 COMPLETE CBC W/AUTO DIFF WBC: CPT

## 2024-04-26 PROCEDURE — 63600175 PHARM REV CODE 636 W HCPCS

## 2024-04-26 PROCEDURE — 11000001 HC ACUTE MED/SURG PRIVATE ROOM

## 2024-04-26 PROCEDURE — 80048 BASIC METABOLIC PNL TOTAL CA: CPT

## 2024-04-26 PROCEDURE — 83735 ASSAY OF MAGNESIUM: CPT

## 2024-04-26 PROCEDURE — 25000003 PHARM REV CODE 250: Performed by: STUDENT IN AN ORGANIZED HEALTH CARE EDUCATION/TRAINING PROGRAM

## 2024-04-26 RX ORDER — MORPHINE SULFATE 4 MG/ML
4 INJECTION, SOLUTION INTRAMUSCULAR; INTRAVENOUS EVERY 4 HOURS PRN
Status: DISCONTINUED | OUTPATIENT
Start: 2024-04-26 | End: 2024-04-29 | Stop reason: HOSPADM

## 2024-04-26 RX ORDER — DEXTROSE MONOHYDRATE 100 MG/ML
12.5 INJECTION, SOLUTION INTRAVENOUS
Status: DISCONTINUED | OUTPATIENT
Start: 2024-04-26 | End: 2024-04-29 | Stop reason: HOSPADM

## 2024-04-26 RX ORDER — GLUCAGON 1 MG
1 KIT INJECTION
Status: DISCONTINUED | OUTPATIENT
Start: 2024-04-26 | End: 2024-04-29 | Stop reason: HOSPADM

## 2024-04-26 RX ORDER — LEVETIRACETAM 500 MG/1
1000 TABLET ORAL 2 TIMES DAILY
Status: DISCONTINUED | OUTPATIENT
Start: 2024-04-26 | End: 2024-04-29 | Stop reason: HOSPADM

## 2024-04-26 RX ORDER — BUSPIRONE HYDROCHLORIDE 10 MG/1
10 TABLET ORAL 3 TIMES DAILY PRN
Status: DISCONTINUED | OUTPATIENT
Start: 2024-04-26 | End: 2024-04-29 | Stop reason: HOSPADM

## 2024-04-26 RX ORDER — GABAPENTIN 100 MG/1
100 CAPSULE ORAL 3 TIMES DAILY
Status: DISCONTINUED | OUTPATIENT
Start: 2024-04-26 | End: 2024-04-29 | Stop reason: HOSPADM

## 2024-04-26 RX ORDER — HYDROCHLOROTHIAZIDE 12.5 MG/1
12.5 TABLET ORAL DAILY
Status: DISCONTINUED | OUTPATIENT
Start: 2024-04-26 | End: 2024-04-29 | Stop reason: HOSPADM

## 2024-04-26 RX ORDER — INSULIN ASPART 100 [IU]/ML
0-10 INJECTION, SOLUTION INTRAVENOUS; SUBCUTANEOUS EVERY 6 HOURS PRN
Status: DISCONTINUED | OUTPATIENT
Start: 2024-04-26 | End: 2024-04-29 | Stop reason: HOSPADM

## 2024-04-26 RX ADMIN — CALCIUM CARBONATE (ANTACID) CHEW TAB 500 MG 500 MG: 500 CHEW TAB at 01:04

## 2024-04-26 RX ADMIN — LEVETIRACETAM 1000 MG: 100 INJECTION INTRAVENOUS at 08:04

## 2024-04-26 RX ADMIN — HYDROCHLOROTHIAZIDE 12.5 MG: 12.5 TABLET ORAL at 12:04

## 2024-04-26 RX ADMIN — GABAPENTIN 100 MG: 100 CAPSULE ORAL at 03:04

## 2024-04-26 RX ADMIN — MORPHINE SULFATE 4 MG: 4 INJECTION INTRAVENOUS at 07:04

## 2024-04-26 RX ADMIN — TOPIRAMATE 100 MG: 25 TABLET, FILM COATED ORAL at 08:04

## 2024-04-26 RX ADMIN — HEPARIN SODIUM 15 UNITS/KG/HR: 10000 INJECTION, SOLUTION INTRAVENOUS at 04:04

## 2024-04-26 RX ADMIN — GABAPENTIN 100 MG: 100 CAPSULE ORAL at 09:04

## 2024-04-26 RX ADMIN — LEVETIRACETAM 1000 MG: 500 TABLET, FILM COATED ORAL at 09:04

## 2024-04-26 NOTE — ASSESSMENT & PLAN NOTE
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 Bowel looks good. Vascular surgery consulted.     No intervention  Needs anticoagulation starting with heparin. Make sure gets therapeutic. Can transition to oral prior to discharge.  Recommend heme/onc consult for work-up of thrombophilia.  Rest of care per primary  We will sign off  Please call with questions

## 2024-04-26 NOTE — ASSESSMENT & PLAN NOTE
59 year old female with a PMH of diabetes mellitus type 2 non insulin dependent , essential hypertension, GERD on daily medications, and anxiety s/p sleeve gastrectomy on 4/4/ now with SMV thrombosis. HOD 1 she continues to show no signs of ischemic bowel at this time due to CT, labs, and exam.     Ice chips this morning  mIVF until tolerating adequate PO  MM pain control  PRN nausea medications  Heparin drip - adjust based on PTT  Vascular consult - no indication for thrombectomy, continue anticoagulation  Interventional cardiology - repeat CT scan on Sunday (ordered)  Hematology/Oncology - anticoagulation for at least 3 months. Can transition to eliquis prior to discharge. Will follow up in 2-3 months.  Home medications. All need to be liquid or crushed    Dispo: TDD 4/28 vs 4/29 w/o home health needs

## 2024-04-26 NOTE — PROGRESS NOTES
[facility]  Vascular Surgery  Progress Note    Patient Name: Monalisa Carson  MRN: [id number]  Admission Date: 4/24/2024  Primary Care Provider: Flor Gomez MD    Subjective:     Interval History: No acute overnight events. Improvement in nasuea, vomiting.     Post-Op Info:  * No surgery found *         Medications:  Continuous Infusions:  Current Facility-Administered Medications   Medication Dose Route Frequency Last Rate Last Admin    heparin (porcine) in D5W  0-40 Units/kg/hr (Adjusted) Intravenous Continuous 10.1 mL/hr at 04/25/24 1459 15 Units/kg/hr at 04/25/24 1459    lactated ringers   Intravenous Continuous 125 mL/hr at 04/25/24 1628 New Bag at 04/25/24 1628     Scheduled Meds:  Current Facility-Administered Medications   Medication Dose Route Frequency    levETIRAcetam (Keppra) IV (PEDS and ADULTS)  1,000 mg Intravenous Q12H    topiramate  100 mg Oral Daily     PRN Meds:  Current Facility-Administered Medications:     acetaminophen, 650 mg, Oral, Q8H PRN    albuterol, 1 puff, Inhalation, Q4H PRN    calcium carbonate, 500 mg, Oral, BID PRN    heparin (PORCINE), 60 Units/kg (Adjusted), Intravenous, PRN    heparin (PORCINE), 30 Units/kg (Adjusted), Intravenous, PRN    LIDOcaine (PF) 10 mg/ml (1%), 1 mL, Intradermal, Once PRN    melatonin, 6 mg, Oral, Nightly PRN    morphine, 4 mg, Intravenous, Q4H PRN    ondansetron, 8 mg, Intravenous, Q8H PRN    sodium chloride 0.9%, 10 mL, Intravenous, PRN     Objective:     Vital Signs (Most Recent):  Temp: 98.6 °F (37 °C) (04/26/24 0750)  Pulse: (!) 59 (04/26/24 0750)  Resp: 18 (04/26/24 0750)  BP: 134/75 (04/26/24 0750)  SpO2: 97 % (04/26/24 0750) Vital Signs (24h Range):  Temp:  [97.8 °F (36.6 °C)-98.6 °F (37 °C)] 98.6 °F (37 °C)  Pulse:  [53-63] 59  Resp:  [14-18] 18  SpO2:  [92 %-97 %] 97 %  BP: (118-137)/(61-75) 134/75          Physical Exam  Vitals reviewed.   Constitutional:       General: She is not in acute distress.  HENT:      Head:  Normocephalic and atraumatic.      Nose: Nose normal.   Eyes:      General:         Right eye: No discharge.         Left eye: No discharge.   Cardiovascular:      Rate and Rhythm: Normal rate and regular rhythm.   Pulmonary:      Effort: Pulmonary effort is normal. No respiratory distress.      Breath sounds: No stridor.   Abdominal:      Comments: Surgical scars well healed  Mild generalized abdominal pain  Soft , ND   Musculoskeletal:         General: Normal range of motion.      Cervical back: Normal range of motion.   Skin:     General: Skin is warm and dry.   Neurological:      General: No focal deficit present.      Mental Status: She is alert and oriented to person, place, and time.   Psychiatric:         Mood and Affect: Mood normal.         Behavior: Behavior normal.          Significant Labs:  All pertinent labs from the last 24 hours have been reviewed.    Significant Diagnostics:  I have reviewed and interpreted all pertinent imaging results/findings within the past 24 hours.  Assessment/Plan:     * Superior mesenteric vein thrombosis  59F s/p sleeve gastrectomy on 4/4 now admitted with abdominal pain and found to have SMV thrombosis. Bowel looks good. Vascular surgery consulted.     No intervention  Needs anticoagulation starting with heparin. Make sure gets therapeutic. Can transition to oral prior to discharge.  Recommend heme/onc consult for work-up of thrombophilia.  Rest of care per primary  We will sign off  Please call with questions          Gaudencio Tovar MD  Vascular Surgery  [facility] - Surgery

## 2024-04-26 NOTE — PLAN OF CARE
Problem: Adult Inpatient Plan of Care  Goal: Plan of Care Review  Outcome: Progressing  Goal: Patient-Specific Goal (Individualized)  Outcome: Progressing  Goal: Absence of Hospital-Acquired Illness or Injury  Outcome: Progressing  Goal: Optimal Comfort and Wellbeing  Outcome: Progressing  Goal: Readiness for Transition of Care  Outcome: Progressing     Problem: Diabetes Comorbidity  Goal: Blood Glucose Level Within Targeted Range  Outcome: Progressing     Problem: Skin Injury Risk Increased  Goal: Skin Health and Integrity  Outcome: Progressing     Problem: Fall Injury Risk  Goal: Absence of Fall and Fall-Related Injury  Outcome: Progressing     Pt SCD's applied and diet advanced to clear liquids tolerating well. Heparin gtt infusing and therapeutic.

## 2024-04-26 NOTE — SUBJECTIVE & OBJECTIVE
Interval History: No acute events overnight. HDS on RA. Improving pain and nausea. Able to walk the halls yesterday.     Medications:  Continuous Infusions:  Current Facility-Administered Medications   Medication Dose Route Frequency Last Rate Last Admin    heparin (porcine) in D5W  0-40 Units/kg/hr (Adjusted) Intravenous Continuous 10.1 mL/hr at 04/25/24 1459 15 Units/kg/hr at 04/25/24 1459    lactated ringers   Intravenous Continuous 125 mL/hr at 04/25/24 1628 New Bag at 04/25/24 1628     Scheduled Meds:  Current Facility-Administered Medications   Medication Dose Route Frequency    levETIRAcetam (Keppra) IV (PEDS and ADULTS)  1,000 mg Intravenous Q12H    topiramate  100 mg Oral Daily     PRN Meds:  Current Facility-Administered Medications:     acetaminophen, 650 mg, Oral, Q8H PRN    albuterol, 1 puff, Inhalation, Q4H PRN    calcium carbonate, 500 mg, Oral, BID PRN    heparin (PORCINE), 60 Units/kg (Adjusted), Intravenous, PRN    heparin (PORCINE), 30 Units/kg (Adjusted), Intravenous, PRN    LIDOcaine (PF) 10 mg/ml (1%), 1 mL, Intradermal, Once PRN    melatonin, 6 mg, Oral, Nightly PRN    morphine, 4 mg, Intravenous, Q4H PRN    ondansetron, 8 mg, Intravenous, Q8H PRN    sodium chloride 0.9%, 10 mL, Intravenous, PRN     Review of patient's allergies indicates:   Allergen Reactions    Keflex [cephalexin] Hives    Vicodin [hydrocodone-acetaminophen] Itching     itch    Crestor [rosuvastatin] Other (See Comments)     Muscle pain     Objective:     Vital Signs (Most Recent):  Temp: 98.1 °F (36.7 °C) (04/26/24 0526)  Pulse: 63 (04/26/24 0526)  Resp: 16 (04/26/24 0526)  BP: 131/67 (04/26/24 0526)  SpO2: (!) 94 % (04/26/24 0526) Vital Signs (24h Range):  Temp:  [97.8 °F (36.6 °C)-98.2 °F (36.8 °C)] 98.1 °F (36.7 °C)  Pulse:  [53-63] 63  Resp:  [14-20] 16  SpO2:  [92 %-97 %] 94 %  BP: (118-137)/(61-69) 131/67     Weight: 88.4 kg (194 lb 14.2 oz)  Body mass index is 34.53 kg/m².    Intake/Output - Last 3 Shifts          04/24 0700  04/25 0659 04/25 0700 04/26 0659 04/26 0700  04/27 0659    P.O.  160     Total Intake(mL/kg)  160 (1.8)     Urine (mL/kg/hr)  500 (0.2)     Total Output  500     Net  -340            Urine Occurrence  1 x              Physical Exam  Constitutional:       Appearance: Normal appearance.   HENT:      Head: Normocephalic and atraumatic.      Nose: Nose normal.      Mouth/Throat:      Mouth: Mucous membranes are moist.      Pharynx: Oropharynx is clear.   Eyes:      Extraocular Movements: Extraocular movements intact.      Conjunctiva/sclera: Conjunctivae normal.   Cardiovascular:      Rate and Rhythm: Normal rate and regular rhythm.   Pulmonary:      Effort: Pulmonary effort is normal.   Abdominal:      General: Abdomen is flat. There is no distension.      Palpations: Abdomen is soft.      Tenderness: There is no abdominal tenderness.      Comments: Port incisions well healed   Skin:     General: Skin is warm and dry.      Capillary Refill: Capillary refill takes less than 2 seconds.   Neurological:      General: No focal deficit present.      Mental Status: She is alert.          Significant Labs:  I have reviewed all pertinent lab results within the past 24 hours.  CBC:   Recent Labs   Lab 04/26/24  0512   WBC 5.13  5.13   RBC 3.97*  3.97*   HGB 11.4*  11.4*   HCT 35.5*  35.5*     446   MCV 89  89   MCH 28.7  28.7   MCHC 32.1  32.1     CMP:   Recent Labs   Lab 04/24/24  1643 04/25/24  0523 04/26/24  0511   *   < > 120*   CALCIUM 9.9   < > 9.8   ALBUMIN 3.2*  --   --    PROT 8.0  --   --       < > 139   K 4.1   < > 3.9   CO2 23   < > 23      < > 107   BUN 8   < > 6   CREATININE 0.7   < > 0.8   ALKPHOS 97  --   --    ALT 72*  --   --    AST 53*  --   --    BILITOT 0.5  --   --     < > = values in this interval not displayed.       Significant Diagnostics:  I have reviewed all pertinent imaging results/findings within the past 24 hours.

## 2024-04-26 NOTE — NURSING
Nurses Note -- 4 Eyes      4/26/2024   2:38 PM      Skin assessed during: Daily Assessment      [x] No Altered Skin Integrity Present    []Prevention Measures Documented      [] Yes- Altered Skin Integrity Present or Discovered   [] LDA Added if Not in Epic (Describe Wound)   [] New Altered Skin Integrity was Present on Admit and Documented in LDA   [] Wound Image Taken    Wound Care Consulted? No    Attending Nurse:  Brittni Hudson LPN    Second RN/Staff Member:   Fede Babb LPN

## 2024-04-26 NOTE — PROGRESS NOTES
[facility]  General Surgery  Progress Note    Subjective:     History of Present Illness:  Monalisa Carson is a 59 year old female with a PMH of diabetes mellitus type 2 non insulin dependent , essential hypertension, GERD on daily medications, and anxiety who presents with abdominal pain that began on Sunday. She reports that this is in the upper abdomen that radiates to the RUQ and is sharp. Pain is improved with medication in the ED. She has also had associated nausea and vomiting that started yesterday. She is s/p sleeve gastrectomy on 4/4/24.    Labs without significant abnormalities. CT scan with SMV thrombosis.     Post-Op Info:  * No surgery found *         Interval History: No acute events overnight. HDS on RA. Improving pain and nausea. Able to walk the halls yesterday.     Medications:  Continuous Infusions:  Current Facility-Administered Medications   Medication Dose Route Frequency Last Rate Last Admin    heparin (porcine) in D5W  0-40 Units/kg/hr (Adjusted) Intravenous Continuous 10.1 mL/hr at 04/25/24 1459 15 Units/kg/hr at 04/25/24 1459    lactated ringers   Intravenous Continuous 125 mL/hr at 04/25/24 1628 New Bag at 04/25/24 1628     Scheduled Meds:  Current Facility-Administered Medications   Medication Dose Route Frequency    levETIRAcetam (Keppra) IV (PEDS and ADULTS)  1,000 mg Intravenous Q12H    topiramate  100 mg Oral Daily     PRN Meds:  Current Facility-Administered Medications:     acetaminophen, 650 mg, Oral, Q8H PRN    albuterol, 1 puff, Inhalation, Q4H PRN    calcium carbonate, 500 mg, Oral, BID PRN    heparin (PORCINE), 60 Units/kg (Adjusted), Intravenous, PRN    heparin (PORCINE), 30 Units/kg (Adjusted), Intravenous, PRN    LIDOcaine (PF) 10 mg/ml (1%), 1 mL, Intradermal, Once PRN    melatonin, 6 mg, Oral, Nightly PRN    morphine, 4 mg, Intravenous, Q4H PRN    ondansetron, 8 mg, Intravenous, Q8H PRN    sodium chloride 0.9%, 10 mL, Intravenous, PRN     Review of patient's  allergies indicates:   Allergen Reactions    Keflex [cephalexin] Hives    Vicodin [hydrocodone-acetaminophen] Itching     itch    Crestor [rosuvastatin] Other (See Comments)     Muscle pain     Objective:     Vital Signs (Most Recent):  Temp: 98.1 °F (36.7 °C) (04/26/24 0526)  Pulse: 63 (04/26/24 0526)  Resp: 16 (04/26/24 0526)  BP: 131/67 (04/26/24 0526)  SpO2: (!) 94 % (04/26/24 0526) Vital Signs (24h Range):  Temp:  [97.8 °F (36.6 °C)-98.2 °F (36.8 °C)] 98.1 °F (36.7 °C)  Pulse:  [53-63] 63  Resp:  [14-20] 16  SpO2:  [92 %-97 %] 94 %  BP: (118-137)/(61-69) 131/67     Weight: 88.4 kg (194 lb 14.2 oz)  Body mass index is 34.53 kg/m².    Intake/Output - Last 3 Shifts         04/24 0700  04/25 0659 04/25 0700  04/26 0659 04/26 0700  04/27 0659    P.O.  160     Total Intake(mL/kg)  160 (1.8)     Urine (mL/kg/hr)  500 (0.2)     Total Output  500     Net  -340            Urine Occurrence  1 x              Physical Exam  Constitutional:       Appearance: Normal appearance.   HENT:      Head: Normocephalic and atraumatic.      Nose: Nose normal.      Mouth/Throat:      Mouth: Mucous membranes are moist.      Pharynx: Oropharynx is clear.   Eyes:      Extraocular Movements: Extraocular movements intact.      Conjunctiva/sclera: Conjunctivae normal.   Cardiovascular:      Rate and Rhythm: Normal rate and regular rhythm.   Pulmonary:      Effort: Pulmonary effort is normal.   Abdominal:      General: Abdomen is flat. There is no distension.      Palpations: Abdomen is soft.      Tenderness: There is no abdominal tenderness.      Comments: Port incisions well healed   Skin:     General: Skin is warm and dry.      Capillary Refill: Capillary refill takes less than 2 seconds.   Neurological:      General: No focal deficit present.      Mental Status: She is alert.          Significant Labs:  I have reviewed all pertinent lab results within the past 24 hours.  CBC:   Recent Labs   Lab 04/26/24 0512   WBC 5.13  5.13   RBC 3.97*   3.97*   HGB 11.4*  11.4*   HCT 35.5*  35.5*     446   MCV 89  89   MCH 28.7  28.7   MCHC 32.1  32.1     CMP:   Recent Labs   Lab 04/24/24  1643 04/25/24  0523 04/26/24  0511   *   < > 120*   CALCIUM 9.9   < > 9.8   ALBUMIN 3.2*  --   --    PROT 8.0  --   --       < > 139   K 4.1   < > 3.9   CO2 23   < > 23      < > 107   BUN 8   < > 6   CREATININE 0.7   < > 0.8   ALKPHOS 97  --   --    ALT 72*  --   --    AST 53*  --   --    BILITOT 0.5  --   --     < > = values in this interval not displayed.       Significant Diagnostics:  I have reviewed all pertinent imaging results/findings within the past 24 hours.  Assessment/Plan:     * Superior mesenteric vein thrombosis  59 year old female with a PMH of diabetes mellitus type 2 non insulin dependent , essential hypertension, GERD on daily medications, and anxiety s/p sleeve gastrectomy on 4/4/ now with SMV thrombosis. HOD 1 she continues to show no signs of ischemic bowel at this time due to CT, labs, and exam.     Ice chips this morning  mIVF until tolerating adequate PO  MM pain control  PRN nausea medications  Heparin drip - adjust based on PTT  Vascular consult - no indication for thrombectomy, continue anticoagulation  Interventional cardiology - repeat CT scan on Sunday (ordered)  Hematology/Oncology - anticoagulation for at least 3 months. Can transition to eliquis prior to discharge. Will follow up in 2-3 months.  Home medications. All need to be liquid or crushed    Dispo: TDD 4/28 vs 4/29 w/o home health needs        Rasheeda Kennedy MD  General Surgery  [facility]

## 2024-04-26 NOTE — SUBJECTIVE & OBJECTIVE
Medications:  Continuous Infusions:  Current Facility-Administered Medications   Medication Dose Route Frequency Last Rate Last Admin    heparin (porcine) in D5W  0-40 Units/kg/hr (Adjusted) Intravenous Continuous 10.1 mL/hr at 04/25/24 1459 15 Units/kg/hr at 04/25/24 1459    lactated ringers   Intravenous Continuous 125 mL/hr at 04/25/24 1628 New Bag at 04/25/24 1628     Scheduled Meds:  Current Facility-Administered Medications   Medication Dose Route Frequency    levETIRAcetam (Keppra) IV (PEDS and ADULTS)  1,000 mg Intravenous Q12H    topiramate  100 mg Oral Daily     PRN Meds:  Current Facility-Administered Medications:     acetaminophen, 650 mg, Oral, Q8H PRN    albuterol, 1 puff, Inhalation, Q4H PRN    calcium carbonate, 500 mg, Oral, BID PRN    heparin (PORCINE), 60 Units/kg (Adjusted), Intravenous, PRN    heparin (PORCINE), 30 Units/kg (Adjusted), Intravenous, PRN    LIDOcaine (PF) 10 mg/ml (1%), 1 mL, Intradermal, Once PRN    melatonin, 6 mg, Oral, Nightly PRN    morphine, 4 mg, Intravenous, Q4H PRN    ondansetron, 8 mg, Intravenous, Q8H PRN    sodium chloride 0.9%, 10 mL, Intravenous, PRN     Objective:     Vital Signs (Most Recent):  Temp: 98.6 °F (37 °C) (04/26/24 0750)  Pulse: (!) 59 (04/26/24 0750)  Resp: 18 (04/26/24 0750)  BP: 134/75 (04/26/24 0750)  SpO2: 97 % (04/26/24 0750) Vital Signs (24h Range):  Temp:  [97.8 °F (36.6 °C)-98.6 °F (37 °C)] 98.6 °F (37 °C)  Pulse:  [53-63] 59  Resp:  [14-18] 18  SpO2:  [92 %-97 %] 97 %  BP: (118-137)/(61-75) 134/75          Physical Exam  Vitals reviewed.   Constitutional:       General: She is not in acute distress.  HENT:      Head: Normocephalic and atraumatic.      Nose: Nose normal.   Eyes:      General:         Right eye: No discharge.         Left eye: No discharge.   Cardiovascular:      Rate and Rhythm: Normal rate and regular rhythm.   Pulmonary:      Effort: Pulmonary effort is normal. No respiratory distress.      Breath sounds: No stridor.    Abdominal:      Comments: Surgical scars well healed  Mild generalized abdominal pain  Soft , ND   Musculoskeletal:         General: Normal range of motion.      Cervical back: Normal range of motion.   Skin:     General: Skin is warm and dry.   Neurological:      General: No focal deficit present.      Mental Status: She is alert and oriented to person, place, and time.   Psychiatric:         Mood and Affect: Mood normal.         Behavior: Behavior normal.          Significant Labs:  All pertinent labs from the last 24 hours have been reviewed.    Significant Diagnostics:  I have reviewed and interpreted all pertinent imaging results/findings within the past 24 hours.

## 2024-04-27 LAB
ANION GAP SERPL CALC-SCNC: 11 MMOL/L (ref 8–16)
APTT PPP: 26.9 SEC (ref 21–32)
APTT PPP: 53.2 SEC (ref 21–32)
BASOPHILS # BLD AUTO: 0.03 K/UL (ref 0–0.2)
BASOPHILS # BLD AUTO: 0.03 K/UL (ref 0–0.2)
BASOPHILS NFR BLD: 0.6 % (ref 0–1.9)
BASOPHILS NFR BLD: 0.6 % (ref 0–1.9)
BUN SERPL-MCNC: 5 MG/DL (ref 6–20)
CALCIUM SERPL-MCNC: 10 MG/DL (ref 8.7–10.5)
CHLORIDE SERPL-SCNC: 106 MMOL/L (ref 95–110)
CO2 SERPL-SCNC: 21 MMOL/L (ref 23–29)
CREAT SERPL-MCNC: 0.8 MG/DL (ref 0.5–1.4)
DIFFERENTIAL METHOD BLD: ABNORMAL
DIFFERENTIAL METHOD BLD: ABNORMAL
EOSINOPHIL # BLD AUTO: 0.2 K/UL (ref 0–0.5)
EOSINOPHIL # BLD AUTO: 0.2 K/UL (ref 0–0.5)
EOSINOPHIL NFR BLD: 4.2 % (ref 0–8)
EOSINOPHIL NFR BLD: 4.2 % (ref 0–8)
ERYTHROCYTE [DISTWIDTH] IN BLOOD BY AUTOMATED COUNT: 13.2 % (ref 11.5–14.5)
ERYTHROCYTE [DISTWIDTH] IN BLOOD BY AUTOMATED COUNT: 13.2 % (ref 11.5–14.5)
EST. GFR  (NO RACE VARIABLE): >60 ML/MIN/1.73 M^2
GLUCOSE SERPL-MCNC: 119 MG/DL (ref 70–110)
HCT VFR BLD AUTO: 39.3 % (ref 37–48.5)
HCT VFR BLD AUTO: 39.3 % (ref 37–48.5)
HGB BLD-MCNC: 12.5 G/DL (ref 12–16)
HGB BLD-MCNC: 12.5 G/DL (ref 12–16)
IMM GRANULOCYTES # BLD AUTO: 0.01 K/UL (ref 0–0.04)
IMM GRANULOCYTES # BLD AUTO: 0.01 K/UL (ref 0–0.04)
IMM GRANULOCYTES NFR BLD AUTO: 0.2 % (ref 0–0.5)
IMM GRANULOCYTES NFR BLD AUTO: 0.2 % (ref 0–0.5)
LYMPHOCYTES # BLD AUTO: 2.6 K/UL (ref 1–4.8)
LYMPHOCYTES # BLD AUTO: 2.6 K/UL (ref 1–4.8)
LYMPHOCYTES NFR BLD: 49.4 % (ref 18–48)
LYMPHOCYTES NFR BLD: 49.4 % (ref 18–48)
MAGNESIUM SERPL-MCNC: 1.8 MG/DL (ref 1.6–2.6)
MCH RBC QN AUTO: 29.6 PG (ref 27–31)
MCH RBC QN AUTO: 29.6 PG (ref 27–31)
MCHC RBC AUTO-ENTMCNC: 31.8 G/DL (ref 32–36)
MCHC RBC AUTO-ENTMCNC: 31.8 G/DL (ref 32–36)
MCV RBC AUTO: 93 FL (ref 82–98)
MCV RBC AUTO: 93 FL (ref 82–98)
MONOCYTES # BLD AUTO: 0.5 K/UL (ref 0.3–1)
MONOCYTES # BLD AUTO: 0.5 K/UL (ref 0.3–1)
MONOCYTES NFR BLD: 9.4 % (ref 4–15)
MONOCYTES NFR BLD: 9.4 % (ref 4–15)
NEUTROPHILS # BLD AUTO: 1.9 K/UL (ref 1.8–7.7)
NEUTROPHILS # BLD AUTO: 1.9 K/UL (ref 1.8–7.7)
NEUTROPHILS NFR BLD: 36.2 % (ref 38–73)
NEUTROPHILS NFR BLD: 36.2 % (ref 38–73)
NRBC BLD-RTO: 0 /100 WBC
NRBC BLD-RTO: 0 /100 WBC
PHOSPHATE SERPL-MCNC: 4.8 MG/DL (ref 2.7–4.5)
PLATELET # BLD AUTO: 463 K/UL (ref 150–450)
PLATELET # BLD AUTO: 463 K/UL (ref 150–450)
PMV BLD AUTO: 10.4 FL (ref 9.2–12.9)
PMV BLD AUTO: 10.4 FL (ref 9.2–12.9)
POCT GLUCOSE: 109 MG/DL (ref 70–110)
POCT GLUCOSE: 128 MG/DL (ref 70–110)
POTASSIUM SERPL-SCNC: 3.6 MMOL/L (ref 3.5–5.1)
RBC # BLD AUTO: 4.22 M/UL (ref 4–5.4)
RBC # BLD AUTO: 4.22 M/UL (ref 4–5.4)
SODIUM SERPL-SCNC: 138 MMOL/L (ref 136–145)
WBC # BLD AUTO: 5.2 K/UL (ref 3.9–12.7)
WBC # BLD AUTO: 5.2 K/UL (ref 3.9–12.7)

## 2024-04-27 PROCEDURE — 83735 ASSAY OF MAGNESIUM: CPT

## 2024-04-27 PROCEDURE — 36415 COLL VENOUS BLD VENIPUNCTURE: CPT | Performed by: SURGERY

## 2024-04-27 PROCEDURE — 11000001 HC ACUTE MED/SURG PRIVATE ROOM

## 2024-04-27 PROCEDURE — 25000003 PHARM REV CODE 250: Performed by: STUDENT IN AN ORGANIZED HEALTH CARE EDUCATION/TRAINING PROGRAM

## 2024-04-27 PROCEDURE — 25000003 PHARM REV CODE 250

## 2024-04-27 PROCEDURE — 85730 THROMBOPLASTIN TIME PARTIAL: CPT | Performed by: SURGERY

## 2024-04-27 PROCEDURE — 63600175 PHARM REV CODE 636 W HCPCS

## 2024-04-27 PROCEDURE — 85025 COMPLETE CBC W/AUTO DIFF WBC: CPT

## 2024-04-27 PROCEDURE — 80048 BASIC METABOLIC PNL TOTAL CA: CPT

## 2024-04-27 PROCEDURE — 84100 ASSAY OF PHOSPHORUS: CPT

## 2024-04-27 RX ORDER — PANTOPRAZOLE SODIUM 40 MG/1
40 TABLET, DELAYED RELEASE ORAL DAILY
Status: DISCONTINUED | OUTPATIENT
Start: 2024-04-27 | End: 2024-04-29 | Stop reason: HOSPADM

## 2024-04-27 RX ADMIN — HYDROCHLOROTHIAZIDE 12.5 MG: 12.5 TABLET ORAL at 09:04

## 2024-04-27 RX ADMIN — GABAPENTIN 100 MG: 100 CAPSULE ORAL at 09:04

## 2024-04-27 RX ADMIN — PANTOPRAZOLE SODIUM 40 MG: 40 TABLET, DELAYED RELEASE ORAL at 01:04

## 2024-04-27 RX ADMIN — HEPARIN SODIUM 15 UNITS/KG/HR: 10000 INJECTION, SOLUTION INTRAVENOUS at 07:04

## 2024-04-27 RX ADMIN — TOPIRAMATE 100 MG: 25 TABLET, FILM COATED ORAL at 09:04

## 2024-04-27 RX ADMIN — HEPARIN SODIUM 18 UNITS/KG/HR: 10000 INJECTION, SOLUTION INTRAVENOUS at 08:04

## 2024-04-27 RX ADMIN — CALCIUM CARBONATE (ANTACID) CHEW TAB 500 MG 500 MG: 500 CHEW TAB at 01:04

## 2024-04-27 RX ADMIN — LEVETIRACETAM 1000 MG: 500 TABLET, FILM COATED ORAL at 09:04

## 2024-04-27 RX ADMIN — ACETAMINOPHEN 650 MG: 325 TABLET ORAL at 01:04

## 2024-04-27 RX ADMIN — Medication 6 MG: at 09:04

## 2024-04-27 RX ADMIN — GABAPENTIN 100 MG: 100 CAPSULE ORAL at 03:04

## 2024-04-27 NOTE — PROGRESS NOTES
[facility]  General Surgery  Progress Note    Subjective:     History of Present Illness:  Monalisa Carson is a 59 year old female with a PMH of diabetes mellitus type 2 non insulin dependent , essential hypertension, GERD on daily medications, and anxiety who presents with abdominal pain that began on Sunday. She reports that this is in the upper abdomen that radiates to the RUQ and is sharp. Pain is improved with medication in the ED. She has also had associated nausea and vomiting that started yesterday. She is s/p sleeve gastrectomy on 4/4/24.    Labs without significant abnormalities. CT scan with SMV thrombosis.     Post-Op Info:  * No surgery found *         Interval History: Doing well this morning, tolerating clears. Her abdominal pain has significantly improved.     Medications:  Continuous Infusions:  Current Facility-Administered Medications   Medication Dose Route Frequency Last Rate Last Admin    heparin (porcine) in D5W  0-40 Units/kg/hr (Adjusted) Intravenous Continuous 10.1 mL/hr at 04/27/24 0645 15 Units/kg/hr at 04/27/24 0645    lactated ringers   Intravenous Continuous 125 mL/hr at 04/25/24 1628 New Bag at 04/25/24 1628     Scheduled Meds:  Current Facility-Administered Medications   Medication Dose Route Frequency    gabapentin  100 mg Oral TID    hydroCHLOROthiazide  12.5 mg Oral Daily    levETIRAcetam  1,000 mg Oral BID    topiramate  100 mg Oral Daily     PRN Meds:  Current Facility-Administered Medications:     acetaminophen, 650 mg, Oral, Q8H PRN    albuterol, 1 puff, Inhalation, Q4H PRN    busPIRone, 10 mg, Oral, TID PRN    calcium carbonate, 500 mg, Oral, BID PRN    dextrose 10 % in water (D10W), 12.5 g, Intravenous, PRN    glucagon (human recombinant), 1 mg, Intramuscular, PRN    heparin (PORCINE), 60 Units/kg (Adjusted), Intravenous, PRN    heparin (PORCINE), 30 Units/kg (Adjusted), Intravenous, PRN    insulin aspart U-100, 0-10 Units, Subcutaneous, Q6H PRN    LIDOcaine (PF)  10 mg/ml (1%), 1 mL, Intradermal, Once PRN    melatonin, 6 mg, Oral, Nightly PRN    morphine, 4 mg, Intravenous, Q4H PRN    ondansetron, 8 mg, Intravenous, Q8H PRN    sodium chloride 0.9%, 10 mL, Intravenous, PRN     Review of patient's allergies indicates:   Allergen Reactions    Keflex [cephalexin] Hives    Vicodin [hydrocodone-acetaminophen] Itching     itch    Crestor [rosuvastatin] Other (See Comments)     Muscle pain     Objective:     Vital Signs (Most Recent):  Temp: 98 °F (36.7 °C) (04/27/24 0429)  Pulse: (!) 59 (04/27/24 0429)  Resp: 16 (04/27/24 0429)  BP: 136/66 (04/27/24 0429)  SpO2: 97 % (04/27/24 0429) Vital Signs (24h Range):  Temp:  [98 °F (36.7 °C)-98.6 °F (37 °C)] 98 °F (36.7 °C)  Pulse:  [56-60] 59  Resp:  [16-20] 16  SpO2:  [97 %-98 %] 97 %  BP: (134-166)/(66-81) 136/66     Weight: 88.4 kg (194 lb 14.2 oz)  Body mass index is 34.53 kg/m².    Intake/Output - Last 3 Shifts         04/25 0700  04/26 0659 04/26 0700  04/27 0659    P.O. 160     Total Intake(mL/kg) 160 (1.8)     Urine (mL/kg/hr) 500 (0.2)     Total Output 500     Net -340           Urine Occurrence 1 x              Physical Exam  Vitals and nursing note reviewed.   Constitutional:       General: She is not in acute distress.  Cardiovascular:      Rate and Rhythm: Normal rate.      Pulses: Normal pulses.   Pulmonary:      Effort: Pulmonary effort is normal. No respiratory distress.   Abdominal:      General: There is no distension.      Palpations: Abdomen is soft.      Tenderness: There is no abdominal tenderness.   Neurological:      Mental Status: She is alert.          Significant Labs:  I have reviewed all pertinent lab results within the past 24 hours.  CBC:   Recent Labs   Lab 04/27/24  0436   WBC 5.20  5.20   RBC 4.22  4.22   HGB 12.5  12.5   HCT 39.3  39.3   *  463*   MCV 93  93   MCH 29.6  29.6   MCHC 31.8*  31.8*     BMP:   Recent Labs   Lab 04/27/24  0436   *      K 3.6      CO2 21*   BUN  5*   CREATININE 0.8   CALCIUM 10.0   MG 1.8     CMP:   Recent Labs   Lab 04/24/24  1643 04/25/24  0523 04/27/24  0436   *   < > 119*   CALCIUM 9.9   < > 10.0   ALBUMIN 3.2*  --   --    PROT 8.0  --   --       < > 138   K 4.1   < > 3.6   CO2 23   < > 21*      < > 106   BUN 8   < > 5*   CREATININE 0.7   < > 0.8   ALKPHOS 97  --   --    ALT 72*  --   --    AST 53*  --   --    BILITOT 0.5  --   --     < > = values in this interval not displayed.       Significant Diagnostics:  I have reviewed all pertinent imaging results/findings within the past 24 hours.  Assessment/Plan:     * Superior mesenteric vein thrombosis  59 year old female with a PMH of diabetes mellitus type 2 non insulin dependent , essential hypertension, GERD on daily medications, and anxiety s/p sleeve gastrectomy on 4/4/ now with SMV thrombosis. Clinically improving, remains on heparin gtt. Repeat CT 4/28 to evaluate SMV thrombus.     CLD  MM pain control  PRN nausea medications  Heparin drip - adjust based on PTT. Will transition to Eliquis on 4/28  Vascular consult - no indication for thrombectomy, continue anticoagulation  Interventional cardiology - repeat CT scan on Sunday (ordered)  Hematology/Oncology - anticoagulation for at least 3 months. Can transition to eliquis prior to discharge. Will follow up in 2-3 months.  Home medications. All need to be liquid or crushed    Dispo: TDD 4/28 vs 4/29 w/o home health needs, pending CT scan on 4/28        Clary Lord MD  General Surgery  [facility] - Surgery

## 2024-04-27 NOTE — PROGRESS NOTES
[facility]  General Surgery  Progress Note    Subjective:     History of Present Illness:  Monalisa Valverdeite is a 59 year old female with a PMH of diabetes mellitus type 2 non insulin dependent , essential hypertension, GERD on daily medications, and anxiety who presents with abdominal pain that began on Sunday. She reports that this is in the upper abdomen that radiates to the RUQ and is sharp. Pain is improved with medication in the ED. She has also had associated nausea and vomiting that started yesterday. She is s/p sleeve gastrectomy on 4/4/24.    Labs without significant abnormalities. CT scan with SMV thrombosis.     Post-Op Info:  * No surgery found *         No new subjective & objective note has been filed under this hospital service since the last note was generated.    Assessment/Plan:     * Superior mesenteric vein thrombosis  59 year old female with a PMH of diabetes mellitus type 2 non insulin dependent , essential hypertension, GERD on daily medications, and anxiety s/p sleeve gastrectomy on 4/4/ now with SMV thrombosis. Clinically improving, remains on heparin gtt. Repeat CT 4/28 to evaluate SMV thrombus.     CLD  MM pain control  PRN nausea medications  Heparin drip - adjust based on PTT. Will transition to Eliquis on 4/28  Vascular consult - no indication for thrombectomy, continue anticoagulation  Interventional cardiology - repeat CT scan on Sunday (ordered)  Hematology/Oncology - anticoagulation for at least 3 months. Can transition to eliquis prior to discharge. Will follow up in 2-3 months.  Home medications. All need to be liquid or crushed    Dispo: TDD 4/28 vs 4/29 w/o home health needs, pending CT scan on 4/28        Clary Lord MD  General Surgery  [facility]

## 2024-04-27 NOTE — SUBJECTIVE & OBJECTIVE
Interval History: Doing well this morning, tolerating clears. Her abdominal pain has significantly improved.     Medications:  Continuous Infusions:  Current Facility-Administered Medications   Medication Dose Route Frequency Last Rate Last Admin    heparin (porcine) in D5W  0-40 Units/kg/hr (Adjusted) Intravenous Continuous 10.1 mL/hr at 04/27/24 0645 15 Units/kg/hr at 04/27/24 0645    lactated ringers   Intravenous Continuous 125 mL/hr at 04/25/24 1628 New Bag at 04/25/24 1628     Scheduled Meds:  Current Facility-Administered Medications   Medication Dose Route Frequency    gabapentin  100 mg Oral TID    hydroCHLOROthiazide  12.5 mg Oral Daily    levETIRAcetam  1,000 mg Oral BID    topiramate  100 mg Oral Daily     PRN Meds:  Current Facility-Administered Medications:     acetaminophen, 650 mg, Oral, Q8H PRN    albuterol, 1 puff, Inhalation, Q4H PRN    busPIRone, 10 mg, Oral, TID PRN    calcium carbonate, 500 mg, Oral, BID PRN    dextrose 10 % in water (D10W), 12.5 g, Intravenous, PRN    glucagon (human recombinant), 1 mg, Intramuscular, PRN    heparin (PORCINE), 60 Units/kg (Adjusted), Intravenous, PRN    heparin (PORCINE), 30 Units/kg (Adjusted), Intravenous, PRN    insulin aspart U-100, 0-10 Units, Subcutaneous, Q6H PRN    LIDOcaine (PF) 10 mg/ml (1%), 1 mL, Intradermal, Once PRN    melatonin, 6 mg, Oral, Nightly PRN    morphine, 4 mg, Intravenous, Q4H PRN    ondansetron, 8 mg, Intravenous, Q8H PRN    sodium chloride 0.9%, 10 mL, Intravenous, PRN     Review of patient's allergies indicates:   Allergen Reactions    Keflex [cephalexin] Hives    Vicodin [hydrocodone-acetaminophen] Itching     itch    Crestor [rosuvastatin] Other (See Comments)     Muscle pain     Objective:     Vital Signs (Most Recent):  Temp: 98 °F (36.7 °C) (04/27/24 0429)  Pulse: (!) 59 (04/27/24 0429)  Resp: 16 (04/27/24 0429)  BP: 136/66 (04/27/24 0429)  SpO2: 97 % (04/27/24 0429) Vital Signs (24h Range):  Temp:  [98 °F (36.7 °C)-98.6 °F  (37 °C)] 98 °F (36.7 °C)  Pulse:  [56-60] 59  Resp:  [16-20] 16  SpO2:  [97 %-98 %] 97 %  BP: (134-166)/(66-81) 136/66     Weight: 88.4 kg (194 lb 14.2 oz)  Body mass index is 34.53 kg/m².    Intake/Output - Last 3 Shifts         04/25 0700 04/26 0659 04/26 0700 04/27 0659    P.O. 160     Total Intake(mL/kg) 160 (1.8)     Urine (mL/kg/hr) 500 (0.2)     Total Output 500     Net -340           Urine Occurrence 1 x              Physical Exam  Vitals and nursing note reviewed.   Constitutional:       General: She is not in acute distress.  Cardiovascular:      Rate and Rhythm: Normal rate.      Pulses: Normal pulses.   Pulmonary:      Effort: Pulmonary effort is normal. No respiratory distress.   Abdominal:      General: There is no distension.      Palpations: Abdomen is soft.      Tenderness: There is no abdominal tenderness.   Neurological:      Mental Status: She is alert.          Significant Labs:  I have reviewed all pertinent lab results within the past 24 hours.  CBC:   Recent Labs   Lab 04/27/24  0436   WBC 5.20  5.20   RBC 4.22  4.22   HGB 12.5  12.5   HCT 39.3  39.3   *  463*   MCV 93  93   MCH 29.6  29.6   MCHC 31.8*  31.8*     BMP:   Recent Labs   Lab 04/27/24  0436   *      K 3.6      CO2 21*   BUN 5*   CREATININE 0.8   CALCIUM 10.0   MG 1.8     CMP:   Recent Labs   Lab 04/24/24  1643 04/25/24  0523 04/27/24  0436   *   < > 119*   CALCIUM 9.9   < > 10.0   ALBUMIN 3.2*  --   --    PROT 8.0  --   --       < > 138   K 4.1   < > 3.6   CO2 23   < > 21*      < > 106   BUN 8   < > 5*   CREATININE 0.7   < > 0.8   ALKPHOS 97  --   --    ALT 72*  --   --    AST 53*  --   --    BILITOT 0.5  --   --     < > = values in this interval not displayed.       Significant Diagnostics:  I have reviewed all pertinent imaging results/findings within the past 24 hours.

## 2024-04-27 NOTE — ASSESSMENT & PLAN NOTE
59 year old female with a PMH of diabetes mellitus type 2 non insulin dependent , essential hypertension, GERD on daily medications, and anxiety s/p sleeve gastrectomy on 4/4/ now with SMV thrombosis. Clinically improving, remains on heparin gtt. Repeat CT 4/28 to evaluate SMV thrombus.     CLD  MM pain control  PRN nausea medications  Heparin drip - adjust based on PTT. Will transition to Eliquis on 4/28  Vascular consult - no indication for thrombectomy, continue anticoagulation  Interventional cardiology - repeat CT scan on Sunday (ordered)  Hematology/Oncology - anticoagulation for at least 3 months. Can transition to eliquis prior to discharge. Will follow up in 2-3 months.  Home medications. All need to be liquid or crushed    Dispo: TDD 4/28 vs 4/29 w/o home health needs, pending CT scan on 4/28

## 2024-04-28 LAB
ANION GAP SERPL CALC-SCNC: 11 MMOL/L (ref 8–16)
APTT PPP: 100.2 SEC (ref 21–32)
APTT PPP: 100.2 SEC (ref 21–32)
APTT PPP: 44.2 SEC (ref 21–32)
APTT PPP: 54 SEC (ref 21–32)
BASOPHILS # BLD AUTO: 0.02 K/UL (ref 0–0.2)
BASOPHILS # BLD AUTO: 0.02 K/UL (ref 0–0.2)
BASOPHILS NFR BLD: 0.4 % (ref 0–1.9)
BASOPHILS NFR BLD: 0.4 % (ref 0–1.9)
BUN SERPL-MCNC: 3 MG/DL (ref 6–20)
CALCIUM SERPL-MCNC: 10 MG/DL (ref 8.7–10.5)
CHLORIDE SERPL-SCNC: 107 MMOL/L (ref 95–110)
CO2 SERPL-SCNC: 20 MMOL/L (ref 23–29)
CREAT SERPL-MCNC: 0.8 MG/DL (ref 0.5–1.4)
DIFFERENTIAL METHOD BLD: ABNORMAL
DIFFERENTIAL METHOD BLD: ABNORMAL
EOSINOPHIL # BLD AUTO: 0.2 K/UL (ref 0–0.5)
EOSINOPHIL # BLD AUTO: 0.2 K/UL (ref 0–0.5)
EOSINOPHIL NFR BLD: 3.4 % (ref 0–8)
EOSINOPHIL NFR BLD: 3.4 % (ref 0–8)
ERYTHROCYTE [DISTWIDTH] IN BLOOD BY AUTOMATED COUNT: 13.1 % (ref 11.5–14.5)
ERYTHROCYTE [DISTWIDTH] IN BLOOD BY AUTOMATED COUNT: 13.1 % (ref 11.5–14.5)
EST. GFR  (NO RACE VARIABLE): >60 ML/MIN/1.73 M^2
GLUCOSE SERPL-MCNC: 123 MG/DL (ref 70–110)
HCT VFR BLD AUTO: 38.4 % (ref 37–48.5)
HCT VFR BLD AUTO: 38.4 % (ref 37–48.5)
HGB BLD-MCNC: 12.4 G/DL (ref 12–16)
HGB BLD-MCNC: 12.4 G/DL (ref 12–16)
IMM GRANULOCYTES # BLD AUTO: 0.02 K/UL (ref 0–0.04)
IMM GRANULOCYTES # BLD AUTO: 0.02 K/UL (ref 0–0.04)
IMM GRANULOCYTES NFR BLD AUTO: 0.4 % (ref 0–0.5)
IMM GRANULOCYTES NFR BLD AUTO: 0.4 % (ref 0–0.5)
LACTATE SERPL-SCNC: 1 MMOL/L (ref 0.5–2.2)
LYMPHOCYTES # BLD AUTO: 2.7 K/UL (ref 1–4.8)
LYMPHOCYTES # BLD AUTO: 2.7 K/UL (ref 1–4.8)
LYMPHOCYTES NFR BLD: 47.7 % (ref 18–48)
LYMPHOCYTES NFR BLD: 47.7 % (ref 18–48)
MAGNESIUM SERPL-MCNC: 1.8 MG/DL (ref 1.6–2.6)
MCH RBC QN AUTO: 29.2 PG (ref 27–31)
MCH RBC QN AUTO: 29.2 PG (ref 27–31)
MCHC RBC AUTO-ENTMCNC: 32.3 G/DL (ref 32–36)
MCHC RBC AUTO-ENTMCNC: 32.3 G/DL (ref 32–36)
MCV RBC AUTO: 90 FL (ref 82–98)
MCV RBC AUTO: 90 FL (ref 82–98)
MONOCYTES # BLD AUTO: 0.5 K/UL (ref 0.3–1)
MONOCYTES # BLD AUTO: 0.5 K/UL (ref 0.3–1)
MONOCYTES NFR BLD: 8.9 % (ref 4–15)
MONOCYTES NFR BLD: 8.9 % (ref 4–15)
NEUTROPHILS # BLD AUTO: 2.2 K/UL (ref 1.8–7.7)
NEUTROPHILS # BLD AUTO: 2.2 K/UL (ref 1.8–7.7)
NEUTROPHILS NFR BLD: 39.2 % (ref 38–73)
NEUTROPHILS NFR BLD: 39.2 % (ref 38–73)
NRBC BLD-RTO: 0 /100 WBC
NRBC BLD-RTO: 0 /100 WBC
PHOSPHATE SERPL-MCNC: 5 MG/DL (ref 2.7–4.5)
PLATELET # BLD AUTO: 459 K/UL (ref 150–450)
PLATELET # BLD AUTO: 459 K/UL (ref 150–450)
PMV BLD AUTO: 10.6 FL (ref 9.2–12.9)
PMV BLD AUTO: 10.6 FL (ref 9.2–12.9)
POCT GLUCOSE: 106 MG/DL (ref 70–110)
POCT GLUCOSE: 120 MG/DL (ref 70–110)
POCT GLUCOSE: 123 MG/DL (ref 70–110)
POCT GLUCOSE: 134 MG/DL (ref 70–110)
POCT GLUCOSE: 148 MG/DL (ref 70–110)
POTASSIUM SERPL-SCNC: 3.7 MMOL/L (ref 3.5–5.1)
RBC # BLD AUTO: 4.25 M/UL (ref 4–5.4)
RBC # BLD AUTO: 4.25 M/UL (ref 4–5.4)
SODIUM SERPL-SCNC: 138 MMOL/L (ref 136–145)
WBC # BLD AUTO: 5.62 K/UL (ref 3.9–12.7)
WBC # BLD AUTO: 5.62 K/UL (ref 3.9–12.7)

## 2024-04-28 PROCEDURE — 80048 BASIC METABOLIC PNL TOTAL CA: CPT

## 2024-04-28 PROCEDURE — 84100 ASSAY OF PHOSPHORUS: CPT

## 2024-04-28 PROCEDURE — 83605 ASSAY OF LACTIC ACID: CPT

## 2024-04-28 PROCEDURE — 25000003 PHARM REV CODE 250: Performed by: STUDENT IN AN ORGANIZED HEALTH CARE EDUCATION/TRAINING PROGRAM

## 2024-04-28 PROCEDURE — 11000001 HC ACUTE MED/SURG PRIVATE ROOM

## 2024-04-28 PROCEDURE — 85025 COMPLETE CBC W/AUTO DIFF WBC: CPT

## 2024-04-28 PROCEDURE — 63600175 PHARM REV CODE 636 W HCPCS

## 2024-04-28 PROCEDURE — 36415 COLL VENOUS BLD VENIPUNCTURE: CPT | Performed by: SURGERY

## 2024-04-28 PROCEDURE — 83735 ASSAY OF MAGNESIUM: CPT

## 2024-04-28 PROCEDURE — 25000003 PHARM REV CODE 250

## 2024-04-28 PROCEDURE — 25500020 PHARM REV CODE 255

## 2024-04-28 PROCEDURE — 25500020 PHARM REV CODE 255: Performed by: SURGERY

## 2024-04-28 PROCEDURE — 85730 THROMBOPLASTIN TIME PARTIAL: CPT | Mod: 91 | Performed by: SURGERY

## 2024-04-28 PROCEDURE — 36415 COLL VENOUS BLD VENIPUNCTURE: CPT

## 2024-04-28 RX ADMIN — ONDANSETRON 8 MG: 2 INJECTION INTRAMUSCULAR; INTRAVENOUS at 11:04

## 2024-04-28 RX ADMIN — GABAPENTIN 100 MG: 100 CAPSULE ORAL at 09:04

## 2024-04-28 RX ADMIN — LEVETIRACETAM 1000 MG: 500 TABLET, FILM COATED ORAL at 10:04

## 2024-04-28 RX ADMIN — ACETAMINOPHEN 650 MG: 325 TABLET ORAL at 02:04

## 2024-04-28 RX ADMIN — SODIUM CHLORIDE, POTASSIUM CHLORIDE, SODIUM LACTATE AND CALCIUM CHLORIDE: 600; 310; 30; 20 INJECTION, SOLUTION INTRAVENOUS at 02:04

## 2024-04-28 RX ADMIN — PANTOPRAZOLE SODIUM 40 MG: 40 TABLET, DELAYED RELEASE ORAL at 10:04

## 2024-04-28 RX ADMIN — GABAPENTIN 100 MG: 100 CAPSULE ORAL at 10:04

## 2024-04-28 RX ADMIN — MORPHINE SULFATE 4 MG: 4 INJECTION INTRAVENOUS at 05:04

## 2024-04-28 RX ADMIN — HEPARIN SODIUM 14 UNITS/KG/HR: 10000 INJECTION, SOLUTION INTRAVENOUS at 08:04

## 2024-04-28 RX ADMIN — IOHEXOL 100 ML: 350 INJECTION, SOLUTION INTRAVENOUS at 01:04

## 2024-04-28 RX ADMIN — GABAPENTIN 100 MG: 100 CAPSULE ORAL at 05:04

## 2024-04-28 RX ADMIN — HEPARIN SODIUM 14 UNITS/KG/HR: 10000 INJECTION, SOLUTION INTRAVENOUS at 05:04

## 2024-04-28 RX ADMIN — Medication 6 MG: at 09:04

## 2024-04-28 RX ADMIN — LEVETIRACETAM 1000 MG: 500 TABLET, FILM COATED ORAL at 09:04

## 2024-04-28 RX ADMIN — HYDROCHLOROTHIAZIDE 12.5 MG: 12.5 TABLET ORAL at 10:04

## 2024-04-28 RX ADMIN — IOHEXOL 15 ML: 350 INJECTION, SOLUTION INTRAVENOUS at 10:04

## 2024-04-28 RX ADMIN — TOPIRAMATE 100 MG: 25 TABLET, FILM COATED ORAL at 10:04

## 2024-04-28 NOTE — ASSESSMENT & PLAN NOTE
59 year old female with a PMH of diabetes mellitus type 2 non insulin dependent , essential hypertension, GERD on daily medications, and anxiety s/p sleeve gastrectomy on 4/4/ now with SMV thrombosis. Clinically improving, remains on heparin gtt. Repeat CT 4/28 to evaluate SMV thrombus.     NPO for CT with PO contrast today  Has been tolerating clears, if CT today with interval improvement in SMV thrombus will likely advance diet   MM pain control  PRN nausea medications  Heparin drip - adjust based on PTT. Will transition to Eliquis on 4/28  Vascular consult - no indication for thrombectomy, continue anticoagulation  Interventional cardiology - repeat CT scan on Sunday (ordered)  Hematology/Oncology - anticoagulation for at least 3 months. Can transition to eliquis prior to discharge. Will follow up in 2-3 months.  Home medications. All need to be liquid or crushed    Dispo: TDD 4/28 vs 4/29 w/o home health needs, pending CT scan today

## 2024-04-28 NOTE — NURSING
APTT result within therapeutic range per nomogram, no changes made to infusion. Next APTT draw is scheduled for 8 pm.

## 2024-04-28 NOTE — PLAN OF CARE
Problem: Adult Inpatient Plan of Care  Goal: Plan of Care Review  Outcome: Progressing  Goal: Patient-Specific Goal (Individualized)  Outcome: Progressing  Goal: Absence of Hospital-Acquired Illness or Injury  Outcome: Progressing  Goal: Optimal Comfort and Wellbeing  Outcome: Progressing  Goal: Readiness for Transition of Care  Outcome: Progressing   Heparin infusing per orders and nomogram , pt tolerated meds and meals , pt up in chair most of day , pt up to bsc per self multiple times today , walked into room at end of shift and pump was off . Tried to restart , pump had channel error and was replaced , Dr and charge nurse notified , new orders written , next shift notified , vss no distress

## 2024-04-28 NOTE — PLAN OF CARE
Problem: Adult Inpatient Plan of Care  Goal: Plan of Care Review  Outcome: Progressing  Goal: Patient-Specific Goal (Individualized)  Outcome: Progressing  Goal: Absence of Hospital-Acquired Illness or Injury  Outcome: Progressing  Goal: Optimal Comfort and Wellbeing  Outcome: Progressing  Goal: Readiness for Transition of Care  Outcome: Progressing     Problem: Diabetes Comorbidity  Goal: Blood Glucose Level Within Targeted Range  Outcome: Progressing     Problem: Skin Injury Risk Increased  Goal: Skin Health and Integrity  Outcome: Progressing     Problem: Fall Injury Risk  Goal: Absence of Fall and Fall-Related Injury  Outcome: Progressing

## 2024-04-28 NOTE — PROGRESS NOTES
[facility]  General Surgery  Progress Note    Subjective:     History of Present Illness:  Monalisa Carson is a 59 year old female with a PMH of diabetes mellitus type 2 non insulin dependent , essential hypertension, GERD on daily medications, and anxiety who presents with abdominal pain that began on Sunday. She reports that this is in the upper abdomen that radiates to the RUQ and is sharp. Pain is improved with medication in the ED. She has also had associated nausea and vomiting that started yesterday. She is s/p sleeve gastrectomy on 4/4/24.    Labs without significant abnormalities. CT scan with SMV thrombosis.     Post-Op Info:  * No surgery found *         Interval History: No issues overnight, continues to tolerating clears. No abdominal pain. Plan for CT today    Medications:  Continuous Infusions:  Current Facility-Administered Medications   Medication Dose Route Frequency Last Rate Last Admin    heparin (porcine) in D5W  0-40 Units/kg/hr (Adjusted) Intravenous Continuous 9.4 mL/hr at 04/28/24 0809 14 Units/kg/hr at 04/28/24 0809    lactated ringers   Intravenous Continuous 125 mL/hr at 04/27/24 2010 125 mL/hr at 04/27/24 2010     Scheduled Meds:  Current Facility-Administered Medications   Medication Dose Route Frequency    gabapentin  100 mg Oral TID    hydroCHLOROthiazide  12.5 mg Oral Daily    levETIRAcetam  1,000 mg Oral BID    pantoprazole  40 mg Oral Daily    topiramate  100 mg Oral Daily     PRN Meds:  Current Facility-Administered Medications:     acetaminophen, 650 mg, Oral, Q8H PRN    albuterol, 1 puff, Inhalation, Q4H PRN    busPIRone, 10 mg, Oral, TID PRN    calcium carbonate, 500 mg, Oral, BID PRN    dextrose 10 % in water (D10W), 12.5 g, Intravenous, PRN    glucagon (human recombinant), 1 mg, Intramuscular, PRN    heparin (PORCINE), 60 Units/kg (Adjusted), Intravenous, PRN    heparin (PORCINE), 30 Units/kg (Adjusted), Intravenous, PRN    insulin aspart U-100, 0-10 Units,  Subcutaneous, Q6H PRN    iohexol, 15 mL, Oral, PRN    LIDOcaine (PF) 10 mg/ml (1%), 1 mL, Intradermal, Once PRN    melatonin, 6 mg, Oral, Nightly PRN    morphine, 4 mg, Intravenous, Q4H PRN    ondansetron, 8 mg, Intravenous, Q8H PRN    sodium chloride 0.9%, 10 mL, Intravenous, PRN     Review of patient's allergies indicates:   Allergen Reactions    Keflex [cephalexin] Hives    Vicodin [hydrocodone-acetaminophen] Itching     itch    Crestor [rosuvastatin] Other (See Comments)     Muscle pain     Objective:     Vital Signs (Most Recent):  Temp: 98 °F (36.7 °C) (04/28/24 0418)  Pulse: 60 (04/28/24 0418)  Resp: 17 (04/28/24 0418)  BP: 127/64 (04/28/24 0418)  SpO2: (!) 94 % (04/28/24 0418) Vital Signs (24h Range):  Temp:  [98 °F (36.7 °C)-98.3 °F (36.8 °C)] 98 °F (36.7 °C)  Pulse:  [58-78] 60  Resp:  [16-18] 17  SpO2:  [93 %-98 %] 94 %  BP: (125-143)/(58-80) 127/64     Weight: 88.4 kg (194 lb 14.2 oz)  Body mass index is 34.53 kg/m².    Intake/Output - Last 3 Shifts         04/26 0700 04/27 0659 04/27 0700 04/28 0659 04/28 0700  04/29 0659    P.O.  360     I.V. (mL/kg)  600 (6.8)     Total Intake(mL/kg)  960 (10.9)     Urine (mL/kg/hr)       Total Output       Net  +960            Urine Occurrence  3 x              Physical Exam  Vitals and nursing note reviewed.   Constitutional:       General: She is not in acute distress.  Cardiovascular:      Rate and Rhythm: Normal rate.      Pulses: Normal pulses.   Pulmonary:      Effort: Pulmonary effort is normal. No respiratory distress.   Abdominal:      General: There is no distension.      Palpations: Abdomen is soft.      Tenderness: There is no abdominal tenderness.      Comments: Incisions clean/dry; soft non-tended and non-distended    Neurological:      Mental Status: She is alert.          Significant Labs:  I have reviewed all pertinent lab results within the past 24 hours.  CBC:   Recent Labs   Lab 04/28/24  0320   WBC 5.62  5.62   RBC 4.25  4.25   HGB 12.4   12.4   HCT 38.4  38.4   *  459*   MCV 90  90   MCH 29.2  29.2   MCHC 32.3  32.3     BMP:   Recent Labs   Lab 04/28/24  0320   *      K 3.7      CO2 20*   BUN 3*   CREATININE 0.8   CALCIUM 10.0   MG 1.8     CMP:   Recent Labs   Lab 04/24/24  1643 04/25/24  0523 04/28/24  0320   *   < > 123*   CALCIUM 9.9   < > 10.0   ALBUMIN 3.2*  --   --    PROT 8.0  --   --       < > 138   K 4.1   < > 3.7   CO2 23   < > 20*      < > 107   BUN 8   < > 3*   CREATININE 0.7   < > 0.8   ALKPHOS 97  --   --    ALT 72*  --   --    AST 53*  --   --    BILITOT 0.5  --   --     < > = values in this interval not displayed.       Significant Diagnostics:  I have reviewed all pertinent imaging results/findings within the past 24 hours.  Assessment/Plan:     * Superior mesenteric vein thrombosis  59 year old female with a PMH of diabetes mellitus type 2 non insulin dependent , essential hypertension, GERD on daily medications, and anxiety s/p sleeve gastrectomy on 4/4/ now with SMV thrombosis. Clinically improving, remains on heparin gtt. Repeat CT 4/28 to evaluate SMV thrombus.     NPO for CT with PO contrast today  Has been tolerating clears, if CT today with interval improvement in SMV thrombus will likely advance diet   MM pain control  PRN nausea medications  Heparin drip - adjust based on PTT. Will transition to Eliquis on 4/28  Vascular consult - no indication for thrombectomy, continue anticoagulation  Interventional cardiology - repeat CT scan on Sunday (ordered)  Hematology/Oncology - anticoagulation for at least 3 months. Can transition to eliquis prior to discharge. Will follow up in 2-3 months.  Home medications. All need to be liquid or crushed    Dispo: TDD 4/28 vs 4/29 w/o home health needs, pending CT scan today        Clary Lord MD  General Surgery  [facility] - Surgery

## 2024-04-28 NOTE — NURSING
Heparin drip restarted per nomogram, decreased by 4 units, now infusing at 14 units/kg/hr. APTT scheduled for 2 pm.

## 2024-04-29 VITALS
BODY MASS INDEX: 34.53 KG/M2 | SYSTOLIC BLOOD PRESSURE: 103 MMHG | TEMPERATURE: 98 F | OXYGEN SATURATION: 96 % | RESPIRATION RATE: 16 BRPM | HEART RATE: 61 BPM | HEIGHT: 63 IN | WEIGHT: 194.88 LBS | DIASTOLIC BLOOD PRESSURE: 51 MMHG

## 2024-04-29 LAB
ANION GAP SERPL CALC-SCNC: 11 MMOL/L (ref 8–16)
APTT PPP: 58.8 SEC (ref 21–32)
BASOPHILS # BLD AUTO: 0.02 K/UL (ref 0–0.2)
BASOPHILS # BLD AUTO: 0.02 K/UL (ref 0–0.2)
BASOPHILS NFR BLD: 0.4 % (ref 0–1.9)
BASOPHILS NFR BLD: 0.4 % (ref 0–1.9)
BUN SERPL-MCNC: 5 MG/DL (ref 6–20)
CALCIUM SERPL-MCNC: 9.8 MG/DL (ref 8.7–10.5)
CHLORIDE SERPL-SCNC: 107 MMOL/L (ref 95–110)
CO2 SERPL-SCNC: 22 MMOL/L (ref 23–29)
CREAT SERPL-MCNC: 0.9 MG/DL (ref 0.5–1.4)
DIFFERENTIAL METHOD BLD: ABNORMAL
DIFFERENTIAL METHOD BLD: ABNORMAL
EOSINOPHIL # BLD AUTO: 0.2 K/UL (ref 0–0.5)
EOSINOPHIL # BLD AUTO: 0.2 K/UL (ref 0–0.5)
EOSINOPHIL NFR BLD: 4.6 % (ref 0–8)
EOSINOPHIL NFR BLD: 4.6 % (ref 0–8)
ERYTHROCYTE [DISTWIDTH] IN BLOOD BY AUTOMATED COUNT: 13.2 % (ref 11.5–14.5)
ERYTHROCYTE [DISTWIDTH] IN BLOOD BY AUTOMATED COUNT: 13.2 % (ref 11.5–14.5)
EST. GFR  (NO RACE VARIABLE): >60 ML/MIN/1.73 M^2
GLUCOSE SERPL-MCNC: 122 MG/DL (ref 70–110)
HCT VFR BLD AUTO: 37.5 % (ref 37–48.5)
HCT VFR BLD AUTO: 37.5 % (ref 37–48.5)
HGB BLD-MCNC: 12.3 G/DL (ref 12–16)
HGB BLD-MCNC: 12.3 G/DL (ref 12–16)
IMM GRANULOCYTES # BLD AUTO: 0.01 K/UL (ref 0–0.04)
IMM GRANULOCYTES # BLD AUTO: 0.01 K/UL (ref 0–0.04)
IMM GRANULOCYTES NFR BLD AUTO: 0.2 % (ref 0–0.5)
IMM GRANULOCYTES NFR BLD AUTO: 0.2 % (ref 0–0.5)
LYMPHOCYTES # BLD AUTO: 2.5 K/UL (ref 1–4.8)
LYMPHOCYTES # BLD AUTO: 2.5 K/UL (ref 1–4.8)
LYMPHOCYTES NFR BLD: 50.7 % (ref 18–48)
LYMPHOCYTES NFR BLD: 50.7 % (ref 18–48)
MAGNESIUM SERPL-MCNC: 1.8 MG/DL (ref 1.6–2.6)
MCH RBC QN AUTO: 29.2 PG (ref 27–31)
MCH RBC QN AUTO: 29.2 PG (ref 27–31)
MCHC RBC AUTO-ENTMCNC: 32.8 G/DL (ref 32–36)
MCHC RBC AUTO-ENTMCNC: 32.8 G/DL (ref 32–36)
MCV RBC AUTO: 89 FL (ref 82–98)
MCV RBC AUTO: 89 FL (ref 82–98)
MONOCYTES # BLD AUTO: 0.5 K/UL (ref 0.3–1)
MONOCYTES # BLD AUTO: 0.5 K/UL (ref 0.3–1)
MONOCYTES NFR BLD: 9.2 % (ref 4–15)
MONOCYTES NFR BLD: 9.2 % (ref 4–15)
NEUTROPHILS # BLD AUTO: 1.7 K/UL (ref 1.8–7.7)
NEUTROPHILS # BLD AUTO: 1.7 K/UL (ref 1.8–7.7)
NEUTROPHILS NFR BLD: 34.9 % (ref 38–73)
NEUTROPHILS NFR BLD: 34.9 % (ref 38–73)
NRBC BLD-RTO: 0 /100 WBC
NRBC BLD-RTO: 0 /100 WBC
PHOSPHATE SERPL-MCNC: 5.1 MG/DL (ref 2.7–4.5)
PLATELET # BLD AUTO: 421 K/UL (ref 150–450)
PLATELET # BLD AUTO: 421 K/UL (ref 150–450)
PMV BLD AUTO: 9.9 FL (ref 9.2–12.9)
PMV BLD AUTO: 9.9 FL (ref 9.2–12.9)
POCT GLUCOSE: 112 MG/DL (ref 70–110)
POCT GLUCOSE: 122 MG/DL (ref 70–110)
POTASSIUM SERPL-SCNC: 3.6 MMOL/L (ref 3.5–5.1)
RBC # BLD AUTO: 4.21 M/UL (ref 4–5.4)
RBC # BLD AUTO: 4.21 M/UL (ref 4–5.4)
SODIUM SERPL-SCNC: 140 MMOL/L (ref 136–145)
WBC # BLD AUTO: 4.99 K/UL (ref 3.9–12.7)
WBC # BLD AUTO: 4.99 K/UL (ref 3.9–12.7)

## 2024-04-29 PROCEDURE — 99223 1ST HOSP IP/OBS HIGH 75: CPT | Mod: ,,,

## 2024-04-29 PROCEDURE — 25000003 PHARM REV CODE 250

## 2024-04-29 PROCEDURE — 80048 BASIC METABOLIC PNL TOTAL CA: CPT

## 2024-04-29 PROCEDURE — 84100 ASSAY OF PHOSPHORUS: CPT

## 2024-04-29 PROCEDURE — 85025 COMPLETE CBC W/AUTO DIFF WBC: CPT

## 2024-04-29 PROCEDURE — 85730 THROMBOPLASTIN TIME PARTIAL: CPT | Performed by: SURGERY

## 2024-04-29 PROCEDURE — 25000003 PHARM REV CODE 250: Performed by: STUDENT IN AN ORGANIZED HEALTH CARE EDUCATION/TRAINING PROGRAM

## 2024-04-29 PROCEDURE — 83735 ASSAY OF MAGNESIUM: CPT

## 2024-04-29 RX ORDER — SODIUM CHLORIDE, SODIUM LACTATE, POTASSIUM CHLORIDE, CALCIUM CHLORIDE 600; 310; 30; 20 MG/100ML; MG/100ML; MG/100ML; MG/100ML
INJECTION, SOLUTION INTRAVENOUS CONTINUOUS
Status: DISCONTINUED | OUTPATIENT
Start: 2024-04-29 | End: 2024-04-29

## 2024-04-29 RX ADMIN — TOPIRAMATE 100 MG: 25 TABLET, FILM COATED ORAL at 09:04

## 2024-04-29 RX ADMIN — HYDROCHLOROTHIAZIDE 12.5 MG: 12.5 TABLET ORAL at 09:04

## 2024-04-29 RX ADMIN — PANTOPRAZOLE SODIUM 40 MG: 40 TABLET, DELAYED RELEASE ORAL at 09:04

## 2024-04-29 RX ADMIN — GABAPENTIN 100 MG: 100 CAPSULE ORAL at 02:04

## 2024-04-29 RX ADMIN — LEVETIRACETAM 1000 MG: 500 TABLET, FILM COATED ORAL at 09:04

## 2024-04-29 RX ADMIN — GABAPENTIN 100 MG: 100 CAPSULE ORAL at 09:04

## 2024-04-29 RX ADMIN — APIXABAN 10 MG: 5 TABLET, FILM COATED ORAL at 09:04

## 2024-04-29 NOTE — CARE UPDATE
GENERAL SURGERY  CARE UPDATE        Discussed with IR, because patient is asymptomatic and tolerating a Bariatric regular diet, no urgent indication for thrombectomy.    Will transition to oral AC, discontinue heparin gtt once first dose of Eliquis given. Anticipate discharge this afternoon once continues to tolerate bariatric regular diet on 3 months of Eliquis.     IR will follow-up with patient with a CT in 1 month to monitor.       - Clary Lord M.D  General Surgery PGY5  Pager: [phone]

## 2024-04-29 NOTE — PLAN OF CARE
Problem: Adult Inpatient Plan of Care  Goal: Plan of Care Review  Outcome: Progressing  Goal: Patient-Specific Goal (Individualized)  Outcome: Progressing  Goal: Absence of Hospital-Acquired Illness or Injury  Outcome: Progressing  Goal: Optimal Comfort and Wellbeing  Outcome: Progressing  Goal: Readiness for Transition of Care  Outcome: Progressing     Problem: Diabetes Comorbidity  Goal: Blood Glucose Level Within Targeted Range  Outcome: Progressing    Pt had CT done today , family at bedside today , vss, no distress, see mar for pain med admin, heparin infusing per nomogram

## 2024-04-29 NOTE — NURSING
Nurses Note -- 4 Eyes      4/29/2024   12:55 PM      Skin assessed during: Daily Assessment      [x] No Altered Skin Integrity Present    []Prevention Measures Documented      [] Yes- Altered Skin Integrity Present or Discovered   [] LDA Added if Not in Epic (Describe Wound)   [] New Altered Skin Integrity was Present on Admit and Documented in LDA   [] Wound Image Taken    Wound Care Consulted? No    Attending Nurse:  Brittni Hudson LPN    Second RN/Staff Member:   Oly Daniels RN

## 2024-04-29 NOTE — CARE UPDATE
GENERAL SURGERY  CARE UPDATE      Discussed new CT scan today with Interventional Radiology, they are going to discuss with their team for possible thrombectomy 4/29. Will advance to bariatric regular diet tonight, NPO at midnight for possible IR procedure.       Discussed with patient. Will remain on heparin gtt at this time. Possible transition to Eliquis after IR intervention 4/29.       -- Clary Lord M.D  General Surgery PGY5  Pager: [phone]

## 2024-04-29 NOTE — PLAN OF CARE
Problem: Adult Inpatient Plan of Care  Goal: Plan of Care Review  Outcome: Progressing  Goal: Patient-Specific Goal (Individualized)  Outcome: Progressing  Goal: Absence of Hospital-Acquired Illness or Injury  Outcome: Progressing  Goal: Optimal Comfort and Wellbeing  Outcome: Progressing  Goal: Readiness for Transition of Care  Outcome: Progressing     Problem: Diabetes Comorbidity  Goal: Blood Glucose Level Within Targeted Range  Outcome: Progressing     Problem: Skin Injury Risk Increased  Goal: Skin Health and Integrity  Outcome: Progressing     Problem: Fall Injury Risk  Goal: Absence of Fall and Fall-Related Injury  Outcome: Progressing     Pt AAOx4,nad or pain noted. Pt heparin gtt stopped and tolerating diet well.

## 2024-04-29 NOTE — ASSESSMENT & PLAN NOTE
59 year old female with a PMH of diabetes mellitus type 2 non insulin dependent , essential hypertension, GERD on daily medications, and anxiety s/p sleeve gastrectomy on 4/4/ now with SMV thrombosis. Clinically improving, remains on heparin gtt. Repeat CT 4/28 to evaluate SMV thrombus.     NPO for possible thormbectomy today  MM pain control  PRN nausea medications  Heparin drip - adjust based on PTT. With plan to transition to Eliquis after thrombectomy  Vascular consult - no indication for thrombectomy, continue anticoagulation  Interventional cardiology - repeat CT scan on Sunday (ordered)  Hematology/Oncology - anticoagulation for at least 3 months. Can transition to eliquis prior to discharge. Will follow up in 2-3 months.  Home medications. All need to be liquid or crushed    Dispo: Possibly 4/29

## 2024-04-29 NOTE — PROGRESS NOTES
[facility]  General Surgery  Progress Note    Subjective:     History of Present Illness:  Monalisa Carson is a 59 year old female with a PMH of diabetes mellitus type 2 non insulin dependent , essential hypertension, GERD on daily medications, and anxiety who presents with abdominal pain that began on Sunday. She reports that this is in the upper abdomen that radiates to the RUQ and is sharp. Pain is improved with medication in the ED. She has also had associated nausea and vomiting that started yesterday. She is s/p sleeve gastrectomy on 4/4/24.    Labs without significant abnormalities. CT scan with SMV thrombosis.     Post-Op Info:  * No surgery found *         Interval History: Repeat CT scan yesterday with residual SMV thrombus. Did tolerate a bariatric regular diet in the evening without abdominal pain. No issues this am, NPO for possible IR thrombectomy    Medications:  Continuous Infusions:  Current Facility-Administered Medications   Medication Dose Route Frequency Last Rate Last Admin    heparin (porcine) in D5W  0-40 Units/kg/hr (Adjusted) Intravenous Continuous 9.4 mL/hr at 04/28/24 1748 14 Units/kg/hr at 04/28/24 1748     Scheduled Meds:  Current Facility-Administered Medications   Medication Dose Route Frequency    gabapentin  100 mg Oral TID    hydroCHLOROthiazide  12.5 mg Oral Daily    levETIRAcetam  1,000 mg Oral BID    pantoprazole  40 mg Oral Daily    topiramate  100 mg Oral Daily     PRN Meds:  Current Facility-Administered Medications:     acetaminophen, 650 mg, Oral, Q8H PRN    albuterol, 1 puff, Inhalation, Q4H PRN    busPIRone, 10 mg, Oral, TID PRN    calcium carbonate, 500 mg, Oral, BID PRN    dextrose 10 % in water (D10W), 12.5 g, Intravenous, PRN    glucagon (human recombinant), 1 mg, Intramuscular, PRN    heparin (PORCINE), 60 Units/kg (Adjusted), Intravenous, PRN    heparin (PORCINE), 30 Units/kg (Adjusted), Intravenous, PRN    insulin aspart U-100, 0-10 Units, Subcutaneous,  Q6H PRN    iohexol, 15 mL, Oral, PRN    LIDOcaine (PF) 10 mg/ml (1%), 1 mL, Intradermal, Once PRN    melatonin, 6 mg, Oral, Nightly PRN    morphine, 4 mg, Intravenous, Q4H PRN    ondansetron, 8 mg, Intravenous, Q8H PRN    sodium chloride 0.9%, 10 mL, Intravenous, PRN     Review of patient's allergies indicates:   Allergen Reactions    Keflex [cephalexin] Hives    Vicodin [hydrocodone-acetaminophen] Itching     itch    Crestor [rosuvastatin] Other (See Comments)     Muscle pain     Objective:     Vital Signs (Most Recent):  Temp: 98.2 °F (36.8 °C) (04/29/24 0450)  Pulse: 60 (04/29/24 0450)  Resp: 18 (04/29/24 0450)  BP: (!) 111/59 (04/29/24 0450)  SpO2: 97 % (04/29/24 0450) Vital Signs (24h Range):  Temp:  [97.6 °F (36.4 °C)-98.2 °F (36.8 °C)] 98.2 °F (36.8 °C)  Pulse:  [60-70] 60  Resp:  [16-18] 18  SpO2:  [95 %-99 %] 97 %  BP: (111-144)/(57-73) 111/59     Weight: 88.4 kg (194 lb 14.2 oz)  Body mass index is 34.53 kg/m².    Intake/Output - Last 3 Shifts         04/27 0700  04/28 0659 04/28 0700  04/29 0659    P.O. 360 1480    I.V. (mL/kg) 600 (6.8)     Total Intake(mL/kg) 960 (10.9) 1480 (16.7)    Net +960 +1480          Urine Occurrence 3 x              Physical Exam  Vitals and nursing note reviewed.   Constitutional:       General: She is not in acute distress.  Cardiovascular:      Rate and Rhythm: Normal rate.      Pulses: Normal pulses.   Pulmonary:      Effort: Pulmonary effort is normal. No respiratory distress.   Abdominal:      General: There is no distension.      Palpations: Abdomen is soft.      Tenderness: There is no abdominal tenderness.   Neurological:      Mental Status: She is alert.          Significant Labs:  I have reviewed all pertinent lab results within the past 24 hours.  CBC:   Recent Labs   Lab 04/29/24  0555   WBC 4.99  4.99   RBC 4.21  4.21   HGB 12.3  12.3   HCT 37.5  37.5     421   MCV 89  89   MCH 29.2  29.2   MCHC 32.8  32.8     BMP:   Recent Labs   Lab 04/29/24  0555    *      K 3.6      CO2 22*   BUN 5*   CREATININE 0.9   CALCIUM 9.8   MG 1.8     CMP:   Recent Labs   Lab 04/24/24  1643 04/25/24  0523 04/29/24  0555   *   < > 122*   CALCIUM 9.9   < > 9.8   ALBUMIN 3.2*  --   --    PROT 8.0  --   --       < > 140   K 4.1   < > 3.6   CO2 23   < > 22*      < > 107   BUN 8   < > 5*   CREATININE 0.7   < > 0.9   ALKPHOS 97  --   --    ALT 72*  --   --    AST 53*  --   --    BILITOT 0.5  --   --     < > = values in this interval not displayed.       Significant Diagnostics:  I have reviewed all pertinent imaging results/findings within the past 24 hours.  Assessment/Plan:     * Superior mesenteric vein thrombosis  59 year old female with a PMH of diabetes mellitus type 2 non insulin dependent , essential hypertension, GERD on daily medications, and anxiety s/p sleeve gastrectomy on 4/4/ now with SMV thrombosis. Clinically improving, remains on heparin gtt. Repeat CT 4/28 to evaluate SMV thrombus.     NPO for possible thormbectomy today  MM pain control  PRN nausea medications  Heparin drip - adjust based on PTT. With plan to transition to Eliquis after thrombectomy  Vascular consult - no indication for thrombectomy, continue anticoagulation  Interventional cardiology - repeat CT scan on Sunday (ordered)  Hematology/Oncology - anticoagulation for at least 3 months. Can transition to eliquis prior to discharge. Will follow up in 2-3 months.  Home medications. All need to be liquid or crushed    Dispo: Possibly 4/29        Clary Lord MD  General Surgery  [facility] - Surgery

## 2024-04-29 NOTE — CONSULTS
Interventional Radiology   Consult Note      Date: 4/29/2024   Primary team: Networked reference to record MARGARETTE , Gavin Sultana MD   Room/bed: 526/526 A    Inpatient consult to Interventional Radiology  Consult performed by: Anita Oquendo PA-C  Consult ordered by: Michelle Mcnulty MD             History of Present Illness:  Monalisa Carson is a 59 y.o. female with a history of DM2,recent sleeve gastrectomy on 4/4/24 who was admitted on 4/24/24 for abdominal pain. Had a CT done which revealed SMV thrombosis. Vascular surgery evaluated the patient who recommend anticoagulation with heparin during admission with transition to oral AC upon discharge. IR was consulted for possible thrombectomy. 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 Patient is currently tolerating diet without abdominal pain. Plans for transition to oral AC (eliquis) per surgery team.       ROS:   Review of Systems   Constitutional: Negative.    HENT: Negative.     Respiratory: Negative.     Cardiovascular: Negative.    Gastrointestinal: Negative.    Musculoskeletal: Negative.    Skin: Negative.    Neurological: Negative.    Psychiatric/Behavioral: Negative.            Past Medical History:  Past Medical History:   Diagnosis Date    Allergy     Anxiety     Diabetes mellitus     GERD (gastroesophageal reflux disease)     Heart murmur     Hyperlipidemia     Hypertension     Seizures     5-6 years ago, spontaneous       Past Surgical History:  Past Surgical History:   Procedure Laterality Date    CHOLECYSTECTOMY      2001 april    COLONOSCOPY N/A 06/29/2021    Procedure: COLONOSCOPY;  Surgeon: Gustavo Pelletier MD;  Location: [facility];  Service: Endoscopy;  Laterality: N/A;  combined orders    ESOPHAGOGASTRODUODENOSCOPY N/A 06/29/2021    Procedure: ESOPHAGOGASTRODUODENOSCOPY (EGD);  Surgeon: Gustavo Pelletier MD;  Location: [facility];  Service: Endoscopy;  Laterality: N/A;  covdi +3/29/20, fully vaccinated 3/23/21, prep  instr portal -ml    ESOPHAGOGASTRODUODENOSCOPY N/A 11/29/2023    Procedure: EGD (ESOPHAGOGASTRODUODENOSCOPY);  Surgeon: Maciej Fine MD;  Location: [facility];  Service: Endoscopy;  Laterality: N/A;  referred by Cristhian RAHMAN, [facility] takes on Tuesdays will hold dose on Tuesday 11/28/23, instructions per [facility].    HYSTERECTOMY      partial  1996    LAPAROSCOPIC SLEEVE GASTRECTOMY N/A 4/4/2024    Procedure: GASTRECTOMY, SLEEVE, LAPAROSCOPIC with intraop EGD (OGB, please submit auth to insurance on or after 3/14/24);  Surgeon: Maciej Kennedy Jr., MD;  Location: [facility] OR [address] [place] [address];  Service: General;  Laterality: N/A;  with poss hiatal hernia repair    OOPHORECTOMY      SHOULDER SURGERY Right     WISDOM TOOTH EXTRACTION          Sedation History:    No known adverse reactions.     Social History:  Social History     Tobacco Use    Smoking status: Former     Current packs/day: 0.00     Types: Cigarettes    Smokeless tobacco: Never   Substance Use Topics    Alcohol use: Not Currently    Drug use: No        Home Medications:   Prior to Admission medications    Medication Sig Start Date End Date Taking? Authorizing Provider   acetaminophen (TYLENOL) 500 MG tablet Take 2 tablets (1,000 mg total) by mouth every 6 (six) hours as needed for Pain. 5/31/21   Jeana Valadez,    acetaminophen (TYLENOL) 500 MG tablet Take 1 tablet (500 mg total) by mouth every 4 (four) hours as needed for Pain or Temperature greater than (100.5 or greater). 9/6/23   Taz Angeles PA-C   albuterol (PROVENTIL/VENTOLIN HFA) 90 mcg/actuation inhaler Inhale 1-2 puffs into the lungs every 4 (four) hours as needed for Wheezing. Rescue 2/17/22 4/2/24  Flor Gomez MD   apixaban (ELIQUIS) 5 mg Tab Take 2 tablets (10 mg total) by mouth 2 (two) times daily. for 7 days 4/29/24 5/6/24  Clary Lord MD   apixaban (ELIQUIS) 5 mg Tab Take 2 tablets (10 mg total) by mouth 2 (two) times daily for 7 days, THEN 1 tablet (5 mg total) 2 (two)  times daily. 4/29/24 8/3/24  Clary Lord MD   benazepriL (LOTENSIN) 40 MG tablet TAKE 1 TABLET(40 MG) BY MOUTH EVERY DAY 8/8/23   Roberta Rushing, JORDAN   busPIRone (BUSPAR) 10 MG tablet Take 1 tablet (10 mg total) by mouth 3 (three) times daily as needed (anxiety).  Patient not taking: Reported on 12/6/2023 3/6/23 3/5/24  Flor Gomez MD   dicyclomine (BENTYL) 20 mg tablet Take 20 mg by mouth 4 (four) times daily. 5/3/21   Provider, Historical   dulaglutide (TRULICITY) 0.75 mg/0.5 mL pen injector Inject 0.75 mg into the skin every 7 days. 5/6/23 5/5/24  Flor Gomez MD   EScitalopram oxalate (LEXAPRO) 10 MG tablet Take 1 tablet (10 mg total) by mouth once daily. 3/6/23   Flor Gomez MD   fluticasone propionate (FLONASE) 50 mcg/actuation nasal spray 1 spray (50 mcg total) by Each Nostril route 2 (two) times daily. 5/31/21   Jeana Valadez DO   gabapentin (NEURONTIN) 100 MG capsule Take 100 mg by mouth 3 (three) times daily. 1/25/23   Provider, Historical   hydroCHLOROthiazide (HYDRODIURIL) 12.5 MG Tab TAKE 1 TABLET(12.5 MG) BY MOUTH EVERY DAY 11/15/23   Flor Gomez MD   hydrocodone-acetaminophen (HYCET) solution 7.5-325 mg/15mL Take 15 mLs by mouth 4 (four) times daily as needed for Pain. 3/20/24   Maciej Kennedy Jr., MD   hydrocodone-acetaminophen (HYCET) solution 7.5-325 mg/15mL Take 15 mLs by mouth 4 (four) times daily as needed for Pain. 4/6/24   Clary Lord MD   HYDROcodone-acetaminophen (NORCO) 7.5-325 mg per tablet Take 1 tablet by mouth every 6 (six) hours as needed. 5/31/23   Provider, Historical   hydrocortisone (WESTCORT) 0.2 % cream Apply topically 2 (two) times daily. for 5 days 7/8/23 3/20/24  Tomasz Monet MD   hydrocortisone 2.5 % cream [place]OL[place]ANG EXT AA BID 8/5/22   Flor Gomez MD   hydrOXYzine HCL (ATARAX) 25 MG tablet Take 1 tablet (25 mg total) by mouth every 4 to 6 hours as needed for Itching (redness). 7/8/23   Tomasz Monet MD   k  "phos di & mono-sod phos mono (K-PHOS-NEUTRAL) 250 mg Tab Take 1 tablet by mouth 2 (two) times a day. for 3 days  Patient not taking: Reported on 12/6/2023 9/28/23 10/1/23  Radha Smyth, NP   levETIRAcetam (KEPPRA) 1000 MG tablet TAKE 1 TABLET(1000 MG) BY MOUTH TWICE DAILY 12/11/23   Flro Gomez MD   LIDOcaine (LIDODERM) 5 % Place 1 patch onto the skin once daily. Remove & Discard patch within 12 hours or as directed by MD 11/16/21   Jose Angel Ray MD   lidocaine (LMX) 4 % cream Apply topically as needed. 8/6/20   Britney Ly MD   metFORMIN (GLUCOPHAGE-XR) 500 MG ER 24hr tablet TAKE 1 TABLET(500 MG) BY MOUTH DAILY WITH BREAKFAST 10/3/23   Flor Gomez MD   omeprazole (PRILOSEC) 40 MG capsule Take 1 capsule (40 mg total) by mouth once daily. 4/18/24 4/18/25  Cristhian Cuadra PA-C   ondansetron (ZOFRAN-ODT) 4 MG TbDL Take 1 tablet (4 mg total) by mouth every 6 (six) hours as needed (nausea). 9/6/23   Taz Angeles PA-C   ondansetron (ZOFRAN-ODT) 8 MG TbDL Dissolve 1 tablet (8 mg total) by mouth every 6 (six) hours as needed. 3/20/24   Maciej Kennedy Jr., MD   ondansetron (ZOFRAN-ODT) 8 MG TbDL Dissolve 1 tablet (8 mg total) by mouth every 6 (six) hours as needed. 4/6/24   Clary Lord MD   pantoprazole (PROTONIX) 40 MG tablet Take 1 tablet (40 mg total) by mouth once daily. 11/14/23 4/2/24  Radha Smyth NP   pen needle, diabetic 31 gauge x 5/16" Ndle For use with bydureon 2/24/20   Alisha See MD   phenoL (CHLORASEPTIC THROAT SPRAY) 1.4 % SprA by Mucous Membrane route every 2 (two) hours. 9/6/23   Taz Angeles PA-C   polyethylene glycol (GLYCOLAX) 17 gram/dose powder Use cap to measure 17 grams, mix in liquid and take by mouth once daily. 4/6/24   Clary Lord MD   promethazine (PHENERGAN) 6.25 mg/5 mL syrup Take 5-10 mLs (6.25-12.5 mg total) by mouth every 6 (six) hours. 3/20/24   Maciej Kennedy Jr., MD   sodium chloride (OCEAN NASAL) 0.65 % " nasal spray 1 spray by Nasal route every 3 (three) hours as needed for Congestion. 5/31/21   Jeana Valadez DO   topiramate (TOPAMAX) 100 MG tablet Take 1 tablet (100 mg total) by mouth once daily.  Patient not taking: Reported on 3/20/2024 1/5/22 12/6/23  Swathi Francis PA   zolpidem (AMBIEN) 5 MG Tab TAKE 1 TABLET BY MOUTH IN THE EVENING SPARINGLY AS NEEDED 4/15/24   Flor Gomez MD       Inpatient Medications:    Current Facility-Administered Medications:     acetaminophen tablet 650 mg, 650 mg, Oral, Q8H PRN, Rasheeda Kennedy MD, 650 mg at 04/28/24 1432    albuterol inhaler 1 puff, 1 puff, Inhalation, Q4H PRN, Rasheeda Kennedy MD    apixaban tablet 10 mg, 10 mg, Oral, BID, Clary Lord MD, 10 mg at 04/29/24 0941    [START ON 5/6/2024] apixaban tablet 5 mg, 5 mg, Oral, BID, Clary Lord MD    busPIRone tablet 10 mg, 10 mg, Oral, TID PRN, Clary Lord MD    calcium carbonate 200 mg calcium (500 mg) chewable tablet 500 mg, 500 mg, Oral, BID PRN, Thomas Gilbert MD, 500 mg at 04/27/24 1344    dextrose 10 % infusion, 12.5 g, Intravenous, PRN, Clary Lord MD    gabapentin capsule 100 mg, 100 mg, Oral, TID, Clary Lord MD, 100 mg at 04/29/24 0942    glucagon (human recombinant) injection 1 mg, 1 mg, Intramuscular, PRN, Clary Lord MD    hydroCHLOROthiazide tablet 12.5 mg, 12.5 mg, Oral, Daily, Clary Lord MD, 12.5 mg at 04/29/24 0951    insulin aspart U-100 pen 0-10 Units, 0-10 Units, Subcutaneous, Q6H PRN, Clary Lord MD    iohexol (Omnipaque 350) oral solution 15 mL, 15 mL, Oral, PRN, Stanley Brito MD    levETIRAcetam tablet 1,000 mg, 1,000 mg, Oral, BID, Clary Lord MD, 1,000 mg at 04/29/24 0941    LIDOcaine (PF) 10 mg/ml (1%) injection 10 mg, 1 mL, Intradermal, Once PRN, Rasheeda Kennedy MD    melatonin tablet 6 mg, 6 mg, Oral, Nightly PRN, Rasheeda Kennedy MD, 6 mg at 04/28/24 2116    morphine injection 4 mg, 4 mg, Intravenous, Q4H PRN, Ofelia,  MD Thomas, 4 mg at 04/28/24 1747    ondansetron injection 8 mg, 8 mg, Intravenous, Q8H PRN, [place] MD, 8 mg at 04/28/24 1121    pantoprazole EC tablet 40 mg, 40 mg, Oral, Daily, Clary Lord MD, 40 mg at 04/29/24 0942    sodium chloride 0.9% flush 10 mL, 10 mL, Intravenous, PRN, Rasheeda Kennedy MD    topiramate tablet 100 mg, 100 mg, Oral, Daily, Rasheeda Kennedy MD, 100 mg at 04/29/24 0941     Anticoagulants/Antiplatelets:   Apixaban    Allergies:   Review of patient's allergies indicates:   Allergen Reactions    Keflex [cephalexin] Hives    Vicodin [hydrocodone-acetaminophen] Itching     itch    Crestor [rosuvastatin] Other (See Comments)     Muscle pain       Vital Signs:  Temp: 98.2 °F (36.8 °C) (04/29/24 0734)  Pulse: (!) 58 (04/29/24 0734)  Resp: 18 (04/29/24 0734)  BP: 120/68 (04/29/24 0734)  SpO2: (!) 94 % (04/29/24 0734)    Temp:  [97.6 °F (36.4 °C)-98.2 °F (36.8 °C)]   Pulse:  [58-70]   Resp:  [18]   BP: (111-144)/(57-73)   SpO2:  [94 %-99 %]      Physical Exam:   Physical Exam  Constitutional:       General: She is not in acute distress.     Appearance: She is obese.      Comments: Resting in bed comfortably eating breakfast   HENT:      Head: Normocephalic.   Cardiovascular:      Rate and Rhythm: Normal rate and regular rhythm.   Pulmonary:      Effort: Pulmonary effort is normal.   Abdominal:      Comments: obese   Neurological:      Mental Status: She is alert and oriented to person, place, and time. Mental status is at baseline.   Psychiatric:         Mood and Affect: Mood normal.         Behavior: Behavior normal.           Sedation Exam:  ASA: III - Patient appears to have severe systemic disease not posing a constant threat to life  Mallampati score: III (soft and hard palate and base of uvula visible)    Laboratory:  Lab Results   Component Value Date    INR 1.0 04/24/2024       Lab Results   Component Value Date    WBC 4.99 04/29/2024    WBC 4.99 04/29/2024    HGB 12.3 04/29/2024     HGB 12.3 04/29/2024    HCT 37.5 04/29/2024    HCT 37.5 04/29/2024    MCV 89 04/29/2024    MCV 89 04/29/2024     04/29/2024     04/29/2024      Lab Results   Component Value Date     (H) 04/29/2024     04/29/2024    K 3.6 04/29/2024     04/29/2024    CO2 22 (L) 04/29/2024    BUN 5 (L) 04/29/2024    CREATININE 0.9 04/29/2024    CALCIUM 9.8 04/29/2024    MG 1.8 04/29/2024    ALT 72 (H) 04/24/2024    AST 53 (H) 04/24/2024    ALBUMIN 3.2 (L) 04/24/2024    BILITOT 0.5 04/24/2024    BILIDIR 0.2 09/28/2023       Imaging:   Reviewed by Humberto Vick MD.      ASSESSMENT/PLAN/RECOMMENDATIONS:   Monalisa Carson is a 59 y.o. female with a history of DM2,recent sleeve gastrectomy on 4/4/24 who was admitted on 4/24/24 for abdominal pain. Had a CT done which revealed SMV thrombosis. Vascular surgery evaluated the patient who recommend anticoagulation with heparin during admission with transition to oral AC upon discharge. IR was consulted for possible thrombectomy. 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 Patient is currently tolerating diet without abdominal pain. Plans for transition to oral AC (eliquis) per surgery team.     CT triple phase done yesterday reviewed by IR staff. Given stable SMV thrombosis and pt able to tolerate diet without abdominal pain/no clinical signs of mesenteric ischemia at this time, will plan for repeat CT triple phase in 1 month followed by IR clinic visit. Plan was discussed with surgery. CT triple phase ordered by surgery. Outpatient IR schedulers notified.     Plan:  Recommend continued monitoring for clinical signs of mesenteric ischemia while patient is inpatient. Patient currently tolerating diet without abdominal pain.   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   Recommend obtaining repeat CT Triple phase in 1 month to evaluate SMV thrombosis  (ordered by surgery).   Will review CT triple phase in 1 month and follow up in IR clinic (IR schedulers notified).     Anita Oquendo PA-C  Interventional Radiology  Spectra: [phone]  4/29/2024

## 2024-04-29 NOTE — SUBJECTIVE & OBJECTIVE
Interval History: Repeat CT scan yesterday with residual SMV thrombus. Did tolerate a bariatric regular diet in the evening without abdominal pain. No issues this am, NPO for possible IR thrombectomy    Medications:  Continuous Infusions:  Current Facility-Administered Medications   Medication Dose Route Frequency Last Rate Last Admin    heparin (porcine) in D5W  0-40 Units/kg/hr (Adjusted) Intravenous Continuous 9.4 mL/hr at 04/28/24 1748 14 Units/kg/hr at 04/28/24 1748     Scheduled Meds:  Current Facility-Administered Medications   Medication Dose Route Frequency    gabapentin  100 mg Oral TID    hydroCHLOROthiazide  12.5 mg Oral Daily    levETIRAcetam  1,000 mg Oral BID    pantoprazole  40 mg Oral Daily    topiramate  100 mg Oral Daily     PRN Meds:  Current Facility-Administered Medications:     acetaminophen, 650 mg, Oral, Q8H PRN    albuterol, 1 puff, Inhalation, Q4H PRN    busPIRone, 10 mg, Oral, TID PRN    calcium carbonate, 500 mg, Oral, BID PRN    dextrose 10 % in water (D10W), 12.5 g, Intravenous, PRN    glucagon (human recombinant), 1 mg, Intramuscular, PRN    heparin (PORCINE), 60 Units/kg (Adjusted), Intravenous, PRN    heparin (PORCINE), 30 Units/kg (Adjusted), Intravenous, PRN    insulin aspart U-100, 0-10 Units, Subcutaneous, Q6H PRN    iohexol, 15 mL, Oral, PRN    LIDOcaine (PF) 10 mg/ml (1%), 1 mL, Intradermal, Once PRN    melatonin, 6 mg, Oral, Nightly PRN    morphine, 4 mg, Intravenous, Q4H PRN    ondansetron, 8 mg, Intravenous, Q8H PRN    sodium chloride 0.9%, 10 mL, Intravenous, PRN     Review of patient's allergies indicates:   Allergen Reactions    Keflex [cephalexin] Hives    Vicodin [hydrocodone-acetaminophen] Itching     itch    Crestor [rosuvastatin] Other (See Comments)     Muscle pain     Objective:     Vital Signs (Most Recent):  Temp: 98.2 °F (36.8 °C) (04/29/24 0450)  Pulse: 60 (04/29/24 0450)  Resp: 18 (04/29/24 0450)  BP: (!) 111/59 (04/29/24 0450)  SpO2: 97 % (04/29/24 0450)  Vital Signs (24h Range):  Temp:  [97.6 °F (36.4 °C)-98.2 °F (36.8 °C)] 98.2 °F (36.8 °C)  Pulse:  [60-70] 60  Resp:  [16-18] 18  SpO2:  [95 %-99 %] 97 %  BP: (111-144)/(57-73) 111/59     Weight: 88.4 kg (194 lb 14.2 oz)  Body mass index is 34.53 kg/m².    Intake/Output - Last 3 Shifts         04/27 0700  04/28 0659 04/28 0700 04/29 0659    P.O. 360 1480    I.V. (mL/kg) 600 (6.8)     Total Intake(mL/kg) 960 (10.9) 1480 (16.7)    Net +960 +1480          Urine Occurrence 3 x              Physical Exam  Vitals and nursing note reviewed.   Constitutional:       General: She is not in acute distress.  Cardiovascular:      Rate and Rhythm: Normal rate.      Pulses: Normal pulses.   Pulmonary:      Effort: Pulmonary effort is normal. No respiratory distress.   Abdominal:      General: There is no distension.      Palpations: Abdomen is soft.      Tenderness: There is no abdominal tenderness.   Neurological:      Mental Status: She is alert.          Significant Labs:  I have reviewed all pertinent lab results within the past 24 hours.  CBC:   Recent Labs   Lab 04/29/24  0555   WBC 4.99  4.99   RBC 4.21  4.21   HGB 12.3  12.3   HCT 37.5  37.5     421   MCV 89  89   MCH 29.2  29.2   MCHC 32.8  32.8     BMP:   Recent Labs   Lab 04/29/24  0555   *      K 3.6      CO2 22*   BUN 5*   CREATININE 0.9   CALCIUM 9.8   MG 1.8     CMP:   Recent Labs   Lab 04/24/24  1643 04/25/24  0523 04/29/24  0555   *   < > 122*   CALCIUM 9.9   < > 9.8   ALBUMIN 3.2*  --   --    PROT 8.0  --   --       < > 140   K 4.1   < > 3.6   CO2 23   < > 22*      < > 107   BUN 8   < > 5*   CREATININE 0.7   < > 0.9   ALKPHOS 97  --   --    ALT 72*  --   --    AST 53*  --   --    BILITOT 0.5  --   --     < > = values in this interval not displayed.       Significant Diagnostics:  I have reviewed all pertinent imaging results/findings within the past 24 hours.

## 2024-04-30 ENCOUNTER — PATIENT OUTREACH (OUTPATIENT)
Dept: ADMINISTRATIVE | Facility: CLINIC | Age: 59
End: 2024-04-30
Payer: COMMERCIAL

## 2024-04-30 NOTE — DISCHARGE SUMMARY
[facility]  General Surgery  Discharge Summary      Patient Name: Monalisa Carson  MRN: [id number]  Admission Date: 4/24/2024  Hospital Length of Stay: 4 days  Discharge Date and Time:  04/30/2024 5:27 PM  Attending Physician: Kelsea att. providers found   Discharging Provider: Clary Lord MD  Primary Care Provider: Flor Gomez MD     HPI: Monalisa Carson is a 59 year old female with a PMH of diabetes mellitus type 2 non insulin dependent , essential hypertension, GERD on daily medications, and anxiety who presents with abdominal pain that began on Sunday. She reports that this is in the upper abdomen that radiates to the RUQ and is sharp. Pain is improved with medication in the ED. She has also had associated nausea and vomiting that started yesterday. She is s/p sleeve gastrectomy on 4/4/24.     * No surgery found *     Hospital Course: Patient was admitted to the [facility] general surgery service on 4/24/24 for evaluation of this abdominal pain and SMV thrombosis. She was kept NPO with mIVF. Vascular surgery and IR were consulted for evaluation of this SMV thrombosis for possible thrombectomy. They recommended heparin gtt, when amenable transition to an oral anticoagulant for 3 months. Over the hospital course, abdominal pain resolved and she was able to tolerate a regular diet. Repeat CT scan on 4/29/24 demonstrated SMV thrombosis was still in place, however bowel remains viable. IR confirmed no intervention at this time because patient is asymptomatic. On 4/30/24, patient tolerating a regular diet. She is meeting all milestones for discharge. She will be discharged on a 3 month course of anticoagulation.     Consults (From admission, onward)          Status Ordering Provider     Inpatient consult to Interventional Radiology  Once        Provider:  (Not yet assigned)    Completed MICHELE LUEVANO     Inpatient consult to Interventional Radiology  Once        Provider:  (Not yet  assigned)    Completed KRYSTAL ELLIS     Inpatient consult to Hematology/Oncology  Once        Provider:  (Not yet assigned)    Completed ZULEMA MOSQUERA     Inpatient consult to Vascular Surgery  Once        Provider:  (Not yet assigned)    Completed JG MILLER     Inpatient consult to General surgery  Once        Provider:  (Not yet assigned)    Completed JAZZY PARK            Significant Diagnostic Studies: N/A    Pending Diagnostic Studies:       None          Final Active Diagnoses:    Diagnosis Date Noted POA    PRINCIPAL PROBLEM:  Superior mesenteric vein thrombosis [K55.069] 04/24/2024 Yes    History of stroke [Z86.73] 04/25/2024 Not Applicable    S/P laparoscopic sleeve gastrectomy [Z98.84] 04/24/2024 Not Applicable    Seizures [R56.9] 04/04/2024 Yes    HLD (hyperlipidemia) [E78.5] 07/19/2013 Yes    Controlled type 2 diabetes mellitus with stage 2 chronic kidney disease, without long-term current use of insulin [E11.22, N18.2] 09/27/2012 Yes    Essential hypertension, benign [I10] 09/27/2012 Yes      Problems Resolved During this Admission:      Discharged Condition: good    Disposition: Home or Self Care    Follow Up:   Follow-up Information       Gavin Sultana MD Follow up in 2 week(s).    Specialties: Surgery, Bariatrics  Why: Follow-up hospital discharge  Contact information:  [address] [place] [address] [phone]               INTERVENTIONAL, RADIOLOGY Follow up in 1 month(s).    Why: IR clinic with CT scan before                         Patient Instructions:      CT Abdomen Pelvis With IV Contrast Routine Oral Contrast   Standing Status: Future Standing Exp. Date: 04/29/25     Order Specific Question Answer Comments   Is the patient taking metformin or a metformin combination medication?  If so, the patient should hold the medication for 2 days following contrast. Yes    Does this patient have impaired renal function? No    May the Radiologist modify the order per  protocol to meet the clinical needs of the patient? Yes    Oral/Rectal Contrast instructions: Routine Oral Contrast    Special CT ABD Protocol Request? Triple Phase Liver      Diet general     Call MD for:  temperature >100.4     Call MD for:  persistent nausea and vomiting     Call MD for:  severe uncontrolled pain     Call MD for:  redness, tenderness, or signs of infection (pain, swelling, redness, odor or green/yellow discharge around incision site)     No dressing needed     Medications:  Reconciled Home Medications:      Medication List        START taking these medications      * apixaban 5 mg Tab  Commonly known as: ELIQUIS  Take 2 tablets (10 mg total) by mouth 2 (two) times daily. for 7 days     * ELIQUIS 5 mg Tab  Generic drug: apixaban  Take 2 tablets (10 mg total) by mouth 2 (two) times daily for 7 days, THEN 1 tablet (5 mg total) 2 (two) times daily.  Start taking on: April 29, 2024     EScitalopram oxalate 10 MG tablet  Commonly known as: LEXAPRO  Take 1 tablet (10 mg total) by mouth once daily.           * This list has 2 medication(s) that are the same as other medications prescribed for you. Read the directions carefully, and ask your doctor or other care provider to review them with you.                CONTINUE taking these medications      * acetaminophen 500 MG tablet  Commonly known as: TYLENOL  Take 2 tablets (1,000 mg total) by mouth every 6 (six) hours as needed for Pain.     * acetaminophen 500 MG tablet  Commonly known as: TYLENOL  Take 1 tablet (500 mg total) by mouth every 4 (four) hours as needed for Pain or Temperature greater than (100.5 or greater).     albuterol 90 mcg/actuation inhaler  Commonly known as: PROVENTIL/VENTOLIN HFA  Inhale 1-2 puffs into the lungs every 4 (four) hours as needed for Wheezing. Rescue     benazepriL 40 MG tablet  Commonly known as: LOTENSIN  TAKE 1 TABLET(40 MG) BY MOUTH EVERY DAY     busPIRone 10 MG tablet  Commonly known as: BUSPAR  Take 1 tablet (10 mg  total) by mouth 3 (three) times daily as needed (anxiety).     CHLORASEPTIC THROAT SPRAY 1.4 % Spra  Generic drug: phenoL  by Mucous Membrane route every 2 (two) hours.     dicyclomine 20 mg tablet  Commonly known as: BENTYL  Take 20 mg by mouth 4 (four) times daily.     fluticasone propionate 50 mcg/actuation nasal spray  Commonly known as: FLONASE  1 spray (50 mcg total) by Each Nostril route 2 (two) times daily.     gabapentin 100 MG capsule  Commonly known as: NEURONTIN  Take 100 mg by mouth 3 (three) times daily.     hydroCHLOROthiazide 12.5 MG Tab  Commonly known as: HYDRODIURIL  TAKE 1 TABLET(12.5 MG) BY MOUTH EVERY DAY     * HYDROcodone-acetaminophen 7.5-325 mg per tablet  Commonly known as: NORCO  Take 1 tablet by mouth every 6 (six) hours as needed.     * hydrocodone-apap 7.5-325 MG/15 ML oral solution  Commonly known as: HYCET  Take 15 mLs by mouth 4 (four) times daily as needed for Pain.     * hydrocodone-apap 7.5-325 MG/15 ML oral solution  Commonly known as: HYCET  Take 15 mLs by mouth 4 (four) times daily as needed for Pain.     hydrocortisone 0.2 % cream  Commonly known as: WESTCORT  Apply topically 2 (two) times daily. for 5 days     hydrocortisone 2.5 % cream  WOLFGANG EXT AA BID     hydrOXYzine HCL 25 MG tablet  Commonly known as: ATARAX  Take 1 tablet (25 mg total) by mouth every 4 to 6 hours as needed for Itching (redness).     k phos di & mono-sod phos mono 250 mg Tab  Commonly known as: K-Phos-NeutraL  Take 1 tablet by mouth 2 (two) times a day. for 3 days     levETIRAcetam 1000 MG tablet  Commonly known as: KEPPRA  TAKE 1 TABLET(1000 MG) BY MOUTH TWICE DAILY     * LIDOcaine 4 % cream  Commonly known as: LMX  Apply topically as needed.     * LIDOcaine 5 %  Commonly known as: LIDODERM  Place 1 patch onto the skin once daily. Remove & Discard patch within 12 hours or as directed by MD     metFORMIN 500 MG ER 24hr tablet  Commonly known as: GLUCOPHAGE-XR  TAKE 1 TABLET(500 MG) BY MOUTH DAILY WITH  "BREAKFAST     omeprazole 40 MG capsule  Commonly known as: PRILOSEC  Take 1 capsule (40 mg total) by mouth once daily.     * ondansetron 4 MG Tbdl  Commonly known as: ZOFRAN-ODT  Take 1 tablet (4 mg total) by mouth every 6 (six) hours as needed (nausea).     * ondansetron 8 MG Tbdl  Commonly known as: ZOFRAN-ODT  Dissolve 1 tablet (8 mg total) by mouth every 6 (six) hours as needed.     * ondansetron 8 MG Tbdl  Commonly known as: ZOFRAN-ODT  Dissolve 1 tablet (8 mg total) by mouth every 6 (six) hours as needed.     pantoprazole 40 MG tablet  Commonly known as: PROTONIX  Take 1 tablet (40 mg total) by mouth once daily.     pen needle, diabetic 31 gauge x 5/16" Ndle  For use with bydureon     polyethylene glycol 17 gram/dose powder  Commonly known as: GLYCOLAX  Use cap to measure 17 grams, mix in liquid and take by mouth once daily.     promethazine 6.25 mg/5 mL syrup  Commonly known as: PHENERGAN  Take 5-10 mLs (6.25-12.5 mg total) by mouth every 6 (six) hours.     sodium chloride 0.65 % nasal spray  Commonly known as: OCEAN NASAL  1 spray by Nasal route every 3 (three) hours as needed for Congestion.     topiramate 100 MG tablet  Commonly known as: TOPAMAX  Take 1 tablet (100 mg total) by mouth once daily.     TRULICITY 0.75 mg/0.5 mL pen injector  Generic drug: dulaglutide  Inject 0.75 mg into the skin every 7 days.     zolpidem 5 MG Tab  Commonly known as: AMBIEN  TAKE 1 TABLET BY MOUTH IN THE EVENING SPARINGLY AS NEEDED           * This list has 10 medication(s) that are the same as other medications prescribed for you. Read the directions carefully, and ask your doctor or other care provider to review them with you.                  Clary Lord MD  General Surgery  [facility]   "

## 2024-04-30 NOTE — PLAN OF CARE
[facility] - Surgery  Discharge Final Note    Primary Care Provider: Flor Gomez MD    Expected Discharge Date: 4/29/2024    Final Discharge Note (most recent)       Final Note - 04/29/24 1841          Final Note    Assessment Type Final Discharge Note     Anticipated Discharge Disposition Home or Self Care     Hospital Resources/Appts/Education Provided Provided patient/caregiver with written discharge plan information;Provided education on problems/symptoms using teachback                   Future Appointments   Date Time Provider Department Center   5/30/2024  9:30 AM LAB, APPOINTMENT [facility]   5/30/2024 10:00 AM Cristhian Cuadra PA-C [facility]   5/30/2024 10:30 AM Tracy Grimes RD [facility]   6/4/2024  1:30 PM Tamika Garza MD [facility] [place]   6/21/2024  2:30 PM Gena Casey OD [facility]   6/21/2024  3:30 PM Gena Casey OD [facility]     Contact Info       Gavin Sultana MD   Specialty: Surgery, Bariatrics    [address] [facility] [address]   Phone: [phone]       Next Steps: Follow up in 2 week(s)    Instructions: Follow-up hospital discharge    Interventional, Radiology        Next Steps: Follow up in 1 month(s)    Instructions: IR clinic with CT scan before

## 2024-05-02 NOTE — PHYSICIAN QUERY
Please clarify Superior mesenteric vein thrombosis as it relates to the procedure sleeve gastrectomy.    Complication of the procedure    I have now filled this out about 4 times.

## 2024-05-02 NOTE — PHYSICIAN QUERY
Please clarify Superior mesenteric vein thrombosis as it relates to the procedure sleeve gastrectomy.    Complication of the procedure

## 2024-05-02 NOTE — PHYSICIAN QUERY
Please clarify Superior mesenteric vein thrombosis as it relates to the procedure sleeve gastrectomy.

## 2024-05-03 RX ORDER — POLYETHYLENE GLYCOL 3350 17 G/17G
17 POWDER, FOR SOLUTION ORAL DAILY
Qty: 238 G | Refills: 0 | Status: CANCELLED | OUTPATIENT
Start: 2024-05-03 | End: 2024-05-17

## 2024-05-06 RX ORDER — POLYETHYLENE GLYCOL 3350 17 G/17G
17 POWDER, FOR SOLUTION ORAL DAILY
Qty: 238 G | Refills: 0 | Status: CANCELLED | OUTPATIENT
Start: 2024-05-03 | End: 2024-05-17

## 2024-05-06 NOTE — PHYSICIAN QUERY
Please clarify Superior mesenteric vein thrombosis as it relates to the procedure sleeve gastrectomy:

## 2024-05-08 RX ORDER — POLYETHYLENE GLYCOL 3350 17 G/17G
17 POWDER, FOR SOLUTION ORAL DAILY
Qty: 238 G | Refills: 0 | Status: CANCELLED | OUTPATIENT
Start: 2024-05-03 | End: 2024-05-17

## 2024-05-08 NOTE — PHYSICIAN QUERY
Please clarify Superior mesenteric vein thrombosis as it relates to the procedure sleeve gastrectomy:     Complication of the procedure

## 2024-05-09 ENCOUNTER — OFFICE VISIT (OUTPATIENT)
Dept: FAMILY MEDICINE | Facility: CLINIC | Age: 59
End: 2024-05-09
Payer: COMMERCIAL

## 2024-05-09 VITALS
SYSTOLIC BLOOD PRESSURE: 158 MMHG | RESPIRATION RATE: 16 BRPM | HEIGHT: 63 IN | TEMPERATURE: 98 F | HEART RATE: 66 BPM | DIASTOLIC BLOOD PRESSURE: 80 MMHG | BODY MASS INDEX: 35.62 KG/M2 | WEIGHT: 201.06 LBS | OXYGEN SATURATION: 98 %

## 2024-05-09 DIAGNOSIS — E11.22 CONTROLLED TYPE 2 DIABETES MELLITUS WITH STAGE 2 CHRONIC KIDNEY DISEASE, WITHOUT LONG-TERM CURRENT USE OF INSULIN: ICD-10-CM

## 2024-05-09 DIAGNOSIS — Z98.84 S/P LAPAROSCOPIC SLEEVE GASTRECTOMY: ICD-10-CM

## 2024-05-09 DIAGNOSIS — K55.069 THROMBOSIS OF SUPERIOR MESENTERIC ARTERY: ICD-10-CM

## 2024-05-09 DIAGNOSIS — E66.01 SEVERE OBESITY (BMI 35.0-39.9) WITH COMORBIDITY: ICD-10-CM

## 2024-05-09 DIAGNOSIS — N18.2 CONTROLLED TYPE 2 DIABETES MELLITUS WITH STAGE 2 CHRONIC KIDNEY DISEASE, WITHOUT LONG-TERM CURRENT USE OF INSULIN: ICD-10-CM

## 2024-05-09 DIAGNOSIS — Z09 HOSPITAL DISCHARGE FOLLOW-UP: Primary | ICD-10-CM

## 2024-05-09 PROBLEM — U07.1 2019 NOVEL CORONAVIRUS DISEASE (COVID-19): Status: RESOLVED | Noted: 2020-03-29 | Resolved: 2024-05-09

## 2024-05-09 PROBLEM — R10.10 UPPER ABDOMINAL PAIN: Status: RESOLVED | Noted: 2021-06-29 | Resolved: 2024-05-09

## 2024-05-09 PROCEDURE — 99214 OFFICE O/P EST MOD 30 MIN: CPT | Mod: S$GLB,,, | Performed by: INTERNAL MEDICINE

## 2024-05-09 PROCEDURE — 1160F RVW MEDS BY RX/DR IN RCRD: CPT | Mod: CPTII,S$GLB,, | Performed by: INTERNAL MEDICINE

## 2024-05-09 PROCEDURE — 3008F BODY MASS INDEX DOCD: CPT | Mod: CPTII,S$GLB,, | Performed by: INTERNAL MEDICINE

## 2024-05-09 PROCEDURE — 3044F HG A1C LEVEL LT 7.0%: CPT | Mod: CPTII,S$GLB,, | Performed by: INTERNAL MEDICINE

## 2024-05-09 PROCEDURE — 3079F DIAST BP 80-89 MM HG: CPT | Mod: CPTII,S$GLB,, | Performed by: INTERNAL MEDICINE

## 2024-05-09 PROCEDURE — 3072F LOW RISK FOR RETINOPATHY: CPT | Mod: CPTII,S$GLB,, | Performed by: INTERNAL MEDICINE

## 2024-05-09 PROCEDURE — 3077F SYST BP >= 140 MM HG: CPT | Mod: CPTII,S$GLB,, | Performed by: INTERNAL MEDICINE

## 2024-05-09 PROCEDURE — 99999 PR PBB SHADOW E&M-EST. PATIENT-LVL V: CPT | Mod: PBBFAC,,, | Performed by: INTERNAL MEDICINE

## 2024-05-09 PROCEDURE — 1159F MED LIST DOCD IN RCRD: CPT | Mod: CPTII,S$GLB,, | Performed by: INTERNAL MEDICINE

## 2024-05-09 PROCEDURE — 1111F DSCHRG MED/CURRENT MED MERGE: CPT | Mod: CPTII,S$GLB,, | Performed by: INTERNAL MEDICINE

## 2024-05-09 RX ORDER — DULAGLUTIDE 0.75 MG/.5ML
0.75 INJECTION, SOLUTION SUBCUTANEOUS
COMMUNITY
End: 2024-05-15

## 2024-05-09 NOTE — PROGRESS NOTES
Health Maintenance Due   Topic     HIV Screening      COVID-19 Vaccine (6 - 2023-24 season) Not offered at this facility.     Foot Exam      Eye Exam      Diabetes Urine Screening      Mammogram

## 2024-05-09 NOTE — PROGRESS NOTES
"Subjective:       Patient ID: Monalisa THOMPSON Favorite is a pleasant 59 y.o. Black or  female patient    Chief Complaint: Hospital Follow Up      Patient is a pt I saw last on 12/06/2023, see my last notes.    HPI:     Patient with past medical history as per list of problems below coming today for a hospital discharge f-up visit.  From our last encounter:  - 04/04/2024 gastrectomy (sleeve).. F-up in Bariatric Surgery.  She was admitted from 04/24 to 04/30/2024.   As per notes:    " Pt presenting with abdominal pain, was found to have SMV thrombosis. .Vascular surgery and IR consulted for possible thrombectomy. They recommended heparin gtt, when amenable transition to an oral anticoagulant for 3 months. Over the hospital course, abdominal pain resolved and she was able to tolerate a regular diet. Repeat CT scan on 4/29/24 demonstrated SMV thrombosis was still in place, however bowel remains viable. IR confirmed no intervention at this time because patient is asymptomatic"  She is with her granddaughter, Lynnette Sheldon, who is 13 y old.  She reports feeling globally fine. She has no worsening abdominal pain, and BM are fine.  She.takes her medications as directed.  No recent bruising or gum bleeding.    Patient Active Problem List   Diagnosis    Anxiety    Essential hypertension, benign    Controlled type 2 diabetes mellitus with stage 2 chronic kidney disease, without long-term current use of insulin    HLD (hyperlipidemia)    Dysarthria    Hemifacial spasm    Nonintractable epilepsy without status epilepticus    Tension headache, chronic    Occipital neuralgia of left side    Severe obesity (BMI 35.0-39.9) with comorbidity    Sal's paralysis (postepileptic)    Wears contact lenses    Refractive error    Heart murmur    PATTERSON (dyspnea on exertion)    Obesity    Seizures    Superior mesenteric vein thrombosis    S/P laparoscopic sleeve gastrectomy    History of stroke         PAST MEDICAL HISTORY  Past " Medical History:   Diagnosis Date    Allergy     Anxiety     Diabetes mellitus     GERD (gastroesophageal reflux disease)     Heart murmur     Hyperlipidemia     Hypertension     Seizures     5-6 years ago, spontaneous        PAST SURGICAL HISTORY:  Past Surgical History:   Procedure Laterality Date    CHOLECYSTECTOMY      2001 april    COLONOSCOPY N/A 06/29/2021    Procedure: COLONOSCOPY;  Surgeon: Gustavo Pelletier MD;  Location: [facility];  Service: Endoscopy;  Laterality: N/A;  combined orders    ESOPHAGOGASTRODUODENOSCOPY N/A 06/29/2021    Procedure: ESOPHAGOGASTRODUODENOSCOPY (EGD);  Surgeon: Gustavo Pelletier MD;  Location: [facility];  Service: Endoscopy;  Laterality: N/A;  covdi +3/29/20, fully vaccinated 3/23/21, prep instr portal -ml    ESOPHAGOGASTRODUODENOSCOPY N/A 11/29/2023    Procedure: EGD (ESOPHAGOGASTRODUODENOSCOPY);  Surgeon: Maciej Fine MD;  Location: [facility];  Service: Endoscopy;  Laterality: N/A;  referred by Cristhian RAHMAN, [facility] takes on Tuesdays will hold dose on Tuesday 11/28/23, instructions per [facility].    HYSTERECTOMY      partial  1996    LAPAROSCOPIC SLEEVE GASTRECTOMY N/A 4/4/2024    Procedure: GASTRECTOMY, SLEEVE, LAPAROSCOPIC with intraop EGD ([facility], please submit auth to insurance on or after 3/14/24);  Surgeon: Maciej Kennedy Jr., MD;  Location: [address];  Service: General;  Laterality: N/A;  with poss hiatal hernia repair    OOPHORECTOMY      SHOULDER SURGERY Right     WISDOM TOOTH EXTRACTION          SOCIAL HISTORY:   reports that she has quit smoking. Her smoking use included cigarettes. She has never used smokeless tobacco. She reports that she does not currently use alcohol. She reports that she does not use drugs.     FAMILY HISTORY:  Family History   Problem Relation Name Age of Onset    Diabetes Mother      Hyperlipidemia Mother      Hypertension Mother      Cataracts Mother      Diabetes Father      Hypertension Father      Stroke Father       Depression Sister      Hypertension Sister      Stroke Sister      No Known Problems Brother      Cancer Maternal Aunt          breast x 2    Breast cancer Maternal Aunt      Cancer Maternal Uncle          prostate x 2    Cancer Paternal Aunt          breast    Breast cancer Paternal Aunt      No Known Problems Paternal Uncle      No Known Problems Maternal Grandmother      Cancer Maternal Grandfather          lung    Early death Paternal Grandmother      Early death Paternal Grandfather      Esophageal cancer Paternal Grandfather      No Known Problems Other      Melanoma Neg Hx      Eczema Neg Hx      Lupus Neg Hx      Psoriasis Neg Hx      Amblyopia Neg Hx      Blindness Neg Hx      Glaucoma Neg Hx      Macular degeneration Neg Hx      Retinal detachment Neg Hx      Strabismus Neg Hx      Thyroid disease Neg Hx      Colon cancer Neg Hx          ALLERGIES:   Review of patient's allergies indicates:   Allergen Reactions    Keflex [cephalexin] Hives    Vicodin [hydrocodone-acetaminophen] Itching     itch    Crestor [rosuvastatin] Other (See Comments)     Muscle pain       MEDICATIONS:    Current Outpatient Medications:     acetaminophen (TYLENOL) 500 MG tablet, Take 2 tablets (1,000 mg total) by mouth every 6 (six) hours as needed for Pain., Disp: 30 tablet, Rfl: 0    albuterol (PROVENTIL/VENTOLIN HFA) 90 mcg/actuation inhaler, Inhale 1-2 puffs into the lungs every 4 (four) hours as needed for Wheezing. Rescue, Disp: 18 g, Rfl: 11    apixaban (ELIQUIS) 5 mg Tab, Take 2 tablets (10 mg total) by mouth 2 (two) times daily. for 7 days, Disp: 28 tablet, Rfl: 0    benazepriL (LOTENSIN) 40 MG tablet, TAKE 1 TABLET(40 MG) BY MOUTH EVERY DAY, Disp: 90 tablet, Rfl: 0    busPIRone (BUSPAR) 10 MG tablet, Take 1 tablet (10 mg total) by mouth 3 (three) times daily as needed (anxiety)., Disp: 90 tablet, Rfl: 2    dulaglutide (TRULICITY) 0.75 mg/0.5 mL pen injector, Inject 0.75 mg into the skin every 7 days., Disp: , Rfl:      "EScitalopram oxalate (LEXAPRO) 10 MG tablet, Take 1 tablet (10 mg total) by mouth once daily., Disp: 90 tablet, Rfl: 1    fluticasone propionate (FLONASE) 50 mcg/actuation nasal spray, 1 spray (50 mcg total) by Each Nostril route 2 (two) times daily., Disp: 16 g, Rfl: 0    gabapentin (NEURONTIN) 100 MG capsule, Take 100 mg by mouth 3 (three) times daily., Disp: , Rfl:     hydroCHLOROthiazide (HYDRODIURIL) 12.5 MG Tab, TAKE 1 TABLET(12.5 MG) BY MOUTH EVERY DAY, Disp: 90 tablet, Rfl: 1    hydrocortisone (WESTCORT) 0.2 % cream, Apply topically 2 (two) times daily. for 5 days, Disp: 60 g, Rfl: 0    hydrocortisone 2.5 % cream, [place]OL[place]ANG EXT AA BID, Disp: 28 g, Rfl: 0    hydrOXYzine HCL (ATARAX) 25 MG tablet, Take 1 tablet (25 mg total) by mouth every 4 to 6 hours as needed for Itching (redness)., Disp: 30 tablet, Rfl: 0    levETIRAcetam (KEPPRA) 1000 MG tablet, TAKE 1 TABLET(1000 MG) BY MOUTH TWICE DAILY, Disp: 180 tablet, Rfl: 3    LIDOcaine (LIDODERM) 5 %, Place 1 patch onto the skin once daily. Remove & Discard patch within 12 hours or as directed by MD, Disp: 15 patch, Rfl: 0    lidocaine (LMX) 4 % cream, Apply topically as needed., Disp: , Rfl: 0    metFORMIN (GLUCOPHAGE-XR) 500 MG ER 24hr tablet, TAKE 1 TABLET(500 MG) BY MOUTH DAILY WITH BREAKFAST, Disp: 90 tablet, Rfl: 3    omeprazole (PRILOSEC) 40 MG capsule, Take 1 capsule (40 mg total) by mouth once daily., Disp: 90 capsule, Rfl: 3    ondansetron (ZOFRAN-ODT) 8 MG TbDL, Dissolve 1 tablet (8 mg total) by mouth every 6 (six) hours as needed., Disp: 30 tablet, Rfl: 0    pen needle, diabetic 31 gauge x 5/16" Ndle, For use with bydureon, Disp: 100 each, Rfl: 0    zolpidem (AMBIEN) 5 MG Tab, TAKE 1 TABLET BY MOUTH IN THE EVENING SPARINGLY AS NEEDED, Disp: 30 tablet, Rfl: 0    apixaban (ELIQUIS) 5 mg Tab, Take 2 tablets (10 mg total) by mouth 2 (two) times daily for 7 days, THEN 1 tablet (5 mg total) 2 (two) times daily. (Patient not taking: Reported on 5/9/2024), Disp: 208 " tablet, Rfl: 0    dicyclomine (BENTYL) 20 mg tablet, Take 20 mg by mouth 4 (four) times daily. (Patient not taking: Reported on 5/9/2024), Disp: , Rfl:     hydrocodone-acetaminophen (HYCET) solution 7.5-325 mg/15mL, Take 15 mLs by mouth 4 (four) times daily as needed for Pain. (Patient not taking: Reported on 5/9/2024), Disp: 200 mL, Rfl: 0    ondansetron (ZOFRAN-ODT) 8 MG TbDL, Dissolve 1 tablet (8 mg total) by mouth every 6 (six) hours as needed. (Patient not taking: Reported on 5/9/2024), Disp: 30 tablet, Rfl: 0    pantoprazole (PROTONIX) 40 MG tablet, Take 1 tablet (40 mg total) by mouth once daily. (Patient not taking: Reported on 5/9/2024), Disp: 90 tablet, Rfl: 3    phenoL (CHLORASEPTIC THROAT SPRAY) 1.4 % SprA, by Mucous Membrane route every 2 (two) hours. (Patient not taking: Reported on 5/9/2024), Disp: 177 mL, Rfl: 0    Review of Systems   Constitutional: Negative.    HENT:  Negative for dental problem.    Eyes: Negative.    Respiratory:  Negative for cough.    Cardiovascular: Negative.    Gastrointestinal: Negative.    Endocrine: Negative.    Genitourinary: Negative.    Musculoskeletal:  Negative for arthralgias and myalgias.   Skin: Negative.    Allergic/Immunologic: Negative.    Neurological:  Negative for weakness and numbness.   Hematological: Negative.    Psychiatric/Behavioral:  Negative for sleep disturbance. The patient is not nervous/anxious.        Objective:      Physical Exam  Vitals and nursing note reviewed.   Constitutional:       Appearance: Normal appearance. She is obese.   HENT:      Right Ear: Tympanic membrane and external ear normal.      Left Ear: Tympanic membrane and external ear normal.   Eyes:      Conjunctiva/sclera: Conjunctivae normal.   Cardiovascular:      Rate and Rhythm: Normal rate and regular rhythm.      Pulses: Normal pulses.      Heart sounds: Normal heart sounds.   Pulmonary:      Effort: Pulmonary effort is normal.      Breath sounds: Normal breath sounds.  "  Abdominal:      General: Bowel sounds are normal.   Skin:     General: Skin is warm and dry.      Comments: Scars from laparoscopic sleeve surgery   Neurological:      General: No focal deficit present.      Mental Status: She is alert and oriented to person, place, and time.   Psychiatric:         Mood and Affect: Mood normal.         Behavior: Behavior normal.         Thought Content: Thought content normal.         Judgment: Judgment normal.         Vitals:    05/09/24 0946   BP: (!) 158/80   BP Location: Right arm   Patient Position: Sitting   BP Method: Large (Manual)   Pulse: 66   Resp: 16   Temp: 97.7 °F (36.5 °C)   TempSrc: Oral   SpO2: 98%   Weight: 91.2 kg (201 lb 1 oz)   Height: 5' 2.99" (1.6 m)     Body mass index is 35.63 kg/m².    RESULTS: Reviewed labs from last 12 months    Last Lab Results:     Lab Results   Component Value Date    WBC 4.99 04/29/2024    WBC 4.99 04/29/2024    HGB 12.3 04/29/2024    HGB 12.3 04/29/2024    HCT 37.5 04/29/2024    HCT 37.5 04/29/2024     04/29/2024     04/29/2024     04/29/2024    K 3.6 04/29/2024     04/29/2024    CO2 22 (L) 04/29/2024    BUN 5 (L) 04/29/2024    CREATININE 0.9 04/29/2024    CALCIUM 9.8 04/29/2024    ALBUMIN 3.2 (L) 04/24/2024    AST 53 (H) 04/24/2024    ALT 72 (H) 04/24/2024    CHOL 256 (H) 12/06/2023    TRIG 255 (H) 12/06/2023    HDL 42 12/06/2023    LDLCALC 163.0 (H) 12/06/2023    HGBA1C 6.7 (H) 03/20/2024    TSH 1.387 12/06/2023       Assessment & Plan:       1. Hospital discharge follow-up    See HPI, patient feeling fine presently, on blood thinners for 3 months.  Advised regarding symptoms and signs to monitor.  All appointment scheduled for follow-up.    2. Thrombosis of superior mesenteric artery    See above.    3. S/P laparoscopic sleeve gastrectomy    See HPI and above.    4. Severe obesity (BMI 35.0-39.9) with comorbidity    See above.    5. Controlled type 2 diabetes mellitus with stage 2 chronic kidney " disease, without long-term current use of insulin    Most recent A1c level 6.7%, on metformin.    Follow up in about 6 months (around 11/9/2024) for f-up .    This note was created by combination of typed  and M-Modal dictation.  Transcription errors may be present.  If there are any questions, please contact me.

## 2024-05-14 ENCOUNTER — PATIENT MESSAGE (OUTPATIENT)
Dept: BARIATRICS | Facility: CLINIC | Age: 59
End: 2024-05-14
Payer: COMMERCIAL

## 2024-05-14 NOTE — TELEPHONE ENCOUNTER
Refill Routing Note   Medication(s) are not appropriate for processing by [facility] for the following reason(s):        No active prescription written by provider: script  24    OR action(s):  Defer               Appointments  past 12m or future 3m with PCP    Date Provider   Last Visit   2024 Flor Gomez MD   Next Visit   Visit date not found Flor Gomez MD   ED visits in past 90 days: 0        Note composed:5:22 PM 2024

## 2024-05-14 NOTE — TELEPHONE ENCOUNTER
No care due was identified.  [facility] Embedded Care Due Messages. Reference number: [id number].   5/14/2024 4:31:17 PM CDT

## 2024-05-15 DIAGNOSIS — N18.2 CONTROLLED TYPE 2 DIABETES MELLITUS WITH STAGE 2 CHRONIC KIDNEY DISEASE, WITHOUT LONG-TERM CURRENT USE OF INSULIN: Primary | ICD-10-CM

## 2024-05-15 DIAGNOSIS — E11.22 CONTROLLED TYPE 2 DIABETES MELLITUS WITH STAGE 2 CHRONIC KIDNEY DISEASE, WITHOUT LONG-TERM CURRENT USE OF INSULIN: Primary | ICD-10-CM

## 2024-05-15 RX ORDER — DULAGLUTIDE 0.75 MG/.5ML
0.75 INJECTION, SOLUTION SUBCUTANEOUS
Qty: 12 PEN | Refills: 1 | Status: SHIPPED | OUTPATIENT
Start: 2024-05-15

## 2024-05-15 RX ORDER — DULAGLUTIDE 0.75 MG/.5ML
0.75 INJECTION, SOLUTION SUBCUTANEOUS
Qty: 12 PEN | Refills: 1 | Status: SHIPPED | OUTPATIENT
Start: 2024-05-15 | End: 2024-05-15 | Stop reason: SDUPTHER

## 2024-05-15 NOTE — TELEPHONE ENCOUNTER
No care due was identified.  [facility] Embedded Care Due Messages. Reference number: [id number].   5/15/2024 1:36:27 PM CDT

## 2024-05-15 NOTE — TELEPHONE ENCOUNTER
----- Message from Eric Morales sent at 5/15/2024 12:58 PM CDT -----  Regarding: otoniel  Type: Patient Call Back    Who called:otoniel    What is the request in detail:calling to get diagnostic code for TRULICITY 0.75 mg/0.5 mL pen injector    Can the clinic reply by [facility]?no    Would the patient rather a call back or a response via [facility]? callback    Best call back number:[phone]    Additional Information:

## 2024-05-24 DIAGNOSIS — I10 UNCONTROLLED HYPERTENSION: ICD-10-CM

## 2024-05-24 RX ORDER — HYDROCHLOROTHIAZIDE 12.5 MG/1
12.5 TABLET ORAL DAILY
Qty: 90 TABLET | Refills: 3 | Status: SHIPPED | OUTPATIENT
Start: 2024-05-24

## 2024-05-24 NOTE — TELEPHONE ENCOUNTER
Refill Routing Note   Medication(s) are not appropriate for processing by [facility] for the following reason(s):        Required vitals abnormal:  BP  (!) 158/80     [facility] action(s):  Defer               Appointments  past 12m or future 3m with PCP    Date Provider   Last Visit   5/9/2024 Flor Gomez MD   Next Visit   Visit date not found Flor Gomez MD   ED visits in past 90 days: 0        Note composed:10:26 AM 05/24/2024

## 2024-05-24 NOTE — TELEPHONE ENCOUNTER
No care due was identified.  [facility] Embedded Care Due Messages. Reference number: [id number].   5/24/2024 6:01:00 AM CDT

## 2024-05-29 NOTE — PROGRESS NOTES
NUTRITION NOTE  Referring Physician: Apoorva Weaver M.D.   Reason for MNT Referral: Follow-up 8 Weeks s/p Gastric Sleeve    PAST MEDICAL HISTORY:  Denies diarrhea.  Reports problems, including occasional nausea or vomiting from trying to drink protein shake . Constipation. Takes Ducolax.    PT was recently hospitalized for acute SMV thrombosis  PT reports fear of eating and not wanting to be sick. PT also reports food feeling like it's stuck. PT states she us able to tolerate fruit, sliced deli meats, cheese, some vegetables.     Past Medical History:   Diagnosis Date    Allergy     Anxiety     Diabetes mellitus     GERD (gastroesophageal reflux disease)     Heart murmur     Hyperlipidemia     Hypertension     Seizures     5-6 years ago, spontaneous       CLINICAL DATA:  59 y.o.-year-old Black or  female.    Current Weight: 194 lbs  BMI: 34.44  Total Weight Loss: 28 lbs    Excess Weight Loss: 32%        CURRENT DIET:  Bariatric Soft Diet    Diet Recall: 1 grams of protein/day; 64 oz of fluids/day    B: Skipped  S: Fruit, sf jell-o  L: Chopped okra  D: Red beans (3 teaspoons)    Diet Includes:   Meal Pattern: 2 meal(s) + 1 snack(s) + 0-1 protein supplement(s)  Adequate protein supplement intake. [employer] Core Power 3 x week  Adequate dairy intake.   Adequate vegetable intake. Okra, greens, cauliflower rice  Adequate fruit intake. Mandarin orange, peach, cherries  Starchy CHO: none  Beverages: Water, Crystal Light    EXERCISE:  Walking    Restrictions to Exercise:  None      LABS:  None available at time of visit    VITAMINS/MINERALS:  PT to switch to pills. Currently taking:  Barimelt Multivitamin with 18 mg iron 1 x day  EZ Melt B-1/with 25 mg thiamine 2 x day  Barimelt Calcium Citrate 500 mg with vitamin D  3 x day  EZ Melt Vitamin B-12 sublingual 2500 mcg 1 x week  EZ Melt biotin    ASSESSMENT:  Doing poorly overall.  Inadequate protein intake.  Adequate fluid intake.  Advancing diet  appropriately.  Not exercising.  Adequate vitamins & minerals.    BARIATRIC DIET DISCUSSION:  Instructed and provided written materials on bariatric regular diet plan.  Reinforced post-op nutrition guidelines.  Reminded PT no starchy CHO until goal weight  Discussed tracking intake to ensure protein goals and calorie range are met  Discussed trying protein water and fruit flavored protein shakes mixed with water & Crystal Light    PLAN / RECOMMENDATIONS:  Advance to bariatric regular diet.  Increase protein intake.  Maintain fluid intake.  Continue light exercise.  Continue appropriate vitamins & minerals.    Return to clinic in 4 months.    SESSION TIME: 25 minutes

## 2024-05-30 ENCOUNTER — LAB VISIT (OUTPATIENT)
Dept: LAB | Facility: HOSPITAL | Age: 59
End: 2024-05-30
Payer: COMMERCIAL

## 2024-05-30 ENCOUNTER — OFFICE VISIT (OUTPATIENT)
Dept: BARIATRICS | Facility: CLINIC | Age: 59
End: 2024-05-30
Payer: COMMERCIAL

## 2024-05-30 ENCOUNTER — CLINICAL SUPPORT (OUTPATIENT)
Dept: BARIATRICS | Facility: CLINIC | Age: 59
End: 2024-05-30
Payer: COMMERCIAL

## 2024-05-30 VITALS
WEIGHT: 194.31 LBS | DIASTOLIC BLOOD PRESSURE: 66 MMHG | SYSTOLIC BLOOD PRESSURE: 105 MMHG | BODY MASS INDEX: 34.44 KG/M2 | OXYGEN SATURATION: 99 % | HEART RATE: 62 BPM | TEMPERATURE: 98 F

## 2024-05-30 DIAGNOSIS — K21.9 GASTROESOPHAGEAL REFLUX DISEASE, UNSPECIFIED WHETHER ESOPHAGITIS PRESENT: ICD-10-CM

## 2024-05-30 DIAGNOSIS — E78.5 HYPERLIPIDEMIA, UNSPECIFIED HYPERLIPIDEMIA TYPE: ICD-10-CM

## 2024-05-30 DIAGNOSIS — E11.9 TYPE 2 DIABETES MELLITUS WITHOUT COMPLICATION, WITHOUT LONG-TERM CURRENT USE OF INSULIN: Primary | ICD-10-CM

## 2024-05-30 DIAGNOSIS — Z98.890 POST-OPERATIVE STATE: Primary | ICD-10-CM

## 2024-05-30 DIAGNOSIS — E11.9 TYPE 2 DIABETES MELLITUS WITHOUT COMPLICATION, WITHOUT LONG-TERM CURRENT USE OF INSULIN: ICD-10-CM

## 2024-05-30 DIAGNOSIS — I10 HYPERTENSION, UNSPECIFIED TYPE: ICD-10-CM

## 2024-05-30 DIAGNOSIS — R11.0 NAUSEA: ICD-10-CM

## 2024-05-30 DIAGNOSIS — R63.4 WEIGHT LOSS: ICD-10-CM

## 2024-05-30 DIAGNOSIS — Z71.3 DIETARY COUNSELING AND SURVEILLANCE: ICD-10-CM

## 2024-05-30 DIAGNOSIS — Z98.84 S/P BARIATRIC SURGERY: ICD-10-CM

## 2024-05-30 DIAGNOSIS — E66.9 OBESITY (BMI 30-39.9): ICD-10-CM

## 2024-05-30 DIAGNOSIS — E66.01 MORBID OBESITY: ICD-10-CM

## 2024-05-30 DIAGNOSIS — Z98.84 S/P LAPAROSCOPIC SLEEVE GASTRECTOMY: ICD-10-CM

## 2024-05-30 LAB
25(OH)D3+25(OH)D2 SERPL-MCNC: 52 NG/ML (ref 30–96)
ALBUMIN SERPL BCP-MCNC: 3.8 G/DL (ref 3.5–5.2)
ALP SERPL-CCNC: 93 U/L (ref 55–135)
ALT SERPL W/O P-5'-P-CCNC: 43 U/L (ref 10–44)
ANION GAP SERPL CALC-SCNC: 9 MMOL/L (ref 8–16)
AST SERPL-CCNC: 29 U/L (ref 10–40)
BASOPHILS # BLD AUTO: 0.03 K/UL (ref 0–0.2)
BASOPHILS NFR BLD: 0.5 % (ref 0–1.9)
BILIRUB SERPL-MCNC: 0.7 MG/DL (ref 0.1–1)
BUN SERPL-MCNC: 11 MG/DL (ref 6–20)
CALCIUM SERPL-MCNC: 10.2 MG/DL (ref 8.7–10.5)
CHLORIDE SERPL-SCNC: 103 MMOL/L (ref 95–110)
CHOLEST SERPL-MCNC: 218 MG/DL (ref 120–199)
CHOLEST/HDLC SERPL: 4.5 {RATIO} (ref 2–5)
CO2 SERPL-SCNC: 27 MMOL/L (ref 23–29)
CREAT SERPL-MCNC: 0.9 MG/DL (ref 0.5–1.4)
DIFFERENTIAL METHOD BLD: ABNORMAL
EOSINOPHIL # BLD AUTO: 0 K/UL (ref 0–0.5)
EOSINOPHIL NFR BLD: 0.6 % (ref 0–8)
ERYTHROCYTE [DISTWIDTH] IN BLOOD BY AUTOMATED COUNT: 14.6 % (ref 11.5–14.5)
EST. GFR  (NO RACE VARIABLE): >60 ML/MIN/1.73 M^2
GLUCOSE SERPL-MCNC: 99 MG/DL (ref 70–110)
HCT VFR BLD AUTO: 41.9 % (ref 37–48.5)
HDLC SERPL-MCNC: 48 MG/DL (ref 40–75)
HDLC SERPL: 22 % (ref 20–50)
HGB BLD-MCNC: 13.6 G/DL (ref 12–16)
IMM GRANULOCYTES # BLD AUTO: 0.02 K/UL (ref 0–0.04)
IMM GRANULOCYTES NFR BLD AUTO: 0.3 % (ref 0–0.5)
IRON SERPL-MCNC: 88 UG/DL (ref 30–160)
LDLC SERPL CALC-MCNC: 142.6 MG/DL (ref 63–159)
LYMPHOCYTES # BLD AUTO: 4.3 K/UL (ref 1–4.8)
LYMPHOCYTES NFR BLD: 65.9 % (ref 18–48)
MCH RBC QN AUTO: 29.6 PG (ref 27–31)
MCHC RBC AUTO-ENTMCNC: 32.5 G/DL (ref 32–36)
MCV RBC AUTO: 91 FL (ref 82–98)
MONOCYTES # BLD AUTO: 0.5 K/UL (ref 0.3–1)
MONOCYTES NFR BLD: 7.8 % (ref 4–15)
NEUTROPHILS # BLD AUTO: 1.6 K/UL (ref 1.8–7.7)
NEUTROPHILS NFR BLD: 24.9 % (ref 38–73)
NONHDLC SERPL-MCNC: 170 MG/DL
NRBC BLD-RTO: 0 /100 WBC
PLATELET # BLD AUTO: 370 K/UL (ref 150–450)
PMV BLD AUTO: 10.2 FL (ref 9.2–12.9)
POTASSIUM SERPL-SCNC: 4.1 MMOL/L (ref 3.5–5.1)
PROT SERPL-MCNC: 8 G/DL (ref 6–8.4)
RBC # BLD AUTO: 4.6 M/UL (ref 4–5.4)
SATURATED IRON: 25 % (ref 20–50)
SODIUM SERPL-SCNC: 139 MMOL/L (ref 136–145)
TOTAL IRON BINDING CAPACITY: 358 UG/DL (ref 250–450)
TRANSFERRIN SERPL-MCNC: 242 MG/DL (ref 200–375)
TRIGL SERPL-MCNC: 137 MG/DL (ref 30–150)
VIT B12 SERPL-MCNC: 1934 PG/ML (ref 210–950)
WBC # BLD AUTO: 6.56 K/UL (ref 3.9–12.7)

## 2024-05-30 PROCEDURE — 99999 PR PBB SHADOW E&M-EST. PATIENT-LVL II: CPT | Mod: PBBFAC,,, | Performed by: DIETITIAN, REGISTERED

## 2024-05-30 PROCEDURE — 84425 ASSAY OF VITAMIN B-1: CPT | Performed by: PHYSICIAN ASSISTANT

## 2024-05-30 PROCEDURE — 3078F DIAST BP <80 MM HG: CPT | Mod: CPTII,S$GLB,, | Performed by: PHYSICIAN ASSISTANT

## 2024-05-30 PROCEDURE — 82306 VITAMIN D 25 HYDROXY: CPT | Performed by: PHYSICIAN ASSISTANT

## 2024-05-30 PROCEDURE — 99499 UNLISTED E&M SERVICE: CPT | Mod: S$GLB,,, | Performed by: DIETITIAN, REGISTERED

## 2024-05-30 PROCEDURE — 1159F MED LIST DOCD IN RCRD: CPT | Mod: CPTII,S$GLB,, | Performed by: PHYSICIAN ASSISTANT

## 2024-05-30 PROCEDURE — 83540 ASSAY OF IRON: CPT | Performed by: PHYSICIAN ASSISTANT

## 2024-05-30 PROCEDURE — 80053 COMPREHEN METABOLIC PANEL: CPT | Performed by: PHYSICIAN ASSISTANT

## 2024-05-30 PROCEDURE — 3044F HG A1C LEVEL LT 7.0%: CPT | Mod: CPTII,S$GLB,, | Performed by: PHYSICIAN ASSISTANT

## 2024-05-30 PROCEDURE — 1160F RVW MEDS BY RX/DR IN RCRD: CPT | Mod: CPTII,S$GLB,, | Performed by: PHYSICIAN ASSISTANT

## 2024-05-30 PROCEDURE — 82607 VITAMIN B-12: CPT | Performed by: PHYSICIAN ASSISTANT

## 2024-05-30 PROCEDURE — 3072F LOW RISK FOR RETINOPATHY: CPT | Mod: CPTII,S$GLB,, | Performed by: PHYSICIAN ASSISTANT

## 2024-05-30 PROCEDURE — 3074F SYST BP LT 130 MM HG: CPT | Mod: CPTII,S$GLB,, | Performed by: PHYSICIAN ASSISTANT

## 2024-05-30 PROCEDURE — 99999 PR PBB SHADOW E&M-EST. PATIENT-LVL V: CPT | Mod: PBBFAC,,, | Performed by: PHYSICIAN ASSISTANT

## 2024-05-30 PROCEDURE — 99024 POSTOP FOLLOW-UP VISIT: CPT | Mod: S$GLB,,, | Performed by: PHYSICIAN ASSISTANT

## 2024-05-30 PROCEDURE — 80061 LIPID PANEL: CPT | Performed by: PHYSICIAN ASSISTANT

## 2024-05-30 PROCEDURE — 36415 COLL VENOUS BLD VENIPUNCTURE: CPT | Performed by: PHYSICIAN ASSISTANT

## 2024-05-30 PROCEDURE — 85025 COMPLETE CBC W/AUTO DIFF WBC: CPT | Performed by: PHYSICIAN ASSISTANT

## 2024-05-30 RX ORDER — ONDANSETRON 8 MG/1
8 TABLET, ORALLY DISINTEGRATING ORAL EVERY 6 HOURS PRN
Qty: 30 TABLET | Refills: 1 | Status: SHIPPED | OUTPATIENT
Start: 2024-05-30

## 2024-05-30 NOTE — PROGRESS NOTES
BARIATRIC POST-OPERATIVE VISIT:    HPI:  Monalisa Carson is a 59 y.o. year old female presents for 8 week post op visit following s/p sleeve.  she is doing well and tolerating the diet without difficulty.  she has no complaints.    Pt states that she is doing ok   States that since her Acute SMV thrombosis she has been afraid to eat   States that she has seen vascular and is on treatment has her next ct on Saturday      Denies: nausea, vomiting, abdominal pain, changes in bowel movement pattern, fever, chills, dysphagia, chest pain, and shortness of breath.    Review of Systems   Constitutional:  Negative for fatigue and fever.   HENT:  Negative for tinnitus and trouble swallowing.    Eyes:  Negative for visual disturbance.   Respiratory:  Negative for chest tightness and shortness of breath.    Cardiovascular:  Negative for chest pain, palpitations and leg swelling.   Gastrointestinal:  Negative for abdominal pain, constipation, diarrhea, nausea and vomiting.   Genitourinary:  Negative for decreased urine volume.   Skin:  Negative for rash.   Neurological:  Negative for light-headedness and headaches.   Psychiatric/Behavioral:  Negative for dysphoric mood, self-injury and sleep disturbance. The patient is not nervous/anxious.        EXERCISE & VITAMINS:  See Bariatric Assessment  Adherent to vitamins   MEDICATIONS/ALLERGIES:  Have been reviewed.    DIET: See Dietician note from today for a more detailed assessment.      Physical Exam  Constitutional:       Appearance: She is obese.   HENT:      Head: Normocephalic and atraumatic.   Eyes:      Extraocular Movements: Extraocular movements intact.      Conjunctiva/sclera: Conjunctivae normal.      Pupils: Pupils are equal, round, and reactive to light.   Cardiovascular:      Rate and Rhythm: Normal rate and regular rhythm.      Pulses: Normal pulses.      Heart sounds: Normal heart sounds.   Pulmonary:      Effort: Pulmonary effort is normal. No respiratory  distress.      Breath sounds: Normal breath sounds.   Abdominal:      General: Bowel sounds are normal.      Palpations: Abdomen is soft.      Tenderness: There is no abdominal tenderness.   Musculoskeletal:      Cervical back: Normal range of motion and neck supple.   Skin:     Capillary Refill: Capillary refill takes less than 2 seconds.   Neurological:      General: No focal deficit present.      Mental Status: She is alert and oriented to person, place, and time.   Psychiatric:         Mood and Affect: Mood normal.         Behavior: Behavior normal.         Thought Content: Thought content normal.         Judgment: Judgment normal.       ASSESSMENT:  - Morbid obesity s/p sleeve gastrectomy   - Co-morbidities: diabetes mellitus, dyslipidemia, and hypertension  - Good Weight loss, 28#'s and 32% EWL  - No formal Exercise routine  - Fair Diet  - Good Vitamin regimen    PLAN:    - No lifting more than 10 lbs for 6 weeks  - Miralax daily for constipation  - Emphasized the importance of regular exercise and adherence to bariatric diet to achieve maximum weight loss.  - Encouraged patient to start/continue regular exercise.  - Follow-up with dietician to advance diet.  - Continue daily vitamins and medications.  - RTC in 2 weeks or sooner if needed.  - Call the office for any issues.  - Check labs today.

## 2024-05-30 NOTE — PATIENT INSTRUCTIONS
Bariatric Vitamin and Mineral Needs    Bariatric surgery can change the way your body absorbs certain vitamins and minerals. With a smaller stomach, it is also harder for you to get all the nutrients you need through food. After bariatric surgery, it is essential for you to take the following vitamins and minerals for the rest of your life to avoid nutrient deficiency. These can be purchased in stores or online (see below).     The dietitian will call you one week after your surgery and discuss starting your recommended vitamins and minerals.      DO NOT START VITAMINS AND MINERALS UNTIL INSTRUCTED    You can purchase your vitamins and minerals locally ([employer] pharmacy, grocery, etc.) or on the websites below:  https://www.bariatricSportilia.CO-Value/ (use verification code [facility] for discount)  https://store.[employer]/collections/bariatric-vitamins (use discount code STFJL584 for 20% off your first order)  https://FedBid.[employer]-Value/  https://www.bariatricfusion.com/  https://[employer].com/                      Post-Op Medication Instructions    Taking Vitamins, Minerals and Prescribed Medications  Continue all prescribed medications upon arrival home from hospital discharge as instructed per your prescribing providers or per discharge instructions from the hospital.  Specific, written instructions about your medicines will be given to you by your nurse upon hospital discharge.    Speak with your primary care doctor or the physician who prescribed your medications if you have any concerns regarding changes in your medications that are not prescribed by the bariatric team.      You may swallow pills smaller than 5 mm or the tip of the pencil eraser right after surgery    DO NOT swallow pills larger than 5 mm (tip of pencil eraser) for 2 weeks after surgery.  If your medications are larger than 5 mm you will either need crushable, chewable, liquid, or a capsule form for 2 weeks after surgery. Review your current  medications with the prescribing provider and/or pharmacy to determine if medications are appropriate to crush, open or alter    Extended-release medications should never be altered and are meant to be taken whole.     If you are having a hard time with swallowing pills, split or crush them and try mixing them with unsweetened applesauce    If a change in these medications is needed, please consult with the provider that prescribes these medications so that they can prescribe an alternative form, dose, or medication    DO NOT take gummy multivitamins due to poor quality and sugar content    DO NOT take calcium citrate and iron within 2 hours of each other due to poor absorption    The dietitian will call you one week after your surgery and discuss starting your recommended vitamins and minerals at that time    Take one vitamin/mineral/medication at a time, spaced 10-15 mins apart

## 2024-06-01 ENCOUNTER — HOSPITAL ENCOUNTER (OUTPATIENT)
Dept: RADIOLOGY | Facility: HOSPITAL | Age: 59
Discharge: HOME | End: 2024-06-01
Attending: STUDENT IN AN ORGANIZED HEALTH CARE EDUCATION/TRAINING PROGRAM
Payer: COMMERCIAL

## 2024-06-01 DIAGNOSIS — Z98.84 S/P LAPAROSCOPIC SLEEVE GASTRECTOMY: ICD-10-CM

## 2024-06-01 PROCEDURE — 74177 CT ABD & PELVIS W/CONTRAST: CPT | Mod: TC

## 2024-06-01 PROCEDURE — 74177 CT ABD & PELVIS W/CONTRAST: CPT | Mod: 26,,, | Performed by: RADIOLOGY

## 2024-06-01 PROCEDURE — 25500020 PHARM REV CODE 255: Performed by: STUDENT IN AN ORGANIZED HEALTH CARE EDUCATION/TRAINING PROGRAM

## 2024-06-01 RX ADMIN — IOHEXOL 100 ML: 350 INJECTION, SOLUTION INTRAVENOUS at 09:06

## 2024-06-04 ENCOUNTER — PATIENT MESSAGE (OUTPATIENT)
Dept: ADMINISTRATIVE | Facility: HOSPITAL | Age: 59
End: 2024-06-04
Payer: COMMERCIAL

## 2024-06-04 LAB — VIT B1 BLD-MCNC: 43 UG/L (ref 38–122)

## 2024-06-11 ENCOUNTER — PATIENT MESSAGE (OUTPATIENT)
Dept: BARIATRICS | Facility: CLINIC | Age: 59
End: 2024-06-11
Payer: COMMERCIAL

## 2024-06-11 DIAGNOSIS — R11.0 NAUSEA: Primary | ICD-10-CM

## 2024-06-17 ENCOUNTER — OFFICE VISIT (OUTPATIENT)
Dept: INTERVENTIONAL RADIOLOGY/VASCULAR | Facility: CLINIC | Age: 59
End: 2024-06-17
Payer: COMMERCIAL

## 2024-06-17 DIAGNOSIS — K55.069 SUPERIOR MESENTERIC VEIN THROMBOSIS: Primary | ICD-10-CM

## 2024-06-17 PROCEDURE — 3072F LOW RISK FOR RETINOPATHY: CPT | Mod: CPTII,95,,

## 2024-06-17 PROCEDURE — 99212 OFFICE O/P EST SF 10 MIN: CPT | Mod: 95,,,

## 2024-06-17 PROCEDURE — 3044F HG A1C LEVEL LT 7.0%: CPT | Mod: CPTII,95,,

## 2024-06-17 NOTE — PROGRESS NOTES
Subjective     Patient ID: Monalisa Carson is a 59 y.o. female.    Chief Complaint: No chief complaint on file.    Monalisa Carson is a 59 y.o. female with a history of DM2,recent sleeve gastrectomy on 4/4/24 who was admitted on 4/24/24 for abdominal pain. Had a CT done which revealed SMV thrombosis. Vascular surgery evaluated the patient who recommend anticoagulation with heparin during admission with transition to oral AC upon discharge. IR was consulted for possible thrombectomy inpatient. Recommend follow up imaging outpatient to evaluate thrombus prior to considering thrombectomy. Patient eating well with no abdominal pain complaints today. Reports she is doing well. Here to follow up after CT (6/1/2024)  Review of Systems   Constitutional:  Negative for appetite change and fatigue.   Respiratory:  Negative for cough and shortness of breath.    Cardiovascular:  Negative for chest pain.   Gastrointestinal:  Negative for abdominal pain.   Neurological:  Negative for facial asymmetry and speech difficulty.   Psychiatric/Behavioral:  Positive for sleep disturbance. Negative for behavioral problems and confusion.         Objective     Physical Exam  Constitutional:       General: She is not in acute distress.     Appearance: Normal appearance.      Comments: Virtual Visit.    HENT:      Head: Normocephalic and atraumatic.      Nose: Nose normal.   Eyes:      Conjunctiva/sclera: Conjunctivae normal.   Pulmonary:      Effort: Pulmonary effort is normal.   Neurological:      General: No focal deficit present.      Mental Status: She is alert.   Psychiatric:         Mood and Affect: Mood normal.         Behavior: Behavior normal.     EXAMINATION:  CT ABDOMEN PELVIS WITH IV CONTRAST     CLINICAL HISTORY:  evaluate for SMV thrombus;  Bariatric surgery status     TECHNIQUE:  Low dose axial images, sagittal and coronal reformations were obtained from the lung bases to the pubic symphysis following the IV  administration of 100 mL of Omnipaque 350 intravenous contrast. Oral contrast was not administered.     COMPARISON:  CT abdomen pelvis 04/28/2024, 04/24/2024     FINDINGS:  Lungs: Minimal dependent atelectasis.  No consolidation or pleural effusion in the visualized lung bases.     Heart: Normal size with probable left atrial enlargement.  No pericardial effusion.  Calcification about the aortic annulus and mitral annulus.     Liver: Normal size with smooth contours and redemonstration of heterogeneous enhancement in diffuse hypoattenuation of the left hepatic lobe as compared to the right. Punctate calcification in the right hepatic lobe at the dome (series 2, image 12).     Gallbladder: Surgically absent.     Bile ducts: No intrahepatic or extrahepatic biliary ductal dilatation.     Spleen: Normal size.     Pancreas: No mass. No ductal dilatation. No peripancreatic fat stranding.     Adrenals: No significant abnormality     Renal/Ureters: The kidneys are normal in size and location with bilateral symmetric enhancement.  1.3 cm lower pole right renal cyst (series 3, image 59), additional subcentimeter subcortical hypodensities, too small to characterize.  No obstructing nephrolithiasis.  No solid mass lesion.  No hydronephrosis.  No ureteral dilatation.  The urinary bladder is unremarkable.     Reproductive: Prior hysterectomy.  No adnexal mass.  Pelvic phleboliths.     Stomach/Bowel: Postsurgical changes of sleeve gastrectomy.  Small bowel is normal caliber without obstruction or evidence of inflammation.  Appendix is normal..  Large bowel is normal caliber without obstruction, focal wall thickening or evidence of inflammation.     Peritoneum: No free fluid. No intraperitoneal free air.     Lymph Nodes: No pathologic mary enlargement in the abdomen or pelvis.     Vasculature: Abdominal aorta tapers normally.  No significant calcified atherosclerosis of the abdominal aorta and its branches. Nonvisualization of the  right main portal vein at the bifurcation, occluded, slightly less prominent than the prior exam.  The main portal vein is patent.  Persistent hypodense luminal filling defect within the SMV, extending distally from the portal confluence noted (series 3, image 56), decreased in size from the prior exam.  Improved venous flow noted branches of the mesentery noted.     Bones: Mild degenerative changes of the lumbar spine.  No acute fractures or osseous destructive lesions.     Soft Tissues: Tiny fat containing umbilical hernia.     Impression:     SMV and right portal vein thrombus, as above, improved from the prior exams.  No new thrombus.     Prior cholecystectomy, hysterectomy, and gastric sleeve surgery.     Electronically signed by resident: Marisol Fofana  Date:                                            06/01/2024  Time:                                           10:53     Electronically signed by:Sal Lyon MD  Date:                                            06/01/2024  Time:                                           11:57          Assessment and Plan     1. Superior mesenteric vein thrombosis    - Imaging reviewed by Dr. Vick. Improvement of thrombus on AC. No intervention required.   - Follow up with IR as needed.   - Recommend continuing AC as prescribed by outside provider.     The patient location is: [place]  The chief complaint leading to consultation is: Follow up     Visit type: audiovisual    10 minutes of total time spent on the encounter, which includes face to face time and non-face to face time preparing to see the patient (eg, review of tests), Obtaining and/or reviewing separately obtained history, Documenting clinical information in the electronic or other health record, Independently interpreting results (not separately reported) and communicating results to the patient/family/caregiver, or Care coordination (not separately reported).     Each patient to whom he or she provides medical  services by telemedicine is:  (1) informed of the relationship between the physician and patient and the respective role of any other health care provider with respect to management of the patient; and (2) notified that he or she may decline to receive medical services by telemedicine and may withdraw from such care at any time.    Elda Vinson PA-C  Interventional Radiology  [phone]

## 2024-06-21 ENCOUNTER — OFFICE VISIT (OUTPATIENT)
Dept: OPTOMETRY | Facility: CLINIC | Age: 59
End: 2024-06-21
Payer: COMMERCIAL

## 2024-06-21 DIAGNOSIS — Z46.0 ENCOUNTER FOR FITTING OR ADJUSTMENT OF SPECTACLES OR CONTACT LENSES: Primary | ICD-10-CM

## 2024-06-21 DIAGNOSIS — E11.9 TYPE 2 DIABETES MELLITUS WITHOUT COMPLICATION, UNSPECIFIED WHETHER LONG TERM INSULIN USE: Primary | ICD-10-CM

## 2024-06-21 DIAGNOSIS — H52.7 REFRACTIVE ERROR: ICD-10-CM

## 2024-06-21 DIAGNOSIS — Z97.3 WEARS CONTACT LENSES: ICD-10-CM

## 2024-06-21 PROCEDURE — 99999 PR PBB SHADOW E&M-EST. PATIENT-LVL IV: CPT | Mod: PBBFAC,,, | Performed by: OPTOMETRIST

## 2024-06-21 NOTE — PROGRESS NOTES
Subjective:       Patient ID: Monalisa THOMPSON Favorite is a 59 y.o. female      Chief Complaint   Patient presents with    Diabetic Eye Exam     History of Present Illness  DLS: 1/18/23    Pt here for diabetic eye exam and CLFU  Pt states vision is till doing well with current Rx and denies any vision changes since last exam   (-) pain (-) headaches (-) floaters (-) flashes    1. Type 2 DM  2. RE    Hemoglobin A1C       Date                     Value               Ref Range             Status                03/20/2024               6.7 (H)             4.0 - 5.6 %           Final                 12/06/2023               6.6 (H)             4.0 - 5.6 %           Final                 09/28/2023               7.0 (H)             4.0 - 5.6 %           Final                 Wearing air optix, replacing every twice a week. does sleep in lenses        Assessment/Plan:     1. Type 2 diabetes mellitus without complication, unspecified whether long term insulin use  No diabetic retinopathy. Discussed with pt the effects of diabetes on vision, importance of good blood sugar control, compliance with meds, and follow up care with PCP. Return in 1 year for dilated eye exam, sooner PRN.  - Ambulatory referral/consult to Optometry    2. Refractive error  Educated patient on refractive error and discussed lens options. Dispensed updated spectacle Rx. Educated about adaptation period to new specs.    Eyeglass Final Rx       Eyeglass Final Rx         Sphere Cylinder Axis Add    Right -3.25 +1.50 180 +2.25    Left -3.75 +1.25 160 +2.25      Expiration Date: 6/21/2025                      3. Wears contact lenses  Contact lens Rx released to pt. Daily wear only advised, replacement monthly with education to risks of extended wear. Advised against sleeping, swimming, showering in lenses. Okay to order if happy with comfort and VA. Discussed lens care, compliance and solutions. RTC yearly contact lens health check.     Contact Lens Final Rx        Final Contact Lens Rx         Brand Base Curve Diameter Sphere Cylinder Axis    Right Air Optix Aqua for Astigmatism 8.7 14.5 -2.00 -1.25 090    Left Air Optix Aqua for Astigmatism 8.7 14.5 -2.50 -1.25 070      Expiration Date: 6/21/2025    Replacement: Monthly    Solutions: OptiFree PureMoist    Wearing Schedule: Daily Wear              Final Contact Lens Rx #2         Brand Base Curve Diameter Sphere Cylinder Axis    Right Air Optix Colours 8.6 14.2 -3.00 Sphere     Left Air Optix Colours 8.6 14.2 -3.00 Sphere       Expiration Date: 6/21/2025    Replacement: Monthly    Solutions: OptiFree PureMoist    Wearing Schedule: Daily Wear                      Follow up in about 1 year (around 6/21/2025) for Diabetic Eye Exam.

## 2024-06-23 ENCOUNTER — PATIENT MESSAGE (OUTPATIENT)
Dept: OPTOMETRY | Facility: CLINIC | Age: 59
End: 2024-06-23
Payer: COMMERCIAL

## 2024-06-26 ENCOUNTER — TELEPHONE (OUTPATIENT)
Dept: BARIATRICS | Facility: CLINIC | Age: 59
End: 2024-06-26
Payer: COMMERCIAL

## 2024-06-26 NOTE — TELEPHONE ENCOUNTER
Called patient- he stated she is doing great.  Informed him that the [facility] has partnered with The Metabolic and Bariatric Surgery Accreditation Quality Improvement Program's BSTOP-D (Bariatric Surgery Targeting Opioid Prescriptions at Discharge) Initiative to reduce opioid prescriptions at hospital discharge.  Part of the Performance Improvement is to collect data on current prescriptions and usage. Discussed the following questions to remain in compliance with the project- patient answered   Total Opioids Used (Outpatient)? 190 ml  Unused Opioids Returned to Pharmacy? No, instructed patient to return to disposal receptacle at either [facility] or outside pharmacy  30-Day Postop Opioid Use- Was the patient still taking opioids 30 days after surgery? No

## 2024-07-03 ENCOUNTER — PATIENT MESSAGE (OUTPATIENT)
Dept: BARIATRICS | Facility: CLINIC | Age: 59
End: 2024-07-03
Payer: COMMERCIAL

## 2024-07-04 DIAGNOSIS — G47.00 INSOMNIA, UNSPECIFIED TYPE: ICD-10-CM

## 2024-07-04 NOTE — TELEPHONE ENCOUNTER
Care Due:                  Date            Visit Type   Department     Provider  --------------------------------------------------------------------------------                                [facility]  Last Visit: 05-      FOLLOW UP    MEDICINE       Flor Gomez  Next Visit: None Scheduled  None         None Found                                                            Last  Test          Frequency    Reason                     Performed    Due Date  --------------------------------------------------------------------------------    HBA1C.......  6 months...  dulaglutide, metFORMIN...  03- 09-    [facility] Embedded Care Due Messages. Reference number: [id number].   7/04/2024 12:00:06 PM CDT

## 2024-07-05 RX ORDER — ZOLPIDEM TARTRATE 5 MG/1
TABLET ORAL
Qty: 30 TABLET | Refills: 0 | Status: SHIPPED | OUTPATIENT
Start: 2024-07-05

## 2024-07-09 ENCOUNTER — PATIENT MESSAGE (OUTPATIENT)
Dept: BARIATRICS | Facility: CLINIC | Age: 59
End: 2024-07-09
Payer: COMMERCIAL

## 2024-07-30 RX ORDER — ONDANSETRON 8 MG/1
8 TABLET, ORALLY DISINTEGRATING ORAL EVERY 6 HOURS PRN
Qty: 30 TABLET | Refills: 1 | Status: SHIPPED | OUTPATIENT
Start: 2024-07-30

## 2024-08-07 DIAGNOSIS — G47.00 INSOMNIA, UNSPECIFIED TYPE: ICD-10-CM

## 2024-08-07 DIAGNOSIS — E11.22 CONTROLLED TYPE 2 DIABETES MELLITUS WITH STAGE 2 CHRONIC KIDNEY DISEASE, WITHOUT LONG-TERM CURRENT USE OF INSULIN: ICD-10-CM

## 2024-08-07 DIAGNOSIS — N18.2 CONTROLLED TYPE 2 DIABETES MELLITUS WITH STAGE 2 CHRONIC KIDNEY DISEASE, WITHOUT LONG-TERM CURRENT USE OF INSULIN: ICD-10-CM

## 2024-08-07 RX ORDER — HYDROCORTISONE 25 MG/G
CREAM TOPICAL
Qty: 28 G | Refills: 0 | Status: SHIPPED | OUTPATIENT
Start: 2024-08-07

## 2024-08-07 RX ORDER — DULAGLUTIDE 0.75 MG/.5ML
0.75 INJECTION, SOLUTION SUBCUTANEOUS
Qty: 12 PEN | Refills: 1 | Status: SHIPPED | OUTPATIENT
Start: 2024-08-07

## 2024-08-07 RX ORDER — ZOLPIDEM TARTRATE 5 MG/1
TABLET ORAL
Qty: 30 TABLET | Refills: 0 | Status: SHIPPED | OUTPATIENT
Start: 2024-08-07

## 2024-08-12 ENCOUNTER — PATIENT MESSAGE (OUTPATIENT)
Dept: BARIATRICS | Facility: CLINIC | Age: 59
End: 2024-08-12
Payer: COMMERCIAL

## 2024-08-14 DIAGNOSIS — Z12.31 OTHER SCREENING MAMMOGRAM: ICD-10-CM

## 2024-08-15 ENCOUNTER — OFFICE VISIT (OUTPATIENT)
Dept: BARIATRICS | Facility: CLINIC | Age: 59
End: 2024-08-15
Payer: COMMERCIAL

## 2024-08-15 ENCOUNTER — TELEPHONE (OUTPATIENT)
Dept: BARIATRICS | Facility: CLINIC | Age: 59
End: 2024-08-15

## 2024-08-15 VITALS
SYSTOLIC BLOOD PRESSURE: 142 MMHG | HEART RATE: 60 BPM | DIASTOLIC BLOOD PRESSURE: 76 MMHG | BODY MASS INDEX: 33.21 KG/M2 | WEIGHT: 187.38 LBS | OXYGEN SATURATION: 98 %

## 2024-08-15 DIAGNOSIS — Z71.3 DIETARY COUNSELING AND SURVEILLANCE: ICD-10-CM

## 2024-08-15 DIAGNOSIS — R10.13 EPIGASTRIC PAIN: Primary | ICD-10-CM

## 2024-08-15 DIAGNOSIS — E86.0 DEHYDRATION: ICD-10-CM

## 2024-08-15 DIAGNOSIS — R11.0 NAUSEA: Primary | ICD-10-CM

## 2024-08-15 DIAGNOSIS — R11.0 NAUSEA: ICD-10-CM

## 2024-08-15 DIAGNOSIS — I10 HYPERTENSION, UNSPECIFIED TYPE: ICD-10-CM

## 2024-08-15 DIAGNOSIS — Z98.890 POST-OPERATIVE STATE: ICD-10-CM

## 2024-08-15 DIAGNOSIS — Z98.84 S/P LAPAROSCOPIC SLEEVE GASTRECTOMY: ICD-10-CM

## 2024-08-15 DIAGNOSIS — E78.5 HYPERLIPIDEMIA, UNSPECIFIED HYPERLIPIDEMIA TYPE: ICD-10-CM

## 2024-08-15 DIAGNOSIS — R10.13 EPIGASTRIC PAIN: ICD-10-CM

## 2024-08-15 PROCEDURE — 99213 OFFICE O/P EST LOW 20 MIN: CPT | Mod: S$GLB,,, | Performed by: PHYSICIAN ASSISTANT

## 2024-08-15 PROCEDURE — 1160F RVW MEDS BY RX/DR IN RCRD: CPT | Mod: CPTII,S$GLB,, | Performed by: PHYSICIAN ASSISTANT

## 2024-08-15 PROCEDURE — 3078F DIAST BP <80 MM HG: CPT | Mod: CPTII,S$GLB,, | Performed by: PHYSICIAN ASSISTANT

## 2024-08-15 PROCEDURE — 3008F BODY MASS INDEX DOCD: CPT | Mod: CPTII,S$GLB,, | Performed by: PHYSICIAN ASSISTANT

## 2024-08-15 PROCEDURE — 1159F MED LIST DOCD IN RCRD: CPT | Mod: CPTII,S$GLB,, | Performed by: PHYSICIAN ASSISTANT

## 2024-08-15 PROCEDURE — 3044F HG A1C LEVEL LT 7.0%: CPT | Mod: CPTII,S$GLB,, | Performed by: PHYSICIAN ASSISTANT

## 2024-08-15 PROCEDURE — 3077F SYST BP >= 140 MM HG: CPT | Mod: CPTII,S$GLB,, | Performed by: PHYSICIAN ASSISTANT

## 2024-08-15 PROCEDURE — 99999 PR PBB SHADOW E&M-EST. PATIENT-LVL V: CPT | Mod: PBBFAC,,, | Performed by: PHYSICIAN ASSISTANT

## 2024-08-15 RX ORDER — SUCRALFATE 1 G/10ML
1 SUSPENSION ORAL 2 TIMES DAILY
Qty: 414 ML | Refills: 0 | Status: SHIPPED | OUTPATIENT
Start: 2024-08-15

## 2024-08-15 RX ORDER — DICYCLOMINE HYDROCHLORIDE 20 MG/1
20 TABLET ORAL 4 TIMES DAILY
Qty: 30 TABLET | Refills: 0 | Status: SHIPPED | OUTPATIENT
Start: 2024-08-15

## 2024-08-15 RX ORDER — FAMOTIDINE 10 MG/ML
40 INJECTION INTRAVENOUS ONCE
Status: CANCELLED
Start: 2024-08-15 | End: 2024-08-15

## 2024-08-15 RX ORDER — DICYCLOMINE HYDROCHLORIDE 20 MG/1
20 TABLET ORAL EVERY 6 HOURS
Qty: 120 TABLET | Refills: 0 | Status: SHIPPED | OUTPATIENT
Start: 2024-08-15 | End: 2024-09-14

## 2024-08-15 RX ORDER — FAMOTIDINE 40 MG/1
40 TABLET, FILM COATED ORAL 2 TIMES DAILY
Qty: 90 TABLET | Refills: 3 | Status: SHIPPED | OUTPATIENT
Start: 2024-08-15 | End: 2025-08-15

## 2024-08-15 RX ORDER — HEPARIN 100 UNIT/ML
500 SYRINGE INTRAVENOUS
Status: CANCELLED | OUTPATIENT
Start: 2024-08-15

## 2024-08-15 RX ORDER — SODIUM CHLORIDE 0.9 % (FLUSH) 0.9 %
10 SYRINGE (ML) INJECTION
Status: CANCELLED | OUTPATIENT
Start: 2024-08-15

## 2024-08-15 NOTE — PROGRESS NOTES
BARIATRIC POST-OPERATIVE VISIT:    HPI:  Monalisa Carson is a 59 y.o. year old female presents for abdominal pain that started on Sunday. Pt states that she started with similar abdominal pain to her previous episodes of abdominal pain ( hx of an acute SMV thrombosis 3 week post operatively). Describes pain as crampy then sharp and only relieved when she belches or the solids/liquids digest. States that the pain last around 20 minutes and is located in her epigastric area and occurs with both solids and liquids with no differentiation in temperature changes. Associated with n/v ( three episodes) along with two episodes of diarrhea ( non bloody), denies any chest pain sob or dysuria. States that she has not had any spicy foods, NSAIDs alcohol intake, nicotine or caffeine.  Last full meal was on Sunday which she has butter beans.       Review of Systems   Constitutional:  Positive for fatigue. Negative for fever.   HENT:  Negative for tinnitus and trouble swallowing.    Eyes:  Negative for visual disturbance.   Respiratory:  Negative for chest tightness and shortness of breath.    Cardiovascular:  Negative for chest pain, palpitations and leg swelling.   Gastrointestinal:  Positive for abdominal pain, diarrhea, nausea and vomiting. Negative for constipation.   Genitourinary:  Negative for decreased urine volume.   Skin:  Negative for rash.   Neurological:  Positive for weakness. Negative for light-headedness and headaches.   Psychiatric/Behavioral:  Negative for dysphoric mood, self-injury and sleep disturbance. The patient is not nervous/anxious.        EXERCISE & VITAMINS:  See Bariatric Assessment  Adherent to vitamins   MEDICATIONS/ALLERGIES:  Have been reviewed.    DIET: See Dietician note from today for a more detailed assessment.      Physical Exam  Constitutional:       Appearance: She is obese.   HENT:      Head: Normocephalic and atraumatic.   Eyes:      Extraocular Movements: Extraocular movements  intact.      Conjunctiva/sclera: Conjunctivae normal.      Pupils: Pupils are equal, round, and reactive to light.   Cardiovascular:      Rate and Rhythm: Normal rate and regular rhythm.      Pulses: Normal pulses.      Heart sounds: Normal heart sounds.   Pulmonary:      Effort: Pulmonary effort is normal. No respiratory distress.      Breath sounds: Normal breath sounds.   Abdominal:      General: Bowel sounds are normal.      Palpations: Abdomen is soft.      Tenderness: There is abdominal tenderness.   Musculoskeletal:      Cervical back: Normal range of motion and neck supple.   Skin:     Capillary Refill: Capillary refill takes less than 2 seconds.   Neurological:      General: No focal deficit present.      Mental Status: She is alert and oriented to person, place, and time.   Psychiatric:         Mood and Affect: Mood normal.         Behavior: Behavior normal.         Thought Content: Thought content normal.         Judgment: Judgment normal.         ASSESSMENT:  - Morbid obesity s/p sleeve gastrectomy   - Co-morbidities: diabetes mellitus, dyslipidemia, and hypertension  - Good Weight loss, 35#'s and 40% EWL  - No formal Exercise routine  - Fair Diet  - Good Vitamin regimen    PLAN:  - Ct ab/pelvis with contrast  - Iv fluids with added Pepcid  - UGI   - start PPI [id number] and carafate BID  - bentyl for cramping   - No lifting more than 10 lbs for 6 weeks  - Miralax daily for constipation  - Emphasized the importance of regular exercise and adherence to bariatric diet to achieve maximum weight loss.  - Encouraged patient to start/continue regular exercise.  - Follow-up with dietician to advance diet.  - Continue daily vitamins and medications.  - RTC   - Call the office for any issues.  - Check labs today.

## 2024-08-15 NOTE — TELEPHONE ENCOUNTER
----- Message from Davdi Wilson sent at 8/15/2024  2:42 PM CDT -----  Regarding: Lab Orders  Hello,       This patient is scheduled for a CT scan with contrast. The patient will need a CMP, BMP, or Creatinine lab prior to receiving contrast. Please place the lab order as soon as possible to avoid any delay in patient care.       Thank you,     David Wilson  Patient Access Rep  Phone: [phone]  Fax: [phone]

## 2024-08-15 NOTE — PATIENT INSTRUCTIONS
Fluids scheduled for tomorrow at 1130 on [employer]. Please call [phone] if you have any issues      Intake as tolerated.

## 2024-08-15 NOTE — TELEPHONE ENCOUNTER
----- Message from Togn Garibay sent at 8/15/2024  1:11 PM CDT -----  Regarding: Prior Authorization  Contact: Ruben/Roge [phone]  Ruben calling from [facility] to request a prior authorization to be completed on sucralfate (CARAFATE) 100 mg/mL suspension. Please send authorization to the patients pharmacy...    [id number] - [address] [facility] [employer] [facility]  [place] [address]  Phone: [phone] Fax: [phone]

## 2024-08-16 ENCOUNTER — INFUSION (OUTPATIENT)
Dept: INFUSION THERAPY | Facility: HOSPITAL | Age: 59
End: 2024-08-16
Payer: COMMERCIAL

## 2024-08-16 ENCOUNTER — HOSPITAL ENCOUNTER (OUTPATIENT)
Dept: RADIOLOGY | Facility: HOSPITAL | Age: 59
Discharge: HOME | End: 2024-08-16
Attending: PHYSICIAN ASSISTANT
Payer: COMMERCIAL

## 2024-08-16 ENCOUNTER — TELEPHONE (OUTPATIENT)
Dept: ENDOSCOPY | Facility: HOSPITAL | Age: 59
End: 2024-08-16
Payer: COMMERCIAL

## 2024-08-16 VITALS
TEMPERATURE: 98 F | DIASTOLIC BLOOD PRESSURE: 75 MMHG | HEART RATE: 62 BPM | RESPIRATION RATE: 16 BRPM | OXYGEN SATURATION: 98 % | SYSTOLIC BLOOD PRESSURE: 136 MMHG

## 2024-08-16 VITALS — HEIGHT: 63 IN | BODY MASS INDEX: 33.19 KG/M2

## 2024-08-16 DIAGNOSIS — R10.13 EPIGASTRIC PAIN: ICD-10-CM

## 2024-08-16 DIAGNOSIS — M54.81 OCCIPITAL NEURALGIA OF LEFT SIDE: ICD-10-CM

## 2024-08-16 DIAGNOSIS — Z98.84 S/P LAPAROSCOPIC SLEEVE GASTRECTOMY: ICD-10-CM

## 2024-08-16 DIAGNOSIS — R56.9 SEIZURES: ICD-10-CM

## 2024-08-16 DIAGNOSIS — E86.0 DEHYDRATION: Primary | ICD-10-CM

## 2024-08-16 DIAGNOSIS — H52.7 REFRACTIVE ERROR: ICD-10-CM

## 2024-08-16 DIAGNOSIS — Z97.3 WEARS CONTACT LENSES: ICD-10-CM

## 2024-08-16 DIAGNOSIS — E66.01 SEVERE OBESITY (BMI 35.0-39.9) WITH COMORBIDITY: ICD-10-CM

## 2024-08-16 DIAGNOSIS — R47.1 DYSARTHRIA: ICD-10-CM

## 2024-08-16 DIAGNOSIS — E86.0 DEHYDRATION: ICD-10-CM

## 2024-08-16 DIAGNOSIS — R11.0 NAUSEA: ICD-10-CM

## 2024-08-16 DIAGNOSIS — F41.9 ANXIETY: ICD-10-CM

## 2024-08-16 DIAGNOSIS — E11.22 CONTROLLED TYPE 2 DIABETES MELLITUS WITH STAGE 2 CHRONIC KIDNEY DISEASE, WITHOUT LONG-TERM CURRENT USE OF INSULIN: ICD-10-CM

## 2024-08-16 DIAGNOSIS — R10.13 EPIGASTRIC PAIN: Primary | ICD-10-CM

## 2024-08-16 DIAGNOSIS — R06.09 DOE (DYSPNEA ON EXERTION): ICD-10-CM

## 2024-08-16 DIAGNOSIS — I10 ESSENTIAL HYPERTENSION, BENIGN: ICD-10-CM

## 2024-08-16 DIAGNOSIS — K55.069 SUPERIOR MESENTERIC VEIN THROMBOSIS: ICD-10-CM

## 2024-08-16 DIAGNOSIS — E66.9 OBESITY, UNSPECIFIED CLASSIFICATION, UNSPECIFIED OBESITY TYPE, UNSPECIFIED WHETHER SERIOUS COMORBIDITY PRESENT: ICD-10-CM

## 2024-08-16 DIAGNOSIS — Z86.73 HISTORY OF STROKE: ICD-10-CM

## 2024-08-16 DIAGNOSIS — R01.1 HEART MURMUR: ICD-10-CM

## 2024-08-16 DIAGNOSIS — N18.2 CONTROLLED TYPE 2 DIABETES MELLITUS WITH STAGE 2 CHRONIC KIDNEY DISEASE, WITHOUT LONG-TERM CURRENT USE OF INSULIN: ICD-10-CM

## 2024-08-16 DIAGNOSIS — G83.84 TODD'S PARALYSIS (POSTEPILEPTIC): ICD-10-CM

## 2024-08-16 PROCEDURE — 25000003 PHARM REV CODE 250: Performed by: PHYSICIAN ASSISTANT

## 2024-08-16 PROCEDURE — 74177 CT ABD & PELVIS W/CONTRAST: CPT | Mod: TC

## 2024-08-16 PROCEDURE — 96374 THER/PROPH/DIAG INJ IV PUSH: CPT

## 2024-08-16 PROCEDURE — 74177 CT ABD & PELVIS W/CONTRAST: CPT | Mod: 26,,, | Performed by: STUDENT IN AN ORGANIZED HEALTH CARE EDUCATION/TRAINING PROGRAM

## 2024-08-16 PROCEDURE — 25500020 PHARM REV CODE 255: Performed by: PHYSICIAN ASSISTANT

## 2024-08-16 RX ORDER — SODIUM CHLORIDE 0.9 % (FLUSH) 0.9 %
10 SYRINGE (ML) INJECTION
OUTPATIENT
Start: 2024-08-16

## 2024-08-16 RX ORDER — FAMOTIDINE 10 MG/ML
40 INJECTION INTRAVENOUS ONCE
Status: COMPLETED | OUTPATIENT
Start: 2024-08-16 | End: 2024-08-16

## 2024-08-16 RX ORDER — FAMOTIDINE 10 MG/ML
40 INJECTION INTRAVENOUS ONCE
Status: CANCELLED
Start: 2024-08-16 | End: 2024-08-16

## 2024-08-16 RX ORDER — HEPARIN 100 UNIT/ML
500 SYRINGE INTRAVENOUS
OUTPATIENT
Start: 2024-08-16

## 2024-08-16 RX ADMIN — SODIUM CHLORIDE 1000 ML: 9 INJECTION, SOLUTION INTRAVENOUS at 11:08

## 2024-08-16 RX ADMIN — FAMOTIDINE 40 MG: 10 INJECTION INTRAVENOUS at 11:08

## 2024-08-16 RX ADMIN — IOHEXOL 15 ML: 300 INJECTION, SOLUTION INTRAVENOUS at 02:08

## 2024-08-16 RX ADMIN — IOHEXOL 75 ML: 350 INJECTION, SOLUTION INTRAVENOUS at 02:08

## 2024-08-16 NOTE — TELEPHONE ENCOUNTER
Spoke to patient to schedule procedure(s) Upper Endoscopy (EGD)       Physician to perform procedure(s) Dr. Henderson  Date of Procedure (s) 9/17/24  Arrival Time 9:30 AM  Time of Procedure(s) 10:30 AM   Location of Procedure(s) [address]   Type of Rx Prep sent to patient: N/A  Instructions provided to patient via [facility]    Patient was informed on the following information and verbalized understanding. Screening questionnaire reviewed with patient and complete. If procedure requires anesthesia, a responsible adult needs to be present to accompany the patient home, patient cannot drive after receiving anesthesia. Appointment details are tentative, especially check-in time. Patient will receive a prep-op call 7 days prior to confirm check-in time for procedure. If applicable the patient should contact their pharmacy to verify Rx for procedure prep is ready for pick-up. Patient was advised to call the scheduling department at [phone] if pharmacy states no Rx is available. Patient was advised to call the endoscopy scheduling department if any questions or concerns arise.       Endoscopy Scheduling Department    Pt is currently taking Eliquis (apixaban). Message sent to Endoscopy clearance nurse per protocol to submit Eliquis (apixaban) hold.

## 2024-08-16 NOTE — PLAN OF CARE
Pt arrived to unit for IV fluids. IV was placed and fluids received with no S&S of complications. IV left in place for pt to go to CT scan immediately following fluids. Pt discharged off unit to CT in [facility].

## 2024-08-19 ENCOUNTER — TELEPHONE (OUTPATIENT)
Dept: ENDOSCOPY | Facility: HOSPITAL | Age: 59
End: 2024-08-19
Payer: COMMERCIAL

## 2024-08-19 NOTE — TELEPHONE ENCOUNTER
----- Message from Karin Kraft RN sent at 2024  9:25 AM CDT -----  Regardin/17 BT  The patient is currently under an internal PCP Flor Gomez MD care and requires a blood thinner Eliquis (apixaban) for their upcoming scheduled Upper Endoscopy (EGD) on 24.

## 2024-08-19 NOTE — TELEPHONE ENCOUNTER
Dear Dr Gomez,    Patient has a scheduled procedure Upper Endoscopy (EGD) on 9/17/24 and is currently taking a blood thinner prescribed by your office. In order to ensure patient safety, we would like to confirm that the patient can place their blood thinner medication on hold for the procedure. Can he/she discontinue Eliquis (apixaban) for a minimum of 2 days prior to the procedure?     Thank you for your prompt reply.    [facility] Endoscopy Scheduling

## 2024-08-21 ENCOUNTER — HOSPITAL ENCOUNTER (OUTPATIENT)
Dept: RADIOLOGY | Facility: HOSPITAL | Age: 59
Discharge: HOME | End: 2024-08-21
Attending: PHYSICIAN ASSISTANT
Payer: COMMERCIAL

## 2024-08-21 DIAGNOSIS — R10.13 EPIGASTRIC PAIN: ICD-10-CM

## 2024-08-21 PROCEDURE — 74240 X-RAY XM UPR GI TRC 1CNTRST: CPT | Mod: 26,,, | Performed by: RADIOLOGY

## 2024-08-21 PROCEDURE — A9698 NON-RAD CONTRAST MATERIALNOC: HCPCS | Performed by: PHYSICIAN ASSISTANT

## 2024-08-21 PROCEDURE — 25500020 PHARM REV CODE 255: Performed by: PHYSICIAN ASSISTANT

## 2024-08-21 PROCEDURE — 74240 X-RAY XM UPR GI TRC 1CNTRST: CPT | Mod: TC,FY

## 2024-08-21 RX ADMIN — BARIUM SULFATE 150 ML: 0.6 SUSPENSION ORAL at 10:08

## 2024-09-03 ENCOUNTER — PATIENT OUTREACH (OUTPATIENT)
Dept: ADMINISTRATIVE | Facility: HOSPITAL | Age: 59
End: 2024-09-03
Payer: COMMERCIAL

## 2024-09-03 ENCOUNTER — PATIENT MESSAGE (OUTPATIENT)
Dept: BARIATRICS | Facility: CLINIC | Age: 59
End: 2024-09-03
Payer: COMMERCIAL

## 2024-09-03 DIAGNOSIS — E11.22 CONTROLLED TYPE 2 DIABETES MELLITUS WITH STAGE 2 CHRONIC KIDNEY DISEASE, WITHOUT LONG-TERM CURRENT USE OF INSULIN: ICD-10-CM

## 2024-09-03 DIAGNOSIS — E11.9 TYPE 2 DIABETES MELLITUS WITHOUT COMPLICATION, UNSPECIFIED WHETHER LONG TERM INSULIN USE: Primary | ICD-10-CM

## 2024-09-03 DIAGNOSIS — N18.2 CONTROLLED TYPE 2 DIABETES MELLITUS WITH STAGE 2 CHRONIC KIDNEY DISEASE, WITHOUT LONG-TERM CURRENT USE OF INSULIN: ICD-10-CM

## 2024-09-03 NOTE — PROGRESS NOTES
Population Health Chart Review & Patient Outreach Details      Additional [facility] Notes:    COMM. GAP REPORT NEED URINE SCREENING AND A1C AFTER 9/20/24. IF ALREADY DONE, NEED TO GET RECORDS INFORMATION.  MAMMOGRAM SCHEDULE 9/4/24.  LABS LINK TO NEXT VISIT OK BY PATIENT. REQUESTED MEDICATION MANAGEMENT. SENT MESSAGE TO PCP.           Updates Requested / Reviewed:      Updated Care Coordination Note and Care Everywhere         Health Maintenance Topics Overdue:      VBHM Score: 2     Urine Screening  Foot Exam                     Health Maintenance Topic(s) Outreach Outcomes & Actions Taken:    Lab(s) - Outreach Outcomes & Actions Taken  : Overdue Lab(s) Scheduled

## 2024-09-03 NOTE — TELEPHONE ENCOUNTER
It is overall the insurance will not pay for it cause it is too soon to be filled but I did mission to monitor her sugar until I see what you recommend. Also told her you are leaving. Overall you just want patient to monitor BS until time for refill and establish care with another provider to see alternative for future problem with medication?

## 2024-09-03 NOTE — TELEPHONE ENCOUNTER
Patient have trouble with Trulicity out for past 2 weeks, insurance will not cover until 9/26/24. Patient would like to know if able to prescribe any other medication before fill script Pt want to make sure she keep her sugar level down.

## 2024-09-04 ENCOUNTER — PATIENT OUTREACH (OUTPATIENT)
Dept: ADMINISTRATIVE | Facility: HOSPITAL | Age: 59
End: 2024-09-04
Payer: COMMERCIAL

## 2024-09-04 ENCOUNTER — HOSPITAL ENCOUNTER (OUTPATIENT)
Dept: RADIOLOGY | Facility: HOSPITAL | Age: 59
Discharge: HOME | End: 2024-09-04
Attending: INTERNAL MEDICINE
Payer: COMMERCIAL

## 2024-09-04 DIAGNOSIS — Z12.31 OTHER SCREENING MAMMOGRAM: ICD-10-CM

## 2024-09-04 PROCEDURE — 77067 SCR MAMMO BI INCL CAD: CPT | Mod: 26,,, | Performed by: RADIOLOGY

## 2024-09-04 PROCEDURE — 77067 SCR MAMMO BI INCL CAD: CPT | Mod: TC,PO

## 2024-09-04 PROCEDURE — 77063 BREAST TOMOSYNTHESIS BI: CPT | Mod: 26,,, | Performed by: RADIOLOGY

## 2024-09-04 RX ORDER — DULAGLUTIDE 0.75 MG/.5ML
0.75 INJECTION, SOLUTION SUBCUTANEOUS
Qty: 12 PEN | Refills: 1 | Status: SHIPPED | OUTPATIENT
Start: 2024-09-04 | End: 2025-02-19

## 2024-09-04 NOTE — PROGRESS NOTES
Population Health Chart Review & Patient Outreach Details      Additional [facility] Health Notes:    PCP sent message regarding medication management. Patient requesting 90 days supplies for Rx. Need labs prior to visit. Patient requesting to send message and will see response. Will schedule follow up after if still interested.            Updates Requested / Reviewed:      Updated Care Coordination Note and Care Everywhere         Health Maintenance Topics Overdue:      VB Score: 1     Foot Exam                       Health Maintenance Topic(s) Outreach Outcomes & Actions Taken:    Medication Adherence / Statins - Outreach Outcomes & Actions Taken  : see message above

## 2024-09-04 NOTE — ANESTHESIA POSTPROCEDURE EVALUATION
Anesthesia Post Evaluation    Patient: Monalisa THOMPSON Favorite    Procedure(s) Performed: Procedure(s) (LRB):  EGD (ESOPHAGOGASTRODUODENOSCOPY) (N/A)    Final Anesthesia Type: general      Patient location during evaluation: GI PACU  Patient participation: Yes- Able to Participate  Level of consciousness: awake and alert and oriented  Post-procedure vital signs: reviewed and stable  Pain management: adequate  Airway patency: patent    PONV status at discharge: No PONV  Anesthetic complications: no      Cardiovascular status: blood pressure returned to baseline and hemodynamically stable  Respiratory status: unassisted, spontaneous ventilation and room air  Hydration status: euvolemic  Follow-up not needed.              Vitals Value Taken Time   /93 11/29/23 1324   Temp 36.3 °C (97.3 °F) 11/29/23 1254   Pulse 67 11/29/23 1324   Resp 16 11/29/23 1324   SpO2 100 % 11/29/23 1324         Event Time   Out of Recovery 13:28:04         Pain/Nitin Score: Nitin Score: 10 (11/29/2023  1:24 PM)          
No

## 2024-09-04 NOTE — PROGRESS NOTES
Population Health Chart Review & Patient Outreach Details      Additional [facility] Health Notes:      Patient need 90 days refill on medication and est care visit with another provider. Notified of refill. Patient will schedule when ready due to having multiple appointment will check her schedule.          Updates Requested / Reviewed:      Updated Care Coordination Note and Care Everywhere         Health Maintenance Topics Overdue:      [facility] Score: 1     Foot Exam                       Health Maintenance Topic(s) Outreach Outcomes & Actions Taken:    Primary Care Appt - Outreach Outcomes & Actions Taken  : Patient Declined Scheduling or Will Call Back to Schedule

## 2024-09-04 NOTE — TELEPHONE ENCOUNTER
Notified patient of information below. I am sorry the confusion, she needed a 90 days supplies of the medication to be able to get prescription. Not due until after date. Then stated that her and her  been using each other prescription. Skyler Valverdeite. He is also taking same medicine. He is using her medicine due to him being out. She was using his at that time and now he is out.     Overall both patient using each other medicine to make sure sugar stable but end up not having medicine for couple weeks.    Unsure if you want to refill both patient for 90 days and us schedule a follow up labs prior to a visit with a new provider.

## 2024-09-10 ENCOUNTER — TELEPHONE (OUTPATIENT)
Dept: ENDOSCOPY | Facility: HOSPITAL | Age: 59
End: 2024-09-10
Payer: COMMERCIAL

## 2024-09-10 ENCOUNTER — PATIENT MESSAGE (OUTPATIENT)
Dept: BARIATRICS | Facility: CLINIC | Age: 59
End: 2024-09-10
Payer: COMMERCIAL

## 2024-09-10 NOTE — TELEPHONE ENCOUNTER
Spoke to patient for pre-call to confirm scheduled Upper Endoscopy (EGD) and patient verbalized understanding of the following:       Date & arrival time of procedure(s) verified 9/17/24, 9:30 AM.  Location of procedure(s) [address]  verified.  NPO status reinforced. Ok to continue clear liquids until 6:30 AM.   Patient is taking Trulicity (Dulaglutide), instructed to take last dose on/before 9/9/24.   Patient is taking Eliquis (apixaban), instructed to take last dose on:  9/14/24.  Approval to hold Eliquis (apixaban) received from  Flor Gomez MD.  Patient confirmed receipt of prep instructions.  Instructions provided to patient via [facility].  Patient confirmed ride home after procedure if procedure requires anesthesia.   Pre-call screening questionnaire reviewed and completed with patient.   Appointment details are tentative, including check-in time.  If the patient begins taking any blood thinning medications, injectable weight loss/diabetes medications (other than insulin), or Adipex (phentermine) patient was instructed to contact the endoscopy scheduling department as soon as possible.  Patient was advised to call the endoscopy scheduling department if any questions or concerns arise.      Endoscopy Scheduling Department       There is no need to send stone for testing at this time. She should ensure she drinks plenty of water daily and notify office if symptoms return. If symptoms return we will send her for imaging and urology consult. Thanks.

## 2024-09-17 ENCOUNTER — ANESTHESIA (OUTPATIENT)
Dept: ENDOSCOPY | Facility: HOSPITAL | Age: 59
End: 2024-09-17
Payer: COMMERCIAL

## 2024-09-17 ENCOUNTER — HOSPITAL ENCOUNTER (OUTPATIENT)
Facility: HOSPITAL | Age: 59
Discharge: HOME | End: 2024-09-17
Attending: STUDENT IN AN ORGANIZED HEALTH CARE EDUCATION/TRAINING PROGRAM | Admitting: STUDENT IN AN ORGANIZED HEALTH CARE EDUCATION/TRAINING PROGRAM
Payer: COMMERCIAL

## 2024-09-17 ENCOUNTER — ANESTHESIA EVENT (OUTPATIENT)
Dept: ENDOSCOPY | Facility: HOSPITAL | Age: 59
End: 2024-09-17
Payer: COMMERCIAL

## 2024-09-17 VITALS
TEMPERATURE: 97 F | OXYGEN SATURATION: 98 % | HEART RATE: 61 BPM | SYSTOLIC BLOOD PRESSURE: 145 MMHG | DIASTOLIC BLOOD PRESSURE: 65 MMHG | RESPIRATION RATE: 16 BRPM

## 2024-09-17 DIAGNOSIS — R10.9 ABDOMINAL PAIN: ICD-10-CM

## 2024-09-17 LAB — POCT GLUCOSE: 131 MG/DL (ref 70–110)

## 2024-09-17 PROCEDURE — 43239 EGD BIOPSY SINGLE/MULTIPLE: CPT | Performed by: STUDENT IN AN ORGANIZED HEALTH CARE EDUCATION/TRAINING PROGRAM

## 2024-09-17 PROCEDURE — 25000003 PHARM REV CODE 250: Performed by: STUDENT IN AN ORGANIZED HEALTH CARE EDUCATION/TRAINING PROGRAM

## 2024-09-17 PROCEDURE — 37000009 HC ANESTHESIA EA ADD 15 MINS: Performed by: STUDENT IN AN ORGANIZED HEALTH CARE EDUCATION/TRAINING PROGRAM

## 2024-09-17 PROCEDURE — 43239 EGD BIOPSY SINGLE/MULTIPLE: CPT | Mod: ,,, | Performed by: STUDENT IN AN ORGANIZED HEALTH CARE EDUCATION/TRAINING PROGRAM

## 2024-09-17 PROCEDURE — 88305 TISSUE EXAM BY PATHOLOGIST: CPT | Mod: 26,,, | Performed by: PATHOLOGY

## 2024-09-17 PROCEDURE — 37000008 HC ANESTHESIA 1ST 15 MINUTES: Performed by: STUDENT IN AN ORGANIZED HEALTH CARE EDUCATION/TRAINING PROGRAM

## 2024-09-17 PROCEDURE — 88342 IMHCHEM/IMCYTCHM 1ST ANTB: CPT | Mod: 26,,, | Performed by: PATHOLOGY

## 2024-09-17 PROCEDURE — 88342 IMHCHEM/IMCYTCHM 1ST ANTB: CPT | Mod: 59 | Performed by: PATHOLOGY

## 2024-09-17 PROCEDURE — 63600175 PHARM REV CODE 636 W HCPCS: Performed by: STUDENT IN AN ORGANIZED HEALTH CARE EDUCATION/TRAINING PROGRAM

## 2024-09-17 PROCEDURE — 27201012 HC FORCEPS, HOT/COLD, DISP: Performed by: STUDENT IN AN ORGANIZED HEALTH CARE EDUCATION/TRAINING PROGRAM

## 2024-09-17 PROCEDURE — 88305 TISSUE EXAM BY PATHOLOGIST: CPT | Performed by: PATHOLOGY

## 2024-09-17 RX ORDER — PROPOFOL 10 MG/ML
VIAL (ML) INTRAVENOUS
Status: DISCONTINUED | OUTPATIENT
Start: 2024-09-17 | End: 2024-09-17

## 2024-09-17 RX ORDER — LIDOCAINE HYDROCHLORIDE 20 MG/ML
INJECTION INTRAVENOUS
Status: DISCONTINUED | OUTPATIENT
Start: 2024-09-17 | End: 2024-09-17

## 2024-09-17 RX ORDER — LIDOCAINE HYDROCHLORIDE 20 MG/ML
INJECTION, SOLUTION EPIDURAL; INFILTRATION; INTRACAUDAL; PERINEURAL
Status: DISCONTINUED
Start: 2024-09-17 | End: 2024-09-17 | Stop reason: HOSPADM

## 2024-09-17 RX ORDER — SODIUM CHLORIDE 9 MG/ML
INJECTION, SOLUTION INTRAVENOUS CONTINUOUS
Status: DISCONTINUED | OUTPATIENT
Start: 2024-09-17 | End: 2024-09-17 | Stop reason: HOSPADM

## 2024-09-17 RX ORDER — PROPOFOL 10 MG/ML
VIAL (ML) INTRAVENOUS
Status: DISCONTINUED
Start: 2024-09-17 | End: 2024-09-17 | Stop reason: HOSPADM

## 2024-09-17 RX ADMIN — PROPOFOL 20 MG: 10 INJECTION, EMULSION INTRAVENOUS at 09:09

## 2024-09-17 RX ADMIN — SODIUM CHLORIDE: 0.9 INJECTION, SOLUTION INTRAVENOUS at 09:09

## 2024-09-17 RX ADMIN — PROPOFOL 30 MG: 10 INJECTION, EMULSION INTRAVENOUS at 09:09

## 2024-09-17 RX ADMIN — PROPOFOL 100 MG: 10 INJECTION, EMULSION INTRAVENOUS at 09:09

## 2024-09-17 RX ADMIN — LIDOCAINE HYDROCHLORIDE 140 MG: 20 INJECTION, SOLUTION INTRAVENOUS at 09:09

## 2024-09-17 NOTE — PLAN OF CARE
Procedure and Recovery complete. Patient alert and oriented, no pain reported, and no signs or symptoms of distress noted at this moment. Discharge instructions discussed and given to patient. Patient discharged home with .

## 2024-09-17 NOTE — ANESTHESIA PREPROCEDURE EVALUATION
[facility]  Anesthesia Pre-Operative Evaluation         Patient Name: Monalisa Carson  YOB: 1965  MRN: [id number]    SUBJECTIVE:     Pre-operative evaluation for Procedure(s) (LRB):  EGD (ESOPHAGOGASTRODUODENOSCOPY) (N/A)     09/17/2024    Monalisa Carson is a 59 y.o. female w/ a significant PMHx of HTN, T2DM, HLD, seizures    Patient now presents for the above procedure(s).      LDA: None documented.    Prev airway: None documented.    Drips: None documented.    Patient Active Problem List   Diagnosis    Anxiety    Essential hypertension, benign    Controlled type 2 diabetes mellitus with stage 2 chronic kidney disease, without long-term current use of insulin    HLD (hyperlipidemia)    Dysarthria    Hemifacial spasm    Nonintractable epilepsy without status epilepticus    Tension headache, chronic    Occipital neuralgia of left side    Severe obesity (BMI 35.0-39.9) with comorbidity    Sal's paralysis (postepileptic)    Wears contact lenses    Refractive error    Heart murmur    PATTERSON (dyspnea on exertion)    Obesity    Seizures    Superior mesenteric vein thrombosis    S/P laparoscopic sleeve gastrectomy    History of stroke    Dehydration       Review of patient's allergies indicates:   Allergen Reactions    Keflex [cephalexin] Hives    Vicodin [hydrocodone-acetaminophen] Itching     itch    Crestor [rosuvastatin]      Muscle pain       Current Outpatient Medications:    Current Facility-Administered Medications:     0.9%  NaCl infusion, , Intravenous, Continuous, Rohit Henderson MD    Past Surgical History:   Procedure Laterality Date    CHOLECYSTECTOMY      2001 april    COLONOSCOPY N/A 06/29/2021    Procedure: COLONOSCOPY;  Surgeon: Gustavo Pelletier MD;  Location: [facility] ENDO;  Service: Endoscopy;  Laterality: N/A;  combined orders    ESOPHAGOGASTRODUODENOSCOPY N/A 06/29/2021    Procedure: ESOPHAGOGASTRODUODENOSCOPY (EGD);  Surgeon: Gustavo Pelletier MD;  Location: [facility]  ENDO;  Service: Endoscopy;  Laterality: N/A;  covdi +3/29/20, fully vaccinated 3/23/21, prep instr portal -ml    ESOPHAGOGASTRODUODENOSCOPY N/A 11/29/2023    Procedure: EGD (ESOPHAGOGASTRODUODENOSCOPY);  Surgeon: Maciej Fine MD;  Location: [facility];  Service: Endoscopy;  Laterality: N/A;  referred by Cristhian RAHMAN, [facility] takes on Tuesdays will hold dose on Tuesday 11/28/23, instructions per [facility].    HYSTERECTOMY      partial  1996    LAPAROSCOPIC SLEEVE GASTRECTOMY N/A 04/04/2024    Procedure: GASTRECTOMY, SLEEVE, LAPAROSCOPIC with intraop EGD ([facility], please submit auth to insurance on or after 3/14/24);  Surgeon: Maciej Kennedy Jr., MD;  Location: [address];  Service: General;  Laterality: N/A;  with poss hiatal hernia repair    OOPHORECTOMY      SHOULDER SURGERY Right     WISDOM TOOTH EXTRACTION         Social History     Socioeconomic History    Marital status:    Occupational History    Occupation:      Employer: [employer]   Tobacco Use    Smoking status: Former     Current packs/day: 0.00     Types: Cigarettes    Smokeless tobacco: Never   Substance and Sexual Activity    Alcohol use: Not Currently    Drug use: No    Sexual activity: Yes     Partners: Male     Birth control/protection: None   Other Topics Concern    Are you pregnant or think you may be? No    Breast-feeding No     Social Determinants of Health     Financial Resource Strain: Low Risk  (4/26/2024)    Overall Financial Resource Strain (CARDIA)     Difficulty of Paying Living Expenses: Not hard at all   Food Insecurity: No Food Insecurity (4/26/2024)    Hunger Vital Sign     Worried About Running Out of Food in the Last Year: Never true     Ran Out of Food in the Last Year: Never true   Transportation Needs: No Transportation Needs (4/26/2024)    PRAPARE - Transportation     Lack of Transportation (Medical): No     Lack of Transportation (Non-Medical): No   Physical Activity: Inactive (4/5/2024)     Exercise Vital Sign     Days of Exercise per Week: 0 days     Minutes of Exercise per Session: 0 min   Stress: No Stress Concern Present (4/26/2024)    [place] [employer] - Occupational Stress Questionnaire     Feeling of Stress : Not at all   Housing Stability: Low Risk  (4/26/2024)    Housing Stability Vital Sign     Unable to Pay for Housing in the Last Year: No     Homeless in the Last Year: No       OBJECTIVE:     Vital Signs Range (Last 24H):         Significant Labs:  Lab Results   Component Value Date    WBC 6.56 05/30/2024    HGB 13.6 05/30/2024    HCT 41.9 05/30/2024     05/30/2024    CHOL 218 (H) 05/30/2024    TRIG 137 05/30/2024    HDL 48 05/30/2024    ALT 19 08/16/2024    AST 28 08/16/2024     08/16/2024     08/16/2024    K 4.5 08/16/2024    K 4.5 08/16/2024     08/16/2024     08/16/2024    CREATININE 1.0 08/16/2024    CREATININE 1.0 08/16/2024    CREATININE 1.0 08/16/2024    BUN 11 08/16/2024    BUN 11 08/16/2024    CO2 19 (L) 08/16/2024    CO2 19 (L) 08/16/2024    TSH 1.387 12/06/2023    INR 1.0 04/24/2024    HGBA1C 6.7 (H) 03/20/2024       Diagnostic Studies: No relevant studies.    EKG:   Results for orders placed or performed during the hospital encounter of 09/28/23   EKG    Collection Time: 09/28/23 12:48 PM    Narrative    Test Reason : E78.00,I10,E11.22,N18.2,E66.01,    Vent. Rate : 074 BPM     Atrial Rate : 074 BPM     P-R Int : 182 ms          QRS Dur : 096 ms      QT Int : 374 ms       P-R-T Axes : 064 062 075 degrees     QTc Int : 415 ms    Normal sinus rhythm  Incomplete right bundle branch block  Borderline Abnormal ECG  When compared with ECG of 27-APR-2022 13:31,  Significant changes have occurred  Confirmed by Naseem Marx MD ([id number]) on 9/29/2023 12:52:10 PM    Referred By: YE CONTRERAS           Confirmed By:Naseem Marx MD       2D ECHO:  TTE:  Results for orders placed or performed during the hospital encounter of 05/03/22   Echo    Result Value Ref Range    Ascending aorta 2.39 cm    STJ 2.35 cm    AV mean gradient 13 mmHg    Ao peak erick 2.14 m/s    Ao VTI 43.19 cm    IVRT 122.74 msec    IVS 1.07 0.6 - 1.1 cm    LA size 3.66 cm    Left Atrium Major Axis 5.73 cm    Left Atrium Minor Axis 5.49 cm    LVIDd 4.29 3.5 - 6.0 cm    LVIDs 2.62 2.1 - 4.0 cm    LVOT diameter 2.00 cm    LVOT peak VTI 32.94 cm    Posterior Wall 0.89 0.6 - 1.1 cm    MV Peak A Erick 1.11 m/s    E wave deceleration time 257.13 msec    MV Peak E Erick 1.12 m/s    PV Peak D Erick 0.37 m/s    PV Peak S Erick 0.66 m/s    RA Major Axis 5.07 cm    RA Width 3.44 cm    RVDD 3.78 cm    TAPSE 2.66 cm    TR Max Erick 2.59 m/s    LA WIDTH 3.59 cm    PV PEAK VELOCITY 1.46 cm/s    MV stenosis pressure 1/2 time 74.57 ms    LV Diastolic Volume 82.73 mL    LV Systolic Volume 24.97 mL    LVOT peak erick 1.59 m/s    TDI LATERAL 0.08 m/s    TDI SEPTAL 0.06 m/s    RV S' 16.35 cm/s    LV LATERAL E/E' RATIO 14.00 m/s    LV SEPTAL E/E' RATIO 18.67 m/s    FS 39 %    LA volume 62.63 cm3    LV mass 138.04 g    Left Ventricle Relative Wall Thickness 0.41 cm    AV valve area 2.39 cm2    AV Velocity Ratio 0.74     AV index (prosthetic) 0.76     MV valve area p 1/2 method 2.95 cm2    E/A ratio 1.01     Mean e' 0.07 m/s    Pulm vein S/D ratio 1.78     LVOT area 3.1 cm2    LVOT stroke volume 103.43 cm3    AV peak gradient 18 mmHg    E/E' ratio 16.00 m/s    Triscuspid Valve Regurgitation Peak Gradient 27 mmHg    BSA 2.12 m2    LV Systolic Volume Index 12.3 mL/m2    LV Diastolic Volume Index 40.75 mL/m2    LA Volume Index 30.9 mL/m2    LV Mass Index 68 g/m2    Right Atrial Pressure (from IVC) 3 mmHg    EF 55 %    TV resting pulmonary artery pressure 30 mmHg    Narrative    · The estimated ejection fraction is 55%.  · Normal systolic function.  · Indeterminate left ventricular diastolic function.  · Normal right ventricular size with normal right ventricular systolic   function.  · Mild left atrial enlargement.  · Normal  central venous pressure (3 mmHg).  · The estimated PA systolic pressure is 30 mmHg.          SASHA:  No results found for this or any previous visit.    ASSESSMENT/PLAN:           Pre-op Assessment    I have reviewed the Patient Summary Reports.     I have reviewed the Nursing Notes. I have reviewed the NPO Status.   I have reviewed the Medications.     Review of Systems  Cardiovascular:     Hypertension           hyperlipidemia                             Hepatic/GI:     GERD             Neurological:       Seizures                                Endocrine:  Diabetes               Physical Exam  General: Well nourished, Cooperative, Alert and Oriented    Airway:  Mallampati: I   Mouth Opening: Normal  TM Distance: Normal  Tongue: Normal  Neck ROM: Normal ROM    Dental:  Intact    Chest/Lungs:  Normal Respiratory Rate    Heart:  Rate: Normal  Rhythm: Regular Rhythm        Anesthesia Plan  Type of Anesthesia, risks & benefits discussed:    Anesthesia Type: MAC, Gen Natural Airway, Gen ETT  Intra-op Monitoring Plan: Standard ASA Monitors  Post Op Pain Control Plan: multimodal analgesia  Induction:  IV  Informed Consent: Informed consent signed with the Patient and all parties understand the risks and agree with anesthesia plan.  All questions answered. Patient consented to blood products? No  ASA Score: 2    Ready For Surgery From Anesthesia Perspective.     .

## 2024-09-17 NOTE — TRANSFER OF CARE
Anesthesia Transfer of Care Note    Patient: Monalisa THOMPSON Favorite    Procedure(s) Performed: Procedure(s) (LRB):  EGD (ESOPHAGOGASTRODUODENOSCOPY) (N/A)    Patient location: GI    Anesthesia Type: general    Transport from OR: Transported from OR on room air with adequate spontaneous ventilation    Post pain: adequate analgesia    Post assessment: no apparent anesthetic complications and tolerated procedure well    Post vital signs: stable    Level of consciousness: awake and alert    Nausea/Vomiting: no nausea/vomiting    Complications: none    Transfer of care protocol was followed      Last vitals: Visit Vitals  /76   Pulse 78   Temp 36.3 °C (97.3 °F) (Axillary)   Resp 18   SpO2 96%   Breastfeeding No

## 2024-09-17 NOTE — ANESTHESIA POSTPROCEDURE EVALUATION
Anesthesia Post Evaluation    Patient: Monalisa THOMPSON Favorite    Procedure(s) Performed: Procedure(s) (LRB):  EGD (ESOPHAGOGASTRODUODENOSCOPY) (N/A)    Final Anesthesia Type: general      Patient location during evaluation: GI PACU  Patient participation: Yes- Able to Participate  Level of consciousness: awake and alert  Post-procedure vital signs: reviewed and stable  Pain management: adequate  Airway patency: patent    PONV status at discharge: No PONV  Anesthetic complications: no      Cardiovascular status: blood pressure returned to baseline  Respiratory status: unassisted  Hydration status: euvolemic  Follow-up not needed.              Vitals Value Taken Time   /65 09/17/24 1010   Temp 36.3 °C (97.3 °F) 09/17/24 0940   Pulse 61 09/17/24 1010   Resp 16 09/17/24 1010   SpO2 98 % 09/17/24 1010         Event Time   Out of Recovery 10:18:05         Pain/Nitin Score: Nitin Score: 10 (9/17/2024 10:10 AM)

## 2024-09-17 NOTE — PROVATION PATIENT INSTRUCTIONS
Discharge Summary/Instructions after an Endoscopic Procedure  Patient Name: Monalisa Carson  Patient MRN: [id number]  Patient YOB: 1965  Tuesday, September 17, 2024  Rohit Henderson MD  Dear patient,  As a result of recent federal legislation (The Federal Cures Act), you may   receive lab or pathology results from your procedure in your [facility]   account before your physician is able to contact you. Your physician or   their representative will relay the results to you with their   recommendations at their soonest availability.  Thank you,  RESTRICTIONS:  During your procedure today, you received medications for sedation.  These   medications may affect your judgment, balance and coordination.  Therefore,   for 24 hours, you have the following restrictions:   - DO NOT drive a car, operate machinery, make legal/financial decisions,   sign important papers or drink alcohol.    ACTIVITY:  Today: no heavy lifting, straining or running due to procedural   sedation/anesthesia.  The following day: return to full activity including work.  DIET:  Eat and drink normally unless instructed otherwise.     TREATMENT FOR COMMON SIDE EFFECTS:  - Mild abdominal pain, nausea, belching, bloating or excessive gas:  rest,   eat lightly and use a heating pad.  - Sore Throat: treat with throat lozenges and/or gargle with warm salt   water.  - Because air was used during the procedure, expelling large amounts of air   from your rectum or belching is normal.  - If a bowel prep was taken, you may not have a bowel movement for 1-3 days.    This is normal.  SYMPTOMS TO WATCH FOR AND REPORT TO YOUR PHYSICIAN:  1. Abdominal pain or bloating, other than gas cramps.  2. Chest pain.  3. Back pain.  4. Signs of infection such as: chills or fever occurring within 24 hours   after the procedure.  5. Rectal bleeding, which would show as bright red, maroon, or black stools.   (A tablespoon of blood from the rectum is not serious, especially  if   hemorrhoids are present.)  6. Vomiting.  7. Weakness or dizziness.  GO DIRECTLY TO THE NEAREST EMERGENCY ROOM IF YOU HAVE ANY OF THE FOLLOWING:      Difficulty breathing              Chills and/or fever over 101 F   Persistent vomiting and/or vomiting blood   Severe abdominal pain   Severe chest pain   Black, tarry stools   Bleeding- more than one tablespoon   Any other symptom or condition that you feel may need urgent attention  Your doctor recommends these additional instructions:  If any biopsies were taken, your doctors clinic will contact you in 1 to 2   weeks with any results.  - Patient has a contact number available for emergencies.  The signs and   symptoms of potential delayed complications were discussed with the   patient.  Return to normal activities tomorrow.  Written discharge   instructions were provided to the patient.   - Discharge patient to home.   - Resume previous diet.   - Continue present medications.   - Await pathology results.  For questions, problems or results please call your physician - Rohit Henderson MD at Work:  [phone].  [facility] can be reached at [phone]     IF A COMPLICATION OR EMERGENCY SITUATION ARISES AND YOU ARE UNABLE TO REACH   YOUR PHYSICIAN - GO DIRECTLY TO THE EMERGENCY ROOM.  MD Rohit Kenney MD  9/17/2024 9:41:38 AM  This report has been verified and signed electronically.  Dear patient,  As a result of recent federal legislation (The Federal Cures Act), you may   receive lab or pathology results from your procedure in your [facility]   account before your physician is able to contact you. Your physician or   their representative will relay the results to you with their   recommendations at their soonest availability.  Thank you,  PROVATION

## 2024-09-17 NOTE — H&P
Short Stay Endoscopy History and Physical    PCP - Flor Gomez MD     Procedure - EGD  ASA - per anesthesia  Mallampati - per anesthesia  History of Anesthesia problems - no  Family history Anesthesia problems -  no   Plan of anesthesia - General    HPI:  This is a 59 y.o. female here for evaluation of : epigastric pain.     ROS:  Constitutional: No fevers, chills, No weight loss  CV: No chest pain  Pulm: No cough, No shortness of breath  Ophtho: No vision changes  GI: see HPI  Derm: No rash    Medical History:  has a past medical history of Allergy, Anxiety, Cataract, Diabetes mellitus, GERD (gastroesophageal reflux disease), Heart murmur, Hyperlipidemia, Hypertension, and Seizures.    Surgical History:  has a past surgical history that includes Cholecystectomy; Hysterectomy; [place] tooth extraction; Shoulder surgery (Right); Oophorectomy; Esophagogastroduodenoscopy (N/A, 06/29/2021); Colonoscopy (N/A, 06/29/2021); Esophagogastroduodenoscopy (N/A, 11/29/2023); and Laparoscopic sleeve gastrectomy (N/A, 04/04/2024).    Family History: family history includes Breast cancer in her maternal aunt and paternal aunt; Cancer in her maternal aunt, maternal grandfather, maternal uncle, and paternal aunt; Cataracts in her mother; Depression in her sister; Diabetes in her father and mother; Early death in her paternal grandfather and paternal grandmother; Esophageal cancer in her paternal grandfather; Hyperlipidemia in her mother; Hypertension in her father, mother, and sister; No Known Problems in her brother, maternal grandmother, paternal uncle, and another family member; Stroke in her father and sister.. Otherwise no colon cancer, inflammatory bowel disease, or GI malignancies.    Social History:  reports that she has quit smoking. Her smoking use included cigarettes. She has never used smokeless tobacco. She reports that she does not currently use alcohol. She reports that she does not use drugs.    Review of  patient's allergies indicates:   Allergen Reactions    Keflex [cephalexin] Hives    Vicodin [hydrocodone-acetaminophen] Itching     itch    Crestor [rosuvastatin]      Muscle pain       Medications:   Medications Prior to Admission   Medication Sig Dispense Refill Last Dose    acetaminophen (TYLENOL) 500 MG tablet Take 2 tablets (1,000 mg total) by mouth every 6 (six) hours as needed for Pain. 30 tablet 0     albuterol (PROVENTIL/VENTOLIN HFA) 90 mcg/actuation inhaler Inhale 1-2 puffs into the lungs every 4 (four) hours as needed for Wheezing. Rescue 18 g 11     apixaban (ELIQUIS) 5 mg Tab Take 2 tablets (10 mg total) by mouth 2 (two) times daily for 7 days, THEN 1 tablet (5 mg total) 2 (two) times daily. 208 tablet 0     benazepriL (LOTENSIN) 40 MG tablet TAKE 1 TABLET(40 MG) BY MOUTH EVERY DAY 90 tablet 0     busPIRone (BUSPAR) 10 MG tablet Take 1 tablet (10 mg total) by mouth 3 (three) times daily as needed (anxiety). 90 tablet 2     dicyclomine (BENTYL) 20 mg tablet Take 1 tablet (20 mg total) by mouth 4 (four) times daily. 30 tablet 0     dulaglutide (TRULICITY) 0.75 mg/0.5 mL pen injector Inject 0.75 mg into the skin every 7 days. 12 pen 1     EScitalopram oxalate (LEXAPRO) 10 MG tablet Take 1 tablet (10 mg total) by mouth once daily. 90 tablet 1     famotidine (PEPCID) 40 MG tablet Take 1 tablet (40 mg total) by mouth 2 (two) times daily. 90 tablet 3     fluticasone propionate (FLONASE) 50 mcg/actuation nasal spray 1 spray (50 mcg total) by Each Nostril route 2 (two) times daily. 16 g 0     gabapentin (NEURONTIN) 100 MG capsule Take 100 mg by mouth 3 (three) times daily.       hydroCHLOROthiazide (HYDRODIURIL) 12.5 MG Tab Take 1 tablet (12.5 mg total) by mouth once daily. 90 tablet 3     hydrocortisone (WESTCORT) 0.2 % cream Apply topically 2 (two) times daily. for 5 days 60 g 0     hydrocortisone 2.5 % cream [place] EXT AA BID 28 g 0     hydrOXYzine HCL (ATARAX) 25 MG tablet Take 1 tablet (25 mg total) by mouth  "every 4 to 6 hours as needed for Itching (redness). 30 tablet 0     levETIRAcetam (KEPPRA) 1000 MG tablet TAKE 1 TABLET(1000 MG) BY MOUTH TWICE DAILY 180 tablet 3     lidocaine (LMX) 4 % cream Apply topically as needed.  0     metFORMIN (GLUCOPHAGE-XR) 500 MG ER 24hr tablet TAKE 1 TABLET(500 MG) BY MOUTH DAILY WITH BREAKFAST 90 tablet 3     omeprazole (PRILOSEC) 40 MG capsule Take 1 capsule (40 mg total) by mouth once daily. 90 capsule 3     ondansetron (ZOFRAN-ODT) 8 MG TbDL Take 1 tablet (8 mg total) by mouth every 6 (six) hours as needed. 30 tablet 1     pantoprazole (PROTONIX) 40 MG tablet Take 1 tablet (40 mg total) by mouth once daily. 90 tablet 3     pen needle, diabetic 31 gauge x 5/16" Ndle For use with bydureon 100 each 0     sucralfate (CARAFATE) 100 mg/mL suspension Take 10 mLs (1 g total) by mouth 2 (two) times daily. 414 mL 0     zolpidem (AMBIEN) 5 MG Tab TAKE 1 TABLET BY MOUTH IN THE EVENING SPARINGLY AS NEEDED 30 tablet 0        Physical Exam:    Vital Signs:   Vitals:    09/17/24 0901   BP: (!) 148/74   Pulse: (!) 49   Resp: 19   Temp: 97.8 °F (36.6 °C)       General Appearance: Well appearing in no acute distress  Abdomen:  non distended    Labs:  Lab Results   Component Value Date    WBC 6.56 05/30/2024    HGB 13.6 05/30/2024    HCT 41.9 05/30/2024     05/30/2024    CHOL 218 (H) 05/30/2024    TRIG 137 05/30/2024    HDL 48 05/30/2024    ALT 19 08/16/2024    AST 28 08/16/2024     08/16/2024     08/16/2024    K 4.5 08/16/2024    K 4.5 08/16/2024     08/16/2024     08/16/2024    CREATININE 1.0 08/16/2024    CREATININE 1.0 08/16/2024    CREATININE 1.0 08/16/2024    BUN 11 08/16/2024    BUN 11 08/16/2024    CO2 19 (L) 08/16/2024    CO2 19 (L) 08/16/2024    TSH 1.387 12/06/2023    INR 1.0 04/24/2024    HGBA1C 6.7 (H) 03/20/2024       I have explained the risks and benefits of endoscopy procedures to the patient including but not limited to bleeding, perforation, infection, " and death.  The patient was asked if they understand and allowed to ask any further questions to their satisfaction.      Reyna Wolff MD

## 2024-09-19 LAB
FINAL PATHOLOGIC DIAGNOSIS: NORMAL
GROSS: NORMAL
Lab: NORMAL

## 2024-09-24 LAB
FINAL PATHOLOGIC DIAGNOSIS: NORMAL
GROSS: NORMAL
Lab: NORMAL
SUPPLEMENTAL DIAGNOSIS: NORMAL

## 2024-09-25 NOTE — PROGRESS NOTES
Dear Ms. Favorite:    It was my pleasure seeing you at the time of your recent EGD at [facility].    The biopsies from your stomach were negative for Helicobacter pylori. Some chronic inflammation was seen on the lining of your stomach and recommend avoiding NSAID medications such as ibuprofen, alcohol, and tobacco products.    The biopsies from your small intestine did not show any abnormalities.    I will forward a copy of this letter to your primary care/referring physician.     Sincerely,    Rohit Henderson MD

## 2024-10-01 ENCOUNTER — PATIENT MESSAGE (OUTPATIENT)
Dept: BARIATRICS | Facility: CLINIC | Age: 59
End: 2024-10-01
Payer: COMMERCIAL

## 2024-10-07 NOTE — PROGRESS NOTES
NUTRITION NOTE    Referring Physician: Apoorva Weaver M.D.  Reason for MNT Referral: Follow-up 6 months s/p laparoscopic sleeve gastrectomy    PAST MEDICAL HISTORY:    Denies nausea, vomiting, and diarrhea.  Reports problems, including occasional constipation. Eats vegetables or takes Miralax .    Past Medical History:   Diagnosis Date    Allergy     Anxiety     Cataract     Diabetes mellitus     GERD (gastroesophageal reflux disease)     Heart murmur     Hyperlipidemia     Hypertension     Seizures     5-6 years ago, spontaneous       CLINICAL DATA:  59 y.o.-year-old Black or  female.    Current Weight: 187 lbs  BMI: 33.16  Total Weight Loss: 35 lbs  Excess Weight Loss: 40 %    CURRENT DIET:  Bariatric Diet.  Diet Recall: 30-60 grams of protein/day; 56 oz of fluids/day    B: Coffee, egg  S: Fruit  L: Protein shake or salad (beets, lettuce, tomatoes, cucumbers, onions, cheese) or couple bites of 's sandwich  D: Smothered pork chop  S: Corn chips    Meal Pattern: 3 meal(s) + 2 snack(s) + 0-1 protein supplement(s)  Adequate protein supplement intake. Fairlife Core Power 2 x week  Adequate dairy intake. Cheese, yogurt  Adequate vegetable intake. Salad, green beans, sweet peas  Adequate fruit intake. Peaches and plums  Starchy CHO: corn, sweet peas  Beverages: Water (2 bottles) with Crystal Light, Gatorade Zero    LABS:  None available at time of visit    VITAMINS / MINERALS:  Women's One a Day Multivitamin with 18 mg iron 1 x day  Calcium Carbonate 600 mg with vitamin D  2 x day  EZ Melt Vitamin B-12 sublingual 2500 mcg 1 x week  EZ Melt B-1/with 25 mg thiamine 2 x day  EZ Melt biotin    EXERCISE:  Walking    Restrictions to Exercise: None.    ASSESSMENT:  Doing ok overall.  Weight loss.  Adequate calorie intake.  Inadequate protein intake.  Inadequate fluid intake.  Following diet inappropriately.  Not exercising.  Adequate vitamins & minerals.    BARIATRIC DIET DISCUSSION:  Instructed  and provided written materials on bariatric diet plan.  Reinforced post-op nutrition guidelines.  Provided PT with a list of foods to eat and foods to avoid  Recommend PT discontinue starchy CHO until goal weight    PLAN / RECOMMENDATIONS:  Continue  diet plan.  Adjust diet:  Increase protein intake.  Increase fluid intake.  Increase exercise.  Continue appropriate vitamins & minerals.  Adjust vitamins & minerals by taking calcium 1 pill, twice per day, 2 hours apart from multi, and with food.    Return to clinic in 6 months.    SESSION TIME: 30 minutes

## 2024-10-08 ENCOUNTER — PATIENT MESSAGE (OUTPATIENT)
Dept: BARIATRICS | Facility: CLINIC | Age: 59
End: 2024-10-08

## 2024-10-08 ENCOUNTER — CLINICAL SUPPORT (OUTPATIENT)
Dept: BARIATRICS | Facility: CLINIC | Age: 59
End: 2024-10-08
Payer: COMMERCIAL

## 2024-10-08 ENCOUNTER — LAB VISIT (OUTPATIENT)
Dept: LAB | Facility: HOSPITAL | Age: 59
End: 2024-10-08
Payer: COMMERCIAL

## 2024-10-08 ENCOUNTER — OFFICE VISIT (OUTPATIENT)
Dept: BARIATRICS | Facility: CLINIC | Age: 59
End: 2024-10-08
Payer: COMMERCIAL

## 2024-10-08 VITALS — WEIGHT: 187.19 LBS | BODY MASS INDEX: 33.16 KG/M2 | BODY MASS INDEX: 33.16 KG/M2 | WEIGHT: 187.19 LBS

## 2024-10-08 DIAGNOSIS — N18.2 CONTROLLED TYPE 2 DIABETES MELLITUS WITH STAGE 2 CHRONIC KIDNEY DISEASE, WITHOUT LONG-TERM CURRENT USE OF INSULIN: ICD-10-CM

## 2024-10-08 DIAGNOSIS — E11.9 TYPE 2 DIABETES MELLITUS WITHOUT COMPLICATION, UNSPECIFIED WHETHER LONG TERM INSULIN USE: ICD-10-CM

## 2024-10-08 DIAGNOSIS — E66.01 MORBID OBESITY: ICD-10-CM

## 2024-10-08 DIAGNOSIS — K21.9 GASTROESOPHAGEAL REFLUX DISEASE, UNSPECIFIED WHETHER ESOPHAGITIS PRESENT: ICD-10-CM

## 2024-10-08 DIAGNOSIS — Z71.3 DIETARY COUNSELING AND SURVEILLANCE: Primary | ICD-10-CM

## 2024-10-08 DIAGNOSIS — E78.5 HYPERLIPIDEMIA, UNSPECIFIED HYPERLIPIDEMIA TYPE: ICD-10-CM

## 2024-10-08 DIAGNOSIS — Z98.84 S/P LAPAROSCOPIC SLEEVE GASTRECTOMY: ICD-10-CM

## 2024-10-08 DIAGNOSIS — E11.22 CONTROLLED TYPE 2 DIABETES MELLITUS WITH STAGE 2 CHRONIC KIDNEY DISEASE, WITHOUT LONG-TERM CURRENT USE OF INSULIN: ICD-10-CM

## 2024-10-08 DIAGNOSIS — E66.9 OBESITY (BMI 30-39.9): ICD-10-CM

## 2024-10-08 DIAGNOSIS — K21.9 GASTROESOPHAGEAL REFLUX DISEASE WITHOUT ESOPHAGITIS: ICD-10-CM

## 2024-10-08 DIAGNOSIS — E11.9 TYPE 2 DIABETES MELLITUS WITHOUT COMPLICATION, WITHOUT LONG-TERM CURRENT USE OF INSULIN: ICD-10-CM

## 2024-10-08 DIAGNOSIS — I10 HYPERTENSION, UNSPECIFIED TYPE: ICD-10-CM

## 2024-10-08 DIAGNOSIS — R11.0 NAUSEA: ICD-10-CM

## 2024-10-08 DIAGNOSIS — Z98.84 S/P LAPAROSCOPIC SLEEVE GASTRECTOMY: Primary | ICD-10-CM

## 2024-10-08 LAB
25(OH)D3+25(OH)D2 SERPL-MCNC: 38 NG/ML (ref 30–96)
ALBUMIN SERPL BCP-MCNC: 3.7 G/DL (ref 3.5–5.2)
ALP SERPL-CCNC: 93 U/L (ref 55–135)
ALT SERPL W/O P-5'-P-CCNC: 18 U/L (ref 10–44)
ANION GAP SERPL CALC-SCNC: 8 MMOL/L (ref 8–16)
AST SERPL-CCNC: 18 U/L (ref 10–40)
BASOPHILS # BLD AUTO: 0.02 K/UL (ref 0–0.2)
BASOPHILS NFR BLD: 0.4 % (ref 0–1.9)
BILIRUB SERPL-MCNC: 0.6 MG/DL (ref 0.1–1)
BUN SERPL-MCNC: 14 MG/DL (ref 6–20)
CALCIUM SERPL-MCNC: 10.2 MG/DL (ref 8.7–10.5)
CHLORIDE SERPL-SCNC: 107 MMOL/L (ref 95–110)
CHOLEST SERPL-MCNC: 229 MG/DL (ref 120–199)
CHOLEST/HDLC SERPL: 3.8 {RATIO} (ref 2–5)
CO2 SERPL-SCNC: 29 MMOL/L (ref 23–29)
CREAT SERPL-MCNC: 0.9 MG/DL (ref 0.5–1.4)
DIFFERENTIAL METHOD BLD: ABNORMAL
EOSINOPHIL # BLD AUTO: 0.1 K/UL (ref 0–0.5)
EOSINOPHIL NFR BLD: 0.9 % (ref 0–8)
ERYTHROCYTE [DISTWIDTH] IN BLOOD BY AUTOMATED COUNT: 14 % (ref 11.5–14.5)
EST. GFR  (NO RACE VARIABLE): >60 ML/MIN/1.73 M^2
ESTIMATED AVG GLUCOSE: 126 MG/DL (ref 68–131)
GLUCOSE SERPL-MCNC: 111 MG/DL (ref 70–110)
HBA1C MFR BLD: 6 % (ref 4–5.6)
HCT VFR BLD AUTO: 41.4 % (ref 37–48.5)
HDLC SERPL-MCNC: 60 MG/DL (ref 40–75)
HDLC SERPL: 26.2 % (ref 20–50)
HGB BLD-MCNC: 13.7 G/DL (ref 12–16)
IMM GRANULOCYTES # BLD AUTO: 0.01 K/UL (ref 0–0.04)
IMM GRANULOCYTES NFR BLD AUTO: 0.2 % (ref 0–0.5)
IRON SERPL-MCNC: 81 UG/DL (ref 30–160)
LDLC SERPL CALC-MCNC: 144.2 MG/DL (ref 63–159)
LYMPHOCYTES # BLD AUTO: 3.5 K/UL (ref 1–4.8)
LYMPHOCYTES NFR BLD: 62.2 % (ref 18–48)
MCH RBC QN AUTO: 30.6 PG (ref 27–31)
MCHC RBC AUTO-ENTMCNC: 33.1 G/DL (ref 32–36)
MCV RBC AUTO: 93 FL (ref 82–98)
MONOCYTES # BLD AUTO: 0.4 K/UL (ref 0.3–1)
MONOCYTES NFR BLD: 7.9 % (ref 4–15)
NEUTROPHILS # BLD AUTO: 1.6 K/UL (ref 1.8–7.7)
NEUTROPHILS NFR BLD: 28.4 % (ref 38–73)
NONHDLC SERPL-MCNC: 169 MG/DL
NRBC BLD-RTO: 0 /100 WBC
PLATELET # BLD AUTO: 324 K/UL (ref 150–450)
PMV BLD AUTO: 10 FL (ref 9.2–12.9)
POTASSIUM SERPL-SCNC: 4.2 MMOL/L (ref 3.5–5.1)
PROT SERPL-MCNC: 8.1 G/DL (ref 6–8.4)
RBC # BLD AUTO: 4.47 M/UL (ref 4–5.4)
SATURATED IRON: 22 % (ref 20–50)
SODIUM SERPL-SCNC: 144 MMOL/L (ref 136–145)
TOTAL IRON BINDING CAPACITY: 373 UG/DL (ref 250–450)
TRANSFERRIN SERPL-MCNC: 252 MG/DL (ref 200–375)
TRIGL SERPL-MCNC: 124 MG/DL (ref 30–150)
VIT B12 SERPL-MCNC: 1856 PG/ML (ref 210–950)
WBC # BLD AUTO: 5.58 K/UL (ref 3.9–12.7)

## 2024-10-08 PROCEDURE — 97803 MED NUTRITION INDIV SUBSEQ: CPT | Mod: S$GLB,,, | Performed by: DIETITIAN, REGISTERED

## 2024-10-08 PROCEDURE — 82607 VITAMIN B-12: CPT | Performed by: PHYSICIAN ASSISTANT

## 2024-10-08 PROCEDURE — 85025 COMPLETE CBC W/AUTO DIFF WBC: CPT | Performed by: PHYSICIAN ASSISTANT

## 2024-10-08 PROCEDURE — 3008F BODY MASS INDEX DOCD: CPT | Mod: CPTII,S$GLB,, | Performed by: PHYSICIAN ASSISTANT

## 2024-10-08 PROCEDURE — 83036 HEMOGLOBIN GLYCOSYLATED A1C: CPT | Performed by: INTERNAL MEDICINE

## 2024-10-08 PROCEDURE — 99999 PR PBB SHADOW E&M-EST. PATIENT-LVL I: CPT | Mod: PBBFAC,,, | Performed by: DIETITIAN, REGISTERED

## 2024-10-08 PROCEDURE — 1159F MED LIST DOCD IN RCRD: CPT | Mod: CPTII,S$GLB,, | Performed by: PHYSICIAN ASSISTANT

## 2024-10-08 PROCEDURE — 80061 LIPID PANEL: CPT | Performed by: PHYSICIAN ASSISTANT

## 2024-10-08 PROCEDURE — 1160F RVW MEDS BY RX/DR IN RCRD: CPT | Mod: CPTII,S$GLB,, | Performed by: PHYSICIAN ASSISTANT

## 2024-10-08 PROCEDURE — 83540 ASSAY OF IRON: CPT | Performed by: PHYSICIAN ASSISTANT

## 2024-10-08 PROCEDURE — 99999 PR PBB SHADOW E&M-EST. PATIENT-LVL IV: CPT | Mod: PBBFAC,,, | Performed by: PHYSICIAN ASSISTANT

## 2024-10-08 PROCEDURE — 99213 OFFICE O/P EST LOW 20 MIN: CPT | Mod: S$GLB,,, | Performed by: PHYSICIAN ASSISTANT

## 2024-10-08 PROCEDURE — 84425 ASSAY OF VITAMIN B-1: CPT | Performed by: PHYSICIAN ASSISTANT

## 2024-10-08 PROCEDURE — 80053 COMPREHEN METABOLIC PANEL: CPT | Performed by: PHYSICIAN ASSISTANT

## 2024-10-08 PROCEDURE — 3044F HG A1C LEVEL LT 7.0%: CPT | Mod: CPTII,S$GLB,, | Performed by: PHYSICIAN ASSISTANT

## 2024-10-08 PROCEDURE — 82306 VITAMIN D 25 HYDROXY: CPT | Performed by: PHYSICIAN ASSISTANT

## 2024-10-08 RX ORDER — POLYETHYLENE GLYCOL 3350 17 G/17G
17 POWDER, FOR SOLUTION ORAL DAILY
Qty: 238 G | Refills: 0 | Status: CANCELLED | OUTPATIENT
Start: 2024-10-08 | End: 2024-10-22

## 2024-10-08 NOTE — PROGRESS NOTES
BARIATRIC POST-OPERATIVE VISIT:    HPI:  Monalisa Carson is a 59 y.o. year old female presents for 6 month post op   States that she is doing well no complaints   Still cannot eat very much     Review of Systems   Constitutional:  Negative for fatigue and fever.   HENT:  Negative for tinnitus and trouble swallowing.    Eyes:  Negative for visual disturbance.   Respiratory:  Negative for chest tightness and shortness of breath.    Cardiovascular:  Negative for chest pain, palpitations and leg swelling.   Gastrointestinal:  Negative for abdominal pain, constipation, diarrhea, nausea and vomiting.   Genitourinary:  Negative for decreased urine volume.   Skin:  Negative for rash.   Neurological:  Negative for weakness, light-headedness and headaches.   Psychiatric/Behavioral:  Negative for dysphoric mood, self-injury and sleep disturbance. The patient is not nervous/anxious.        EXERCISE & VITAMINS:  See Bariatric Assessment  Adherent to vitamins   MEDICATIONS/ALLERGIES:  Have been reviewed.    DIET: See Dietician note from today for a more detailed assessment.    Maybe 3 times   Corn chips i    Physical Exam  Constitutional:       Appearance: Normal appearance.   HENT:      Head: Normocephalic and atraumatic.   Eyes:      Extraocular Movements: Extraocular movements intact.      Conjunctiva/sclera: Conjunctivae normal.      Pupils: Pupils are equal, round, and reactive to light.   Cardiovascular:      Rate and Rhythm: Normal rate and regular rhythm.      Pulses: Normal pulses.      Heart sounds: Normal heart sounds.   Pulmonary:      Effort: Pulmonary effort is normal. No respiratory distress.      Breath sounds: Normal breath sounds.   Abdominal:      General: Bowel sounds are normal.      Palpations: Abdomen is soft.      Tenderness: There is no abdominal tenderness.   Musculoskeletal:      Cervical back: Normal range of motion and neck supple.   Skin:     Capillary Refill: Capillary refill takes less than 2  seconds.   Neurological:      General: No focal deficit present.      Mental Status: She is alert and oriented to person, place, and time.   Psychiatric:         Mood and Affect: Mood normal.         Behavior: Behavior normal.         Thought Content: Thought content normal.         Judgment: Judgment normal.         ASSESSMENT:  - Morbid obesity s/p sleeve gastrectomy   - Co-morbidities: diabetes mellitus, dyslipidemia, and hypertension  - Good Weight loss, 35#'s and 40% EWL  - No formal Exercise routine  - Fair Diet  - Good Vitamin regimen    PLAN:  - No lifting more than 10 lbs for 6 weeks  - Miralax daily for constipation  - Emphasized the importance of regular exercise and adherence to bariatric diet to achieve maximum weight loss.  - Encouraged patient to start/continue regular exercise.  - Follow-up with dietician to advance diet.  - Continue daily vitamins and medications.  - RTC   - Call the office for any issues.  - Check labs today.

## 2024-10-10 RX ORDER — POLYETHYLENE GLYCOL 3350 17 G/17G
17 POWDER, FOR SOLUTION ORAL DAILY
Qty: 238 G | Refills: 0 | Status: CANCELLED | OUTPATIENT
Start: 2024-10-08 | End: 2024-10-22

## 2024-10-11 LAB — VIT B1 BLD-MCNC: 45 UG/L (ref 38–122)

## 2024-10-15 ENCOUNTER — PATIENT MESSAGE (OUTPATIENT)
Dept: ADMINISTRATIVE | Facility: HOSPITAL | Age: 59
End: 2024-10-15
Payer: COMMERCIAL

## 2024-10-21 RX ORDER — POLYETHYLENE GLYCOL 3350 17 G/17G
17 POWDER, FOR SOLUTION ORAL DAILY
Qty: 238 G | Refills: 0 | OUTPATIENT
Start: 2024-10-21 | End: 2024-11-04

## 2024-10-28 DIAGNOSIS — N18.2 CONTROLLED TYPE 2 DIABETES MELLITUS WITH STAGE 2 CHRONIC KIDNEY DISEASE, WITHOUT LONG-TERM CURRENT USE OF INSULIN: ICD-10-CM

## 2024-10-28 DIAGNOSIS — E11.22 CONTROLLED TYPE 2 DIABETES MELLITUS WITH STAGE 2 CHRONIC KIDNEY DISEASE, WITHOUT LONG-TERM CURRENT USE OF INSULIN: ICD-10-CM

## 2024-10-28 DIAGNOSIS — G47.00 INSOMNIA, UNSPECIFIED TYPE: ICD-10-CM

## 2024-10-28 RX ORDER — DULAGLUTIDE 0.75 MG/.5ML
0.75 INJECTION, SOLUTION SUBCUTANEOUS
Qty: 12 PEN | Refills: 1 | OUTPATIENT
Start: 2024-10-28 | End: 2025-04-14

## 2024-10-28 RX ORDER — ZOLPIDEM TARTRATE 5 MG/1
TABLET ORAL
Qty: 30 TABLET | Refills: 0 | OUTPATIENT
Start: 2024-10-28

## 2024-10-29 ENCOUNTER — OFFICE VISIT (OUTPATIENT)
Dept: FAMILY MEDICINE | Facility: CLINIC | Age: 59
End: 2024-10-29
Payer: COMMERCIAL

## 2024-10-29 VITALS
HEART RATE: 60 BPM | DIASTOLIC BLOOD PRESSURE: 68 MMHG | OXYGEN SATURATION: 98 % | WEIGHT: 188.5 LBS | SYSTOLIC BLOOD PRESSURE: 120 MMHG | HEIGHT: 63 IN | RESPIRATION RATE: 18 BRPM | BODY MASS INDEX: 33.4 KG/M2 | TEMPERATURE: 98 F

## 2024-10-29 DIAGNOSIS — F51.01 PRIMARY INSOMNIA: ICD-10-CM

## 2024-10-29 DIAGNOSIS — Z23 FLU VACCINE NEED: ICD-10-CM

## 2024-10-29 DIAGNOSIS — G89.29 CHRONIC BILATERAL LOW BACK PAIN WITHOUT SCIATICA: ICD-10-CM

## 2024-10-29 DIAGNOSIS — N18.2 CONTROLLED TYPE 2 DIABETES MELLITUS WITH STAGE 2 CHRONIC KIDNEY DISEASE, WITHOUT LONG-TERM CURRENT USE OF INSULIN: ICD-10-CM

## 2024-10-29 DIAGNOSIS — E11.22 CONTROLLED TYPE 2 DIABETES MELLITUS WITH STAGE 2 CHRONIC KIDNEY DISEASE, WITHOUT LONG-TERM CURRENT USE OF INSULIN: ICD-10-CM

## 2024-10-29 DIAGNOSIS — H93.13 TINNITUS OF BOTH EARS: Primary | ICD-10-CM

## 2024-10-29 DIAGNOSIS — M54.50 CHRONIC BILATERAL LOW BACK PAIN WITHOUT SCIATICA: ICD-10-CM

## 2024-10-29 PROCEDURE — 3074F SYST BP LT 130 MM HG: CPT | Mod: CPTII,S$GLB,,

## 2024-10-29 PROCEDURE — 3078F DIAST BP <80 MM HG: CPT | Mod: CPTII,S$GLB,,

## 2024-10-29 PROCEDURE — 99999 PR PBB SHADOW E&M-EST. PATIENT-LVL III: CPT | Mod: PBBFAC,,,

## 2024-10-29 PROCEDURE — 99214 OFFICE O/P EST MOD 30 MIN: CPT | Mod: 25,S$GLB,,

## 2024-10-29 PROCEDURE — 3044F HG A1C LEVEL LT 7.0%: CPT | Mod: CPTII,S$GLB,,

## 2024-10-29 PROCEDURE — 3008F BODY MASS INDEX DOCD: CPT | Mod: CPTII,S$GLB,,

## 2024-10-29 PROCEDURE — 3061F NEG MICROALBUMINURIA REV: CPT | Mod: CPTII,S$GLB,,

## 2024-10-29 PROCEDURE — 3066F NEPHROPATHY DOC TX: CPT | Mod: CPTII,S$GLB,,

## 2024-10-29 PROCEDURE — 90471 IMMUNIZATION ADMIN: CPT | Mod: S$GLB,,,

## 2024-10-29 PROCEDURE — 90656 IIV3 VACC NO PRSV 0.5 ML IM: CPT | Mod: S$GLB,,,

## 2024-10-29 RX ORDER — ACETAMINOPHEN 500 MG
1000 TABLET ORAL EVERY 6 HOURS PRN
Qty: 30 TABLET | Refills: 1 | Status: SHIPPED | OUTPATIENT
Start: 2024-10-29

## 2024-10-29 RX ORDER — ZOLPIDEM TARTRATE 5 MG/1
TABLET ORAL
Qty: 30 TABLET | Refills: 4 | Status: SHIPPED | OUTPATIENT
Start: 2024-10-29

## 2024-10-29 RX ORDER — DULAGLUTIDE 0.75 MG/.5ML
0.75 INJECTION, SOLUTION SUBCUTANEOUS
Qty: 12 PEN | Refills: 1 | Status: SHIPPED | OUTPATIENT
Start: 2024-10-29 | End: 2025-04-15

## 2024-11-02 ENCOUNTER — PATIENT MESSAGE (OUTPATIENT)
Dept: BARIATRICS | Facility: CLINIC | Age: 59
End: 2024-11-02
Payer: COMMERCIAL

## 2024-11-12 ENCOUNTER — PATIENT MESSAGE (OUTPATIENT)
Dept: BARIATRICS | Facility: CLINIC | Age: 59
End: 2024-11-12
Payer: COMMERCIAL

## 2024-12-03 ENCOUNTER — PATIENT MESSAGE (OUTPATIENT)
Dept: BARIATRICS | Facility: CLINIC | Age: 59
End: 2024-12-03
Payer: COMMERCIAL

## 2024-12-05 ENCOUNTER — TELEPHONE (OUTPATIENT)
Dept: FAMILY MEDICINE | Facility: CLINIC | Age: 59
End: 2024-12-05
Payer: COMMERCIAL

## 2024-12-05 NOTE — TELEPHONE ENCOUNTER
----- Message from Eric sent at 12/5/2024  3:02 PM CST -----  Regarding: self  Type: Patient Call Back    Who called:self    What is the request in detail:calling in regards of authorization of zolpidem (AMBIEN) 5 MG Ta for the pt, her insurance is faxing it over to be signed, she is wanting to get that medication authorized     Can the clinic reply by [facility]?callback    Would the patient rather a call back or a response via [facility]? callback    Best call back number:[phone]    Additional Information:

## 2024-12-10 ENCOUNTER — PATIENT MESSAGE (OUTPATIENT)
Dept: BARIATRICS | Facility: CLINIC | Age: 59
End: 2024-12-10
Payer: COMMERCIAL

## 2025-01-06 ENCOUNTER — PATIENT MESSAGE (OUTPATIENT)
Dept: BARIATRICS | Facility: CLINIC | Age: 60
End: 2025-01-06
Payer: COMMERCIAL

## 2025-01-14 ENCOUNTER — PATIENT MESSAGE (OUTPATIENT)
Dept: BARIATRICS | Facility: CLINIC | Age: 60
End: 2025-01-14
Payer: COMMERCIAL

## 2025-02-10 ENCOUNTER — PATIENT MESSAGE (OUTPATIENT)
Dept: BARIATRICS | Facility: CLINIC | Age: 60
End: 2025-02-10
Payer: COMMERCIAL

## 2025-02-24 DIAGNOSIS — R11.0 NAUSEA: ICD-10-CM

## 2025-02-24 RX ORDER — ONDANSETRON 8 MG/1
8 TABLET, ORALLY DISINTEGRATING ORAL EVERY 6 HOURS PRN
Qty: 30 TABLET | Refills: 1 | Status: SHIPPED | OUTPATIENT
Start: 2025-02-24

## 2025-02-24 RX ORDER — ONDANSETRON 8 MG/1
8 TABLET, ORALLY DISINTEGRATING ORAL EVERY 6 HOURS PRN
Qty: 30 TABLET | Refills: 1 | Status: CANCELLED | OUTPATIENT
Start: 2025-02-24

## 2025-02-24 NOTE — TELEPHONE ENCOUNTER
No care due was identified.  [employer] Embedded Care Due Messages. Reference number: [id number].   2/24/2025 5:26:56 PM CST

## 2025-02-24 NOTE — TELEPHONE ENCOUNTER
LOV 10/29/2024 has appointment scheduled for 3/7/2024  Last refill 7/30/2024    Patient requesting refill of Zofran.  Stated she is going on a cruise soon and wants to make sure she has the medication.  Please advise.

## 2025-02-24 NOTE — TELEPHONE ENCOUNTER
Care Due:                  Date            Visit Type   Department     Provider  --------------------------------------------------------------------------------                                EP -                              PRIMARY      [facility]  Last Visit: 10-      CARE ([facility])   MEDICINE       Aga Taylor                              ESTABLISHED   [facility]  Next Visit: 03-      PATIENT      MEDICINE       Aga Taylor                                                            Last  Test          Frequency    Reason                     Performed    Due Date  --------------------------------------------------------------------------------    HBA1C.......  6 months...  dulaglutide, metFORMIN...  10-   04-    [facility] Embedded Care Due Messages. Reference number: [id number].   2/24/2025 3:48:46 PM CST

## 2025-03-07 ENCOUNTER — OFFICE VISIT (OUTPATIENT)
Dept: FAMILY MEDICINE | Facility: CLINIC | Age: 60
End: 2025-03-07
Payer: COMMERCIAL

## 2025-03-07 VITALS
BODY MASS INDEX: 33.17 KG/M2 | TEMPERATURE: 98 F | OXYGEN SATURATION: 97 % | HEART RATE: 79 BPM | WEIGHT: 187.19 LBS | SYSTOLIC BLOOD PRESSURE: 128 MMHG | HEIGHT: 63 IN | DIASTOLIC BLOOD PRESSURE: 80 MMHG | RESPIRATION RATE: 16 BRPM

## 2025-03-07 DIAGNOSIS — Z90.3 S/P GASTRIC SLEEVE PROCEDURE: ICD-10-CM

## 2025-03-07 DIAGNOSIS — E11.22 CONTROLLED TYPE 2 DIABETES MELLITUS WITH STAGE 2 CHRONIC KIDNEY DISEASE, WITHOUT LONG-TERM CURRENT USE OF INSULIN: ICD-10-CM

## 2025-03-07 DIAGNOSIS — N18.2 CONTROLLED TYPE 2 DIABETES MELLITUS WITH STAGE 2 CHRONIC KIDNEY DISEASE, WITHOUT LONG-TERM CURRENT USE OF INSULIN: ICD-10-CM

## 2025-03-07 DIAGNOSIS — K21.9 GASTROESOPHAGEAL REFLUX DISEASE WITHOUT ESOPHAGITIS: ICD-10-CM

## 2025-03-07 DIAGNOSIS — F43.20 ABNORMAL GRIEF REACTION: Primary | ICD-10-CM

## 2025-03-07 PROCEDURE — 99999 PR PBB SHADOW E&M-EST. PATIENT-LVL V: CPT | Mod: PBBFAC,,,

## 2025-03-07 PROCEDURE — 1160F RVW MEDS BY RX/DR IN RCRD: CPT | Mod: CPTII,S$GLB,,

## 2025-03-07 PROCEDURE — 3079F DIAST BP 80-89 MM HG: CPT | Mod: CPTII,S$GLB,,

## 2025-03-07 PROCEDURE — 99214 OFFICE O/P EST MOD 30 MIN: CPT | Mod: S$GLB,,,

## 2025-03-07 PROCEDURE — 1159F MED LIST DOCD IN RCRD: CPT | Mod: CPTII,S$GLB,,

## 2025-03-07 PROCEDURE — 3008F BODY MASS INDEX DOCD: CPT | Mod: CPTII,S$GLB,,

## 2025-03-07 PROCEDURE — 3074F SYST BP LT 130 MM HG: CPT | Mod: CPTII,S$GLB,,

## 2025-03-07 PROCEDURE — 3072F LOW RISK FOR RETINOPATHY: CPT | Mod: CPTII,S$GLB,,

## 2025-03-07 RX ORDER — LORAZEPAM 1 MG/1
1 TABLET ORAL EVERY 8 HOURS PRN
Qty: 30 TABLET | Refills: 1 | Status: SHIPPED | OUTPATIENT
Start: 2025-03-07 | End: 2025-04-06

## 2025-03-07 RX ORDER — OMEPRAZOLE 40 MG/1
40 CAPSULE, DELAYED RELEASE ORAL DAILY
Qty: 90 CAPSULE | Refills: 3 | Status: SHIPPED | OUTPATIENT
Start: 2025-03-07 | End: 2026-03-07

## 2025-03-07 NOTE — PROGRESS NOTES
Health Maintenance Due   Topic     HIV Screening  Consult with PCp    COVID-19 Vaccine (6 - 2024-25 season)     Pneumococcal Vaccines (Age 50+) (3 of 3 - PCV20 or PCV21)     RSV Vaccine (Age 60+ and Pregnant patients) (1 - Risk 60-74 years 1-dose series) Not given at this facility

## 2025-03-08 NOTE — PROGRESS NOTES
Family Medicine     Patient name: Monalisa Carson  MRN: [id number]  : 1965  PCP NAME: Aga Taylor MD    Subjective     History of Present Illness:  Patient ID: Monalisa Carson is a 60 y.o. Black or  female presents to the clinic today. Chronic medical issues, if present, have been documented.   Active Problem List with Overview Notes    Diagnosis Date Noted    Dehydration 08/15/2024    History of stroke 2024    Superior mesenteric vein thrombosis 2024    S/P laparoscopic sleeve gastrectomy 2024    Obesity 2024    Seizures 2024    PATTERSON (dyspnea on exertion) 2022    Heart murmur 2022    Wears contact lenses 2021    Refractive error 2021    Severe obesity (BMI 35.0-39.9) with comorbidity 2021    Sal's paralysis (postepileptic) 2021    Occipital neuralgia of left side 2020    Tension headache, chronic 2019    Nonintractable epilepsy without status epilepticus 2019    Dysarthria 2019    Hemifacial spasm 2019    HLD (hyperlipidemia) 2013    Anxiety 2012    Essential hypertension, benign 2012    Controlled type 2 diabetes mellitus with stage 2 chronic kidney disease, without long-term current use of insulin 2012       Chief Complaint  Chief Complaint   Patient presents with    Follow-up    Anxiety     Presents today with terrible grief.  She has recently bereaved of her mother.  Mother has been laid to rest.  Anxious.  Tearful.   Has been finding it difficult to sleep, even with Ambien.  Requests a medication for anxiety    Medications   Medication List with Changes/Refills   New Medications    LORAZEPAM (ATIVAN) 1 MG TABLET    Take 1 tablet (1 mg total) by mouth every 8 (eight) hours as needed for Anxiety.   Current Medications    ACETAMINOPHEN (TYLENOL) 500 MG TABLET    Take 2 tablets (1,000 mg total) by mouth every 6 (six) hours as needed for Pain.     "ALBUTEROL (PROVENTIL/VENTOLIN HFA) 90 MCG/ACTUATION INHALER    Inhale 1-2 puffs into the lungs every 4 (four) hours as needed for Wheezing. Rescue    BENAZEPRIL (LOTENSIN) 40 MG TABLET    TAKE 1 TABLET(40 MG) BY MOUTH EVERY DAY    BUSPIRONE (BUSPAR) 10 MG TABLET    Take 1 tablet (10 mg total) by mouth 3 (three) times daily as needed (anxiety).    DICYCLOMINE (BENTYL) 20 MG TABLET    Take 1 tablet (20 mg total) by mouth 4 (four) times daily.    DULAGLUTIDE (TRULICITY) 0.75 MG/0.5 ML PEN INJECTOR    Inject 0.75 mg into the skin every 7 days.    ESCITALOPRAM OXALATE (LEXAPRO) 10 MG TABLET    Take 1 tablet (10 mg total) by mouth once daily.    FAMOTIDINE (PEPCID) 40 MG TABLET    Take 1 tablet (40 mg total) by mouth 2 (two) times daily.    FLUTICASONE PROPIONATE (FLONASE) 50 MCG/ACTUATION NASAL SPRAY    1 spray (50 mcg total) by Each Nostril route 2 (two) times daily.    GABAPENTIN (NEURONTIN) 100 MG CAPSULE    Take 100 mg by mouth 3 (three) times daily.    HYDROCHLOROTHIAZIDE (HYDRODIURIL) 12.5 MG TAB    Take 1 tablet (12.5 mg total) by mouth once daily.    HYDROCORTISONE (WESTCORT) 0.2 % CREAM    Apply topically 2 (two) times daily. for 5 days    HYDROCORTISONE 2.5 % CREAM    [place]ANG EXT AA BID    HYDROXYZINE HCL (ATARAX) 25 MG TABLET    Take 1 tablet (25 mg total) by mouth every 4 to 6 hours as needed for Itching (redness).    LEVETIRACETAM (KEPPRA) 1000 MG TABLET    TAKE 1 TABLET(1000 MG) BY MOUTH TWICE DAILY    LIDOCAINE (LMX) 4 % CREAM    Apply topically as needed.    METFORMIN (GLUCOPHAGE-XR) 500 MG ER 24HR TABLET    TAKE 1 TABLET(500 MG) BY MOUTH DAILY WITH BREAKFAST    ONDANSETRON (ZOFRAN-ODT) 8 MG TBDL    Take 1 tablet (8 mg total) by mouth every 6 (six) hours as needed.    PEN NEEDLE, DIABETIC 31 GAUGE X 5/16" NDLE    For use with bydureon    SUCRALFATE (CARAFATE) 100 MG/ML SUSPENSION    Take 10 mLs (1 g total) by mouth 2 (two) times daily.    ZOLPIDEM (AMBIEN) 5 MG TAB    TAKE 1 TABLET BY MOUTH IN THE EVENING " SPARINGLY AS NEEDED   Changed and/or Refilled Medications    Modified Medication Previous Medication    OMEPRAZOLE (PRILOSEC) 40 MG CAPSULE omeprazole (PRILOSEC) 40 MG capsule       Take 1 capsule (40 mg total) by mouth once daily.    Take 1 capsule (40 mg total) by mouth once daily.   Discontinued Medications    PANTOPRAZOLE (PROTONIX) 40 MG TABLET    Take 1 tablet (40 mg total) by mouth once daily.       Allergies  Review of patient's allergies indicates:   Allergen Reactions    Keflex [cephalexin] Hives    Vicodin [hydrocodone-acetaminophen] Itching     itch    Crestor [rosuvastatin]      Muscle pain     Past Medical history  Past Medical History:   Diagnosis Date    Allergy     Anxiety     Cataract     Diabetes mellitus     GERD (gastroesophageal reflux disease)     Heart murmur     Hyperlipidemia     Hypertension     Seizures     5-6 years ago, spontaneous          Surgical History  Past Surgical History:   Procedure Laterality Date    CHOLECYSTECTOMY      2001 april    COLONOSCOPY N/A 06/29/2021    Procedure: COLONOSCOPY;  Surgeon: Gustavo Pelletier MD;  Location: [facility];  Service: Endoscopy;  Laterality: N/A;  combined orders    ESOPHAGOGASTRODUODENOSCOPY N/A 06/29/2021    Procedure: ESOPHAGOGASTRODUODENOSCOPY (EGD);  Surgeon: Gustavo Pelletier MD;  Location: [facility];  Service: Endoscopy;  Laterality: N/A;  covdi +3/29/20, fully vaccinated 3/23/21, prep instr portal -ml    ESOPHAGOGASTRODUODENOSCOPY N/A 11/29/2023    Procedure: EGD (ESOPHAGOGASTRODUODENOSCOPY);  Surgeon: Maciej Fine MD;  Location: [facility] ENDO;  Service: Endoscopy;  Laterality: N/A;  referred by Cristhian RAHMAN, [facility] takes on Tuesdays will hold dose on Tuesday 11/28/23, instructions per stephaniesner.    ESOPHAGOGASTRODUODENOSCOPY N/A 9/17/2024    Procedure: EGD (ESOPHAGOGASTRODUODENOSCOPY);  Surgeon: Rohit Henderson MD;  Location: [facility] ENDO;  Service: Endoscopy;  Laterality: N/A;  8/16 ref by Cristhian Cuadra MD, instr. to Rafaela arellano  hold approval pending, Trulicity-instr. to hold x 8 days prior-st  ok to hold Eliquis 2 days per Dr Gomez-GT  9/10/241- pc complete. DBM    HYSTERECTOMY      partial  1996    LAPAROSCOPIC SLEEVE GASTRECTOMY N/A 04/04/2024    Procedure: GASTRECTOMY, SLEEVE, LAPAROSCOPIC with intraop EGD ([facility], please submit auth to insurance on or after 3/14/24);  Surgeon: Maciej Kennedy Jr., MD;  Location: [facility] OR [address] [place] [address];  Service: General;  Laterality: N/A;  with poss hiatal hernia repair    OOPHORECTOMY      SHOULDER SURGERY Right     WISDOM TOOTH EXTRACTION       Family History  Family History   Problem Relation Name Age of Onset    Diabetes Mother      Hyperlipidemia Mother      Hypertension Mother      Cataracts Mother      Diabetes Father      Hypertension Father      Stroke Father      Depression Sister      Hypertension Sister      Stroke Sister      No Known Problems Brother      Cancer Maternal Aunt          breast x 2    Breast cancer Maternal Aunt      Cancer Maternal Uncle          prostate x 2    Cancer Paternal Aunt          breast    Breast cancer Paternal Aunt      No Known Problems Paternal Uncle      No Known Problems Maternal Grandmother      Cancer Maternal Grandfather          lung    Early death Paternal Grandmother      Early death Paternal Grandfather      Esophageal cancer Paternal Grandfather      No Known Problems Other      Melanoma Neg Hx      Eczema Neg Hx      Lupus Neg Hx      Psoriasis Neg Hx      Amblyopia Neg Hx      Blindness Neg Hx      Glaucoma Neg Hx      Macular degeneration Neg Hx      Retinal detachment Neg Hx      Strabismus Neg Hx      Thyroid disease Neg Hx      Colon cancer Neg Hx       Social History  Social History[1]       Review of system     ROS  Negative except as mentioned above  Physical exam   Vital Signs  Vitals:    03/07/25 1057   BP: 128/80   BP Location: Right arm   Patient Position: Sitting   Pulse: 79   Resp: 16   Temp: 98.4 °F (36.9 °C)   TempSrc: Oral   SpO2: 97%  "  Weight: 84.9 kg (187 lb 2.7 oz)   Height: 5' 3" (1.6 m)       Physical Exam  Constitutional:       Appearance: Normal appearance.   Cardiovascular:      Rate and Rhythm: Normal rate and regular rhythm.      Pulses: Normal pulses.      Heart sounds: Normal heart sounds.   Pulmonary:      Effort: Pulmonary effort is normal. No respiratory distress.      Breath sounds: Normal breath sounds. No wheezing.   Abdominal:      General: Bowel sounds are normal. There is no distension.      Palpations: Abdomen is soft.      Tenderness: There is no abdominal tenderness.   Musculoskeletal:      Right lower leg: No edema.      Left lower leg: No edema.   Skin:     General: Skin is warm.   Neurological:      Mental Status: She is alert and oriented to person, place, and time.           Wt Readings from Last 3 Encounters:   03/07/25 84.9 kg (187 lb 2.7 oz)   10/29/24 85.5 kg (188 lb 7.9 oz)   10/08/24 84.9 kg (187 lb 2.7 oz)          Body mass index is 33.16 kg/m².      Laboratory data and other diagnostic findings     Lab Results   Component Value Date    WBC 5.58 10/08/2024    HGB 13.7 10/08/2024    HCT 41.4 10/08/2024    MCV 93 10/08/2024     10/08/2024         Lab Results   Component Value Date    CREATININE 0.9 10/08/2024    BUN 14 10/08/2024     10/08/2024    K 4.2 10/08/2024     10/08/2024    CO2 29 10/08/2024         Assessment and plan       Abnormal grief reaction  Grief reaction.  Short-term trial of short-acting benzodiazepines.  -     LORazepam (ATIVAN) 1 MG tablet; Take 1 tablet (1 mg total) by mouth every 8 (eight) hours as needed for Anxiety.  Dispense: 30 tablet; Refill: 1    Gastroesophageal reflux disease without esophagitis  -     omeprazole (PRILOSEC) 40 MG capsule; Take 1 capsule (40 mg total) by mouth once daily.  Dispense: 90 capsule; Refill: 3    S/P gastric sleeve procedure  -     omeprazole (PRILOSEC) 40 MG capsule; Take 1 capsule (40 mg total) by mouth once daily.  Dispense: 90 " capsule; Refill: 3    Controlled type 2 diabetes mellitus with stage 2 chronic kidney disease, without long-term current use of insulin           Problem List Items Addressed This Visit       Controlled type 2 diabetes mellitus with stage 2 chronic kidney disease, without long-term current use of insulin     Other Visit Diagnoses         Abnormal grief reaction    -  Primary    Relevant Medications    LORazepam (ATIVAN) 1 MG tablet      Gastroesophageal reflux disease without esophagitis        Relevant Medications    omeprazole (PRILOSEC) 40 MG capsule      S/P gastric sleeve procedure        Relevant Medications    omeprazole (PRILOSEC) 40 MG capsule                Medications Administered this visit       Future Appointments  Future Appointments   Date Time Provider Department Center   4/24/2025  9:00 AM LAB, APPOINTMENT [facility]   4/29/2025 10:00 AM Tracy Grimes RD [facility]   4/29/2025 10:30 AM Cristhian Cuadra PA-C [facility]   7/8/2025  9:00 [facility] [place]         No follow-ups on file. and PRN.      Aga Taylor MD    This office note was created by combination of typed  and MModal dictation.  Transcription errors may be present.  If there are any questions, please contact me.               [1]   Social History  Tobacco Use    Smoking status: Former     Current packs/day: 0.00     Types: Cigarettes    Smokeless tobacco: Never   Substance Use Topics    Alcohol use: Not Currently    Drug use: No

## 2025-03-10 ENCOUNTER — PATIENT MESSAGE (OUTPATIENT)
Dept: BARIATRICS | Facility: CLINIC | Age: 60
End: 2025-03-10
Payer: COMMERCIAL

## 2025-04-02 ENCOUNTER — TELEPHONE (OUTPATIENT)
Dept: PHARMACY | Facility: CLINIC | Age: 60
End: 2025-04-02
Payer: COMMERCIAL

## 2025-04-02 NOTE — TELEPHONE ENCOUNTER
[facility]/Population Health Chart Review & Patient Outreach Details For Medication Adherence Project    Reason for Outreach Encounter: 3rd Party payor non-compliance report ([employer], [employer], [employer], etc)  2.  Patient Outreach Method: Reviewed patient chart   3.   Medication in question:    Diabetes Medications              dulaglutide (TRULICITY) 0.75 mg/0.5 mL pen injector Inject 0.75 mg into the skin every 7 days.    metFORMIN (GLUCOPHAGE-XR) 500 MG ER 24hr tablet TAKE 1 TABLET(500 MG) BY MOUTH DAILY WITH BREAKFAST                 Trulicity LF 28 ds 3/28/25  4.  Reviewed and or Updates Made To: Patient Chart  5. Outreach Outcomes and/or actions taken: Patient filled medication and is on track to be adherent  Additional Notes:

## 2025-04-08 ENCOUNTER — PATIENT MESSAGE (OUTPATIENT)
Dept: BARIATRICS | Facility: CLINIC | Age: 60
End: 2025-04-08
Payer: COMMERCIAL

## 2025-04-24 ENCOUNTER — LAB VISIT (OUTPATIENT)
Dept: LAB | Facility: HOSPITAL | Age: 60
End: 2025-04-24
Payer: COMMERCIAL

## 2025-04-24 DIAGNOSIS — Z98.84 S/P LAPAROSCOPIC SLEEVE GASTRECTOMY: ICD-10-CM

## 2025-04-24 DIAGNOSIS — N18.2 CONTROLLED TYPE 2 DIABETES MELLITUS WITH STAGE 2 CHRONIC KIDNEY DISEASE, WITHOUT LONG-TERM CURRENT USE OF INSULIN: ICD-10-CM

## 2025-04-24 DIAGNOSIS — R11.0 NAUSEA: ICD-10-CM

## 2025-04-24 DIAGNOSIS — K21.9 GASTROESOPHAGEAL REFLUX DISEASE WITHOUT ESOPHAGITIS: ICD-10-CM

## 2025-04-24 DIAGNOSIS — I10 HYPERTENSION, UNSPECIFIED TYPE: ICD-10-CM

## 2025-04-24 DIAGNOSIS — E11.22 CONTROLLED TYPE 2 DIABETES MELLITUS WITH STAGE 2 CHRONIC KIDNEY DISEASE, WITHOUT LONG-TERM CURRENT USE OF INSULIN: ICD-10-CM

## 2025-04-24 LAB
25(OH)D3+25(OH)D2 SERPL-MCNC: 44 NG/ML (ref 30–96)
ABSOLUTE EOSINOPHIL (OHS): 0.06 K/UL
ABSOLUTE MONOCYTE (OHS): 0.42 K/UL (ref 0.3–1)
ABSOLUTE NEUTROPHIL COUNT (OHS): 1.81 K/UL (ref 1.8–7.7)
ALBUMIN SERPL BCP-MCNC: 3.7 G/DL (ref 3.5–5.2)
ALP SERPL-CCNC: 105 UNIT/L (ref 40–150)
ALT SERPL W/O P-5'-P-CCNC: 18 UNIT/L (ref 10–44)
ANION GAP (OHS): 9 MMOL/L (ref 8–16)
AST SERPL-CCNC: 20 UNIT/L (ref 11–45)
BASOPHILS # BLD AUTO: 0.02 K/UL
BASOPHILS NFR BLD AUTO: 0.4 %
BILIRUB SERPL-MCNC: 0.7 MG/DL (ref 0.1–1)
BUN SERPL-MCNC: 14 MG/DL (ref 6–20)
CALCIUM SERPL-MCNC: 10.2 MG/DL (ref 8.7–10.5)
CHLORIDE SERPL-SCNC: 105 MMOL/L (ref 95–110)
CHOLEST SERPL-MCNC: 268 MG/DL (ref 120–199)
CHOLEST/HDLC SERPL: 4.3 {RATIO} (ref 2–5)
CO2 SERPL-SCNC: 29 MMOL/L (ref 23–29)
CREAT SERPL-MCNC: 0.8 MG/DL (ref 0.5–1.4)
ERYTHROCYTE [DISTWIDTH] IN BLOOD BY AUTOMATED COUNT: 13.6 % (ref 11.5–14.5)
GFR SERPLBLD CREATININE-BSD FMLA CKD-EPI: >60 ML/MIN/1.73/M2
GLUCOSE SERPL-MCNC: 92 MG/DL (ref 70–110)
HCT VFR BLD AUTO: 45.1 % (ref 37–48.5)
HDLC SERPL-MCNC: 62 MG/DL (ref 40–75)
HDLC SERPL: 23.1 % (ref 20–50)
HGB BLD-MCNC: 14.3 GM/DL (ref 12–16)
IMM GRANULOCYTES # BLD AUTO: 0.01 K/UL (ref 0–0.04)
IMM GRANULOCYTES NFR BLD AUTO: 0.2 % (ref 0–0.5)
IRON SATN MFR SERPL: 24 % (ref 20–50)
IRON SERPL-MCNC: 90 UG/DL (ref 30–160)
LDLC SERPL CALC-MCNC: 178 MG/DL (ref 63–159)
LYMPHOCYTES # BLD AUTO: 2.94 K/UL (ref 1–4.8)
MCH RBC QN AUTO: 29.4 PG (ref 27–31)
MCHC RBC AUTO-ENTMCNC: 31.7 G/DL (ref 32–36)
MCV RBC AUTO: 93 FL (ref 82–98)
NONHDLC SERPL-MCNC: 206 MG/DL
NUCLEATED RBC (/100WBC) (OHS): 0 /100 WBC
PLATELET # BLD AUTO: 398 K/UL (ref 150–450)
PMV BLD AUTO: 9.7 FL (ref 9.2–12.9)
POTASSIUM SERPL-SCNC: 4.8 MMOL/L (ref 3.5–5.1)
PROT SERPL-MCNC: 8.3 GM/DL (ref 6–8.4)
RBC # BLD AUTO: 4.86 M/UL (ref 4–5.4)
RELATIVE EOSINOPHIL (OHS): 1.1 %
RELATIVE LYMPHOCYTE (OHS): 55.9 % (ref 18–48)
RELATIVE MONOCYTE (OHS): 8 % (ref 4–15)
RELATIVE NEUTROPHIL (OHS): 34.4 % (ref 38–73)
SODIUM SERPL-SCNC: 143 MMOL/L (ref 136–145)
TIBC SERPL-MCNC: 377 UG/DL (ref 250–450)
TRANSFERRIN SERPL-MCNC: 255 MG/DL (ref 200–375)
TRIGL SERPL-MCNC: 140 MG/DL (ref 30–150)
VIT B12 SERPL-MCNC: 1685 PG/ML (ref 210–950)
WBC # BLD AUTO: 5.26 K/UL (ref 3.9–12.7)

## 2025-04-24 PROCEDURE — 82607 VITAMIN B-12: CPT

## 2025-04-24 PROCEDURE — 82306 VITAMIN D 25 HYDROXY: CPT

## 2025-04-24 PROCEDURE — 84466 ASSAY OF TRANSFERRIN: CPT

## 2025-04-24 PROCEDURE — 85025 COMPLETE CBC W/AUTO DIFF WBC: CPT

## 2025-04-24 PROCEDURE — 84425 ASSAY OF VITAMIN B-1: CPT

## 2025-04-24 PROCEDURE — 80061 LIPID PANEL: CPT

## 2025-04-24 PROCEDURE — 84460 ALANINE AMINO (ALT) (SGPT): CPT

## 2025-04-24 PROCEDURE — 36415 COLL VENOUS BLD VENIPUNCTURE: CPT

## 2025-04-29 ENCOUNTER — CLINICAL SUPPORT (OUTPATIENT)
Dept: BARIATRICS | Facility: CLINIC | Age: 60
End: 2025-04-29
Payer: COMMERCIAL

## 2025-04-29 ENCOUNTER — OFFICE VISIT (OUTPATIENT)
Dept: BARIATRICS | Facility: CLINIC | Age: 60
End: 2025-04-29
Payer: COMMERCIAL

## 2025-04-29 ENCOUNTER — PATIENT MESSAGE (OUTPATIENT)
Dept: BARIATRICS | Facility: CLINIC | Age: 60
End: 2025-04-29

## 2025-04-29 VITALS
OXYGEN SATURATION: 98 % | HEIGHT: 63 IN | WEIGHT: 186.31 LBS | DIASTOLIC BLOOD PRESSURE: 72 MMHG | HEART RATE: 62 BPM | SYSTOLIC BLOOD PRESSURE: 110 MMHG | BODY MASS INDEX: 33.01 KG/M2

## 2025-04-29 VITALS — WEIGHT: 186.31 LBS | BODY MASS INDEX: 33 KG/M2

## 2025-04-29 DIAGNOSIS — Z71.3 DIETARY COUNSELING AND SURVEILLANCE: Primary | ICD-10-CM

## 2025-04-29 DIAGNOSIS — Z98.84 S/P LAPAROSCOPIC SLEEVE GASTRECTOMY: ICD-10-CM

## 2025-04-29 DIAGNOSIS — E11.9 TYPE 2 DIABETES MELLITUS WITHOUT COMPLICATION, WITHOUT LONG-TERM CURRENT USE OF INSULIN: ICD-10-CM

## 2025-04-29 DIAGNOSIS — Z98.84 S/P LAPAROSCOPIC SLEEVE GASTRECTOMY: Primary | ICD-10-CM

## 2025-04-29 DIAGNOSIS — E78.5 HYPERLIPIDEMIA, UNSPECIFIED HYPERLIPIDEMIA TYPE: ICD-10-CM

## 2025-04-29 DIAGNOSIS — R11.0 NAUSEA: ICD-10-CM

## 2025-04-29 DIAGNOSIS — I10 HYPERTENSION, UNSPECIFIED TYPE: ICD-10-CM

## 2025-04-29 DIAGNOSIS — E66.9 OBESITY (BMI 30-39.9): ICD-10-CM

## 2025-04-29 PROCEDURE — 97803 MED NUTRITION INDIV SUBSEQ: CPT | Mod: S$GLB,,, | Performed by: DIETITIAN, REGISTERED

## 2025-04-29 PROCEDURE — 99999 PR PBB SHADOW E&M-EST. PATIENT-LVL I: CPT | Mod: PBBFAC,,, | Performed by: DIETITIAN, REGISTERED

## 2025-04-29 PROCEDURE — 99999 PR PBB SHADOW E&M-EST. PATIENT-LVL V: CPT | Mod: PBBFAC,,, | Performed by: PHYSICIAN ASSISTANT

## 2025-04-29 RX ORDER — ONDANSETRON 8 MG/1
8 TABLET, ORALLY DISINTEGRATING ORAL EVERY 6 HOURS PRN
Qty: 30 TABLET | Refills: 1 | Status: SHIPPED | OUTPATIENT
Start: 2025-04-29

## 2025-04-29 NOTE — PATIENT INSTRUCTIONS
"Snacks: (100-200 calories; >5g protein)    - 1 low-fat cheese stick with 8 cherry tomatoes or 1 serving fresh fruit  - 4 thin slices fat-free turkey breast and 1 slice low-fat cheese  - 4 thin slices fat-free honey ham with wedge of melon  - 2 slices of turkey pool  - Boiled eggs (can buy at [employer] already boiled w/ shell removed)  - for convenience,  [place] read, snack, go (deli meat and cheese rolls)  - P3 packets (Protein packs w/ cheese, nuts, lean deli meat)  - MHP Fit and Lean Protein Pudding (find at [employer] - per 1 cup serving = 100 calories, 15 g protein, 0 g sugar)  - 6-8 edamame pods (equivalent to about 1/4 cup edamame without pods).   - 1/4 cup unsalted nuts with ½ cup fruit  - 6-oz container Dannon Light n Fit vanilla yogurt, topped with 1oz unsalted nuts         - apple, celery or baby carrots spread with 2 Tbsp PB2  - apple slices with 1 oz slice low-fat cheese  - Apple slices dipped in 2 Tbsp of PB2  - 2 Tbsp PB2 mixed in light or greek yogurt or sugar-free pudding  - celery, cucumber, bell pepper or baby carrots dipped in ¼ cup hummus bean spread   - celery, cucumber, baby carrots dipped in high protein greek yogurt (Mix 16 oz plain greek yogurt + 1 packet of hidden valley ranch dip mix)  - Luis Armando Links Beef Steak - 14g protein! (similar to beef jerky but very lean)  - 2 wedges Laughing Cow - Light Herb & Garlic Cheese with sliced cucumber or green bell pepper  - 1/2 cup low-fat cottage cheese with ¼ cup fruit or ¼ cup salsa  - 1/2 cup low fat cottage cheese with 10-15 cherry tomatoes  - 8 oz glass of FAIRLIFE fat free milk (13 g protein)  - 8 oz glass of FAIRLIFE fat free milk + 1 packet of sugar-free hot cocoa  - Add Atkins advantage Cafe Caramel shake to decaf coffee. Serve hot or blend with ice for "frappaccino" like drink  - RTD Protein drinks: Atkins, Low Carb Slim Fast, EAS light, Muscle Milk Light, etc.  - Homemade Protein drinks: GNC Soy95, Isopure, Nectar, UNJURY, Whey " Gourmet, etc. Mix 1 scoop powder with 8oz skim/1% milk or light soymilk.  - Protein bars: Atkins, EAS, Pure Protein,  Quest, Think Thin, Detour, etc. Must have 0-4 grams sugar - Read the label.    ** Be CREATIVE. You can always snack on bites of grilled chicken or tuna salad made with low fat rinaldi, if needed!

## 2025-04-29 NOTE — PROGRESS NOTES
NUTRITION NOTE    Referring Physician: Apoorva Weaver M.D.  Reason for MNT Referral: Follow-up 1 years s/p laparoscopic sleeve gastrectomy    PAST MEDICAL HISTORY:    Denies vomiting and diarrhea.  Reports problems, including nausea and constipation . PT reports she is out of Zofran, takes Miralax.    PT reports having lost mother three months ago, not having an appetite during the day, snacking at night.    Past Medical History:   Diagnosis Date    Allergy     Anxiety     Cataract     Diabetes mellitus     GERD (gastroesophageal reflux disease)     Heart murmur     Hyperlipidemia     Hypertension     Seizures     5-6 years ago, spontaneous       CLINICAL DATA:  60 y.o.-year-old Black or  female.    Current Weight: 186 lbs  BMI: 33.00  Total Weight Loss: 36 lbs  Excess Weight Loss: 41 %    CURRENT DIET:  Irregular  Diet.    Diet Recall: 26-30 grams of protein/day; 64 oz of fluids/day     B: Coffee  May eat a sandwich from [employer] (chicken, cold cuts) throughout  D: Fixes dinner for kids but does not eat most of the time - spaghetti and meat sauce, red beans, cabbage  S: Chips     Meal Pattern: Irregular  Adequate protein supplement intake. [employer] 3 x week  Inadequate dairy intake. Cheese (rolled up with salami)  Inadequate vegetable intake. 2-2 x week - corn, green beans, peas  Inadequate fruit intake. 2 x week - bananas, oranges, mandarin oranges, grapes, cantaloupe, watermelon  Starchy CHO: corn, sweet peas  Beverages: Water (2 bottled) with Crystal Light, Gatorade Zero, zero sugar sodas (2 bottles)    LABS    VITAMINS / MINERALS:  Women's One a Day Multivitamin with 18 mg iron 1 x day  Calcium Carbonate 600 mg with vitamin D  2 x day  EZ Melt Vitamin B-12 sublingual 2500 mcg 1 x week  EZ Melt B-1/with 25 mg thiamine 2 x day  EZ Melt biotin    EXERCISE:  Walking in park 3 x week, 1 mile.  Biking    ASSESSMENT:  Doing poorly overall.  Little weight loss.  Inadequate  calorie intake.  Inadequate protein intake.  Adequate fluid intake.  Following diet inappropriately.  Exercising.  Adequate vitamins & minerals.    BARIATRIC DIET DISCUSSION:  Instructed and provided written materials on bariatric diet plan.  Reinforced post-op nutrition guidelines.    PLAN / RECOMMENDATIONS:  Back on track with diet plan.  Increase protein intake.  Maintain fluid intake.  Continue exercise. Add strength training  Continue appropriate vitamins & minerals.    Return to clinic in 1 years.    SESSION TIME: 30 minutes

## 2025-04-29 NOTE — PROGRESS NOTES
BARIATRIC POST-OPERATIVE VISIT:    HPI:  Monalisa Carson is a 60 y.o. year old female presents for 12 month post op   States that she is doing well no complaints   Still cannot eat very much     Review of Systems   Constitutional:  Negative for fatigue and fever.   HENT:  Negative for tinnitus and trouble swallowing.    Eyes:  Negative for visual disturbance.   Respiratory:  Negative for chest tightness and shortness of breath.    Cardiovascular:  Negative for chest pain, palpitations and leg swelling.   Gastrointestinal:  Negative for abdominal pain, constipation, diarrhea, nausea and vomiting.   Genitourinary:  Negative for decreased urine volume.   Skin:  Negative for rash.   Neurological:  Negative for weakness, light-headedness and headaches.   Psychiatric/Behavioral:  Negative for dysphoric mood, self-injury and sleep disturbance. The patient is not nervous/anxious.        EXERCISE & VITAMINS:  See Bariatric Assessment  Adherent to vitamins   MEDICATIONS/ALLERGIES:  Have been reviewed.    DIET: See Dietician note from today for a more detailed assessment.    Maybe 3 times   Corn chips i    Physical Exam  Constitutional:       Appearance: Normal appearance.   HENT:      Head: Normocephalic and atraumatic.   Eyes:      Extraocular Movements: Extraocular movements intact.      Conjunctiva/sclera: Conjunctivae normal.      Pupils: Pupils are equal, round, and reactive to light.   Cardiovascular:      Rate and Rhythm: Normal rate and regular rhythm.      Pulses: Normal pulses.      Heart sounds: Normal heart sounds.   Pulmonary:      Effort: Pulmonary effort is normal. No respiratory distress.      Breath sounds: Normal breath sounds.   Abdominal:      General: Bowel sounds are normal.      Palpations: Abdomen is soft.      Tenderness: There is no abdominal tenderness.   Musculoskeletal:      Cervical back: Normal range of motion and neck supple.   Skin:     Capillary Refill: Capillary refill takes less than 2  seconds.   Neurological:      General: No focal deficit present.      Mental Status: She is alert and oriented to person, place, and time.   Psychiatric:         Mood and Affect: Mood normal.         Behavior: Behavior normal.         Thought Content: Thought content normal.         Judgment: Judgment normal.         ASSESSMENT:  - Morbid obesity s/p sleeve gastrectomy   - Co-morbidities: diabetes mellitus, dyslipidemia, and hypertension  - Good Weight loss, 35#'s and 40% EWL  - No formal Exercise routine  - Fair Diet  - Good Vitamin regimen    PLAN:  - No lifting more than 10 lbs for 6 weeks  - Miralax daily for constipation  - Emphasized the importance of regular exercise and adherence to bariatric diet to achieve maximum weight loss.  - Encouraged patient to start/continue regular exercise.  - Follow-up with dietician to advance diet.  - Continue daily vitamins and medications.  - RTC   - Call the office for any issues.  - Check labs today.

## 2025-05-01 ENCOUNTER — TELEPHONE (OUTPATIENT)
Dept: PHARMACY | Facility: CLINIC | Age: 60
End: 2025-05-01
Payer: COMMERCIAL

## 2025-05-01 NOTE — TELEPHONE ENCOUNTER
[facility]/Population Health Chart Review & Patient Outreach Details For Medication Adherence Project    Reason for Outreach Encounter: 3rd Party payor non-compliance report ([employer], BCBS, C, etc)  2.  Patient Outreach Method: Reviewed patient chart  and [employer] message  3.   Medication in question:    Diabetes Medications              metFORMIN (GLUCOPHAGE-XR) 500 MG ER 24hr tablet TAKE 1 TABLET(500 MG) BY MOUTH DAILY WITH BREAKFAST                 trulicity  last filled  3/28 for 28 day supply      4.  Reviewed and or Updates Made To: Patient Chart  5. Outreach Outcomes and/or actions taken: Sent inquiry to patient: Waiting for response  Additional Notes:

## 2025-05-08 DIAGNOSIS — F51.01 PRIMARY INSOMNIA: ICD-10-CM

## 2025-05-08 RX ORDER — ZOLPIDEM TARTRATE 5 MG/1
TABLET ORAL
Qty: 30 TABLET | Refills: 4 | Status: SHIPPED | OUTPATIENT
Start: 2025-05-08

## 2025-05-08 NOTE — TELEPHONE ENCOUNTER
Care Due:                  Date            Visit Type   Department     Provider  --------------------------------------------------------------------------------                                ESTABLISHED   [facility]  Last Visit: 03-      PATIENT      MEDICINE       Aga Taylor                              EP -                              PRIMARY      [facility]  Next Visit: 07-      CARE ([facility])   MEDICINE       Aga Taylor                                                            Last  Test          Frequency    Reason                     Performed    Due Date  --------------------------------------------------------------------------------    HBA1C.......  6 months...  metFORMIN................  10-   04-    [facility] Embedded Care Due Messages. Reference number: [id number].   5/08/2025 7:29:50 AM CDT

## 2025-05-13 ENCOUNTER — PATIENT MESSAGE (OUTPATIENT)
Dept: BARIATRICS | Facility: CLINIC | Age: 60
End: 2025-05-13
Payer: COMMERCIAL

## 2025-06-03 ENCOUNTER — PATIENT MESSAGE (OUTPATIENT)
Dept: BARIATRICS | Facility: CLINIC | Age: 60
End: 2025-06-03
Payer: COMMERCIAL

## 2025-06-13 DIAGNOSIS — E11.22 CONTROLLED TYPE 2 DIABETES MELLITUS WITH STAGE 2 CHRONIC KIDNEY DISEASE, WITHOUT LONG-TERM CURRENT USE OF INSULIN: ICD-10-CM

## 2025-06-13 DIAGNOSIS — N18.2 CONTROLLED TYPE 2 DIABETES MELLITUS WITH STAGE 2 CHRONIC KIDNEY DISEASE, WITHOUT LONG-TERM CURRENT USE OF INSULIN: ICD-10-CM

## 2025-06-13 RX ORDER — METFORMIN HYDROCHLORIDE 500 MG/1
500 TABLET, EXTENDED RELEASE ORAL DAILY
Qty: 90 TABLET | Refills: 3 | Status: SHIPPED | OUTPATIENT
Start: 2025-06-13

## 2025-06-13 NOTE — TELEPHONE ENCOUNTER
No care due was identified.  [facility] Embedded Care Due Messages. Reference number: [id number].   6/13/2025 2:27:45 PM CDT

## 2025-07-07 ENCOUNTER — PATIENT MESSAGE (OUTPATIENT)
Dept: BARIATRICS | Facility: CLINIC | Age: 60
End: 2025-07-07
Payer: COMMERCIAL

## 2025-07-08 ENCOUNTER — OFFICE VISIT (OUTPATIENT)
Dept: FAMILY MEDICINE | Facility: CLINIC | Age: 60
End: 2025-07-08
Payer: COMMERCIAL

## 2025-07-08 ENCOUNTER — PATIENT MESSAGE (OUTPATIENT)
Dept: BARIATRICS | Facility: CLINIC | Age: 60
End: 2025-07-08
Payer: COMMERCIAL

## 2025-07-08 ENCOUNTER — LAB VISIT (OUTPATIENT)
Dept: LAB | Facility: HOSPITAL | Age: 60
End: 2025-07-08
Payer: COMMERCIAL

## 2025-07-08 VITALS
DIASTOLIC BLOOD PRESSURE: 88 MMHG | RESPIRATION RATE: 18 BRPM | BODY MASS INDEX: 34.96 KG/M2 | TEMPERATURE: 99 F | HEART RATE: 64 BPM | SYSTOLIC BLOOD PRESSURE: 120 MMHG | OXYGEN SATURATION: 98 % | WEIGHT: 197.31 LBS | HEIGHT: 63 IN

## 2025-07-08 DIAGNOSIS — E11.65 TYPE 2 DIABETES MELLITUS WITH HYPERGLYCEMIA, WITHOUT LONG-TERM CURRENT USE OF INSULIN: ICD-10-CM

## 2025-07-08 DIAGNOSIS — R56.9 SEIZURES: ICD-10-CM

## 2025-07-08 DIAGNOSIS — E78.2 MIXED HYPERLIPIDEMIA: ICD-10-CM

## 2025-07-08 DIAGNOSIS — N18.2 CONTROLLED TYPE 2 DIABETES MELLITUS WITH STAGE 2 CHRONIC KIDNEY DISEASE, WITHOUT LONG-TERM CURRENT USE OF INSULIN: ICD-10-CM

## 2025-07-08 DIAGNOSIS — E11.65 TYPE 2 DIABETES MELLITUS WITH HYPERGLYCEMIA, WITHOUT LONG-TERM CURRENT USE OF INSULIN: Primary | ICD-10-CM

## 2025-07-08 DIAGNOSIS — G83.84 TODD'S PARALYSIS (POSTEPILEPTIC): ICD-10-CM

## 2025-07-08 DIAGNOSIS — E11.22 CONTROLLED TYPE 2 DIABETES MELLITUS WITH STAGE 2 CHRONIC KIDNEY DISEASE, WITHOUT LONG-TERM CURRENT USE OF INSULIN: ICD-10-CM

## 2025-07-08 DIAGNOSIS — E66.01 SEVERE OBESITY (BMI 35.0-39.9) WITH COMORBIDITY: ICD-10-CM

## 2025-07-08 DIAGNOSIS — G51.32 HEMIFACIAL SPASM OF LEFT SIDE OF FACE: ICD-10-CM

## 2025-07-08 LAB
EAG (OHS): 117 MG/DL (ref 68–131)
HBA1C MFR BLD: 5.7 % (ref 4–5.6)

## 2025-07-08 PROCEDURE — 99999 PR PBB SHADOW E&M-EST. PATIENT-LVL V: CPT | Mod: PBBFAC,,,

## 2025-07-08 PROCEDURE — 99214 OFFICE O/P EST MOD 30 MIN: CPT | Mod: S$GLB,,,

## 2025-07-08 PROCEDURE — G2211 COMPLEX E/M VISIT ADD ON: HCPCS | Mod: S$GLB,,,

## 2025-07-08 PROCEDURE — 83036 HEMOGLOBIN GLYCOSYLATED A1C: CPT

## 2025-07-08 PROCEDURE — 3044F HG A1C LEVEL LT 7.0%: CPT | Mod: CPTII,S$GLB,,

## 2025-07-08 PROCEDURE — 1160F RVW MEDS BY RX/DR IN RCRD: CPT | Mod: CPTII,S$GLB,,

## 2025-07-08 PROCEDURE — 1159F MED LIST DOCD IN RCRD: CPT | Mod: CPTII,S$GLB,,

## 2025-07-08 PROCEDURE — 3074F SYST BP LT 130 MM HG: CPT | Mod: CPTII,S$GLB,,

## 2025-07-08 PROCEDURE — 3079F DIAST BP 80-89 MM HG: CPT | Mod: CPTII,S$GLB,,

## 2025-07-08 PROCEDURE — 36415 COLL VENOUS BLD VENIPUNCTURE: CPT | Mod: PO

## 2025-07-08 PROCEDURE — 3008F BODY MASS INDEX DOCD: CPT | Mod: CPTII,S$GLB,,

## 2025-07-08 PROCEDURE — 3072F LOW RISK FOR RETINOPATHY: CPT | Mod: CPTII,S$GLB,,

## 2025-07-08 RX ORDER — ATORVASTATIN CALCIUM 40 MG/1
40 TABLET, FILM COATED ORAL NIGHTLY
Qty: 90 TABLET | Refills: 0 | Status: SHIPPED | OUTPATIENT
Start: 2025-07-08 | End: 2025-10-06

## 2025-07-08 RX ORDER — SEMAGLUTIDE 0.68 MG/ML
0.25 INJECTION, SOLUTION SUBCUTANEOUS
Qty: 1.5 ML | Refills: 2 | Status: SHIPPED | OUTPATIENT
Start: 2025-07-08 | End: 2025-09-30

## 2025-07-08 NOTE — PROGRESS NOTES
Family Medicine     Patient name: Monalisa Carson  MRN: [id number]  : 1965  PCP NAME: Aga Taylor MD    Subjective     History of Present Illness:  Patient ID: Monalisa Carson is a 60 y.o. Black or  female presents to the clinic today. Chronic medical issues, if present, have been documented.   Active Problem List with Overview Notes    Diagnosis Date Noted    Dehydration 08/15/2024    History of stroke 2024    Superior mesenteric vein thrombosis 2024    S/P laparoscopic sleeve gastrectomy 2024    Obesity 2024    Seizures 2024    PATTERSON (dyspnea on exertion) 2022    Heart murmur 2022    Wears contact lenses 2021    Refractive error 2021    Severe obesity (BMI 35.0-39.9) with comorbidity 2021    Sal's paralysis (postepileptic) 2021    Occipital neuralgia of left side 2020    Tension headache, chronic 2019    Nonintractable epilepsy without status epilepticus 2019    Dysarthria 2019    Hemifacial spasm 2019    HLD (hyperlipidemia) 2013    Anxiety 2012    Essential hypertension, benign 2012    Controlled type 2 diabetes mellitus with stage 2 chronic kidney disease, without long-term current use of insulin 2012       Chief Complaint  Chief Complaint   Patient presents with    Follow-up    Anxiety     Still dealing with the lost of her mother    Depression       Still grieving over the loss of her mother.  Still Compliant with Keppra for abnormal neurologic activity.  Suspected to be hemifacial spasms versus seizures.  When she stops taking Keppra, mouth twitching restarts.  Has noticed some twitching in her lower extremity.  Dissatisfied with degree of weight gain.  She wants to try Ozempic      Medications   Medication List with Changes/Refills   New Medications    ATORVASTATIN (LIPITOR) 40 MG TABLET    Take 1 tablet (40 mg total) by mouth every  evening.    SEMAGLUTIDE (OZEMPIC) 0.25 MG OR 0.5 MG (2 MG/3 ML) PEN INJECTOR    Inject 0.25 mg into the skin every 7 days.   Current Medications    ACETAMINOPHEN (TYLENOL) 500 MG TABLET    Take 2 tablets (1,000 mg total) by mouth every 6 (six) hours as needed for Pain.    ALBUTEROL (PROVENTIL/VENTOLIN HFA) 90 MCG/ACTUATION INHALER    Inhale 1-2 puffs into the lungs every 4 (four) hours as needed for Wheezing. Rescue    BENAZEPRIL (LOTENSIN) 40 MG TABLET    TAKE 1 TABLET(40 MG) BY MOUTH EVERY DAY    BUSPIRONE (BUSPAR) 10 MG TABLET    Take 1 tablet (10 mg total) by mouth 3 (three) times daily as needed (anxiety).    DICYCLOMINE (BENTYL) 20 MG TABLET    Take 1 tablet (20 mg total) by mouth 4 (four) times daily.    ESCITALOPRAM OXALATE (LEXAPRO) 10 MG TABLET    Take 1 tablet (10 mg total) by mouth once daily.    FAMOTIDINE (PEPCID) 40 MG TABLET    Take 1 tablet (40 mg total) by mouth 2 (two) times daily.    FLUTICASONE PROPIONATE (FLONASE) 50 MCG/ACTUATION NASAL SPRAY    1 spray (50 mcg total) by Each Nostril route 2 (two) times daily.    GABAPENTIN (NEURONTIN) 100 MG CAPSULE    Take 100 mg by mouth 3 (three) times daily.    HYDROCHLOROTHIAZIDE (HYDRODIURIL) 12.5 MG TAB    Take 1 tablet (12.5 mg total) by mouth once daily.    HYDROCORTISONE (WESTCORT) 0.2 % CREAM    Apply topically 2 (two) times daily. for 5 days    HYDROCORTISONE 2.5 % CREAM    WOLFGANG EXT AA BID    HYDROXYZINE HCL (ATARAX) 25 MG TABLET    Take 1 tablet (25 mg total) by mouth every 4 to 6 hours as needed for Itching (redness).    LEVETIRACETAM (KEPPRA) 1000 MG TABLET    TAKE 1 TABLET(1000 MG) BY MOUTH TWICE DAILY    LIDOCAINE (LMX) 4 % CREAM    Apply topically as needed.    METFORMIN (GLUCOPHAGE-XR) 500 MG ER 24HR TABLET    Take 1 tablet (500 mg total) by mouth once daily.    OMEPRAZOLE (PRILOSEC) 40 MG CAPSULE    Take 1 capsule (40 mg total) by mouth once daily.    ONDANSETRON (ZOFRAN-ODT) 8 MG TBDL    Take 1 tablet (8 mg total) by mouth every 6 (six)  "hours as needed.    PEN NEEDLE, DIABETIC 31 GAUGE X 5/16" NDLE    For use with bydureon    SUCRALFATE (CARAFATE) 100 MG/ML SUSPENSION    Take 10 mLs (1 g total) by mouth 2 (two) times daily.    ZOLPIDEM (AMBIEN) 5 MG TAB    TAKE 1 TABLET BY MOUTH IN THE EVENING SPARINGLY AS NEEDED       Allergies  Review of patient's allergies indicates:   Allergen Reactions    Keflex [cephalexin] Hives    Vicodin [hydrocodone-acetaminophen] Itching     itch    Crestor [rosuvastatin]      Muscle pain     Past Medical history  Past Medical History:   Diagnosis Date    Allergy     Anxiety     Cataract     Diabetes mellitus     GERD (gastroesophageal reflux disease)     Heart murmur     Hyperlipidemia     Hypertension     Seizures     5-6 years ago, spontaneous          Surgical History  Past Surgical History:   Procedure Laterality Date    CHOLECYSTECTOMY      2001 april    COLONOSCOPY N/A 06/29/2021    Procedure: COLONOSCOPY;  Surgeon: Gustavo Pelletier MD;  Location: [facility];  Service: Endoscopy;  Laterality: N/A;  combined orders    ESOPHAGOGASTRODUODENOSCOPY N/A 06/29/2021    Procedure: ESOPHAGOGASTRODUODENOSCOPY (EGD);  Surgeon: Gustavo Pelletier MD;  Location: [facility];  Service: Endoscopy;  Laterality: N/A;  covdi +3/29/20, fully vaccinated 3/23/21, prep instr portal -ml    ESOPHAGOGASTRODUODENOSCOPY N/A 11/29/2023    Procedure: EGD (ESOPHAGOGASTRODUODENOSCOPY);  Surgeon: Maciej Fine MD;  Location: [facility] ENDO;  Service: Endoscopy;  Laterality: N/A;  referred by Cristhian RAHMAN, [facility] takes on Tuesdays will hold dose on Tuesday 11/28/23, instructions per [facility].    ESOPHAGOGASTRODUODENOSCOPY N/A 9/17/2024    Procedure: EGD (ESOPHAGOGASTRODUODENOSCOPY);  Surgeon: Rohit Henderson MD;  Location: [facility] ENDO;  Service: Endoscopy;  Laterality: N/A;  8/16 ref by Cristhian Cuadra MD, instr. to portal, Eliquis hold approval pending, Trulicity-instr. to hold x 8 days prior-  ok to hold Eliquis 2 days per Dr Gomez-GT  9/10/241- pc " "complete. DBM    HYSTERECTOMY      partial  1996    LAPAROSCOPIC SLEEVE GASTRECTOMY N/A 04/04/2024    Procedure: GASTRECTOMY, SLEEVE, LAPAROSCOPIC with intraop EGD ([facility], please submit auth to insurance on or after 3/14/24);  Surgeon: Maciej Kennedy Jr., MD;  Location: [facility] OR [address] [place] [address];  Service: General;  Laterality: N/A;  with poss hiatal hernia repair    OOPHORECTOMY      SHOULDER SURGERY Right     WISDOM TOOTH EXTRACTION       Family History  Family History   Problem Relation Name Age of Onset    Diabetes Mother      Hyperlipidemia Mother      Hypertension Mother      Cataracts Mother      Diabetes Father      Hypertension Father      Stroke Father      Depression Sister      Hypertension Sister      Stroke Sister      No Known Problems Brother      Cancer Maternal Aunt          breast x 2    Breast cancer Maternal Aunt      Cancer Maternal Uncle          prostate x 2    Cancer Paternal Aunt          breast    Breast cancer Paternal Aunt      No Known Problems Paternal Uncle      No Known Problems Maternal Grandmother      Cancer Maternal Grandfather          lung    Early death Paternal Grandmother      Early death Paternal Grandfather      Esophageal cancer Paternal Grandfather      No Known Problems Other      Melanoma Neg Hx      Eczema Neg Hx      Lupus Neg Hx      Psoriasis Neg Hx      Amblyopia Neg Hx      Blindness Neg Hx      Glaucoma Neg Hx      Macular degeneration Neg Hx      Retinal detachment Neg Hx      Strabismus Neg Hx      Thyroid disease Neg Hx      Colon cancer Neg Hx       Social History  Social History[1]       Review of system     ROS  Negative except as mentioned above  Physical exam   Vital Signs  Vitals:    07/08/25 0857   BP: 120/88   Pulse: 64   Resp: 18   Temp: 98.6 °F (37 °C)   TempSrc: Oral   SpO2: 98%   Weight: 89.5 kg (197 lb 5 oz)   Height: 5' 3" (1.6 m)       Physical Exam  Constitutional:       Appearance: She is obese.   Cardiovascular:      Rate and Rhythm: Normal rate " and regular rhythm.      Pulses: Normal pulses.      Heart sounds: Normal heart sounds.           Wt Readings from Last 3 Encounters:   07/08/25 89.5 kg (197 lb 5 oz)   04/29/25 84.5 kg (186 lb 4.6 oz)   04/29/25 84.5 kg (186 lb 4.6 oz)          Body mass index is 34.95 kg/m².      Laboratory data and other diagnostic findings     Lab Results   Component Value Date    WBC 5.26 04/24/2025    HGB 14.3 04/24/2025    HCT 45.1 04/24/2025    MCV 93 04/24/2025     04/24/2025         Lab Results   Component Value Date    CREATININE 0.8 04/24/2025    BUN 14 04/24/2025     04/24/2025    K 4.8 04/24/2025     04/24/2025    CO2 29 04/24/2025     Lab Results   Component Value Date    HGBA1C 5.7 (H) 07/08/2025         Assessment and plan     Problem List Items Addressed This Visit       Controlled type 2 diabetes mellitus with stage 2 chronic kidney disease, without long-term current use of insulin    Relevant Medications    semaglutide (OZEMPIC) 0.25 mg or 0.5 mg (2 mg/3 mL) pen injector    HLD (hyperlipidemia)    Relevant Medications    atorvastatin (LIPITOR) 40 MG tablet    Hemifacial spasm    Severe obesity (BMI 35.0-39.9) with comorbidity    Sal's paralysis (postepileptic)    Seizures     Other Visit Diagnoses         Type 2 diabetes mellitus with hyperglycemia, without long-term current use of insulin    -  Primary    Relevant Medications    semaglutide (OZEMPIC) 0.25 mg or 0.5 mg (2 mg/3 mL) pen injector    Other Relevant Orders    Hemoglobin A1C (Completed)    TSH            Type 2 diabetes mellitus with hyperglycemia, without long-term current use of insulin  -     Hemoglobin A1C; Future; Expected date: 07/08/2025  -     semaglutide (OZEMPIC) 0.25 mg or 0.5 mg (2 mg/3 mL) pen injector; Inject 0.25 mg into the skin every 7 days.  Dispense: 1.5 mL; Refill: 2  -     TSH; Future; Expected date: 07/08/2025    Hemifacial spasm of left side of face    Mixed hyperlipidemia  -     atorvastatin (LIPITOR) 40 MG  tablet; Take 1 tablet (40 mg total) by mouth every evening.  Dispense: 90 tablet; Refill: 0    Controlled type 2 diabetes mellitus with stage 2 chronic kidney disease, without long-term current use of insulin    Severe obesity (BMI 35.0-39.9) with comorbidity    Seizures    Sal's paralysis (postepileptic)         Continue to monitor spasms and twitching for now.   Repeat A1c today.  Start the patient on low-dose Ozempic and we will increase dosing as tolerated.  Has not been on a statin despite elevated ASCVD.  She can not remember why Lipitor was discontinued in the past.  May have been due to muscle pain but she is not sure.  We will restart Lipitor today and monitor going forward      Future Appointments  Future Appointments   Date Time Provider Department Center   11/7/2025  9:40 AM [facility]         No follow-ups on file. and PRN.      Aga Taylor MD    This office note was created by combination of typed  and MModal dictation.  Transcription errors may be present.  If there are any questions, please contact me.                 [1]   Social History  Tobacco Use    Smoking status: Former     Current packs/day: 0.00     Types: Cigarettes    Smokeless tobacco: Never   Substance Use Topics    Alcohol use: Not Currently    Drug use: No

## 2025-07-09 ENCOUNTER — CLINICAL SUPPORT (OUTPATIENT)
Dept: FAMILY MEDICINE | Facility: CLINIC | Age: 60
End: 2025-07-09
Payer: COMMERCIAL

## 2025-07-09 DIAGNOSIS — Z23 NEED FOR COVID-19 VACCINE: Primary | ICD-10-CM

## 2025-07-09 DIAGNOSIS — Z23 NEED FOR PNEUMOCOCCAL VACCINE: ICD-10-CM

## 2025-07-09 PROCEDURE — 90471 IMMUNIZATION ADMIN: CPT | Mod: S$GLB,,,

## 2025-07-09 PROCEDURE — 90480 ADMN SARSCOV2 VAC 1/ONLY CMP: CPT | Mod: S$GLB,,,

## 2025-07-09 PROCEDURE — 90677 PCV20 VACCINE IM: CPT | Mod: S$GLB,,,

## 2025-07-09 PROCEDURE — 91320 SARSCV2 VAC 30MCG TRS-SUC IM: CPT | Mod: S$GLB,,,

## 2025-07-09 PROCEDURE — 99999 PR PBB SHADOW E&M-EST. PATIENT-LVL II: CPT | Mod: PBBFAC,,,

## 2025-07-11 ENCOUNTER — TELEPHONE (OUTPATIENT)
Dept: PHARMACY | Facility: CLINIC | Age: 60
End: 2025-07-11
Payer: COMMERCIAL

## 2025-07-11 NOTE — TELEPHONE ENCOUNTER
[facility]/Population Health Chart Review & Patient Outreach Details For Medication Adherence Project    Reason for Outreach Encounter: 3rd Party payor non-compliance report ([employer], BC[employer], C, etc)  2.  Patient Outreach Method: Reviewed Patient Chart  3.   Medication in question: metformin er   LAST FILLED: 6/14/25 for 90 day supply  Diabetes Medications              metFORMIN (GLUCOPHAGE-XR) 500 MG ER 24hr tablet Take 1 tablet (500 mg total) by mouth once daily.    semaglutide (OZEMPIC) 0.25 mg or 0.5 mg (2 mg/3 mL) pen injector Inject 0.25 mg into the skin every 7 days.              4.  Reviewed and or Updates Made To: Patient Chart  5. Outreach Outcomes and/or actions taken: Patient filled medication and is on track to be adherent

## 2025-07-14 DIAGNOSIS — G40.209 PARTIAL SYMPTOMATIC EPILEPSY WITH COMPLEX PARTIAL SEIZURES, NOT INTRACTABLE, WITHOUT STATUS EPILEPTICUS: ICD-10-CM

## 2025-07-14 RX ORDER — LEVETIRACETAM 1000 MG/1
1000 TABLET ORAL 2 TIMES DAILY
Qty: 180 TABLET | Refills: 3 | Status: SHIPPED | OUTPATIENT
Start: 2025-07-14

## 2025-08-04 ENCOUNTER — TELEPHONE (OUTPATIENT)
Dept: BARIATRICS | Facility: CLINIC | Age: 60
End: 2025-08-04
Payer: COMMERCIAL

## 2025-08-04 ENCOUNTER — HOSPITAL ENCOUNTER (OUTPATIENT)
Dept: RADIOLOGY | Facility: HOSPITAL | Age: 60
Discharge: HOME | End: 2025-08-04
Attending: NURSE PRACTITIONER
Payer: COMMERCIAL

## 2025-08-04 DIAGNOSIS — R11.0 NAUSEA: ICD-10-CM

## 2025-08-04 DIAGNOSIS — Z98.84 S/P LAPAROSCOPIC SLEEVE GASTRECTOMY: Primary | ICD-10-CM

## 2025-08-04 DIAGNOSIS — Z98.84 S/P LAPAROSCOPIC SLEEVE GASTRECTOMY: ICD-10-CM

## 2025-08-04 PROCEDURE — 25500020 PHARM REV CODE 255: Performed by: NURSE PRACTITIONER

## 2025-08-04 PROCEDURE — 74240 X-RAY XM UPR GI TRC 1CNTRST: CPT | Mod: TC

## 2025-08-04 PROCEDURE — A9698 NON-RAD CONTRAST MATERIALNOC: HCPCS | Performed by: NURSE PRACTITIONER

## 2025-08-04 PROCEDURE — 74240 X-RAY XM UPR GI TRC 1CNTRST: CPT | Mod: 26,,, | Performed by: STUDENT IN AN ORGANIZED HEALTH CARE EDUCATION/TRAINING PROGRAM

## 2025-08-04 RX ORDER — ONDANSETRON 8 MG/1
8 TABLET, ORALLY DISINTEGRATING ORAL EVERY 6 HOURS PRN
Qty: 30 TABLET | Refills: 1 | Status: SHIPPED | OUTPATIENT
Start: 2025-08-04

## 2025-08-04 RX ADMIN — BARIUM SULFATE 100 ML: 0.6 SUSPENSION ORAL at 12:08

## 2025-08-04 NOTE — TELEPHONE ENCOUNTER
Copied from CRM #[id number]. Topic: Appointments - Appointment Access  >> Aug 4, 2025  9:49 AM Nayeli wrote:  Type:  Appointment Request    Caller is requesting an  appointment.     Name of Caller:Monalisa/Patient    When is the first available appointment?No available Appts    Symptoms:severe nausea and vomiting, abdominal pain, food is not digesting    Best Call Back Number:[phone]    Additional Information: N/A

## 2025-08-04 NOTE — TELEPHONE ENCOUNTER
S/p Lap gastric sleeve with Dr. Kennedy 4/4/24.    Called and spoke with the pt. She stated last week she ate something (unsure what she ate, maybe a sandwich), followed by upper abdominal pain (10 out of 10), she vomited and then felt better.  Then on Saturday, 8/2/25, she ate plum, she then had to vomit, initially only dry heaved and then finally had severe vomiting. Then on the next day, Sunday, she was very nauseated. She ate beans which made her feel badly but then ate cereal which she was fine with.  She stated she is able to drink fluids. She is almost out of Zofran which usually helps her.  Her preferred Pharmacy is:     [id number] - [place], [place] - [address] [facility]     Discussed with Radha Smyth NP who advised to schedule [place] [facility].  Called and scheduled with the pt.  Provided instructions.  Radha Smyth sent in refill for Zofran. Pt is concerned with how last year she had a blood clot.  Radha Symth reviewed and symptoms are different than last year.  I asked the pt if she felt pain behind her knee or calf.  She does not. I asked if she has taken any NSAIDs.  She stated she has not.  I asked if she has had reflux symptoms recently.  She said she has. Discussed with VARSHA Smyth and plan is to wait on [place] UGI result and then proceed from there.

## 2025-08-12 ENCOUNTER — PATIENT MESSAGE (OUTPATIENT)
Dept: BARIATRICS | Facility: CLINIC | Age: 60
End: 2025-08-12
Payer: COMMERCIAL

## 2025-08-18 ENCOUNTER — TELEPHONE (OUTPATIENT)
Dept: PHARMACY | Facility: CLINIC | Age: 60
End: 2025-08-18
Payer: COMMERCIAL

## 2025-08-25 ENCOUNTER — HOSPITAL ENCOUNTER (INPATIENT)
Facility: HOSPITAL | Age: 60
LOS: 4 days | Discharge: HOME | DRG: 444 | End: 2025-08-31
Attending: INTERNAL MEDICINE | Admitting: STUDENT IN AN ORGANIZED HEALTH CARE EDUCATION/TRAINING PROGRAM
Payer: COMMERCIAL

## 2025-08-25 DIAGNOSIS — R07.9 CHEST PAIN: ICD-10-CM

## 2025-08-25 DIAGNOSIS — K80.50 CHOLEDOCHOLITHIASIS: Primary | ICD-10-CM

## 2025-08-25 DIAGNOSIS — R11.2 INTRACTABLE NAUSEA AND VOMITING: ICD-10-CM

## 2025-08-25 DIAGNOSIS — R11.14 BILIOUS VOMITING WITH NAUSEA: ICD-10-CM

## 2025-08-25 DIAGNOSIS — R56.9 SEIZURES: ICD-10-CM

## 2025-08-25 DIAGNOSIS — E80.6 HYPERBILIRUBINEMIA: ICD-10-CM

## 2025-08-25 DIAGNOSIS — R74.01 TRANSAMINITIS: ICD-10-CM

## 2025-08-25 DIAGNOSIS — E78.5 HYPERLIPIDEMIA, UNSPECIFIED HYPERLIPIDEMIA TYPE: ICD-10-CM

## 2025-08-25 DIAGNOSIS — I10 ESSENTIAL HYPERTENSION, BENIGN: ICD-10-CM

## 2025-08-25 DIAGNOSIS — I81 PORTAL VEIN THROMBOSIS: ICD-10-CM

## 2025-08-25 DIAGNOSIS — E11.65 TYPE 2 DIABETES MELLITUS WITH HYPERGLYCEMIA, WITHOUT LONG-TERM CURRENT USE OF INSULIN: ICD-10-CM

## 2025-08-25 LAB
ALBUMIN SERPL-MCNC: 3.7 G/DL (ref 3.3–5.5)
ALBUMIN SERPL-MCNC: 3.9 G/DL (ref 3.3–5.5)
ALP SERPL-CCNC: 282 U/L (ref 42–141)
ALP SERPL-CCNC: 287 U/L (ref 42–141)
BILIRUB SERPL-MCNC: 2.5 MG/DL (ref 0.2–1.6)
BILIRUB SERPL-MCNC: 2.6 MG/DL (ref 0.2–1.6)
BUN SERPL-MCNC: 7 MG/DL (ref 7–22)
CALCIUM SERPL-MCNC: 10.4 MG/DL (ref 8–10.3)
CHLORIDE SERPL-SCNC: 104 MMOL/L (ref 98–108)
CREAT SERPL-MCNC: 0.9 MG/DL (ref 0.6–1.2)
GLUCOSE SERPL-MCNC: 142 MG/DL (ref 73–118)
Lab: 0.4 10 9/L (ref 0.1–1.5)
Lab: 1.2 10 9/L (ref 0.5–5)
Lab: 13.6 % (ref 11–16)
Lab: 13.7 G/DL (ref 11.5–16.5)
Lab: 24.2 % (ref 15–50)
Lab: 3.6 10 9/L (ref 1.2–8)
Lab: 30.5 PG (ref 25–35)
Lab: 34.9 G/DL (ref 31–38)
Lab: 355 10 9/L (ref 100–400)
Lab: 4.49 10 12/L (ref 3.5–5.5)
Lab: 5.2 10 9/L (ref 3.5–10)
Lab: 6.9 % (ref 2–15)
Lab: 68.9 % (ref 35–80)
Lab: 8.8 FL (ref 8–11)
Lab: 87.3 FL (ref 75–100)
POC ALT (SGPT): 439 U/L (ref 10–47)
POC ALT (SGPT): 439 U/L (ref 10–47)
POC AMYLASE: 41 U/L (ref 14–97)
POC AST (SGOT): 459 U/L (ref 11–38)
POC AST (SGOT): 471 U/L (ref 11–38)
POC GGT: 892 U/L (ref 5–65)
POC HCT: 39.2 % (ref 35–55)
POC TCO2: 29 MMOL/L (ref 18–33)
POTASSIUM BLD-SCNC: 4.2 MMOL/L (ref 3.6–5.1)
PROTEIN, POC: 7.7 G/DL (ref 6.4–8.1)
PROTEIN, POC: 7.7 G/DL (ref 6.4–8.1)
SODIUM BLD-SCNC: 143 MMOL/L (ref 128–145)

## 2025-08-25 PROCEDURE — 25000003 PHARM REV CODE 250: Mod: ER | Performed by: INTERNAL MEDICINE

## 2025-08-25 PROCEDURE — 96361 HYDRATE IV INFUSION ADD-ON: CPT | Mod: ER

## 2025-08-25 RX ADMIN — SODIUM CHLORIDE 1000 ML: 9 INJECTION, SOLUTION INTRAVENOUS at 11:08

## 2025-08-25 RX ADMIN — IOHEXOL 100 ML: 350 INJECTION, SOLUTION INTRAVENOUS at 11:08

## 2025-08-26 ENCOUNTER — ANESTHESIA (OUTPATIENT)
Dept: ENDOSCOPY | Facility: HOSPITAL | Age: 60
End: 2025-08-26
Payer: COMMERCIAL

## 2025-08-26 ENCOUNTER — ANESTHESIA EVENT (OUTPATIENT)
Dept: ENDOSCOPY | Facility: HOSPITAL | Age: 60
End: 2025-08-26
Payer: COMMERCIAL

## 2025-08-26 PROBLEM — E80.6 HYPERBILIRUBINEMIA: Status: ACTIVE | Noted: 2025-08-26

## 2025-08-26 PROBLEM — R74.01 TRANSAMINITIS: Status: ACTIVE | Noted: 2025-08-26

## 2025-08-26 PROBLEM — E80.6 HYPERBILIRUBINEMIA: Status: RESOLVED | Noted: 2025-08-26 | Resolved: 2025-08-26

## 2025-08-26 LAB
ABSOLUTE EOSINOPHIL (OHS): 0.07 K/UL
ABSOLUTE MONOCYTE (OHS): 0.3 K/UL (ref 0.3–1)
ABSOLUTE NEUTROPHIL COUNT (OHS): 1.91 K/UL (ref 1.8–7.7)
ALBUMIN SERPL BCP-MCNC: 3.2 G/DL (ref 3.5–5.2)
ALBUMIN SERPL BCP-MCNC: 3.2 G/DL (ref 3.5–5.2)
ALP SERPL-CCNC: 318 UNIT/L (ref 40–150)
ALP SERPL-CCNC: 335 UNIT/L (ref 40–150)
ALT SERPL W/O P-5'-P-CCNC: 371 UNIT/L (ref 10–44)
ALT SERPL W/O P-5'-P-CCNC: 433 UNIT/L (ref 10–44)
ANION GAP (OHS): 9 MMOL/L (ref 8–16)
APAP SERPL-MCNC: <3 UG/ML (ref 10–20)
AST SERPL-CCNC: 257 UNIT/L (ref 11–45)
AST SERPL-CCNC: 370 UNIT/L (ref 11–45)
BASOPHILS # BLD AUTO: 0.02 K/UL
BASOPHILS NFR BLD AUTO: 0.6 %
BILIRUB DIRECT SERPL-MCNC: 0.6 MG/DL (ref 0.1–0.3)
BILIRUB SERPL-MCNC: 1.1 MG/DL (ref 0.1–1)
BILIRUB SERPL-MCNC: 1.7 MG/DL (ref 0.1–1)
BILIRUBIN, POC UA: ABNORMAL
BLOOD, POC UA: NEGATIVE
BUN SERPL-MCNC: 6 MG/DL (ref 6–20)
CALCIUM SERPL-MCNC: 9.4 MG/DL (ref 8.7–10.5)
CERULOPLASMIN SERPL-MCNC: 34 MG/DL (ref 15–45)
CHLORIDE SERPL-SCNC: 106 MMOL/L (ref 95–110)
CHOLEST SERPL-MCNC: 142 MG/DL (ref 120–199)
CHOLEST/HDLC SERPL: 2.5 {RATIO} (ref 2–5)
CLARITY, UA: CLEAR
CO2 SERPL-SCNC: 24 MMOL/L (ref 23–29)
COLOR, UA: ABNORMAL
CREAT SERPL-MCNC: 0.7 MG/DL (ref 0.5–1.4)
ERYTHROCYTE [DISTWIDTH] IN BLOOD BY AUTOMATED COUNT: 13.2 % (ref 11.5–14.5)
GFR SERPLBLD CREATININE-BSD FMLA CKD-EPI: >60 ML/MIN/1.73/M2
GLUCOSE SERPL-MCNC: 116 MG/DL (ref 70–110)
GLUCOSE, POC UA: NEGATIVE
HAV IGM SERPL QL IA: NORMAL
HBV CORE IGM SERPL QL IA: NORMAL
HBV SURFACE AG SERPL QL IA: NORMAL
HCT VFR BLD AUTO: 38.2 % (ref 37–48.5)
HCV AB SERPL QL IA: NORMAL
HDLC SERPL-MCNC: 56 MG/DL (ref 40–75)
HDLC SERPL: 39.4 % (ref 20–50)
HGB BLD-MCNC: 12.5 GM/DL (ref 12–16)
IGG SERPL-MCNC: 1474 MG/DL (ref 650–1600)
IMM GRANULOCYTES # BLD AUTO: 0.01 K/UL (ref 0–0.04)
IMM GRANULOCYTES NFR BLD AUTO: 0.3 % (ref 0–0.5)
INR PPP: 0.9 (ref 0.8–1.2)
KETONES, POC UA: NEGATIVE
LDLC SERPL CALC-MCNC: 70.2 MG/DL (ref 63–159)
LEUKOCYTE EST, POC UA: NEGATIVE
LIPASE SERPL-CCNC: 25 U/L (ref 4–60)
LYMPHOCYTES # BLD AUTO: 1.24 K/UL (ref 1–4.8)
MAGNESIUM SERPL-MCNC: 1.8 MG/DL (ref 1.6–2.6)
MCH RBC QN AUTO: 30.1 PG (ref 27–31)
MCHC RBC AUTO-ENTMCNC: 32.7 G/DL (ref 32–36)
MCV RBC AUTO: 92 FL (ref 82–98)
NITRITE, POC UA: NEGATIVE
NONHDLC SERPL-MCNC: 86 MG/DL
NUCLEATED RBC (/100WBC) (OHS): 0 /100 WBC
PH UR STRIP: 5 [PH] (ref 5–8)
PHOSPHATE SERPL-MCNC: 3.7 MG/DL (ref 2.7–4.5)
PLATELET # BLD AUTO: 328 K/UL (ref 150–450)
PMV BLD AUTO: 10 FL (ref 9.2–12.9)
POCT GLUCOSE: 128 MG/DL (ref 70–110)
POCT GLUCOSE: 86 MG/DL (ref 70–110)
POTASSIUM SERPL-SCNC: 3.7 MMOL/L (ref 3.5–5.1)
PROT SERPL-MCNC: 7 GM/DL (ref 6–8.4)
PROT SERPL-MCNC: 7.2 GM/DL (ref 6–8.4)
PROTEIN, POC UA: NEGATIVE
PROTHROMBIN TIME: 10.7 SECONDS (ref 9–12.5)
RBC # BLD AUTO: 4.15 M/UL (ref 4–5.4)
RELATIVE EOSINOPHIL (OHS): 2 %
RELATIVE LYMPHOCYTE (OHS): 34.9 % (ref 18–48)
RELATIVE MONOCYTE (OHS): 8.5 % (ref 4–15)
RELATIVE NEUTROPHIL (OHS): 53.7 % (ref 38–73)
SODIUM SERPL-SCNC: 139 MMOL/L (ref 136–145)
SPECIFIC GRAVITY, POC UA: 1.01 (ref 1–1.03)
TRIGL SERPL-MCNC: 79 MG/DL (ref 30–150)
UROBILINOGEN, POC UA: 2 E.U./DL
WBC # BLD AUTO: 3.55 K/UL (ref 3.9–12.7)

## 2025-08-26 PROCEDURE — 83735 ASSAY OF MAGNESIUM: CPT | Performed by: INTERNAL MEDICINE

## 2025-08-26 PROCEDURE — 80076 HEPATIC FUNCTION PANEL: CPT | Performed by: NURSE PRACTITIONER

## 2025-08-26 PROCEDURE — 86381 MITOCHONDRIAL ANTIBODY EACH: CPT | Performed by: NURSE PRACTITIONER

## 2025-08-26 PROCEDURE — 37000008 HC ANESTHESIA 1ST 15 MINUTES: Performed by: STUDENT IN AN ORGANIZED HEALTH CARE EDUCATION/TRAINING PROGRAM

## 2025-08-26 PROCEDURE — 80143 DRUG ASSAY ACETAMINOPHEN: CPT | Performed by: INTERNAL MEDICINE

## 2025-08-26 PROCEDURE — 82784 ASSAY IGA/IGD/IGG/IGM EACH: CPT | Performed by: NURSE PRACTITIONER

## 2025-08-26 PROCEDURE — 80053 COMPREHEN METABOLIC PANEL: CPT | Performed by: INTERNAL MEDICINE

## 2025-08-26 PROCEDURE — 85025 COMPLETE CBC W/AUTO DIFF WBC: CPT | Performed by: INTERNAL MEDICINE

## 2025-08-26 PROCEDURE — 36415 COLL VENOUS BLD VENIPUNCTURE: CPT | Performed by: NURSE PRACTITIONER

## 2025-08-26 PROCEDURE — 25500020 PHARM REV CODE 255: Mod: ER | Performed by: INTERNAL MEDICINE

## 2025-08-26 PROCEDURE — 63600175 PHARM REV CODE 636 W HCPCS: Performed by: NURSE ANESTHETIST, CERTIFIED REGISTERED

## 2025-08-26 PROCEDURE — 84100 ASSAY OF PHOSPHORUS: CPT | Performed by: INTERNAL MEDICINE

## 2025-08-26 PROCEDURE — 63600175 PHARM REV CODE 636 W HCPCS: Performed by: INTERNAL MEDICINE

## 2025-08-26 PROCEDURE — 0DJ08ZZ INSPECTION OF UPPER INTESTINAL TRACT, VIA NATURAL OR ARTIFICIAL OPENING ENDOSCOPIC: ICD-10-PCS | Performed by: STUDENT IN AN ORGANIZED HEALTH CARE EDUCATION/TRAINING PROGRAM

## 2025-08-26 PROCEDURE — 25000003 PHARM REV CODE 250: Performed by: INTERNAL MEDICINE

## 2025-08-26 PROCEDURE — 82962 GLUCOSE BLOOD TEST: CPT | Mod: ER

## 2025-08-26 PROCEDURE — 36415 COLL VENOUS BLD VENIPUNCTURE: CPT | Performed by: INTERNAL MEDICINE

## 2025-08-26 PROCEDURE — G0378 HOSPITAL OBSERVATION PER HR: HCPCS | Mod: ER

## 2025-08-26 PROCEDURE — 99222 1ST HOSP IP/OBS MODERATE 55: CPT | Mod: 25,,, | Performed by: NURSE PRACTITIONER

## 2025-08-26 PROCEDURE — 43235 EGD DIAGNOSTIC BRUSH WASH: CPT | Performed by: STUDENT IN AN ORGANIZED HEALTH CARE EDUCATION/TRAINING PROGRAM

## 2025-08-26 PROCEDURE — 96374 THER/PROPH/DIAG INJ IV PUSH: CPT | Mod: ER

## 2025-08-26 PROCEDURE — 86015 ACTIN ANTIBODY EACH: CPT | Performed by: NURSE PRACTITIONER

## 2025-08-26 PROCEDURE — 81003 URINALYSIS AUTO W/O SCOPE: CPT | Mod: ER

## 2025-08-26 PROCEDURE — 82103 ALPHA-1-ANTITRYPSIN TOTAL: CPT | Performed by: NURSE PRACTITIONER

## 2025-08-26 PROCEDURE — 63600175 PHARM REV CODE 636 W HCPCS: Mod: ER | Performed by: INTERNAL MEDICINE

## 2025-08-26 PROCEDURE — 82390 ASSAY OF CERULOPLASMIN: CPT | Performed by: NURSE PRACTITIONER

## 2025-08-26 PROCEDURE — 85610 PROTHROMBIN TIME: CPT | Performed by: INTERNAL MEDICINE

## 2025-08-26 PROCEDURE — G0378 HOSPITAL OBSERVATION PER HR: HCPCS

## 2025-08-26 PROCEDURE — 83690 ASSAY OF LIPASE: CPT | Performed by: INTERNAL MEDICINE

## 2025-08-26 PROCEDURE — 86038 ANTINUCLEAR ANTIBODIES: CPT | Performed by: NURSE PRACTITIONER

## 2025-08-26 PROCEDURE — 80053 COMPREHEN METABOLIC PANEL: CPT | Mod: ER

## 2025-08-26 PROCEDURE — 25000003 PHARM REV CODE 250: Performed by: NURSE ANESTHETIST, CERTIFIED REGISTERED

## 2025-08-26 PROCEDURE — 80061 LIPID PANEL: CPT | Performed by: INTERNAL MEDICINE

## 2025-08-26 PROCEDURE — 96375 TX/PRO/DX INJ NEW DRUG ADDON: CPT | Mod: ER

## 2025-08-26 PROCEDURE — 80074 ACUTE HEPATITIS PANEL: CPT | Performed by: INTERNAL MEDICINE

## 2025-08-26 PROCEDURE — 85025 COMPLETE CBC W/AUTO DIFF WBC: CPT | Mod: ER

## 2025-08-26 PROCEDURE — 99285 EMERGENCY DEPT VISIT HI MDM: CPT | Mod: 25,ER

## 2025-08-26 PROCEDURE — 43235 EGD DIAGNOSTIC BRUSH WASH: CPT | Mod: ,,, | Performed by: STUDENT IN AN ORGANIZED HEALTH CARE EDUCATION/TRAINING PROGRAM

## 2025-08-26 PROCEDURE — 63600175 PHARM REV CODE 636 W HCPCS: Mod: JZ,TB,ER | Performed by: INTERNAL MEDICINE

## 2025-08-26 PROCEDURE — 82150 ASSAY OF AMYLASE: CPT | Mod: ER

## 2025-08-26 RX ORDER — PROPOFOL 10 MG/ML
VIAL (ML) INTRAVENOUS CONTINUOUS PRN
Status: DISCONTINUED | OUTPATIENT
Start: 2025-08-26 | End: 2025-08-26

## 2025-08-26 RX ORDER — PANTOPRAZOLE SODIUM 40 MG/10ML
40 INJECTION, POWDER, LYOPHILIZED, FOR SOLUTION INTRAVENOUS DAILY
Status: DISCONTINUED | OUTPATIENT
Start: 2025-08-26 | End: 2025-08-31 | Stop reason: HOSPADM

## 2025-08-26 RX ORDER — LIDOCAINE HYDROCHLORIDE 20 MG/ML
INJECTION INTRAVENOUS
Status: DISCONTINUED | OUTPATIENT
Start: 2025-08-26 | End: 2025-08-26

## 2025-08-26 RX ORDER — LEVETIRACETAM 500 MG/1
1000 TABLET ORAL 2 TIMES DAILY
Status: DISCONTINUED | OUTPATIENT
Start: 2025-08-26 | End: 2025-08-31 | Stop reason: HOSPADM

## 2025-08-26 RX ORDER — TALC
6 POWDER (GRAM) TOPICAL NIGHTLY PRN
Status: DISCONTINUED | OUTPATIENT
Start: 2025-08-26 | End: 2025-08-31 | Stop reason: HOSPADM

## 2025-08-26 RX ORDER — POLYETHYLENE GLYCOL 3350 17 G/17G
17 POWDER, FOR SOLUTION ORAL 2 TIMES DAILY PRN
Status: DISCONTINUED | OUTPATIENT
Start: 2025-08-26 | End: 2025-08-31 | Stop reason: HOSPADM

## 2025-08-26 RX ORDER — KETOROLAC TROMETHAMINE 30 MG/ML
30 INJECTION, SOLUTION INTRAMUSCULAR; INTRAVENOUS
Status: COMPLETED | OUTPATIENT
Start: 2025-08-26 | End: 2025-08-26

## 2025-08-26 RX ORDER — SUCRALFATE 1 G/10ML
1 SUSPENSION ORAL 2 TIMES DAILY
Status: DISCONTINUED | OUTPATIENT
Start: 2025-08-26 | End: 2025-08-31 | Stop reason: HOSPADM

## 2025-08-26 RX ORDER — PANTOPRAZOLE SODIUM 40 MG/10ML
40 INJECTION, POWDER, LYOPHILIZED, FOR SOLUTION INTRAVENOUS ONCE
Status: COMPLETED | OUTPATIENT
Start: 2025-08-26 | End: 2025-08-26

## 2025-08-26 RX ORDER — ONDANSETRON HYDROCHLORIDE 2 MG/ML
4 INJECTION, SOLUTION INTRAVENOUS
Status: COMPLETED | OUTPATIENT
Start: 2025-08-26 | End: 2025-08-26

## 2025-08-26 RX ORDER — SODIUM CHLORIDE, SODIUM LACTATE, POTASSIUM CHLORIDE, CALCIUM CHLORIDE 600; 310; 30; 20 MG/100ML; MG/100ML; MG/100ML; MG/100ML
INJECTION, SOLUTION INTRAVENOUS CONTINUOUS
Status: ACTIVE | OUTPATIENT
Start: 2025-08-26 | End: 2025-08-27

## 2025-08-26 RX ORDER — SODIUM CHLORIDE 0.9 % (FLUSH) 0.9 %
10 SYRINGE (ML) INJECTION EVERY 12 HOURS PRN
Status: DISCONTINUED | OUTPATIENT
Start: 2025-08-26 | End: 2025-08-31 | Stop reason: HOSPADM

## 2025-08-26 RX ORDER — INSULIN ASPART 100 [IU]/ML
0-5 INJECTION, SOLUTION INTRAVENOUS; SUBCUTANEOUS EVERY 6 HOURS PRN
Status: DISCONTINUED | OUTPATIENT
Start: 2025-08-26 | End: 2025-08-31 | Stop reason: HOSPADM

## 2025-08-26 RX ORDER — HYDROXYZINE HYDROCHLORIDE 25 MG/1
25 TABLET, FILM COATED ORAL 4 TIMES DAILY PRN
Status: DISCONTINUED | OUTPATIENT
Start: 2025-08-26 | End: 2025-08-31 | Stop reason: HOSPADM

## 2025-08-26 RX ORDER — GABAPENTIN 100 MG/1
100 CAPSULE ORAL 3 TIMES DAILY
Status: DISCONTINUED | OUTPATIENT
Start: 2025-08-26 | End: 2025-08-31 | Stop reason: HOSPADM

## 2025-08-26 RX ORDER — LISINOPRIL 20 MG/1
40 TABLET ORAL DAILY
Status: DISCONTINUED | OUTPATIENT
Start: 2025-08-26 | End: 2025-08-31 | Stop reason: HOSPADM

## 2025-08-26 RX ORDER — GLUCAGON 1 MG
1 KIT INJECTION
Status: DISCONTINUED | OUTPATIENT
Start: 2025-08-26 | End: 2025-08-31 | Stop reason: HOSPADM

## 2025-08-26 RX ORDER — ONDANSETRON HYDROCHLORIDE 2 MG/ML
4 INJECTION, SOLUTION INTRAVENOUS EVERY 6 HOURS PRN
Status: DISCONTINUED | OUTPATIENT
Start: 2025-08-26 | End: 2025-08-31 | Stop reason: HOSPADM

## 2025-08-26 RX ORDER — MAGNESIUM SULFATE 1 G/100ML
1 INJECTION INTRAVENOUS ONCE
Status: COMPLETED | OUTPATIENT
Start: 2025-08-26 | End: 2025-08-26

## 2025-08-26 RX ORDER — ONDANSETRON HYDROCHLORIDE 2 MG/ML
4 INJECTION, SOLUTION INTRAVENOUS EVERY 6 HOURS
Status: COMPLETED | OUTPATIENT
Start: 2025-08-26 | End: 2025-08-27

## 2025-08-26 RX ORDER — SIMETHICONE 80 MG
1 TABLET,CHEWABLE ORAL 4 TIMES DAILY PRN
Status: DISCONTINUED | OUTPATIENT
Start: 2025-08-26 | End: 2025-08-31 | Stop reason: HOSPADM

## 2025-08-26 RX ORDER — DICYCLOMINE HYDROCHLORIDE 10 MG/1
10 CAPSULE ORAL EVERY 6 HOURS PRN
Status: DISCONTINUED | OUTPATIENT
Start: 2025-08-26 | End: 2025-08-31 | Stop reason: HOSPADM

## 2025-08-26 RX ORDER — BUSPIRONE HYDROCHLORIDE 10 MG/1
10 TABLET ORAL 3 TIMES DAILY PRN
Status: DISCONTINUED | OUTPATIENT
Start: 2025-08-26 | End: 2025-08-31 | Stop reason: HOSPADM

## 2025-08-26 RX ORDER — OXYCODONE HYDROCHLORIDE 5 MG/1
5 TABLET ORAL EVERY 6 HOURS PRN
Refills: 0 | Status: DISCONTINUED | OUTPATIENT
Start: 2025-08-27 | End: 2025-08-31 | Stop reason: HOSPADM

## 2025-08-26 RX ADMIN — PROPOFOL 200 MCG/KG/MIN: 10 INJECTION, EMULSION INTRAVENOUS at 01:08

## 2025-08-26 RX ADMIN — SODIUM CHLORIDE: 0.9 INJECTION, SOLUTION INTRAVENOUS at 01:08

## 2025-08-26 RX ADMIN — PANTOPRAZOLE SODIUM 40 MG: 40 INJECTION, POWDER, FOR SOLUTION INTRAVENOUS at 03:08

## 2025-08-26 RX ADMIN — GABAPENTIN 100 MG: 100 CAPSULE ORAL at 09:08

## 2025-08-26 RX ADMIN — ONDANSETRON 4 MG: 2 INJECTION INTRAMUSCULAR; INTRAVENOUS at 01:08

## 2025-08-26 RX ADMIN — LEVETIRACETAM 1000 MG: 500 TABLET, FILM COATED ORAL at 09:08

## 2025-08-26 RX ADMIN — ONDANSETRON 4 MG: 2 INJECTION INTRAMUSCULAR; INTRAVENOUS at 05:08

## 2025-08-26 RX ADMIN — SODIUM CHLORIDE, POTASSIUM CHLORIDE, SODIUM LACTATE AND CALCIUM CHLORIDE: 600; 310; 30; 20 INJECTION, SOLUTION INTRAVENOUS at 05:08

## 2025-08-26 RX ADMIN — DICYCLOMINE HYDROCHLORIDE 10 MG: 10 CAPSULE ORAL at 09:08

## 2025-08-26 RX ADMIN — KETOROLAC TROMETHAMINE 30 MG: 30 INJECTION, SOLUTION INTRAMUSCULAR; INTRAVENOUS at 01:08

## 2025-08-26 RX ADMIN — SUCRALFATE 1 G: 1 SUSPENSION ORAL at 09:08

## 2025-08-26 RX ADMIN — DICYCLOMINE HYDROCHLORIDE 10 MG: 10 CAPSULE ORAL at 03:08

## 2025-08-26 RX ADMIN — GABAPENTIN 100 MG: 100 CAPSULE ORAL at 03:08

## 2025-08-26 RX ADMIN — Medication 6 MG: at 09:08

## 2025-08-26 RX ADMIN — MAGNESIUM SULFATE IN DEXTROSE 1 G: 10 INJECTION, SOLUTION INTRAVENOUS at 09:08

## 2025-08-26 RX ADMIN — HYDROXYZINE HYDROCHLORIDE 25 MG: 25 TABLET, FILM COATED ORAL at 09:08

## 2025-08-26 RX ADMIN — LIDOCAINE HYDROCHLORIDE 100 MG: 20 INJECTION, SOLUTION INTRAVENOUS at 01:08

## 2025-08-26 RX ADMIN — ONDANSETRON 4 MG: 2 INJECTION INTRAMUSCULAR; INTRAVENOUS at 12:08

## 2025-08-26 RX ADMIN — PANTOPRAZOLE SODIUM 40 MG: 40 INJECTION, POWDER, FOR SOLUTION INTRAVENOUS at 09:08

## 2025-08-27 PROBLEM — I81 PORTAL VEIN THROMBOSIS: Status: ACTIVE | Noted: 2025-08-27

## 2025-08-27 PROBLEM — R11.2 INTRACTABLE NAUSEA AND VOMITING: Status: ACTIVE | Noted: 2024-08-15

## 2025-08-27 LAB
ALBUMIN SERPL BCP-MCNC: 3.1 G/DL (ref 3.5–5.2)
ALP SERPL-CCNC: 344 UNIT/L (ref 40–150)
ALT SERPL W/O P-5'-P-CCNC: 360 UNIT/L (ref 10–44)
ANA (OHS): NORMAL
ANION GAP (OHS): 12 MMOL/L (ref 8–16)
AST SERPL-CCNC: 283 UNIT/L (ref 11–45)
BILIRUB SERPL-MCNC: 1.1 MG/DL (ref 0.1–1)
BUN SERPL-MCNC: 7 MG/DL (ref 6–20)
CALCIUM SERPL-MCNC: 9.2 MG/DL (ref 8.7–10.5)
CHLORIDE SERPL-SCNC: 105 MMOL/L (ref 95–110)
CO2 SERPL-SCNC: 24 MMOL/L (ref 23–29)
CREAT SERPL-MCNC: 0.8 MG/DL (ref 0.5–1.4)
GFR SERPLBLD CREATININE-BSD FMLA CKD-EPI: >60 ML/MIN/1.73/M2
GGT SERPL-CCNC: 824 U/L (ref 8–55)
GLUCOSE SERPL-MCNC: 105 MG/DL (ref 70–110)
HOLD SPECIMEN: NORMAL
MITOCHONDRIA AB TITR SER IF: NORMAL {TITER}
POCT GLUCOSE: 109 MG/DL (ref 70–110)
POCT GLUCOSE: 137 MG/DL (ref 70–110)
POCT GLUCOSE: 145 MG/DL (ref 70–110)
POCT GLUCOSE: 155 MG/DL (ref 70–110)
POCT GLUCOSE: 87 MG/DL (ref 70–110)
POTASSIUM SERPL-SCNC: 4 MMOL/L (ref 3.5–5.1)
PROT SERPL-MCNC: 6.9 GM/DL (ref 6–8.4)
SMOOTH MUSCLE AB TITR SER IF: NORMAL {TITER}
SODIUM SERPL-SCNC: 141 MMOL/L (ref 136–145)

## 2025-08-27 PROCEDURE — 25000003 PHARM REV CODE 250: Performed by: INTERNAL MEDICINE

## 2025-08-27 PROCEDURE — 99232 SBSQ HOSP IP/OBS MODERATE 35: CPT | Mod: ,,, | Performed by: NURSE PRACTITIONER

## 2025-08-27 PROCEDURE — 63600175 PHARM REV CODE 636 W HCPCS: Performed by: HOSPITALIST

## 2025-08-27 PROCEDURE — 80321 ALCOHOLS BIOMARKERS 1OR 2: CPT | Performed by: NURSE PRACTITIONER

## 2025-08-27 PROCEDURE — 25000003 PHARM REV CODE 250: Performed by: HOSPITALIST

## 2025-08-27 PROCEDURE — 36415 COLL VENOUS BLD VENIPUNCTURE: CPT | Performed by: NURSE PRACTITIONER

## 2025-08-27 PROCEDURE — 96372 THER/PROPH/DIAG INJ SC/IM: CPT | Performed by: HOSPITALIST

## 2025-08-27 PROCEDURE — 11000001 HC ACUTE MED/SURG PRIVATE ROOM

## 2025-08-27 PROCEDURE — 36415 COLL VENOUS BLD VENIPUNCTURE: CPT | Performed by: HOSPITALIST

## 2025-08-27 PROCEDURE — 63600175 PHARM REV CODE 636 W HCPCS: Performed by: INTERNAL MEDICINE

## 2025-08-27 PROCEDURE — 82565 ASSAY OF CREATININE: CPT | Performed by: HOSPITALIST

## 2025-08-27 PROCEDURE — 82977 ASSAY OF GGT: CPT | Performed by: NURSE PRACTITIONER

## 2025-08-27 RX ORDER — ENOXAPARIN SODIUM 100 MG/ML
1 INJECTION SUBCUTANEOUS EVERY 12 HOURS
Status: DISCONTINUED | OUTPATIENT
Start: 2025-08-27 | End: 2025-08-31 | Stop reason: HOSPADM

## 2025-08-27 RX ORDER — MORPHINE SULFATE 4 MG/ML
2 INJECTION, SOLUTION INTRAMUSCULAR; INTRAVENOUS ONCE
Refills: 0 | Status: COMPLETED | OUTPATIENT
Start: 2025-08-27 | End: 2025-08-27

## 2025-08-27 RX ORDER — SODIUM CHLORIDE 9 MG/ML
INJECTION, SOLUTION INTRAVENOUS CONTINUOUS
Status: DISCONTINUED | OUTPATIENT
Start: 2025-08-27 | End: 2025-08-30

## 2025-08-27 RX ADMIN — ONDANSETRON 4 MG: 2 INJECTION INTRAMUSCULAR; INTRAVENOUS at 05:08

## 2025-08-27 RX ADMIN — GABAPENTIN 100 MG: 100 CAPSULE ORAL at 02:08

## 2025-08-27 RX ADMIN — LEVETIRACETAM 1000 MG: 500 TABLET, FILM COATED ORAL at 08:08

## 2025-08-27 RX ADMIN — ONDANSETRON 4 MG: 2 INJECTION INTRAMUSCULAR; INTRAVENOUS at 12:08

## 2025-08-27 RX ADMIN — MORPHINE SULFATE 2 MG: 4 INJECTION, SOLUTION INTRAMUSCULAR; INTRAVENOUS at 08:08

## 2025-08-27 RX ADMIN — SODIUM CHLORIDE: 9 INJECTION, SOLUTION INTRAVENOUS at 04:08

## 2025-08-27 RX ADMIN — OXYCODONE HYDROCHLORIDE 5 MG: 5 TABLET ORAL at 11:08

## 2025-08-27 RX ADMIN — ENOXAPARIN SODIUM 80 MG: 80 INJECTION SUBCUTANEOUS at 11:08

## 2025-08-27 RX ADMIN — DICYCLOMINE HYDROCHLORIDE 10 MG: 10 CAPSULE ORAL at 06:08

## 2025-08-27 RX ADMIN — GABAPENTIN 100 MG: 100 CAPSULE ORAL at 08:08

## 2025-08-27 RX ADMIN — OXYCODONE HYDROCHLORIDE 5 MG: 5 TABLET ORAL at 12:08

## 2025-08-27 RX ADMIN — SUCRALFATE 1 G: 1 SUSPENSION ORAL at 08:08

## 2025-08-27 RX ADMIN — OXYCODONE HYDROCHLORIDE 5 MG: 5 TABLET ORAL at 05:08

## 2025-08-27 RX ADMIN — PANTOPRAZOLE SODIUM 40 MG: 40 INJECTION, POWDER, FOR SOLUTION INTRAVENOUS at 08:08

## 2025-08-28 LAB
ALBUMIN SERPL BCP-MCNC: 3 G/DL (ref 3.5–5.2)
ALP SERPL-CCNC: 408 UNIT/L (ref 40–150)
ALT SERPL W/O P-5'-P-CCNC: 403 UNIT/L (ref 10–44)
ANION GAP (OHS): 7 MMOL/L (ref 8–16)
AST SERPL-CCNC: 386 UNIT/L (ref 11–45)
BILIRUB SERPL-MCNC: 2.1 MG/DL (ref 0.1–1)
BUN SERPL-MCNC: 5 MG/DL (ref 6–20)
CALCIUM SERPL-MCNC: 9 MG/DL (ref 8.7–10.5)
CHLORIDE SERPL-SCNC: 107 MMOL/L (ref 95–110)
CO2 SERPL-SCNC: 24 MMOL/L (ref 23–29)
CREAT SERPL-MCNC: 0.7 MG/DL (ref 0.5–1.4)
GFR SERPLBLD CREATININE-BSD FMLA CKD-EPI: >60 ML/MIN/1.73/M2
GLUCOSE SERPL-MCNC: 106 MG/DL (ref 70–110)
POCT GLUCOSE: 127 MG/DL (ref 70–110)
POCT GLUCOSE: 133 MG/DL (ref 70–110)
POCT GLUCOSE: 205 MG/DL (ref 70–110)
POCT GLUCOSE: 97 MG/DL (ref 70–110)
POTASSIUM SERPL-SCNC: 3.6 MMOL/L (ref 3.5–5.1)
PROT SERPL-MCNC: 6.7 GM/DL (ref 6–8.4)
SODIUM SERPL-SCNC: 138 MMOL/L (ref 136–145)

## 2025-08-28 PROCEDURE — 99232 SBSQ HOSP IP/OBS MODERATE 35: CPT | Mod: ,,, | Performed by: NURSE PRACTITIONER

## 2025-08-28 PROCEDURE — 11000001 HC ACUTE MED/SURG PRIVATE ROOM

## 2025-08-28 PROCEDURE — 36415 COLL VENOUS BLD VENIPUNCTURE: CPT | Performed by: HOSPITALIST

## 2025-08-28 PROCEDURE — 25000003 PHARM REV CODE 250: Performed by: INTERNAL MEDICINE

## 2025-08-28 PROCEDURE — 63600175 PHARM REV CODE 636 W HCPCS: Performed by: INTERNAL MEDICINE

## 2025-08-28 PROCEDURE — 84460 ALANINE AMINO (ALT) (SGPT): CPT | Performed by: HOSPITALIST

## 2025-08-28 PROCEDURE — 25000003 PHARM REV CODE 250: Performed by: HOSPITALIST

## 2025-08-28 PROCEDURE — 63600175 PHARM REV CODE 636 W HCPCS: Performed by: HOSPITALIST

## 2025-08-28 RX ORDER — CALCIUM CARBONATE 200(500)MG
500 TABLET,CHEWABLE ORAL 2 TIMES DAILY PRN
Status: DISCONTINUED | OUTPATIENT
Start: 2025-08-28 | End: 2025-08-31 | Stop reason: HOSPADM

## 2025-08-28 RX ORDER — LIDOCAINE 50 MG/G
1 PATCH TOPICAL
Status: DISCONTINUED | OUTPATIENT
Start: 2025-08-28 | End: 2025-08-31 | Stop reason: HOSPADM

## 2025-08-28 RX ORDER — TIZANIDINE 4 MG/1
4 TABLET ORAL EVERY 8 HOURS PRN
Status: DISCONTINUED | OUTPATIENT
Start: 2025-08-28 | End: 2025-08-31 | Stop reason: HOSPADM

## 2025-08-28 RX ADMIN — LEVETIRACETAM 1000 MG: 500 TABLET, FILM COATED ORAL at 09:08

## 2025-08-28 RX ADMIN — SUCRALFATE 1 G: 1 SUSPENSION ORAL at 09:08

## 2025-08-28 RX ADMIN — GABAPENTIN 100 MG: 100 CAPSULE ORAL at 08:08

## 2025-08-28 RX ADMIN — PANTOPRAZOLE SODIUM 40 MG: 40 INJECTION, POWDER, FOR SOLUTION INTRAVENOUS at 08:08

## 2025-08-28 RX ADMIN — SODIUM CHLORIDE: 9 INJECTION, SOLUTION INTRAVENOUS at 09:08

## 2025-08-28 RX ADMIN — SUCRALFATE 1 G: 1 SUSPENSION ORAL at 08:08

## 2025-08-28 RX ADMIN — LEVETIRACETAM 1000 MG: 500 TABLET, FILM COATED ORAL at 08:08

## 2025-08-28 RX ADMIN — ENOXAPARIN SODIUM 80 MG: 80 INJECTION SUBCUTANEOUS at 11:08

## 2025-08-28 RX ADMIN — LIDOCAINE 1 PATCH: 50 PATCH TOPICAL at 03:08

## 2025-08-28 RX ADMIN — OXYCODONE HYDROCHLORIDE 5 MG: 5 TABLET ORAL at 06:08

## 2025-08-28 RX ADMIN — GABAPENTIN 100 MG: 100 CAPSULE ORAL at 03:08

## 2025-08-28 RX ADMIN — SODIUM CHLORIDE: 9 INJECTION, SOLUTION INTRAVENOUS at 02:08

## 2025-08-28 RX ADMIN — INSULIN ASPART 1 UNITS: 100 INJECTION, SOLUTION INTRAVENOUS; SUBCUTANEOUS at 09:08

## 2025-08-28 RX ADMIN — LISINOPRIL 40 MG: 20 TABLET ORAL at 08:08

## 2025-08-28 RX ADMIN — TIZANIDINE 4 MG: 4 TABLET ORAL at 12:08

## 2025-08-28 RX ADMIN — GABAPENTIN 100 MG: 100 CAPSULE ORAL at 09:08

## 2025-08-28 RX ADMIN — OXYCODONE HYDROCHLORIDE 5 MG: 5 TABLET ORAL at 07:08

## 2025-08-29 ENCOUNTER — ANESTHESIA EVENT (OUTPATIENT)
Dept: ENDOSCOPY | Facility: HOSPITAL | Age: 60
End: 2025-08-29
Payer: COMMERCIAL

## 2025-08-29 ENCOUNTER — ANESTHESIA (OUTPATIENT)
Dept: ENDOSCOPY | Facility: HOSPITAL | Age: 60
End: 2025-08-29
Payer: COMMERCIAL

## 2025-08-29 LAB
ABSOLUTE EOSINOPHIL (OHS): 0.08 K/UL
ABSOLUTE MONOCYTE (OHS): 0.36 K/UL (ref 0.3–1)
ABSOLUTE NEUTROPHIL COUNT (OHS): 1.74 K/UL (ref 1.8–7.7)
ALBUMIN SERPL BCP-MCNC: 2.7 G/DL (ref 3.5–5.2)
ALP SERPL-CCNC: 360 UNIT/L (ref 40–150)
ALT SERPL W/O P-5'-P-CCNC: 292 UNIT/L (ref 10–44)
ANION GAP (OHS): 8 MMOL/L (ref 8–16)
AST SERPL-CCNC: 190 UNIT/L (ref 11–45)
BASOPHILS # BLD AUTO: 0.02 K/UL
BASOPHILS NFR BLD AUTO: 0.5 %
BILIRUB SERPL-MCNC: 1 MG/DL (ref 0.1–1)
BUN SERPL-MCNC: 6 MG/DL (ref 6–20)
CALCIUM SERPL-MCNC: 8.8 MG/DL (ref 8.7–10.5)
CHLORIDE SERPL-SCNC: 110 MMOL/L (ref 95–110)
CO2 SERPL-SCNC: 23 MMOL/L (ref 23–29)
CREAT SERPL-MCNC: 0.7 MG/DL (ref 0.5–1.4)
ERYTHROCYTE [DISTWIDTH] IN BLOOD BY AUTOMATED COUNT: 13.9 % (ref 11.5–14.5)
GFR SERPLBLD CREATININE-BSD FMLA CKD-EPI: >60 ML/MIN/1.73/M2
GLUCOSE SERPL-MCNC: 97 MG/DL (ref 70–110)
HCT VFR BLD AUTO: 35.9 % (ref 37–48.5)
HGB BLD-MCNC: 11.2 GM/DL (ref 12–16)
IMM GRANULOCYTES # BLD AUTO: 0.01 K/UL (ref 0–0.04)
IMM GRANULOCYTES NFR BLD AUTO: 0.3 % (ref 0–0.5)
LYMPHOCYTES # BLD AUTO: 1.73 K/UL (ref 1–4.8)
MCH RBC QN AUTO: 29.1 PG (ref 27–31)
MCHC RBC AUTO-ENTMCNC: 31.2 G/DL (ref 32–36)
MCV RBC AUTO: 93 FL (ref 82–98)
NUCLEATED RBC (/100WBC) (OHS): 0 /100 WBC
PLATELET # BLD AUTO: 277 K/UL (ref 150–450)
PMV BLD AUTO: 10.4 FL (ref 9.2–12.9)
POCT GLUCOSE: 98 MG/DL (ref 70–110)
POTASSIUM SERPL-SCNC: 3.7 MMOL/L (ref 3.5–5.1)
PROT SERPL-MCNC: 6.4 GM/DL (ref 6–8.4)
RBC # BLD AUTO: 3.85 M/UL (ref 4–5.4)
RELATIVE EOSINOPHIL (OHS): 2 %
RELATIVE LYMPHOCYTE (OHS): 43.9 % (ref 18–48)
RELATIVE MONOCYTE (OHS): 9.1 % (ref 4–15)
RELATIVE NEUTROPHIL (OHS): 44.2 % (ref 38–73)
SODIUM SERPL-SCNC: 141 MMOL/L (ref 136–145)
WBC # BLD AUTO: 3.94 K/UL (ref 3.9–12.7)

## 2025-08-29 PROCEDURE — 25000003 PHARM REV CODE 250: Performed by: HOSPITALIST

## 2025-08-29 PROCEDURE — 43262 ENDO CHOLANGIOPANCREATOGRAPH: CPT | Mod: ,,, | Performed by: INTERNAL MEDICINE

## 2025-08-29 PROCEDURE — 74328 X-RAY BILE DUCT ENDOSCOPY: CPT | Mod: 26,,, | Performed by: INTERNAL MEDICINE

## 2025-08-29 PROCEDURE — 0F798ZZ DILATION OF COMMON BILE DUCT, VIA NATURAL OR ARTIFICIAL OPENING ENDOSCOPIC: ICD-10-PCS | Performed by: INTERNAL MEDICINE

## 2025-08-29 PROCEDURE — 63600175 PHARM REV CODE 636 W HCPCS: Performed by: HOSPITALIST

## 2025-08-29 PROCEDURE — 63600175 PHARM REV CODE 636 W HCPCS: Performed by: INTERNAL MEDICINE

## 2025-08-29 PROCEDURE — 25000003 PHARM REV CODE 250: Performed by: INTERNAL MEDICINE

## 2025-08-29 PROCEDURE — 36415 COLL VENOUS BLD VENIPUNCTURE: CPT | Performed by: HOSPITALIST

## 2025-08-29 PROCEDURE — 11000001 HC ACUTE MED/SURG PRIVATE ROOM

## 2025-08-29 PROCEDURE — 37000009 HC ANESTHESIA EA ADD 15 MINS: Performed by: INTERNAL MEDICINE

## 2025-08-29 PROCEDURE — 80053 COMPREHEN METABOLIC PANEL: CPT | Performed by: HOSPITALIST

## 2025-08-29 PROCEDURE — 0FC98ZZ EXTIRPATION OF MATTER FROM COMMON BILE DUCT, VIA NATURAL OR ARTIFICIAL OPENING ENDOSCOPIC: ICD-10-PCS | Performed by: INTERNAL MEDICINE

## 2025-08-29 PROCEDURE — 27202125 HC BALLOON, EXTRACTION (ANY): Performed by: INTERNAL MEDICINE

## 2025-08-29 PROCEDURE — 43262 ENDO CHOLANGIOPANCREATOGRAPH: CPT | Performed by: INTERNAL MEDICINE

## 2025-08-29 PROCEDURE — 74328 X-RAY BILE DUCT ENDOSCOPY: CPT | Mod: TC | Performed by: INTERNAL MEDICINE

## 2025-08-29 PROCEDURE — 85025 COMPLETE CBC W/AUTO DIFF WBC: CPT | Performed by: HOSPITALIST

## 2025-08-29 PROCEDURE — 43264 ERCP REMOVE DUCT CALCULI: CPT | Mod: ,,, | Performed by: INTERNAL MEDICINE

## 2025-08-29 PROCEDURE — 25500020 PHARM REV CODE 255: Performed by: INTERNAL MEDICINE

## 2025-08-29 PROCEDURE — 37000008 HC ANESTHESIA 1ST 15 MINUTES: Performed by: INTERNAL MEDICINE

## 2025-08-29 PROCEDURE — 27201674 HC SPHINCTERTOME: Performed by: INTERNAL MEDICINE

## 2025-08-29 PROCEDURE — 63600175 PHARM REV CODE 636 W HCPCS: Performed by: NURSE ANESTHETIST, CERTIFIED REGISTERED

## 2025-08-29 PROCEDURE — 43264 ERCP REMOVE DUCT CALCULI: CPT | Performed by: INTERNAL MEDICINE

## 2025-08-29 PROCEDURE — C1769 GUIDE WIRE: HCPCS | Performed by: INTERNAL MEDICINE

## 2025-08-29 PROCEDURE — 25000003 PHARM REV CODE 250: Performed by: NURSE ANESTHETIST, CERTIFIED REGISTERED

## 2025-08-29 PROCEDURE — 82962 GLUCOSE BLOOD TEST: CPT | Performed by: INTERNAL MEDICINE

## 2025-08-29 RX ORDER — ONDANSETRON HYDROCHLORIDE 2 MG/ML
4 INJECTION, SOLUTION INTRAVENOUS DAILY PRN
Status: DISCONTINUED | OUTPATIENT
Start: 2025-08-29 | End: 2025-08-29 | Stop reason: HOSPADM

## 2025-08-29 RX ORDER — HALOPERIDOL LACTATE 5 MG/ML
0.5 INJECTION, SOLUTION INTRAMUSCULAR EVERY 10 MIN PRN
Status: DISCONTINUED | OUTPATIENT
Start: 2025-08-29 | End: 2025-08-29 | Stop reason: HOSPADM

## 2025-08-29 RX ORDER — FENTANYL CITRATE 50 UG/ML
25 INJECTION, SOLUTION INTRAMUSCULAR; INTRAVENOUS EVERY 5 MIN PRN
Refills: 0 | OUTPATIENT
Start: 2025-08-29

## 2025-08-29 RX ORDER — HYDROMORPHONE HYDROCHLORIDE 2 MG/ML
0.2 INJECTION, SOLUTION INTRAMUSCULAR; INTRAVENOUS; SUBCUTANEOUS EVERY 5 MIN PRN
Refills: 0 | OUTPATIENT
Start: 2025-08-29

## 2025-08-29 RX ORDER — OXYCODONE HYDROCHLORIDE 5 MG/1
5 TABLET ORAL
Refills: 0 | OUTPATIENT
Start: 2025-08-29

## 2025-08-29 RX ORDER — ACETAMINOPHEN 325 MG/1
650 TABLET ORAL EVERY 6 HOURS PRN
Status: DISCONTINUED | OUTPATIENT
Start: 2025-08-30 | End: 2025-08-31 | Stop reason: HOSPADM

## 2025-08-29 RX ORDER — PROPOFOL 10 MG/ML
VIAL (ML) INTRAVENOUS
Status: DISCONTINUED | OUTPATIENT
Start: 2025-08-29 | End: 2025-08-29

## 2025-08-29 RX ORDER — GLUCAGON 1 MG
1 KIT INJECTION
Status: DISCONTINUED | OUTPATIENT
Start: 2025-08-29 | End: 2025-08-29 | Stop reason: HOSPADM

## 2025-08-29 RX ORDER — LIDOCAINE HYDROCHLORIDE 20 MG/ML
INJECTION INTRAVENOUS
Status: DISCONTINUED | OUTPATIENT
Start: 2025-08-29 | End: 2025-08-29

## 2025-08-29 RX ADMIN — ENOXAPARIN SODIUM 80 MG: 80 INJECTION SUBCUTANEOUS at 11:08

## 2025-08-29 RX ADMIN — LEVETIRACETAM 1000 MG: 500 TABLET, FILM COATED ORAL at 08:08

## 2025-08-29 RX ADMIN — PROPOFOL 200 MCG/KG/MIN: 10 INJECTION, EMULSION INTRAVENOUS at 01:08

## 2025-08-29 RX ADMIN — ONDANSETRON 4 MG: 2 INJECTION INTRAMUSCULAR; INTRAVENOUS at 07:08

## 2025-08-29 RX ADMIN — OXYCODONE HYDROCHLORIDE 5 MG: 5 TABLET ORAL at 01:08

## 2025-08-29 RX ADMIN — SUCRALFATE 1 G: 1 SUSPENSION ORAL at 08:08

## 2025-08-29 RX ADMIN — ENOXAPARIN SODIUM 80 MG: 80 INJECTION SUBCUTANEOUS at 01:08

## 2025-08-29 RX ADMIN — OXYCODONE HYDROCHLORIDE 5 MG: 5 TABLET ORAL at 07:08

## 2025-08-29 RX ADMIN — OXYCODONE HYDROCHLORIDE 5 MG: 5 TABLET ORAL at 10:08

## 2025-08-29 RX ADMIN — PANTOPRAZOLE SODIUM 40 MG: 40 INJECTION, POWDER, FOR SOLUTION INTRAVENOUS at 07:08

## 2025-08-29 RX ADMIN — SODIUM CHLORIDE: 9 INJECTION, SOLUTION INTRAVENOUS at 07:08

## 2025-08-29 RX ADMIN — GABAPENTIN 100 MG: 100 CAPSULE ORAL at 08:08

## 2025-08-29 RX ADMIN — LIDOCAINE HYDROCHLORIDE 60 MG: 20 INJECTION INTRAVENOUS at 01:08

## 2025-08-29 RX ADMIN — LISINOPRIL 40 MG: 20 TABLET ORAL at 08:08

## 2025-08-29 RX ADMIN — PROPOFOL 80 MG: 10 INJECTION, EMULSION INTRAVENOUS at 01:08

## 2025-08-29 RX ADMIN — SIMETHICONE 80 MG: 80 TABLET, CHEWABLE ORAL at 07:08

## 2025-08-29 RX ADMIN — PROPOFOL 20 MG: 10 INJECTION, EMULSION INTRAVENOUS at 01:08

## 2025-08-29 RX ADMIN — ACETAMINOPHEN 650 MG: 325 TABLET ORAL at 11:08

## 2025-08-29 RX ADMIN — SODIUM CHLORIDE: 0.9 INJECTION, SOLUTION INTRAVENOUS at 01:08

## 2025-08-29 RX ADMIN — OXYCODONE HYDROCHLORIDE 5 MG: 5 TABLET ORAL at 08:08

## 2025-08-30 LAB
ALBUMIN SERPL BCP-MCNC: 2.8 G/DL (ref 3.5–5.2)
ALP SERPL-CCNC: 332 UNIT/L (ref 40–150)
ALT SERPL W/O P-5'-P-CCNC: 217 UNIT/L (ref 10–44)
ANION GAP (OHS): 9 MMOL/L (ref 8–16)
AST SERPL-CCNC: 101 UNIT/L (ref 11–45)
BILIRUB SERPL-MCNC: 0.9 MG/DL (ref 0.1–1)
BUN SERPL-MCNC: 6 MG/DL (ref 6–20)
CALCIUM SERPL-MCNC: 9.3 MG/DL (ref 8.7–10.5)
CHLORIDE SERPL-SCNC: 108 MMOL/L (ref 95–110)
CO2 SERPL-SCNC: 24 MMOL/L (ref 23–29)
CREAT SERPL-MCNC: 0.6 MG/DL (ref 0.5–1.4)
GFR SERPLBLD CREATININE-BSD FMLA CKD-EPI: >60 ML/MIN/1.73/M2
GLUCOSE SERPL-MCNC: 107 MG/DL (ref 70–110)
POCT GLUCOSE: 113 MG/DL (ref 70–110)
POCT GLUCOSE: 154 MG/DL (ref 70–110)
POCT GLUCOSE: 93 MG/DL (ref 70–110)
POTASSIUM SERPL-SCNC: 3.8 MMOL/L (ref 3.5–5.1)
PROT SERPL-MCNC: 6.6 GM/DL (ref 6–8.4)
SODIUM SERPL-SCNC: 141 MMOL/L (ref 136–145)

## 2025-08-30 PROCEDURE — 25000003 PHARM REV CODE 250: Performed by: INTERNAL MEDICINE

## 2025-08-30 PROCEDURE — 11000001 HC ACUTE MED/SURG PRIVATE ROOM

## 2025-08-30 PROCEDURE — 80053 COMPREHEN METABOLIC PANEL: CPT | Performed by: HOSPITALIST

## 2025-08-30 PROCEDURE — 63600175 PHARM REV CODE 636 W HCPCS: Performed by: HOSPITALIST

## 2025-08-30 PROCEDURE — 63600175 PHARM REV CODE 636 W HCPCS: Performed by: INTERNAL MEDICINE

## 2025-08-30 PROCEDURE — 36415 COLL VENOUS BLD VENIPUNCTURE: CPT | Performed by: HOSPITALIST

## 2025-08-30 PROCEDURE — 25000003 PHARM REV CODE 250: Performed by: HOSPITALIST

## 2025-08-30 RX ORDER — APIXABAN 5 MG (74)
KIT ORAL
Qty: 1 EACH | Refills: 0 | Status: SHIPPED | OUTPATIENT
Start: 2025-08-30 | End: 2025-08-30 | Stop reason: HOSPADM

## 2025-08-30 RX ADMIN — GABAPENTIN 100 MG: 100 CAPSULE ORAL at 08:08

## 2025-08-30 RX ADMIN — ENOXAPARIN SODIUM 80 MG: 80 INJECTION SUBCUTANEOUS at 11:08

## 2025-08-30 RX ADMIN — SUCRALFATE 1 G: 1 SUSPENSION ORAL at 08:08

## 2025-08-30 RX ADMIN — LEVETIRACETAM 1000 MG: 500 TABLET, FILM COATED ORAL at 08:08

## 2025-08-30 RX ADMIN — LIDOCAINE 1 PATCH: 50 PATCH TOPICAL at 11:08

## 2025-08-30 RX ADMIN — GABAPENTIN 100 MG: 100 CAPSULE ORAL at 02:08

## 2025-08-30 RX ADMIN — PANTOPRAZOLE SODIUM 40 MG: 40 INJECTION, POWDER, FOR SOLUTION INTRAVENOUS at 08:08

## 2025-08-30 RX ADMIN — TIZANIDINE 4 MG: 4 TABLET ORAL at 08:08

## 2025-08-30 RX ADMIN — ACETAMINOPHEN 650 MG: 325 TABLET ORAL at 08:08

## 2025-08-31 VITALS
HEART RATE: 54 BPM | HEIGHT: 63 IN | OXYGEN SATURATION: 91 % | WEIGHT: 186.06 LBS | BODY MASS INDEX: 32.97 KG/M2 | SYSTOLIC BLOOD PRESSURE: 153 MMHG | DIASTOLIC BLOOD PRESSURE: 77 MMHG | RESPIRATION RATE: 18 BRPM | TEMPERATURE: 99 F

## 2025-08-31 LAB
ALBUMIN SERPL BCP-MCNC: 2.9 G/DL (ref 3.5–5.2)
ALP SERPL-CCNC: 297 UNIT/L (ref 40–150)
ALT SERPL W/O P-5'-P-CCNC: 163 UNIT/L (ref 10–44)
ANION GAP (OHS): 8 MMOL/L (ref 8–16)
AST SERPL-CCNC: 64 UNIT/L (ref 11–45)
BILIRUB SERPL-MCNC: 0.7 MG/DL (ref 0.1–1)
BUN SERPL-MCNC: 6 MG/DL (ref 6–20)
CALCIUM SERPL-MCNC: 9.2 MG/DL (ref 8.7–10.5)
CHLORIDE SERPL-SCNC: 109 MMOL/L (ref 95–110)
CO2 SERPL-SCNC: 24 MMOL/L (ref 23–29)
CREAT SERPL-MCNC: 0.6 MG/DL (ref 0.5–1.4)
GFR SERPLBLD CREATININE-BSD FMLA CKD-EPI: >60 ML/MIN/1.73/M2
GLUCOSE SERPL-MCNC: 113 MG/DL (ref 70–110)
PLPETH BLD-MCNC: <10 NG/ML
POCT GLUCOSE: 124 MG/DL (ref 70–110)
POPETH BLD-MCNC: <10 NG/ML
POTASSIUM SERPL-SCNC: 3.8 MMOL/L (ref 3.5–5.1)
PROT SERPL-MCNC: 6.8 GM/DL (ref 6–8.4)
SODIUM SERPL-SCNC: 141 MMOL/L (ref 136–145)
TOXICOLOGIST REVIEW: NORMAL

## 2025-08-31 PROCEDURE — 80053 COMPREHEN METABOLIC PANEL: CPT | Performed by: HOSPITALIST

## 2025-08-31 PROCEDURE — 63600175 PHARM REV CODE 636 W HCPCS: Performed by: INTERNAL MEDICINE

## 2025-08-31 PROCEDURE — 36415 COLL VENOUS BLD VENIPUNCTURE: CPT | Performed by: HOSPITALIST

## 2025-08-31 PROCEDURE — 25000003 PHARM REV CODE 250: Performed by: INTERNAL MEDICINE

## 2025-08-31 RX ADMIN — LISINOPRIL 40 MG: 20 TABLET ORAL at 08:08

## 2025-08-31 RX ADMIN — LEVETIRACETAM 1000 MG: 500 TABLET, FILM COATED ORAL at 08:08

## 2025-08-31 RX ADMIN — GABAPENTIN 100 MG: 100 CAPSULE ORAL at 08:08

## 2025-08-31 RX ADMIN — PANTOPRAZOLE SODIUM 40 MG: 40 INJECTION, POWDER, FOR SOLUTION INTRAVENOUS at 08:08

## 2025-09-02 ENCOUNTER — OFFICE VISIT (OUTPATIENT)
Dept: FAMILY MEDICINE | Facility: CLINIC | Age: 60
End: 2025-09-02
Payer: COMMERCIAL

## 2025-09-02 VITALS
SYSTOLIC BLOOD PRESSURE: 140 MMHG | HEIGHT: 63 IN | OXYGEN SATURATION: 98 % | RESPIRATION RATE: 18 BRPM | TEMPERATURE: 99 F | BODY MASS INDEX: 34.3 KG/M2 | DIASTOLIC BLOOD PRESSURE: 90 MMHG | WEIGHT: 193.56 LBS | HEART RATE: 70 BPM

## 2025-09-02 DIAGNOSIS — Z09 HOSPITAL DISCHARGE FOLLOW-UP: ICD-10-CM

## 2025-09-02 DIAGNOSIS — R74.01 TRANSAMINITIS: Primary | ICD-10-CM

## 2025-09-02 DIAGNOSIS — I81 PORTAL VEIN THROMBOSIS: ICD-10-CM

## 2025-09-02 LAB
A1AT PHENOTYP SERPL-IMP: NORMAL
A1AT SERPL NEPH-MCNC: 130 MG/DL (ref 100–190)

## 2025-09-02 PROCEDURE — 99999 PR PBB SHADOW E&M-EST. PATIENT-LVL III: CPT | Mod: PBBFAC,,,

## 2025-09-03 ENCOUNTER — PATIENT OUTREACH (OUTPATIENT)
Dept: ADMINISTRATIVE | Facility: CLINIC | Age: 60
End: 2025-09-03
Payer: COMMERCIAL

## (undated) DEVICE — TROCAR ENDOPATH XCEL 12X100MM

## (undated) DEVICE — DRAPE ABDOMINAL TIBURON 14X11

## (undated) DEVICE — BOWL STERILE LARGE 32OZ

## (undated) DEVICE — DRAPE CORETEMP FLD WRM 56X62IN

## (undated) DEVICE — RELOAD ECHELON FLEX GRN 60MM

## (undated) DEVICE — SYR 10CC LUER LOCK

## (undated) DEVICE — ELECTRODE REM PLYHSV RETURN 9

## (undated) DEVICE — TROCAR ENDOPATH XCEL 5X100MM

## (undated) DEVICE — SHEARS HARMONIC 5CM 36CM

## (undated) DEVICE — TRAY MINOR GEN SURG OMC

## (undated) DEVICE — SUT MCRYL PLUS 4-0 PS2 27IN

## (undated) DEVICE — TUBING HF INSUFFLATION W/ FLTR

## (undated) DEVICE — RELOAD ECHELON FLEX BLU 60MM

## (undated) DEVICE — SUT 0 VICRYL / UR6 (J603)

## (undated) DEVICE — NDL HYPO REG 25G X 1 1/2

## (undated) DEVICE — CANNULA ENDOPATH XCEL 5X100MM

## (undated) DEVICE — TROCAR ENDOPATH XCEL 12MM 10CM

## (undated) DEVICE — Device